# Patient Record
Sex: FEMALE | Race: WHITE | NOT HISPANIC OR LATINO | Employment: OTHER | URBAN - METROPOLITAN AREA
[De-identification: names, ages, dates, MRNs, and addresses within clinical notes are randomized per-mention and may not be internally consistent; named-entity substitution may affect disease eponyms.]

---

## 2017-06-09 ENCOUNTER — OUTSIDE FACILITY SERVICE (OUTPATIENT)
Dept: PULMONOLOGY | Facility: CLINIC | Age: 77
End: 2017-06-09

## 2017-06-09 PROCEDURE — 99233 SBSQ HOSP IP/OBS HIGH 50: CPT | Performed by: INTERNAL MEDICINE

## 2017-10-05 ENCOUNTER — HOSPITAL ENCOUNTER (OUTPATIENT)
Dept: OTHER | Age: 77
Discharge: HOME | End: 2017-10-05
Attending: INTERNAL MEDICINE | Admitting: INTERNAL MEDICINE

## 2017-10-05 LAB
BASOPHILS ABSOLUTE: 0 K/UL (ref 0–0.1)
BASOPHILS RELATIVE PERCENT: 0.1 %
CHOLESTEROL, TOTAL: 125 MG/DL (ref 0–200)
EOSINOPHILS ABSOLUTE: 0.2 K/UL (ref 0–0.4)
EOSINOPHILS RELATIVE PERCENT: 2.7 %
HCT VFR BLD CALC: 31.8 % (ref 37–47)
HDLC SERPL-MCNC: 27 MG/DL (ref 40–60)
HEMOGLOBIN: 9.7 G/DL (ref 11.5–16.5)
LDL CHOLESTEROL CALCULATED: 57 MG/DL
LYMPHOCYTES ABSOLUTE: 2.3 K/UL (ref 1.5–4)
LYMPHOCYTES RELATIVE PERCENT: 31.5 % (ref 20–40)
MCH RBC QN AUTO: 28.8 PG (ref 27–32)
MCHC RBC AUTO-ENTMCNC: 30.5 G/DL (ref 31–35)
MCV RBC AUTO: 94.4 FL (ref 80–100)
MONOCYTES ABSOLUTE: 0.7 K/UL (ref 0.2–0.8)
MONOCYTES RELATIVE PERCENT: 9.1 % (ref 3–10)
NEUTROPHILS ABSOLUTE: 4.1 K/UL (ref 2–7.5)
NEUTROPHILS RELATIVE PERCENT: 56.6 %
PDW BLD-RTO: 15 % (ref 11–16)
PLATELET # BLD: 250 K/UL (ref 150–400)
PMV BLD AUTO: 11.2 FL (ref 6–10)
RBC # BLD: 3.37 M/UL (ref 3.8–5.8)
TRIGL SERPL-MCNC: 204 MG/DL (ref 0–249)
TSH SERPL DL<=0.05 MIU/L-ACNC: 0.94 UIU/ML (ref 0.35–5.5)
VITAMIN B-12: 549 PG/ML (ref 211–911)
VITAMIN D 25-HYDROXY: 28.4 (ref 32–100)
VLDLC SERPL CALC-MCNC: 41 MG/DL
WBC # BLD: 7.3 K/UL (ref 4–11)

## 2017-10-06 ENCOUNTER — HOSPITAL ENCOUNTER (OUTPATIENT)
Dept: OTHER | Age: 77
Discharge: HOME | End: 2017-10-06
Attending: INTERNAL MEDICINE | Admitting: INTERNAL MEDICINE

## 2017-10-06 LAB
BACTERIA: ABNORMAL /HPF
BILIRUBIN URINE: NEGATIVE
BLOOD, URINE: ABNORMAL
CLARITY: ABNORMAL
COLOR: YELLOW
CRYSTALS, UA: ABNORMAL /HPF
EPITHELIAL CELLS, UA: ABNORMAL /HPF
GLUCOSE URINE: NEGATIVE MG/DL
KETONES, URINE: NEGATIVE MG/DL
LEUKOCYTE ESTERASE, URINE: ABNORMAL
MICROSCOPIC EXAMINATION: YES
MUCUS: ABNORMAL /LPF
NITRITE, URINE: NEGATIVE
PH UA: >=9
PROTEIN UA: 100 MG/DL
RBC UA: ABNORMAL /HPF (ref 0–2)
SPECIFIC GRAVITY UA: 1.01
URINE REFLEX TO CULTURE: YES
URINE TYPE: ABNORMAL
UROBILINOGEN, URINE: 0.2 E.U./DL
WBC UA: ABNORMAL /HPF (ref 0–5)

## 2017-10-08 LAB
ORGANISM: ABNORMAL
URINE CULTURE, ROUTINE: ABNORMAL
URINE CULTURE, ROUTINE: ABNORMAL

## 2017-10-18 ENCOUNTER — HOSPITAL ENCOUNTER (OUTPATIENT)
Dept: OTHER | Age: 77
Discharge: HOME | End: 2017-10-18
Attending: INTERNAL MEDICINE | Admitting: INTERNAL MEDICINE

## 2017-10-21 ENCOUNTER — HOSPITAL ENCOUNTER (OUTPATIENT)
Dept: OTHER | Age: 77
Discharge: HOME | End: 2017-10-21
Attending: INTERNAL MEDICINE | Admitting: INTERNAL MEDICINE

## 2017-10-22 LAB
CULTURE, RESPIRATORY: ABNORMAL
CULTURE, RESPIRATORY: ABNORMAL
GRAM STAIN RESULT: ABNORMAL
ORGANISM: ABNORMAL

## 2017-10-23 LAB
CULTURE, RESPIRATORY: NORMAL
GRAM STAIN RESULT: NORMAL

## 2017-10-28 ENCOUNTER — HOSPITAL ENCOUNTER (OUTPATIENT)
Dept: OTHER | Age: 77
Discharge: HOME | End: 2017-10-28
Attending: INTERNAL MEDICINE | Admitting: INTERNAL MEDICINE

## 2017-10-28 LAB
BACTERIA: ABNORMAL /HPF
BILIRUBIN URINE: NEGATIVE
BLOOD, URINE: ABNORMAL
CLARITY: ABNORMAL
COLOR: YELLOW
GLUCOSE URINE: NEGATIVE MG/DL
KETONES, URINE: NEGATIVE MG/DL
LEUKOCYTE ESTERASE, URINE: ABNORMAL
MICROSCOPIC EXAMINATION: YES
NITRITE, URINE: NEGATIVE
PH UA: 6
PROTEIN UA: NEGATIVE MG/DL
RBC UA: ABNORMAL /HPF (ref 0–2)
SPECIFIC GRAVITY UA: 1.01
URINE REFLEX TO CULTURE: YES
URINE TYPE: ABNORMAL
UROBILINOGEN, URINE: 0.2 E.U./DL
WBC UA: >100 /HPF (ref 0–5)

## 2017-10-30 LAB
ORGANISM: ABNORMAL
ORGANISM: ABNORMAL
URINE CULTURE, ROUTINE: ABNORMAL

## 2017-11-14 ENCOUNTER — HOSPITAL ENCOUNTER (OUTPATIENT)
Dept: OTHER | Age: 77
Discharge: HOME | End: 2017-11-14
Attending: INTERNAL MEDICINE | Admitting: INTERNAL MEDICINE

## 2017-11-14 LAB
BACTERIA: ABNORMAL /HPF
BILIRUBIN URINE: NEGATIVE
BLOOD, URINE: NEGATIVE
CLARITY: CLEAR
COLOR: YELLOW
EPITHELIAL CELLS, UA: ABNORMAL /HPF
GLUCOSE URINE: NEGATIVE MG/DL
KETONES, URINE: NEGATIVE MG/DL
LEUKOCYTE ESTERASE, URINE: ABNORMAL
MICROSCOPIC EXAMINATION: YES
NITRITE, URINE: NEGATIVE
PH UA: 7
PROTEIN UA: NEGATIVE MG/DL
RBC UA: ABNORMAL /HPF (ref 0–2)
SPECIFIC GRAVITY UA: 1.01
URINE REFLEX TO CULTURE: YES
URINE TYPE: ABNORMAL
UROBILINOGEN, URINE: 0.2 E.U./DL
WBC UA: ABNORMAL /HPF (ref 0–5)

## 2017-11-16 LAB
ORGANISM: ABNORMAL
URINE CULTURE, ROUTINE: ABNORMAL
URINE CULTURE, ROUTINE: ABNORMAL

## 2017-12-02 PROBLEM — N30.01 ACUTE CYSTITIS WITH HEMATURIA: Status: ACTIVE | Noted: 2017-12-02

## 2017-12-03 PROBLEM — Z93.1 G TUBE FEEDINGS (HCC): Status: ACTIVE | Noted: 2017-12-03

## 2017-12-03 PROBLEM — R33.9 URINARY RETENTION: Status: ACTIVE | Noted: 2017-12-03

## 2017-12-03 PROBLEM — I95.9 HYPOTENSION: Status: ACTIVE | Noted: 2017-12-03

## 2017-12-03 PROBLEM — N18.9 ACUTE ON CHRONIC RENAL FAILURE (HCC): Status: ACTIVE | Noted: 2017-12-03

## 2017-12-03 PROBLEM — T68.XXXA HYPOTHERMIA: Status: ACTIVE | Noted: 2017-12-03

## 2017-12-03 PROBLEM — L89.312 DECUBITUS ULCER OF RIGHT BUTTOCK, STAGE 2 (HCC): Status: ACTIVE | Noted: 2017-12-03

## 2017-12-03 PROBLEM — E16.2 HYPOGLYCEMIA: Status: ACTIVE | Noted: 2017-12-03

## 2017-12-03 PROBLEM — N17.9 ACUTE ON CHRONIC RENAL FAILURE (HCC): Status: ACTIVE | Noted: 2017-12-03

## 2017-12-03 PROBLEM — D50.9 IRON DEFICIENCY ANEMIA: Status: ACTIVE | Noted: 2017-12-03

## 2017-12-03 PROBLEM — E11.29 TYPE 2 DIABETES MELLITUS WITH KIDNEY COMPLICATION, WITH LONG-TERM CURRENT USE OF INSULIN (HCC): Status: ACTIVE | Noted: 2017-12-03

## 2017-12-03 PROBLEM — R41.82 ALTERED MENTAL STATUS: Status: ACTIVE | Noted: 2017-12-03

## 2017-12-03 PROBLEM — Z79.4 TYPE 2 DIABETES MELLITUS WITH KIDNEY COMPLICATION, WITH LONG-TERM CURRENT USE OF INSULIN (HCC): Status: ACTIVE | Noted: 2017-12-03

## 2017-12-05 PROBLEM — N18.30 ACUTE RENAL FAILURE SUPERIMPOSED ON STAGE 3 CHRONIC KIDNEY DISEASE (HCC): Status: ACTIVE | Noted: 2017-12-03

## 2017-12-05 PROBLEM — E11.22 TYPE 2 DIABETES MELLITUS WITH STAGE 3 CHRONIC KIDNEY DISEASE, WITH LONG-TERM CURRENT USE OF INSULIN (HCC): Status: ACTIVE | Noted: 2017-12-03

## 2017-12-05 PROBLEM — N18.30 TYPE 2 DIABETES MELLITUS WITH STAGE 3 CHRONIC KIDNEY DISEASE, WITH LONG-TERM CURRENT USE OF INSULIN (HCC): Status: ACTIVE | Noted: 2017-12-03

## 2017-12-13 ENCOUNTER — HOSPITAL ENCOUNTER (OUTPATIENT)
Dept: OTHER | Age: 77
Discharge: HOME | End: 2017-12-14
Attending: INTERNAL MEDICINE | Admitting: INTERNAL MEDICINE

## 2017-12-13 ENCOUNTER — HOSPITAL ENCOUNTER (OUTPATIENT)
Dept: OTHER | Age: 77
Discharge: HOME | End: 2017-12-13
Attending: INTERNAL MEDICINE | Admitting: INTERNAL MEDICINE

## 2017-12-13 LAB
ANION GAP SERPL CALCULATED.3IONS-SCNC: 8 MMOL/L (ref 3–16)
BASOPHILS ABSOLUTE: 0 K/UL (ref 0–0.1)
BASOPHILS RELATIVE PERCENT: 0.3 %
BUN BLDV-MCNC: 12 MG/DL (ref 6–20)
CALCIUM SERPL-MCNC: 9 MG/DL (ref 8.5–10.5)
CHLORIDE BLD-SCNC: 99 MMOL/L (ref 98–107)
CO2: 34 MMOL/L (ref 20–30)
CREAT SERPL-MCNC: 0.6 MG/DL (ref 0.4–1.2)
EOSINOPHILS ABSOLUTE: 0.2 K/UL (ref 0–0.4)
EOSINOPHILS RELATIVE PERCENT: 2.1 %
GFR AFRICAN AMERICAN: >59
GFR NON-AFRICAN AMERICAN: >60
GLUCOSE BLD-MCNC: 230 MG/DL (ref 74–106)
HCT VFR BLD CALC: 35.4 % (ref 37–47)
HEMOGLOBIN: 10.6 G/DL (ref 11.5–16.5)
IMMATURE GRANULOCYTES #: 0
IMMATURE GRANULOCYTES %: 0.4 % (ref 0–5)
LYMPHOCYTES ABSOLUTE: 2.8 K/UL (ref 1.5–4)
LYMPHOCYTES RELATIVE PERCENT: 26 %
MCH RBC QN AUTO: 27.6 PG (ref 27–32)
MCHC RBC AUTO-ENTMCNC: 29.9 G/DL (ref 31–35)
MCV RBC AUTO: 92.2 FL (ref 80–100)
MONOCYTES ABSOLUTE: 0.7 K/UL (ref 0.2–0.8)
MONOCYTES RELATIVE PERCENT: 6.2 %
NEUTROPHILS ABSOLUTE: 7 K/UL (ref 2–7.5)
NEUTROPHILS RELATIVE PERCENT: 65 %
PDW BLD-RTO: 15.2 % (ref 11–16)
PLATELET # BLD: 249 K/UL (ref 150–400)
PMV BLD AUTO: 9.7 FL (ref 6–10)
POTASSIUM SERPL-SCNC: 4.3 MMOL/L (ref 3.4–5.1)
RBC # BLD: 3.84 M/UL (ref 3.8–5.8)
SODIUM BLD-SCNC: 141 MMOL/L (ref 136–145)
WBC # BLD: 10.7 K/UL (ref 4–11)

## 2017-12-20 ENCOUNTER — HOSPITAL ENCOUNTER (OUTPATIENT)
Dept: OTHER | Age: 77
Discharge: HOME | End: 2017-12-20
Attending: INTERNAL MEDICINE | Admitting: INTERNAL MEDICINE

## 2017-12-20 LAB
BACTERIA: ABNORMAL /HPF
BILIRUBIN URINE: NEGATIVE
BLOOD, URINE: ABNORMAL
CLARITY: ABNORMAL
COLOR: YELLOW
EPITHELIAL CELLS, UA: ABNORMAL /HPF
GLUCOSE URINE: 500 MG/DL
KETONES, URINE: NEGATIVE MG/DL
LEUKOCYTE ESTERASE, URINE: ABNORMAL
MICROSCOPIC EXAMINATION: YES
NITRITE, URINE: POSITIVE
PH UA: 7.5
PROTEIN UA: 30 MG/DL
RBC UA: ABNORMAL /HPF (ref 0–2)
SPECIFIC GRAVITY UA: 1.01
URINE REFLEX TO CULTURE: YES
URINE TYPE: ABNORMAL
UROBILINOGEN, URINE: 1 E.U./DL
WBC UA: >100 /HPF (ref 0–5)

## 2017-12-21 ENCOUNTER — HOSPITAL ENCOUNTER (OUTPATIENT)
Dept: OTHER | Age: 77
Discharge: HOME | End: 2017-12-21
Attending: INTERNAL MEDICINE | Admitting: INTERNAL MEDICINE

## 2017-12-21 LAB
ANION GAP SERPL CALCULATED.3IONS-SCNC: 10 MMOL/L (ref 3–16)
BASOPHILS ABSOLUTE: 0 K/UL (ref 0–0.1)
BASOPHILS RELATIVE PERCENT: 0.5 %
BUN BLDV-MCNC: 13 MG/DL (ref 6–20)
CALCIUM SERPL-MCNC: 9 MG/DL (ref 8.5–10.5)
CHLORIDE BLD-SCNC: 98 MMOL/L (ref 98–107)
CO2: 29 MMOL/L (ref 20–30)
CREAT SERPL-MCNC: <0.5 MG/DL (ref 0.4–1.2)
EOSINOPHILS ABSOLUTE: 0.3 K/UL (ref 0–0.4)
EOSINOPHILS RELATIVE PERCENT: 4.5 %
GFR AFRICAN AMERICAN: >59
GFR NON-AFRICAN AMERICAN: >60
GLUCOSE BLD-MCNC: 296 MG/DL (ref 74–106)
HCT VFR BLD CALC: 34.6 % (ref 37–47)
HEMOGLOBIN: 10.4 G/DL (ref 11.5–16.5)
IMMATURE GRANULOCYTES #: 0
IMMATURE GRANULOCYTES %: 0.2 % (ref 0–5)
LYMPHOCYTES ABSOLUTE: 2.5 K/UL (ref 1.5–4)
LYMPHOCYTES RELATIVE PERCENT: 39.9 %
MCH RBC QN AUTO: 27.8 PG (ref 27–32)
MCHC RBC AUTO-ENTMCNC: 30.1 G/DL (ref 31–35)
MCV RBC AUTO: 92.5 FL (ref 80–100)
MONOCYTES ABSOLUTE: 0.6 K/UL (ref 0.2–0.8)
MONOCYTES RELATIVE PERCENT: 8.9 %
NEUTROPHILS ABSOLUTE: 2.9 K/UL (ref 2–7.5)
NEUTROPHILS RELATIVE PERCENT: 46 %
PDW BLD-RTO: 16 % (ref 11–16)
PLATELET # BLD: 243 K/UL (ref 150–400)
PMV BLD AUTO: 10.8 FL (ref 6–10)
POTASSIUM SERPL-SCNC: 4.8 MMOL/L (ref 3.4–5.1)
RBC # BLD: 3.74 M/UL (ref 3.8–5.8)
SODIUM BLD-SCNC: 137 MMOL/L (ref 136–145)
WBC # BLD: 6.2 K/UL (ref 4–11)

## 2017-12-22 LAB — URINE CULTURE, ROUTINE: NORMAL

## 2018-01-05 ENCOUNTER — HOSPITAL ENCOUNTER (OUTPATIENT)
Dept: OTHER | Age: 78
Discharge: HOME | End: 2018-01-05
Attending: INTERNAL MEDICINE | Admitting: INTERNAL MEDICINE

## 2018-01-08 LAB
CULTURE, RESPIRATORY: ABNORMAL
GRAM STAIN RESULT: ABNORMAL
ORGANISM: ABNORMAL
ORGANISM: ABNORMAL

## 2018-02-20 ENCOUNTER — HOSPITAL ENCOUNTER (OUTPATIENT)
Dept: OTHER | Age: 78
Discharge: HOME | End: 2018-02-20
Attending: INTERNAL MEDICINE | Admitting: INTERNAL MEDICINE

## 2018-03-09 PROCEDURE — 99305 1ST NF CARE MODERATE MDM 35: CPT | Performed by: PEDIATRICS

## 2018-03-12 RX ORDER — OXYCODONE HYDROCHLORIDE 5 MG/1
5 TABLET ORAL EVERY 6 HOURS
Qty: 60 TABLET | Refills: 0 | Status: SHIPPED | OUTPATIENT
Start: 2018-03-12 | End: 2018-03-27 | Stop reason: SDUPTHER

## 2018-03-19 ENCOUNTER — TELEPHONE (OUTPATIENT)
Dept: PRIMARY CARE CLINIC | Age: 78
End: 2018-03-19

## 2018-03-20 ENCOUNTER — HOSPITAL ENCOUNTER (OUTPATIENT)
Dept: OTHER | Age: 78
Discharge: HOME | End: 2018-03-20
Attending: INTERNAL MEDICINE | Admitting: INTERNAL MEDICINE

## 2018-03-21 ENCOUNTER — OUTSIDE SERVICES (OUTPATIENT)
Dept: PRIMARY CARE CLINIC | Age: 78
End: 2018-03-21
Payer: MEDICARE

## 2018-03-21 DIAGNOSIS — D50.9 IRON DEFICIENCY ANEMIA, UNSPECIFIED IRON DEFICIENCY ANEMIA TYPE: ICD-10-CM

## 2018-03-21 DIAGNOSIS — Z79.4 TYPE 2 DIABETES MELLITUS WITH STAGE 3 CHRONIC KIDNEY DISEASE, WITH LONG-TERM CURRENT USE OF INSULIN (HCC): Primary | ICD-10-CM

## 2018-03-21 DIAGNOSIS — N18.30 STAGE 3 CHRONIC KIDNEY DISEASE (HCC): ICD-10-CM

## 2018-03-21 DIAGNOSIS — R79.89 ABNORMAL TSH: ICD-10-CM

## 2018-03-21 DIAGNOSIS — Z98.890 H/O TRACHEOSTOMY: ICD-10-CM

## 2018-03-21 DIAGNOSIS — N18.30 TYPE 2 DIABETES MELLITUS WITH STAGE 3 CHRONIC KIDNEY DISEASE, WITH LONG-TERM CURRENT USE OF INSULIN (HCC): Primary | ICD-10-CM

## 2018-03-21 DIAGNOSIS — Z93.1 G TUBE FEEDINGS (HCC): ICD-10-CM

## 2018-03-21 DIAGNOSIS — E11.22 TYPE 2 DIABETES MELLITUS WITH STAGE 3 CHRONIC KIDNEY DISEASE, WITH LONG-TERM CURRENT USE OF INSULIN (HCC): Primary | ICD-10-CM

## 2018-03-24 PROBLEM — Z98.890 H/O TRACHEOSTOMY: Status: ACTIVE | Noted: 2018-03-24

## 2018-03-24 ASSESSMENT — ENCOUNTER SYMPTOMS
SHORTNESS OF BREATH: 0
ABDOMINAL PAIN: 0
WHEEZING: 0
BACK PAIN: 0
SINUS PRESSURE: 0
VOMITING: 0
SORE THROAT: 0
EYE DISCHARGE: 0
COUGH: 0
NAUSEA: 0

## 2018-03-27 RX ORDER — OXYCODONE HYDROCHLORIDE 5 MG/1
5 TABLET ORAL EVERY 6 HOURS
Qty: 120 TABLET | Refills: 0 | Status: SHIPPED | OUTPATIENT
Start: 2018-03-27 | End: 2018-04-24 | Stop reason: SDUPTHER

## 2018-03-27 RX ORDER — OXYCODONE HYDROCHLORIDE 5 MG/1
5 TABLET ORAL EVERY 6 HOURS
Status: CANCELLED | OUTPATIENT
Start: 2018-03-27

## 2018-04-24 RX ORDER — OXYCODONE HYDROCHLORIDE 5 MG/1
5 TABLET ORAL EVERY 8 HOURS
Qty: 90 TABLET | Refills: 0 | Status: SHIPPED | OUTPATIENT
Start: 2018-04-24 | End: 2018-05-03 | Stop reason: ALTCHOICE

## 2018-05-03 ENCOUNTER — TELEPHONE (OUTPATIENT)
Dept: PRIMARY CARE CLINIC | Age: 78
End: 2018-05-03

## 2018-05-03 PROCEDURE — 99308 SBSQ NF CARE LOW MDM 20: CPT | Performed by: PEDIATRICS

## 2018-05-03 RX ORDER — ALBUTEROL SULFATE 2.5 MG/3ML
2.5 SOLUTION RESPIRATORY (INHALATION) EVERY 4 HOURS PRN
COMMUNITY

## 2018-05-03 RX ORDER — ONDANSETRON 4 MG/1
4 TABLET, FILM COATED ORAL EVERY 6 HOURS PRN
COMMUNITY

## 2018-05-03 RX ORDER — MIDODRINE HYDROCHLORIDE 10 MG/1
10 TABLET ORAL 3 TIMES DAILY
COMMUNITY

## 2018-05-03 RX ORDER — BUSPIRONE HYDROCHLORIDE 10 MG/1
10 TABLET ORAL 3 TIMES DAILY
COMMUNITY
End: 2018-08-22

## 2018-05-03 RX ORDER — OXYCODONE HYDROCHLORIDE 5 MG/1
5 CAPSULE ORAL EVERY 6 HOURS PRN
COMMUNITY
End: 2018-05-10 | Stop reason: SDUPTHER

## 2018-05-03 RX ORDER — FERROUS SULFATE 325(65) MG
325 TABLET ORAL
COMMUNITY
End: 2018-08-07 | Stop reason: SDUPTHER

## 2018-05-03 RX ORDER — CHLORAL HYDRATE 500 MG
1000 CAPSULE ORAL 2 TIMES DAILY
COMMUNITY

## 2018-05-03 RX ORDER — HYDROXYZINE HYDROCHLORIDE 25 MG/1
25 TABLET, FILM COATED ORAL DAILY
COMMUNITY
End: 2018-08-07 | Stop reason: ALTCHOICE

## 2018-05-03 RX ORDER — NAPHAZOLINE HCL/GLYCERIN 0.012-0.2%
DROPS OPHTHALMIC (EYE)
COMMUNITY
End: 2018-09-16

## 2018-05-10 RX ORDER — OXYCODONE HYDROCHLORIDE 5 MG/1
5 CAPSULE ORAL EVERY 6 HOURS PRN
Qty: 120 CAPSULE | Refills: 0 | Status: SHIPPED | OUTPATIENT
Start: 2018-05-10 | End: 2018-05-18 | Stop reason: SDUPTHER

## 2018-05-18 RX ORDER — OXYCODONE HYDROCHLORIDE 5 MG/1
5 CAPSULE ORAL EVERY 8 HOURS
Qty: 30 CAPSULE | Refills: 0 | Status: SHIPPED | OUTPATIENT
Start: 2018-05-18 | End: 2018-05-25 | Stop reason: SDUPTHER

## 2018-05-25 RX ORDER — OXYCODONE HYDROCHLORIDE 5 MG/1
5 CAPSULE ORAL EVERY 8 HOURS
Qty: 90 CAPSULE | Refills: 0 | Status: SHIPPED | OUTPATIENT
Start: 2018-05-25 | End: 2018-05-25 | Stop reason: SDUPTHER

## 2018-05-25 RX ORDER — OXYCODONE HYDROCHLORIDE 5 MG/1
5 CAPSULE ORAL EVERY 8 HOURS
Qty: 90 CAPSULE | Refills: 0 | Status: SHIPPED | OUTPATIENT
Start: 2018-05-25 | End: 2018-05-30 | Stop reason: SDUPTHER

## 2018-05-30 DIAGNOSIS — G89.29 OTHER CHRONIC PAIN: Primary | ICD-10-CM

## 2018-05-30 RX ORDER — OXYCODONE HYDROCHLORIDE 5 MG/1
5 CAPSULE ORAL EVERY 8 HOURS
Qty: 90 CAPSULE | Refills: 0 | Status: SHIPPED | OUTPATIENT
Start: 2018-05-30 | End: 2018-06-06

## 2018-06-06 DIAGNOSIS — M54.50 CHRONIC BILATERAL LOW BACK PAIN WITHOUT SCIATICA: Primary | ICD-10-CM

## 2018-06-06 DIAGNOSIS — G89.29 CHRONIC BILATERAL LOW BACK PAIN WITHOUT SCIATICA: Primary | ICD-10-CM

## 2018-06-06 RX ORDER — OXYCODONE HYDROCHLORIDE 5 MG/1
5 TABLET ORAL EVERY 8 HOURS
Qty: 90 TABLET | Refills: 0 | Status: SHIPPED | OUTPATIENT
Start: 2018-06-06 | End: 2018-07-18 | Stop reason: SDUPTHER

## 2018-07-10 DIAGNOSIS — F41.9 ANXIETY: Primary | ICD-10-CM

## 2018-07-10 RX ORDER — CLONAZEPAM 1 MG/1
1 TABLET ORAL 2 TIMES DAILY
Qty: 60 TABLET | Refills: 3 | Status: ON HOLD | OUTPATIENT
Start: 2018-07-10 | End: 2018-10-01 | Stop reason: SDUPTHER

## 2018-07-18 DIAGNOSIS — M54.50 CHRONIC BILATERAL LOW BACK PAIN WITHOUT SCIATICA: ICD-10-CM

## 2018-07-18 DIAGNOSIS — G89.29 CHRONIC BILATERAL LOW BACK PAIN WITHOUT SCIATICA: ICD-10-CM

## 2018-07-18 RX ORDER — OXYCODONE HYDROCHLORIDE 5 MG/1
5 TABLET ORAL EVERY 8 HOURS
Qty: 90 TABLET | Refills: 0 | Status: SHIPPED | OUTPATIENT
Start: 2018-07-18 | End: 2018-09-12 | Stop reason: SDUPTHER

## 2018-07-26 PROCEDURE — 99308 SBSQ NF CARE LOW MDM 20: CPT | Performed by: PEDIATRICS

## 2018-08-07 DIAGNOSIS — I10 ESSENTIAL HYPERTENSION: ICD-10-CM

## 2018-08-07 DIAGNOSIS — K21.9 GASTROESOPHAGEAL REFLUX DISEASE WITHOUT ESOPHAGITIS: ICD-10-CM

## 2018-08-07 DIAGNOSIS — J44.9 CHRONIC OBSTRUCTIVE PULMONARY DISEASE, UNSPECIFIED COPD TYPE (HCC): ICD-10-CM

## 2018-08-07 DIAGNOSIS — I35.0 NONRHEUMATIC AORTIC (VALVE) STENOSIS: ICD-10-CM

## 2018-08-07 DIAGNOSIS — R11.2 NAUSEA AND VOMITING, INTRACTABILITY OF VOMITING NOT SPECIFIED, UNSPECIFIED VOMITING TYPE: ICD-10-CM

## 2018-08-07 DIAGNOSIS — R13.10 DYSPHAGIA, UNSPECIFIED TYPE: ICD-10-CM

## 2018-08-07 DIAGNOSIS — R54 AGE-RELATED PHYSICAL DEBILITY: ICD-10-CM

## 2018-08-07 DIAGNOSIS — I50.30 DIASTOLIC HEART FAILURE, UNSPECIFIED HF CHRONICITY (HCC): ICD-10-CM

## 2018-08-07 PROBLEM — F03.90 DEMENTIA WITHOUT BEHAVIORAL DISTURBANCE (HCC): Status: ACTIVE | Noted: 2018-08-07

## 2018-08-07 PROBLEM — I25.10 ATHEROSCLEROSIS OF NATIVE CORONARY ARTERY WITHOUT ANGINA PECTORIS: Status: ACTIVE | Noted: 2018-08-07

## 2018-08-07 RX ORDER — ROPINIROLE 4 MG/1
4 TABLET, FILM COATED ORAL 3 TIMES DAILY
Qty: 30 TABLET | Refills: 0 | COMMUNITY
Start: 2018-08-07

## 2018-08-07 RX ORDER — FERROUS SULFATE 325(65) MG
325 TABLET ORAL 2 TIMES DAILY
Qty: 30 TABLET | COMMUNITY
Start: 2018-08-07

## 2018-08-12 ENCOUNTER — APPOINTMENT (OUTPATIENT)
Dept: GENERAL RADIOLOGY | Facility: HOSPITAL | Age: 78
End: 2018-08-12
Payer: MEDICARE

## 2018-08-12 ENCOUNTER — HOSPITAL ENCOUNTER (EMERGENCY)
Facility: HOSPITAL | Age: 78
Discharge: OTHER INSTITUTION | End: 2018-08-12
Attending: EMERGENCY MEDICINE
Payer: MEDICARE

## 2018-08-12 VITALS
TEMPERATURE: 97.2 F | OXYGEN SATURATION: 100 % | WEIGHT: 163 LBS | DIASTOLIC BLOOD PRESSURE: 56 MMHG | BODY MASS INDEX: 28.88 KG/M2 | HEIGHT: 63 IN | HEART RATE: 73 BPM | RESPIRATION RATE: 18 BRPM | SYSTOLIC BLOOD PRESSURE: 106 MMHG

## 2018-08-12 DIAGNOSIS — R06.89 DYSPNEA AND RESPIRATORY ABNORMALITIES: Primary | ICD-10-CM

## 2018-08-12 DIAGNOSIS — J95.00 TRACHEOSTOMY COMPLICATION, UNSPECIFIED COMPLICATION TYPE (HCC): ICD-10-CM

## 2018-08-12 DIAGNOSIS — R79.89 ELEVATED BRAIN NATRIURETIC PEPTIDE (BNP) LEVEL: ICD-10-CM

## 2018-08-12 DIAGNOSIS — R06.00 DYSPNEA AND RESPIRATORY ABNORMALITIES: Primary | ICD-10-CM

## 2018-08-12 LAB
ANION GAP SERPL CALCULATED.3IONS-SCNC: 7 MMOL/L (ref 3–16)
BASOPHILS ABSOLUTE: 0 K/UL (ref 0–0.1)
BASOPHILS RELATIVE PERCENT: 0.5 %
BUN BLDV-MCNC: 15 MG/DL (ref 6–20)
CALCIUM SERPL-MCNC: 9.4 MG/DL (ref 8.5–10.5)
CHLORIDE BLD-SCNC: 99 MMOL/L (ref 98–107)
CO2: 32 MMOL/L (ref 20–30)
CREAT SERPL-MCNC: <0.5 MG/DL (ref 0.4–1.2)
EOSINOPHILS ABSOLUTE: 0.2 K/UL (ref 0–0.4)
EOSINOPHILS RELATIVE PERCENT: 2.1 %
GFR AFRICAN AMERICAN: >59
GFR NON-AFRICAN AMERICAN: >60
GLUCOSE BLD-MCNC: 253 MG/DL (ref 74–106)
HCT VFR BLD CALC: 38 % (ref 37–47)
HEMOGLOBIN: 11.6 G/DL (ref 11.5–16.5)
IMMATURE GRANULOCYTES #: 0 K/UL
IMMATURE GRANULOCYTES %: 0.4 % (ref 0–5)
LYMPHOCYTES ABSOLUTE: 2.9 K/UL (ref 1.5–4)
LYMPHOCYTES RELATIVE PERCENT: 36.1 %
MCH RBC QN AUTO: 29.5 PG (ref 27–32)
MCHC RBC AUTO-ENTMCNC: 30.5 G/DL (ref 31–35)
MCV RBC AUTO: 96.7 FL (ref 80–100)
MONOCYTES ABSOLUTE: 0.7 K/UL (ref 0.2–0.8)
MONOCYTES RELATIVE PERCENT: 9.1 %
NEUTROPHILS ABSOLUTE: 4.2 K/UL (ref 2–7.5)
NEUTROPHILS RELATIVE PERCENT: 51.8 %
PDW BLD-RTO: 13.2 % (ref 11–16)
PLATELET # BLD: 170 K/UL (ref 150–400)
PMV BLD AUTO: 10.7 FL (ref 6–10)
POTASSIUM SERPL-SCNC: 4.3 MMOL/L (ref 3.4–5.1)
PRO-BNP: 2715 PG/ML (ref 0–1800)
RBC # BLD: 3.93 M/UL (ref 3.8–5.8)
SODIUM BLD-SCNC: 138 MMOL/L (ref 136–145)
TROPONIN: <0.3 NG/ML
WBC # BLD: 8.1 K/UL (ref 4–11)

## 2018-08-12 PROCEDURE — 71045 X-RAY EXAM CHEST 1 VIEW: CPT

## 2018-08-12 PROCEDURE — 80048 BASIC METABOLIC PNL TOTAL CA: CPT

## 2018-08-12 PROCEDURE — 31720 CLEARANCE OF AIRWAYS: CPT

## 2018-08-12 PROCEDURE — 87070 CULTURE OTHR SPECIMN AEROBIC: CPT

## 2018-08-12 PROCEDURE — 87040 BLOOD CULTURE FOR BACTERIA: CPT

## 2018-08-12 PROCEDURE — 99285 EMERGENCY DEPT VISIT HI MDM: CPT

## 2018-08-12 PROCEDURE — 85025 COMPLETE CBC W/AUTO DIFF WBC: CPT

## 2018-08-12 PROCEDURE — 36415 COLL VENOUS BLD VENIPUNCTURE: CPT

## 2018-08-12 PROCEDURE — 87205 SMEAR GRAM STAIN: CPT

## 2018-08-12 PROCEDURE — 93005 ELECTROCARDIOGRAM TRACING: CPT

## 2018-08-12 PROCEDURE — 84484 ASSAY OF TROPONIN QUANT: CPT

## 2018-08-12 PROCEDURE — 83880 ASSAY OF NATRIURETIC PEPTIDE: CPT

## 2018-08-12 RX ORDER — FUROSEMIDE 40 MG/1
20 TABLET ORAL 2 TIMES DAILY
Qty: 6 TABLET | Refills: 0 | Status: SHIPPED | OUTPATIENT
Start: 2018-08-12

## 2018-08-12 NOTE — ED TRIAGE NOTES
Pt. To er per Twan Alvarado at [facility] inner cannula from trach was missing they gave pt. A oxycodone re-checked her 1 1/2 hrs later and sats were decreased to 93 % tried to replace cannula and sats dropped to 90% so did not replace cannula. Pt.  With c/o SOB

## 2018-08-12 NOTE — ED PROVIDER NOTES
[address] [place] [address] Court  eMERGENCY dEPARTMENT eNCOUnter      Pt Name: Radha Curry  MRN: [id number]  [place]: 1940  Date of evaluation: 1/27/3078  Provider: Ana Teixeira MD    [address] [place] [address]       Chief Complaint   Patient presents with    Shortness of Breath     per Nursing Home pt. inner cannula missing pt. sats 98% NH reports gave her a pain pill and 1 1/2 hours later attempted to replace canula sats decreased 90 and pt. C/O SOB         HISTORY OF PRESENT ILLNESS  (Location/Symptom, Timing/Onset, Context/Setting, Quality, Duration, Modifying Factors, Severity.)   Radha Curry is a 66 y.o. female who presents to the emergency department for dyspnea and hypoxia. She has a size 6 Shiley trach. She apparently lost her inner cannula and the nurse at the NH was unable to re-insert the inner cannula. Patient's sats reportedly dropped to 88%. Before arrival, we communicated with the nursing staff and asked them to perform deep suction. EMS reports O2 sats of 98% upon their arrival with the patient on her baseline oxygen of 2-3LNC. Patient told the nurse she had some dyspnea but told me this is baseline for her. Nursing notes were reviewed. REVIEW OF SYSTEMS    (2-9 systems for level 4, 10 or more for level 5)   ROS:  General:  No fevers, no chills, no weakness  HEENT: No sore throat, runny nose or ear pain  Cardiovascular:  No chest pain, no palpitations  Respiratory:  + shortness of breath, no cough, no wheezing  Gastrointestinal:  No pain, no nausea, no vomiting, no diarrhea  Musculoskeletal:  No muscle pain, no joint pain  Skin:  No rash, no easy bruising  Genitourinary:  No dysuria, no hematuria    Except as noted above the remainder of the review of systems was reviewed and negative.        PAST MEDICAL HISTORY     Past Medical History:   Diagnosis Date    Anemia     Anxiety     CAD (coronary artery disease)     CHF (congestive heart failure) (HCC)     Chronic MULTIVITAMIN-MINERALS) TABLET    Take 1 tablet by mouth daily    OMEGA-3 1000 MG CAPS    Take 1,000 mg by mouth 2 times daily    ONDANSETRON (ZOFRAN) 4 MG TABLET    Take 4 mg by mouth every 6 hours as needed for Nausea or Vomiting    OXYCODONE (ROXICODONE) 5 MG IMMEDIATE RELEASE TABLET    Take 1 tablet by mouth every 8 hours for 30 days. Lauri Hawley PANTOPRAZOLE (PROTONIX) 40 MG TABLET    Take 40 mg by mouth daily    POLYETHYLENE GLYCOL (GLYCOLAX) POWDER    Take 17 g by mouth daily    ROPINIROLE (REQUIP) 4 MG TABLET    Take 1 tablet by mouth 3 times daily    SODIUM BICARBONATE 325 MG TABLET    Take 325 mg by mouth 4 times daily    SOFT LENS PRODUCTS (SALINE) SOLN    by Does not apply route       ALLERGIES     Penicillins and Sulfa antibiotics    FAMILY HISTORY     History reviewed. No pertinent family history. SOCIAL HISTORY       Social History     Social History    Marital status:      Spouse name: N/A    Number of children: N/A    Years of education: N/A     Social History Main Topics    Smoking status: Former Smoker    Smokeless tobacco: Never Used    Alcohol use No    Drug use: No    Sexual activity: Not Asked     Other Topics Concern    None     Social History Narrative    None         PHYSICAL EXAM    (up to 7 for level 4, 8 or more for level 5)     ED Triage Vitals [08/12/18 0636]   BP Temp Temp src Pulse Resp SpO2 Height Weight   -- -- -- 80 -- 93 % 5' 2.5\" (1.588 m) 163 lb (73.9 kg)       Physical Exam  General :Patient is awake, alert, oriented, in no acute distress, nontoxic appearing  HEENT: Pupils are equally round and reactive to light, EOMI. TRACH:  Size 6: Inner cannula placed by MD during physical exam.  Cardiac: Heart regular rate, rhythm, no murmurs, rubs, or gallops  Lungs: Upper respiratory congestive sounds but lungs are clear to auscultation, there is no wheezing, rhonchi, or rales. Abdomen: Abdomen is soft, nontender, nondistended.    Musculoskeletal: Ambulatory  Back: No midline or bony tenderness. Dermatology: Skin is warm and dry  Psych: Mentation is grossly normal, cognition is grossly normal. Affect is appropriate. DIAGNOSTIC RESULTS       RADIOLOGY:   Non-plain film images such as CT, Ultrasound and MRI are read by the radiologist. Plain radiographic images are visualized and preliminarily interpreted by the emergency physician with the below findings:      [] Radiologist's Report Reviewed:  XR CHEST PORTABLE    (Results Pending)         ED BEDSIDE ULTRASOUND:   Performed by ED Physician - none    LABS:  Labs Reviewed   CULTURE BLOOD #1   RESPIRATORY CULTURE   CBC WITH AUTO DIFFERENTIAL   [address] PANEL   TROPONIN       I have reviewed and interpreted all of the currently available lab results from this visit (if applicable):  No results found for this visit on 08/12/18. All other labs were within normal range or not returned as of this dictation. EMERGENCY DEPARTMENT COURSE and DIFFERENTIAL DIAGNOSIS/MDM:   Vitals:    Vitals:    08/12/18 0636   Pulse: 80   SpO2: 93%   Weight: 163 lb (73.9 kg)   Height: 5' 2.5\" (1.588 m)     Will check basic labs including BNP and troponin. Will check chest x-ray. Respiratory removed a large mucous plug.    0700  I have signed out [facility]'s Emergency Department care to [address]. We discussed the pertinent history, physical exam, completed/pending test results (if applicable) and current treatment plan. Please refer to his/her chart for the patients remaining Emergency Department course and final disposition. CONSULTS:  None    PROCEDURES:  Procedures    CRITICAL CARE TIME    Total Critical Care time was 0 minutes, excluding separately reportable procedures. There was a high probability of clinically significant/life threatening deterioration in the patient's condition which required my urgent intervention. FINAL IMPRESSION    No diagnosis found.       DISPOSITION/PLAN

## 2018-08-12 NOTE — ED PROVIDER NOTES
CREATININE <0.5 0.4 - 1.2 mg/dL    GFR Non-African American >60 >59    GFR African American >59 >59    Calcium 9.4 8.5 - 10.5 mg/dL   Troponin   Result Value Ref Range    Troponin <0.30 <0.30 ng/mL     Xr Chest Portable    Result Date: 8/12/2018  EXAM: XR CHEST PORTABLE INDICATION: dyspnea, COMPARISON: May 14, 2018 FINDINGS: Medical Devices: Tracheostomy tube overlies the tracheal air column. Lungs: Small left pleural effusion. No pneumothorax. Heart and Mediastinum: Mild cardiomegaly. Atherosclerotic plaque in the aorta. Other: Postsurgical changes in the right humerus. 1. Small left pleural effusion. 2. Tracheostomy tube. EKG interpreted by me as normal sinus rhythm at 65 bpm, sinus arrhythmia present, first-degree AV block, NV interval 220 ms, no acute ST elevation, there is T-wave inversion in 3, aVF as well as lateral precordial leads. compared to old EKG from May 14, 2018 similar changes, no acute significant change. Final ED Course and MDM: In brief, Wojciech Francisco is a 66 y.o. female whose care was signed out to me by the outgoing provider. In brief, patient presented to losing her in her tracheostomy cannula, unable to be replaced by the nursing facility. This was replaced by overnight physician upon arrival, oxygen saturations in the upper 90s on her trach collar. No acute complaints per the patient. Chest x-ray, IV, lab work obtained, BNP slightly elevated, no other significant abnormalities. Patient eating a full breakfast, without acute complaints, we discussed placing her on a diuretic for a few days, following up with her clinician at her facility, referral to cardiology and echocardiography is indicated. Vital signs stable. Any worsening symptoms or further concerns to return to the emergency department. ED Medication Orders     None          Final Impression      1. Dyspnea and respiratory abnormalities    2. Tracheostomy complication, unspecified complication type ([address]    3.

## 2018-08-14 LAB
CULTURE, RESPIRATORY: NORMAL
GRAM STAIN RESULT: NORMAL

## 2018-08-17 LAB — BLOOD CULTURE, ROUTINE: NORMAL

## 2018-08-22 ENCOUNTER — TELEPHONE (OUTPATIENT)
Dept: PRIMARY CARE CLINIC | Age: 78
End: 2018-08-22

## 2018-08-22 RX ORDER — BUSPIRONE HYDROCHLORIDE 15 MG/1
15 TABLET ORAL 3 TIMES DAILY
Qty: 90 TABLET | Refills: 3 | Status: SHIPPED | OUTPATIENT
Start: 2018-08-22

## 2018-08-26 ENCOUNTER — OUTSIDE SERVICES (OUTPATIENT)
Dept: PRIMARY CARE CLINIC | Age: 78
End: 2018-08-26
Payer: MEDICARE

## 2018-08-26 DIAGNOSIS — I25.10 ATHEROSCLEROSIS OF NATIVE CORONARY ARTERY OF NATIVE HEART WITHOUT ANGINA PECTORIS: ICD-10-CM

## 2018-08-26 DIAGNOSIS — G89.29 OTHER CHRONIC PAIN: ICD-10-CM

## 2018-08-26 DIAGNOSIS — F03.90 DEMENTIA WITHOUT BEHAVIORAL DISTURBANCE, UNSPECIFIED DEMENTIA TYPE: ICD-10-CM

## 2018-08-26 DIAGNOSIS — I10 ESSENTIAL HYPERTENSION: ICD-10-CM

## 2018-08-26 DIAGNOSIS — E11.22 TYPE 2 DIABETES MELLITUS WITH STAGE 3 CHRONIC KIDNEY DISEASE, WITH LONG-TERM CURRENT USE OF INSULIN (HCC): Primary | ICD-10-CM

## 2018-08-26 DIAGNOSIS — D50.9 IRON DEFICIENCY ANEMIA, UNSPECIFIED IRON DEFICIENCY ANEMIA TYPE: ICD-10-CM

## 2018-08-26 DIAGNOSIS — R13.10 DYSPHAGIA, UNSPECIFIED TYPE: ICD-10-CM

## 2018-08-26 DIAGNOSIS — Z98.890 H/O TRACHEOSTOMY: ICD-10-CM

## 2018-08-26 DIAGNOSIS — I35.0 NONRHEUMATIC AORTIC (VALVE) STENOSIS: ICD-10-CM

## 2018-08-26 DIAGNOSIS — G25.81 RESTLESS LEG: ICD-10-CM

## 2018-08-26 DIAGNOSIS — N18.30 STAGE 3 CHRONIC KIDNEY DISEASE (HCC): ICD-10-CM

## 2018-08-26 DIAGNOSIS — K21.9 GASTROESOPHAGEAL REFLUX DISEASE WITHOUT ESOPHAGITIS: ICD-10-CM

## 2018-08-26 DIAGNOSIS — J44.9 CHRONIC OBSTRUCTIVE PULMONARY DISEASE, UNSPECIFIED COPD TYPE (HCC): ICD-10-CM

## 2018-08-26 DIAGNOSIS — R54 AGE-RELATED PHYSICAL DEBILITY: ICD-10-CM

## 2018-08-26 DIAGNOSIS — N18.30 TYPE 2 DIABETES MELLITUS WITH STAGE 3 CHRONIC KIDNEY DISEASE, WITH LONG-TERM CURRENT USE OF INSULIN (HCC): Primary | ICD-10-CM

## 2018-08-26 DIAGNOSIS — I50.9 CONGESTIVE HEART FAILURE, UNSPECIFIED HF CHRONICITY, UNSPECIFIED HEART FAILURE TYPE (HCC): ICD-10-CM

## 2018-08-26 DIAGNOSIS — Z79.4 TYPE 2 DIABETES MELLITUS WITH STAGE 3 CHRONIC KIDNEY DISEASE, WITH LONG-TERM CURRENT USE OF INSULIN (HCC): Primary | ICD-10-CM

## 2018-08-26 DIAGNOSIS — F41.9 ANXIETY: ICD-10-CM

## 2018-08-26 DIAGNOSIS — I50.30 DIASTOLIC HEART FAILURE, UNSPECIFIED HF CHRONICITY (HCC): ICD-10-CM

## 2018-08-26 PROBLEM — I95.9 HYPOTENSION: Status: RESOLVED | Noted: 2017-12-03 | Resolved: 2018-08-26

## 2018-08-26 PROBLEM — E16.2 HYPOGLYCEMIA: Status: RESOLVED | Noted: 2017-12-03 | Resolved: 2018-08-26

## 2018-08-26 PROBLEM — N30.01 ACUTE CYSTITIS WITH HEMATURIA: Status: RESOLVED | Noted: 2017-12-02 | Resolved: 2018-08-26

## 2018-08-26 PROBLEM — R41.82 ALTERED MENTAL STATUS: Status: RESOLVED | Noted: 2017-12-03 | Resolved: 2018-08-26

## 2018-08-26 PROBLEM — R11.2 NAUSEA WITH VOMITING: Status: RESOLVED | Noted: 2018-08-07 | Resolved: 2018-08-26

## 2018-08-26 PROBLEM — N17.9 ACUTE RENAL FAILURE SUPERIMPOSED ON STAGE 3 CHRONIC KIDNEY DISEASE (HCC): Status: RESOLVED | Noted: 2017-12-03 | Resolved: 2018-08-26

## 2018-08-26 PROBLEM — L89.312 DECUBITUS ULCER OF RIGHT BUTTOCK, STAGE 2 (HCC): Status: RESOLVED | Noted: 2017-12-03 | Resolved: 2018-08-26

## 2018-08-26 PROBLEM — Z93.1 G TUBE FEEDINGS (HCC): Status: RESOLVED | Noted: 2017-12-03 | Resolved: 2018-08-26

## 2018-08-26 PROBLEM — T68.XXXA HYPOTHERMIA: Status: RESOLVED | Noted: 2017-12-03 | Resolved: 2018-08-26

## 2018-08-26 PROBLEM — R33.9 URINARY RETENTION: Status: RESOLVED | Noted: 2017-12-03 | Resolved: 2018-08-26

## 2018-08-26 ASSESSMENT — ENCOUNTER SYMPTOMS
SINUS PRESSURE: 0
SORE THROAT: 0
COUGH: 0
SORE THROAT: 0
COUGH: 0
NAUSEA: 0
SHORTNESS OF BREATH: 0
BACK PAIN: 0
BACK PAIN: 0
VOMITING: 0
ABDOMINAL PAIN: 0
EYE DISCHARGE: 0
WHEEZING: 0
ABDOMINAL PAIN: 0
SHORTNESS OF BREATH: 0
SINUS PRESSURE: 0
EYE DISCHARGE: 0
VOMITING: 0
NAUSEA: 0
WHEEZING: 0

## 2018-08-26 NOTE — PROGRESS NOTES
[place]  History and Physical:    Mackenzie Ortiz   : 18    Interval history:    The patient, Mackenzie Ortiz, is a 66 y.o. female who is a resident at [facility] and [facility]. The patient is currently doing well on the current medication regimin and intervention plan. The patient has been complaining of some worsening anxiety and trouble sleeping. She was started on clonazepam 1 mg bid a few weeks ago. She reports this is helping her anxiety. She still has intermittent cough with mucus production from her tracheostomy. Patient's past medical, surgical, social and family histories were reviewed as documented in previous note/chart. Medication list and allergies reviewed as documented in the paper chart of the facility. Some recent changes to medication list may not be shown  Please referred to medication records at the facility for updated medications list.    Current Outpatient Prescriptions   Medication Sig Dispense Refill    busPIRone (BUSPAR) 15 MG tablet Take 15 mg by mouth 3 times daily 90 tablet 3    furosemide (LASIX) 40 MG tablet Take 0.5 tablets by mouth 2 times daily 6 tablet 0    rOPINIRole (REQUIP) 4 MG tablet Take 1 tablet by mouth 3 times daily 30 tablet 0    ferrous sulfate 325 (65 Fe) MG tablet Take 1 tablet by mouth 2 times daily 30 tablet     clonazePAM (KLONOPIN) 1 MG tablet Take 1 tablet by mouth 2 times daily for 120 days. . 60 tablet 3    midodrine (PROAMATINE) 10 MG tablet Take 10 mg by mouth 3 times daily      Omega-3 1000 MG CAPS Take 1,000 mg by mouth 2 times daily      ondansetron (ZOFRAN) 4 MG tablet Take 4 mg by mouth every 6 hours as needed for Nausea or Vomiting      Soft Lens Products (SALINE) SOLN by Does not apply route      albuterol (PROVENTIL) (2.5 MG/3ML) 0.083% nebulizer solution Take 2.5 mg by nebulization every 4 hours as needed for Wheezing      insulin glargine (LANTUS) 100 UNIT/ML injection vial Inject 20 Units into the skin nightly 1 vial 0    aspirin 81 MG chewable tablet Take 81 mg by mouth daily      docusate sodium (COLACE) 100 MG capsule Take 100 mg by mouth daily      polyethylene glycol (GLYCOLAX) powder Take 17 g by mouth daily      pantoprazole (PROTONIX) 40 MG tablet Take 40 mg by mouth daily      sodium bicarbonate 325 MG tablet Take 325 mg by mouth 4 times daily      Multiple Vitamins-Minerals (THERAPEUTIC MULTIVITAMIN-MINERALS) tablet Take 1 tablet by mouth daily      atorvastatin (LIPITOR) 40 MG tablet Take 40 mg by mouth daily      enoxaparin (LOVENOX) 40 MG/0.4ML injection Inject into the skin daily      Arginine POWD 1 packet by Gastrostomy Tube route 2 times daily      acetaminophen (TYLENOL) 500 MG tablet Take 500 mg by mouth every 4 hours as needed for Pain or Fever      acetylcysteine (MUCOMYST) 10 % nebulizer solution Inhale 4 mLs into the lungs every 4 hours as needed      ipratropium-albuterol (DUONEB) 0.5-2.5 (3) MG/3ML SOLN nebulizer solution Inhale 1 vial into the lungs every 4 hours       No current facility-administered medications for this visit. Review of Systems   Constitutional: Negative for chills and fever. HENT: Negative for congestion, sinus pressure and sore throat. Eyes: Negative for discharge and visual disturbance. Respiratory: Negative for cough, shortness of breath and wheezing. Mucus from trach   Cardiovascular: Negative for chest pain and palpitations. Gastrointestinal: Negative for abdominal pain, nausea and vomiting. Endocrine: Negative for cold intolerance and heat intolerance. Genitourinary: Negative for dysuria, frequency and urgency. Musculoskeletal: Negative for arthralgias and back pain. Skin: Negative for rash and wound. Neurological: Negative for syncope, numbness and headaches. Hematological: Negative. Psychiatric/Behavioral: Positive for sleep disturbance. Negative for agitation.  The patient is (L) 05/14/2018    GLOB 3.4 05/14/2018       Lab Results   Component Value Date    WBC 8.1 08/12/2018    HGB 11.6 08/12/2018    HCT 38.0 08/12/2018    MCV 96.7 08/12/2018     08/12/2018       Lab Results   Component Value Date    TSH 0.26 (L) 12/02/2017       No results found for: LABA1C    ASSESSMENT/PLAN:    Diagnosis Orders   1. Type 2 diabetes mellitus with stage 3 chronic kidney disease, with long-term current use of insulin ([facility]     2. Iron deficiency anemia, unspecified iron deficiency anemia type     3. H/O tracheostomy     4. Stage 3 chronic kidney disease     5. Atherosclerosis of native coronary artery of native heart without angina pectoris     6. Gastroesophageal reflux disease without esophagitis     7. Dementia without behavioral disturbance, unspecified dementia type     8. Chronic obstructive pulmonary disease, unspecified COPD type ([facility]     9. Essential hypertension     10. Dysphagia, unspecified type     11. Age-related physical debility     12. Nonrheumatic aortic (valve) stenosis     13. Restless leg     14. Other chronic pain     15. Anxiety     16. Congestive heart failure, unspecified HF chronicity, unspecified heart failure type ([facility]       Clonazepam recently increased from 0.5 mg bid to 1 mg bid due to break through anxiety and trouble sleeping. Will monitor of any adverse effects. Counseled patient again regarding having thickened liquids and following dietary recommendations, she is aware of the risk and reports she will eat and drink what she chooses. Other plan: May crush meds, open caps if appropriate and not contraindicated  May use generic substitutes unless contraindicated  May D/C any PRN meds not used for 3 months. I approve dosage equivalent interchanges for all dosage forms where clinically appropriate. Notify me of all therapeutic drug levels/lab tests per policy. May participate in all planned activities.   May see podiatrist, dentist, optometrist,

## 2018-08-26 NOTE — PROGRESS NOTES
for agitation and sleep disturbance. The patient is not nervous/anxious. Vital signs  Vital signs reviewed per chart and within normal limits per nurses report, currently ordered once weekly unless change in condition. Physical Exam   Constitutional: She is oriented to person, place, and time. She appears well-developed and well-nourished. No distress. HENT:   Head: Normocephalic and atraumatic. Nose: Nose normal.   Mouth/Throat: Oropharynx is clear and moist.   Eyes: Pupils are equal, round, and reactive to light. Conjunctivae and EOM are normal. Right eye exhibits no discharge. Left eye exhibits no discharge. No scleral icterus. Neck: Normal range of motion. Neck supple. No JVD present. No tracheal deviation present. No thyromegaly present. Cardiovascular: Normal rate, regular rhythm and normal heart sounds. No murmur heard. Pulmonary/Chest: Effort normal and breath sounds normal. No stridor. No respiratory distress. She has no wheezes. She has no rales. She exhibits no tenderness. occ referred upper air way sounds, trach   Abdominal: Soft. Bowel sounds are normal. She exhibits no distension and no mass. There is no tenderness. There is no rebound and no guarding. Musculoskeletal: She exhibits no edema or tenderness. Lymphadenopathy:     She has no cervical adenopathy. Neurological: She is alert and oriented to person, place, and time. Skin: Skin is warm and dry. No rash noted. She is not diaphoretic. Psychiatric: She has a normal mood and affect. Nursing note and vitals reviewed.       Lab Results   Component Value Date     08/12/2018    K 4.3 08/12/2018    CL 99 08/12/2018    CO2 32 (H) 08/12/2018    BUN 15 08/12/2018    CREATININE <0.5 08/12/2018    GLUCOSE 253 (H) 08/12/2018    CALCIUM 9.4 08/12/2018    PROT 5.6 (L) 05/14/2018    LABALBU 2.2 (L) 05/14/2018    BILITOT 0.4 05/14/2018    ALKPHOS 113 (H) 05/14/2018    AST 76 (H) 05/14/2018    ALT 61 (H) 05/14/2018 LABGLOM >60 08/12/2018    GFRAA >59 08/12/2018    AGRATIO 0.6 (L) 05/14/2018    GLOB 3.4 05/14/2018       Lab Results   Component Value Date    WBC 8.1 08/12/2018    HGB 11.6 08/12/2018    HCT 38.0 08/12/2018    MCV 96.7 08/12/2018     08/12/2018       Lab Results   Component Value Date    TSH 0.26 (L) 12/02/2017       No results found for: LABA1C    ASSESSMENT/PLAN:    Diagnosis Orders   1. Type 2 diabetes mellitus with stage 3 chronic kidney disease, with long-term current use of insulin ([facility]     2. Iron deficiency anemia, unspecified iron deficiency anemia type     3. Stage 3 chronic kidney disease     4. Atherosclerosis of native coronary artery of native heart without angina pectoris     5. Gastroesophageal reflux disease without esophagitis     6. H/O tracheostomy     7. Nonrheumatic aortic (valve) stenosis     8. Age-related physical debility     9. Essential hypertension     10. Chronic obstructive pulmonary disease, unspecified COPD type ([facility]     11. Dementia without behavioral disturbance, unspecified dementia type     12. Diastolic heart failure, unspecified HF chronicity ([facility]     13. Other chronic pain     14. Restless leg     15. Anxiety         Other plan: May crush meds, open caps if appropriate and not contraindicated  May use generic substitutes unless contraindicated  May D/C any PRN meds not used for 3 months. I approve dosage equivalent interchanges for all dosage forms where clinically appropriate. Notify me of all therapeutic drug levels/lab tests per policy. May participate in all planned activities. May see podiatrist, dentist, optometrist, audiology, dietician, and psychiatrist PRN and follow recommendations. Resident may visit outside facility for therapeutic leave with supervision and meds as needed. May have therapeutic diet restrictions lifted for holidays and special occasions not to exceed 2 x monthly.   May have alcoholic beverages in moderation at special parties unless contraindicated by history of alcohol and substance abuse. Diet consistency may be altered PRN except for liquid diet not to exceed 24 hours. The current treatment regimen was reviewed. I plan to continue other medications as currently written and follow labs as clinically indicated. The plan is to continue to provide assistance with mobility, nutrition and safety at the facility and continue current plan of care. A review of medications, labs, acitivty, diet, diagnosis and medical records completed.     Eris Bernal MD on 5/03/2018 at 5:37 PM

## 2018-09-12 DIAGNOSIS — G89.29 CHRONIC BILATERAL LOW BACK PAIN WITHOUT SCIATICA: ICD-10-CM

## 2018-09-12 DIAGNOSIS — M54.50 CHRONIC BILATERAL LOW BACK PAIN WITHOUT SCIATICA: ICD-10-CM

## 2018-09-12 RX ORDER — OXYCODONE HYDROCHLORIDE 5 MG/1
5 TABLET ORAL EVERY 8 HOURS
Qty: 90 TABLET | Refills: 0 | Status: ON HOLD | OUTPATIENT
Start: 2018-09-12 | End: 2018-10-02

## 2018-09-16 ENCOUNTER — APPOINTMENT (OUTPATIENT)
Dept: GENERAL RADIOLOGY | Facility: HOSPITAL | Age: 78
End: 2018-09-16
Payer: MEDICARE

## 2018-09-16 ENCOUNTER — HOSPITAL ENCOUNTER (EMERGENCY)
Facility: HOSPITAL | Age: 78
Discharge: NURSING, REHAB OR LONG-TERM CARE | End: 2018-09-16
Attending: EMERGENCY MEDICINE
Payer: MEDICARE

## 2018-09-16 VITALS
RESPIRATION RATE: 20 BRPM | SYSTOLIC BLOOD PRESSURE: 92 MMHG | OXYGEN SATURATION: 98 % | WEIGHT: 172 LBS | TEMPERATURE: 98.7 F | BODY MASS INDEX: 31.65 KG/M2 | HEIGHT: 62 IN | DIASTOLIC BLOOD PRESSURE: 54 MMHG | HEART RATE: 81 BPM

## 2018-09-16 DIAGNOSIS — R06.02 SHORTNESS OF BREATH: Primary | ICD-10-CM

## 2018-09-16 PROCEDURE — 31720 CLEARANCE OF AIRWAYS: CPT

## 2018-09-16 PROCEDURE — 99285 EMERGENCY DEPT VISIT HI MDM: CPT

## 2018-09-16 RX ORDER — TERBUTALINE SULFATE 1 MG/ML
INJECTION, SOLUTION SUBCUTANEOUS
COMMUNITY

## 2018-09-16 ASSESSMENT — ENCOUNTER SYMPTOMS
EYE REDNESS: 0
WHEEZING: 0
TROUBLE SWALLOWING: 0
SHORTNESS OF BREATH: 1
ABDOMINAL PAIN: 0
EYE DISCHARGE: 0
SINUS PRESSURE: 0
NAUSEA: 0
SORE THROAT: 0
RHINORRHEA: 0
COUGH: 0
CHEST TIGHTNESS: 0
EYE PAIN: 0
BACK PAIN: 0
DIARRHEA: 0
VOMITING: 0
CONSTIPATION: 0

## 2018-09-16 NOTE — ED NOTES
Azar Cyr from [facility] called back, and was updated on patient's BP. No verbalized concerns.      Sabrina Zhu RN  09/16/18 [id number]

## 2018-09-16 NOTE — ED NOTES
BP per machine 85/32. Manual BP 92/54 per Doneen Friday, RN checking.      Gamaliel Hughes RN  09/16/18 [id number]

## 2018-09-16 NOTE — ED TRIAGE NOTES
Patient is resident of [facility]. Sent because SOA. Patient has trach. Suctioned per respiratory. Obtained small amount of thick, yellow sputum. Patient states she became SOA fifteen minutes ago. Nurse there thinks patient may have mucous plug.

## 2018-09-16 NOTE — ED NOTES
Felipe Zapien, respiratory therapist continues to suction patient and administered albuterol neb.      Ursula Orellana RN  09/16/18 1318

## 2018-09-16 NOTE — ED PROVIDER NOTES
[address] [place] [address] Court  eMERGENCY dEPARTMENT eNCOUnter      Pt Name: Helen Malhotra  MRN: [id number]  [place] 1940  Date of evaluation: 9/16/2018  Provider: MD Mir Olmos       Chief Complaint   Patient presents with    Shortness of Breath         HISTORY OF PRESENT ILLNESS   (Location/Symptom, Timing/Onset, Context/Setting, Quality, Duration, Modifying Factors, Severity)  Note limiting factors. Helen Malhotra is a 66 y.o. female who presents to the emergency department because of shortness of breath. Patient has a trach and probably occluded. No fever. O2 sat 93%. Nursing Notes were reviewed. REVIEW OF SYSTEMS    (2-9 systems for level 4, 10 or more for level 5)     Review of Systems   Constitutional: Negative for chills and fever. HENT: Negative for congestion, ear pain, postnasal drip, rhinorrhea, sinus pressure, sneezing, sore throat and trouble swallowing. Eyes: Negative for pain, discharge and redness. Respiratory: Positive for shortness of breath. Negative for cough, chest tightness and wheezing. Cardiovascular: Negative for chest pain, palpitations and leg swelling. Gastrointestinal: Negative for abdominal pain, constipation, diarrhea, nausea and vomiting. Genitourinary: Negative for dysuria, flank pain, frequency, hematuria and urgency. Musculoskeletal: Negative for back pain, joint swelling and neck pain. Skin: Negative for pallor and rash. Neurological: Negative for dizziness, syncope, weakness, numbness and headaches. Psychiatric/Behavioral: Negative for confusion and hallucinations. The patient is not nervous/anxious.             PAST MEDICAL HISTORY     Past Medical History:   Diagnosis Date    Anemia     Anxiety     CAD (coronary artery disease)     [facility]est[facility] failure) [facility]     Chronic kidney disease     stage 3    Constipation     [facility]     Dementia     Diabetes mellitus ([address]     Dysphagia     Femur fracture (HCC)     GERD (gastroesophageal reflux disease)     Hypercapnia     Hyperlipidemia     Hypertension     Hypotension     Larynx disorder     Nausea & vomiting     Obesity     Respiratory failure (HCC)     Restless leg syndrome     Stenosis of aortic valve          SURGICAL HISTORY       Past Surgical History:   Procedure Laterality Date    ARM SURGERY Right     GASTROSTOMY      LEG SURGERY Right     TRACHEOSTOMY           CURRENT MEDICATIONS       Previous Medications    ACETAMINOPHEN (TYLENOL) 500 MG TABLET    Take 500 mg by mouth every 4 hours as needed for Pain or Fever    ACETYLCYSTEINE (MUCOMYST) 10 % NEBULIZER SOLUTION    Inhale 4 mLs into the lungs every 4 hours as needed    ALBUTEROL (PROVENTIL) (2.5 MG/3ML) 0.083% NEBULIZER SOLUTION    Take 2.5 mg by nebulization every 4 hours as needed for Wheezing    ARGININE POWD    1 packet by Gastrostomy Tube route 2 times daily    ASPIRIN 81 MG CHEWABLE TABLET    Take 81 mg by mouth daily    ATORVASTATIN (LIPITOR) 40 MG TABLET    Take 40 mg by mouth daily    BUSPIRONE (BUSPAR) 15 MG TABLET    Take 15 mg by mouth 3 times daily    CLONAZEPAM (KLONOPIN) 1 MG TABLET    Take 1 tablet by mouth 2 times daily for 120 days. .    DOCUSATE SODIUM (COLACE) 100 MG CAPSULE    Take 100 mg by mouth daily    ENOXAPARIN (LOVENOX) 40 MG/0.4ML INJECTION    Inject into the skin daily    FERROUS SULFATE 325 (65 FE) MG TABLET    Take 1 tablet by mouth 2 times daily    FUROSEMIDE (LASIX) 40 MG TABLET    Take 0.5 tablets by mouth 2 times daily    INSULIN GLARGINE (LANTUS) 100 UNIT/ML INJECTION VIAL    Inject 20 Units into the skin nightly    IPRATROPIUM-ALBUTEROL (DUONEB) 0.5-2.5 (3) MG/3ML SOLN NEBULIZER SOLUTION    Inhale 1 vial into the lungs every 4 hours as needed     MIDODRINE (PROAMATINE) 10 MG TABLET    Take 10 mg by mouth 3 times daily    MULTIPLE VITAMINS-MINERALS (THERAPEUTIC MULTIVITAMIN-MINERALS) TABLET    Take 1 tablet by mouth daily    NUTRITIONAL SUPPLEMENTS (PROMOTE PO)    Take 60 mL/hr by mouth    OMEGA-3 1000 MG CAPS    Take 1,000 mg by mouth 2 times daily    ONDANSETRON (ZOFRAN) 4 MG TABLET    Take 4 mg by mouth every 6 hours as needed for Nausea or Vomiting    OXYCODONE (ROXICODONE) 5 MG IMMEDIATE RELEASE TABLET    Take 1 tablet by mouth every 8 hours for 30 days. Alonzo Garciasper PANTOPRAZOLE (PROTONIX) 40 MG TABLET    Take 40 mg by mouth daily    POLYETHYLENE GLYCOL (GLYCOLAX) POWDER    Take 17 g by mouth daily    ROPINIROLE (REQUIP) 4 MG TABLET    Take 1 tablet by mouth 3 times daily    SODIUM BICARBONATE 325 MG TABLET    Take 325 mg by mouth 4 times daily    TERBUTALINE (BRETHINE) 1 MG/ML INJECTION    Inject 0.25 mg into the skin once       ALLERGIES     Penicillins and Sulfa antibiotics    FAMILY HISTORY     No family history on file. SOCIAL HISTORY       Social History     Social History    Marital status:      Spouse name: N/A    Number of children: N/A    Years of education: N/A     Social History Main Topics    Smoking status: Former Smoker    Smokeless tobacco: Never Used    Alcohol use No    Drug use: No    Sexual activity: Not on file     Other Topics Concern    Not on file     Social History Narrative    No narrative on file       SCREENINGS             PHYSICAL EXAM    (up to 7 for level 4, 8 or more for level 5)     ED Triage Vitals [09/16/18 0355]   BP Temp Temp Source Pulse Resp SpO2 Height Weight   (!) 142/66 98.7 °F (37.1 °C) Oral 112 (!) 36 93 % 5' 2\" (1.575 m) 172 lb (78 kg)       Physical Exam   Constitutional: She is oriented to person, place, and time. She appears well-developed and well-nourished. HENT:   Head: Normocephalic and atraumatic. Trach in place   Eyes: Pupils are equal, round, and reactive to light. Conjunctivae and EOM are normal.   Neck: Neck supple. Cardiovascular: Normal rate and regular rhythm.     Pulmonary/Chest: Effort normal and breath sounds normal. No respiratory distress. She has no wheezes. Abdominal: Soft. Bowel sounds are normal.   Musculoskeletal: Normal range of motion. Neurological: She is alert and oriented to person, place, and time. Skin: Skin is warm and dry. Psychiatric: She has a normal mood and affect. DIAGNOSTIC RESULTS     EKG: All EKG's are interpreted by the Emergency Department Physician who either signs or Co-signs this chart in the absence of a cardiologist.        RADIOLOGY:   Non-plain film images such as CT, Ultrasound and MRI are read by the radiologist. Plain radiographic images are visualized and preliminarily interpreted by the emergency physician with the below findings:        Interpretation per the Radiologist below, if available at the time of this note:    No orders to display         ED BEDSIDE ULTRASOUND:   Performed by ED Physician - none    LABS:  Labs Reviewed - No data to display    All other labs were within normal range or not returned as of this dictation. EMERGENCY DEPARTMENT COURSE and DIFFERENTIAL DIAGNOSIS/MDM:   Vitals:    Vitals:    09/16/18 0355   BP: (!) 142/66   Pulse: 112   Resp: (!) 36   Temp: 98.7 °F (37.1 °C)   TempSrc: Oral   SpO2: 93%   Weight: 172 lb (78 kg)   Height: 5' 2\" (1.575 m)           CRITICAL CARE TIME   Total Critical Care time was  minutes, excluding separately reportable procedures. There was a high probability of clinically significant/life threatening deterioration in the patient's condition which required my urgent intervention. CONSULTS:  None    PROCEDURES:  None    PROGRESS NOTES:    Respiratory suctioned patient, removed and cleaned trach which was occluded with secretions. O2 sat 100%, patient much better. Will d/c back to NH. FINAL IMPRESSION      1.  Shortness of breath          Final diagnoses:   Shortness of breath       DISPOSITION/PLAN   DISPOSITION Decision To Discharge 09/16/2018 04:16:40 AM      PATIENT REFERRED TO:  Tracie Berman DO  [facility] [address]  [phone]      If symptoms worsen      DISCHARGE MEDICATIONS:  New Prescriptions    No medications on file         (Please note that portions of this note may have been completed with a voice recognition program.  Efforts were made to edit the dictations but occasionally words are mis-transcribed.)    Ramona Coffey MD (electronically signed)  Attending Emergency Physician        Carolin Oakes MD  09/16/18 9117

## 2018-09-16 NOTE — ED NOTES
Patient departed ED per [facility]. No respiratory distress noted.       Amish Bridges RN  09/16/18 1868

## 2018-09-19 ENCOUNTER — TELEPHONE (OUTPATIENT)
Dept: PRIMARY CARE CLINIC | Age: 78
End: 2018-09-19

## 2018-09-19 DIAGNOSIS — E11.22 TYPE 2 DIABETES MELLITUS WITH STAGE 3 CHRONIC KIDNEY DISEASE, WITH LONG-TERM CURRENT USE OF INSULIN (HCC): Primary | ICD-10-CM

## 2018-09-19 DIAGNOSIS — N18.30 TYPE 2 DIABETES MELLITUS WITH STAGE 3 CHRONIC KIDNEY DISEASE, WITH LONG-TERM CURRENT USE OF INSULIN (HCC): Primary | ICD-10-CM

## 2018-09-19 DIAGNOSIS — Z79.4 TYPE 2 DIABETES MELLITUS WITH STAGE 3 CHRONIC KIDNEY DISEASE, WITH LONG-TERM CURRENT USE OF INSULIN (HCC): Primary | ICD-10-CM

## 2018-09-21 ENCOUNTER — HOSPITAL ENCOUNTER (OUTPATIENT)
Facility: HOSPITAL | Age: 78
Discharge: HOME | End: 2018-09-21
Payer: MEDICARE

## 2018-09-21 LAB
HBA1C MFR BLD: 9.5 %
HCT VFR BLD CALC: 37.8 % (ref 37–47)
HEMOGLOBIN: 11.6 G/DL (ref 11.5–16.5)

## 2018-09-21 PROCEDURE — 83036 HEMOGLOBIN GLYCOSYLATED A1C: CPT

## 2018-09-21 PROCEDURE — 85014 HEMATOCRIT: CPT

## 2018-09-21 PROCEDURE — 85018 HEMOGLOBIN: CPT

## 2018-09-24 RX ORDER — INSULIN GLARGINE 100 [IU]/ML
30 INJECTION, SOLUTION SUBCUTANEOUS NIGHTLY
Qty: 1 VIAL | Refills: 0 | Status: SHIPPED | OUTPATIENT
Start: 2018-09-24

## 2018-10-01 ENCOUNTER — APPOINTMENT (OUTPATIENT)
Dept: GENERAL RADIOLOGY | Facility: HOSPITAL | Age: 78
DRG: 194 | End: 2018-10-01
Payer: MEDICARE

## 2018-10-01 ENCOUNTER — HOSPITAL ENCOUNTER (INPATIENT)
Facility: HOSPITAL | Age: 78
LOS: 4 days | Discharge: ANOTHER HOSPITAL | DRG: 194 | End: 2018-10-06
Attending: EMERGENCY MEDICINE | Admitting: INTERNAL MEDICINE
Payer: MEDICARE

## 2018-10-01 DIAGNOSIS — F41.9 ANXIETY: ICD-10-CM

## 2018-10-01 DIAGNOSIS — Z43.0 TRACHEOSTOMY, ACUTE MANAGEMENT (HCC): ICD-10-CM

## 2018-10-01 DIAGNOSIS — R50.9 FEBRILE ILLNESS: ICD-10-CM

## 2018-10-01 DIAGNOSIS — D72.829 LEUKOCYTOSIS, UNSPECIFIED TYPE: Primary | ICD-10-CM

## 2018-10-01 DIAGNOSIS — J18.9 PNEUMONIA DUE TO ORGANISM: ICD-10-CM

## 2018-10-01 DIAGNOSIS — N39.0 ACUTE UTI: ICD-10-CM

## 2018-10-01 LAB
BASOPHILS ABSOLUTE: 0 K/UL (ref 0–0.1)
BASOPHILS RELATIVE PERCENT: 0.2 %
EOSINOPHILS ABSOLUTE: 0 K/UL (ref 0–0.4)
EOSINOPHILS RELATIVE PERCENT: 0.1 %
HCT VFR BLD CALC: 39.4 % (ref 37–47)
HEMOGLOBIN: 12.4 G/DL (ref 11.5–16.5)
IMMATURE GRANULOCYTES #: 0.1 K/UL
IMMATURE GRANULOCYTES %: 0.6 % (ref 0–5)
LACTIC ACID: 1.9 MMOL/L (ref 0.4–2)
LYMPHOCYTES ABSOLUTE: 3.6 K/UL (ref 1.5–4)
LYMPHOCYTES RELATIVE PERCENT: 18.1 %
MCH RBC QN AUTO: 29.8 PG (ref 27–32)
MCHC RBC AUTO-ENTMCNC: 31.5 G/DL (ref 31–35)
MCV RBC AUTO: 94.7 FL (ref 80–100)
MONOCYTES ABSOLUTE: 1.3 K/UL (ref 0.2–0.8)
MONOCYTES RELATIVE PERCENT: 6.5 %
NEUTROPHILS ABSOLUTE: 14.9 K/UL (ref 2–7.5)
NEUTROPHILS RELATIVE PERCENT: 74.5 %
PDW BLD-RTO: 13.8 % (ref 11–16)
PLATELET # BLD: 291 K/UL (ref 150–400)
PMV BLD AUTO: 11.2 FL (ref 6–10)
RBC # BLD: 4.16 M/UL (ref 3.8–5.8)
WBC # BLD: 20.1 K/UL (ref 4–11)

## 2018-10-01 PROCEDURE — 81001 URINALYSIS AUTO W/SCOPE: CPT

## 2018-10-01 PROCEDURE — 84484 ASSAY OF TROPONIN QUANT: CPT

## 2018-10-01 PROCEDURE — 87040 BLOOD CULTURE FOR BACTERIA: CPT

## 2018-10-01 PROCEDURE — 83605 ASSAY OF LACTIC ACID: CPT

## 2018-10-01 PROCEDURE — 2580000003 HC RX 258: Performed by: EMERGENCY MEDICINE

## 2018-10-01 PROCEDURE — 31502 CHANGE OF WINDPIPE AIRWAY: CPT

## 2018-10-01 PROCEDURE — 85025 COMPLETE CBC W/AUTO DIFF WBC: CPT

## 2018-10-01 PROCEDURE — 83880 ASSAY OF NATRIURETIC PEPTIDE: CPT

## 2018-10-01 PROCEDURE — 71045 X-RAY EXAM CHEST 1 VIEW: CPT

## 2018-10-01 PROCEDURE — 36415 COLL VENOUS BLD VENIPUNCTURE: CPT

## 2018-10-01 PROCEDURE — 31720 CLEARANCE OF AIRWAYS: CPT

## 2018-10-01 PROCEDURE — 99285 EMERGENCY DEPT VISIT HI MDM: CPT

## 2018-10-01 PROCEDURE — 80053 COMPREHEN METABOLIC PANEL: CPT

## 2018-10-01 RX ORDER — 0.9 % SODIUM CHLORIDE 0.9 %
1000 INTRAVENOUS SOLUTION INTRAVENOUS ONCE
Status: COMPLETED | OUTPATIENT
Start: 2018-10-01 | End: 2018-10-02

## 2018-10-01 RX ADMIN — SODIUM CHLORIDE 1000 ML: 9 INJECTION, SOLUTION INTRAVENOUS at 23:44

## 2018-10-01 ASSESSMENT — PAIN SCALES - GENERAL: PAINLEVEL_OUTOF10: 0

## 2018-10-02 PROBLEM — N30.00 ACUTE CYSTITIS: Status: ACTIVE | Noted: 2018-10-02

## 2018-10-02 PROBLEM — J18.9 PNEUMONIA: Status: ACTIVE | Noted: 2018-10-02

## 2018-10-02 PROBLEM — E11.9 TYPE 2 DIABETES MELLITUS (HCC): Status: ACTIVE | Noted: 2018-10-02

## 2018-10-02 LAB
A/G RATIO: 0.7 (ref 0.8–2)
ALBUMIN SERPL-MCNC: 3.3 G/DL (ref 3.4–4.8)
ALP BLD-CCNC: 84 U/L (ref 25–100)
ALT SERPL-CCNC: 9 U/L (ref 4–36)
ANION GAP SERPL CALCULATED.3IONS-SCNC: 9 MMOL/L (ref 3–16)
AST SERPL-CCNC: 14 U/L (ref 8–33)
BACTERIA: ABNORMAL /HPF
BILIRUB SERPL-MCNC: 0.8 MG/DL (ref 0.3–1.2)
BILIRUBIN URINE: NEGATIVE
BLOOD, URINE: NEGATIVE
BUN BLDV-MCNC: 17 MG/DL (ref 6–20)
CALCIUM SERPL-MCNC: 9.3 MG/DL (ref 8.5–10.5)
CHLORIDE BLD-SCNC: 100 MMOL/L (ref 98–107)
CLARITY: ABNORMAL
CO2: 31 MMOL/L (ref 20–30)
COLOR: YELLOW
COMMENT UA: ABNORMAL
CREAT SERPL-MCNC: 0.7 MG/DL (ref 0.4–1.2)
EPITHELIAL CELLS, UA: ABNORMAL /HPF
GFR AFRICAN AMERICAN: >59
GFR NON-AFRICAN AMERICAN: >60
GLOBULIN: 4.7 G/DL
GLUCOSE BLD-MCNC: 125 MG/DL (ref 74–106)
GLUCOSE BLD-MCNC: 209 MG/DL (ref 74–106)
GLUCOSE BLD-MCNC: 307 MG/DL (ref 74–106)
GLUCOSE BLD-MCNC: 347 MG/DL (ref 74–106)
GLUCOSE BLD-MCNC: 383 MG/DL (ref 74–106)
GLUCOSE URINE: NEGATIVE MG/DL
KETONES, URINE: NEGATIVE MG/DL
LEUKOCYTE ESTERASE, URINE: ABNORMAL
MICROSCOPIC EXAMINATION: YES
MUCUS: ABNORMAL /LPF
NITRITE, URINE: NEGATIVE
PERFORMED ON: ABNORMAL
PH UA: 7.5
POTASSIUM SERPL-SCNC: 3.5 MMOL/L (ref 3.4–5.1)
PRO-BNP: 1668 PG/ML (ref 0–1800)
PROTEIN UA: 100 MG/DL
RBC UA: ABNORMAL /HPF (ref 0–2)
SODIUM BLD-SCNC: 140 MMOL/L (ref 136–145)
SPECIFIC GRAVITY UA: 1.02
TOTAL PROTEIN: 8 G/DL (ref 6.4–8.3)
TROPONIN: <0.3 NG/ML
URINE REFLEX TO CULTURE: YES
URINE TYPE: ABNORMAL
UROBILINOGEN, URINE: 1 E.U./DL
WBC UA: ABNORMAL /HPF (ref 0–5)

## 2018-10-02 PROCEDURE — 2500000003 HC RX 250 WO HCPCS: Performed by: NURSE PRACTITIONER

## 2018-10-02 PROCEDURE — 99222 1ST HOSP IP/OBS MODERATE 55: CPT | Performed by: INTERNAL MEDICINE

## 2018-10-02 PROCEDURE — 89220 SPUTUM SPECIMEN COLLECTION: CPT

## 2018-10-02 PROCEDURE — G8998 SWALLOW D/C STATUS: HCPCS

## 2018-10-02 PROCEDURE — 2580000003 HC RX 258: Performed by: PSYCHIATRY & NEUROLOGY

## 2018-10-02 PROCEDURE — 6360000002 HC RX W HCPCS: Performed by: NURSE PRACTITIONER

## 2018-10-02 PROCEDURE — 2580000003 HC RX 258: Performed by: INTERNAL MEDICINE

## 2018-10-02 PROCEDURE — 2580000003 HC RX 258: Performed by: NURSE PRACTITIONER

## 2018-10-02 PROCEDURE — 97802 MEDICAL NUTRITION INDIV IN: CPT

## 2018-10-02 PROCEDURE — 6360000002 HC RX W HCPCS: Performed by: INTERNAL MEDICINE

## 2018-10-02 PROCEDURE — 1200000000 HC SEMI PRIVATE

## 2018-10-02 PROCEDURE — 6370000000 HC RX 637 (ALT 250 FOR IP): Performed by: NURSE PRACTITIONER

## 2018-10-02 PROCEDURE — 87186 SC STD MICRODIL/AGAR DIL: CPT

## 2018-10-02 PROCEDURE — G8996 SWALLOW CURRENT STATUS: HCPCS

## 2018-10-02 PROCEDURE — 87205 SMEAR GRAM STAIN: CPT

## 2018-10-02 PROCEDURE — 96366 THER/PROPH/DIAG IV INF ADDON: CPT

## 2018-10-02 PROCEDURE — 6360000002 HC RX W HCPCS

## 2018-10-02 PROCEDURE — 87077 CULTURE AEROBIC IDENTIFY: CPT

## 2018-10-02 PROCEDURE — 2580000003 HC RX 258

## 2018-10-02 PROCEDURE — 92610 EVALUATE SWALLOWING FUNCTION: CPT

## 2018-10-02 PROCEDURE — 94760 N-INVAS EAR/PLS OXIMETRY 1: CPT

## 2018-10-02 PROCEDURE — 2700000000 HC OXYGEN THERAPY PER DAY

## 2018-10-02 PROCEDURE — 96365 THER/PROPH/DIAG IV INF INIT: CPT

## 2018-10-02 PROCEDURE — 87070 CULTURE OTHR SPECIMN AEROBIC: CPT

## 2018-10-02 PROCEDURE — 31720 CLEARANCE OF AIRWAYS: CPT

## 2018-10-02 PROCEDURE — 6360000002 HC RX W HCPCS: Performed by: EMERGENCY MEDICINE

## 2018-10-02 RX ORDER — PETROLATUM,WHITE/LANOLIN
OINTMENT (GRAM) TOPICAL PRN
COMMUNITY

## 2018-10-02 RX ORDER — LEVOFLOXACIN 5 MG/ML
750 INJECTION, SOLUTION INTRAVENOUS EVERY 24 HOURS
Status: DISCONTINUED | OUTPATIENT
Start: 2018-10-03 | End: 2018-10-05 | Stop reason: ALTCHOICE

## 2018-10-02 RX ORDER — FAMOTIDINE 20 MG/1
20 TABLET, FILM COATED ORAL 2 TIMES DAILY
Status: DISCONTINUED | OUTPATIENT
Start: 2018-10-02 | End: 2018-10-06 | Stop reason: HOSPADM

## 2018-10-02 RX ORDER — ROPINIROLE 1 MG/1
4 TABLET, FILM COATED ORAL 3 TIMES DAILY
Status: DISCONTINUED | OUTPATIENT
Start: 2018-10-02 | End: 2018-10-06 | Stop reason: HOSPADM

## 2018-10-02 RX ORDER — SODIUM CHLORIDE 0.9 % (FLUSH) 0.9 %
10 SYRINGE (ML) INJECTION EVERY 12 HOURS SCHEDULED
Status: DISCONTINUED | OUTPATIENT
Start: 2018-10-02 | End: 2018-10-06 | Stop reason: HOSPADM

## 2018-10-02 RX ORDER — OXYCODONE HYDROCHLORIDE 5 MG/1
5 CAPSULE ORAL EVERY 8 HOURS
COMMUNITY
End: 2019-04-19 | Stop reason: SDUPTHER

## 2018-10-02 RX ORDER — INSULIN GLARGINE 100 [IU]/ML
30 INJECTION, SOLUTION SUBCUTANEOUS NIGHTLY
Status: DISCONTINUED | OUTPATIENT
Start: 2018-10-02 | End: 2018-10-06 | Stop reason: HOSPADM

## 2018-10-02 RX ORDER — VANCOMYCIN HYDROCHLORIDE 500 MG/10ML
INJECTION, POWDER, LYOPHILIZED, FOR SOLUTION INTRAVENOUS
Status: DISCONTINUED
Start: 2018-10-02 | End: 2018-10-02 | Stop reason: ALTCHOICE

## 2018-10-02 RX ORDER — METHYLPREDNISOLONE SODIUM SUCCINATE 40 MG/ML
40 INJECTION, POWDER, LYOPHILIZED, FOR SOLUTION INTRAMUSCULAR; INTRAVENOUS ONCE
Status: COMPLETED | OUTPATIENT
Start: 2018-10-02 | End: 2018-10-02

## 2018-10-02 RX ORDER — ACETAMINOPHEN 500 MG
500 TABLET ORAL EVERY 4 HOURS PRN
Status: DISCONTINUED | OUTPATIENT
Start: 2018-10-02 | End: 2018-10-06 | Stop reason: HOSPADM

## 2018-10-02 RX ORDER — DEXTROSE MONOHYDRATE 50 MG/ML
100 INJECTION, SOLUTION INTRAVENOUS PRN
Status: DISCONTINUED | OUTPATIENT
Start: 2018-10-02 | End: 2018-10-06 | Stop reason: HOSPADM

## 2018-10-02 RX ORDER — ONDANSETRON 2 MG/ML
4 INJECTION INTRAMUSCULAR; INTRAVENOUS EVERY 6 HOURS PRN
Status: DISCONTINUED | OUTPATIENT
Start: 2018-10-02 | End: 2018-10-06 | Stop reason: HOSPADM

## 2018-10-02 RX ORDER — DOCUSATE SODIUM 100 MG/1
100 CAPSULE, LIQUID FILLED ORAL DAILY
Status: DISCONTINUED | OUTPATIENT
Start: 2018-10-02 | End: 2018-10-06 | Stop reason: HOSPADM

## 2018-10-02 RX ORDER — SODIUM CHLORIDE 9 MG/ML
INJECTION, SOLUTION INTRAVENOUS ONCE
Status: COMPLETED | OUTPATIENT
Start: 2018-10-02 | End: 2018-10-02

## 2018-10-02 RX ORDER — CLONAZEPAM 0.5 MG/1
1 TABLET ORAL 2 TIMES DAILY PRN
Status: DISCONTINUED | OUTPATIENT
Start: 2018-10-02 | End: 2018-10-06 | Stop reason: HOSPADM

## 2018-10-02 RX ORDER — OXYCODONE HYDROCHLORIDE 5 MG/1
5 TABLET ORAL 3 TIMES DAILY PRN
Status: DISCONTINUED | OUTPATIENT
Start: 2018-10-02 | End: 2018-10-06 | Stop reason: HOSPADM

## 2018-10-02 RX ORDER — ONDANSETRON 4 MG/1
4 TABLET, FILM COATED ORAL EVERY 6 HOURS PRN
Status: DISCONTINUED | OUTPATIENT
Start: 2018-10-02 | End: 2018-10-06 | Stop reason: HOSPADM

## 2018-10-02 RX ORDER — SODIUM CHLORIDE 9 MG/ML
INJECTION, SOLUTION INTRAVENOUS CONTINUOUS
Status: DISCONTINUED | OUTPATIENT
Start: 2018-10-02 | End: 2018-10-02

## 2018-10-02 RX ORDER — ASPIRIN 81 MG/1
81 TABLET, CHEWABLE ORAL DAILY
Status: DISCONTINUED | OUTPATIENT
Start: 2018-10-02 | End: 2018-10-06 | Stop reason: HOSPADM

## 2018-10-02 RX ORDER — POLYETHYLENE GLYCOL 3350 17 G/17G
17 POWDER, FOR SOLUTION ORAL DAILY
Status: DISCONTINUED | OUTPATIENT
Start: 2018-10-02 | End: 2018-10-06 | Stop reason: HOSPADM

## 2018-10-02 RX ORDER — SODIUM CHLORIDE 9 MG/ML
INJECTION, SOLUTION INTRAVENOUS CONTINUOUS
Status: DISCONTINUED | OUTPATIENT
Start: 2018-10-03 | End: 2018-10-02

## 2018-10-02 RX ORDER — BUSPIRONE HYDROCHLORIDE 15 MG/1
15 TABLET ORAL 3 TIMES DAILY
Status: DISCONTINUED | OUTPATIENT
Start: 2018-10-02 | End: 2018-10-06 | Stop reason: HOSPADM

## 2018-10-02 RX ORDER — LEVOFLOXACIN 5 MG/ML
500 INJECTION, SOLUTION INTRAVENOUS ONCE
Status: COMPLETED | OUTPATIENT
Start: 2018-10-02 | End: 2018-10-02

## 2018-10-02 RX ORDER — SODIUM CHLORIDE 0.9 % (FLUSH) 0.9 %
10 SYRINGE (ML) INJECTION PRN
Status: DISCONTINUED | OUTPATIENT
Start: 2018-10-02 | End: 2018-10-06 | Stop reason: HOSPADM

## 2018-10-02 RX ORDER — ATORVASTATIN CALCIUM 40 MG/1
40 TABLET, FILM COATED ORAL NIGHTLY
Status: DISCONTINUED | OUTPATIENT
Start: 2018-10-02 | End: 2018-10-06 | Stop reason: HOSPADM

## 2018-10-02 RX ORDER — MIDODRINE HYDROCHLORIDE 5 MG/1
10 TABLET ORAL 3 TIMES DAILY
Status: DISCONTINUED | OUTPATIENT
Start: 2018-10-02 | End: 2018-10-06 | Stop reason: HOSPADM

## 2018-10-02 RX ORDER — DEXTROSE MONOHYDRATE 25 G/50ML
12.5 INJECTION, SOLUTION INTRAVENOUS PRN
Status: DISCONTINUED | OUTPATIENT
Start: 2018-10-02 | End: 2018-10-06 | Stop reason: HOSPADM

## 2018-10-02 RX ORDER — M-VIT,TX,IRON,MINS/CALC/FOLIC 27MG-0.4MG
1 TABLET ORAL DAILY
Status: DISCONTINUED | OUTPATIENT
Start: 2018-10-02 | End: 2018-10-06 | Stop reason: HOSPADM

## 2018-10-02 RX ORDER — SODIUM BICARBONATE 650 MG/1
325 TABLET ORAL 4 TIMES DAILY
Status: DISCONTINUED | OUTPATIENT
Start: 2018-10-02 | End: 2018-10-06 | Stop reason: HOSPADM

## 2018-10-02 RX ORDER — NICOTINE POLACRILEX 4 MG
15 LOZENGE BUCCAL PRN
Status: DISCONTINUED | OUTPATIENT
Start: 2018-10-02 | End: 2018-10-06 | Stop reason: HOSPADM

## 2018-10-02 RX ORDER — DEXTROSE MONOHYDRATE 50 MG/ML
INJECTION, SOLUTION INTRAVENOUS
Status: COMPLETED
Start: 2018-10-02 | End: 2018-10-02

## 2018-10-02 RX ORDER — FERROUS SULFATE 325(65) MG
325 TABLET ORAL 2 TIMES DAILY
Status: DISCONTINUED | OUTPATIENT
Start: 2018-10-02 | End: 2018-10-05

## 2018-10-02 RX ADMIN — SODIUM CHLORIDE: 9 INJECTION, SOLUTION INTRAVENOUS at 05:06

## 2018-10-02 RX ADMIN — FAMOTIDINE 20 MG: 20 TABLET ORAL at 20:51

## 2018-10-02 RX ADMIN — MEROPENEM 1 G: 1 INJECTION, POWDER, FOR SOLUTION INTRAVENOUS at 12:45

## 2018-10-02 RX ADMIN — Medication 5 UNITS: at 18:12

## 2018-10-02 RX ADMIN — MEROPENEM 1 G: 1 INJECTION, POWDER, FOR SOLUTION INTRAVENOUS at 05:08

## 2018-10-02 RX ADMIN — FAMOTIDINE 20 MG: 20 TABLET ORAL at 10:08

## 2018-10-02 RX ADMIN — ROPINIROLE HYDROCHLORIDE 4 MG: 1 TABLET, FILM COATED ORAL at 18:09

## 2018-10-02 RX ADMIN — DEXTROSE MONOHYDRATE 250 ML: 50 INJECTION, SOLUTION INTRAVENOUS at 06:26

## 2018-10-02 RX ADMIN — ASPIRIN 81 MG 81 MG: 81 TABLET ORAL at 18:05

## 2018-10-02 RX ADMIN — BUSPIRONE HYDROCHLORIDE 15 MG: 15 TABLET ORAL at 18:09

## 2018-10-02 RX ADMIN — MICONAZOLE NITRATE: 2 POWDER TOPICAL at 13:59

## 2018-10-02 RX ADMIN — ROPINIROLE HYDROCHLORIDE 4 MG: 1 TABLET, FILM COATED ORAL at 20:50

## 2018-10-02 RX ADMIN — BUSPIRONE HYDROCHLORIDE 15 MG: 15 TABLET ORAL at 20:50

## 2018-10-02 RX ADMIN — DOCUSATE SODIUM 100 MG: 100 CAPSULE, LIQUID FILLED ORAL at 18:05

## 2018-10-02 RX ADMIN — SODIUM CHLORIDE: 9 INJECTION, SOLUTION INTRAVENOUS at 02:11

## 2018-10-02 RX ADMIN — Medication 4 UNITS: at 12:02

## 2018-10-02 RX ADMIN — INSULIN LISPRO 2 UNITS: 100 INJECTION, SOLUTION INTRAVENOUS; SUBCUTANEOUS at 20:55

## 2018-10-02 RX ADMIN — METHYLPREDNISOLONE SODIUM SUCCINATE 40 MG: 40 INJECTION, POWDER, FOR SOLUTION INTRAMUSCULAR; INTRAVENOUS at 05:09

## 2018-10-02 RX ADMIN — LEVOFLOXACIN 500 MG: 5 INJECTION, SOLUTION INTRAVENOUS at 01:07

## 2018-10-02 RX ADMIN — INSULIN GLARGINE 30 UNITS: 100 INJECTION, SOLUTION SUBCUTANEOUS at 20:54

## 2018-10-02 RX ADMIN — SODIUM BICARBONATE TAB 650 MG 325 MG: 650 TAB at 18:05

## 2018-10-02 RX ADMIN — ENOXAPARIN SODIUM 40 MG: 40 INJECTION SUBCUTANEOUS at 10:08

## 2018-10-02 RX ADMIN — ATORVASTATIN CALCIUM 40 MG: 40 TABLET, FILM COATED ORAL at 20:50

## 2018-10-02 RX ADMIN — CLONAZEPAM 1 MG: 0.5 TABLET ORAL at 20:50

## 2018-10-02 RX ADMIN — MULTIPLE VITAMINS W/ MINERALS TAB 1 TABLET: TAB at 18:05

## 2018-10-02 RX ADMIN — MIDODRINE HYDROCHLORIDE 10 MG: 5 TABLET ORAL at 20:50

## 2018-10-02 RX ADMIN — MICONAZOLE NITRATE: 2 POWDER TOPICAL at 20:51

## 2018-10-02 RX ADMIN — VANCOMYCIN HYDROCHLORIDE 1250 MG: 1 INJECTION, POWDER, LYOPHILIZED, FOR SOLUTION INTRAVENOUS at 06:25

## 2018-10-02 RX ADMIN — OXYCODONE HYDROCHLORIDE 5 MG: 5 TABLET ORAL at 20:51

## 2018-10-02 RX ADMIN — MEROPENEM 1 G: 1 INJECTION, POWDER, FOR SOLUTION INTRAVENOUS at 20:49

## 2018-10-02 RX ADMIN — LEVOFLOXACIN 750 MG: 5 INJECTION, SOLUTION INTRAVENOUS at 23:04

## 2018-10-02 RX ADMIN — SODIUM BICARBONATE TAB 650 MG 325 MG: 650 TAB at 20:51

## 2018-10-02 RX ADMIN — MIDODRINE HYDROCHLORIDE 10 MG: 5 TABLET ORAL at 18:09

## 2018-10-02 ASSESSMENT — PAIN SCALES - GENERAL
PAINLEVEL_OUTOF10: 0

## 2018-10-02 NOTE — ED NOTES
Some splotches reddness noted to right arm after infusion of Levaquin. MD notified and Mendoza notified of reddness.      Colt Tobin RN  10/02/18 0930

## 2018-10-02 NOTE — ED NOTES
#16 f/c inserted without diff. Very sm. amt of thick,cloudy urine returned.      Emeli Hess, [place]  10/02/18 Leandro Dunne

## 2018-10-02 NOTE — ED PROVIDER NOTES
[address] [place] [address] SURG  eMERGENCY dEPARTMENT eNCOUnter      Pt Name: Franklin Peterson  MRN: [id number]  [place]: 1940  Date of evaluation: 13/1/9943  Provider: Zoey Guerra MD    CHIEF COMPLAINT       Chief Complaint   Patient presents with    Shortness of Breath     pt inner cannula stopped up with phlegm; Dr. Rehana Vazquez [email or web address] [address]  (Location/Symptom, Timing/Onset, Context/Setting, Quality, Duration, Modifying Factors, Severity.)   Franklin Peterson is a 66 y.o. female who presents to the emergency department for replacement of her inner cannula of her trach. She has apparently been without one for a few days. NH reported that they \"ran out\" of them and needed us to replace the inner cannula and clean up her trach. The patient has no complaints and denies feeling short of breath. Nursing notes were reviewed. REVIEW OF SYSTEMS    (2-9 systems for level 4, 10 or more for level 5)   ROS:  General:  + fevers, no chills, no weakness  Cardiovascular:  No chest pain, no palpitations  Respiratory:  No shortness of breath, no cough, no wheezing  Gastrointestinal:  No pain, no nausea, no vomiting, no diarrhea  Musculoskeletal:  No muscle pain, no joint pain  Skin:  No rash, no easy bruising  Neurologic:  No speech problems, no headache, no extremity numbness, no extremity tingling, no extremity weakness  Psychiatric:  No anxiety  Genitourinary:  No dysuria, no hematuria    Except as noted above the remainder of the review of systems was reviewed and negative.        PAST MEDICAL HISTORY     Past Medical History:   Diagnosis Date    Anemia     Anxiety     CAD (coronary artery disease)     CHF (congestive heart failure) (HCC)     Chronic kidney disease     stage 3    Constipation     [facility]     Dementia     Diabetes mellitus [facility]     Dysphagia     [facility]     GERD (gastroesophageal reflux disease)     Hypercapnia     Hyperlipidemia     Hypertension     Hypotension     Larynx disorder     Nausea & vomiting     Obesity     Respiratory failure (HCC)     Restless leg syndrome     Stenosis of aortic valve          SURGICAL HISTORY       Past Surgical History:   Procedure Laterality Date    ARM SURGERY Right     GASTROSTOMY      LEG SURGERY Right     TRACHEOSTOMY           CURRENT MEDICATIONS       Discharge Medication List as of 10/6/2018  5:33 PM      CONTINUE these medications which have NOT CHANGED    Details   oxyCODONE 5 MG capsule Take 5 mg by mouth every 8 hours. .Historical Med      Vitamins A & D (VITAMIN A & D) ointment Apply topically as needed for Dry Skin Apply topically to buttocks as needed for redness. , Topical, PRN, Historical Med      clonazePAM (KLONOPIN) 1 MG tablet Take 1 tablet by mouth 2 times daily for 120 days. ., Disp-60 tablet, R-3Normal      insulin glargine (LANTUS) 100 UNIT/ML injection vial Inject 30 Units into the skin nightly, Disp-1 vial, R-0Normal      Nutritional Supplements (PROMOTE PO) Take 60 mL/hr by mouthHistorical Med      terbutaline (BRETHINE) 1 MG/ML injection 1 vial inhaled via nebulization every 2 hrs as needed. Historical Med      busPIRone (BUSPAR) 15 MG tablet Take 15 mg by mouth 3 times daily, Disp-90 tablet, R-3Normal      furosemide (LASIX) 40 MG tablet Take 0.5 tablets by mouth 2 times daily, Disp-6 tablet, R-0Print      rOPINIRole (REQUIP) 4 MG tablet Take 1 tablet by mouth 3 times daily, Disp-30 tablet, R-0Historical Med      ferrous sulfate 325 (65 Fe) MG tablet Take 1 tablet by mouth 2 times daily, Disp-30 tabletHistorical Med      midodrine (PROAMATINE) 10 MG tablet Take 10 mg by mouth 3 times dailyHistorical Med      Omega-3 1000 MG CAPS Take 1,000 mg by mouth 2 times dailyHistorical Med      ondansetron (ZOFRAN) 4 MG tablet Take 4 mg by mouth every 6 hours as needed for Nausea or VomitingHistorical Med [10/01/18 2211]   BP Temp Temp Source Pulse Resp SpO2 Height Weight   -- 100.2 °F (37.9 °C) Oral 99 22 92 % 5' 6\" (1.676 m) 175 lb (79.4 kg)       Physical Exam  General :Patient is awake, alert, oriented, in no acute distress, nontoxic appearing  HEENT: Pupils are equally round and reactive to light, EOMI. Oral mucosa is moist, no exudate. No pharyngeal erythema. Neck: Trach in place; skin irritation around the trach; see note below. Neck is supple, full range of motion  Cardiac: Heart regular rate, rhythm, very harsh holosystolic murmur making it difficult to hear 2nd heart sound; no rubs, or gallops  Lungs: Lungs are clear to auscultation, there is no wheezing, rhonchi, or rales. Chest wall: There is no tenderness to palpation over the chest wall or over ribs  Abdomen: Abdomen is soft, nontender, nondistended. There is no firm or pulsatile masses, no rebound rigidity or guarding. Musculoskeletal:  5/5 motor strength in all 4 extremities; ambulatory  Back: No midline or bony tenderness. No CVAT. Neuro: No focal muscle deficits appreciated  Dermatology: Skin is warm and dry  Psych: Mentation is grossly normal, cognition is grossly normal. Affect is appropriate.       DIAGNOSTIC RESULTS     EKG: All EKG's are interpreted by the Emergency Department Physician who either signs or Co-signs this chart in the absence of a cardiologist.      RADIOLOGY:   Non-plain film images such as CT, Ultrasound and MRI are read by the radiologist. Plain radiographic images are visualized and preliminarily interpreted by the emergency physician with the below findings:      [] Radiologist's Report Reviewed:  XR CHEST PORTABLE   Final Result   Impression: Persistent near-complete opacification of the left lung      XR CHEST PORTABLE   Final Result      Complete opacification of the left hemithorax with cut off of the left mainstem bronchus possibly mucous plugging and atelectasis of the left lung      Critical findings called [id number]   POCT GLUCOSE - Abnormal; Notable for the following:     POC Glucose 341 (*)     All other components within normal limits    Narrative:     Performed at:  [address] [place] [address] Laboratory  [address] [place], [place] [address], [place] [address]   Phone [phone]   POCT GLUCOSE - Abnormal; Notable for the following:     POC Glucose 234 (*)     All other components within normal limits    Narrative:     Performed at:  [address] [place] [address] Laboratory  [address] [place], [place] [address], [place] [address]   Phone [phone]   POCT GLUCOSE - Abnormal; Notable for the following:     POC Glucose 379 (*)     All other components within normal limits    Narrative:     Performed at:  [address] [place] [address] Laboratory  [address] [place], [place] [address], [place] [address]   Phone [phone]   POCT GLUCOSE - Abnormal; Notable for the following:     POC Glucose 418 (*)     All other components within normal limits    Narrative:     Performed at:  [address] [place] [address] Laboratory  [address] [place], [place] [address], [place] [address]   Phone [phone]   POCT GLUCOSE - Abnormal; Notable for the following:     POC Glucose 406 (*)     All other components within normal limits    Narrative:     Performed at:  [address] [place] [address] Laboratory  [address] [place], [place] [address], [place] [address]   Phone [phone]   POCT GLUCOSE - Abnormal; Notable for the following:     POC Glucose 412 (*)     All other components within normal limits    Narrative:     Performed at:  [address] [place], [place] [address], [place] [address]   Phone [phone]   POCT GLUCOSE - Abnormal; Notable for the following:     POC Glucose 371 (*)     All other components within normal limits    Narrative:     Performed at:  [address] [place], [place] [address], [place] [address]   Phone [phone] using speech recognition software and may contain errors related to that system including errors in grammar, punctuation, and spelling, as well as words and phrases that may be inappropriate. If there are any questions or concerns please feel free to contact the dictating provider for clarification.     Magan Lagos MD  Attending Emergency Physician                Magan Lagos MD  81/51/45 1549       Magan Lagos MD  10/08/18 9341

## 2018-10-02 NOTE — H&P
lymphadenopathy  Respiratory:  No respiratory distress, no wheezing, rales or rhonchi detected  Cardiovascular:  Normal rate, normal rhythm, no murmurs, no gallops, no rubs, no edema   GI:  Soft, nondistended, normal bowel sounds, nontender, no voluntary guarding  Musculoskeletal:  No cyanosis or obvious acute deformity. Moving all extremities   Integument:  Warm and dry. Neurologic:  Alert, mouthing words and nodding head \"yes\" and \"no\" to communicate, follows commands, moves all extremities  Psychiatric:  Normal mood and affect        Lab Results   Component Value Date     10/01/2018    K 3.5 10/01/2018     10/01/2018    CO2 31 (H) 10/01/2018    BUN 17 10/01/2018    CREATININE 0.7 10/01/2018    GLUCOSE 209 (H) 10/01/2018    CALCIUM 9.3 10/01/2018    PROT 8.0 10/01/2018    LABALBU 3.3 (L) 10/01/2018    BILITOT 0.8 10/01/2018    ALKPHOS 84 10/01/2018    AST 14 10/01/2018    ALT 9 10/01/2018    LABGLOM >60 10/01/2018    GFRAA >59 10/01/2018    AGRATIO 0.7 (L) 10/01/2018    GLOB 4.7 10/01/2018           Lab Results   Component Value Date    WBC 20.1 (H) 10/01/2018    HGB 12.4 10/01/2018    HCT 39.4 10/01/2018    MCV 94.7 10/01/2018     10/01/2018       XR CHEST PORTABLE   Final Result   1. Small left pleural effusion. 2.  Opacities in the left mid and lower lung are nonspecific and may    represent atelectasis or an infectious or inflammatory process. Assessment and Plan     Active Hospital Problems    Diagnosis Date Noted    Pneumonia [J18.9]  Patient was admitted with shortness of breath and cough in addition to some trouble with her tracheostomy. Chest x-ray showed opacities in the left mid and lower lung are nonspecific and may represent atelectasis or an infectious or inflammatory process according to radiology. WBC elevated at 20.1 on arrival. Blood cultures obtained. Diagnosed with pneumonia.  Treat with broad-spectrum antibiotics, nebs, steroid, oxygen and aggressive pulmonary toilet. Follow cultures. CBC and BMP in a.m.   10/02/2018    Type 2 diabetes mellitus ([facility] [E11.9]  Continue regimen. Cover with sliding scale insulin per protocol if indicated. Hypoglycemia protocol. 10/02/2018    Acute cystitis [N30.00]  Urinalysis indicative of possible UTI. Urine culture obtained. Treat with empiric antibiotics pending urine culture. 10/02/2018    Hypotension [I95.9]  Improved. Treated with IVFs. Will hold for now. Continue home dose of midodrine. Monitor on telemetry   12/03/2017   · GI and DVT prophylaxis      Patient was seen and examined by Dr. Shira Crooks and plan of care reviewed.       Nevin Spain certifies per [facility] regulation for 42 CFR [phone]), that the patient may reasonably be expected to be discharged or transferred to a hospital within 96 hours after admission to [facility] and [facility].    Electronically signed by AUSTEN Ocampo on 10/2/2018 at 4:27 PM

## 2018-10-02 NOTE — PLAN OF CARE
Problem: DAILY CARE  Goal: Daily care needs are met  Outcome: Ongoing      Problem: PAIN  Goal: Patient's pain/discomfort is manageable  Outcome: Ongoing  Pt denies any pain at this time,   PT aware of PRN pain medication available.

## 2018-10-03 ENCOUNTER — APPOINTMENT (OUTPATIENT)
Dept: GENERAL RADIOLOGY | Facility: HOSPITAL | Age: 78
DRG: 194 | End: 2018-10-03
Payer: MEDICARE

## 2018-10-03 LAB
ANION GAP SERPL CALCULATED.3IONS-SCNC: 9 MMOL/L (ref 3–16)
BASOPHILS ABSOLUTE: 0 K/UL (ref 0–0.1)
BASOPHILS RELATIVE PERCENT: 0.2 %
BUN BLDV-MCNC: 13 MG/DL (ref 6–20)
CALCIUM SERPL-MCNC: 8.7 MG/DL (ref 8.5–10.5)
CHLORIDE BLD-SCNC: 101 MMOL/L (ref 98–107)
CO2: 27 MMOL/L (ref 20–30)
CREAT SERPL-MCNC: <0.5 MG/DL (ref 0.4–1.2)
EOSINOPHILS ABSOLUTE: 0 K/UL (ref 0–0.4)
EOSINOPHILS RELATIVE PERCENT: 0.3 %
GFR AFRICAN AMERICAN: >59
GFR NON-AFRICAN AMERICAN: >60
GLUCOSE BLD-MCNC: 248 MG/DL (ref 74–106)
GLUCOSE BLD-MCNC: 268 MG/DL (ref 74–106)
GLUCOSE BLD-MCNC: 283 MG/DL (ref 74–106)
GLUCOSE BLD-MCNC: 287 MG/DL (ref 74–106)
GLUCOSE BLD-MCNC: 292 MG/DL (ref 74–106)
HCT VFR BLD CALC: 34.5 % (ref 37–47)
HEMOGLOBIN: 10.2 G/DL (ref 11.5–16.5)
IMMATURE GRANULOCYTES #: 0.1 K/UL
IMMATURE GRANULOCYTES %: 0.4 % (ref 0–5)
LYMPHOCYTES ABSOLUTE: 2.5 K/UL (ref 1.5–4)
LYMPHOCYTES RELATIVE PERCENT: 20.7 %
MAGNESIUM: 1.8 MG/DL (ref 1.7–2.4)
MCH RBC QN AUTO: 28.8 PG (ref 27–32)
MCHC RBC AUTO-ENTMCNC: 29.6 G/DL (ref 31–35)
MCV RBC AUTO: 97.5 FL (ref 80–100)
MONOCYTES ABSOLUTE: 0.8 K/UL (ref 0.2–0.8)
MONOCYTES RELATIVE PERCENT: 6.7 %
NEUTROPHILS ABSOLUTE: 8.6 K/UL (ref 2–7.5)
NEUTROPHILS RELATIVE PERCENT: 71.7 %
PDW BLD-RTO: 13.5 % (ref 11–16)
PERFORMED ON: ABNORMAL
PLATELET # BLD: 243 K/UL (ref 150–400)
PMV BLD AUTO: 10.2 FL (ref 6–10)
POTASSIUM REFLEX MAGNESIUM: 3.2 MMOL/L (ref 3.4–5.1)
RBC # BLD: 3.54 M/UL (ref 3.8–5.8)
SODIUM BLD-SCNC: 137 MMOL/L (ref 136–145)
WBC # BLD: 12 K/UL (ref 4–11)

## 2018-10-03 PROCEDURE — 80048 BASIC METABOLIC PNL TOTAL CA: CPT

## 2018-10-03 PROCEDURE — 94760 N-INVAS EAR/PLS OXIMETRY 1: CPT

## 2018-10-03 PROCEDURE — 6360000002 HC RX W HCPCS: Performed by: NURSE PRACTITIONER

## 2018-10-03 PROCEDURE — 31720 CLEARANCE OF AIRWAYS: CPT

## 2018-10-03 PROCEDURE — 96366 THER/PROPH/DIAG IV INF ADDON: CPT

## 2018-10-03 PROCEDURE — 85025 COMPLETE CBC W/AUTO DIFF WBC: CPT

## 2018-10-03 PROCEDURE — 6370000000 HC RX 637 (ALT 250 FOR IP): Performed by: NURSE PRACTITIONER

## 2018-10-03 PROCEDURE — 94640 AIRWAY INHALATION TREATMENT: CPT

## 2018-10-03 PROCEDURE — 36415 COLL VENOUS BLD VENIPUNCTURE: CPT

## 2018-10-03 PROCEDURE — 83735 ASSAY OF MAGNESIUM: CPT

## 2018-10-03 PROCEDURE — 1200000000 HC SEMI PRIVATE

## 2018-10-03 PROCEDURE — 6360000002 HC RX W HCPCS: Performed by: PEDIATRICS

## 2018-10-03 PROCEDURE — 2700000000 HC OXYGEN THERAPY PER DAY

## 2018-10-03 PROCEDURE — 2580000003 HC RX 258: Performed by: NURSE PRACTITIONER

## 2018-10-03 PROCEDURE — 71045 X-RAY EXAM CHEST 1 VIEW: CPT

## 2018-10-03 PROCEDURE — 6360000002 HC RX W HCPCS: Performed by: INTERNAL MEDICINE

## 2018-10-03 PROCEDURE — 6370000000 HC RX 637 (ALT 250 FOR IP): Performed by: PEDIATRICS

## 2018-10-03 PROCEDURE — 2500000003 HC RX 250 WO HCPCS: Performed by: NURSE PRACTITIONER

## 2018-10-03 PROCEDURE — 94667 MNPJ CHEST WALL 1ST: CPT

## 2018-10-03 PROCEDURE — G8998 SWALLOW D/C STATUS: HCPCS

## 2018-10-03 PROCEDURE — G8996 SWALLOW CURRENT STATUS: HCPCS

## 2018-10-03 PROCEDURE — G8997 SWALLOW GOAL STATUS: HCPCS

## 2018-10-03 PROCEDURE — 2580000003 HC RX 258: Performed by: INTERNAL MEDICINE

## 2018-10-03 RX ORDER — FLUTICASONE PROPIONATE 50 MCG
2 SPRAY, SUSPENSION (ML) NASAL DAILY
Status: DISCONTINUED | OUTPATIENT
Start: 2018-10-03 | End: 2018-10-06 | Stop reason: HOSPADM

## 2018-10-03 RX ORDER — CLONAZEPAM 1 MG/1
1 TABLET ORAL 2 TIMES DAILY
Qty: 60 TABLET | Refills: 3 | Status: SHIPPED | OUTPATIENT
Start: 2018-10-03 | End: 2019-01-24 | Stop reason: SDUPTHER

## 2018-10-03 RX ORDER — TOBRAMYCIN SULFATE 40 MG/ML
300 INJECTION, SOLUTION INTRAMUSCULAR; INTRAVENOUS EVERY 12 HOURS
Status: DISCONTINUED | OUTPATIENT
Start: 2018-10-03 | End: 2018-10-03

## 2018-10-03 RX ORDER — METOCLOPRAMIDE HYDROCHLORIDE 5 MG/ML
10 INJECTION INTRAMUSCULAR; INTRAVENOUS EVERY 6 HOURS
Status: DISCONTINUED | OUTPATIENT
Start: 2018-10-03 | End: 2018-10-06 | Stop reason: HOSPADM

## 2018-10-03 RX ORDER — IPRATROPIUM BROMIDE AND ALBUTEROL SULFATE 2.5; .5 MG/3ML; MG/3ML
1 SOLUTION RESPIRATORY (INHALATION) EVERY 4 HOURS
Status: DISCONTINUED | OUTPATIENT
Start: 2018-10-03 | End: 2018-10-06 | Stop reason: HOSPADM

## 2018-10-03 RX ORDER — ACETYLCYSTEINE 200 MG/ML
600 SOLUTION ORAL; RESPIRATORY (INHALATION) 4 TIMES DAILY
Status: DISCONTINUED | OUTPATIENT
Start: 2018-10-04 | End: 2018-10-05

## 2018-10-03 RX ORDER — ACETYLCYSTEINE 200 MG/ML
600 SOLUTION ORAL; RESPIRATORY (INHALATION) 4 TIMES DAILY
Status: DISCONTINUED | OUTPATIENT
Start: 2018-10-03 | End: 2018-10-03

## 2018-10-03 RX ADMIN — ONDANSETRON 4 MG: 2 INJECTION INTRAMUSCULAR; INTRAVENOUS at 19:36

## 2018-10-03 RX ADMIN — ASPIRIN 81 MG 81 MG: 81 TABLET ORAL at 08:34

## 2018-10-03 RX ADMIN — MIDODRINE HYDROCHLORIDE 10 MG: 5 TABLET ORAL at 21:21

## 2018-10-03 RX ADMIN — Medication 10 ML: at 08:33

## 2018-10-03 RX ADMIN — BUSPIRONE HYDROCHLORIDE 15 MG: 15 TABLET ORAL at 13:24

## 2018-10-03 RX ADMIN — OXYCODONE HYDROCHLORIDE 5 MG: 5 TABLET ORAL at 21:22

## 2018-10-03 RX ADMIN — IPRATROPIUM BROMIDE AND ALBUTEROL SULFATE 1 AMPULE: .5; 3 SOLUTION RESPIRATORY (INHALATION) at 20:44

## 2018-10-03 RX ADMIN — Medication 3 UNITS: at 17:55

## 2018-10-03 RX ADMIN — INSULIN GLARGINE 30 UNITS: 100 INJECTION, SOLUTION SUBCUTANEOUS at 21:23

## 2018-10-03 RX ADMIN — MIDODRINE HYDROCHLORIDE 10 MG: 5 TABLET ORAL at 13:23

## 2018-10-03 RX ADMIN — BUSPIRONE HYDROCHLORIDE 15 MG: 15 TABLET ORAL at 21:22

## 2018-10-03 RX ADMIN — MICONAZOLE NITRATE: 2 POWDER TOPICAL at 21:24

## 2018-10-03 RX ADMIN — Medication 3 UNITS: at 08:36

## 2018-10-03 RX ADMIN — SODIUM BICARBONATE TAB 650 MG 325 MG: 650 TAB at 13:23

## 2018-10-03 RX ADMIN — Medication 10 ML: at 21:23

## 2018-10-03 RX ADMIN — Medication 3 UNITS: at 11:39

## 2018-10-03 RX ADMIN — MIDODRINE HYDROCHLORIDE 10 MG: 5 TABLET ORAL at 08:33

## 2018-10-03 RX ADMIN — SODIUM BICARBONATE TAB 650 MG 325 MG: 650 TAB at 21:22

## 2018-10-03 RX ADMIN — INSULIN LISPRO 2 UNITS: 100 INJECTION, SOLUTION INTRAVENOUS; SUBCUTANEOUS at 21:23

## 2018-10-03 RX ADMIN — ACETYLCYSTEINE 600 MG: 200 SOLUTION ORAL; RESPIRATORY (INHALATION) at 20:46

## 2018-10-03 RX ADMIN — HYDROCORTISONE 2.5%: 25 CREAM TOPICAL at 21:23

## 2018-10-03 RX ADMIN — DOCUSATE SODIUM 100 MG: 100 CAPSULE, LIQUID FILLED ORAL at 08:34

## 2018-10-03 RX ADMIN — METOCLOPRAMIDE 10 MG: 5 INJECTION, SOLUTION INTRAMUSCULAR; INTRAVENOUS at 21:22

## 2018-10-03 RX ADMIN — FAMOTIDINE 20 MG: 20 TABLET ORAL at 08:34

## 2018-10-03 RX ADMIN — MICONAZOLE NITRATE: 2 POWDER TOPICAL at 08:33

## 2018-10-03 RX ADMIN — MEROPENEM 1 G: 1 INJECTION, POWDER, FOR SOLUTION INTRAVENOUS at 13:23

## 2018-10-03 RX ADMIN — Medication 325 MG: at 21:21

## 2018-10-03 RX ADMIN — ROPINIROLE HYDROCHLORIDE 4 MG: 1 TABLET, FILM COATED ORAL at 08:35

## 2018-10-03 RX ADMIN — SODIUM BICARBONATE TAB 650 MG 325 MG: 650 TAB at 17:54

## 2018-10-03 RX ADMIN — MEROPENEM 1 G: 1 INJECTION, POWDER, FOR SOLUTION INTRAVENOUS at 21:22

## 2018-10-03 RX ADMIN — IPRATROPIUM BROMIDE AND ALBUTEROL SULFATE 1 AMPULE: .5; 3 SOLUTION RESPIRATORY (INHALATION) at 23:37

## 2018-10-03 RX ADMIN — SODIUM BICARBONATE TAB 650 MG 325 MG: 650 TAB at 08:34

## 2018-10-03 RX ADMIN — VANCOMYCIN HYDROCHLORIDE 1250 MG: 5 INJECTION, POWDER, LYOPHILIZED, FOR SOLUTION INTRAVENOUS at 04:59

## 2018-10-03 RX ADMIN — FLUTICASONE PROPIONATE 2 SPRAY: 50 SPRAY, METERED NASAL at 13:23

## 2018-10-03 RX ADMIN — BUSPIRONE HYDROCHLORIDE 15 MG: 15 TABLET ORAL at 08:34

## 2018-10-03 RX ADMIN — ENOXAPARIN SODIUM 40 MG: 40 INJECTION SUBCUTANEOUS at 08:34

## 2018-10-03 RX ADMIN — ONDANSETRON 4 MG: 2 INJECTION INTRAMUSCULAR; INTRAVENOUS at 09:35

## 2018-10-03 RX ADMIN — MULTIPLE VITAMINS W/ MINERALS TAB 1 TABLET: TAB at 08:34

## 2018-10-03 RX ADMIN — CLONAZEPAM 1 MG: 0.5 TABLET ORAL at 21:21

## 2018-10-03 RX ADMIN — ROPINIROLE HYDROCHLORIDE 4 MG: 1 TABLET, FILM COATED ORAL at 21:21

## 2018-10-03 RX ADMIN — MEROPENEM 1 G: 1 INJECTION, POWDER, FOR SOLUTION INTRAVENOUS at 04:08

## 2018-10-03 RX ADMIN — ROPINIROLE HYDROCHLORIDE 4 MG: 1 TABLET, FILM COATED ORAL at 13:23

## 2018-10-03 RX ADMIN — ACETAMINOPHEN 500 MG: 500 TABLET, FILM COATED ORAL at 17:53

## 2018-10-03 RX ADMIN — FAMOTIDINE 20 MG: 20 TABLET ORAL at 21:22

## 2018-10-03 RX ADMIN — CLONAZEPAM 1 MG: 0.5 TABLET ORAL at 09:35

## 2018-10-03 RX ADMIN — ATORVASTATIN CALCIUM 40 MG: 40 TABLET, FILM COATED ORAL at 21:21

## 2018-10-03 RX ADMIN — POLYETHYLENE GLYCOL 3350 17 G: 17 POWDER, FOR SOLUTION ORAL at 12:00

## 2018-10-03 ASSESSMENT — PAIN DESCRIPTION - PAIN TYPE: TYPE: CHRONIC PAIN

## 2018-10-03 ASSESSMENT — PAIN DESCRIPTION - LOCATION
LOCATION: ABDOMEN
LOCATION: ABDOMEN

## 2018-10-03 ASSESSMENT — PAIN DESCRIPTION - ORIENTATION
ORIENTATION: LEFT
ORIENTATION: LEFT

## 2018-10-03 ASSESSMENT — PAIN - FUNCTIONAL ASSESSMENT
PAIN_FUNCTIONAL_ASSESSMENT: 0-10
PAIN_FUNCTIONAL_ASSESSMENT: 0-10

## 2018-10-03 ASSESSMENT — PAIN DESCRIPTION - FREQUENCY: FREQUENCY: CONTINUOUS

## 2018-10-03 ASSESSMENT — PAIN DESCRIPTION - DESCRIPTORS
DESCRIPTORS: ACHING
DESCRIPTORS: ACHING

## 2018-10-03 ASSESSMENT — PAIN SCALES - GENERAL
PAINLEVEL_OUTOF10: 8
PAINLEVEL_OUTOF10: 9

## 2018-10-03 NOTE — CONSULTS
Nutrition Assessment    Type and Reason for Visit: Initial, Consult, Positive Nutrition Screen (Late entry: done on 10/2/2018; poor dentation)    Nutrition Recommendations: Will start ONS BID related to wound healing and protein needs. Recommend continuing MVI and will monitor intakes, pertinent labs, weights and changes skin integrity. Malnutrition Assessment:  · Malnutrition Status: At risk for malnutrition  · Context: Chronic illness  · Findings of the 6 clinical characteristics of malnutrition (Minimum of 2 out of 6 clinical characteristics is required to make the diagnosis of moderate or severe Protein Calorie Malnutrition based on AND/ASPEN Guidelines):  1. Energy Intake-Not available, not able to assess    2. Weight Loss-Unable to assess, unable to assess  3. Fat Loss-Unable to assess,    4. Muscle Loss-Unable to assess,    5. Fluid Accumulation-Mild fluid accumulation,    6.   Strength-Normal    Nutrition Diagnosis:   · Problem: Increased nutrient needs  · Etiology: related to Increased demand for energy/nutrients due to     Signs and symptoms:  as evidenced by Presence of wounds, Localized or generalized fluid accumulation, Lab values    Nutrition Assessment:  · Subjective Assessment:    · Nutrition-Focused Physical Findings: patient has dysphagia and has to mouth words, having trouble breathing, has several wounds on chest, poor dentation, on mech soft, nectar thick   · Wound Type: Stage II (on rt buttx )  · Current Nutrition Therapies:  · Oral Diet Orders: Dysphagia 2, Nectar Thick   · Oral Diet intake: % (x two meals)  · Oral Nutrition Supplement (ONS) Orders: None  · ONS intake:    · Anthropometric Measures:  · Ht: 5' 6\" (167.6 cm)   · Current Body Wt: 175 lb 0.7 oz (79.4 kg)  · Admission Body Wt:    · Usual Body Wt:    · % Weight Change:  ,     · Ideal Body Wt: 130 lb (59 kg), % Ideal Body 135  · Adjusted Body Wt:  , body weight adjusted for    · BMI Classification: BMI 25.0 - 29.9 Overweight (28.3)  · Comparative Standards (Estimated Nutrition Needs):  · Estimated Daily Total Kcal:  (1.2-1.3 activity factor)  · Estimated Daily Protein (g): 99    Estimated Intake vs Estimated Needs: Insufficient Data    Nutrition Risk Level: Moderate    Nutrition Interventions:   Continue current diet, Start ONS  Continued Inpatient Monitoring, Education not appropriate at this time    Nutrition Evaluation:   · Evaluation: Goals set   · Goals: meet estimated needs for age/condition, to promote wound healing    · Monitoring: Meal Intake, Supplement Intake, Pertinent Labs, Weight, Wound Healing, Ascites/Edema    See Adult Nutrition Doc Flowsheet for more detail.      Electronically signed by Colette Perez on 10/3/18 at 7:38 AM

## 2018-10-04 ENCOUNTER — APPOINTMENT (OUTPATIENT)
Dept: GENERAL RADIOLOGY | Facility: HOSPITAL | Age: 78
DRG: 194 | End: 2018-10-04
Payer: MEDICARE

## 2018-10-04 LAB
ANION GAP SERPL CALCULATED.3IONS-SCNC: 12 MMOL/L (ref 3–16)
BASE EXCESS ARTERIAL: 1.8 MMOL/L (ref -3–3)
BUN BLDV-MCNC: 15 MG/DL (ref 6–20)
CALCIUM SERPL-MCNC: 8.9 MG/DL (ref 8.5–10.5)
CHLORIDE BLD-SCNC: 100 MMOL/L (ref 98–107)
CO2: 25 MMOL/L (ref 20–30)
CREAT SERPL-MCNC: 0.6 MG/DL (ref 0.4–1.2)
FIO2: 0.4 %
GFR AFRICAN AMERICAN: >59
GFR NON-AFRICAN AMERICAN: >60
GLUCOSE BLD-MCNC: 234 MG/DL (ref 74–106)
GLUCOSE BLD-MCNC: 285 MG/DL (ref 74–106)
GLUCOSE BLD-MCNC: 341 MG/DL (ref 74–106)
GLUCOSE BLD-MCNC: 379 MG/DL (ref 74–106)
GLUCOSE BLD-MCNC: 418 MG/DL (ref 74–106)
HCO3 ARTERIAL: 25.6 MMOL/L (ref 22–26)
HCT VFR BLD CALC: 35.9 % (ref 37–47)
HEMOGLOBIN: 11.4 G/DL (ref 11.5–16.5)
MCH RBC QN AUTO: 29.8 PG (ref 27–32)
MCHC RBC AUTO-ENTMCNC: 31.8 G/DL (ref 31–35)
MCV RBC AUTO: 93.7 FL (ref 80–100)
O2 SAT, ARTERIAL: 94 %
O2 THERAPY: ABNORMAL
PCO2 ARTERIAL: 37.5 MMHG (ref 35–45)
PDW BLD-RTO: 13.5 % (ref 11–16)
PERFORMED ON: ABNORMAL
PH ARTERIAL: 7.45 (ref 7.35–7.45)
PLATELET # BLD: 290 K/UL (ref 150–400)
PMV BLD AUTO: 10.2 FL (ref 6–10)
PO2 ARTERIAL: 75.6 MMHG (ref 80–100)
POTASSIUM SERPL-SCNC: 2.7 MMOL/L (ref 3.4–5.1)
RBC # BLD: 3.83 M/UL (ref 3.8–5.8)
SODIUM BLD-SCNC: 137 MMOL/L (ref 136–145)
TCO2 ARTERIAL: 26.7 MMOL/L
WBC # BLD: 12.5 K/UL (ref 4–11)

## 2018-10-04 PROCEDURE — 6360000002 HC RX W HCPCS: Performed by: PEDIATRICS

## 2018-10-04 PROCEDURE — 6360000002 HC RX W HCPCS: Performed by: NURSE PRACTITIONER

## 2018-10-04 PROCEDURE — 93005 ELECTROCARDIOGRAM TRACING: CPT

## 2018-10-04 PROCEDURE — 1200000000 HC SEMI PRIVATE

## 2018-10-04 PROCEDURE — 82803 BLOOD GASES ANY COMBINATION: CPT

## 2018-10-04 PROCEDURE — 94669 MECHANICAL CHEST WALL OSCILL: CPT

## 2018-10-04 PROCEDURE — 2500000003 HC RX 250 WO HCPCS: Performed by: NURSE PRACTITIONER

## 2018-10-04 PROCEDURE — 36415 COLL VENOUS BLD VENIPUNCTURE: CPT

## 2018-10-04 PROCEDURE — 71045 X-RAY EXAM CHEST 1 VIEW: CPT

## 2018-10-04 PROCEDURE — 94668 MNPJ CHEST WALL SBSQ: CPT

## 2018-10-04 PROCEDURE — 31720 CLEARANCE OF AIRWAYS: CPT

## 2018-10-04 PROCEDURE — 85027 COMPLETE CBC AUTOMATED: CPT

## 2018-10-04 PROCEDURE — 6370000000 HC RX 637 (ALT 250 FOR IP): Performed by: NURSE PRACTITIONER

## 2018-10-04 PROCEDURE — 6370000000 HC RX 637 (ALT 250 FOR IP): Performed by: PEDIATRICS

## 2018-10-04 PROCEDURE — 36600 WITHDRAWAL OF ARTERIAL BLOOD: CPT

## 2018-10-04 PROCEDURE — 6360000002 HC RX W HCPCS: Performed by: INTERNAL MEDICINE

## 2018-10-04 PROCEDURE — 2580000003 HC RX 258: Performed by: INTERNAL MEDICINE

## 2018-10-04 PROCEDURE — 80048 BASIC METABOLIC PNL TOTAL CA: CPT

## 2018-10-04 PROCEDURE — 96366 THER/PROPH/DIAG IV INF ADDON: CPT

## 2018-10-04 PROCEDURE — 94760 N-INVAS EAR/PLS OXIMETRY 1: CPT

## 2018-10-04 PROCEDURE — 94640 AIRWAY INHALATION TREATMENT: CPT

## 2018-10-04 RX ORDER — POTASSIUM CHLORIDE 7.45 MG/ML
10 INJECTION INTRAVENOUS
Status: DISCONTINUED | OUTPATIENT
Start: 2018-10-04 | End: 2018-10-04 | Stop reason: SDUPTHER

## 2018-10-04 RX ORDER — POTASSIUM CHLORIDE 7.45 MG/ML
10 INJECTION INTRAVENOUS ONCE
Status: COMPLETED | OUTPATIENT
Start: 2018-10-04 | End: 2018-10-04

## 2018-10-04 RX ORDER — METHYLPREDNISOLONE SODIUM SUCCINATE 40 MG/ML
40 INJECTION, POWDER, LYOPHILIZED, FOR SOLUTION INTRAMUSCULAR; INTRAVENOUS EVERY 8 HOURS
Status: DISCONTINUED | OUTPATIENT
Start: 2018-10-04 | End: 2018-10-06 | Stop reason: HOSPADM

## 2018-10-04 RX ADMIN — VANCOMYCIN HYDROCHLORIDE 1250 MG: 5 INJECTION, POWDER, LYOPHILIZED, FOR SOLUTION INTRAVENOUS at 06:19

## 2018-10-04 RX ADMIN — ROPINIROLE HYDROCHLORIDE 4 MG: 1 TABLET, FILM COATED ORAL at 21:19

## 2018-10-04 RX ADMIN — LEVOFLOXACIN 750 MG: 5 INJECTION, SOLUTION INTRAVENOUS at 03:02

## 2018-10-04 RX ADMIN — METHYLPREDNISOLONE SODIUM SUCCINATE 40 MG: 40 INJECTION, POWDER, FOR SOLUTION INTRAMUSCULAR; INTRAVENOUS at 17:17

## 2018-10-04 RX ADMIN — MEROPENEM 1 G: 1 INJECTION, POWDER, FOR SOLUTION INTRAVENOUS at 14:36

## 2018-10-04 RX ADMIN — BUSPIRONE HYDROCHLORIDE 15 MG: 15 TABLET ORAL at 14:37

## 2018-10-04 RX ADMIN — ROPINIROLE HYDROCHLORIDE 4 MG: 1 TABLET, FILM COATED ORAL at 09:15

## 2018-10-04 RX ADMIN — MIDODRINE HYDROCHLORIDE 10 MG: 5 TABLET ORAL at 09:15

## 2018-10-04 RX ADMIN — POTASSIUM CHLORIDE 10 MEQ: 7.46 INJECTION, SOLUTION INTRAVENOUS at 11:19

## 2018-10-04 RX ADMIN — ACETYLCYSTEINE 600 MG: 200 SOLUTION ORAL; RESPIRATORY (INHALATION) at 21:14

## 2018-10-04 RX ADMIN — MICONAZOLE NITRATE: 2 POWDER TOPICAL at 09:16

## 2018-10-04 RX ADMIN — HYDROCORTISONE 2.5%: 25 CREAM TOPICAL at 21:37

## 2018-10-04 RX ADMIN — INSULIN LISPRO 3 UNITS: 100 INJECTION, SOLUTION INTRAVENOUS; SUBCUTANEOUS at 21:31

## 2018-10-04 RX ADMIN — DOCUSATE SODIUM 100 MG: 100 CAPSULE, LIQUID FILLED ORAL at 09:15

## 2018-10-04 RX ADMIN — ACETYLCYSTEINE 600 MG: 200 SOLUTION ORAL; RESPIRATORY (INHALATION) at 07:38

## 2018-10-04 RX ADMIN — METHYLPREDNISOLONE SODIUM SUCCINATE 40 MG: 40 INJECTION, POWDER, FOR SOLUTION INTRAMUSCULAR; INTRAVENOUS at 09:16

## 2018-10-04 RX ADMIN — FLUTICASONE PROPIONATE 2 SPRAY: 50 SPRAY, METERED NASAL at 09:15

## 2018-10-04 RX ADMIN — ENOXAPARIN SODIUM 40 MG: 40 INJECTION SUBCUTANEOUS at 09:14

## 2018-10-04 RX ADMIN — ASPIRIN 81 MG 81 MG: 81 TABLET ORAL at 09:15

## 2018-10-04 RX ADMIN — Medication 4 UNITS: at 09:14

## 2018-10-04 RX ADMIN — IPRATROPIUM BROMIDE AND ALBUTEROL SULFATE 1 AMPULE: .5; 3 SOLUTION RESPIRATORY (INHALATION) at 21:14

## 2018-10-04 RX ADMIN — BUSPIRONE HYDROCHLORIDE 15 MG: 15 TABLET ORAL at 09:15

## 2018-10-04 RX ADMIN — METOCLOPRAMIDE 10 MG: 5 INJECTION, SOLUTION INTRAMUSCULAR; INTRAVENOUS at 14:37

## 2018-10-04 RX ADMIN — IPRATROPIUM BROMIDE AND ALBUTEROL SULFATE 1 AMPULE: .5; 3 SOLUTION RESPIRATORY (INHALATION) at 11:50

## 2018-10-04 RX ADMIN — ACETYLCYSTEINE 600 MG: 200 SOLUTION ORAL; RESPIRATORY (INHALATION) at 11:50

## 2018-10-04 RX ADMIN — METOCLOPRAMIDE 10 MG: 5 INJECTION, SOLUTION INTRAMUSCULAR; INTRAVENOUS at 09:29

## 2018-10-04 RX ADMIN — IPRATROPIUM BROMIDE AND ALBUTEROL SULFATE 1 AMPULE: .5; 3 SOLUTION RESPIRATORY (INHALATION) at 15:32

## 2018-10-04 RX ADMIN — BUSPIRONE HYDROCHLORIDE 15 MG: 15 TABLET ORAL at 21:21

## 2018-10-04 RX ADMIN — SODIUM BICARBONATE TAB 650 MG 325 MG: 650 TAB at 21:21

## 2018-10-04 RX ADMIN — MEROPENEM 1 G: 1 INJECTION, POWDER, FOR SOLUTION INTRAVENOUS at 21:18

## 2018-10-04 RX ADMIN — ACETYLCYSTEINE 600 MG: 200 SOLUTION ORAL; RESPIRATORY (INHALATION) at 15:32

## 2018-10-04 RX ADMIN — ATORVASTATIN CALCIUM 40 MG: 40 TABLET, FILM COATED ORAL at 21:20

## 2018-10-04 RX ADMIN — Medication 5 UNITS: at 17:18

## 2018-10-04 RX ADMIN — INSULIN GLARGINE 30 UNITS: 100 INJECTION, SOLUTION SUBCUTANEOUS at 21:31

## 2018-10-04 RX ADMIN — POTASSIUM CHLORIDE 10 MEQ: 7.46 INJECTION, SOLUTION INTRAVENOUS at 09:14

## 2018-10-04 RX ADMIN — MIDODRINE HYDROCHLORIDE 10 MG: 5 TABLET ORAL at 21:20

## 2018-10-04 RX ADMIN — ROPINIROLE HYDROCHLORIDE 4 MG: 1 TABLET, FILM COATED ORAL at 14:37

## 2018-10-04 RX ADMIN — POLYETHYLENE GLYCOL 3350 17 G: 17 POWDER, FOR SOLUTION ORAL at 09:16

## 2018-10-04 RX ADMIN — OXYCODONE HYDROCHLORIDE 5 MG: 5 TABLET ORAL at 06:24

## 2018-10-04 RX ADMIN — MEROPENEM 1 G: 1 INJECTION, POWDER, FOR SOLUTION INTRAVENOUS at 05:37

## 2018-10-04 RX ADMIN — IPRATROPIUM BROMIDE AND ALBUTEROL SULFATE 1 AMPULE: .5; 3 SOLUTION RESPIRATORY (INHALATION) at 07:37

## 2018-10-04 RX ADMIN — Medication 2 UNITS: at 11:40

## 2018-10-04 RX ADMIN — METOCLOPRAMIDE 10 MG: 5 INJECTION, SOLUTION INTRAMUSCULAR; INTRAVENOUS at 05:37

## 2018-10-04 RX ADMIN — HYDROCORTISONE 2.5%: 25 CREAM TOPICAL at 09:16

## 2018-10-04 RX ADMIN — OXYCODONE HYDROCHLORIDE 5 MG: 5 TABLET ORAL at 19:02

## 2018-10-04 RX ADMIN — MULTIPLE VITAMINS W/ MINERALS TAB 1 TABLET: TAB at 09:15

## 2018-10-04 RX ADMIN — SODIUM BICARBONATE TAB 650 MG 325 MG: 650 TAB at 17:18

## 2018-10-04 RX ADMIN — FAMOTIDINE 20 MG: 20 TABLET ORAL at 09:15

## 2018-10-04 RX ADMIN — SODIUM BICARBONATE TAB 650 MG 325 MG: 650 TAB at 14:38

## 2018-10-04 RX ADMIN — MIDODRINE HYDROCHLORIDE 10 MG: 5 TABLET ORAL at 14:37

## 2018-10-04 RX ADMIN — SODIUM BICARBONATE TAB 650 MG 325 MG: 650 TAB at 09:15

## 2018-10-04 RX ADMIN — IPRATROPIUM BROMIDE AND ALBUTEROL SULFATE 1 AMPULE: .5; 3 SOLUTION RESPIRATORY (INHALATION) at 03:38

## 2018-10-04 RX ADMIN — METOCLOPRAMIDE 10 MG: 5 INJECTION, SOLUTION INTRAMUSCULAR; INTRAVENOUS at 21:17

## 2018-10-04 RX ADMIN — FAMOTIDINE 20 MG: 20 TABLET ORAL at 21:20

## 2018-10-04 ASSESSMENT — PAIN SCALES - GENERAL
PAINLEVEL_OUTOF10: 7
PAINLEVEL_OUTOF10: 5
PAINLEVEL_OUTOF10: 0

## 2018-10-04 NOTE — PLAN OF CARE
Problem: SAFETY  Goal: Free from accidental physical injury  Outcome: Met This Shift    Goal: Free from intentional harm  Outcome: Met This Shift      Problem: DAILY CARE  Goal: Daily care needs are met  Outcome: Met This Shift      Problem: PAIN  Goal: Patient's pain/discomfort is manageable  Outcome: Met This Shift      Problem: SKIN INTEGRITY  Goal: Skin integrity is maintained or improved  Outcome: Ongoing      Problem: KNOWLEDGE DEFICIT  Goal: Patient/S.O. demonstrates understanding of disease process, treatment plan, medications, and discharge instructions.   Outcome: Ongoing      Problem: DISCHARGE BARRIERS  Goal: Patient's continuum of care needs are met  Outcome: Ongoing      Problem: Pain:  Goal: Pain level will decrease  Pain level will decrease   Outcome: Ongoing    Goal: Control of acute pain  Control of acute pain   Outcome: Ongoing

## 2018-10-04 NOTE — DISCHARGE SUMMARY
Discharge Summary      Patient ID: Kofi Sharma      Patient's PCP: Jackie Gregg DO    Admit Date: 10/1/2018     Discharge Date:  10/4/2018    Admitting Provider: Gerardo Beltran MD    Discharging Provider: AUSTEN Villatoro     Reason for this admission:   Shortness of breath    Discharge Diagnoses: Active Hospital Problems    Diagnosis Date Noted    Pneumonia [J18.9] 10/02/2018    Type 2 diabetes mellitus ([address] [E11.9] 10/02/2018    Acute cystitis [N30.00] 10/02/2018    Hypotension [I95.9] 12/03/2017       Procedures:  XR CHEST PORTABLE   Final Result   Impression: Persistent near-complete opacification of the left lung      XR CHEST PORTABLE   Final Result      Complete opacification of the left hemithorax with cut off of the left mainstem bronchus possibly mucous plugging and atelectasis of the left lung      Critical findings called to Hattie Wen, patient's floor nurse on 10/3/2018 at 4:17 PM            RADIOLOGY REPORT   Final Result      XR CHEST PORTABLE   Final Result   1. Small left pleural effusion. 2.  Opacities in the left mid and lower lung are nonspecific and may    represent atelectasis or an infectious or inflammatory process. Consults:   PHARMACY TO DOSE VANCOMYCIN  IP CONSULT TO DIETITIAN  Speech therapy    Briefly:   The patient is a 66 y.o. female who presents with persistent shortness of breath for a few days. Patient is a nursing home resident. Hospital Course: Active Hospital Problems    Diagnosis Date Noted    Pneumonia [J18.9]  Patient was admitted with shortness of breath and cough in addition to some trouble with her tracheostomy. Initial chest x-ray showed opacities in the left mid and lower lung are nonspecific and may represent atelectasis or an infectious or inflammatory process according to radiology. WBC elevated at 20.1 on arrival. Blood cultures obtained. Diagnosed with pneumonia.  Treated with broad-spectrum antibiotics, (levaquin, vanco, opportunity to be involved in this patient's care. If you have any questions or concerns please feel free to contact me at [phone].

## 2018-10-05 LAB
ANION GAP SERPL CALCULATED.3IONS-SCNC: 14 MMOL/L (ref 3–16)
BUN BLDV-MCNC: 17 MG/DL (ref 6–20)
CALCIUM SERPL-MCNC: 9.2 MG/DL (ref 8.5–10.5)
CHLORIDE BLD-SCNC: 100 MMOL/L (ref 98–107)
CO2: 26 MMOL/L (ref 20–30)
CREAT SERPL-MCNC: 0.6 MG/DL (ref 0.4–1.2)
CULTURE, RESPIRATORY: ABNORMAL
GFR AFRICAN AMERICAN: >59
GFR NON-AFRICAN AMERICAN: >60
GLUCOSE BLD-MCNC: 202 MG/DL (ref 74–106)
GLUCOSE BLD-MCNC: 357 MG/DL (ref 74–106)
GLUCOSE BLD-MCNC: 371 MG/DL (ref 74–106)
GLUCOSE BLD-MCNC: 406 MG/DL (ref 74–106)
GLUCOSE BLD-MCNC: 412 MG/DL (ref 74–106)
GLUCOSE BLD-MCNC: 450 MG/DL (ref 74–106)
GRAM STAIN RESULT: ABNORMAL
ORGANISM: ABNORMAL
ORGANISM: ABNORMAL
PERFORMED ON: ABNORMAL
POTASSIUM SERPL-SCNC: 3.1 MMOL/L (ref 3.4–5.1)
SODIUM BLD-SCNC: 140 MMOL/L (ref 136–145)
VANCOMYCIN TROUGH: 10.5 UG/ML (ref 5–15)

## 2018-10-05 PROCEDURE — 94760 N-INVAS EAR/PLS OXIMETRY 1: CPT

## 2018-10-05 PROCEDURE — 6370000000 HC RX 637 (ALT 250 FOR IP): Performed by: NURSE PRACTITIONER

## 2018-10-05 PROCEDURE — 36415 COLL VENOUS BLD VENIPUNCTURE: CPT

## 2018-10-05 PROCEDURE — 80202 ASSAY OF VANCOMYCIN: CPT

## 2018-10-05 PROCEDURE — 94668 MNPJ CHEST WALL SBSQ: CPT

## 2018-10-05 PROCEDURE — 94669 MECHANICAL CHEST WALL OSCILL: CPT

## 2018-10-05 PROCEDURE — 94640 AIRWAY INHALATION TREATMENT: CPT

## 2018-10-05 PROCEDURE — 2580000003 HC RX 258: Performed by: INTERNAL MEDICINE

## 2018-10-05 PROCEDURE — 96366 THER/PROPH/DIAG IV INF ADDON: CPT

## 2018-10-05 PROCEDURE — 80048 BASIC METABOLIC PNL TOTAL CA: CPT

## 2018-10-05 PROCEDURE — 31720 CLEARANCE OF AIRWAYS: CPT

## 2018-10-05 PROCEDURE — 6360000002 HC RX W HCPCS: Performed by: INTERNAL MEDICINE

## 2018-10-05 PROCEDURE — 2700000000 HC OXYGEN THERAPY PER DAY

## 2018-10-05 PROCEDURE — 99232 SBSQ HOSP IP/OBS MODERATE 35: CPT | Performed by: PEDIATRICS

## 2018-10-05 PROCEDURE — 6360000002 HC RX W HCPCS: Performed by: PEDIATRICS

## 2018-10-05 PROCEDURE — 6370000000 HC RX 637 (ALT 250 FOR IP): Performed by: INTERNAL MEDICINE

## 2018-10-05 PROCEDURE — 1200000000 HC SEMI PRIVATE

## 2018-10-05 PROCEDURE — 2500000003 HC RX 250 WO HCPCS: Performed by: NURSE PRACTITIONER

## 2018-10-05 PROCEDURE — 6370000000 HC RX 637 (ALT 250 FOR IP): Performed by: PEDIATRICS

## 2018-10-05 PROCEDURE — 6360000002 HC RX W HCPCS: Performed by: NURSE PRACTITIONER

## 2018-10-05 RX ORDER — TOBRAMYCIN SULFATE 40 MG/ML
300 INJECTION, SOLUTION INTRAMUSCULAR; INTRAVENOUS EVERY 12 HOURS
Status: DISCONTINUED | OUTPATIENT
Start: 2018-10-05 | End: 2018-10-06 | Stop reason: HOSPADM

## 2018-10-05 RX ORDER — ACETYLCYSTEINE 200 MG/ML
200 SOLUTION ORAL; RESPIRATORY (INHALATION) 4 TIMES DAILY
Status: DISCONTINUED | OUTPATIENT
Start: 2018-10-05 | End: 2018-10-06 | Stop reason: HOSPADM

## 2018-10-05 RX ORDER — FERROUS SULFATE 325(65) MG
324 TABLET ORAL 2 TIMES DAILY
Status: DISCONTINUED | OUTPATIENT
Start: 2018-10-05 | End: 2018-10-06 | Stop reason: HOSPADM

## 2018-10-05 RX ADMIN — BUSPIRONE HYDROCHLORIDE 15 MG: 15 TABLET ORAL at 20:22

## 2018-10-05 RX ADMIN — SODIUM BICARBONATE TAB 650 MG 325 MG: 650 TAB at 12:41

## 2018-10-05 RX ADMIN — SODIUM BICARBONATE TAB 650 MG 325 MG: 650 TAB at 16:17

## 2018-10-05 RX ADMIN — INSULIN LISPRO 15 UNITS: 100 INJECTION, SOLUTION INTRAVENOUS; SUBCUTANEOUS at 16:53

## 2018-10-05 RX ADMIN — SODIUM BICARBONATE TAB 650 MG 325 MG: 650 TAB at 20:22

## 2018-10-05 RX ADMIN — METOCLOPRAMIDE 10 MG: 5 INJECTION, SOLUTION INTRAMUSCULAR; INTRAVENOUS at 04:40

## 2018-10-05 RX ADMIN — BUSPIRONE HYDROCHLORIDE 15 MG: 15 TABLET ORAL at 16:17

## 2018-10-05 RX ADMIN — MEROPENEM 1 G: 1 INJECTION, POWDER, FOR SOLUTION INTRAVENOUS at 20:21

## 2018-10-05 RX ADMIN — FLUTICASONE PROPIONATE 2 SPRAY: 50 SPRAY, METERED NASAL at 10:10

## 2018-10-05 RX ADMIN — MIDODRINE HYDROCHLORIDE 10 MG: 5 TABLET ORAL at 16:17

## 2018-10-05 RX ADMIN — MIDODRINE HYDROCHLORIDE 10 MG: 5 TABLET ORAL at 10:09

## 2018-10-05 RX ADMIN — IPRATROPIUM BROMIDE AND ALBUTEROL SULFATE 1 AMPULE: .5; 3 SOLUTION RESPIRATORY (INHALATION) at 11:28

## 2018-10-05 RX ADMIN — IPRATROPIUM BROMIDE AND ALBUTEROL SULFATE 1 AMPULE: .5; 3 SOLUTION RESPIRATORY (INHALATION) at 00:46

## 2018-10-05 RX ADMIN — MULTIPLE VITAMINS W/ MINERALS TAB 1 TABLET: TAB at 10:08

## 2018-10-05 RX ADMIN — MICONAZOLE NITRATE: 2 POWDER TOPICAL at 10:11

## 2018-10-05 RX ADMIN — MEROPENEM 1 G: 1 INJECTION, POWDER, FOR SOLUTION INTRAVENOUS at 04:40

## 2018-10-05 RX ADMIN — Medication 6 UNITS: at 10:16

## 2018-10-05 RX ADMIN — METOCLOPRAMIDE 10 MG: 5 INJECTION, SOLUTION INTRAMUSCULAR; INTRAVENOUS at 10:10

## 2018-10-05 RX ADMIN — ENOXAPARIN SODIUM 40 MG: 40 INJECTION SUBCUTANEOUS at 10:10

## 2018-10-05 RX ADMIN — FAMOTIDINE 20 MG: 20 TABLET ORAL at 20:22

## 2018-10-05 RX ADMIN — ROPINIROLE HYDROCHLORIDE 4 MG: 1 TABLET, FILM COATED ORAL at 16:17

## 2018-10-05 RX ADMIN — IPRATROPIUM BROMIDE AND ALBUTEROL SULFATE 1 AMPULE: .5; 3 SOLUTION RESPIRATORY (INHALATION) at 19:57

## 2018-10-05 RX ADMIN — ATORVASTATIN CALCIUM 40 MG: 40 TABLET, FILM COATED ORAL at 20:22

## 2018-10-05 RX ADMIN — INSULIN GLARGINE 30 UNITS: 100 INJECTION, SOLUTION SUBCUTANEOUS at 20:32

## 2018-10-05 RX ADMIN — DOCUSATE SODIUM 100 MG: 100 CAPSULE, LIQUID FILLED ORAL at 10:09

## 2018-10-05 RX ADMIN — INSULIN LISPRO 18 UNITS: 100 INJECTION, SOLUTION INTRAVENOUS; SUBCUTANEOUS at 12:15

## 2018-10-05 RX ADMIN — IPRATROPIUM BROMIDE AND ALBUTEROL SULFATE 1 AMPULE: .5; 3 SOLUTION RESPIRATORY (INHALATION) at 08:45

## 2018-10-05 RX ADMIN — METHYLPREDNISOLONE SODIUM SUCCINATE 40 MG: 40 INJECTION, POWDER, FOR SOLUTION INTRAMUSCULAR; INTRAVENOUS at 10:09

## 2018-10-05 RX ADMIN — MICONAZOLE NITRATE: 2 POWDER TOPICAL at 00:23

## 2018-10-05 RX ADMIN — FAMOTIDINE 20 MG: 20 TABLET ORAL at 10:09

## 2018-10-05 RX ADMIN — HYDROCORTISONE 2.5%: 25 CREAM TOPICAL at 20:23

## 2018-10-05 RX ADMIN — ACETYLCYSTEINE 200 MG: 200 SOLUTION ORAL; RESPIRATORY (INHALATION) at 15:19

## 2018-10-05 RX ADMIN — IPRATROPIUM BROMIDE AND ALBUTEROL SULFATE 1 AMPULE: .5; 3 SOLUTION RESPIRATORY (INHALATION) at 15:19

## 2018-10-05 RX ADMIN — METHYLPREDNISOLONE SODIUM SUCCINATE 40 MG: 40 INJECTION, POWDER, FOR SOLUTION INTRAMUSCULAR; INTRAVENOUS at 16:17

## 2018-10-05 RX ADMIN — CLONAZEPAM 1 MG: 0.5 TABLET ORAL at 20:21

## 2018-10-05 RX ADMIN — METHYLPREDNISOLONE SODIUM SUCCINATE 40 MG: 40 INJECTION, POWDER, FOR SOLUTION INTRAMUSCULAR; INTRAVENOUS at 00:10

## 2018-10-05 RX ADMIN — LEVOFLOXACIN 750 MG: 5 INJECTION, SOLUTION INTRAVENOUS at 00:10

## 2018-10-05 RX ADMIN — ACETYLCYSTEINE 600 MG: 200 SOLUTION ORAL; RESPIRATORY (INHALATION) at 08:45

## 2018-10-05 RX ADMIN — VANCOMYCIN HYDROCHLORIDE 1250 MG: 5 INJECTION, POWDER, LYOPHILIZED, FOR SOLUTION INTRAVENOUS at 05:14

## 2018-10-05 RX ADMIN — ROPINIROLE HYDROCHLORIDE 4 MG: 1 TABLET, FILM COATED ORAL at 10:08

## 2018-10-05 RX ADMIN — ACETYLCYSTEINE 200 MG: 200 SOLUTION ORAL; RESPIRATORY (INHALATION) at 19:57

## 2018-10-05 RX ADMIN — IPRATROPIUM BROMIDE AND ALBUTEROL SULFATE 1 AMPULE: .5; 3 SOLUTION RESPIRATORY (INHALATION) at 04:21

## 2018-10-05 RX ADMIN — Medication 324 MG: at 20:22

## 2018-10-05 RX ADMIN — HYDROCORTISONE 2.5%: 25 CREAM TOPICAL at 10:10

## 2018-10-05 RX ADMIN — MICONAZOLE NITRATE: 2 POWDER TOPICAL at 20:23

## 2018-10-05 RX ADMIN — ASPIRIN 81 MG 81 MG: 81 TABLET ORAL at 10:09

## 2018-10-05 RX ADMIN — TOBRAMYCIN SULFATE 300 MG: 40 INJECTION, SOLUTION INTRAMUSCULAR; INTRAVENOUS at 15:20

## 2018-10-05 RX ADMIN — ROPINIROLE HYDROCHLORIDE 4 MG: 1 TABLET, FILM COATED ORAL at 20:21

## 2018-10-05 RX ADMIN — MEROPENEM 1 G: 1 INJECTION, POWDER, FOR SOLUTION INTRAVENOUS at 12:41

## 2018-10-05 RX ADMIN — INSULIN LISPRO 3 UNITS: 100 INJECTION, SOLUTION INTRAVENOUS; SUBCUTANEOUS at 20:35

## 2018-10-05 RX ADMIN — METOCLOPRAMIDE 10 MG: 5 INJECTION, SOLUTION INTRAMUSCULAR; INTRAVENOUS at 20:22

## 2018-10-05 RX ADMIN — BUSPIRONE HYDROCHLORIDE 15 MG: 15 TABLET ORAL at 10:09

## 2018-10-05 RX ADMIN — MIDODRINE HYDROCHLORIDE 10 MG: 5 TABLET ORAL at 20:22

## 2018-10-05 RX ADMIN — SODIUM BICARBONATE TAB 650 MG 325 MG: 650 TAB at 10:09

## 2018-10-05 RX ADMIN — METOCLOPRAMIDE 10 MG: 5 INJECTION, SOLUTION INTRAMUSCULAR; INTRAVENOUS at 16:17

## 2018-10-05 NOTE — PLAN OF CARE
Problem: SAFETY  Goal: Free from intentional harm  Outcome: Met This Shift      Problem: DAILY CARE  Goal: Daily care needs are met  Outcome: Met This Shift

## 2018-10-06 ENCOUNTER — APPOINTMENT (OUTPATIENT)
Dept: GENERAL RADIOLOGY | Facility: HOSPITAL | Age: 78
End: 2018-10-06

## 2018-10-06 ENCOUNTER — HOSPITAL ENCOUNTER (INPATIENT)
Facility: HOSPITAL | Age: 78
LOS: 12 days | Discharge: NURSING, REHAB OR LONG-TERM CARE | End: 2018-10-18
Attending: FAMILY MEDICINE | Admitting: INTERNAL MEDICINE

## 2018-10-06 VITALS
HEART RATE: 95 BPM | OXYGEN SATURATION: 98 % | HEIGHT: 66 IN | RESPIRATION RATE: 16 BRPM | TEMPERATURE: 98.1 F | WEIGHT: 175 LBS | DIASTOLIC BLOOD PRESSURE: 76 MMHG | BODY MASS INDEX: 28.12 KG/M2 | SYSTOLIC BLOOD PRESSURE: 128 MMHG

## 2018-10-06 DIAGNOSIS — Z74.09 IMPAIRED MOBILITY AND ADLS: Primary | ICD-10-CM

## 2018-10-06 DIAGNOSIS — J69.0 ASPIRATION PNEUMONIA OF LEFT LOWER LOBE, UNSPECIFIED ASPIRATION PNEUMONIA TYPE (HCC): ICD-10-CM

## 2018-10-06 DIAGNOSIS — Z78.9 IMPAIRED MOBILITY AND ADLS: Primary | ICD-10-CM

## 2018-10-06 DIAGNOSIS — R13.12 OROPHARYNGEAL DYSPHAGIA: ICD-10-CM

## 2018-10-06 DIAGNOSIS — Z74.09 IMPAIRED FUNCTIONAL MOBILITY, BALANCE, GAIT, AND ENDURANCE: ICD-10-CM

## 2018-10-06 DIAGNOSIS — R49.9 VOICE AND RESONANCE DISORDER: ICD-10-CM

## 2018-10-06 PROBLEM — E11.9 TYPE 2 DIABETES MELLITUS (HCC): Status: ACTIVE | Noted: 2018-10-06

## 2018-10-06 PROBLEM — J96.21 ACUTE AND CHRONIC RESPIRATORY FAILURE WITH HYPOXIA (HCC): Status: ACTIVE | Noted: 2018-10-06

## 2018-10-06 PROBLEM — J18.9 PNEUMONIA: Status: ACTIVE | Noted: 2018-10-06

## 2018-10-06 LAB
ALBUMIN SERPL-MCNC: 2.82 G/DL (ref 3.2–4.8)
ALBUMIN/GLOB SERPL: 0.9 G/DL (ref 1.5–2.5)
ALP SERPL-CCNC: 78 U/L (ref 25–100)
ALT SERPL W P-5'-P-CCNC: 44 U/L (ref 7–40)
ANION GAP SERPL CALCULATED.3IONS-SCNC: 11 MMOL/L (ref 3–16)
ANION GAP SERPL CALCULATED.3IONS-SCNC: 5 MMOL/L (ref 3–11)
ANION GAP SERPL CALCULATED.3IONS-SCNC: 5 MMOL/L (ref 3–11)
AST SERPL-CCNC: 63 U/L (ref 0–33)
BASOPHILS ABSOLUTE: 0 K/UL (ref 0–0.1)
BASOPHILS RELATIVE PERCENT: 0.1 %
BILIRUB SERPL-MCNC: 0.4 MG/DL (ref 0.3–1.2)
BILIRUB UR QL STRIP: NEGATIVE
BUN BLD-MCNC: 15 MG/DL (ref 9–23)
BUN BLD-MCNC: 15 MG/DL (ref 9–23)
BUN BLDV-MCNC: 18 MG/DL (ref 6–20)
BUN/CREAT SERPL: 27.8 (ref 7–25)
BUN/CREAT SERPL: 27.8 (ref 7–25)
CALCIUM SERPL-MCNC: 9.4 MG/DL (ref 8.5–10.5)
CALCIUM SPEC-SCNC: 8.8 MG/DL (ref 8.7–10.4)
CALCIUM SPEC-SCNC: 8.8 MG/DL (ref 8.7–10.4)
CHLORIDE BLD-SCNC: 103 MMOL/L (ref 98–107)
CHLORIDE SERPL-SCNC: 107 MMOL/L (ref 99–109)
CHLORIDE SERPL-SCNC: 107 MMOL/L (ref 99–109)
CLARITY UR: CLEAR
CO2 SERPL-SCNC: 28 MMOL/L (ref 20–31)
CO2 SERPL-SCNC: 28 MMOL/L (ref 20–31)
CO2: 29 MMOL/L (ref 20–30)
COLOR UR: YELLOW
CREAT BLD-MCNC: 0.54 MG/DL (ref 0.6–1.3)
CREAT BLD-MCNC: 0.54 MG/DL (ref 0.6–1.3)
CREAT SERPL-MCNC: <0.5 MG/DL (ref 0.4–1.2)
EOSINOPHILS ABSOLUTE: 0 K/UL (ref 0–0.4)
EOSINOPHILS RELATIVE PERCENT: 0 %
GFR AFRICAN AMERICAN: >59
GFR NON-AFRICAN AMERICAN: >60
GFR SERPL CREATININE-BSD FRML MDRD: 109 ML/MIN/1.73
GFR SERPL CREATININE-BSD FRML MDRD: 109 ML/MIN/1.73
GLOBULIN UR ELPH-MCNC: 3.3 GM/DL
GLUCOSE BLD-MCNC: 152 MG/DL (ref 74–106)
GLUCOSE BLD-MCNC: 203 MG/DL (ref 74–106)
GLUCOSE BLD-MCNC: 226 MG/DL (ref 70–100)
GLUCOSE BLD-MCNC: 226 MG/DL (ref 70–100)
GLUCOSE BLD-MCNC: 278 MG/DL (ref 74–106)
GLUCOSE BLD-MCNC: 290 MG/DL (ref 74–106)
GLUCOSE BLDC GLUCOMTR-MCNC: 213 MG/DL (ref 70–130)
GLUCOSE UR STRIP-MCNC: ABNORMAL MG/DL
HBA1C MFR BLD: 8.9 % (ref 4.8–5.6)
HCT VFR BLD CALC: 35.4 % (ref 37–47)
HEMOGLOBIN: 10.8 G/DL (ref 11.5–16.5)
HGB UR QL STRIP.AUTO: NEGATIVE
IMMATURE GRANULOCYTES #: 0.1 K/UL
IMMATURE GRANULOCYTES %: 0.5 % (ref 0–5)
KETONES UR QL STRIP: ABNORMAL
LEUKOCYTE ESTERASE UR QL STRIP.AUTO: NEGATIVE
LYMPHOCYTES ABSOLUTE: 1 K/UL (ref 1.5–4)
LYMPHOCYTES RELATIVE PERCENT: 9.4 %
MAGNESIUM SERPL-MCNC: 2.1 MG/DL (ref 1.3–2.7)
MCH RBC QN AUTO: 29 PG (ref 27–32)
MCHC RBC AUTO-ENTMCNC: 30.5 G/DL (ref 31–35)
MCV RBC AUTO: 95.2 FL (ref 80–100)
MONOCYTES ABSOLUTE: 0.6 K/UL (ref 0.2–0.8)
MONOCYTES RELATIVE PERCENT: 5 %
NEUTROPHILS ABSOLUTE: 9.4 K/UL (ref 2–7.5)
NEUTROPHILS RELATIVE PERCENT: 85 %
NITRITE UR QL STRIP: NEGATIVE
PDW BLD-RTO: 13.6 % (ref 11–16)
PERFORMED ON: ABNORMAL
PH UR STRIP.AUTO: 6 [PH] (ref 5–8)
PLATELET # BLD: 273 K/UL (ref 150–400)
PMV BLD AUTO: 10.5 FL (ref 6–10)
POTASSIUM BLD-SCNC: 3.5 MMOL/L (ref 3.5–5.5)
POTASSIUM BLD-SCNC: 3.5 MMOL/L (ref 3.5–5.5)
POTASSIUM SERPL-SCNC: 3 MMOL/L (ref 3.4–5.1)
PROT SERPL-MCNC: 6.1 G/DL (ref 5.7–8.2)
PROT UR QL STRIP: NEGATIVE
RBC # BLD: 3.72 M/UL (ref 3.8–5.8)
SODIUM BLD-SCNC: 140 MMOL/L (ref 132–146)
SODIUM BLD-SCNC: 140 MMOL/L (ref 132–146)
SODIUM BLD-SCNC: 143 MMOL/L (ref 136–145)
SP GR UR STRIP: 1.02 (ref 1–1.03)
UROBILINOGEN UR QL STRIP: ABNORMAL
WBC # BLD: 11.1 K/UL (ref 4–11)

## 2018-10-06 PROCEDURE — 94640 AIRWAY INHALATION TREATMENT: CPT

## 2018-10-06 PROCEDURE — 31720 CLEARANCE OF AIRWAYS: CPT

## 2018-10-06 PROCEDURE — 6370000000 HC RX 637 (ALT 250 FOR IP): Performed by: PEDIATRICS

## 2018-10-06 PROCEDURE — 85025 COMPLETE CBC W/AUTO DIFF WBC: CPT

## 2018-10-06 PROCEDURE — 2580000003 HC RX 258: Performed by: INTERNAL MEDICINE

## 2018-10-06 PROCEDURE — 6370000000 HC RX 637 (ALT 250 FOR IP): Performed by: INTERNAL MEDICINE

## 2018-10-06 PROCEDURE — 94760 N-INVAS EAR/PLS OXIMETRY 1: CPT

## 2018-10-06 PROCEDURE — 82962 GLUCOSE BLOOD TEST: CPT

## 2018-10-06 PROCEDURE — 99238 HOSP IP/OBS DSCHRG MGMT 30/<: CPT | Performed by: PEDIATRICS

## 2018-10-06 PROCEDURE — 6370000000 HC RX 637 (ALT 250 FOR IP): Performed by: NURSE PRACTITIONER

## 2018-10-06 PROCEDURE — 25010000002 ENOXAPARIN PER 10 MG: Performed by: INTERNAL MEDICINE

## 2018-10-06 PROCEDURE — 83735 ASSAY OF MAGNESIUM: CPT | Performed by: INTERNAL MEDICINE

## 2018-10-06 PROCEDURE — 94799 UNLISTED PULMONARY SVC/PX: CPT

## 2018-10-06 PROCEDURE — 63710000001 INSULIN LISPRO (HUMAN) PER 5 UNITS: Performed by: INTERNAL MEDICINE

## 2018-10-06 PROCEDURE — 87040 BLOOD CULTURE FOR BACTERIA: CPT | Performed by: INTERNAL MEDICINE

## 2018-10-06 PROCEDURE — 25010000002 VANCOMYCIN 10 G RECONSTITUTED SOLUTION: Performed by: INTERNAL MEDICINE

## 2018-10-06 PROCEDURE — 96366 THER/PROPH/DIAG IV INF ADDON: CPT

## 2018-10-06 PROCEDURE — 6360000002 HC RX W HCPCS: Performed by: NURSE PRACTITIONER

## 2018-10-06 PROCEDURE — 94669 MECHANICAL CHEST WALL OSCILL: CPT

## 2018-10-06 PROCEDURE — 80053 COMPREHEN METABOLIC PANEL: CPT | Performed by: INTERNAL MEDICINE

## 2018-10-06 PROCEDURE — 71045 X-RAY EXAM CHEST 1 VIEW: CPT

## 2018-10-06 PROCEDURE — 99223 1ST HOSP IP/OBS HIGH 75: CPT | Performed by: INTERNAL MEDICINE

## 2018-10-06 PROCEDURE — 94668 MNPJ CHEST WALL SBSQ: CPT

## 2018-10-06 PROCEDURE — 6360000002 HC RX W HCPCS: Performed by: INTERNAL MEDICINE

## 2018-10-06 PROCEDURE — 36415 COLL VENOUS BLD VENIPUNCTURE: CPT

## 2018-10-06 PROCEDURE — 80048 BASIC METABOLIC PNL TOTAL CA: CPT

## 2018-10-06 PROCEDURE — 81003 URINALYSIS AUTO W/O SCOPE: CPT | Performed by: FAMILY MEDICINE

## 2018-10-06 PROCEDURE — 2500000003 HC RX 250 WO HCPCS: Performed by: NURSE PRACTITIONER

## 2018-10-06 PROCEDURE — 83036 HEMOGLOBIN GLYCOSYLATED A1C: CPT | Performed by: INTERNAL MEDICINE

## 2018-10-06 PROCEDURE — 6360000002 HC RX W HCPCS: Performed by: PEDIATRICS

## 2018-10-06 RX ORDER — PREDNISONE 20 MG/1
40 TABLET ORAL DAILY
Status: DISCONTINUED | OUTPATIENT
Start: 2018-10-06 | End: 2018-10-07

## 2018-10-06 RX ORDER — CHLORAL HYDRATE 500 MG
CAPSULE ORAL 2 TIMES DAILY WITH MEALS
Status: ON HOLD | COMMUNITY
End: 2019-04-02

## 2018-10-06 RX ORDER — BUSPIRONE HYDROCHLORIDE 15 MG/1
15 TABLET ORAL EVERY 8 HOURS SCHEDULED
Status: DISCONTINUED | OUTPATIENT
Start: 2018-10-06 | End: 2018-10-18 | Stop reason: HOSPADM

## 2018-10-06 RX ORDER — ROPINIROLE 4 MG/1
4 TABLET, FILM COATED ORAL 3 TIMES DAILY
COMMUNITY

## 2018-10-06 RX ORDER — DEXTROSE MONOHYDRATE 25 G/50ML
25 INJECTION, SOLUTION INTRAVENOUS
Status: DISCONTINUED | OUTPATIENT
Start: 2018-10-06 | End: 2018-10-18 | Stop reason: HOSPADM

## 2018-10-06 RX ORDER — ATORVASTATIN CALCIUM 40 MG/1
40 TABLET, FILM COATED ORAL DAILY
COMMUNITY

## 2018-10-06 RX ORDER — ASPIRIN 325 MG
162 TABLET ORAL DAILY
Status: DISCONTINUED | OUTPATIENT
Start: 2018-10-06 | End: 2018-10-18 | Stop reason: HOSPADM

## 2018-10-06 RX ORDER — ACETYLCYSTEINE 100 MG/ML
4 SOLUTION ORAL; RESPIRATORY (INHALATION) EVERY 4 HOURS PRN
COMMUNITY
End: 2018-10-18 | Stop reason: HOSPADM

## 2018-10-06 RX ORDER — IPRATROPIUM BROMIDE AND ALBUTEROL SULFATE 2.5; .5 MG/3ML; MG/3ML
3 SOLUTION RESPIRATORY (INHALATION)
Status: DISCONTINUED | OUTPATIENT
Start: 2018-10-06 | End: 2018-10-15

## 2018-10-06 RX ORDER — OXYCODONE HYDROCHLORIDE AND ACETAMINOPHEN 5; 325 MG/1; MG/1
1 TABLET ORAL EVERY 8 HOURS PRN
COMMUNITY
End: 2018-10-18 | Stop reason: HOSPADM

## 2018-10-06 RX ORDER — PANTOPRAZOLE SODIUM 40 MG/1
40 TABLET, DELAYED RELEASE ORAL DAILY
COMMUNITY

## 2018-10-06 RX ORDER — FUROSEMIDE 20 MG/1
20 TABLET ORAL 2 TIMES DAILY
COMMUNITY

## 2018-10-06 RX ORDER — DOCUSATE SODIUM 100 MG/1
100 CAPSULE, LIQUID FILLED ORAL 2 TIMES DAILY
COMMUNITY

## 2018-10-06 RX ORDER — INSULIN GLARGINE 100 [IU]/ML
30 INJECTION, SOLUTION SUBCUTANEOUS NIGHTLY
Status: ON HOLD | COMMUNITY
End: 2018-10-18

## 2018-10-06 RX ORDER — ACETAMINOPHEN 500 MG
500 TABLET ORAL EVERY 4 HOURS PRN
Status: ON HOLD | COMMUNITY
End: 2019-04-04

## 2018-10-06 RX ORDER — ROPINIROLE 2 MG/1
4 TABLET, FILM COATED ORAL NIGHTLY
Status: DISCONTINUED | OUTPATIENT
Start: 2018-10-06 | End: 2018-10-18 | Stop reason: HOSPADM

## 2018-10-06 RX ORDER — ASPIRIN 81 MG/1
81 TABLET, CHEWABLE ORAL DAILY
COMMUNITY

## 2018-10-06 RX ORDER — FUROSEMIDE 20 MG/1
20 TABLET ORAL
Status: DISCONTINUED | OUTPATIENT
Start: 2018-10-06 | End: 2018-10-12

## 2018-10-06 RX ORDER — SODIUM CHLORIDE 0.9 % (FLUSH) 0.9 %
3-10 SYRINGE (ML) INJECTION AS NEEDED
Status: DISCONTINUED | OUTPATIENT
Start: 2018-10-06 | End: 2018-10-18 | Stop reason: HOSPADM

## 2018-10-06 RX ORDER — ATORVASTATIN CALCIUM 40 MG/1
40 TABLET, FILM COATED ORAL NIGHTLY
Status: DISCONTINUED | OUTPATIENT
Start: 2018-10-06 | End: 2018-10-18 | Stop reason: HOSPADM

## 2018-10-06 RX ORDER — ONDANSETRON 4 MG/1
4 TABLET, FILM COATED ORAL EVERY 6 HOURS PRN
COMMUNITY

## 2018-10-06 RX ORDER — IPRATROPIUM BROMIDE AND ALBUTEROL SULFATE 2.5; .5 MG/3ML; MG/3ML
3 SOLUTION RESPIRATORY (INHALATION) 4 TIMES DAILY PRN
Status: ON HOLD | COMMUNITY
End: 2019-04-04

## 2018-10-06 RX ORDER — NICOTINE POLACRILEX 4 MG
15 LOZENGE BUCCAL
Status: DISCONTINUED | OUTPATIENT
Start: 2018-10-06 | End: 2018-10-18 | Stop reason: HOSPADM

## 2018-10-06 RX ORDER — MIDODRINE HYDROCHLORIDE 5 MG/1
10 TABLET ORAL
Status: DISCONTINUED | OUTPATIENT
Start: 2018-10-07 | End: 2018-10-18 | Stop reason: HOSPADM

## 2018-10-06 RX ORDER — SODIUM BICARBONATE 325 MG/1
325 TABLET ORAL 4 TIMES DAILY
COMMUNITY

## 2018-10-06 RX ORDER — FERROUS SULFATE 325(65) MG
325 TABLET ORAL
COMMUNITY

## 2018-10-06 RX ORDER — CLONAZEPAM 1 MG/1
1 TABLET ORAL 2 TIMES DAILY PRN
COMMUNITY
End: 2018-10-18 | Stop reason: HOSPADM

## 2018-10-06 RX ORDER — ALBUTEROL SULFATE 1.25 MG/3ML
1 SOLUTION RESPIRATORY (INHALATION) EVERY 4 HOURS PRN
COMMUNITY

## 2018-10-06 RX ORDER — MIDODRINE HYDROCHLORIDE 10 MG/1
10 TABLET ORAL 3 TIMES DAILY
COMMUNITY

## 2018-10-06 RX ORDER — BUSPIRONE HYDROCHLORIDE 15 MG/1
15 TABLET ORAL 3 TIMES DAILY
COMMUNITY

## 2018-10-06 RX ORDER — SODIUM CHLORIDE 0.9 % (FLUSH) 0.9 %
3 SYRINGE (ML) INJECTION EVERY 12 HOURS SCHEDULED
Status: DISCONTINUED | OUTPATIENT
Start: 2018-10-06 | End: 2018-10-18 | Stop reason: HOSPADM

## 2018-10-06 RX ADMIN — INSULIN LISPRO 6 UNITS: 100 INJECTION, SOLUTION INTRAVENOUS; SUBCUTANEOUS at 08:35

## 2018-10-06 RX ADMIN — FAMOTIDINE 20 MG: 20 TABLET ORAL at 08:24

## 2018-10-06 RX ADMIN — SODIUM BICARBONATE TAB 650 MG 325 MG: 650 TAB at 13:22

## 2018-10-06 RX ADMIN — ACETYLCYSTEINE 200 MG: 200 SOLUTION ORAL; RESPIRATORY (INHALATION) at 04:01

## 2018-10-06 RX ADMIN — INSULIN LISPRO 9 UNITS: 100 INJECTION, SOLUTION INTRAVENOUS; SUBCUTANEOUS at 16:59

## 2018-10-06 RX ADMIN — IPRATROPIUM BROMIDE AND ALBUTEROL SULFATE 1 AMPULE: .5; 3 SOLUTION RESPIRATORY (INHALATION) at 11:28

## 2018-10-06 RX ADMIN — VANCOMYCIN HYDROCHLORIDE 1250 MG: 5 INJECTION, POWDER, LYOPHILIZED, FOR SOLUTION INTRAVENOUS at 05:49

## 2018-10-06 RX ADMIN — IPRATROPIUM BROMIDE AND ALBUTEROL SULFATE 1 AMPULE: .5; 3 SOLUTION RESPIRATORY (INHALATION) at 00:08

## 2018-10-06 RX ADMIN — HYDROCORTISONE 2.5%: 25 CREAM TOPICAL at 08:25

## 2018-10-06 RX ADMIN — BUSPIRONE HYDROCHLORIDE 15 MG: 15 TABLET ORAL at 08:23

## 2018-10-06 RX ADMIN — METOCLOPRAMIDE 10 MG: 5 INJECTION, SOLUTION INTRAMUSCULAR; INTRAVENOUS at 16:20

## 2018-10-06 RX ADMIN — INSULIN LISPRO 9 UNITS: 100 INJECTION, SOLUTION INTRAVENOUS; SUBCUTANEOUS at 11:47

## 2018-10-06 RX ADMIN — MIDODRINE HYDROCHLORIDE 10 MG: 5 TABLET ORAL at 13:22

## 2018-10-06 RX ADMIN — BUSPIRONE HYDROCHLORIDE 15 MG: 15 TABLET ORAL at 13:22

## 2018-10-06 RX ADMIN — TOBRAMYCIN SULFATE 300 MG: 40 INJECTION, SOLUTION INTRAMUSCULAR; INTRAVENOUS at 04:23

## 2018-10-06 RX ADMIN — ROPINIROLE HYDROCHLORIDE 4 MG: 1 TABLET, FILM COATED ORAL at 13:22

## 2018-10-06 RX ADMIN — MICONAZOLE NITRATE: 2 POWDER TOPICAL at 08:25

## 2018-10-06 RX ADMIN — ROPINIROLE HYDROCHLORIDE 4 MG: 1 TABLET, FILM COATED ORAL at 08:23

## 2018-10-06 RX ADMIN — POLYETHYLENE GLYCOL 3350 17 G: 17 POWDER, FOR SOLUTION ORAL at 08:35

## 2018-10-06 RX ADMIN — DOCUSATE SODIUM 100 MG: 100 CAPSULE, LIQUID FILLED ORAL at 08:23

## 2018-10-06 RX ADMIN — SODIUM BICARBONATE TAB 650 MG 325 MG: 650 TAB at 08:24

## 2018-10-06 RX ADMIN — IPRATROPIUM BROMIDE AND ALBUTEROL SULFATE 3 ML: 2.5; .5 SOLUTION RESPIRATORY (INHALATION) at 22:09

## 2018-10-06 RX ADMIN — METHYLPREDNISOLONE SODIUM SUCCINATE 40 MG: 40 INJECTION, POWDER, FOR SOLUTION INTRAMUSCULAR; INTRAVENOUS at 02:10

## 2018-10-06 RX ADMIN — MULTIPLE VITAMINS W/ MINERALS TAB 1 TABLET: TAB at 08:23

## 2018-10-06 RX ADMIN — ENOXAPARIN SODIUM 40 MG: 40 INJECTION SUBCUTANEOUS at 08:22

## 2018-10-06 RX ADMIN — VANCOMYCIN HYDROCHLORIDE 2000 MG: 10 INJECTION, POWDER, LYOPHILIZED, FOR SOLUTION INTRAVENOUS at 23:38

## 2018-10-06 RX ADMIN — FLUTICASONE PROPIONATE 2 SPRAY: 50 SPRAY, METERED NASAL at 08:25

## 2018-10-06 RX ADMIN — Medication 324 MG: at 08:23

## 2018-10-06 RX ADMIN — METOCLOPRAMIDE 10 MG: 5 INJECTION, SOLUTION INTRAMUSCULAR; INTRAVENOUS at 02:10

## 2018-10-06 RX ADMIN — ACETYLCYSTEINE 200 MG: 200 SOLUTION ORAL; RESPIRATORY (INHALATION) at 11:28

## 2018-10-06 RX ADMIN — ASPIRIN 81 MG 81 MG: 81 TABLET ORAL at 08:25

## 2018-10-06 RX ADMIN — METHYLPREDNISOLONE SODIUM SUCCINATE 40 MG: 40 INJECTION, POWDER, FOR SOLUTION INTRAMUSCULAR; INTRAVENOUS at 16:20

## 2018-10-06 RX ADMIN — IPRATROPIUM BROMIDE AND ALBUTEROL SULFATE 1 AMPULE: .5; 3 SOLUTION RESPIRATORY (INHALATION) at 04:01

## 2018-10-06 RX ADMIN — MEROPENEM 1 G: 1 INJECTION, POWDER, FOR SOLUTION INTRAVENOUS at 13:23

## 2018-10-06 RX ADMIN — MIDODRINE HYDROCHLORIDE 10 MG: 5 TABLET ORAL at 08:23

## 2018-10-06 RX ADMIN — MEROPENEM 1 G: 1 INJECTION, POWDER, FOR SOLUTION INTRAVENOUS at 08:23

## 2018-10-06 RX ADMIN — IPRATROPIUM BROMIDE AND ALBUTEROL SULFATE 1 AMPULE: .5; 3 SOLUTION RESPIRATORY (INHALATION) at 07:54

## 2018-10-06 RX ADMIN — ENOXAPARIN SODIUM 40 MG: 40 INJECTION SUBCUTANEOUS at 23:58

## 2018-10-06 RX ADMIN — METOCLOPRAMIDE 10 MG: 5 INJECTION, SOLUTION INTRAMUSCULAR; INTRAVENOUS at 08:24

## 2018-10-06 RX ADMIN — METHYLPREDNISOLONE SODIUM SUCCINATE 40 MG: 40 INJECTION, POWDER, FOR SOLUTION INTRAMUSCULAR; INTRAVENOUS at 08:22

## 2018-10-06 RX ADMIN — Medication 3 ML: at 23:40

## 2018-10-06 RX ADMIN — SODIUM BICARBONATE TAB 650 MG 325 MG: 650 TAB at 16:20

## 2018-10-06 RX ADMIN — ACETYLCYSTEINE 200 MG: 200 SOLUTION ORAL; RESPIRATORY (INHALATION) at 07:54

## 2018-10-06 NOTE — PLAN OF CARE
Problem: SAFETY  Goal: Free from accidental physical injury  Outcome: Ongoing      Problem: DAILY CARE  Goal: Daily care needs are met  Outcome: Ongoing

## 2018-10-06 NOTE — DISCHARGE SUMMARY
Details   oxyCODONE 5 MG capsule Take 5 mg by mouth every 8 hours. .      Vitamins A & D (VITAMIN A & D) ointment Apply topically as needed for Dry Skin Apply topically to buttocks as needed for redness. clonazePAM (KLONOPIN) 1 MG tablet Take 1 tablet by mouth 2 times daily for 120 days. [place] Piedad: 60 tablet, Refills: 3    Associated Diagnoses: Anxiety      insulin glargine (LANTUS) 100 UNIT/ML injection vial Inject 30 Units into the skin nightly  Qty: 1 vial, Refills: 0      Nutritional Supplements (PROMOTE PO) Take 60 mL/hr by mouth      terbutaline (BRETHINE) 1 MG/ML injection 1 vial inhaled via nebulization every 2 hrs as needed.       busPIRone (BUSPAR) 15 MG tablet Take 15 mg by mouth 3 times daily  Qty: 90 tablet, Refills: 3      furosemide (LASIX) 40 MG tablet Take 0.5 tablets by mouth 2 times daily  Qty: 6 tablet, Refills: 0      rOPINIRole (REQUIP) 4 MG tablet Take 1 tablet by mouth 3 times daily  Qty: 30 tablet, Refills: 0      ferrous sulfate 325 (65 Fe) MG tablet Take 1 tablet by mouth 2 times daily  Qty: 30 tablet      midodrine (PROAMATINE) 10 MG tablet Take 10 mg by mouth 3 times daily      Omega-3 1000 MG CAPS Take 1,000 mg by mouth 2 times daily      ondansetron (ZOFRAN) 4 MG tablet Take 4 mg by mouth every 6 hours as needed for Nausea or Vomiting      albuterol (PROVENTIL) (2.5 MG/3ML) 0.083% nebulizer solution Take 2.5 mg by nebulization every 4 hours as needed for Wheezing      aspirin 81 MG chewable tablet Take 81 mg by mouth daily      docusate sodium (COLACE) 100 MG capsule Take 100 mg by mouth daily      polyethylene glycol (GLYCOLAX) powder Take 17 g by mouth daily      pantoprazole (PROTONIX) 40 MG tablet Take 40 mg by mouth daily      sodium bicarbonate 325 MG tablet Take 325 mg by mouth 4 times daily      Multiple Vitamins-Minerals (THERAPEUTIC MULTIVITAMIN-MINERALS) tablet Take 1 tablet by mouth daily      atorvastatin (LIPITOR) 40 MG tablet Take 40 mg by mouth nightly

## 2018-10-06 NOTE — H&P
[facility] Medicine Services  HISTORY AND PHYSICAL    Patient Name: Nallely Junior  : 1940  MRN: [id number]  Primary Care Physician: Ray Strickland MD  Date of admission: 10/6/2018      Subjective   Subjective     Chief Complaint:  Transfer for nonresolving PNA/mucus plugging/trach issues    HPI:  Nallely Junior is a 78 y.o. female with history of chronic respiratory failure s/p tracheostomy, HLD, T2DM, bedbound state who presents as a transfer/direct admission from [facility] for nonresolving PNA, mucus plugging with opacification of left lung and tracheostomy issues. Patient is a poor historian, most history was obtained from outside hospital medical records. Patient was admitted to OSH on 10/2 from her NH, for several days of sob, cough, increasing amount of sputum. Patient was initially diagnosed with left sided PNA at OSH, treated with broad spectrum abx- merrem, vanc, levaquin since 10/2. Patient was also noted to be hypotensive to as low as 90s systolic on presentation, though this resolved. Sputum cx from admission was reported to grow MRSA, sensitivities pending, as well as GNR (speciations/sensitivities pending). Patient was reported to initially be improving as WBC noted on admission 20k--- went down to 10k on day of transfer (10/6), however repeat CXR from 10/3-10/4 revealed worsening opacification of left hemithorax concerning for mucus plugging, also noted reports of small left sided plueral effusion. [facility] was called for transfer, however no beds were available until this time.     Patient as stated above is a poor historian and does not know why/when her trach was placed. No records are available regarding this, will need to be obtained. No family present this evening. She reports that she feels better and is unsure why she was transferred here. No other complaints at this time per patient, though she would like to eat.     Review of Systems   Gen- No  fevers, chills  CV- No chest pain, palpitations  Resp- No cough, dyspnea  GI- No N/V/D, abd pain      Otherwise 10-system ROS reviewed and is negative except as mentioned in the HPI.    Personal History     No past medical history on file.     T2DM  HLD  RLS  Chronic respiratory failure with tracheostomy       No past surgical history on file.    Family History: family history is not on file.     Social History:  reports that she has quit smoking. She quit after 25.00 years of use. She has never used smokeless tobacco.  Social History     Social History Narrative   • No narrative on file       Medications:  Available home medication information reviewed   See Med Rec in OSH records    Allergies   Allergen Reactions   • Penicillins Unknown (See Comments)     Per med rec, pt is a poor historian    • Sulfa Antibiotics Unknown (See Comments)     Per med rec, pt is a poor historian       Objective   Objective     Vital Signs:   Temp:  [97.7 °F (36.5 °C)] 97.7 °F (36.5 °C)  Heart Rate:  [76-88] 76  Resp:  [18-20] 20  BP: (112-125)/(52-69) 125/69        Physical Exam   Constitutional: No acute distress, awake, alert  Eyes: PERRLA, sclerae anicteric, no conjunctival injection  HENT: NCAT, mucous membranes moist, very poor dentition  Neck: Supple, no thyromegaly, no lymphadenopathy, tracheostomy in place , 35% trach collar   Respiratory: very rhonchorus breath sounds, wheezing diffusely   Cardiovascular: RRR, no murmurs, rubs, or gallops, palpable pedal pulses bilaterally  Gastrointestinal: Positive bowel sounds, soft, nontender, nondistended, PEG in place   Musculoskeletal: No bilateral ankle edema, no clubbing or cyanosis to extremities  Psychiatric: Appropriate affect, cooperative, but difficult to understand given trach   Neurologic: Oriented x 3, strength symmetric in all extremities, Cranial Nerves grossly intact to confrontation,   Skin: No rashes      Results Reviewed:  I have personally reviewed current lab,  radiology, and data and agree.          Results from last 7 days  Lab Units 10/06/18  2031   SODIUM mmol/L 140  140   POTASSIUM mmol/L 3.5  3.5   CHLORIDE mmol/L 107  107   CO2 mmol/L 28.0  28.0   BUN mg/dL 15  15   CREATININE mg/dL 0.54*  0.54*   GLUCOSE mg/dL 226*  226*   CALCIUM mg/dL 8.8  8.8   ALT (SGPT) U/L 44*   AST (SGOT) U/L 63*     CrCl cannot be calculated (Unknown ideal weight.).  Brief Urine Lab Results  (Last result in the past 365 days)      Color   Clarity   Blood   Leuk Est   Nitrite   Protein   CREAT   Urine HCG        10/06/18 1854 Yellow Clear Negative Negative Negative Negative             No results found for: BNP  Imaging Results (last 24 hours)     Procedure Component Value Units Date/Time    XR Chest 1 View [[id number]] Updated:  10/06/18 2141             Assessment/Plan   Assessment / Plan     Hospital Problem List     Pneumonia    Acute and chronic respiratory failure with hypoxia (CMS/[facility])    Type 2 diabetes mellitus (CMS/[facility])            Assessment & Plan:    Acute on chronic respiratory failure  PNA, MRSA/GNR   Mucus plugging   Possible aspiration  - unclear what patient's home baseline oxygen requirement is, will need to clarify with further records   - patient was being treated with IV vanc/merrem/levaquin at OSH-- sputum cx had resulted with MRSA/GNR-- unclear if she has had risk factors or pseudomonal infections in the past, however will treat with vanc/azactam (PCN allergy) here for now which should cover both MRSA/GNR, could consider ID consultation. Will need to follow up closely with OSH cultures/sensitivities. Repeat sputum/blood cx sent here  - in regards to reason for transfer- issues with tracheostomy-- will need to find out more information regarding where her trach was done/when this was done--- will place pulmonary consultation for help with this  - CXR pending here , most recent from OSH did comment on complete opacification of left side with supsected mucus  pluggin  - continue scheduled nebs, steroids (patient was on TID solumedrol at OSH prior to transfer)  - will place speech consult regarding aspiration risk-- patient states she takes in regular diet but will keep NPO with aspiration precautions for now (also has a PEG, but patient was not aware she had this, unclear when this has been used last)       T2DM  - home meds per OSH med rec list 30units levemir, will place on 15 units qhs as well as SSI    HLD  - continue statin    RLS  - continue ropinirole    Depression  - continue buspar    H/o Hypotension  - continue home midodrine, closely monitor     CM, PT/OT consult for dispo        DVT prophylaxis: Lovenox     CODE STATUS:    Code Status and Medical Interventions:   Ordered at: 10/06/18 1946     Level Of Support Discussed With:    Patient     Code Status:    CPR     Medical Interventions (Level of Support Prior to Arrest):    Full       Admission Status:  I believe this patient meets INPATIENT status due to the need for care which can only be reasonably provided in an hospital setting such as aggressive/expedited ancillary services and/or consultation services, the necessity for IV medications, close physician monitoring and/or the possible need for procedures.  In such, I feel patient’s risk for adverse outcomes and need for care warrant INPATIENT evaluation and predict the patient’s care encounter to likely last beyond 2 midnights.        Electronically signed by Olivia Murphy MD, 10/06/18, 7:50 PM.

## 2018-10-06 NOTE — NURSING NOTE
ACC REVIEW REPORT: [facility]        PATIENT NAME: Nallely Junior    PATIENT ID: [id number]    BED: N602    BED TYPE:Tele    BED GIVEN TO: Kecia MULTANI BED GIVEN: 1618    YOB: 1940    AGE: 77 yo    GENDER: Female    PREVIOUS ADMIT TO [facility]:     PREVIOUS ADMISSION DATE:     PATIENT CLASS:     TODAY'S DATE: 10/6/2018    TRANSFER DATE: 10/6/2018    ETA:     TRANSFERRING FACILITY: [facility]    TRANSFERRING FACILITY PHONE # : [phone]    TRANSFERRING MD: Vasu HOOD    DATE/TIME REQUEST RECEIVED: 10/4/2018 at 1323    [facility] RN: Patrica Sutton RN     REPORT FROM: Kecia Padilla RN    TIME REPORT TAKEN: 1625    DIAGNOSIS: Pleural Effusion    REASON FOR TRANSFER TO [facility]: Higher Level of Care    TRANSPORTATION: Ambulance    CLINICAL REASON FOR TRANSFER TO [facility]: On 10/2, Patient was taken to local ED from the NH, where she resides, for possible mucus plugging of her trach. It was found that the inner cannula of the trach was missing. Large amounts of thick green secretions where suctioned. Patient was admitted for PNA      CLINICAL INFORMATION    HEIGHT:     WEIGHT: 175 lbs    ALLERGIES: PCN ans sulfa    BHARDWAJ: #16 fr placed on 10/2.     INFECTIOUS DISEASE: MRSA in Sputum per culture results from yesterday    ISOLATION: Contact    1ST VITAL SIGNS:   TIME:   TEMP:   PULSE:   B/P:   RESP:     LAST VITAL SIGNS:  TIME:   TEMP: 98.1  PULSE: 95  B/P: 128/76  RESP: 18    LAB INFORMATION: K+-3.0, WBC-11.1, H/H-10.8/35.4. 12 pm blood sugar-278    CULTURE INFORMATION:     MEDS/IV FLUIDS: See MAR sent with patient. Has a#20 LAC-SL      CARDIAC SYSTEM:    CHEST PAIN: None    RATE:     SCALE:     RHYTHM: SR    Is patient taking or has patient been given any drugs that could increase bleeding? Yes  (Plavix, Brilinta, Effient, Eliquis, Xarelto, Warfarin, Integrilin, Angiomax)    DRUG: Lovenox     DOSE/FREQUENCY: 40 mg daily    CARDIAC ENZYMES:    DATE:   TIME:   CK:   CKMB:   RENA:   TROP:     DATE:   TIME:   CK:   CKMB:   RENA:    TROP:       HEART CATH:     HEART CATH DATE:     HEART CATH RESULTS:     LAD:   RCA:   CX:   LMAIN:   EF:     SWAN:     SITE:   SIZE:    DATE INSERTED:     ARTLINE:     SITE:   SIZE:   DATE INSERTED:     SHEATH:    SITE:   SIZE:   DATE INSERTED:         VASOSEAL:    SITE:   DATE INSERTED:     EXTERNAL PACEMAKER:     RATE:   EXT PACER ON:     MODE:    DATE INSERTED:   OUTPUT SETTING:   SENSITIVITY SETTING:   SENSITIVITY TYPE:     IABP:    SITE:   AUG PRESSURE:   DATE INSERTED:     CARDIAC NOTES:       RESPIRATORY SYSTEM:    LUNG SOUNDS:    CLEAR:   CRACKLES:   WHEEZES:   RHONCHI: Yea  DIMINISHED:     ABG DATE:         ABG TIME:     ABG RESULTS:    PH:   PO2:   PCO2:   HCO3:   O2 SAT:       ETT:     ETT SIZE:     ORAL:     NASAL:     SECURED AT MEASUREMENT (CM):     ON VENTILATOR:    TV:   FI02:   RATE:   PEEP:     OXYGEN: 40%    O2 SAT: 98%    ADMINISTRATION ROUTE: Trach collar    IMAGING FINDINGS:     PNEUMO LOCATION:     PNEUMO SIZE:     PNEUMO CHEST TUBE SEAL TYPE:     RADIOLOGY RESULTS:     RESPIRATORY STATUS: Patient in no acute distress. Needs possible Bronchoscopy. Has a 6 XL Shiley proximal cuffless trach. Trach care done at 0800. Requires suction prn. No redness or excoriation around stoma.       CNS/MUSCULOSKELETAL    ALERT AND ORIENTED:    PERSON: Yes  PLACE: Yes  TIME: Yes    INJURY:  WHERE:     DMITRY COMA SCALE:    E:   M:   V:     STROKE SCALE:     SIZE OF HEMORRHAGE:     SYMPTOMS: (CHOOSE APPROPRIATE)    ASPHASIA:   ATAXIA:   DYSARTHRIA:   DYSPHASIA:   HEADACHE:   PARALYSIS:   SEIZURE:   SYNCOPE:   VERTIGO:   VISION CHANGE:        EXTREMITY WEAKNESS:    LEFT ARM:   RIGHT ARM:   LEFT LEG:   RIGHT LEG:     CAT SCAN RESULTS:     MRI RESULTS:     CNS/MUSCULOSKELETAL NOTES: Patient moves all extremities. Very weak. Minimal bed motility. Patient has not tamie OOB since hospital admission      GI//GY      ABDOMINAL PAIN:     VOMITING:     DIARRHEA:     NAUSEA:     BOWEL SOUNDS:     OCCULT STOOL:      VAGINAL BLEEDING:     TESTICULAR PAIN:     HEMATURIA:     NG TUBE:    SIZE:   DATE INSERTED:       ULTRASOUND:     ULTRASOUND RESULTS:       ACUTE ABDOMEN:     ACUTE ABDOMEN RESULTS:       CT SCAN:     CT SCAN RESULTS:       GI//GY NOTES: LBM-Lg BM today. Continent with bowels.  UOP-850. Urine clear but dark. Patient supposed to be on dysphasia diet but is noncompliant. Meds given crushed in applesauce. Does not show signs of aspiration when swallowing.     PAST MEDICAL HISTORY: Anemia, CAD, COPD, CHF, CKD, DM, GERD, HTN, Dysphasia    OTHER SYMPTOM NOTES: Skin intact. Buttocks red but blanchable    ADDITIONAL NOTES:           Patrica Sutton RN  10/6/2018  4:53 PM

## 2018-10-06 NOTE — PROGRESS NOTES
Called the bed coordinator at [facility],  stated they have 8 pt in the ER needing to be admitted still and not a lot of discharges today, Still no bed available at this time.
Called to patients room. Upon entering room MT thompson at bedside, noticed pt sounded congested. RN stated,  that she had just sxd patient and had a hard time passing sx cath. Inner cannula checked at this time, and was partially occluded, DIC changed at this time. Problem resolved. No s/s of resp distress noted at this time. Pt on ATC. RN still at bedside.---VIV Salinas, STEFANO--
EMS here to transport Pt to [facility]. Pt requested that sister be called to let know of transport. Pt sister, Mila Body called at this time.
Late entry  Pt was brought to the ED by EMS for trach care. MT Crystal had informed me patient came in without inner cannula and needed a new one. MT Espinoza and Dr. Bryan Samaniego notified and aware of the condition of the trach and trach area. Sanket Blades was almost occluded by both moist and dried mucous. Suction preformed multiple passes of the cath. Secretions were copious and tenacious. 4 cath kits used which became occluded with thick tenacious secretions. Patient presented with gown stuck to her chest by dried mucous. Patients skin was red and irritated around trach and chest area. Sanket Blades tie was dirty and stuck to patients neck. Securement elastic band of trach mask was stuck on her chest and gown. Patients gown and trach site was soiled with mucous. Patients chest, neck, and trach area was cleaned and frequently suctioned. Humified oxygen was placed on patient.    Total time spent on suctioning and cleaning in the ED was 60  minutes   Lisseth Napier CRT  10/3/18 08:36
Med rec completed using [address] [facility] Physician Order Sheet. Oxycodone tablets changed to capsules. Strength of buspirone changed from 10mg po tid to 15mg. Terbutaline directions changed from 2.5mg injected subcutaneously once to 1 vial inhaled every 2 hrs via nebulizer as needed. Vitamin A & D ointment added.
symptoms of aspiration, and make recommendations regarding safe dietary consistencies, effective compensatory strategies, and safe eating environment. Impression  Dysphagia Diagnosis  Dysphagia Diagnosis: Mild to moderate oral stage dysphagia  Dysphagia Impression : missing teeth contribute to difficulty with mastication. pt demonstrates controlled feeding with no overt s/s aspiration with current diet. Dysphagia Outcome Severity Scale: Level 5: Mild dysphagia- Distant supervision. May need one diet consistency restricted    Treatment Plan  Requires SLP Intervention: No               Recommended Diet and Intervention  Solid: Diet Solids Recommendation: Dysphagia II Mechanically Altered  Liquid: Liquid Consistency Recommendation: Thin  Medication:Recommended Form of Meds: Meds in puree       Compensatory Swallowing Strategies Attempted  Compensatory Swallowing Strategies: Alternate solids and liquids;Eat/Feed slowly; Small bites/sips;Upright as possible for all oral intake      Treatment/Goals  Dysphagia Goals: The patient will tolerate recommended diet without observed clinical signs of aspiration    General  Chart Reviewed: Yes  Comments: Pt referred for BSE to assess current recommended diet  Subjective: Patient was alert and cooperative for task this date. Behavior/Cognition  Behavior/Cognition: Alert; Cooperative;Pleasant mood  Respiratory Status: Trach cuff  O2 Device: Trach mask  Communication Observation: Functional  Follows Directions: Simple  Dentition: Poor dental/oral hygeine  Patient Positioning: Upright in bed  Baseline Vocal Quality: Weak  Volitional Cough: Weak  Volitional Swallow: Delayed  Consistencies Administered: Soft solid;Mechanical soft; Nectar - cup;Nectar - straw; Thin - straw; Thin - cup         Vision/Hearing  Vision  Vision: Within Functional Limits  Hearing  Hearing: Within functional limits    Oral Motor Deficits  Oral/Motor  Oral Motor:  Within functional limits    Oral Phase
 rOPINIRole  4 mg Oral TID    polyethylene glycol  17 g Oral Daily     Continuous Infusions:   dextrose 250 mL (10/02/18 0626)       Objective:   Vitals: BP (!) 106/46   Pulse 84   Temp 97.4 °F (36.3 °C) (Axillary)   Resp 20   Ht 5' 6\" (1.676 m)   Wt 175 lb (79.4 kg)   SpO2 97%   BMI 28.25 kg/m²   Vital signs reviewed in electronic charts    Physical exam  Constitutional:  Well developed, well nourished, no acute distress  HENT:  Atraumatic, external ears normal, nose normal, oropharynx moist, no pharyngeal exudates. Neck- supple, no JVD, no lymphadenopathy  Respiratory:  No respiratory distress, breath sounds decreased bilaterally, scattered rhonchi noted  Cardiovascular:  Normal rate, normal rhythm, no murmurs, no gallops, no rubs, no edema   GI:  Soft, nondistended, normal bowel sounds, nontender, no voluntary guarding  Musculoskeletal:  No cyanosis or obvious acute deformity. Moving all extremities   Integument:  Warm and dry. Neurologic:  Alert, no apparent focal deficits noted   Psychiatric:  Speech and behavior appropriate       Results:     Lab Results   Component Value Date    WBC 12.0 (H) 10/03/2018    HGB 10.2 (L) 10/03/2018    HCT 34.5 (L) 10/03/2018    MCV 97.5 10/03/2018     10/03/2018       Lab Results   Component Value Date     10/03/2018    K 3.2 10/03/2018     10/03/2018    CO2 27 10/03/2018    BUN 13 10/03/2018    CREATININE <0.5 10/03/2018    GLUCOSE 248 10/03/2018    CALCIUM 8.7 10/03/2018      XR CHEST PORTABLE   Final Result      Complete opacification of the left hemithorax with cut off of the left mainstem bronchus possibly mucous plugging and atelectasis of the left lung      Critical findings called to Adrian Almodovar, patient's floor nurse on 10/3/2018 at 4:17 PM            RADIOLOGY REPORT   Final Result      XR CHEST PORTABLE   Final Result   1. Small left pleural effusion.    2.  Opacities in the left mid and lower lung are nonspecific and may    represent

## 2018-10-06 NOTE — FLOWSHEET NOTE
Notified Apryl Loredo RN at [facility]. Informed that pt just left medical unit, transported from EMS.
When at bedside with pt, audible chest and  tracheal congestion, informed pt of need of suction, pt mouths \"no I dont need it\", pt educated on need for suction. Agrees to suction, RT unavailable at this time, suction performed using sterile technique. Pt had LARGE amount of very thick sputum. 10/02/18 1400   Cough/Sputum   Sputum How Obtained Tracheal;Suctioned   Cough Congested;Productive   Frequency Frequent   Sputum Amount Large   Sputum Color Creamy; Yellow   Tenacity Thick
Assessment Clean;Dry; Intact   Cardiac   Cardiac (WDL) WDL   Cardiac Regularity Irregular   Heart Sounds S1, S2   Cardiac Rhythm NSR   Cardiac Monitor   Telemetry Monitor On Yes   Telemetry Audible Yes   Telemetry Alarms Set Yes   Telemetry Box Number [id number]   Gastrointestinal   Abdominal (WDL) WDL   Abdomen Inspection Rounded   Peripheral Vascular   Peripheral Vascular (WDL) WDL   Edema Generalized   Edema Generalized Non-pitting   Skin Color/Condition   Skin Color/Condition (WDL) WDL   Skin Integrity   Skin Integrity (WDL) X   Skin Integrity Bruising; Redness   Location scattered ble, bue   Skin Fold Management Yes   Skin Integrity Site 2   Skin Integrity Location 2 Redness   Location 2 Groin folds   Musculoskeletal   Musculoskeletal (WDL) X   Musculoskeletal Details   L Foot Limited movement   R Foot Limited movement   R Shoulder Limited movement; Other (Comment)  (Pt reports that she has a deon in arm, limited ROM)   Genitourinary   Genitourinary (WDL) X   Flank Tenderness No   Suprapubic Tenderness No   Dysuria No   Wound 10/02/18 Other (Comment) Buttocks Right stage 2    Date First Assessed/Time First Assessed: 10/02/18 0557   Wound Type: Other (Comment)  Wound Event: Pressure  Location: Buttocks  Wound Location Orientation: Right  Wound Description (Comments): stage 2   Pre-existing: Yes  Photo Taken: No   Wound Type Wound   Wound Pressure Stage  2   Dressing Status Changed   Dressing Changed Changed/New   Dressing/Treatment (mepilex)   Dressing Change Due 10/05/18   Psychosocial   Psychosocial (WDL) WDL     Pt in bed, appears to be restin comfortably. Pt denies complaint at this time.

## 2018-10-07 PROBLEM — E78.5 HYPERLIPIDEMIA: Status: ACTIVE | Noted: 2018-10-07

## 2018-10-07 PROBLEM — J96.11 CHRONIC RESPIRATORY FAILURE WITH HYPOXIA AND HYPERCAPNIA (HCC): Status: ACTIVE | Noted: 2018-10-07

## 2018-10-07 PROBLEM — J44.9 COPD (CHRONIC OBSTRUCTIVE PULMONARY DISEASE) (HCC): Status: ACTIVE | Noted: 2018-10-07

## 2018-10-07 PROBLEM — I25.10 CORONARY ARTERY DISEASE: Status: ACTIVE | Noted: 2018-10-07

## 2018-10-07 PROBLEM — F41.9 ANXIETY: Status: ACTIVE | Noted: 2018-10-07

## 2018-10-07 PROBLEM — F32.A DEPRESSION: Status: ACTIVE | Noted: 2018-10-07

## 2018-10-07 PROBLEM — I95.9 HYPOTENSION: Status: ACTIVE | Noted: 2018-10-07

## 2018-10-07 PROBLEM — K21.9 GERD (GASTROESOPHAGEAL REFLUX DISEASE): Status: ACTIVE | Noted: 2018-10-07

## 2018-10-07 PROBLEM — J96.12 CHRONIC RESPIRATORY FAILURE WITH HYPOXIA AND HYPERCAPNIA (HCC): Status: ACTIVE | Noted: 2018-10-07

## 2018-10-07 PROBLEM — R13.10 DYSPHAGIA: Status: ACTIVE | Noted: 2018-10-07

## 2018-10-07 PROBLEM — I50.9 CHF (CONGESTIVE HEART FAILURE) (HCC): Status: ACTIVE | Noted: 2018-10-07

## 2018-10-07 LAB
ANION GAP SERPL CALCULATED.3IONS-SCNC: 6 MMOL/L (ref 3–11)
BASOPHILS # BLD AUTO: 0.01 10*3/MM3 (ref 0–0.2)
BASOPHILS NFR BLD AUTO: 0.1 % (ref 0–1)
BLOOD CULTURE, ROUTINE: NORMAL
BUN BLD-MCNC: 17 MG/DL (ref 9–23)
BUN/CREAT SERPL: 38.6 (ref 7–25)
CALCIUM SPEC-SCNC: 8.3 MG/DL (ref 8.7–10.4)
CHLORIDE SERPL-SCNC: 104 MMOL/L (ref 99–109)
CO2 SERPL-SCNC: 31 MMOL/L (ref 20–31)
CREAT BLD-MCNC: 0.44 MG/DL (ref 0.6–1.3)
CULTURE, BLOOD 2: NORMAL
DEPRECATED RDW RBC AUTO: 47.6 FL (ref 37–54)
EOSINOPHIL # BLD AUTO: 0 10*3/MM3 (ref 0–0.3)
EOSINOPHIL NFR BLD AUTO: 0 % (ref 0–3)
ERYTHROCYTE [DISTWIDTH] IN BLOOD BY AUTOMATED COUNT: 14.1 % (ref 11.3–14.5)
GFR SERPL CREATININE-BSD FRML MDRD: 138 ML/MIN/1.73
GLUCOSE BLD-MCNC: 196 MG/DL (ref 70–100)
GLUCOSE BLDC GLUCOMTR-MCNC: 169 MG/DL (ref 70–130)
GLUCOSE BLDC GLUCOMTR-MCNC: 171 MG/DL (ref 70–130)
GLUCOSE BLDC GLUCOMTR-MCNC: 173 MG/DL (ref 70–130)
GLUCOSE BLDC GLUCOMTR-MCNC: 175 MG/DL (ref 70–130)
GLUCOSE BLDC GLUCOMTR-MCNC: 214 MG/DL (ref 70–130)
HCT VFR BLD AUTO: 36 % (ref 34.5–44)
HGB BLD-MCNC: 11.8 G/DL (ref 11.5–15.5)
IMM GRANULOCYTES # BLD: 0.05 10*3/MM3 (ref 0–0.03)
IMM GRANULOCYTES NFR BLD: 0.5 % (ref 0–0.6)
LYMPHOCYTES # BLD AUTO: 1.3 10*3/MM3 (ref 0.6–4.8)
LYMPHOCYTES NFR BLD AUTO: 13.1 % (ref 24–44)
MAGNESIUM SERPL-MCNC: 1.9 MG/DL (ref 1.3–2.7)
MCH RBC QN AUTO: 30.6 PG (ref 27–31)
MCHC RBC AUTO-ENTMCNC: 32.8 G/DL (ref 32–36)
MCV RBC AUTO: 93.3 FL (ref 80–99)
MONOCYTES # BLD AUTO: 0.51 10*3/MM3 (ref 0–1)
MONOCYTES NFR BLD AUTO: 5.1 % (ref 0–12)
NEUTROPHILS # BLD AUTO: 8.09 10*3/MM3 (ref 1.5–8.3)
NEUTROPHILS NFR BLD AUTO: 81.2 % (ref 41–71)
PLATELET # BLD AUTO: 251 10*3/MM3 (ref 150–450)
PMV BLD AUTO: 10.2 FL (ref 6–12)
POTASSIUM BLD-SCNC: 4 MMOL/L (ref 3.5–5.5)
RBC # BLD AUTO: 3.86 10*6/MM3 (ref 3.89–5.14)
SODIUM BLD-SCNC: 141 MMOL/L (ref 132–146)
WBC NRBC COR # BLD: 9.96 10*3/MM3 (ref 3.5–10.8)

## 2018-10-07 PROCEDURE — 87077 CULTURE AEROBIC IDENTIFY: CPT | Performed by: INTERNAL MEDICINE

## 2018-10-07 PROCEDURE — 94667 MNPJ CHEST WALL 1ST: CPT

## 2018-10-07 PROCEDURE — 82962 GLUCOSE BLOOD TEST: CPT

## 2018-10-07 PROCEDURE — 87106 FUNGI IDENTIFICATION YEAST: CPT | Performed by: NURSE PRACTITIONER

## 2018-10-07 PROCEDURE — 99222 1ST HOSP IP/OBS MODERATE 55: CPT | Performed by: INTERNAL MEDICINE

## 2018-10-07 PROCEDURE — 92610 EVALUATE SWALLOWING FUNCTION: CPT

## 2018-10-07 PROCEDURE — L8501 TRACHEOSTOMY SPEAKING VALVE: HCPCS

## 2018-10-07 PROCEDURE — 94799 UNLISTED PULMONARY SVC/PX: CPT

## 2018-10-07 PROCEDURE — 97166 OT EVAL MOD COMPLEX 45 MIN: CPT

## 2018-10-07 PROCEDURE — 83735 ASSAY OF MAGNESIUM: CPT | Performed by: INTERNAL MEDICINE

## 2018-10-07 PROCEDURE — 87205 SMEAR GRAM STAIN: CPT | Performed by: INTERNAL MEDICINE

## 2018-10-07 PROCEDURE — 94668 MNPJ CHEST WALL SBSQ: CPT

## 2018-10-07 PROCEDURE — 25010000002 MORPHINE PER 10 MG: Performed by: INTERNAL MEDICINE

## 2018-10-07 PROCEDURE — 99233 SBSQ HOSP IP/OBS HIGH 50: CPT | Performed by: FAMILY MEDICINE

## 2018-10-07 PROCEDURE — 87186 SC STD MICRODIL/AGAR DIL: CPT | Performed by: INTERNAL MEDICINE

## 2018-10-07 PROCEDURE — 87186 SC STD MICRODIL/AGAR DIL: CPT | Performed by: NURSE PRACTITIONER

## 2018-10-07 PROCEDURE — 87077 CULTURE AEROBIC IDENTIFY: CPT | Performed by: NURSE PRACTITIONER

## 2018-10-07 PROCEDURE — 87070 CULTURE OTHR SPECIMN AEROBIC: CPT | Performed by: INTERNAL MEDICINE

## 2018-10-07 PROCEDURE — 87147 CULTURE TYPE IMMUNOLOGIC: CPT | Performed by: NURSE PRACTITIONER

## 2018-10-07 PROCEDURE — 85025 COMPLETE CBC W/AUTO DIFF WBC: CPT | Performed by: INTERNAL MEDICINE

## 2018-10-07 PROCEDURE — 97530 THERAPEUTIC ACTIVITIES: CPT

## 2018-10-07 PROCEDURE — 80048 BASIC METABOLIC PNL TOTAL CA: CPT | Performed by: INTERNAL MEDICINE

## 2018-10-07 PROCEDURE — 25010000002 METHYLPREDNISOLONE PER 40 MG: Performed by: INTERNAL MEDICINE

## 2018-10-07 PROCEDURE — 94760 N-INVAS EAR/PLS OXIMETRY 1: CPT

## 2018-10-07 PROCEDURE — 92597 ORAL SPEECH DEVICE EVAL: CPT

## 2018-10-07 PROCEDURE — 87147 CULTURE TYPE IMMUNOLOGIC: CPT | Performed by: INTERNAL MEDICINE

## 2018-10-07 PROCEDURE — 25010000002 ENOXAPARIN PER 10 MG: Performed by: INTERNAL MEDICINE

## 2018-10-07 PROCEDURE — 87070 CULTURE OTHR SPECIMN AEROBIC: CPT | Performed by: NURSE PRACTITIONER

## 2018-10-07 PROCEDURE — 63710000001 INSULIN DETEMIR PER 5 UNITS: Performed by: INTERNAL MEDICINE

## 2018-10-07 PROCEDURE — 94640 AIRWAY INHALATION TREATMENT: CPT

## 2018-10-07 PROCEDURE — 87205 SMEAR GRAM STAIN: CPT | Performed by: NURSE PRACTITIONER

## 2018-10-07 RX ORDER — MORPHINE SULFATE 2 MG/ML
1 INJECTION, SOLUTION INTRAMUSCULAR; INTRAVENOUS EVERY 4 HOURS PRN
Status: DISCONTINUED | OUTPATIENT
Start: 2018-10-07 | End: 2018-10-10

## 2018-10-07 RX ORDER — METHYLPREDNISOLONE SODIUM SUCCINATE 40 MG/ML
40 INJECTION, POWDER, LYOPHILIZED, FOR SOLUTION INTRAMUSCULAR; INTRAVENOUS EVERY 8 HOURS
Status: DISCONTINUED | OUTPATIENT
Start: 2018-10-07 | End: 2018-10-09

## 2018-10-07 RX ORDER — ACETYLCYSTEINE 200 MG/ML
3 SOLUTION ORAL; RESPIRATORY (INHALATION)
Status: DISCONTINUED | OUTPATIENT
Start: 2018-10-07 | End: 2018-10-10

## 2018-10-07 RX ADMIN — INSULIN LISPRO 2 UNITS: 100 INJECTION, SOLUTION INTRAVENOUS; SUBCUTANEOUS at 11:00

## 2018-10-07 RX ADMIN — MORPHINE SULFATE 1 MG: 2 INJECTION, SOLUTION INTRAMUSCULAR; INTRAVENOUS at 11:00

## 2018-10-07 RX ADMIN — ENOXAPARIN SODIUM 40 MG: 40 INJECTION SUBCUTANEOUS at 21:07

## 2018-10-07 RX ADMIN — BUSPIRONE HYDROCHLORIDE 15 MG: 15 TABLET ORAL at 15:27

## 2018-10-07 RX ADMIN — METHYLPREDNISOLONE SODIUM SUCCINATE 40 MG: 40 INJECTION, POWDER, FOR SOLUTION INTRAMUSCULAR; INTRAVENOUS at 17:12

## 2018-10-07 RX ADMIN — SODIUM CHLORIDE 2 G: 9 INJECTION, SOLUTION INTRAVENOUS at 15:46

## 2018-10-07 RX ADMIN — ACETYLCYSTEINE 3 ML: 200 SOLUTION ORAL; RESPIRATORY (INHALATION) at 12:28

## 2018-10-07 RX ADMIN — FUROSEMIDE 20 MG: 20 TABLET ORAL at 08:47

## 2018-10-07 RX ADMIN — INSULIN LISPRO 2 UNITS: 100 INJECTION, SOLUTION INTRAVENOUS; SUBCUTANEOUS at 21:06

## 2018-10-07 RX ADMIN — FUROSEMIDE 20 MG: 20 TABLET ORAL at 17:12

## 2018-10-07 RX ADMIN — MORPHINE SULFATE 1 MG: 2 INJECTION, SOLUTION INTRAMUSCULAR; INTRAVENOUS at 15:32

## 2018-10-07 RX ADMIN — METHYLPREDNISOLONE SODIUM SUCCINATE 40 MG: 40 INJECTION, POWDER, FOR SOLUTION INTRAMUSCULAR; INTRAVENOUS at 10:56

## 2018-10-07 RX ADMIN — Medication 3 ML: at 08:47

## 2018-10-07 RX ADMIN — MIDODRINE HYDROCHLORIDE 10 MG: 5 TABLET ORAL at 17:11

## 2018-10-07 RX ADMIN — BUSPIRONE HYDROCHLORIDE 15 MG: 15 TABLET ORAL at 21:07

## 2018-10-07 RX ADMIN — INSULIN DETEMIR 15 UNITS: 100 INJECTION, SOLUTION SUBCUTANEOUS at 00:00

## 2018-10-07 RX ADMIN — INSULIN LISPRO 2 UNITS: 100 INJECTION, SOLUTION INTRAVENOUS; SUBCUTANEOUS at 08:47

## 2018-10-07 RX ADMIN — SODIUM CHLORIDE 2 G: 900 INJECTION INTRAVENOUS at 01:16

## 2018-10-07 RX ADMIN — MIDODRINE HYDROCHLORIDE 10 MG: 5 TABLET ORAL at 11:00

## 2018-10-07 RX ADMIN — MORPHINE SULFATE 1 MG: 2 INJECTION, SOLUTION INTRAMUSCULAR; INTRAVENOUS at 00:53

## 2018-10-07 RX ADMIN — INSULIN LISPRO 2 UNITS: 100 INJECTION, SOLUTION INTRAVENOUS; SUBCUTANEOUS at 17:12

## 2018-10-07 RX ADMIN — ATORVASTATIN CALCIUM 40 MG: 40 TABLET, FILM COATED ORAL at 21:07

## 2018-10-07 RX ADMIN — IPRATROPIUM BROMIDE AND ALBUTEROL SULFATE 3 ML: 2.5; .5 SOLUTION RESPIRATORY (INHALATION) at 12:29

## 2018-10-07 RX ADMIN — Medication 3 ML: at 21:10

## 2018-10-07 RX ADMIN — ACETYLCYSTEINE 3 ML: 200 SOLUTION ORAL; RESPIRATORY (INHALATION) at 21:27

## 2018-10-07 RX ADMIN — IPRATROPIUM BROMIDE AND ALBUTEROL SULFATE 3 ML: 2.5; .5 SOLUTION RESPIRATORY (INHALATION) at 07:11

## 2018-10-07 RX ADMIN — INSULIN DETEMIR 15 UNITS: 100 INJECTION, SOLUTION SUBCUTANEOUS at 21:08

## 2018-10-07 RX ADMIN — ROPINIROLE HYDROCHLORIDE 4 MG: 2 TABLET, FILM COATED ORAL at 21:07

## 2018-10-07 RX ADMIN — IPRATROPIUM BROMIDE AND ALBUTEROL SULFATE 3 ML: 2.5; .5 SOLUTION RESPIRATORY (INHALATION) at 16:55

## 2018-10-07 RX ADMIN — IPRATROPIUM BROMIDE AND ALBUTEROL SULFATE 3 ML: 2.5; .5 SOLUTION RESPIRATORY (INHALATION) at 21:27

## 2018-10-07 RX ADMIN — METHYLPREDNISOLONE SODIUM SUCCINATE 40 MG: 40 INJECTION, POWDER, FOR SOLUTION INTRAMUSCULAR; INTRAVENOUS at 01:16

## 2018-10-07 RX ADMIN — SODIUM CHLORIDE 2 G: 9 INJECTION, SOLUTION INTRAVENOUS at 10:55

## 2018-10-07 RX ADMIN — ASPIRIN 325 MG ORAL TABLET 162 MG: 325 PILL ORAL at 08:46

## 2018-10-07 NOTE — PLAN OF CARE
Problem: Patient Care Overview  Goal: Plan of Care Review  Outcome: Ongoing (interventions implemented as appropriate)   10/07/18 0623   Coping/Psychosocial   Plan of Care Reviewed With patient   OTHER   Outcome Summary Pt VSS overnight. NSR on tele. Complained of headache, MD paged for PRN orders. Pt is poor historian when questioned about past medical history- PEG tube leaking foul drainage, cultures sent. Pt kept NPO d/t possible aspiration.

## 2018-10-07 NOTE — PROGRESS NOTES
Pharmacokinetic Consult - Vancomycin Dosing    Nallely Junior is a 78 y.o. female who has been consulted for vancomycin dosing for PNA.    Relevant clinical data and objective history reviewed:  Creatinine   Date Value Ref Range Status   10/06/2018 0.54 (L) 0.60 - 1.30 mg/dL Final   10/06/2018 0.54 (L) 0.60 - 1.30 mg/dL Final     BUN   Date Value Ref Range Status   10/06/2018 15 9 - 23 mg/dL Final   10/06/2018 15 9 - 23 mg/dL Final     Estimated Creatinine Clearance: 59.6 mL/min (A) (by C-G formula based on SCr of 0.54 mg/dL (L)).  No results found for: WBC, HGB, HCT, MCV, PLT  Temp Readings from Last 3 Encounters:   10/07/18 98.3 °F (36.8 °C) (Oral)       Assessment/Plan  The patient got a dose of 2000 mg as a loading dose. Will initiate maintenance dose at 1250 mg IV every 24 hours.  Plan for trough as patient approaches steady state, prior to the 3rd dose.  Due to infection severity, will target a trough of 15-20.     Pharmacy will continue to follow the patient’s culture results and clinical progress daily.    Neo Jean, EddieD

## 2018-10-07 NOTE — THERAPY EVALUATION
Acute Care - Occupational Therapy Initial Evaluation  [facility]     Patient Name: Nallely Junior  : 1940  MRN: [id number]  Today's Date: 10/7/2018  Onset of Illness/Injury or Date of Surgery: 10/06/18  Date of Referral to OT: 10/06/18  Referring Physician: Dr Murphy    Admit Date: 10/6/2018       ICD-10-CM ICD-9-CM   1. Impaired mobility and ADLs Z74.09 799.89     Patient Active Problem List   Diagnosis   • Pneumonia   • Acute and chronic respiratory failure with hypoxia (CMS/HCC)   • Type 2 diabetes mellitus (CMS/HCC)     No past medical history on file.  No past surgical history on file.       OT ASSESSMENT FLOWSHEET (last 72 hours)      Occupational Therapy Evaluation     Row Name 10/07/18 0855                   OT Evaluation Time/Intention    Subjective Information no complaints  -TA        Document Type evaluation  -TA        Mode of Treatment occupational therapy;individual therapy  -TA        Patient Effort good  -TA        Symptoms Noted During/After Treatment fatigue  -TA           General Information    Patient Profile Reviewed? yes  -TA        Onset of Illness/Injury or Date of Surgery 10/06/18  -TA        Referring Physician Dr Murphy  -TA        Patient Observations alert;cooperative;agree to therapy  -TA        Patient/Family Observations No visitors present in room  -TA        General Observations of Patient Pt supine, trach collar, PEG  -TA        Prior Level of Function max assist:;gait;transfer;bed mobility;ADL's  -TA        Equipment Currently Used at Home walker, rolling;wheelchair  -TA        Pertinent History of Current Functional Problem Pt is a direct admit from [facility] for nonresolving PNA, mucous plugging, trach issues.  -TA        Existing Precautions/Restrictions fall;oxygen therapy device and L/min  -TA        Risks Reviewed patient:;LOB;dizziness;change in vital signs;increased discomfort;lines disloged  -TA        Benefits Reviewed patient:;improve  function;increase independence;increase strength;decrease pain;increase balance;increase knowledge  -TA        Barriers to Rehab medically complex  -TA           Relationship/Environment    Lives With alone   ECF in [place], [place]  -TA           Resource/Environmental Concerns    Current Living Arrangements extended care facility  -TA           Cognitive Assessment/Intervention- PT/OT    Orientation Status (Cognition) oriented to;person;place;situation  -TA        Follows Commands (Cognition) follows one step commands;over 90% accuracy  -TA        Safety Deficit (Cognitive) mild deficit;awareness of need for assistance;insight into deficits/self awareness;safety precautions awareness  -TA           Safety Issues, Functional Mobility    Safety Issues Affecting Function (Mobility) safety precaution awareness  -TA        Impairments Affecting Function (Mobility) balance;coordination;endurance/activity tolerance;range of motion (ROM);strength;shortness of breath  -TA           Bed Mobility Assessment/Treatment    Bed Mobility Assessment/Treatment supine-sit;sit-supine  -TA        Supine-Sit [place] (Bed Mobility) maximum assist (25% patient effort);verbal cues  -TA        Sit-Supine [place] (Bed Mobility) maximum assist (25% patient effort);verbal cues  -TA        Bed Mobility, Safety Issues decreased use of arms for pushing/pulling;decreased use of legs for bridging/pushing  -TA           ADL Assessment/Intervention    BADL Assessment/Intervention grooming  -TA           Grooming Assessment/Training    [place] Level (Grooming) wash face, hands;supervision;set up  -TA        Grooming Position supine  -TA           BADL Safety/Performance    Impairments, BADL Safety/Performance balance;endurance/activity tolerance;coordination;range of motion;shortness of breath;strength  -TA        Skilled BADL Treatment/Intervention BADL process/adaptation training  -TA           General ROM    GENERAL ROM COMMENTS R shld  f/e 25%, L shld f/e WFL; heidy joints distally WFL  -TA           MMT (Manual Muscle Testing)    General MMT Comments LUE shld f/e 2/5, joints distally 3/5; LUE 4+/5  -TA           Sensory Assessment/Intervention    Sensory General Assessment no sensation deficits identified;other (see comments)   BUEs intact  -TA           Positioning and Restraints    Pre-Treatment Position in bed  -TA        Post Treatment Position bed  -TA        In Bed supine;call light within reach;encouraged to call for assist;exit alarm on;side rails up x2;legs elevated  -TA           Pain Assessment    Additional Documentation Pain Scale: Numbers Pre/Post-Treatment (Group)  -TA           Pain Scale: Numbers Pre/Post-Treatment    Pain Scale: Numbers, Pretreatment 0/10 - no pain  -TA        Pain Scale: Numbers, Post-Treatment 0/10 - no pain  -TA        Pain Intervention(s) Repositioned;Ambulation/increased activity  -TA           Clinical Impression (OT)    Date of Referral to OT 10/06/18  -TA        OT Diagnosis Impaired mobility and ADLs  -TA        Patient/Family Goals Statement (OT Eval) Return home  -TA        Criteria for Skilled Therapeutic Interventions Met (OT Eval) yes;treatment indicated  -TA        Rehab Potential (OT Eval) good, to achieve stated therapy goals  -TA        Therapy Frequency (OT Eval) daily   Per priority policy  -TA        Care Plan Review (OT) evaluation/treatment results reviewed;risks/benefits reviewed;patient/other agree to care plan  -TA        Anticipated Discharge Disposition (OT) extended care facility  -TA           Vital Signs    Pre Systolic BP Rehab --   VSS; RN cleared pt for tx.  -TA        Pre Patient Position Supine  -TA        Intra Patient Position Sitting  -TA        Post Patient Position Supine  -TA           OT Goals    Bed Mobility Goal Selection (OT) bed mobility, OT goal 1  -TA        Transfer Goal Selection (OT) transfer, OT goal 1  -TA        Toileting Goal Selection (OT) toileting, OT goal  1  -TA           Bed Mobility Goal 1 (OT)    Activity/Assistive Device (Bed Mobility Goal 1, OT) sit to supine;supine to sit  -TA        [place] Level/Cues Needed (Bed Mobility Goal 1, OT) moderate assist (50-74% patient effort);verbal cues required  -TA        Time Frame (Bed Mobility Goal 1, OT) 1 week  -TA           Transfer Goal 1 (OT)    Activity/Assistive Device (Transfer Goal 1, OT) sit-to-stand/stand-to-sit;bed-to-chair/chair-to-bed;toilet  -TA        [place] Level/Cues Needed (Transfer Goal 1, OT) maximum assist (25-49% patient effort);verbal cues required  -TA        Time Frame (Transfer Goal 1, OT) 1 week  -TA           Toileting Goal 1 (OT)    Activity/Device (Toileting Goal 1, OT) perform perineal hygiene;commode, bedside with drop arms  -TA        [place] Level/Cues Needed (Toileting Goal 1, OT) minimum assist (75% or more patient effort)  -TA        Time Frame (Toileting Goal 1, OT) 1 week  -TA          User Key  (r) = Recorded By, (t) = Taken By, (c) = Cosigned By    Initials Name Effective Dates    TA Christiano Marie, OT 03/14/16 -            Occupational Therapy Education     Title: PT OT SLP Therapies (Active)     Topic: Occupational Therapy (Active)     Point: ADL training (Done)     Description: Instruct learner(s) on proper safety adaptation and remediation techniques during self care or transfers.   Instruct in proper use of assistive devices.   Learning Progress Summary     Learner Status Readiness Method Response Comment Documented by    Patient Done Acceptance REYNA SCOTT,SAWYER Body mechanics with bed mobility; reinforced need for call for assist with OOB activities; role of OT. TA 10/07/18 0928          Point: Precautions (Done)     Description: Instruct learner(s) on prescribed precautions during self-care and functional transfers.   Learning Progress Summary     Learner Status Readiness Method Response Comment Documented by    Patient Done Acceptance REYNA SCOTT,NR Body mechanics  with bed mobility; reinforced need for call for assist with OOB activities; role of OT.  10/07/18 0928          Point: Body mechanics (Done)     Description: Instruct learner(s) on proper positioning and spine alignment during self-care, functional mobility activities and/or exercises.   Learning Progress Summary     Learner Status Readiness Method Response Comment Documented by    Patient Done Acceptance REYNA SCOTT,NR Body mechanics with bed mobility; reinforced need for call for assist with OOB activities; role of OT.  10/07/18 0928                      User Key     Initials Effective Dates Name Provider Type Discipline     03/14/16 -  Christiano Marie OT Occupational Therapist OT                  OT Recommendation and Plan  Outcome Summary/Treatment Plan (OT)  Anticipated Discharge Disposition (OT): extended care facility  Therapy Frequency (OT Eval): daily (Per priority policy)  Plan of Care Review  Plan of Care Reviewed With: patient  Plan of Care Reviewed With: patient  Outcome Summary: VSS; Pt presents with generalized weakness, deficits in ADL performance, fxl mobility, occupational endurance. Max A supine<>sit. Recommend ECF at discharge.          Outcome Measures     Row Name 10/07/18 0855             How much help from another is currently needed...    Putting on and taking off regular lower body clothing? 1  -TA      Bathing (including washing, rinsing, and drying) 1  -TA      Toileting (which includes using toilet bed pan or urinal) 1  -TA      Putting on and taking off regular upper body clothing 2  -TA      Taking care of personal grooming (such as brushing teeth) 3  -TA      Eating meals 3  -TA      Score 11  -TA         Functional Assessment    Outcome Measure Options AM-PAC 6 Clicks Daily Activity (OT)  -TA        User Key  (r) = Recorded By, (t) = Taken By, (c) = Cosigned By    Initials Name Provider Type    TA Christiano Marie, OT Occupational Therapist          Time Calculation:          Time Calculation- OT     Row Name 10/07/18 0931             Time Calculation- OT    OT Start Time 0855   ttc 8 minutes  -TA      Total Timed Code Minutes- OT 8 minute(s)  -TA      OT Received On 10/07/18  -TA      OT Goal Re-Cert Due Date 10/17/18  -TA        User Key  (r) = Recorded By, (t) = Taken By, (c) = Cosigned By    Initials Name Provider Type    TA Christiano Marie OT Occupational Therapist        Therapy Suggested Charges     Code   Minutes Charges    None           Therapy Charges for Today     Code Description Service Date Service Provider Modifiers Qty    [id number]  OT EVAL MOD COMPLEXITY 4 10/7/2018 Christiano Marie, OT GO 1    [id number]  OT THERAPEUTIC ACT EA 15 MIN 10/7/2018 Christiano Marie, OT GO 1               Christiano Marie OT  10/7/2018

## 2018-10-07 NOTE — CONSULTS
"Clinical Nutrition   Reason For Visit: Identified at risk by screening criteria, MST score 2+    Patient Name: Nallely Junior  YOB: 1940  MRN: [id number]  Date of Encounter: 10/07/18 11:56 AM  Admission date: 10/6/2018      Nutrition Assessment     Hospital Problem List  Active Problems:    Pneumonia    Acute and chronic respiratory failure with hypoxia (CMS/HCC)    Type 2 diabetes mellitus (CMS/[facility])      Other Applicable Diagnosis:  Pt admitted to OSH (10/2) from her NH secondary to SOA.cough, transferred to [facility] (10/6) for higher level of care.     Pt with tracheostomy and PEG.     A/c respiratory failure  Tracheostomy  PNA  Mucus plugging      Applicable medical tests/procedures since admission:  SLP dysphagia eval pending at this time (10/7)       Reported/Observed/Food/Nutrition Related History     RD called and spoke with nursing staff at [facility] to complete nutrition assessment. Nursing staff report that pt has refused tube feeding for at least the past 10 months. States that pt was started on a PO diet, had a MBS and was recommended mechanical soft textures with nectar thick liquids. Reports that pt does not like nectar thick liquids, and will often drink thin liquids, either that she is able to get a hold of or her family brings in for her. States that pt has a good appetite and eats well and drinks at least 2000 ml/day of fluid. States no food allergies. Reports that sometimes pt gets extra fluids via PEG tube.     RD then spoke with pt. Pt reports good appetite, asking for food, states that she is hungry and that she has not had anything to eat since yesterday at noon. Reports that she does not use oral nutrition supplements but is open to them if passes SLP swallow eval. States no food allergies, no issues chewing/swallowing. When RD asked pt if she is on a modified diet at the nursing facility, pt states \"no.\"      Anthropometrics   Height: 64 in  Weight: 177 lb (10/7), no " method documented  BMI: 30.5  BMI classification: Obese Class I: 30-34.9kg/m2   UBW: ~177 lb per [facility]    Weight change: Nursing staff at [facility] report that pt has not had any recent wt loss or wt changes. Pt states that she has lost 3 lb over the past 5 months.     Labs reviewed   Labs reviewed: Yes    Medications reviewed   Medications reviewed: Yes    Current Nutrition Prescription   PO: NPO Diet SLP swallow eval pending (10/7)    Nutrition Diagnosis   10/7  Problem Inadequate oral intake   Etiology Current diet order   Signs/Symptoms NPO, SLP eval pending       Intervention   Intervention: Follow treatment progress, Care plan reviewed   -Will add oral nutrition supplements (chocolate or vanilla) as appropriate  -RD available for nutrition support recs if pt does not pass swallow eval and medically appropriate      Goal:   General: Nutrition support treatment  PO: Initiate diet if pt passes SLP swallow eval    Monitoring/Evaluation:       Monitoring/Evaluation: Per protocol, Pertinent labs, Weight, GI status, Swallow function  Will Continue to follow per protocol  Deidre Barr MS RD/LD CNSC  Time Spent: 45 minutes

## 2018-10-07 NOTE — THERAPY EVALUATION
"Acute Care - Speech Language Pathology Initial Evaluation  [facility]   Trach/Speaking Valve Evaluation  Clinical Swallow Evaluation     Patient Name: Nallely Junior  : 1940  MRN: [id number]  Today's Date: 10/7/2018  Onset of Illness/Injury or Date of Surgery: 10/06/18     Referring Physician: Dr Murphy      Admit Date: 10/6/2018     Visit Dx:    ICD-10-CM ICD-9-CM   1. Impaired mobility and ADLs Z74.09 799.89   2. Dysphagia, unspecified type R13.10 787.20     Patient Active Problem List   Diagnosis   • Pneumonia   • Acute and chronic respiratory failure with hypoxia (CMS/HCC)   • Type 2 diabetes mellitus (CMS/[facility])     No past medical history on file.  No past surgical history on file.       SLP EVALUATION (last 72 hours)      SLP SLC Evaluation     Row Name 10/07/18 1100                   Speaking Valve    Respiratory Status trach collar  -        Pretreatment Heart Rate (beats/min) 85  -SM        Pre SpO2 (%) 93  -SM        Trach Type Shiley;non cuffed;size 6  -SM        Types of Intervention tracheostomy speaking valve  -        Speaking Valve Type PMV-007 (aqua)  -        Phonation with Occlusion speaking valve;audible at 3 feet  -        Vocal Quality on Phonation WFL  -        Response to Intervention speaking valve;tolerated for short period;other (see comments)   c/o feeling \"rough\" and wheezing noted  -        Minutes Tolerated < 5 minutes  -        Posttreatment Heart Rate (beats/min) 85  -SM        Post SpO2 (%) 93  -SM        At Conclusion speaking valve removed;speaking valve at bedside;notified RN/LPN  -        Speaking Valve Placement Recommendation SLP, RT and RN/LPN only  -           SLP Clinical Impressions    SLP Diagnosis Voice impairment after trach  -        Rehab Potential/Prognosis good  -[facility] Criteria for Skilled Therapy Interventions Met yes  -SM        Functional Impact difficulty communicating wants, needs;difficulty in expressing complex messages  " -           Recommendations    Therapy Frequency (SLP SLC) 5 days per week  -        Predicted Duration Therapy Intervention (Days) until discharge  -        Anticipated Dischage Disposition anticipate therapy at next level of care  -           Communication Treatment Objective and Progress Goals (SLP)    Speaking Valve Treatment Objectives Speaking Valve Treatment Objectives (Group)  -        Additional Goals Additional Goals (Group)  -           Speaking Valve Treatment Objectives    Tolerate Speaking Valve Placement Selection tolerate speaking valve placement, SLP goal 1  -           Tolerate Speaking Valve Placement Goal 1 (SLP)    Tolerate Speaking Valve Placement Goal 1 (SLP) speaking valve 20min;100%;independently (over 90% accuracy)  -        Time Frame (Tolerate Speaking Valve Placement Goal 1, SLP) short term goal (STG);by discharge  -           Additional Goals    Additional Goal Selection additional goal, SLP goal 1  -           Additional Goal 1 (SLP)    Additional Goal 1, SLP LTG: Comunicate wants/needs with PMV speaking valve while in hospital setting with 100% accruacy and no cues  -        Time Frame (Additional Goal 1, SLP) by discharge  -          User Key  (r) = Recorded By, (t) = Taken By, (c) = Cosigned By    Initials Name Effective Dates    Yenny Murillo MS CCC-SLP 06/22/15 -                EDUCATION  The patient has been educated in the following areas:     Speaking Valve.    SLP Recommendation and Plan    SLP Diagnosis: Voice impairment after trach    Rehab Potential/Prognosis: good    SLC Criteria for Skilled Therapy Interventions Met: yes    Anticipated Dischage Disposition: anticipate therapy at next level of care              Predicted Duration Therapy Intervention (Days): until discharge          Plan of Care Review  Plan of Care Reviewed With: patient  Plan of Care Reviewed With: patient              SLP GOALS     Row Name 10/07/18 1100              Tolerate Speaking Valve Placement Goal 1 (SLP)    Tolerate Speaking Valve Placement Goal 1 (SLP) speaking valve 20min;100%;independently (over 90% accuracy)  -      Time Frame (Tolerate Speaking Valve Placement Goal 1, SLP) short term goal (STG);by discharge  -         Additional Goal 1 (SLP)    Additional Goal 1, SLP LTG: Comunicate wants/needs with PMV speaking valve while in hospital setting with 100% accruacy and no cues  -      Time Frame (Additional Goal 1, SLP) by discharge  -        User Key  (r) = Recorded By, (t) = Taken By, (c) = Cosigned By    Initials Name Provider Type    Yenny Murillo MS CCC-SLP Speech and Language Pathologist                      Time Calculation:           Time Calculation- SLP     Row Name 10/07/18 1450             Time Calculation- SLP    SLP Start Time 1100  -      SLP Received On 10/07/18  -        User Key  (r) = Recorded By, (t) = Taken By, (c) = Cosigned By    Initials Name Provider Type    Yenny Murillo MS CCC-SLP Speech and Language Pathologist          Therapy Charges for Today     Code Description Service Date Service Provider Modifiers Qty    33892033288 HC ST EVAL ORAL PHARYNG SWALLOW 2 10/7/2018 Yenny Grover MS CCC-SLP GN 1    38656249334 HC ST EVALUATION FIT VOICE PROSTH 2 10/7/2018 Yenny Grover MS CCC-SLP  1    [id number] HC VALVE TRACH PMV SERIES 10/7/2018 Yenny Grover MS CCC-SLP  1                     Yenny Grover MS CCC-SLP  10/7/2018 and Acute Care - Speech Language Pathology   Swallow Initial Evaluation [facility]     Patient Name: Nallely Junior  : 1940  MRN: [id number]  Today's Date: 10/7/2018  Onset of Illness/Injury or Date of Surgery: 10/06/18     Referring Physician: Dr Murphy      Admit Date: 10/6/2018    Visit Dx:     ICD-10-CM ICD-9-CM   1. Impaired mobility and ADLs Z74.09 799.89   2. Dysphagia, unspecified type R13.10 787.20     Patient Active Problem List   Diagnosis   •  "Pneumonia   • Acute and chronic respiratory failure with hypoxia (CMS/HCC)   • Type 2 diabetes mellitus (CMS/[facility])     No past medical history on file.  No past surgical history on file.       SWALLOW EVALUATION (last 72 hours)      SLP Adult Swallow Evaluation     Row Name 10/07/18 1100                   Rehab Evaluation    Document Type evaluation  -        Subjective Information no complaints  -        Patient Observations alert;cooperative  -           General Information    Patient Profile Reviewed yes  -SM        Pertinent History Of Current Problem \"New-beba trach x 3-4 mos\" per order. Transferred for nonresolving PNA, mucus plugging, trach issues.   -        Current Method of Nutrition NPO  -        Prior Level of Function-Communication cognitive-linguistic impairment  -        Prior Level of Function-Swallowing other (see comments)   mech-soft and nectar as refusing FTs at NH  -        Plans/Goals Discussed with patient;agreed upon  -        Barriers to Rehab previous functional deficit;cognitive status  -        Patient's Goals for Discharge return to PO diet  -           Pain Scale: Numbers Pre/Post-Treatment    Pain Scale: Numbers, Pretreatment 7/10  -SM        Pain Scale: Numbers, Post-Treatment 7/10  -SM        Pain Intervention(s) --   Headache on R. Notified RN  -           Clinical Swallow Eval    Pharyngeal Phase suspected pharyngeal impairment  -           Pharyngeal Phase Concerns    Pharyngeal Phase Concerns wet vocal quality  -        Wet Vocal Quality thin  -        Pharyngeal Phase Concerns, Comment Increase wheezing as PMV trials progressed. Made it difficult to assess for signs aspiration. Hx dysphagia though details unknown. Will need MBS to further assess. Pt agreeable though then asked when her food tray would arrive  -           Clinical Impression    SLP Swallowing Diagnosis suspected pharyngeal dysfunction  -SM        Functional Impact risk of " aspiration/pneumonia  -           Recommendations    SLP Diet Recommendation NPO;ice chips between meals after oral care, with supervision;other (see comments)   ice chips sparingly  -        Recommended Diagnostics VFSS (MBS)  -        Recommended Precautions and Strategies upright posture during/after eating  -        SLP Rec. for Method of Medication Administration meds via alternate route  -          User Key  (r) = Recorded By, (t) = Taken By, (c) = Cosigned By    Initials Name Effective Dates     Yenny Grover, MS CCC-SLP 06/22/15 -         EDUCATION  The patient has been educated in the following areas:   Dysphagia (Swallowing Impairment) Oral Care/Hydration NPO rationale.    SLP Recommendation and Plan  SLP Swallowing Diagnosis: suspected pharyngeal dysfunction  SLP Diet Recommendation: NPO, ice chips between meals after oral care, with supervision, other (see comments) (ice chips sparingly)  Recommended Precautions and Strategies: upright posture during/after eating        Recommended Diagnostics: VFSS (MBS)     Anticipated Dischage Disposition: anticipate therapy at next level of care        Predicted Duration Therapy Intervention (Days): until discharge       Plan of Care Reviewed With: patient  Plan of Care Review  Plan of Care Reviewed With: patient          SLP GOALS     Row Name 10/07/18 1100             Tolerate Speaking Valve Placement Goal 1 (SLP)    Tolerate Speaking Valve Placement Goal 1 (SLP) speaking valve 20min;100%;independently (over 90% accuracy)  -      Time Frame (Tolerate Speaking Valve Placement Goal 1, SLP) short term goal (STG);by discharge  -         Additional Goal 1 (SLP)    Additional Goal 1, SLP LTG: Comunicate wants/needs with PMV speaking valve while in hospital setting with 100% accruacy and no cues  -      Time Frame (Additional Goal 1, SLP) by discharge  -        User Key  (r) = Recorded By, (t) = Taken By, (c) = Cosigned By    Initials Name  Provider Type    Yenny Murillo MS CCC-SLP Speech and Language Pathologist               Time Calculation:         Time Calculation- SLP     Row Name 10/07/18 1450             Time Calculation- SLP    SLP Start Time 1100  -      SLP Received On 10/07/18  -        User Key  (r) = Recorded By, (t) = Taken By, (c) = Cosigned By    Initials Name Provider Type    Yenny Murillo, MS CCC-SLP Speech and Language Pathologist          Therapy Charges for Today     Code Description Service Date Service Provider Modifiers Qty    12977947481 HC ST EVAL ORAL PHARYNG SWALLOW 2 10/7/2018 Yenny Grover MS CCC-SLP GN 1    19567991709 HC ST EVALUATION FIT VOICE PROSTH 2 10/7/2018 Yenny Grover, MS CCC-SLP  1    [id number] HC VALVE TRACH PMV SERIES 10/7/2018 Yenny Grover MS CCC-SLP  1               Yenny Grover MS CCC-SLP  10/7/2018

## 2018-10-07 NOTE — PLAN OF CARE
"Problem: Patient Care Overview  Goal: Plan of Care Review  Outcome: Ongoing (interventions implemented as appropriate)   10/07/18 4165   Coping/Psychosocial   Plan of Care Reviewed With patient   SLP evaluation completed. Clarified - SLP consult for dysphagia and trach/PMV speaking valve. Seen by SLP earlier today. PMV Speaking Eval: Patricio #6 cuffless trach. Tolerated placement with good voicing for 5 mins before increased wheezing noted and pt c/o feeling \"rough\", No change in O2 sats or HR. Ok for nursing to place while in room with pt. Remove/breaks as needed, to be removed when no one in pt's room. Dysphagia Eval: Apparent hx dysphagia though degree unknown. Per RDs note (thank you!), seemingly has refused TFs and taking thin liquids though nectar-thick was safest recommendation. Pt clearly poor historian. Does agree to MBS here to further assess swallow function (though immediately after did ask when her meal tray would arrive). Recommend: NPO x ice chips sparingly from nursing staff when PMV on. Will arrange for MBS tomorrow to further assess. Thanks. Please see note for further details and recommendations.        "

## 2018-10-07 NOTE — PLAN OF CARE
Problem: Fall Risk (Adult)  Goal: Identify Related Risk Factors and Signs and Symptoms  Outcome: Outcome(s) achieved Date Met: 10/07/18    Goal: Absence of Fall  Outcome: Ongoing (interventions implemented as appropriate)      Problem: Skin Injury Risk (Adult)  Goal: Identify Related Risk Factors and Signs and Symptoms  Outcome: Outcome(s) achieved Date Met: 10/07/18    Goal: Skin Health and Integrity  Outcome: Ongoing (interventions implemented as appropriate)      Problem: Patient Care Overview  Goal: Plan of Care Review  Outcome: Ongoing (interventions implemented as appropriate)   10/07/18 1101   Coping/Psychosocial   Plan of Care Reviewed With patient   OTHER   Outcome Summary pt c/o ha all day, iv morphine given q4hrs. Fc patent, trach care completed. pt has thin frothy secretions with suctoning. nebs and ppd given per resp.    Plan of Care Review   Progress improving

## 2018-10-07 NOTE — PROGRESS NOTES
[facility] Medicine Services  PROGRESS NOTE    Patient Name: Nallely Junior  : 1940  MRN: [id number]    Date of Admission: 10/6/2018  Length of Stay: 1  Primary Care Physician: Ray Strickland MD    Subjective   Subjective     CC:  pna     HPI:  Pt asking when speech is coming to see her because she is hungry. Lying in bed no family at bedside.     Review of Systems  Gen- No fevers, chills  CV- No chest pain, palpitations  Resp- No cough,pos dyspnea, trach   GI- No N/V/D, abd pain        Otherwise ROS is negative except as mentioned in the HPI.    Objective   Objective     Vital Signs:   Temp:  [97.7 °F (36.5 °C)-98.6 °F (37 °C)] 98.6 °F (37 °C)  Heart Rate:  [76-88] 79  Resp:  [16-20] 18  BP: ()/(52-69) 96/61        Physical Exam:  Constitutional: No acute distress, awake, alert, trach   HENT: NCAT, mucous membranes moist  Respiratory: wheeze and coarse bs with trach, rhonchi b/l   Cardiovascular: RRR, no murmurs, rubs, or gallops, palpable pedal pulses bilaterally  Gastrointestinal: Positive bowel sounds, soft, nontender, nondistended  Musculoskeletal: No bilateral ankle edema  Psychiatric: Appropriate affect, cooperative  Neurologic: Oriented x 3, strength symmetric in all extremities, Cranial Nerves grossly intact to confrontation, speech clear  Skin: No rashes        Results Reviewed:  I have personally reviewed current lab, radiology, and data and agree.      Results from last 7 days  Lab Units 10/07/18  0756   WBC 10*3/mm3 9.96   HEMOGLOBIN g/dL 11.8   HEMATOCRIT % 36.0   PLATELETS 10*3/mm3 251       Results from last 7 days  Lab Units 10/06/18  2031   SODIUM mmol/L 140  140   POTASSIUM mmol/L 3.5  3.5   CHLORIDE mmol/L 107  107   CO2 mmol/L 28.0  28.0   BUN mg/dL 15  15   CREATININE mg/dL 0.54*  0.54*   GLUCOSE mg/dL 226*  226*   CALCIUM mg/dL 8.8  8.8   ALT (SGPT) U/L 44*   AST (SGOT) U/L 63*     Estimated Creatinine Clearance: 59.6 mL/min (A) (by C-G formula  based on SCr of 0.54 mg/dL (L)).  No results found for: BNP    Microbiology Results Abnormal     Procedure Component Value - Date/Time    Blood Culture - Blood, [[id number]]  (Normal) Collected:  10/06/18 2020    Lab Status:  Preliminary result Specimen:  Blood from Arm, Right Updated:  10/07/18 0900     Blood Culture No growth at less than 24 hours          Imaging Results (last 24 hours)     Procedure Component Value Units Date/Time    XR Chest 1 View [[id number]] Updated:  10/06/18 2141             I have reviewed the medications.      [START ON 10/9/2018] Pharmacy Consult  Does not apply Once   acetylcysteine 3 mL Nebulization BID - RT   aspirin 162 mg Oral Daily   atorvastatin 40 mg Oral Nightly   aztreonam 2 g Intravenous Q8H   busPIRone 15 mg Oral Q8H   enoxaparin 40 mg Subcutaneous Q24H   furosemide 20 mg Oral BID   insulin detemir 15 Units Subcutaneous Nightly   insulin lispro 0-7 Units Subcutaneous 4x Daily With Meals & Nightly   ipratropium-albuterol 3 mL Nebulization 4x Daily - RT   methylPREDNISolone sodium succinate 40 mg Intravenous Q8H   midodrine 10 mg Oral TID AC   rOPINIRole 4 mg Oral Nightly   sodium chloride 3 mL Intravenous Q12H   [START ON 10/8/2018] vancomycin 15 mg/kg Intravenous Q24H         Assessment/Plan   Assessment / Plan     Hospital Problem List     Pneumonia    Acute and chronic respiratory failure with hypoxia (CMS/HCC)    Type 2 diabetes mellitus (CMS/HCC)             Brief Hospital Course to date:  Nallely Junior is a 78 y.o. female  with history of chronic respiratory failure s/p tracheostomy, HLD, T2DM, bedbound state who presents as a transfer/direct admission from [facility] for nonresolving PNA, mucus plugging with opacification of left lung and tracheostomy issues. Patient was admitted to OSH on 10/2 from her NH, for several days of sob, cough, increasing amount of sputum. Patient was initially diagnosed with left sided PNA at OSH, treated with broad spectrum  abx- merrem, vanc, levaquin since 10/2. Patient was also noted to be hypotensive to as low as 90s systolic on presentation, though this resolved. Sputum cx from admission was reported to grow MRSA, sensitivities pending, as well as GNR (speciations/sensitivities pending). Patient was reported to initially be improving as WBC noted on admission 20k--- went down to 10k on day of transfer (10/6), however repeat CXR from 10/3-10/4 revealed worsening opacification of left hemithorax concerning for mucus plugging, also noted reports of small left sided plueral effusion. [facility] was called for transfer, however no beds were available until this time.       Assessment & Plan:  Acute on chronic respiratory failure  PNA, MRSA/GNR   Mucus plugging   Possible aspiration  - unclear what patient's home baseline oxygen requirement is, she says no oxygen at home prior  -- patient was being treated with IV vanc/merrem/levaquin at OSH  -- sputum cx had resulted with MRSA/GNR  --continue  vanc/azactam (PCN allergy) here for now which should cover both MRSA/GNR, will consider ID consultation. Will need to follow up closely with OSH cultures/sensitivities. Repeat sputum/blood cx sent here  - in regards to reason for transfer- issues with tracheostomy-- will need to find out more information regarding where her trach was done/when this was done--- pulm following.   - CXR pending read here but reviewed and appears almost complete opacification of left side with suspected mucus plugging  - continue scheduled nebs, steroids (patient was on TID solumedrol at OSH prior to transfer)  - speech consult regarding aspiration risk-- patient states she takes in regular diet but will keep NPO with aspiration precautions for now (also has a PEG, but reportedly she has been refusing to use this and has been eating anything she wants)  --Mucomyst nebs  --Chest Physiotherapy        T2DM  - home meds per OSH med rec list 30units levemir, will place on 15  units qhs as well as SSI     HLD  - continue statin     RLS  - continue ropinirole     Depression  - continue buspar     H/o Hypotension  - continue home midodrine, closely monitor      CM, PT/OT consult for dispo      DVT prophylaxis: Lovenox     CODE STATUS:   Code Status and Medical Interventions:   Ordered at: 10/06/18 1946     Level Of Support Discussed With:    Patient     Code Status:    CPR     Medical Interventions (Level of Support Prior to Arrest):    Full       Disposition: I expect the patient to be discharged tbd      Electronically signed by Salina Arguello DO, 10/07/18, 9:11 AM.

## 2018-10-07 NOTE — NURSING NOTE
"Patient unable to answer questions on medications prior to admission nor where she resides. RN called family and spoke to sister, Carlita Sharif. Pt lives at [facility] and her tracheotomy was placed at  \"years and years ago\", per sister. RN also called nursing home for medications prior to admission, they state their \"computers have been down for two days now\". Also, state patient is usually alert and oriented. Number for nursing home is [phone] for further reference.     "

## 2018-10-07 NOTE — PLAN OF CARE
Problem: Patient Care Overview  Goal: Plan of Care Review  Outcome: Ongoing (interventions implemented as appropriate)   10/07/18 2718   Coping/Psychosocial   Plan of Care Reviewed With patient   OTHER   Outcome Summary VSS; Pt presents with generalized weakness, deficits in ADL performance, fxl mobility, occupational endurance. Max A supine<>sit. Recommend ECF at discharge.

## 2018-10-08 ENCOUNTER — APPOINTMENT (OUTPATIENT)
Dept: GENERAL RADIOLOGY | Facility: HOSPITAL | Age: 78
End: 2018-10-08

## 2018-10-08 ENCOUNTER — APPOINTMENT (OUTPATIENT)
Dept: CT IMAGING | Facility: HOSPITAL | Age: 78
End: 2018-10-08

## 2018-10-08 LAB
ANION GAP SERPL CALCULATED.3IONS-SCNC: 7 MMOL/L (ref 3–11)
BASOPHILS # BLD AUTO: 0.01 10*3/MM3 (ref 0–0.2)
BASOPHILS NFR BLD AUTO: 0.1 % (ref 0–1)
BUN BLD-MCNC: 15 MG/DL (ref 9–23)
BUN/CREAT SERPL: 37.5 (ref 7–25)
CALCIUM SPEC-SCNC: 8.1 MG/DL (ref 8.7–10.4)
CHLORIDE SERPL-SCNC: 96 MMOL/L (ref 99–109)
CO2 SERPL-SCNC: 35 MMOL/L (ref 20–31)
CREAT BLD-MCNC: 0.4 MG/DL (ref 0.6–1.3)
DEPRECATED RDW RBC AUTO: 49.2 FL (ref 37–54)
EOSINOPHIL # BLD AUTO: 0 10*3/MM3 (ref 0–0.3)
EOSINOPHIL NFR BLD AUTO: 0 % (ref 0–3)
ERYTHROCYTE [DISTWIDTH] IN BLOOD BY AUTOMATED COUNT: 14.1 % (ref 11.3–14.5)
GFR SERPL CREATININE-BSD FRML MDRD: >150 ML/MIN/1.73
GLUCOSE BLD-MCNC: 124 MG/DL (ref 70–100)
GLUCOSE BLDC GLUCOMTR-MCNC: 125 MG/DL (ref 70–130)
GLUCOSE BLDC GLUCOMTR-MCNC: 171 MG/DL (ref 70–130)
GLUCOSE BLDC GLUCOMTR-MCNC: 290 MG/DL (ref 70–130)
HCT VFR BLD AUTO: 36.8 % (ref 34.5–44)
HGB BLD-MCNC: 11.3 G/DL (ref 11.5–15.5)
IMM GRANULOCYTES # BLD: 0.07 10*3/MM3 (ref 0–0.03)
IMM GRANULOCYTES NFR BLD: 0.8 % (ref 0–0.6)
LYMPHOCYTES # BLD AUTO: 1.09 10*3/MM3 (ref 0.6–4.8)
LYMPHOCYTES NFR BLD AUTO: 11.7 % (ref 24–44)
MAGNESIUM SERPL-MCNC: 1.9 MG/DL (ref 1.3–2.7)
MCH RBC QN AUTO: 29.3 PG (ref 27–31)
MCHC RBC AUTO-ENTMCNC: 30.7 G/DL (ref 32–36)
MCV RBC AUTO: 95.3 FL (ref 80–99)
MONOCYTES # BLD AUTO: 0.61 10*3/MM3 (ref 0–1)
MONOCYTES NFR BLD AUTO: 6.6 % (ref 0–12)
NEUTROPHILS # BLD AUTO: 7.5 10*3/MM3 (ref 1.5–8.3)
NEUTROPHILS NFR BLD AUTO: 80.8 % (ref 41–71)
PLAT MORPH BLD: NORMAL
PLATELET # BLD AUTO: 197 10*3/MM3 (ref 150–450)
PMV BLD AUTO: 10.5 FL (ref 6–12)
POTASSIUM BLD-SCNC: 3.5 MMOL/L (ref 3.5–5.5)
RBC # BLD AUTO: 3.86 10*6/MM3 (ref 3.89–5.14)
RBC MORPH BLD: NORMAL
SODIUM BLD-SCNC: 138 MMOL/L (ref 132–146)
WBC MORPH BLD: NORMAL
WBC NRBC COR # BLD: 9.28 10*3/MM3 (ref 3.5–10.8)

## 2018-10-08 PROCEDURE — 71045 X-RAY EXAM CHEST 1 VIEW: CPT

## 2018-10-08 PROCEDURE — 71250 CT THORAX DX C-: CPT

## 2018-10-08 PROCEDURE — 94668 MNPJ CHEST WALL SBSQ: CPT

## 2018-10-08 PROCEDURE — 25010000002 VANCOMYCIN 10 G RECONSTITUTED SOLUTION

## 2018-10-08 PROCEDURE — 25010000002 METHYLPREDNISOLONE PER 40 MG: Performed by: INTERNAL MEDICINE

## 2018-10-08 PROCEDURE — 83735 ASSAY OF MAGNESIUM: CPT | Performed by: INTERNAL MEDICINE

## 2018-10-08 PROCEDURE — 63710000001 INSULIN DETEMIR PER 5 UNITS: Performed by: INTERNAL MEDICINE

## 2018-10-08 PROCEDURE — 85007 BL SMEAR W/DIFF WBC COUNT: CPT | Performed by: INTERNAL MEDICINE

## 2018-10-08 PROCEDURE — 25010000002 ENOXAPARIN PER 10 MG: Performed by: INTERNAL MEDICINE

## 2018-10-08 PROCEDURE — 74230 X-RAY XM SWLNG FUNCJ C+: CPT

## 2018-10-08 PROCEDURE — 94799 UNLISTED PULMONARY SVC/PX: CPT

## 2018-10-08 PROCEDURE — 92507 TX SP LANG VOICE COMM INDIV: CPT

## 2018-10-08 PROCEDURE — 99233 SBSQ HOSP IP/OBS HIGH 50: CPT | Performed by: INTERNAL MEDICINE

## 2018-10-08 PROCEDURE — 92611 MOTION FLUOROSCOPY/SWALLOW: CPT

## 2018-10-08 PROCEDURE — 94760 N-INVAS EAR/PLS OXIMETRY 1: CPT

## 2018-10-08 PROCEDURE — 99233 SBSQ HOSP IP/OBS HIGH 50: CPT | Performed by: FAMILY MEDICINE

## 2018-10-08 PROCEDURE — 94640 AIRWAY INHALATION TREATMENT: CPT

## 2018-10-08 PROCEDURE — 82962 GLUCOSE BLOOD TEST: CPT

## 2018-10-08 PROCEDURE — 97162 PT EVAL MOD COMPLEX 30 MIN: CPT

## 2018-10-08 PROCEDURE — G8979 MOBILITY GOAL STATUS: HCPCS

## 2018-10-08 PROCEDURE — 80048 BASIC METABOLIC PNL TOTAL CA: CPT | Performed by: INTERNAL MEDICINE

## 2018-10-08 PROCEDURE — G8978 MOBILITY CURRENT STATUS: HCPCS

## 2018-10-08 PROCEDURE — 85025 COMPLETE CBC W/AUTO DIFF WBC: CPT | Performed by: INTERNAL MEDICINE

## 2018-10-08 RX ADMIN — ACETYLCYSTEINE 3 ML: 200 SOLUTION ORAL; RESPIRATORY (INHALATION) at 09:31

## 2018-10-08 RX ADMIN — IPRATROPIUM BROMIDE AND ALBUTEROL SULFATE 3 ML: 2.5; .5 SOLUTION RESPIRATORY (INHALATION) at 17:00

## 2018-10-08 RX ADMIN — INSULIN DETEMIR 15 UNITS: 100 INJECTION, SOLUTION SUBCUTANEOUS at 21:22

## 2018-10-08 RX ADMIN — ENOXAPARIN SODIUM 40 MG: 40 INJECTION SUBCUTANEOUS at 21:19

## 2018-10-08 RX ADMIN — MIDODRINE HYDROCHLORIDE 10 MG: 5 TABLET ORAL at 17:11

## 2018-10-08 RX ADMIN — INSULIN LISPRO 4 UNITS: 100 INJECTION, SOLUTION INTRAVENOUS; SUBCUTANEOUS at 21:22

## 2018-10-08 RX ADMIN — METHYLPREDNISOLONE SODIUM SUCCINATE 40 MG: 40 INJECTION, POWDER, FOR SOLUTION INTRAMUSCULAR; INTRAVENOUS at 17:20

## 2018-10-08 RX ADMIN — ATORVASTATIN CALCIUM 40 MG: 40 TABLET, FILM COATED ORAL at 21:19

## 2018-10-08 RX ADMIN — BUSPIRONE HYDROCHLORIDE 15 MG: 15 TABLET ORAL at 21:19

## 2018-10-08 RX ADMIN — IPRATROPIUM BROMIDE AND ALBUTEROL SULFATE 3 ML: 2.5; .5 SOLUTION RESPIRATORY (INHALATION) at 09:30

## 2018-10-08 RX ADMIN — SODIUM CHLORIDE 2 G: 9 INJECTION, SOLUTION INTRAVENOUS at 09:21

## 2018-10-08 RX ADMIN — BUSPIRONE HYDROCHLORIDE 15 MG: 15 TABLET ORAL at 06:55

## 2018-10-08 RX ADMIN — BARIUM SULFATE 50 ML: 400 SUSPENSION ORAL at 11:48

## 2018-10-08 RX ADMIN — BUSPIRONE HYDROCHLORIDE 15 MG: 15 TABLET ORAL at 17:11

## 2018-10-08 RX ADMIN — MIDODRINE HYDROCHLORIDE 10 MG: 5 TABLET ORAL at 06:55

## 2018-10-08 RX ADMIN — BARIUM SULFATE 100 ML: 0.81 POWDER, FOR SUSPENSION ORAL at 11:49

## 2018-10-08 RX ADMIN — IPRATROPIUM BROMIDE AND ALBUTEROL SULFATE 3 ML: 2.5; .5 SOLUTION RESPIRATORY (INHALATION) at 20:32

## 2018-10-08 RX ADMIN — METHYLPREDNISOLONE SODIUM SUCCINATE 40 MG: 40 INJECTION, POWDER, FOR SOLUTION INTRAMUSCULAR; INTRAVENOUS at 02:41

## 2018-10-08 RX ADMIN — METHYLPREDNISOLONE SODIUM SUCCINATE 40 MG: 40 INJECTION, POWDER, FOR SOLUTION INTRAMUSCULAR; INTRAVENOUS at 12:14

## 2018-10-08 RX ADMIN — INSULIN LISPRO 2 UNITS: 100 INJECTION, SOLUTION INTRAVENOUS; SUBCUTANEOUS at 17:24

## 2018-10-08 RX ADMIN — FUROSEMIDE 20 MG: 20 TABLET ORAL at 17:11

## 2018-10-08 RX ADMIN — ROPINIROLE HYDROCHLORIDE 4 MG: 2 TABLET, FILM COATED ORAL at 21:20

## 2018-10-08 RX ADMIN — SODIUM CHLORIDE 2 G: 9 INJECTION, SOLUTION INTRAVENOUS at 23:49

## 2018-10-08 RX ADMIN — IPRATROPIUM BROMIDE AND ALBUTEROL SULFATE 3 ML: 2.5; .5 SOLUTION RESPIRATORY (INHALATION) at 13:29

## 2018-10-08 RX ADMIN — SODIUM CHLORIDE 2 G: 9 INJECTION, SOLUTION INTRAVENOUS at 17:11

## 2018-10-08 RX ADMIN — Medication 3 ML: at 21:20

## 2018-10-08 RX ADMIN — MIDODRINE HYDROCHLORIDE 10 MG: 5 TABLET ORAL at 12:15

## 2018-10-08 RX ADMIN — VANCOMYCIN HYDROCHLORIDE 1250 MG: 10 INJECTION, POWDER, LYOPHILIZED, FOR SOLUTION INTRAVENOUS at 05:50

## 2018-10-08 RX ADMIN — HYDROCORTISONE 2.5% 1 APPLICATION: 25 CREAM TOPICAL at 12:16

## 2018-10-08 RX ADMIN — BARIUM SULFATE 20 ML: 400 PASTE ORAL at 11:48

## 2018-10-08 RX ADMIN — ACETYLCYSTEINE 3 ML: 200 SOLUTION ORAL; RESPIRATORY (INHALATION) at 20:32

## 2018-10-08 RX ADMIN — SODIUM CHLORIDE 2 G: 9 INJECTION, SOLUTION INTRAVENOUS at 00:18

## 2018-10-08 RX ADMIN — ASPIRIN 325 MG ORAL TABLET 162 MG: 325 PILL ORAL at 09:21

## 2018-10-08 RX ADMIN — FUROSEMIDE 20 MG: 20 TABLET ORAL at 09:21

## 2018-10-08 NOTE — PROGRESS NOTES
INPATIENT PULMONARY SERVICE  PROGRESS NOTE     Hospital LOS: 2 days    Ms. Nallely Junior, is followed for a Chief Complaint of: Non-resolving pneumonia, shortness of breath    Active Problems:    Pneumonia    Type 2 diabetes mellitus (CMS/[facility]ly [facility])    Hypotension    Hyperlipidemia    Depression    Anxiety    COPD (chronic obstructive pulmonary disease) (CMS/[facility])    Dysphagia    Coronary artery disease    CHF (congestive heart failure) (CMS/[facility])    GERD (gastroesophageal reflux disease)    Chronic respiratory failure with hypoxia and hypercapnia (CMS/[facility])      Subjective   S   Mrs. Junior is a 77 y/o WF who was transferred to [facility] from [facility] yesterday for pneumonia.  She had a 4 day c/o shortness of air and was taken to [facility].  She was found to have a UTI and LLL PNA.  Per records, her sputum was pending but was positive for GNR and Staph Aureus.  She was given Zosyn, Vanc and Merrem.      She is a very poor historian.  Per RN, her sister stated that the patient fell about a year and a half ago and broke her leg. She went to  and had to have surgery.  She was failure to wean from the vent after a month, so she had a trach and PEG placed.  She now is a NHR.  She does have dysphagia and is supposed to be on mechanical soft/nectar thick diet, but she is noncompliant. Her family brings her food and she is found drinking thin liquids often.  She still has her PEG that is not used.      Since admission, she has been continued on Vancomycin and Aztreonam. Sputum culture here with only scant growth of yeast. She was started on Mucomyst and chest physiotherapy on 10/7.     Interval History:  No events overnight. She is scheduled for a MBS today. She wants to eat. She is noted to have thick secretions.        The patient's relevant past medical, surgical and social history were reviewed and updated in Epic as appropriate.      ROS:   Constitutional: Negative for fever.   Respiratory: Positive for dyspnea.    Cardiovascular: Negative for chest pain.   Gastrointestinal: Negative for  nausea, vomiting and diarrhea.     Objective   O     Vitals  Temp  Min: 97.8 °F (36.6 °C)  Max: 98.4 °F (36.9 °C)  BP  Min: 97/56  Max: 123/64  Pulse  Min: 66  Max: 84  Resp  Min: 16  Max: 24  SpO2  Min: 91 %  Max: 95 % Flow (L/min)  Min: 11  Max: 35    I/O 24 HR (7:00 AM-6:59AM):  Intake/Output       10/07/18 0700 - 10/08/18 0659    Intake (ml) 1110    Output (ml) 2750    Net (ml) -1640          Medications (Drips):    Pharmacy to dose vancomycin       Physical Examination    Telemetry: Normal sinus rhythm.    Constitutional:  No acute distress.  Lying in bed.    Cardiovascular: Regular rate and rhythm.  No murmurs, rub or gallop.   Respiratory: Normal symmetric chest expansion.  Normal respiratory effort.  Diffuse rhonchi with diminished breath sounds on the left.    Abdominal:  Soft. No masses.   Non-tender. No distension.   No hepatosplenomegaly.   Extremities: No digital cyanosis or clubbing.  No peripheral edema.   Neurological:   Alert and Oriented to person, place, and time.   Moves all extremities.              Results from last 7 days  Lab Units 10/08/18  0449 10/07/18  0756   WBC 10*3/mm3 9.28 9.96   HEMOGLOBIN g/dL 11.3* 11.8   MCV fL 95.3 93.3   PLATELETS 10*3/mm3 197 251       Results from last 7 days  Lab Units 10/08/18  0449 10/07/18  0756 10/06/18  2031   SODIUM mmol/L 138 141 140  140   POTASSIUM mmol/L 3.5 4.0 3.5  3.5   CO2 mmol/L 35.0* 31.0 28.0  28.0   CREATININE mg/dL 0.40* 0.44* 0.54*  0.54*   MAGNESIUM mg/dL 1.9 1.9 2.1     Serum creatinine: 0.4 mg/dL (L) 10/08/18 0449  Estimated creatinine clearance: 59.6 mL/min (A)    Results from last 7 days  Lab Units 10/06/18  2031   ALK PHOS U/L 78   BILIRUBIN mg/dL 0.4   ALT (SGPT) U/L 44*   AST (SGOT) U/L 63*             Images:     Imaging Results (last 24 hours)     Procedure Component Value Units Date/Time    XR Chest 1 View [[id number]] Collected:  10/08/18 0814      Updated:  10/08/18 0814    Narrative:       EXAMINATION: XR CHEST 1 VW-      INDICATION: Respiratory failure, pneumonia, atelectasis; Z74.09-Other  reduced mobility; R13.10-Dysphagia, unspecified.      COMPARISON: 10/06/2018.     FINDINGS: Portable chest reveals patchy ill-defined opacification seen  within the left upper lobe. Tracheostomy tube in satisfactory position  above the jacobo. Ill-defined opacification seen at the lung bases. Mild  elevation identified of the right hemidiaphragm. Small left pleural  effusion.           Impression:       Worsening identified of the airspace disease in the left  upper lobe. Small bilateral pleural effusions. The heart is enlarged.     D:  10/08/2018  E:  10/08/2018       XR Chest 1 View [[id number]] Collected:  10/07/18 1222     Updated:  10/07/18 1449    Narrative:       EXAMINATION: XR CHEST 1 VW - 10/6/2018     INDICATION: Pneumonia.     TECHNIQUE:  Single view frontal chest.     COMPARISONS: None.     FINDINGS:  Tracheostomy in place. Calcification of the aortic knob.  There is an arc-like calcification projecting over the cardiac  silhouette; this could be valvular or in the ventricular wall. There is  extensive opacity involving the left lung. The right lung is grossly  clear. No pneumothorax. Right proximal humerus ORIF hardware noted.       Impression:       Extensive opacification of the left lung. This is  compatible with the given history of pneumonia.     DICTATED:   10/07/2018  EDITED/ls :   10/07/2018         This report was finalized on 10/7/2018 2:47 PM by Sonny Perez.             I reviewed the patient's new clinical results.  I reviewed the patient's new imaging results and agree with the interpretation.    Assessment/Plan   A / P     Ms. Junior is a 77yo F who is admitted with acute on chronic respiratory failure secondary to non-resolving pneumonia and mucous plugging. She was started on mucomyst and chest physiotherapy on 10/7. Chest xray this AM shows  worsening left airspace disease. She has remained on broad spectrum antibiotics for GNR and staph isolated from sputum at the OSH. Cultures here are growing scant yeast. She has a history of dysphagia with noncompliance and is scheduled for a MBS today.     NPO Diet  Code Status and Medical Interventions:   Ordered at: 10/06/18 1946     Level Of Support Discussed With:    Patient     Code Status:    CPR     Medical Interventions (Level of Support Prior to Arrest):    Full       Hospital Problem List     Pneumonia    Type 2 diabetes mellitus (CMS/[facility])    Hypotension    Overview Signed 10/7/2018  3:25 PM by Meme Acevedo APRN     on midodrine         Hyperlipidemia    Depression    Anxiety    COPD (chronic obstructive pulmonary disease) (CMS/[facility])    Dysphagia    Overview Signed 10/7/2018  4:13 PM by Meme Acevedo APRN     Was on mechanical soft and nectar thick at SNF, noncompliant          Coronary artery disease    CHF (congestive heart failure) (CMS/[facility])    GERD (gastroesophageal reflux disease)    Chronic respiratory failure with hypoxia and hypercapnia (CMS/[facility])          Assessment / Plan:  1. Continue chest physiotherapy and Mucomyst today.   2. Obtain CT chest to further delineate the degree of obstruction and assess for any masses or endobronchial lesions.   3. If she fails to have any improvement with the above therapy, she may need a therapeutic bronchoscopy.   4. MBS today. NPO until completed.   5. AM CXR    I discussed the patient's findings and my recommendations with patient and nursing staff    Time: 35 minutes       Alva Pinedo, DO  Pulmonary and Critical Care Medicine

## 2018-10-08 NOTE — PLAN OF CARE
Problem: Patient Care Overview  Goal: Plan of Care Review  Outcome: Ongoing (interventions implemented as appropriate)   10/08/18 7779   Coping/Psychosocial   Plan of Care Reviewed With patient   OTHER   Outcome Summary Pt. was pleasant and cooperative during the evening; Pt. NPO d/t aspiration risk- barium swallow scheduled for today; meds crushed and put through PEG; trach suctioned once during shift; VSS; NSR; IV Abx. continued; x-ray showed almost total white of left lung; continue to monitor.   Plan of Care Review   Progress no change

## 2018-10-08 NOTE — MBS/VFSS/FEES
Acute Care - Speech Language Pathology   Swallow Initial Evaluation  [facility]   Modified Barium Swallow Study (MBS)     Patient Name: Nallely Junior  : 1940  MRN: [id number]  Today's Date: 10/8/2018  Onset of Illness/Injury or Date of Surgery: 10/06/18     Referring Physician: TIN Murphy MD      Admit Date: 10/6/2018    Visit Dx:     ICD-10-CM ICD-9-CM   1. Impaired mobility and ADLs Z74.09 799.89   2. Oropharyngeal dysphagia R13.12 787.22   3. Impaired functional mobility, balance, gait, and endurance Z74.09 V49.89     Patient Active Problem List   Diagnosis   • Pneumonia   • Type 2 diabetes mellitus (CMS/HCC)   • Hypotension   • Hyperlipidemia   • Depression   • Anxiety   • COPD (chronic obstructive pulmonary disease) (CMS/HCC)   • Dysphagia   • Coronary artery disease   • CHF (congestive heart failure) (CMS/HCC)   • GERD (gastroesophageal reflux disease)   • Chronic respiratory failure with hypoxia and hypercapnia (CMS/HCC)     Past Medical History:   Diagnosis Date   • Anemia    • Anxiety    • Aortic stenosis    • CHF (congestive heart failure) (CMS/HCC)    • Chronic respiratory failure with hypoxia and hypercapnia (CMS/HCC)    • CKD (chronic kidney disease), stage III (CMS/HCC)    • Constipation    • COPD (chronic obstructive pulmonary disease) (CMS/HCC)    • Coronary artery disease    • Dementia    • Depression    • Diabetes mellitus (CMS/HCC)    • Dysphagia    • Femur fracture, right (CMS/HCC)    • GERD (gastroesophageal reflux disease)    • Hyperlipidemia    • Hypertension    • Hypotension     on midodrine   • Restless legs syndrome (RLS)      Past Surgical History:   Procedure Laterality Date   • FOREARM SURGERY     • LEG SURGERY     • PEG TUBE INSERTION     • TRACHEOSTOMY            SWALLOW EVALUATION (last 72 hours)      SLP Adult Swallow Evaluation     Row Name 10/08/18 1130       Rehab Evaluation    Document Type  --    Subjective Information complains of;dyspnea  -RD    Patient Observations  alert;cooperative;agree to therapy  -RD    Patient/Family Observations No family present  -RD    Patient Effort good  -RD       General Information    Patient Profile Reviewed yes  -RD    Pertinent History Of Current Problem Trach for ~ x3-4 months per order. Transferred to [facility] for non-resolving PNA, mucus plugging, and trach issues.   -RD    Current Method of Nutrition NPO  -RD    Precautions/Limitations, Vision WFL;for purposes of eval  -RD    Precautions/Limitations, Hearing WFL;for purposes of eval  -RD    Prior Level of Function-Communication cognitive-linguistic impairment  -RD    Prior Level of Function-Swallowing other (see comments)   Mech soft and nectar- refusing TF's at NH  -RD    Plans/Goals Discussed with patient;agreed upon  -RD    Barriers to Rehab previous functional deficit;cognitive status;medically complex  -RD    Patient's Goals for Discharge return to PO diet  -RD       Pain Assessment    Additional Documentation Pain Scale: FACES Pre/Post-Treatment (Group)  -RD       Pain Scale: Numbers Pre/Post-Treatment    Pain Scale: Numbers, Pretreatment  --    Pain Scale: Numbers, Post-Treatment  --    Pain Intervention(s)  --       Pain Scale: FACES Pre/Post-Treatment    Pain: FACES Scale, Pretreatment 0-->no hurt  -RD    Pain: FACES Scale, Post-Treatment 0-->no hurt  -RD       Clinical Swallow Eval    Pharyngeal Phase  --       Pharyngeal Phase Concerns    Pharyngeal Phase Concerns  --    Wet Vocal Quality  --    Pharyngeal Phase Concerns, Comment  --       MBS/VFSS    Utensils Used spoon;cup;straw  -RD    Consistencies Trialed thin liquids;nectar/syrup-thick liquids;pudding thick;regular textures  -RD       MBS/VFSS Interpretation    Oral Prep Phase impaired oral phase of swallowing  -RD    Oral Transit Phase impaired  -RD    Oral Residue impaired  -RD    Oral Phase, Comment Mild-moderate oral dysphagia. Incr'd prep and piecemeal transit w/ regular solid. Suspect respiratory status impacting along w/  lingual weakness. Incr'd oral transit time w/ all consistencies. Pre-spill inconsistently to the valleculae and pyriforms w/ all consistencies. Reduced lingual/back of tongue control.   -RD       Oral Preparatory Phase    Oral Preparatory Phase prolonged manipulation  -RD    Prolonged Manipulation regular textures  -RD       Oral Transit Phase    Impaired Oral Transit Phase increased A-P transit time;piecemeal oral transit;premature spillage of liquids into pharynx  -RD    Increased A-P Transit Time all consistencies tested;secondary to reduced lingual control;secondary to impaired cognitive status  -RD    Piecemeal Oral Transit regular textures;secondary to reduced lingual control  -RD    Premature Spillage of Liquids into Pharynx all consistencies tested;secondary to reduced lingual control  -RD       Oral Residue    Impaired Oral Residue lingual residue  -RD    Lingual Residue regular textures;secondary to reduced lingual strength;secondary to reduced lingual range of motion  -RD    Response to Oral Residue cleared residue;with spontaneous subsequent swallow  -RD       Initiation of Pharyngeal Swallow    Initiation of Pharyngeal Swallow bolus in pyriform sinuses  -RD    Pharyngeal Phase impaired pharyngeal phase of swallowing  -RD    Rosenbek's Scale thin:;8--->level 8;7--->level 7  -RD    Attempted Compensatory Maneuvers bolus size;bolus presentation style;combination of maneuvers (see comments);other (see comments)   PMV on and off  -RD    Response to Attempted Compensatory Maneuvers prevented aspiration;did not prevent penetration  -RD    Pharyngeal Phase, Comment Moderate oropharyngeal dysphagia. Swallow initiation was inconsistently delayed to the valleculae and pyriforms w/ all consistencies. Aspiration occurred before/during the swallow w/ thins 2' pre-spill + delay and reduced vestibular closure. Pt continued to aspirate thin vestibular residue. Pt sensed initial aspiration w/ thins w/ weak cough, however  was unsuccessful to clear aspirated material. Otherwise, aspiration was mostly silent. Penetration occurred during the swallow w/ nectar-thick liquids, however penetration cleared upon completion of the swallow. No aspiration w/ nectar, pureed, or solid. Compensations attempted including placement and removal of PMV. Pt safe for Nectar-thick and Level-4 dys mech soft w/ PMV off. With PMV in place, pt may have thins via cup only (no straws) b/w meals w/ RN & SLP supervision only, as long as pt able to tolerate PMV. If incr'd RR or fatigue noted w/ PMV in place may need to remove and discontinue drinks of thins. Vestibular residue present w/ thins, otherwise no significant pharyngeal residue observed. Difficulty coordinating breathing/swallowing, lingual control, timing, and reduced hyolaryngeal elevation/excursion impacting swallow function at this time.   -RD       Clinical Impression    SLP Swallowing Diagnosis moderate;oral dysfunction;pharyngeal dysfunction  -RD    Functional Impact risk of aspiration/pneumonia;risk of malnutrition;risk of dehydration  -RD    Rehab Potential/Prognosis, Swallowing good, to achieve stated therapy goals  -RD    Swallow Criteria for Skilled Therapeutic Interventions Met demonstrates skilled criteria  -RD       Recommendations    Therapy Frequency (Swallow) 5 days per week  -RD    Predicted Duration Therapy Intervention (Days) until discharge  -RD    SLP Diet Recommendation mechanical soft with no mixed consistencies;nectar thick liquids;other (see comments)   thins via cup b/w meals w/ PMV in place (only w/ RN/SLP)  -RD    Recommended Diagnostics  --    Recommended Precautions and Strategies upright posture during/after eating;small bites of food and sips of liquid;other (see comments)   w/o PMV for meals, gen aspiration precaut; Oral care BID/PRN  -RD    SLP Rec. for Method of Medication Administration meds crushed;with pudding or applesauce;as tolerated  -RD    Monitor for Signs of  Aspiration yes;notify SLP if any concerns  -RD    Anticipated Dischage Disposition unknown;anticipate therapy at next level of care  -RD      User Key  (r) = Recorded By, (t) = Taken By, (c) = Cosigned By    Initials Name Effective Dates    JAYE Lenore Yenny M, MS CCC-SLP 06/22/15 -     RD Stacey Kmi, MS CCC-SLP 04/03/18 -         EDUCATION  The patient has been educated in the following areas:   Dysphagia (Swallowing Impairment) Oral Care/Hydration Modified Diet Instruction.    SLP Recommendation and Plan  SLP Swallowing Diagnosis: moderate, oral dysfunction, pharyngeal dysfunction  SLP Diet Recommendation: mechanical soft with no mixed consistencies, nectar thick liquids, other (see comments) (thins via cup b/w meals w/ PMV in place (only w/ RN/SLP))  Recommended Precautions and Strategies: upright posture during/after eating, small bites of food and sips of liquid, other (see comments) (w/o PMV for meals, gen aspiration precaut; Oral care BID/PRN)     Monitor for Signs of Aspiration: yes, notify SLP if any concerns     Swallow Criteria for Skilled Therapeutic Interventions Met: demonstrates skilled criteria  Anticipated Dischage Disposition: unknown, anticipate therapy at next level of care  Rehab Potential/Prognosis, Swallowing: good, to achieve stated therapy goals  Therapy Frequency (Swallow): 5 days per week  Predicted Duration Therapy Intervention (Days): until discharge       Plan of Care Reviewed With: patient  Plan of Care Review  Plan of Care Reviewed With: patient  Daily Summary of Progress (SLP): progress toward functional goals as expected  Plan for Continued Treatment (SLP): Continue PMV tx. PMV off during meals. Pt fatigued after ~5 minutes of use of PMV and noted w/ incr'd RR and PMV had to be removed. Unable to tolerate re-placement of valve. Pt reports SOA and anxiety w/ placement. Wear PMV w/ RN and SLP supervision only.          SLP GOALS     Row Name 10/08/18 1130 10/07/18 1100           Oral Nutrition/Hydration Goal 1 (SLP)    Oral Nutrition/Hydration Goal 1, SLP LTG: Pt will tolerate soft diet and thin liquids w/ 100% accuracy w/o cues.   -RD  --     Time Frame (Oral Nutrition/Hydration Goal 1, SLP) by discharge  -RD  --        Oral Nutrition/Hydration Goal 2 (SLP)    Oral Nutrition/Hydration Goal 2, SLP Pt will tolerate nectar-thick and Level 4-dys mech soft (w/ PMV off) w/ no overt s/s of aspiration w/ 100% acc w/o cues  -RD  --     Time Frame (Oral Nutrition/Hydration Goal 2, SLP) short term goal (STG);by discharge  -RD  --        Lingual Strengthening Goal 1 (SLP)    Activity (Lingual Strengthening Goal 1, SLP) increase tongue back strength  -RD  --     Increase Tongue Back Strength lingual resistance exercises  -RD  --     [place]/Accuracy (Lingual Strengthening Goal 1, SLP) with minimal cues (75-90% accuracy)  -RD  --     Time Frame (Lingual Strengthening Goal 1, SLP) short term goal (STG);by discharge  -RD  --        Pharyngeal Strengthening Exercise Goal 1 (SLP)    Activity (Pharyngeal Strengthening Goal 1, SLP) increase timing;increase superior movement of the hyolaryngeal complex;increase anterior movement of the hyolaryngeal complex;increase closure at entrance to airway/closure of airway at glottis  -RD  --     Increase Timing prepping - 3 second prep or suck swallow or 3-step swallow  -RD  --     Increase Superior Movement of the Hyolaryngeal Complex supraglottic swallow  -RD  --     Increase Anterior Movement of the Hyolaryngeal Complex chin tuck against resistance (CTAR)  -RD  --     Increase Closure at Entrance to Airway/Closure of Airway at Glottis super-supraglottic swallow  -RD  --     [place]/Accuracy (Pharyngeal Strengthening Goal 1, SLP) with minimal cues (75-90% accuracy)  -RD  --     Time Frame (Pharyngeal Strengthening Goal 1, SLP) short term goal (STG);by discharge  -RD  --        Swallow Compensatory Strategies Goal (SLP)    Swallow Compensatory  Strategies/Techniques Goal (SLP) Pt will tolerate trials of thins via cup only w/ PMV on w/ no overt s/s of aspiration or significant changes in vital signs w/ 100% acc w/o cues  -RD  --     Time Frame (Swallow Compensatory Strategies/Techniques Goal, SLP) short term goal (STG);by discharge  -RD  --        Tolerate Speaking Valve Placement Goal 1 (SLP)    Tolerate Speaking Valve Placement Goal 1 (SLP) speaking valve 20min;100%;independently (over 90% accuracy)  -RD speaking valve 20min;100%;independently (over 90% accuracy)  -SM     Time Frame (Tolerate Speaking Valve Placement Goal 1, SLP) short term goal (STG);by discharge  -RD short term goal (STG);by discharge  -SM     Progress (Tolerate Speaking Valve Placement Goal 1, SLP) 40%;with minimal cues (75-90%)  -RD  --     Progress/Outcomes (Tolerate Speaking Valve Placement Goal 1, SLP) continuing progress toward goal  -RD  --     Comment (Tolerate Speaking Valve Placement Goal 1, SLP) Tolerated for ~ 5min then observed w/ SOA and had to remove  -RD  --        Additional Goal 1 (SLP)    Additional Goal 1, SLP LTG: Comunicate wants/needs with PMV speaking valve while in hospital setting with 100% accruacy and no cues  -RD LTG: Comunicate wants/needs with PMV speaking valve while in hospital setting with 100% accruacy and no cues  -SM     Time Frame (Additional Goal 1, SLP) by discharge  -RD by discharge  -SM     Progress/Outcomes (Additional Goal 1, SLP) continuing progress toward goal  -RD  --       User Key  (r) = Recorded By, (t) = Taken By, (c) = Cosigned By    Initials Name Provider Type    Yenny Murillo, MS CCC-SLP Speech and Language Pathologist    Stacey Patton, MS CCC-SLP Speech and Language Pathologist               Time Calculation:         Time Calculation- SLP     Row Name 10/08/18 2674             Time Calculation- SLP    SLP Start Time 1130  -RD      SLP Received On 10/08/18  -RD        User Key  (r) = Recorded By, (t) = Taken By, (c)  = Cosigned By    Initials Name Provider Type    RD Stacey Kim MS CCC-SLP Speech and Language Pathologist          Therapy Charges for Today     Code Description Service Date Service Provider Modifiers Qty    32250493998 HC ST MOTION FLUORO EVAL SWALLOW 7 10/8/2018 Stacey Kim MS CCC-SLP GN 1    [id number] HC ST TREATMENT SPEECH 2 10/8/2018 Stacey Kim MS CCC-SLP GN 1        [facility] Care - Speech Language Pathology Treatment Note  [facility]     Patient Name: Nallely Junior  : 1940  MRN: [id number]  Today's Date: 10/8/2018         Admit Date: 10/6/2018    Visit Dx:      ICD-10-CM ICD-9-CM   1. Impaired mobility and ADLs Z74.09 799.89   2. Oropharyngeal dysphagia R13.12 787.22   3. Impaired functional mobility, balance, gait, and endurance Z74.09 V49.89     Patient Active Problem List   Diagnosis   • Pneumonia   • Type 2 diabetes mellitus (CMS/[facility])   • Hypotension   • Hyperlipidemia   • Depression   • Anxiety   • COPD (chronic obstructive pulmonary disease) (CMS/[facility])   • Dysphagia   • Coronary artery disease   • CHF (congestive heart failure) (CMS/[facility])   • GERD (gastroesophageal reflux disease)   • Chronic respiratory failure with hypoxia and hypercapnia (CMS/[facility])        Therapy Treatment        Rehabilitation Treatment Summary     Row Name 10/08/18 1130             Treatment Time/Intention    Discipline speech language pathologist  -RD      Document Type evaluation;therapy note (daily note)  -RD      Mode of Treatment speech-language pathology  -RD      Care Plan Review evaluation/treatment results reviewed;care plan/treatment goals reviewed;risks/benefits reviewed;current/potential barriers reviewed;patient/other agree to care plan  -RD      Recorded by [RD] Stacey Kim MS CCC-SLP 10/08/18 1642      Row Name 10/08/18 3440             Outcome Summary/Treatment Plan (SLP)    Daily Summary of Progress (SLP) progress toward functional goals as expected  -RD      Barriers to  Overall Progress (SLP) resp status  -RD      Plan for Continued Treatment (SLP) Continue PMV tx. PMV off during meals. Pt fatigued after ~5 minutes of use of PMV and noted w/ incr'd RR and PMV had to be removed. Unable to tolerate re-placement of valve. Pt reports SOA and anxiety w/ placement. Wear PMV w/ RN and SLP supervision only.  -RD      Recorded by [RD] Stacey Kim, MS CCC-SLP 10/08/18 5031        User Key  (r) = Recorded By, (t) = Taken By, (c) = Cosigned By    Initials Name Effective Dates Discipline    RD Stacey Kim, MS CCC-SLP 04/03/18 -  SLP            EDUCATION  The patient has been educated in the following areas:   Communication Impairment Speaking Valve.    SLP Recommendation and Plan        Swallow Criteria for Skilled Therapeutic Interventions Met: demonstrates skilled criteria  Anticipated Dischage Disposition: unknown, anticipate therapy at next level of care     Therapy Frequency (Swallow): 5 days per week  Predicted Duration Therapy Intervention (Days): until discharge       Plan of Care Reviewed With: patient  Plan of Care Review  Plan of Care Reviewed With: patient  Daily Summary of Progress (SLP): progress toward functional goals as expected  Plan for Continued Treatment (SLP): Continue PMV tx. PMV off during meals. Pt fatigued after ~5 minutes of use of PMV and noted w/ incr'd RR and PMV had to be removed. Unable to tolerate re-placement of valve. Pt reports SOA and anxiety w/ placement. Wear PMV w/ RN and SLP supervision only.          SLP GOALS     Row Name 10/08/18 1130 10/07/18 1100          Oral Nutrition/Hydration Goal 1 (SLP)    Oral Nutrition/Hydration Goal 1, SLP LTG: Pt will tolerate soft diet and thin liquids w/ 100% accuracy w/o cues.   -RD  --     Time Frame (Oral Nutrition/Hydration Goal 1, SLP) by discharge  -RD  --        Oral Nutrition/Hydration Goal 2 (SLP)    Oral Nutrition/Hydration Goal 2, SLP Pt will tolerate nectar-thick and Level 4-dys mech soft (w/  PMV off) w/ no overt s/s of aspiration w/ 100% acc w/o cues  -RD  --     Time Frame (Oral Nutrition/Hydration Goal 2, SLP) short term goal (STG);by discharge  -RD  --        Lingual Strengthening Goal 1 (SLP)    Activity (Lingual Strengthening Goal 1, SLP) increase tongue back strength  -RD  --     Increase Tongue Back Strength lingual resistance exercises  -RD  --     [place]/Accuracy (Lingual Strengthening Goal 1, SLP) with minimal cues (75-90% accuracy)  -RD  --     Time Frame (Lingual Strengthening Goal 1, SLP) short term goal (STG);by discharge  -RD  --        Pharyngeal Strengthening Exercise Goal 1 (SLP)    Activity (Pharyngeal Strengthening Goal 1, SLP) increase timing;increase superior movement of the hyolaryngeal complex;increase anterior movement of the hyolaryngeal complex;increase closure at entrance to airway/closure of airway at glottis  -RD  --     Increase Timing prepping - 3 second prep or suck swallow or 3-step swallow  -RD  --     Increase Superior Movement of the Hyolaryngeal Complex supraglottic swallow  -RD  --     Increase Anterior Movement of the Hyolaryngeal Complex chin tuck against resistance (CTAR)  -RD  --     Increase Closure at Entrance to Airway/Closure of Airway at Glottis super-supraglottic swallow  -RD  --     [place]/Accuracy (Pharyngeal Strengthening Goal 1, SLP) with minimal cues (75-90% accuracy)  -RD  --     Time Frame (Pharyngeal Strengthening Goal 1, SLP) short term goal (STG);by discharge  -RD  --        Swallow Compensatory Strategies Goal (SLP)    Swallow Compensatory Strategies/Techniques Goal (SLP) Pt will tolerate trials of thins via cup only w/ PMV on w/ no overt s/s of aspiration or significant changes in vital signs w/ 100% acc w/o cues  -RD  --     Time Frame (Swallow Compensatory Strategies/Techniques Goal, SLP) short term goal (STG);by discharge  -RD  --        Tolerate Speaking Valve Placement Goal 1 (SLP)    Tolerate Speaking Valve Placement Goal 1  (SLP) speaking valve 20min;100%;independently (over 90% accuracy)  -RD speaking valve 20min;100%;independently (over 90% accuracy)  -SM     Time Frame (Tolerate Speaking Valve Placement Goal 1, SLP) short term goal (STG);by discharge  -RD short term goal (STG);by discharge  -SM     Progress (Tolerate Speaking Valve Placement Goal 1, SLP) 40%;with minimal cues (75-90%)  -RD  --     Progress/Outcomes (Tolerate Speaking Valve Placement Goal 1, SLP) continuing progress toward goal  -RD  --     Comment (Tolerate Speaking Valve Placement Goal 1, SLP) Tolerated for ~ 5min then observed w/ SOA and had to remove  -RD  --        Additional Goal 1 (SLP)    Additional Goal 1, SLP LTG: Comunicate wants/needs with PMV speaking valve while in hospital setting with 100% accruacy and no cues  -RD LTG: Comunicate wants/needs with PMV speaking valve while in hospital setting with 100% accruacy and no cues  -SM     Time Frame (Additional Goal 1, SLP) by discharge  -RD by discharge  -SM     Progress/Outcomes (Additional Goal 1, SLP) continuing progress toward goal  -RD  --       User Key  (r) = Recorded By, (t) = Taken By, (c) = Cosigned By    Initials Name Provider Type    Yenny Murillo MS CCC-SLP Speech and Language Pathologist    Stacey Patton MS CCC-SLP Speech and Language Pathologist                Time Calculation:           Time Calculation- SLP     Row Name 10/08/18 1655             Time Calculation- SLP    SLP Start Time 1130  -      SLP Received On 10/08/18  -        User Key  (r) = Recorded By, (t) = Taken By, (c) = Cosigned By    Initials Name Provider Type    Stacey Patton MS CCC-SLP Speech and Language Pathologist          Therapy Charges for Today     Code Description Service Date Service Provider Modifiers Qty    [id number]  ST MOTION FLUORO EVAL SWALLOW 7 10/8/2018 Stacey Kim MS CCC-SLP GN 1    [id number] HC ST TREATMENT SPEECH 2 10/8/2018 Stacey Kim MS CCC-SLP GN 1                      Stacey Kim, MS CCC-SLP  10/8/2018         Stacey Kim, MS CCC-SLP  10/8/2018

## 2018-10-08 NOTE — PROGRESS NOTES
Discharge Planning Assessment  [facility]     Patient Name: Nallely Junior  MRN: [id number]  Today's Date: 10/8/2018    Admit Date: 10/6/2018          Discharge Needs Assessment     Row Name 10/08/18 1042       Living Environment    Lives With facility resident   [facility]    Current Living Arrangements extended care facility    Primary Care Provided by other (see comments)    Provides Primary Care For no one, unable/limited ability to care for self    Family Caregiver if Needed other (see comments)   facility    Quality of Family Relationships unable to assess       Resource/Environmental Concerns    Resource/Environmental Concerns none    Transportation Concerns other (see comments)   Ambulance       Transition Planning    Patient/Family Anticipates Transition to long term care facility    Patient/Family Anticipated Services at Transition ;other (see comments)   Ambulance services, Long term care gacility.     Transportation Anticipated health plan transportation       Discharge Needs Assessment    Readmission Within the Last 30 Days no previous admission in last 30 days    Concerns to be Addressed denies needs/concerns at this time    Equipment Currently Used at Home trach supplies;other (see comments)   Pt mostly bed ridden. All equipment provided per [facility]    Anticipated Changes Related to Illness none    Equipment Needed After Discharge none    Discharge Facility/Level of Care Needs nursing facility, intermediate    Current Discharge Risk chronically ill;dependent with mobility/activities of daily living            Discharge Plan     Row Name 10/08/18 1045       Plan    Plan Back to [facility]    Patient/Family in Agreement with Plan yes    Plan Comments Met with pt at . She was able to answer questions in spite of her trach. She has been at [facility] Medicaid long term care and plans to return there at d/c.  Called and spoke to Radha, admission assistant at [facility] and pt has a 14 day medicaid bed hold and able to return when medically ready. Pt will need an ambulance for return due to her trach and is bed ridden. Pt denies any further d/c needs. CM will cont to follow. Patrica Bryant RN, CM ext. [phone]    Final Discharge Disposition Code 04 - intermediate care facility        Destination     No service coordination in this encounter.      Durable Medical Equipment     No service coordination in this encounter.      Dialysis/Infusion     No service coordination in this encounter.      Home Medical Care     No service coordination in this encounter.      Social Care     No service coordination in this encounter.        Expected Discharge Date and Time     Expected Discharge Date Expected Discharge Time    Oct 10, 2018               Demographic Summary     Row Name 10/08/18 1041       General Information    Referral Source admission list;nursing    Reason for Consult discharge planning    Preferred Language English     Used During This Interaction no    General Information Comments PCP is Dr. Strickland       Contact Information    Permission Granted to Share Info With ;family/designee   Sister-Carlita Sharif at [phone]            Functional Status     Row Name 10/08/18 1041       Functional Status    Usual Activity Tolerance poor    Current Activity Tolerance poor    Functional Status Comments Pt has a trach, peg and f/c. Can sit up on side of bed.        Functional Status, IADL    Medications completely dependent    Meal Preparation completely dependent   PEG    Housekeeping completely dependent    Laundry completely dependent    Shopping completely dependent       Employment/    Employment/ Comments Has Medicare [employer] and [employer]. Is at Nursing Home with Medicaid            Psychosocial    No documentation.           Abuse/Neglect    No documentation.           Legal    No documentation.            Substance Abuse    No documentation.           Patient Forms    No documentation.         Patrica Bryant

## 2018-10-08 NOTE — PLAN OF CARE
Problem: Patient Care Overview  Goal: Plan of Care Review  Outcome: Ongoing (interventions implemented as appropriate)   10/08/18 1130   Coping/Psychosocial   Plan of Care Reviewed With patient   SLP MBS evaluation completed. Will address moderate oropharyngeal dysphagia. RECS: Nectar-thick liquids, Level 4-dys mech soft, small bites/sips, general aspiration precautions, Oral care BID/PRN, PMV off w/ meals, initiate dysphagia treatment. Pt may have small sips of thin liquids (6-8 drinks) via cup w/ PMV ON only, b/w meals only w/ RN/SLP supervision. If any changes in vital signs or coughing noted, discontinue drinks and remove PMV. Please see note for further details and recommendations.

## 2018-10-08 NOTE — CONSULTS
INFECTIOUS DISEASE CONSULT/INITIAL HOSPITAL VISIT    Nallely Junior  1940  [id number]    Date of consult: 10/8/2018    Admit date: 10/6/2018    Requesting Provider: Mallika Jacobs MD  Evaluating physician: Maxim Lundberg MD  Reason for Consultation: pneumonia  Chief Complaint: above      Subjective   History of present illness:  Patient is a 78 y.o.  Yr old female with dementia, diabetes mellitus type 2, chronic respiratory failure status post tracheostomy,hypertension, hyperlipidemia, bedbound, previous critical aortic stenosis, dysphagia, diastolic dysfunction, COPD, restless leg, GERD, anxiety, CAD, ho was recently admitted to [facility] for pneumonia and mucous plugging with opacification of the left lung. Patient is a poor historian.The patienthad been treated with meropenem, vancomycin, and levofloxacin since 10/2/18. She was transferred to [facility] on 10/6/18 with non-resolving pneumonia. Left hemithorax continued to be opacified.  I was consulted on 10/8/18 for further evaluation and treatment.  No known immunocompromised state, travel history, zoonotic exposures,TB, or HIV.    Past Medical History:   Diagnosis Date   • Anemia    • Anxiety    • Aortic stenosis    • CHF (congestive heart failure) (CMS/HCC)    • Chronic respiratory failure with hypoxia and hypercapnia (CMS/HCC)    • CKD (chronic kidney disease), stage III (CMS/HCC)    • Constipation    • COPD (chronic obstructive pulmonary disease) (CMS/HCC)    • Coronary artery disease    • Dementia    • Depression    • Diabetes mellitus (CMS/HCC)    • Dysphagia    • Femur fracture, right (CMS/HCC)    • GERD (gastroesophageal reflux disease)    • Hyperlipidemia    • Hypertension    • Hypotension     on midodrine   • Restless legs syndrome (RLS)        Past Surgical History:   Procedure Laterality Date   • FOREARM SURGERY     • LEG SURGERY     • PEG TUBE INSERTION     • TRACHEOSTOMY     SOPHIA, cholecystectomy,  tracheostomy May 2017    Pediatric History   Patient Guardian Status   • Not on file.     Other Topics Concern   • Not on file     Social History Narrative   • No narrative on file   Single, never , no children    Family history is unknown by patient.    Allergies   Allergen Reactions   • Penicillins Unknown (See Comments)     Per med rec, pt is a poor historian    • Sulfa Antibiotics Unknown (See Comments)     Per med rec, pt is a poor historian         There is no immunization history on file for this patient.    Medication:    Current Facility-Administered Medications:   •  [START ON 10/9/2018] !vanc trough 10/9@0530, do not hang dose until lab drawn and pharmacy reviewed, , Does not apply, Once, Neo Jean, [facility]  •  acetylcysteine (MUCOMYST) 20 % solution 3 mL, 3 mL, Nebulization, BID - RT, Case, Alva V., DO, 3 mL at 10/08/18 0931  •  aspirin tablet 162 mg, 162 mg, Oral, Daily, Olivia Murphy MD, 162 mg at 10/08/18 0921  •  atorvastatin (LIPITOR) tablet 40 mg, 40 mg, Oral, Nightly, Olivia Murphy MD, 40 mg at 10/07/18 2107  •  aztreonam (AZACTAM) 2 g in sodium chloride 0.9 % 100 mL IVPB-MBP, 2 g, Intravenous, Q8H, Tony Barba MD  •  busPIRone (BUSPAR) tablet 15 mg, 15 mg, Oral, Q8H, Olivia Murphy MD, 15 mg at 10/08/18 0655  •  dextrose (D50W) 25 g/ 50mL Intravenous Solution 25 g, 25 g, Intravenous, Q15 Min PRN, Olivia Murphy MD  •  dextrose (GLUTOSE) oral gel 15 g, 15 g, Oral, Q15 Min PRN, Olivia Murphy MD  •  enoxaparin (LOVENOX) syringe 40 mg, 40 mg, Subcutaneous, Q24H, Olivia Murphy MD, 40 mg at 10/07/18 2107  •  furosemide (LASIX) tablet 20 mg, 20 mg, Oral, BID, Olivia Murphy MD, 20 mg at 10/08/18 0921  •  glucagon (human recombinant) (GLUCAGEN DIAGNOSTIC) injection 1 mg, 1 mg, Subcutaneous, PRN, Olivia Murphy MD  •  hydrocortisone (ANUSOL-HC) 2.5 % rectal cream, , Rectal, BID, Latonia, Yumi, ARELI  •  insulin detemir (LEVEMIR) injection 15 Units, 15 Units,  Subcutaneous, Nightly, Olivia Murphy MD, 15 Units at 10/07/18 2108  •  insulin lispro (humaLOG) injection 0-7 Units, 0-7 Units, Subcutaneous, 4x Daily With Meals & Nightly, Olivia Murphy MD, 2 Units at 10/07/18 2106  •  ipratropium-albuterol (DUO-NEB) nebulizer solution 3 mL, 3 mL, Nebulization, 4x Daily - RT, Olivia Murphy MD, 3 mL at 10/08/18 0930  •  methylPREDNISolone sodium succinate (SOLU-Medrol) injection 40 mg, 40 mg, Intravenous, Q8H, Olivia Murphy MD, 40 mg at 10/08/18 0241  •  midodrine (PROAMATINE) tablet 10 mg, 10 mg, Oral, TID AC, Olivia Murphy MD, 10 mg at 10/08/18 0655  •  morphine injection 1 mg, 1 mg, Intravenous, Q4H PRN, Olivia Murphy MD, 1 mg at 10/07/18 1532  •  Pharmacy to dose vancomycin, , Does not apply, Continuous PRN, Olivia Murphy MD  •  rOPINIRole (REQUIP) tablet 4 mg, 4 mg, Oral, Nightly, Olivia Murphy MD, 4 mg at 10/07/18 2107  •  sodium chloride 0.9 % flush 3 mL, 3 mL, Intravenous, Q12H, Olivia Murphy MD, 3 mL at 10/07/18 2110  •  sodium chloride 0.9 % flush 3-10 mL, 3-10 mL, Intravenous, PRN, lOivia Murphy MD  •  vancomycin 1250 mg/250 mL 0.9% NS IVPB ([facility]), 15 mg/kg, Intravenous, Q24H, Neo [facility], 1,250 mg at 10/08/18 0550    Please refer to the medical record for a full medication list    Review of Systems:    Constitutional-- No Fever, chills or sweats.  Appetite poor, and no malaise. No fatigue.  HEENT-- No new vision, hearing or throat complaints.  No epistaxis or oral sores.  Denies odynophagia or dysphagia.  No odynophagia or dysphagia. No headache, photophobia or neck stiffness.  CV-- No chest pain, palpitation or syncope  Resp-- some SOB/cough and sputum production/no Hemoptysis  GI- No nausea, vomiting, or diarrhea.  No hematochezia, melena, or hematemesis. Denies jaundice or chronic liver disease.  -- No dysuria, hematuria, or flank pain.  Denies hesitancy, urgency or flank pain.  Lymph- no swollen lymph nodes in neck/axilla or  "groin.   Heme- No active bruising or bleeding; no Hx of DVT or PE.  MS-- no swelling or pain in the bones or joints of arms/legs.  No new back pain.  Neuro-- No acute focal weakness or numbness in the arms or legs.  No seizures.  Skin--No rashes or lesions    Physical Exam:   Vital Signs   Temp:  [97.8 °F (36.6 °C)-98.4 °F (36.9 °C)] 97.8 °F (36.6 °C)  Heart Rate:  [66-84] 69  Resp:  [16-24] 18  BP: ()/(50-65) 115/65    Temp  Min: 97.8 °F (36.6 °C)  Max: 98.4 °F (36.9 °C)  BP  Min: 97/56  Max: 123/64  Pulse  Min: 66  Max: 84  Resp  Min: 16  Max: 24  SpO2  Min: 91 %  Max: 95 %    Blood pressure 115/65, pulse 69, temperature 97.8 °F (36.6 °C), temperature source Oral, resp. rate 18, height 162.6 cm (64\"), weight 80.7 kg (177 lb 14.6 oz), SpO2 95 %.  GENERAL: Awake and alert, in moderate distress. Appears older than stated age.  HEENT:  Normocephalic, atraumatic.  Oropharynx without thrush. Dentition in good repair. No cervical adenopathy. No neck masses.  Ears externally normal, Nose externally normal.  EYES: PERRL. No conjunctival injection. No icterus. EOM full.  LYMPHATICS: No lymphadenopathy of the neck or axillary or inguinal regions.   HEART: No murmur, gallop, or pericardial friction rub. Reg rate rhythm, No JVD at 45 degrees.  LUNGS: rales left chest. No respiratory distress, no use of accessory muscles.  ABDOMEN: Soft, nontender, nondistended. No appreciable HSM.  Bowel sounds normal.  obese  GENITAL: No external lesions, breasts without masses, back straight, no CVAT, rectal external without lesions.   SKIN: Warm and dry without cutaneous eruptions.  No nodules.    PSYCHIATRIC: Mental status lucid. some confusion.  EXT:  No cellulitic change.  NEURO: Oriented to name, CN 2 to 12 intact, DTR 1 + and symmetric, sensory intact to LT upper and lower extremitiy, motor 5/5 upper and lower extremity, cerebellar and gait not tested.      Results Review:   I reviewed the patient's new clinical results.  I " reviewed the patient's new imaging results and agree with the interpretation.  I reviewed the patient's other test results and agree with the interpretation      Results from last 7 days  Lab Units 10/08/18  0449 10/07/18  0756   WBC 10*3/mm3 9.28 9.96   HEMOGLOBIN g/dL 11.3* 11.8   HEMATOCRIT % 36.8 36.0   PLATELETS 10*3/mm3 197 251       Results from last 7 days  Lab Units 10/08/18  0449   SODIUM mmol/L 138   POTASSIUM mmol/L 3.5   CHLORIDE mmol/L 96*   CO2 mmol/L 35.0*   BUN mg/dL 15   CREATININE mg/dL 0.40*   GLUCOSE mg/dL 124*   CALCIUM mg/dL 8.1*       Results from last 7 days  Lab Units 10/06/18  2031   ALK PHOS U/L 78   BILIRUBIN mg/dL 0.4   ALT (SGPT) U/L 44*   AST (SGOT) U/L 63*                     Estimated Creatinine Clearance: 59.6 mL/min (A) (by C-G formula based on SCr of 0.4 mg/dL (L)).    Microbiology:  Microbiology Results Abnormal     Procedure Component Value - Date/Time    Wound Culture - Wound, Abdominal Wall [[id number]]  (Abnormal) Collected:  10/07/18 0109    Lab Status:  Preliminary result Specimen:  Wound from Abdominal Wall Updated:  10/08/18 0855     Wound Culture Moderate growth (3+) Gram Negative Bacilli (A)     Gram Stain Result No WBCs seen      Few (2+) Gram positive bacilli      Many (4+) Yeast    Respiratory Culture - Sputum, NT Suction [[id number]]  (Abnormal) Collected:  10/07/18 0108    Lab Status:  Preliminary result Specimen:  Sputum from NT Suction Updated:  10/08/18 0746     Respiratory Culture Scant growth (1+) Yeast isolated (A)     Gram Stain Result Many (4+) WBCs per low power field      Rare (1+) Epithelial cells per low power field      No organisms seen    Blood Culture - Blood, [[id number]]  (Normal) Collected:  10/06/18 2020    Lab Status:  Preliminary result Specimen:  Blood from Arm, Right Updated:  10/07/18 2101     Blood Culture No growth at 24 hours          Radiology:  Imaging Results (last 72 hours)     Procedure Component Value Units Date/Time    XR Chest 1  View [[id number]] Collected:  10/08/18 0814     Updated:  10/08/18 0814    Narrative:       EXAMINATION: XR CHEST 1 VW-      INDICATION: Respiratory failure, pneumonia, atelectasis; Z74.09-Other  reduced mobility; R13.10-Dysphagia, unspecified.      COMPARISON: 10/06/2018.     FINDINGS: Portable chest reveals patchy ill-defined opacification seen  within the left upper lobe. Tracheostomy tube in satisfactory position  above the jacobo. Ill-defined opacification seen at the lung bases. Mild  elevation identified of the right hemidiaphragm. Small left pleural  effusion.           Impression:       Worsening identified of the airspace disease in the left  upper lobe. Small bilateral pleural effusions. The heart is enlarged.     D:  10/08/2018  E:  10/08/2018       XR Chest 1 View [[id number]] Collected:  10/07/18 1222     Updated:  10/07/18 1449    Narrative:       EXAMINATION: XR CHEST 1 VW - 10/6/2018     INDICATION: Pneumonia.     TECHNIQUE:  Single view frontal chest.     COMPARISONS: None.     FINDINGS:  Tracheostomy in place. Calcification of the aortic knob.  There is an arc-like calcification projecting over the cardiac  silhouette; this could be valvular or in the ventricular wall. There is  extensive opacity involving the left lung. The right lung is grossly  clear. No pneumothorax. Right proximal humerus ORIF hardware noted.       Impression:       Extensive opacification of the left lung. This is  compatible with the given history of pneumonia.     DICTATED:   10/07/2018  EDITED/ls :   10/07/2018         This report was finalized on 10/7/2018 2:47 PM by Sonny Perez.             IMPRESSION:     1.  Pneumonia, left chest, with recurrent mucus plugging, with previous cultures in the past positive for MRSA and Pseudomonas. Recurrence likely related to recurrent aspiration.  2. Acute on chronic respiratory hypoxic failure.  3. Dementia.  4. Encephalopathy, metabolic.  5. Anemia, chronic disease.  6. Diabetes  mellitus type 2 with increased risk for infection.  7. Hypocalcemia, 8.1.  8.  ALT 44, AST 63, elevated. Probably related to medications versus other.    RECOMMENDATIONS:    1.  Diagnostically, continue to follow patient's physical exam, CBC, CMP, CRP, cultures obtained from outside hospital, serum immunoglobulins, HIV.  2. Therapeutically, continue vancomycin and aztreonam pending further culture data.  Duration of antibiotics likely to be until 10/20/18.  3. Oxygen support therapy.    I discussed the patients findings and my recommendations with patient, nursing staff and primary care team    Thank you for asking me to see Nallely Junior.  Our group would be pleased to follow this patient over the course of their hospitalization and assist with outpatient antimicrobial therapy, as indicated.  Further recommendations depend on the results of the cultures and clinical course.    Maxim Lundberg MD  10/8/2018

## 2018-10-08 NOTE — PROGRESS NOTES
[facility] Medicine Services  PROGRESS NOTE    Patient Name: Nallely Junior  : 1940  MRN: [id number]    Date of Admission: 10/6/2018  Length of Stay: 2  Primary Care Physician: Ray Strickland MD    Subjective   Subjective     CC:  pna     HPI:  Pt just suctioned and laying in bed smiling. Feeling some better overall, respiratory at bedside. For Ct today     Review of Systems  Gen- No fevers, chills  CV- No chest pain, palpitations  Resp- No cough,pos dyspnea, trach   GI- No N/V/D, abd pain        Otherwise ROS is negative except as mentioned in the HPI.    Objective   Objective     Vital Signs:   Temp:  [97.8 °F (36.6 °C)-98.4 °F (36.9 °C)] 97.8 °F (36.6 °C)  Heart Rate:  [69-84] 78  Resp:  [16-24] 16  BP: ()/(50-64) 123/64        Physical Exam:  Constitutional: No acute distress, awake, alert, trached  HENT: NCAT, mucous membranes moist  Respiratory: wheeze and coarse bs with trach, rhonchi b/l   Cardiovascular: RRR, no murmurs, rubs, or gallops, palpable pedal pulses bilaterally  Gastrointestinal: Positive bowel sounds, soft, nontender, nondistended  Musculoskeletal: No bilateral ankle edema  Psychiatric: Appropriate affect, cooperative  Neurologic: Oriented x 3, strength symmetric in all extremities, Cranial Nerves grossly intact to confrontation, speech clear  Skin: No rashes        Results Reviewed:  I have personally reviewed current lab, radiology, and data and agree.      Results from last 7 days  Lab Units 10/08/18  0449 10/07/18  0756   WBC 10*3/mm3 9.28 9.96   HEMOGLOBIN g/dL 11.3* 11.8   HEMATOCRIT % 36.8 36.0   PLATELETS 10*3/mm3 197 251       Results from last 7 days  Lab Units 10/08/18  0449 10/07/18  0756 10/06/18  2031   SODIUM mmol/L 138 141 140  140   POTASSIUM mmol/L 3.5 4.0 3.5  3.5   CHLORIDE mmol/L 96* 104 107  107   CO2 mmol/L 35.0* 31.0 28.0  28.0   BUN mg/dL 15 17 15  15   CREATININE mg/dL 0.40* 0.44* 0.54*  0.54*   GLUCOSE mg/dL 124* 196* 226*   226*   CALCIUM mg/dL 8.1* 8.3* 8.8  8.8   ALT (SGPT) U/L  --   --  44*   AST (SGOT) U/L  --   --  63*     Estimated Creatinine Clearance: 59.6 mL/min (A) (by C-G formula based on SCr of 0.4 mg/dL (L)).  No results found for: BNP    Microbiology Results Abnormal     Procedure Component Value - Date/Time    Respiratory Culture - Sputum, NT Suction [[id number]]  (Abnormal) Collected:  10/07/18 0108    Lab Status:  Preliminary result Specimen:  Sputum from NT Suction Updated:  10/08/18 0746     Respiratory Culture Scant growth (1+) Yeast isolated (A)     Gram Stain Result Many (4+) WBCs per low power field      Rare (1+) Epithelial cells per low power field      No organisms seen    Blood Culture - Blood, [[id number]]  (Normal) Collected:  10/06/18 2020    Lab Status:  Preliminary result Specimen:  Blood from Arm, Right Updated:  10/07/18 2101     Blood Culture No growth at 24 hours    Wound Culture - Wound, Abdominal Wall [[id number]] Collected:  10/07/18 0109    Lab Status:  Preliminary result Specimen:  Wound from Abdominal Wall Updated:  10/07/18 1418     Gram Stain Result No WBCs seen      Few (2+) Gram positive bacilli      Many (4+) Yeast          Imaging Results (last 24 hours)     Procedure Component Value Units Date/Time    XR Chest 1 View [[id number]] Collected:  10/08/18 0814     Updated:  10/08/18 0814    Narrative:       EXAMINATION: XR CHEST 1 VW-      INDICATION: Respiratory failure, pneumonia, atelectasis; Z74.09-Other  reduced mobility; R13.10-Dysphagia, unspecified.      COMPARISON: 10/06/2018.     FINDINGS: Portable chest reveals patchy ill-defined opacification seen  within the left upper lobe. Tracheostomy tube in satisfactory position  above the jacobo. Ill-defined opacification seen at the lung bases. Mild  elevation identified of the right hemidiaphragm. Small left pleural  effusion.           Impression:       Worsening identified of the airspace disease in the left  upper lobe. Small bilateral  pleural effusions. The heart is enlarged.     D:  10/08/2018  E:  10/08/2018       XR Chest 1 View [[id number]] Collected:  10/07/18 1222     Updated:  10/07/18 1449    Narrative:       EXAMINATION: XR CHEST 1 VW - 10/6/2018     INDICATION: Pneumonia.     TECHNIQUE:  Single view frontal chest.     COMPARISONS: None.     FINDINGS:  Tracheostomy in place. Calcification of the aortic knob.  There is an arc-like calcification projecting over the cardiac  silhouette; this could be valvular or in the ventricular wall. There is  extensive opacity involving the left lung. The right lung is grossly  clear. No pneumothorax. Right proximal humerus ORIF hardware noted.       Impression:       Extensive opacification of the left lung. This is  compatible with the given history of pneumonia.     DICTATED:   10/07/2018  EDITED/ls :   10/07/2018         This report was finalized on 10/7/2018 2:47 PM by Sonny Perez.                I have reviewed the medications.      [START ON 10/9/2018] Pharmacy Consult  Does not apply Once   acetylcysteine 3 mL Nebulization BID - RT   aspirin 162 mg Oral Daily   atorvastatin 40 mg Oral Nightly   aztreonam 2 g Intravenous Q8H   busPIRone 15 mg Oral Q8H   enoxaparin 40 mg Subcutaneous Q24H   furosemide 20 mg Oral BID   hydrocortisone  Rectal BID   insulin detemir 15 Units Subcutaneous Nightly   insulin lispro 0-7 Units Subcutaneous 4x Daily With Meals & Nightly   ipratropium-albuterol 3 mL Nebulization 4x Daily - RT   methylPREDNISolone sodium succinate 40 mg Intravenous Q8H   midodrine 10 mg Oral TID AC   rOPINIRole 4 mg Oral Nightly   sodium chloride 3 mL Intravenous Q12H   vancomycin 15 mg/kg Intravenous Q24H         Assessment/Plan   Assessment / Plan     Hospital Problem List     Pneumonia    Type 2 diabetes mellitus (CMS/HCC)    Hypotension    Overview Signed 10/7/2018  3:25 PM by Meme Acevedo APRN     on midodrine         Hyperlipidemia    Depression    Anxiety    COPD  (chronic obstructive pulmonary disease) (CMS/[facility])    Dysphagia    Overview Signed 10/7/2018  4:13 PM by Meme Acevedo APRN     Was on mechanical soft and nectar thick at SNF, noncompliant          Coronary artery disease    CHF (congestive heart failure) (CMS/[facility])    GERD (gastroesophageal reflux disease)    Chronic respiratory failure with hypoxia and hypercapnia (CMS/[facility])             Brief Hospital Course to date:  Nallely Junior is a 78 y.o. female  with history of chronic respiratory failure s/p tracheostomy, HLD, T2DM, bedbound state who presents as a transfer/direct admission from [facility] for nonresolving PNA, mucus plugging with opacification of left lung and tracheostomy issues. Patient was admitted to OSH on 10/2 from her NH, for several days of sob, cough, increasing amount of sputum. Patient was initially diagnosed with left sided PNA at OSH, treated with broad spectrum abx- merrem, vanc, levaquin since 10/2. Patient was also noted to be hypotensive to as low as 90s systolic on presentation, though this resolved. Sputum cx from admission was reported to grow MRSA, sensitivities pending, as well as GNR (speciations/sensitivities pending). Patient was reported to initially be improving as WBC noted on admission 20k--- went down to 10k on day of transfer (10/6), however repeat CXR from 10/3-10/4 revealed worsening opacification of left hemithorax concerning for mucus plugging, also noted reports of small left sided plueral effusion. [facility] was called for transfer, however no beds were available until this time.       Assessment & Plan:  Acute on chronic respiratory failure  PNA, MRSA/GNR   Mucus plugging   Possible aspiration  - unclear what patient's home baseline oxygen requirement is, she says no oxygen at home prior but per sister ,the patient fell about a year and a half ago and broke her leg.  She went to  and had to have surgery.  She was failure to wean from the  vent after a month, so she had a trach and PEG placed.  She now is a [employer]  -- patient was being treated with IV vanc/merrem/levaquin at OSH  -- sputum cx had resulted with MRSA/GNR  --continue  vanc/azactam (PCN allergy) here for now which should cover both MRSA/GNR, Likely through 10/20/18. ID consulted and following. Will need to follow up closely with OSH cultures/sensitivities. Repeat sputum/blood cx sent here.HIV and IGG pending,  pulm following.   - CXR pending read here but reviewed and appears almost complete opacification of left side with suspected mucus plugging  - continue scheduled nebs, steroids (patient was on TID solumedrol at OSH prior to transfer)  - speech consult regarding aspiration risk-- patient states she takes in regular diet but will keep NPO with aspiration precautions for now (also has a PEG, but reportedly she has been refusing to use this and has been eating anything she wants) per ST Recommend npo x ice chips sparingly from nursing and MBS today.   --Mucomyst nebs  --Chest Physiotherapy        T2DM  - home meds per OSH med rec list 30units levemir, continue 15 units qhs as well as SSI, seems good control      HLD  - continue statin     RLS  - continue ropinirole     Depression  - continue buspar     H/o Hypotension  - continue home midodrine, closely monitor      CM, PT/OT consult for dispo      DVT prophylaxis: Lovenox     CODE STATUS:   Code Status and Medical Interventions:   Ordered at: 10/06/18 1946     Level Of Support Discussed With:    Patient     Code Status:    CPR     Medical Interventions (Level of Support Prior to Arrest):    Full       Disposition: I expect the patient to be discharged tbd      Electronically signed by Salina Arguello DO, 10/08/18, 8:50 AM.

## 2018-10-08 NOTE — NURSING NOTE
Pt with low salo score, impaired mobility and at risk for skin breakdown.  Low air loss mattress and regular bed frame ordered from [facility] [phone].

## 2018-10-08 NOTE — PLAN OF CARE
Problem: Patient Care Overview  Goal: Plan of Care Review  Outcome: Ongoing (interventions implemented as appropriate)   10/08/18 1113   Coping/Psychosocial   Plan of Care Reviewed With patient   OTHER   Outcome Summary PT eval complerted. Pt. pleasant, w/ good effort. Pt. presents w/ generalized weakness, decreased ROM w/ L ankle contracture, decreased functional endurance, and impaired mobility. Pt. performed bed mobility w/ max A, participated in BLE supine Ther Ex, and tolerated PROM/stretching L ankle DF. Pt. would benefit from B multipodus boots to protect ankle ROM and facilitate improved L ankle DF for improved ability to perform safe transfers. Pt. will benefit from skilled IPPT to address impairments and maximize patients safety and independence w/ mobility.    Plan of Care Review   Progress improving      10/08/18 1113   Coping/Psychosocial   Plan of Care Reviewed With patient   OTHER   Outcome Summary PT ziggy ariasrted. Pt. pleasant, w/ good effort. Pt. presents w/ generalized weakness, decreased ROM w/ L ankle contracture, decreased functional endurance, and impaired mobility. Pt. performed bed mobility w/ max A, participated in BLE supine Ther Ex, and tolerated PROM/stretching L ankle. Pt. would benefit from B multipodus boots to protect ankle ROM and facilitate improved L ankle DF for improved ability to perform safe transfers. Pt. will benefit from skilled IPPT to address impairments and maximize patients safety and independence w/ mobility.    Plan of Care Review   Progress improving

## 2018-10-09 ENCOUNTER — APPOINTMENT (OUTPATIENT)
Dept: GENERAL RADIOLOGY | Facility: HOSPITAL | Age: 78
End: 2018-10-09

## 2018-10-09 LAB
ALBUMIN SERPL-MCNC: 2.93 G/DL (ref 3.2–4.8)
ALBUMIN/GLOB SERPL: 1.1 G/DL (ref 1.5–2.5)
ALP SERPL-CCNC: 79 U/L (ref 25–100)
ALT SERPL W P-5'-P-CCNC: 45 U/L (ref 7–40)
ANION GAP SERPL CALCULATED.3IONS-SCNC: 4 MMOL/L (ref 3–11)
AST SERPL-CCNC: 43 U/L (ref 0–33)
BASOPHILS # BLD AUTO: 0 10*3/MM3 (ref 0–0.2)
BASOPHILS NFR BLD AUTO: 0 % (ref 0–1)
BILIRUB SERPL-MCNC: 0.6 MG/DL (ref 0.3–1.2)
BUN BLD-MCNC: 15 MG/DL (ref 9–23)
BUN/CREAT SERPL: 30 (ref 7–25)
CALCIUM SPEC-SCNC: 8.2 MG/DL (ref 8.7–10.4)
CHLORIDE SERPL-SCNC: 95 MMOL/L (ref 99–109)
CK SERPL-CCNC: 31 U/L (ref 26–174)
CO2 SERPL-SCNC: 34 MMOL/L (ref 20–31)
CREAT BLD-MCNC: 0.5 MG/DL (ref 0.6–1.3)
D-LACTATE SERPL-SCNC: 1.7 MMOL/L (ref 0.5–2)
D-LACTATE SERPL-SCNC: 2.3 MMOL/L (ref 0.5–2)
DEPRECATED RDW RBC AUTO: 47.3 FL (ref 37–54)
EOSINOPHIL # BLD AUTO: 0 10*3/MM3 (ref 0–0.3)
EOSINOPHIL NFR BLD AUTO: 0 % (ref 0–3)
ERYTHROCYTE [DISTWIDTH] IN BLOOD BY AUTOMATED COUNT: 13.9 % (ref 11.3–14.5)
GFR SERPL CREATININE-BSD FRML MDRD: 119 ML/MIN/1.73
GLOBULIN UR ELPH-MCNC: 2.7 GM/DL
GLUCOSE BLD-MCNC: 302 MG/DL (ref 70–100)
GLUCOSE BLDC GLUCOMTR-MCNC: 262 MG/DL (ref 70–130)
GLUCOSE BLDC GLUCOMTR-MCNC: 282 MG/DL (ref 70–130)
GLUCOSE BLDC GLUCOMTR-MCNC: 327 MG/DL (ref 70–130)
GLUCOSE BLDC GLUCOMTR-MCNC: 335 MG/DL (ref 70–130)
HCT VFR BLD AUTO: 36.5 % (ref 34.5–44)
HGB BLD-MCNC: 11.5 G/DL (ref 11.5–15.5)
HOLD SPECIMEN: NORMAL
IMM GRANULOCYTES # BLD: 0.07 10*3/MM3 (ref 0–0.03)
IMM GRANULOCYTES NFR BLD: 0.8 % (ref 0–0.6)
LYMPHOCYTES # BLD AUTO: 0.83 10*3/MM3 (ref 0.6–4.8)
LYMPHOCYTES NFR BLD AUTO: 9.3 % (ref 24–44)
MAGNESIUM SERPL-MCNC: 2 MG/DL (ref 1.3–2.7)
MCH RBC QN AUTO: 29.8 PG (ref 27–31)
MCHC RBC AUTO-ENTMCNC: 31.5 G/DL (ref 32–36)
MCV RBC AUTO: 94.6 FL (ref 80–99)
MONOCYTES # BLD AUTO: 0.58 10*3/MM3 (ref 0–1)
MONOCYTES NFR BLD AUTO: 6.5 % (ref 0–12)
NEUTROPHILS # BLD AUTO: 7.42 10*3/MM3 (ref 1.5–8.3)
NEUTROPHILS NFR BLD AUTO: 83.4 % (ref 41–71)
PLATELET # BLD AUTO: 235 10*3/MM3 (ref 150–450)
PMV BLD AUTO: 10.4 FL (ref 6–12)
POTASSIUM BLD-SCNC: 3.6 MMOL/L (ref 3.5–5.5)
PROCALCITONIN SERPL-MCNC: 0.05 NG/ML
PROT SERPL-MCNC: 5.6 G/DL (ref 5.7–8.2)
RBC # BLD AUTO: 3.86 10*6/MM3 (ref 3.89–5.14)
SODIUM BLD-SCNC: 133 MMOL/L (ref 132–146)
VANCOMYCIN TROUGH SERPL-MCNC: 9.8 MCG/ML (ref 10–20)
WBC NRBC COR # BLD: 8.9 10*3/MM3 (ref 3.5–10.8)

## 2018-10-09 PROCEDURE — 82785 ASSAY OF IGE: CPT | Performed by: INTERNAL MEDICINE

## 2018-10-09 PROCEDURE — 94760 N-INVAS EAR/PLS OXIMETRY 1: CPT

## 2018-10-09 PROCEDURE — 97110 THERAPEUTIC EXERCISES: CPT

## 2018-10-09 PROCEDURE — 99233 SBSQ HOSP IP/OBS HIGH 50: CPT | Performed by: INTERNAL MEDICINE

## 2018-10-09 PROCEDURE — 25010000002 METHYLPREDNISOLONE PER 40 MG: Performed by: INTERNAL MEDICINE

## 2018-10-09 PROCEDURE — 82962 GLUCOSE BLOOD TEST: CPT

## 2018-10-09 PROCEDURE — 83735 ASSAY OF MAGNESIUM: CPT | Performed by: INTERNAL MEDICINE

## 2018-10-09 PROCEDURE — 25010000002 VANCOMYCIN 10 G RECONSTITUTED SOLUTION

## 2018-10-09 PROCEDURE — 80202 ASSAY OF VANCOMYCIN: CPT

## 2018-10-09 PROCEDURE — 83605 ASSAY OF LACTIC ACID: CPT | Performed by: INTERNAL MEDICINE

## 2018-10-09 PROCEDURE — 82550 ASSAY OF CK (CPK): CPT | Performed by: INTERNAL MEDICINE

## 2018-10-09 PROCEDURE — 63710000001 PREDNISONE PER 1 MG: Performed by: INTERNAL MEDICINE

## 2018-10-09 PROCEDURE — 94799 UNLISTED PULMONARY SVC/PX: CPT

## 2018-10-09 PROCEDURE — 71045 X-RAY EXAM CHEST 1 VIEW: CPT

## 2018-10-09 PROCEDURE — 63710000001 INSULIN DETEMIR PER 5 UNITS: Performed by: FAMILY MEDICINE

## 2018-10-09 PROCEDURE — 99232 SBSQ HOSP IP/OBS MODERATE 35: CPT | Performed by: FAMILY MEDICINE

## 2018-10-09 PROCEDURE — 80053 COMPREHEN METABOLIC PANEL: CPT | Performed by: INTERNAL MEDICINE

## 2018-10-09 PROCEDURE — 84145 PROCALCITONIN (PCT): CPT | Performed by: INTERNAL MEDICINE

## 2018-10-09 PROCEDURE — 82784 ASSAY IGA/IGD/IGG/IGM EACH: CPT | Performed by: INTERNAL MEDICINE

## 2018-10-09 PROCEDURE — 25010000002 ENOXAPARIN PER 10 MG: Performed by: INTERNAL MEDICINE

## 2018-10-09 PROCEDURE — 25010000002 VANCOMYCIN PER 500 MG

## 2018-10-09 PROCEDURE — 94640 AIRWAY INHALATION TREATMENT: CPT

## 2018-10-09 PROCEDURE — 85025 COMPLETE CBC W/AUTO DIFF WBC: CPT | Performed by: INTERNAL MEDICINE

## 2018-10-09 PROCEDURE — 94668 MNPJ CHEST WALL SBSQ: CPT

## 2018-10-09 RX ORDER — PREDNISONE 20 MG/1
40 TABLET ORAL
Status: COMPLETED | OUTPATIENT
Start: 2018-10-09 | End: 2018-10-13

## 2018-10-09 RX ORDER — VANCOMYCIN HYDROCHLORIDE 1 G/200ML
1000 INJECTION, SOLUTION INTRAVENOUS EVERY 12 HOURS
Status: DISCONTINUED | OUTPATIENT
Start: 2018-10-09 | End: 2018-10-13

## 2018-10-09 RX ORDER — ACETAMINOPHEN 325 MG/1
650 TABLET ORAL EVERY 6 HOURS PRN
Status: DISCONTINUED | OUTPATIENT
Start: 2018-10-09 | End: 2018-10-18 | Stop reason: HOSPADM

## 2018-10-09 RX ADMIN — BUSPIRONE HYDROCHLORIDE 15 MG: 15 TABLET ORAL at 14:25

## 2018-10-09 RX ADMIN — ACETYLCYSTEINE 3 ML: 200 SOLUTION ORAL; RESPIRATORY (INHALATION) at 20:12

## 2018-10-09 RX ADMIN — IPRATROPIUM BROMIDE AND ALBUTEROL SULFATE 3 ML: 2.5; .5 SOLUTION RESPIRATORY (INHALATION) at 17:19

## 2018-10-09 RX ADMIN — MIDODRINE HYDROCHLORIDE 10 MG: 5 TABLET ORAL at 17:58

## 2018-10-09 RX ADMIN — PREDNISONE 40 MG: 20 TABLET ORAL at 09:36

## 2018-10-09 RX ADMIN — INSULIN LISPRO 4 UNITS: 100 INJECTION, SOLUTION INTRAVENOUS; SUBCUTANEOUS at 17:58

## 2018-10-09 RX ADMIN — METHYLPREDNISOLONE SODIUM SUCCINATE 40 MG: 40 INJECTION, POWDER, FOR SOLUTION INTRAMUSCULAR; INTRAVENOUS at 02:58

## 2018-10-09 RX ADMIN — MIDODRINE HYDROCHLORIDE 10 MG: 5 TABLET ORAL at 09:37

## 2018-10-09 RX ADMIN — FUROSEMIDE 20 MG: 20 TABLET ORAL at 17:58

## 2018-10-09 RX ADMIN — AZTREONAM 2 G: 2 INJECTION, POWDER, LYOPHILIZED, FOR SOLUTION INTRAMUSCULAR; INTRAVENOUS at 16:44

## 2018-10-09 RX ADMIN — AZTREONAM 2 G: 2 INJECTION, POWDER, LYOPHILIZED, FOR SOLUTION INTRAMUSCULAR; INTRAVENOUS at 09:35

## 2018-10-09 RX ADMIN — FUROSEMIDE 20 MG: 20 TABLET ORAL at 09:37

## 2018-10-09 RX ADMIN — MIDODRINE HYDROCHLORIDE 10 MG: 5 TABLET ORAL at 12:20

## 2018-10-09 RX ADMIN — ENOXAPARIN SODIUM 40 MG: 40 INJECTION SUBCUTANEOUS at 22:03

## 2018-10-09 RX ADMIN — INSULIN DETEMIR 20 UNITS: 100 INJECTION, SOLUTION SUBCUTANEOUS at 21:53

## 2018-10-09 RX ADMIN — BUSPIRONE HYDROCHLORIDE 15 MG: 15 TABLET ORAL at 06:45

## 2018-10-09 RX ADMIN — INSULIN LISPRO 5 UNITS: 100 INJECTION, SOLUTION INTRAVENOUS; SUBCUTANEOUS at 21:53

## 2018-10-09 RX ADMIN — VANCOMYCIN HYDROCHLORIDE 1250 MG: 10 INJECTION, POWDER, LYOPHILIZED, FOR SOLUTION INTRAVENOUS at 06:46

## 2018-10-09 RX ADMIN — ATORVASTATIN CALCIUM 40 MG: 40 TABLET, FILM COATED ORAL at 21:53

## 2018-10-09 RX ADMIN — INSULIN LISPRO 4 UNITS: 100 INJECTION, SOLUTION INTRAVENOUS; SUBCUTANEOUS at 12:21

## 2018-10-09 RX ADMIN — IPRATROPIUM BROMIDE AND ALBUTEROL SULFATE 3 ML: 2.5; .5 SOLUTION RESPIRATORY (INHALATION) at 08:28

## 2018-10-09 RX ADMIN — IPRATROPIUM BROMIDE AND ALBUTEROL SULFATE 3 ML: 2.5; .5 SOLUTION RESPIRATORY (INHALATION) at 20:12

## 2018-10-09 RX ADMIN — ACETYLCYSTEINE 3 ML: 200 SOLUTION ORAL; RESPIRATORY (INHALATION) at 08:28

## 2018-10-09 RX ADMIN — ACETAMINOPHEN 650 MG: 325 TABLET ORAL at 00:55

## 2018-10-09 RX ADMIN — IPRATROPIUM BROMIDE AND ALBUTEROL SULFATE 3 ML: 2.5; .5 SOLUTION RESPIRATORY (INHALATION) at 12:37

## 2018-10-09 RX ADMIN — VANCOMYCIN HYDROCHLORIDE 1000 MG: 1 INJECTION, SOLUTION INTRAVENOUS at 17:58

## 2018-10-09 RX ADMIN — ASPIRIN 325 MG ORAL TABLET 162 MG: 325 PILL ORAL at 09:36

## 2018-10-09 RX ADMIN — ROPINIROLE HYDROCHLORIDE 4 MG: 2 TABLET, FILM COATED ORAL at 21:53

## 2018-10-09 RX ADMIN — BUSPIRONE HYDROCHLORIDE 15 MG: 15 TABLET ORAL at 21:53

## 2018-10-09 RX ADMIN — INSULIN LISPRO 5 UNITS: 100 INJECTION, SOLUTION INTRAVENOUS; SUBCUTANEOUS at 09:38

## 2018-10-09 RX ADMIN — Medication 3 ML: at 22:03

## 2018-10-09 NOTE — THERAPY TREATMENT NOTE
Acute Care - Physical Therapy Treatment Note  [facility]     Patient Name: Nallely Junior  : 1940  MRN: [id number]  Today's Date: 10/9/2018  Onset of Illness/Injury or Date of Surgery: 10/06/18  Date of Referral to PT: 10/06/18  Referring Physician: TIN Murphy MD    Admit Date: 10/6/2018    Visit Dx:    ICD-10-CM ICD-9-CM   1. Impaired mobility and ADLs Z74.09 799.89   2. Oropharyngeal dysphagia R13.12 787.22   3. Impaired functional mobility, balance, gait, and endurance Z74.09 V49.89     Patient Active Problem List   Diagnosis   • Pneumonia   • Type 2 diabetes mellitus (CMS/[facility])   • Hypotension   • Hyperlipidemia   • Depression   • Anxiety   • COPD (chronic obstructive pulmonary disease) (CMS/[facility])   • Dysphagia   • Coronary artery disease   • CHF (congestive heart failure) (CMS/[facility])   • GERD (gastroesophageal reflux disease)   • Chronic respiratory failure with hypoxia and hypercapnia (CMS/[facility])       Therapy Treatment          Rehabilitation Treatment Summary     Row Name 10/09/18 1435 10/09/18 1147          Treatment Time/Intention    Discipline physical therapist  -MB occupational therapist  -CL     Document Type therapy note (daily note)  -MB therapy note (daily note)  -CL     Subjective Information no complaints  -MB complains of;fatigue;weakness  -CL     Mode of Treatment physical therapy;individual therapy  -MB  --     Patient/Family Observations Pt's sister present at bedside.  -MB  --     Care Plan Review care plan/treatment goals reviewed;risks/benefits reviewed;current/potential barriers reviewed;patient/other agree to care plan  -MB  --     Care Plan Review, Other Participant(s) sibling  -MB  --     Therapy Frequency (PT Clinical Impression) daily  -MB  --     Patient Effort good  -MB fair  -CL     Existing Precautions/Restrictions fall;oxygen therapy device and L/min   trach  -MB fall;oxygen therapy device and L/min  -CL     Treatment Considerations/Comments  -- Pt declined any EOB or OOB  activity this date.   -CL     Recorded by [MB] Britt Gilbert, PT 10/09/18 1553 [CL] Marie Pabon, OT 10/09/18 1548     Row Name 10/09/18 1435 10/09/18 1147          Vital Signs    Pre Systolic BP Rehab --   VSS.  RN cleared for PT.  -MB --   VSS, RN cleared for tx.   -CL     Recorded by [MB] Britt Gilbert, PT 10/09/18 1553 [CL] Marie Pabon, OT 10/09/18 1548     Row Name 10/09/18 1435 10/09/18 1147          Cognitive Assessment/Intervention- PT/OT    Affect/Mental Status (Cognitive)  -- confused  -CL     Orientation Status (Cognition) oriented to;person;place;situation;verbal cues/prompts needed for orientation;time  -MB oriented to;person;place  -CL     Follows Commands (Cognition) follows one step commands;over 90% accuracy;verbal cues/prompting required  -MB follows one step commands;75-90% accuracy;repetition of directions required;verbal cues/prompting required  -CL     Cognitive Function (Cognitive)  -- safety deficit  -CL     Safety Deficit (Cognitive) mild deficit;awareness of need for assistance;insight into deficits/self awareness;safety precautions awareness  -MB mild deficit;awareness of need for assistance;safety precautions awareness  -CL     Personal Safety Interventions  -- fall prevention program maintained;gait belt;nonskid shoes/slippers when out of bed  -CL     Recorded by [MB] Britt Gilbert, PT 10/09/18 1553 [CL] Marie Pabon, OT 10/09/18 1548     Row Name 10/09/18 1435             Safety Issues, Functional Mobility    Impairments Affecting Function (Mobility) balance;coordination;endurance/activity tolerance;range of motion (ROM);shortness of breath;strength  -MB      Recorded by [MB] Britt Gilbert, PT 10/09/18 1553      Row Name 10/09/18 1435 10/09/18 1147          Bed Mobility Assessment/Treatment    Bed Mobility Assessment/Treatment scooting/bridging  -MB  --     Scooting/Bridging [place] (Bed Mobility) dependent (less than 25% patient effort);2 person assist   -MB  --     Bed Mobility, Safety Issues decreased use of arms for pushing/pulling;decreased use of legs for bridging/pushing  -MB  --     Assistive Device (Bed Mobility) draw sheet  -MB  --     Comment (Bed Mobility) Pt. required dep A x 2 to scoot to HOB.    -MB Pt declined.   -CL     Recorded by [MB] Britt Gilbert, PT 10/09/18 1553 [CL] Marie Pabon, OT 10/09/18 1548     Row Name 10/09/18 1435             Transfer Assessment/Treatment    Comment (Transfers) Deferred  -MB      Recorded by [MB] Britt Gilbert, PT 10/09/18 1553      Row Name 10/09/18 1435             Gait/Stairs Assessment/Training    Comment (Gait/Stairs) Pt non-ambulatory at baseline.  -MB      Recorded by [MB] Britt Gilbert, PT 10/09/18 1553      Row Name 10/09/18 1147             Motor Skills Assessment/Interventions    Additional Documentation Therapeutic Exercise (Group)  -CL      Recorded by [CL] Marie Pabon, OT 10/09/18 1548      Row Name 10/09/18 1435 10/09/18 1147          Therapeutic Exercise    Therapeutic Exercise supine, upper extremities;supine, lower extremities  -MB seated, upper extremities   HOB elevated Simultaneous filing. User may be unaware of other data.  -CL     23774 - OT Therapeutic Exercise Minutes  -- 10  -CL     Recorded by [MB] Britt Gilbert, PT 10/09/18 1553 [CL] Marie Pabon, OT 10/09/18 1548     Row Name 10/09/18 1147             Upper Extremity Seated Therapeutic Exercise    Performed, Seated Upper Extremity (Therapeutic Exercise) shoulder flexion/extension;elbow flexion/extension;digit flexion/extension;scapular protraction/retraction;shoulder horizontal abduction/adduction;shoulder abduction/adduction  -CL      Exercise Type, Seated Upper Extremity (Therapeutic Exercise) AROM (active range of motion)  -CL      Sets/Reps Detail, Seated Upper Extremity (Therapeutic Exercise) 1/10  -CL      Comment, Seated Upper Extremity (Therapeutic Exercise) BUE  -CL      Recorded by [CL] Marie Pabon, OT  10/09/18 1548      Row Name 10/09/18 1435             Upper Extremity Supine Therapeutic Exercise    Performed, Supine Upper Extremity (Therapeutic Exercise) shoulder abduction/adduction;elbow flexion/extension  -MB      Exercise Type, Supine Upper Extremity (Therapeutic Exercise) AROM (active range of motion);AAROM (active assistive range of motion)   R shoulder AAROM  -MB      Sets/Reps Detail, Supine Upper Extremity (Therapeutic Exercise) 1/10 BUEs  -MB      Recorded by [MB] Britt Gilbert, PT 10/09/18 1553      Row Name 10/09/18 1435             Lower Extremity Supine Therapeutic Exercise    Performed, Supine Lower Extremity (Therapeutic Exercise) hip flexion/extension;hip abduction/adduction;ankle dorsiflexion/plantarflexion;SAQ (short arc quad) over bolster;SLR (straight leg raise);quadriceps sets;gluteal sets;ankle pumps;dorsiflexor stretch  -MB      Exercise Type, Supine Lower Extremity (Therapeutic Exercise) AROM (active range of motion);AAROM (active assistive range of motion)  -MB      Sets/Reps Detail, Supine Lower Extremity (Therapeutic Exercise) 1/10 BLEs  -MB      Recorded by [MB] Britt Gilbert, PT 10/09/18 1553      Row Name 10/09/18 1435 10/09/18 1147          Positioning and Restraints    Pre-Treatment Position in bed  -MB in bed  -CL     Post Treatment Position bed  -MB bed  -CL     In Bed notified nsg;supine;call light within reach;encouraged to call for assist;exit alarm on;with family/caregiver;RUE elevated;heels elevated  -MB notified nsg;fowlers;call light within reach;encouraged to call for assist;exit alarm on;with family/caregiver;RUE elevated;legs elevated;heels elevated  -CL     Recorded by [MB] Britt Gilbert, PT 10/09/18 1553 [CL] Marie Pabon, OT 10/09/18 1548     Row Name 10/09/18 1435 10/09/18 1147          Pain Scale: Numbers Pre/Post-Treatment    Pain Scale: Numbers, Pretreatment 0/10 - no pain  -MB 0/10 - no pain  -CL     Pain Scale: Numbers, Post-Treatment 0/10 -  no pain  -MB 0/10 - no pain  -CL     Recorded by [MB] Britt Gilbert, PT 10/09/18 1553 [CL] Marie Pabon, OT 10/09/18 1548     Row Name 10/09/18 1435             Plan of Care Review    Plan of Care Reviewed With patient  -MB      Recorded by [MB] Britt Gilbert, PT 10/09/18 1553      Row Name 10/09/18 1435             Outcome Summary/Treatment Plan (PT)    Daily Summary of Progress (PT) progress toward functional goals as expected  -MB      Anticipated Discharge Disposition (PT) extended care facility  -MB      Recorded by [MB] Britt Gilbert, PT 10/09/18 1553        User Key  (r) = Recorded By, (t) = Taken By, (c) = Cosigned By    Initials Name Effective Dates Discipline    Britt Clay, PT 03/14/16 -  PT    CL Marie Pabon, OT 04/03/18 -  OT                             PT Recommendation and Plan  Anticipated Discharge Disposition (PT): extended care facility  Planned Therapy Interventions (PT Eval): balance training, bed mobility training, home exercise program, patient/family education, strengthening, transfer training, manual therapy techniques, ROM (range of motion)  Therapy Frequency (PT Clinical Impression): daily  Outcome Summary/Treatment Plan (PT)  Daily Summary of Progress (PT): progress toward functional goals as expected  Anticipated Equipment Needs at Discharge (PT):  (TBD)  Anticipated Discharge Disposition (PT): extended care facility  Plan of Care Reviewed With: patient  Progress: improving  Outcome Summary: Pt. pleasant w/ good effort w/ supine TherEx.  Pt. required dep A x 2 for bed mobility; declined sitting EOB.  Recommend cont IPPT per POC.          Outcome Measures     Row Name 10/09/18 1435 10/09/18 1147 10/08/18 0943       How much help from another person do you currently need...    Turning from your back to your side while in flat bed without using bedrails? 2  -MB  -- 2  -MB    Moving from lying on back to sitting on the side of a flat bed without bedrails? 2  -MB   -- 2  -MB    Moving to and from a bed to a chair (including a wheelchair)? 1  -MB  -- 1  -MB    Standing up from a chair using your arms (e.g., wheelchair, bedside chair)? 1  -MB  -- 1  -MB    Climbing 3-5 steps with a railing? 1  -MB  -- 1  -MB    To walk in hospital room? 1  -MB  -- 1  -MB    AM-PAC 6 Clicks Score 8  -MB  -- 8  -MB       How much help from another is currently needed...    Putting on and taking off regular lower body clothing?  -- 1  -CL  --    Bathing (including washing, rinsing, and drying)  -- 1  -CL  --    Toileting (which includes using toilet bed pan or urinal)  -- 1  -CL  --    Putting on and taking off regular upper body clothing  -- 2  -CL  --    Taking care of personal grooming (such as brushing teeth)  -- 3  -CL  --    Eating meals  -- 3  -CL  --    Score  -- 11  -CL  --       Functional Assessment    Outcome Measure Options AM-PAC 6 Clicks Basic Mobility (PT)  -MB AM-PAC 6 Clicks Daily Activity (OT)  -CL AM-PAC 6 Clicks Basic Mobility (PT)  -MB    Row Name 10/07/18 0855             How much help from another is currently needed...    Putting on and taking off regular lower body clothing? 1  -TA      Bathing (including washing, rinsing, and drying) 1  -TA      Toileting (which includes using toilet bed pan or urinal) 1  -TA      Putting on and taking off regular upper body clothing 2  -TA      Taking care of personal grooming (such as brushing teeth) 3  -TA      Eating meals 3  -TA      Score 11  -TA         Functional Assessment    Outcome Measure Options AM-PAC 6 Clicks Daily Activity (OT)  -TA        User Key  (r) = Recorded By, (t) = Taken By, (c) = Cosigned By    Initials Name Provider Type    TA Christiano Marie, OT Occupational Therapist    MB Britt Gilbert, PT Physical Therapist    CL Marie Pabon, OT Occupational Therapist           Time Calculation:         PT Charges     Row Name 10/09/18 6434             Time Calculation    Start Time 1435  -MB      PT Received On  10/09/18  -MB      PT Goal Re-Cert Due Date 10/18/18  -MB         Time Calculation- PT    Total Timed Code Minutes- PT 20 minute(s)  -MB         Timed Charges    21050 - PT Therapeutic Exercise Minutes 20  -MB        User Key  (r) = Recorded By, (t) = Taken By, (c) = Cosigned By    Initials Name Provider Type    Britt Clay, PT Physical Therapist        Therapy Suggested Charges     Code   Minutes Charges    92377 (CPT®) Hc Pt Neuromusc Re Education Ea 15 Min      46018 (CPT®) Hc Pt Ther Proc Ea 15 Min 20 1    65858 (CPT®) Hc Gait Training Ea 15 Min      08106 (CPT®) Hc Pt Therapeutic Act Ea 15 Min      50969 (CPT®) Hc Pt Manual Therapy Ea 15 Min      35707 (CPT®) Hc Pt Iontophoresis Ea 15 Min      30997 (CPT®) Hc Pt Elec Stim Ea-Per 15 Min      78441 (CPT®) Hc Pt Ultrasound Ea 15 Min      60147 (CPT®) Hc Pt Self Care/Mgmt/Train Ea 15 Min      27844 (CPT®) Hc Pt Prosthetic (S) Train Initial Encounter, Each 15 Min      05605 (CPT®) Hc Pt Orthotic(S)/Prosthetic(S) Encounter, Each 15 Min      48567 (CPT®) Hc Orthotic(S) Mgmt/Train Initial Encounter, Each 15min      Total  20 1        Therapy Charges for Today     Code Description Service Date Service Provider Modifiers Qty    53470632866 HC PT MOBILITY CURRENT 10/8/2018 Britt Gilbert, PT GP, CM 1    97061162545 HC PT MOBILITY PROJECTED 10/8/2018 Britt Gilbert, PT GP, CL 1    08733649739 HC PT EVAL MOD COMPLEXITY 4 10/8/2018 Britt Gilbert, PT GP 1    94449147036 HC PT THER PROC EA 15 MIN 10/9/2018 Britt Gilbert, PT GP 1          PT G-Codes  Outcome Measure Options: AM-PAC 6 Clicks Basic Mobility (PT)  AM-PAC 6 Clicks Score: 8  Score: 11  Functional Limitation: Mobility: Walking and moving around  Mobility: Walking and Moving Around Current Status (): At least 80 percent but less than 100 percent impaired, limited or restricted  Mobility: Walking and Moving Around Goal Status (): At least 60 percent but less than 80 percent  impaired, limited or restricted    Britt Gilbert, PT  10/9/2018

## 2018-10-09 NOTE — PROGRESS NOTES
[facility] Progress Note    Admission Date: 10/6/2018    Nallely Junior  1940  [id number]    Date: 10/9/2018    Antibiotics:  Anti-Infectives     Ordered     Dose/Rate Route Frequency Start Stop    10/09/18 0723  vancomycin (VANCOCIN) in iso-osmotic dextrose IVPB 1 g (premix) 200 mL     Ordering Provider:  Jan Martínez RPH    1,000 mg  over 60 Minutes Intravenous Every 12 Hours 10/09/18 1800 10/20/18 1759    10/09/18 0723  aztreonam (AZACTAM) 2 g in sodium chloride 0.9 % 100 mL IVPB-MBP     Ordering Provider:  Jan Martínez RPH    2 g  over 4 Hours Intravenous Every 8 Hours 10/09/18 0800 10/20/18 0814    10/09/18 0723  Pharmacy to dose vancomycin     Ordering Provider:  Jan Martínez RPH     Does not apply Continuous PRN 10/09/18 0721 10/20/18 0720    10/06/18 2328  aztreonam (AZACTAM) 2 g in sodium chloride 0.9 % 100 mL IVPB-MBP     Ordering Provider:  Olivia Murphy MD    2 g  200 mL/hr over 30 Minutes Intravenous Once 10/07/18 0015 10/07/18 0146    10/06/18 2023  vancomycin 2000 mg/500 mL 0.9% NS IVPB ([facility])     Ordering Provider:  Olivia Murphy MD    2,000 mg  over 120 Minutes Intravenous Once 10/06/18 2115 10/07/18 0138             CC:  pneumonia    HPI:  Patient is a 78 y.o.  Yr old female with dementia, diabetes mellitus type 2, chronic respiratory failure status post tracheostomy,hypertension, hyperlipidemia, bedbound, previous critical aortic stenosis, dysphagia, diastolic dysfunction, COPD, restless leg, GERD, anxiety, CAD, ho was recently admitted to [facility] for pneumonia and mucous plugging with opacification of the left lung. Patient is a poor historian.The patienthad been treated with meropenem, vancomycin, and levofloxacin since 10/2/18. She was transferred to [facility] on 10/6/18 with non-resolving pneumonia. Left hemithorax continued to be opacified.  I was consulted on 10/8/18 for further evaluation and treatment.  No known  immunocompromised state, travel history, zoonotic exposures,TB, or HIV.  10/9/18 hx rev.  Some sob.  Zulay vanc and aztreonam till 10/20 for pneum.  GNR in sputum.  No fever.    ROS:  No f/c/s. No n/v/d. No rash. No new ADR to Abx.     Constitutional-- No Fever, chills or sweats.  Appetite good, and no malaise. No fatigue.  Heent-- No new vision, hearing or throat complaints.  No epistaxis or oral sores.  Denies odynophagia or dysphagia.  No flashers, floaters or eye pain. No odynophagia or dysphagia. No headache, photophobia or neck stiffness.  CV-- No chest pain, palpitation or syncope  Resp-- Some SOB/cough/Hemoptysis  GI- No nausea, vomiting, or diarrhea.  No hematochezia, melena, or hematemesis. Denies jaundice or chronic liver disease.  -- No dysuria, hematuria, or flank pain.  Denies hesitancy, urgency or flank pain.  Lymph- no swollen lymph nodes in neck/axilla or groin.   Heme- No active bruising or bleeding; no Hx of DVT or PE.  MS-- no swelling or pain in the bones or joints of arms/legs.  No new back pain.  Neuro-- No acute focal weakness or numbness in the arms or legs.  No seizures.  Skin--No rash, nodules, blisters.    Objective   PE:  Vital Signs  Temp  Min: 97.4 °F (36.3 °C)  Max: 98.1 °F (36.7 °C)  BP  Min: 93/47  Max: 107/59  Pulse  Min: 65  Max: 77  Resp  Min: 18  Max: 22  SpO2  Min: 92 %  Max: 96 %    GENERAL: Awake and alert, in mod distress.   HEENT: Normocephalic, atraumatic.  EOMI. No conjunctival injection. No icterus. Oropharynx clear without evidence of thrush or exudate. No evidence of peridontal disease.    NECK: Supple without nuchal rigidity. No mass.  LYMPH: No cervical, axillary or inguinal lymphadenopathy.  HEART: RRR; No murmur, rubs, gallops.   LUNGS: rales left lung. Normal respiratory effort. Nonlabored. No dullness.  ABDOMEN: Soft, nontender, nondistended. Positive bowel sounds. No rebound or guarding. NO mass or HSM.  Obese.  EXT:  No cyanosis, clubbing or edema. No  cord.  MSK: FROM without joint effusions noted arms/legs.    SKIN: Warm and dry without cutaneous eruptions on Inspection/palpation.    NEURO: Oriented to name. No focal deficits on motor/sensory exam at arms/legs.    Laboratory Data      Results from last 7 days  Lab Units 10/09/18  0525 10/08/18  0449 10/07/18  0756   WBC 10*3/mm3 8.90 9.28 9.96   HEMOGLOBIN g/dL 11.5 11.3* 11.8   HEMATOCRIT % 36.5 36.8 36.0   PLATELETS 10*3/mm3 235 197 251       Results from last 7 days  Lab Units 10/09/18  0525   SODIUM mmol/L 133   POTASSIUM mmol/L 3.6   CHLORIDE mmol/L 95*   CO2 mmol/L 34.0*   BUN mg/dL 15   CREATININE mg/dL 0.50*   GLUCOSE mg/dL 302*   CALCIUM mg/dL 8.2*       Results from last 7 days  Lab Units 10/09/18  0525   ALK PHOS U/L 79   BILIRUBIN mg/dL 0.6   ALT (SGPT) U/L 45*   AST (SGOT) U/L 43*               Results from last 7 days  Lab Units 10/09/18  0525   CK TOTAL U/L 31       Results from last 7 days  Lab Units 10/09/18  0525   VANCOMYCIN TR mcg/mL 9.80*       Results from last 7 days  Lab Units 10/09/18  0525   LACTATE mmol/L 2.3*     Estimated Creatinine Clearance: 59.6 mL/min (A) (by C-G formula based on SCr of 0.5 mg/dL (L)).      Microbiology:  Microbiology Results Abnormal     Procedure Component Value - Date/Time    Blood Culture - Blood, [[id number]]  (Normal) Collected:  10/06/18 2020    Lab Status:  Preliminary result Specimen:  Blood from Arm, Right Updated:  10/08/18 2101     Blood Culture No growth at 2 days    Wound Culture - Wound, Abdominal Wall [[id number]]  (Abnormal) Collected:  10/07/18 0109    Lab Status:  Preliminary result Specimen:  Wound from Abdominal Wall Updated:  10/08/18 0855     Wound Culture Moderate growth (3+) Gram Negative Bacilli (A)     Gram Stain Result No WBCs seen      Few (2+) Gram positive bacilli      Many (4+) Yeast    Respiratory Culture - Sputum, NT Suction [[id number]]  (Abnormal) Collected:  10/07/18 0108    Lab Status:  Preliminary result Specimen:  Sputum from  NT Suction Updated:  10/08/18 0746     Respiratory Culture Scant growth (1+) Yeast isolated (A)     Gram Stain Result Many (4+) WBCs per low power field      Rare (1+) Epithelial cells per low power field      No organisms seen          Radiology:  Imaging Results (last 72 hours)     Procedure Component Value Units Date/Time    XR Chest 1 View [[id number]] Collected:  10/09/18 0809     Updated:  10/09/18 0954    Narrative:       EXAMINATION: XR CHEST 1 VW-10/09/2018:      INDICATION: Pneumonia; Z74.09-Other reduced mobility; R13.12-Dysphagia,  oropharyngeal phase; Z74.09-Other reduced mobility.      COMPARISON: Chest x-ray 10/08/2018.      FINDINGS: Significant interval improvement in left lung aeration with  decreased opacifications left lung apex and within the left lung base  from prior. Cardiac silhouette enlarged with only minimal left mid lung  opacification likely atelectasis. Right hemithorax grossly clear. No  pneumothorax or large effusion.       Impression:       Significant interval reduction in opacifications of the left  hemithorax with a considerable increase in aeration of the left lung and  only mid lung opacities likely represent mild atelectasis remaining.     D:  10/09/2018  E:  10/09/2018             CT Chest Without Contrast [[id number]] Collected:  10/08/18 0852     Updated:  10/08/18 2123    Narrative:       EXAM:    CT Chest Without Intravenous Contrast     EXAM DATE/TIME:    10/8/2018 8:52 AM     CLINICAL HISTORY:    78 years old, female; Dysphagia, oropharyngeal phase; Other reduced mobility;   Other reduced mobility; Abnormal findings; Abnormal radiologic exam of lung or   chest; Additional info: Pneumonia complicated / unresolved     TECHNIQUE:    Axial computed tomography images of the chest without intravenous contrast.    All CT scans at this facility use at least one of these dose optimization   techniques: automated exposure control; mA and/or kV adjustment per patient   size  (includes targeted exams where dose is matched to clinical indication); or   iterative reconstruction.    Coronal and sagittal reformatted images were created and reviewed.     COMPARISON:    CR XR CHEST 1 VW 10/8/2018 1:31 AM     FINDINGS:    Tubes, catheters and devices:  Tracheostomy tube position appears   satisfactory.    Lungs:  Bilateral infiltrates/atelectasis, predominantly on left, involving   LLL, BETTIE, and RLL. Clinically correlate to rule out pneumonia. Retained   secretions scattered in left bronchial tree.    Pleural space:  Small to moderate left pleural effusion, small right pleural   effusion. No pneumothorax.    Heart:  Mild cardiomegaly. Mitral annulus calcifications.  CAD.   Aorta:  No aortic aneurysm. Aortic atherosclerosis.    Great vessels off aortic arch and other great vessels:  Atherosclerotic   plaques are scattered along some vessels.    Other arteries:  There are coronary artery atheromatous calcifications,   consistent with CAD.    Lymph nodes:  No obvious lymphadenopathy, but resolution of left hilum is   obscured by adjacent air space disease.    Bones/joints:  Plate and screws ORIF hardware at proximal right humerus. DJD   of right glenohumeral joint. Degenerative changes of the spine. Mild scoliotic   curvature, positional versus fixed.    Soft tissues: Unremarkable.    Gallbladder and bile ducts:  Cholecystectomy.    Kidneys and ureters: Nonobstructive right nephrolithiasis.    Stomach and bowel:  Some contrast in the bowel.       Impression:       1. Small to moderate left pleural effusion, small right pleural effusion.   2. Bilateral infiltrates/atelectasis, predominantly on left, involving LLL,   BETTIE, and RLL.   3. CAD.   4. Mild cardiomegaly.   5.  Nonobstructive right nephrolithiasis.     THIS DOCUMENT HAS BEEN ELECTRONICALLY SIGNED BY KEENAN KIM MD    FL Video Swallow With Speech [[id number]] Collected:  10/08/18 1514     Updated:  10/08/18 1532    Narrative:        EXAMINATION: FL VIDEO SWALLOW W SPEECH-     INDICATION: dysphagia; Z74.09-Other reduced mobility; R13.10-Dysphagia,  unspecified; Z74.09-Other reduced mobility     TECHNIQUE: 1 minute and 54 seconds of fluoroscopic time was used for  this exam. 1 associated image was saved. The patient was evaluated in  the seated lateral position while taking a variety of consistencies of  barium by mouth under the direction of speech pathology.     COMPARISON: NONE     FINDINGS: There was aspiration with sips of thin barium. There was no  penetration and no aspiration with nectar, pudding, or solid consistency  barium.          Impression:       Fluoroscopy provided for a modified barium swallow. Please  see speech therapy report for full details and recommendations.         This report was finalized on 10/8/2018 3:30 PM by Dr. Christiano Mcmillan MD.       XR Chest 1 View [[id number]] Collected:  10/08/18 0814     Updated:  10/08/18 1311    Narrative:       EXAMINATION: XR CHEST 1 VW-      INDICATION: Respiratory failure, pneumonia, atelectasis; Z74.09-Other  reduced mobility; R13.10-Dysphagia, unspecified.      COMPARISON: 10/06/2018.     FINDINGS: Portable chest reveals patchy ill-defined opacification seen  within the left upper lobe. Tracheostomy tube in satisfactory position  above the jacobo. Ill-defined opacification seen at the lung bases. Mild  elevation identified of the right hemidiaphragm. Small left pleural  effusion.           Impression:       Worsening identified of the airspace disease in the left  upper lobe. Small bilateral pleural effusions. The heart is enlarged.     D:  10/08/2018  E:  10/08/2018     This report was finalized on 10/8/2018 1:09 PM by Dr. Meagan Randhawa MD.       XR Chest 1 View [[id number]] Collected:  10/07/18 1222     Updated:  10/07/18 1449    Narrative:       EXAMINATION: XR CHEST 1 VW - 10/6/2018     INDICATION: Pneumonia.     TECHNIQUE:  Single view frontal chest.     COMPARISONS: None.      FINDINGS:  Tracheostomy in place. Calcification of the aortic knob.  There is an arc-like calcification projecting over the cardiac  silhouette; this could be valvular or in the ventricular wall. There is  extensive opacity involving the left lung. The right lung is grossly  clear. No pneumothorax. Right proximal humerus ORIF hardware noted.       Impression:       Extensive opacification of the left lung. This is  compatible with the given history of pneumonia.     DICTATED:   10/07/2018  EDITED/ls :   10/07/2018         This report was finalized on 10/7/2018 2:47 PM by Sonny Perez.             I personally reviewed the radiographic studies     Assessment/Plan   IMPRESSION:   1.  Pneumonia, left chest, with recurrent mucus plugging, with previous cultures in the past positive for MRSA and Pseudomonas. Recurrence likely related to recurrent aspiration.  2. Acute on chronic respiratory hypoxic failure.  3. Dementia.  4. Encephalopathy, metabolic.  5. Anemia, chronic disease.  6. Diabetes mellitus type 2 with increased risk for infection.  7. Hypocalcemia, 8.5.  8.  ALT 45 (worse), AST 42, elevated. Probably related to medications versus other.     Plan:     1.  Diagnostically, continue to follow patient's physical exam, CBC, CMP, CRP, cultures obtained from outside hospital, serum immunoglobulins, HIV.  2. Therapeutically, continue vancomycin and aztreonam pending further culture data.  Duration of antibiotics likely to be until 10/20/18.  3. Oxygen support therapy.    Increased risk for adverse drug reactions, complications from line, recurrent pneumonia.    Maxim Lundberg MD  10/9/2018

## 2018-10-09 NOTE — PLAN OF CARE
Problem: Patient Care Overview  Goal: Plan of Care Review   10/09/18 1048   Coping/Psychosocial   Plan of Care Reviewed With patient   Plan of Care Review   Progress no change

## 2018-10-09 NOTE — PLAN OF CARE
Problem: Patient Care Overview  Goal: Plan of Care Review  Outcome: Ongoing (interventions implemented as appropriate)   10/09/18 0552   Coping/Psychosocial   Plan of Care Reviewed With patient   OTHER   Outcome Summary Pt. was pleasant and cooperative during the evening; CT of abdomin completed; VSS; NSR; high flow NC at 35%; Pt. awake for most of the night; no other problems identified.   Plan of Care Review   Progress improving

## 2018-10-09 NOTE — PROGRESS NOTES
"Pharmacy Consult-Vancomycin Dosing  Nallely Junior is a  78 y.o. female receiving vancomycin therapy.     Indication: Pneumonia  Consulting Provider: Dr. Murphy  ID Consult: yes    Goal Trough: 15-20mcg/mL    Current Antimicrobial Therapy    Vancomycin 1250mg q24h - day 4 vancomycin  Aztreonam 2gm q8h    Allergies  Allergies as of 10/06/2018 - Reviewed 10/06/2018   Allergen Reaction Noted   • Penicillins Unknown (See Comments) 10/06/2018   • Sulfa antibiotics Unknown (See Comments) 10/06/2018     LabsResults from last 7 days   Lab Units 10/09/18  0525 10/08/18  0449 10/07/18  0756   SODIUM mmol/L 133 138 141   POTASSIUM mmol/L 3.6 3.5 4.0   CHLORIDE mmol/L 95* 96* 104   CO2 mmol/L 34.0* 35.0* 31.0   BUN mg/dL 15 15 17   CREATININE mg/dL 0.50* 0.40* 0.44*   GLUCOSE mg/dL 302* 124* 196*   CALCIUM mg/dL 8.2* 8.1* 8.3*      Results from last 7 days     Lab Units 10/09/18  0525 10/08/18  0449 10/07/18  0756   WBC 10*3/mm3 8.90 9.28 9.96   HEMOGLOBIN g/dL 11.5 11.3* 11.8   HEMATOCRIT % 36.5 36.8 36.0   PLATELETS 10*3/mm3 235 197 251      Evaluation of Dosing     Ht - 162.6 cm (64\")  Wt - 80.7 kg (177 lb 14.6 oz)    Estimated Creatinine Clearance: 59.6 mL/min (A) (by C-G formula based on SCr of 0.5 mg/dL (L)).    Intake & Output (last 3 days)         10/06 0701 - 10/07 0700 10/07 0701 - 10/08 0700 10/08 0701 - 10/09 0700 10/09 0701 - 10/10 0700    P.O.  480      Other  80 400     IV Piggyback  550 350     Total Intake(mL/kg)  1110 (13.8) 750 (9.3)     Urine (mL/kg/hr) 1000 2750 (1.4) 2875 (1.5)     Total Output 1000 2750 2875      Net -1000 -1640 -2125              Unmeasured Stool Occurrence 1 x 3 x 2 x           Microbiology and Radiology    Microbiology Results (last 10 days)       Procedure Component Value - Date/Time    Wound Culture - Wound, Abdominal Wall [[id number]]  (Abnormal) Collected:  10/07/18 0109    Lab Status:  Preliminary result Specimen:  Wound from Abdominal Wall Updated:  10/08/18 0855     Wound Culture " Moderate growth (3+) Gram Negative Bacilli (A)     Gram Stain Result No WBCs seen      Few (2+) Gram positive bacilli      Many (4+) Yeast    Respiratory Culture - Sputum, NT Suction [[id number]]  (Abnormal) Collected:  10/07/18 0108    Lab Status:  Preliminary result Specimen:  Sputum from NT Suction Updated:  10/08/18 0746     Respiratory Culture Scant growth (1+) Yeast isolated (A)     Gram Stain Result Many (4+) WBCs per low power field      Rare (1+) Epithelial cells per low power field      No organisms seen    Blood Culture - Blood, [[id number]]  (Normal) Collected:  10/06/18 2020    Lab Status:  Preliminary result Specimen:  Blood from Arm, Right Updated:  10/08/18 2101     Blood Culture No growth at 2 days          Evaluation of Level    Lab Results   Component Value Date    VANCOTROUGH 9.80 (L) 10/09/2018       Assessment/Plan:    10/9 vancomycin trough 9.8 mcg/ml  Vancomycin trough drawn 22.5 hours following 2nd dose   Goal trough: 15-20 mcg/ml    SCr - 0.5, WBC - 8.9, 24hr Tmax -98.1  Will adjust to vancomycin 1000mg q12h  Plan duration of treatment to 10/20 per ID  Obtain vancomycin trough 10/11 prior to 0600 dose  Monitor response to treatment, s/s nephrotoxicity and infusion related reactions  Will follow    Thanks.  Jan Martínez RPH  10/9/2018  7:09 AM

## 2018-10-09 NOTE — PLAN OF CARE
Problem: Patient Care Overview  Goal: Plan of Care Review  Outcome: Ongoing (interventions implemented as appropriate)   10/09/18 4790   Coping/Psychosocial   Plan of Care Reviewed With patient   OTHER   Outcome Summary Pt session limited d/t pt declined any EOB or OOB activity. Pt tolerated bed level ther ex well. Recommend cont skilled IPOT POC.    Plan of Care Review   Progress improving

## 2018-10-09 NOTE — PROGRESS NOTES
[facility] Medicine Services  PROGRESS NOTE    Patient Name: Nallely Junior  : 1940  MRN: [id number]    Date of Admission: 10/6/2018  Length of Stay: 3  Primary Care Physician: Ray Strickland MD    Subjective   Subjective     CC:  pna     HPI:  Pt sitting up in bed, awake and alert, feels better since she can eat. No fever or chills      Review of Systems  Gen- No fevers, chills  CV- No chest pain, palpitations  Resp- No cough,pos dyspnea, trach   GI- No N/V/D, abd pain        Otherwise ROS is negative except as mentioned in the HPI.    Objective   Objective     Vital Signs:   Temp:  [96.7 °F (35.9 °C)-98.1 °F (36.7 °C)] 97.4 °F (36.3 °C)  Heart Rate:  [65-75] 65  Resp:  [18-22] 20  BP: ()/(44-65) 105/44        Physical Exam:  Constitutional: No acute distress, awake, alert, trached  HENT: NCAT, mucous membranes moist  Respiratory: wheeze and coarse bs with trach, rhonchi b/l   Cardiovascular: RRR, no murmurs, rubs, or gallops, palpable pedal pulses bilaterally  Gastrointestinal: Positive bowel sounds, soft, nontender, nondistended  Musculoskeletal: No bilateral ankle edema  Psychiatric: Appropriate affect, cooperative  Neurologic: Oriented x 3, strength symmetric in all extremities, Cranial Nerves grossly intact to confrontation, speech clear  Skin: No rashes        Results Reviewed:  I have personally reviewed current lab, radiology, and data and agree.      Results from last 7 days  Lab Units 10/09/18  0525 10/08/18  0449 10/07/18  0756   WBC 10*3/mm3 8.90 9.28 9.96   HEMOGLOBIN g/dL 11.5 11.3* 11.8   HEMATOCRIT % 36.5 36.8 36.0   PLATELETS 10*3/mm3 235 197 251       Results from last 7 days  Lab Units 10/09/18  0525 10/08/18  0449 10/07/18  0756 10/06/18  2031   SODIUM mmol/L 133 138 141 140  140   POTASSIUM mmol/L 3.6 3.5 4.0 3.5  3.5   CHLORIDE mmol/L 95* 96* 104 107  107   CO2 mmol/L 34.0* 35.0* 31.0 28.0  28.0   BUN mg/dL 15 15 17 15  15   CREATININE mg/dL 0.50*  0.40* 0.44* 0.54*  0.54*   GLUCOSE mg/dL 302* 124* 196* 226*  226*   CALCIUM mg/dL 8.2* 8.1* 8.3* 8.8  8.8   ALT (SGPT) U/L 45*  --   --  44*   AST (SGOT) U/L 43*  --   --  63*     Estimated Creatinine Clearance: 59.6 mL/min (A) (by C-G formula based on SCr of 0.5 mg/dL (L)).  No results found for: BNP    Microbiology Results Abnormal     Procedure Component Value - Date/Time    Blood Culture - Blood, [[id number]]  (Normal) Collected:  10/06/18 2020    Lab Status:  Preliminary result Specimen:  Blood from Arm, Right Updated:  10/08/18 2101     Blood Culture No growth at 2 days    Wound Culture - Wound, Abdominal Wall [[id number]]  (Abnormal) Collected:  10/07/18 0109    Lab Status:  Preliminary result Specimen:  Wound from Abdominal Wall Updated:  10/08/18 0855     Wound Culture Moderate growth (3+) Gram Negative Bacilli (A)     Gram Stain Result No WBCs seen      Few (2+) Gram positive bacilli      Many (4+) Yeast    Respiratory Culture - Sputum, NT Suction [[id number]]  (Abnormal) Collected:  10/07/18 0108    Lab Status:  Preliminary result Specimen:  Sputum from NT Suction Updated:  10/08/18 0746     Respiratory Culture Scant growth (1+) Yeast isolated (A)     Gram Stain Result Many (4+) WBCs per low power field      Rare (1+) Epithelial cells per low power field      No organisms seen          Imaging Results (last 24 hours)     Procedure Component Value Units Date/Time    XR Chest 1 View [[id number]] Collected:  10/09/18 0809     Updated:  10/09/18 0810    Narrative:          EXAMINATION: XR CHEST 1 VW-      INDICATION: Pneumonia; Z74.09-Other reduced mobility; R13.12-Dysphagia,  oropharyngeal phase; Z74.09-Other reduced mobility      COMPARISON: Chest x-ray 10/18/2018     FINDINGS: Significant interval improvement in left lung aeration with  decreased opacifications left lung apex and within the left lung base  from prior. Cardiac silhouette enlarged with only minimal left midlung  opacifications likely  atelectasis. Hemithorax grossly clear. No  pneumothorax or large effusion.       Impression:       Significant interval reduction in opacifications of the left  hemithorax with a considerable increase in aeration of the left lung and  allowing midlung opacities likely represent mild atelectasis remaining.          CT Chest Without Contrast [[id number]] Collected:  10/08/18 0852     Updated:  10/08/18 2123    Narrative:       EXAM:    CT Chest Without Intravenous Contrast     EXAM DATE/TIME:    10/8/2018 8:52 AM     CLINICAL HISTORY:    78 years old, female; Dysphagia, oropharyngeal phase; Other reduced mobility;   Other reduced mobility; Abnormal findings; Abnormal radiologic exam of lung or   chest; Additional info: Pneumonia complicated / unresolved     TECHNIQUE:    Axial computed tomography images of the chest without intravenous contrast.    All CT scans at this facility use at least one of these dose optimization   techniques: automated exposure control; mA and/or kV adjustment per patient   size (includes targeted exams where dose is matched to clinical indication); or   iterative reconstruction.    Coronal and sagittal reformatted images were created and reviewed.     COMPARISON:    CR XR CHEST 1 VW 10/8/2018 1:31 AM     FINDINGS:    Tubes, catheters and devices:  Tracheostomy tube position appears   satisfactory.    Lungs:  Bilateral infiltrates/atelectasis, predominantly on left, involving   LLL, BETTIE, and RLL. Clinically correlate to rule out pneumonia. Retained   secretions scattered in left bronchial tree.    Pleural space:  Small to moderate left pleural effusion, small right pleural   effusion. No pneumothorax.    Heart:  Mild cardiomegaly. Mitral annulus calcifications.  CAD.   Aorta:  No aortic aneurysm. Aortic atherosclerosis.    Great vessels off aortic arch and other great vessels:  Atherosclerotic   plaques are scattered along some vessels.    Other arteries:  There are coronary artery  atheromatous calcifications,   consistent with CAD.    Lymph nodes:  No obvious lymphadenopathy, but resolution of left hilum is   obscured by adjacent air space disease.    Bones/joints:  Plate and screws ORIF hardware at proximal right humerus. DJD   of right glenohumeral joint. Degenerative changes of the spine. Mild scoliotic   curvature, positional versus fixed.    Soft tissues: Unremarkable.    Gallbladder and bile ducts:  Cholecystectomy.    Kidneys and ureters: Nonobstructive right nephrolithiasis.    Stomach and bowel:  Some contrast in the bowel.       Impression:       1. Small to moderate left pleural effusion, small right pleural effusion.   2. Bilateral infiltrates/atelectasis, predominantly on left, involving LLL,   BETTIE, and RLL.   3. CAD.   4. Mild cardiomegaly.   5.  Nonobstructive right nephrolithiasis.     THIS DOCUMENT HAS BEEN ELECTRONICALLY SIGNED BY KEENAN KIM MD    FL Video Swallow With Speech [[id number]] Collected:  10/08/18 1514     Updated:  10/08/18 1532    Narrative:       EXAMINATION: FL VIDEO SWALLOW W SPEECH-     INDICATION: dysphagia; Z74.09-Other reduced mobility; R13.10-Dysphagia,  unspecified; Z74.09-Other reduced mobility     TECHNIQUE: 1 minute and 54 seconds of fluoroscopic time was used for  this exam. 1 associated image was saved. The patient was evaluated in  the seated lateral position while taking a variety of consistencies of  barium by mouth under the direction of speech pathology.     COMPARISON: NONE     FINDINGS: There was aspiration with sips of thin barium. There was no  penetration and no aspiration with nectar, pudding, or solid consistency  barium.          Impression:       Fluoroscopy provided for a modified barium swallow. Please  see speech therapy report for full details and recommendations.         This report was finalized on 10/8/2018 3:30 PM by Dr. Christiano Mcmillan MD.       XR Chest 1 View [[id number]] Collected:  10/08/18 0814     Updated:  10/08/18  1311    Narrative:       EXAMINATION: XR CHEST 1 VW-      INDICATION: Respiratory failure, pneumonia, atelectasis; Z74.09-Other  reduced mobility; R13.10-Dysphagia, unspecified.      COMPARISON: 10/06/2018.     FINDINGS: Portable chest reveals patchy ill-defined opacification seen  within the left upper lobe. Tracheostomy tube in satisfactory position  above the jacobo. Ill-defined opacification seen at the lung bases. Mild  elevation identified of the right hemidiaphragm. Small left pleural  effusion.           Impression:       Worsening identified of the airspace disease in the left  upper lobe. Small bilateral pleural effusions. The heart is enlarged.     D:  10/08/2018  E:  10/08/2018     This report was finalized on 10/8/2018 1:09 PM by Dr. Meagan Randhawa MD.                I have reviewed the medications.      Pharmacy Consult  Does not apply Once   acetylcysteine 3 mL Nebulization BID - RT   aspirin 162 mg Oral Daily   atorvastatin 40 mg Oral Nightly   aztreonam 2 g Intravenous Q8H   busPIRone 15 mg Oral Q8H   enoxaparin 40 mg Subcutaneous Q24H   furosemide 20 mg Oral BID   hydrocortisone  Rectal BID   insulin detemir 15 Units Subcutaneous Nightly   insulin lispro 0-7 Units Subcutaneous 4x Daily With Meals & Nightly   ipratropium-albuterol 3 mL Nebulization 4x Daily - RT   midodrine 10 mg Oral TID AC   predniSONE 40 mg Oral Daily With Breakfast   rOPINIRole 4 mg Oral Nightly   sodium chloride 3 mL Intravenous Q12H   vancomycin 1,000 mg Intravenous Q12H         Assessment/Plan   Assessment / Plan     Hospital Problem List     Pneumonia    Type 2 diabetes mellitus (CMS/[facility])    Hypotension    Overview Signed 10/7/2018  3:25 PM by Meme Acevedo APRN     on midodrine         Hyperlipidemia    Depression    Anxiety    COPD (chronic obstructive pulmonary disease) (CMS/[facility])    Dysphagia    Overview Signed 10/7/2018  4:13 PM by Meme Acevedo APRN     Was on mechanical soft and  nectar thick at SNF, noncompliant          Coronary artery disease    CHF (congestive heart failure) (CMS/[facility])    GERD (gastroesophageal reflux disease)    Chronic respiratory failure with hypoxia and hypercapnia (CMS/[facility])             Brief Hospital Course to date:  Nallely Junior is a 78 y.o. female  with history of chronic respiratory failure s/p tracheostomy, HLD, T2DM, bedbound state who presents as a transfer/direct admission from [facility] for nonresolving PNA, mucus plugging with opacification of left lung and tracheostomy issues. Patient was admitted to OSH on 10/2 from her NH, for several days of sob, cough, increasing amount of sputum. Patient was initially diagnosed with left sided PNA at OSH, treated with broad spectrum abx- merrem, vanc, levaquin since 10/2. Patient was also noted to be hypotensive to as low as 90s systolic on presentation, though this resolved. Sputum cx from admission was reported to grow MRSA, sensitivities pending, as well as GNR (speciations/sensitivities pending). Patient was reported to initially be improving as WBC noted on admission 20k--- went down to 10k on day of transfer (10/6), however repeat CXR from 10/3-10/4 revealed worsening opacification of left hemithorax concerning for mucus plugging, also noted reports of small left sided plueral effusion. [facility] was called for transfer, however no beds were available until this time. unclear what patient's home baseline oxygen requirement is, she says no oxygen at home prior but per sister ,the patient fell about a year and a half ago and broke her leg.  She went to  and had to have surgery.  She was failure to wean from the vent after a month, so she had a trach and PEG placed.  She now is a N[employer]      Assessment & Plan:  Acute on chronic respiratory failure  PNA, MRSA/GNR ,LLL, BETTIE, RLL PNA on CT   Mucus plugging   Possible aspiration   -- patient was being treated with IV vanc/merrem/levaquin at OSH  -- sputum cx  had resulted with MRSA/GNR  --continue  vanc/azactam (PCN allergy) here for now which should cover both MRSA/GNR, Likely through 10/20/18. ID consulted and following.  pulm following. No need for bronch at this point per pulm  - continue scheduled nebs, steroids (patient was on TID solumedrol at OSH prior to transfer)  - speech consulted and mech soft with nectar thick liquids.    --Mucomyst nebs  --Chest Physiotherapy  --cxr in am         T2DM  - home meds per OSH med rec list 30units levemir, increase to 20 units qhs as well as SSI, elevated today      HLD  - continue statin     RLS  - continue ropinirole     Depression  - continue buspar     H/o Hypotension  - continue home midodrine, closely monitor     Elevated Lactic Acid   --recheck mei     CM, PT/OT consult for dispo      DVT prophylaxis: Lovenox     CODE STATUS:   Code Status and Medical Interventions:   Ordered at: 10/06/18 1946     Level Of Support Discussed With:    Patient     Code Status:    CPR     Medical Interventions (Level of Support Prior to Arrest):    Full       Disposition: I expect the patient to be discharged tbd      Electronically signed by Salina Arguello DO, 10/09/18, 8:34 AM.

## 2018-10-09 NOTE — THERAPY TREATMENT NOTE
Acute Care - Occupational Therapy Treatment Note  [facility]     Patient Name: Nallely Junior  : 1940  MRN: [id number]  Today's Date: 10/9/2018  Onset of Illness/Injury or Date of Surgery: 10/06/18  Date of Referral to OT: 10/06/18  Referring Physician: TIN Murphy MD    Admit Date: 10/6/2018       ICD-10-CM ICD-9-CM   1. Impaired mobility and ADLs Z74.09 799.89   2. Oropharyngeal dysphagia R13.12 787.22   3. Impaired functional mobility, balance, gait, and endurance Z74.09 V49.89     Patient Active Problem List   Diagnosis   • Pneumonia   • Type 2 diabetes mellitus (CMS/HCC)   • Hypotension   • Hyperlipidemia   • Depression   • Anxiety   • COPD (chronic obstructive pulmonary disease) (CMS/HCC)   • Dysphagia   • Coronary artery disease   • CHF (congestive heart failure) (CMS/HCC)   • GERD (gastroesophageal reflux disease)   • Chronic respiratory failure with hypoxia and hypercapnia (CMS/HCC)     Past Medical History:   Diagnosis Date   • Anemia    • Anxiety    • Aortic stenosis    • CHF (congestive heart failure) (CMS/HCC)    • Chronic respiratory failure with hypoxia and hypercapnia (CMS/HCC)    • CKD (chronic kidney disease), stage III (CMS/HCC)    • Constipation    • COPD (chronic obstructive pulmonary disease) (CMS/HCC)    • Coronary artery disease    • Dementia    • Depression    • Diabetes mellitus (CMS/HCC)    • Dysphagia    • Femur fracture, right (CMS/HCC)    • GERD (gastroesophageal reflux disease)    • Hyperlipidemia    • Hypertension    • Hypotension     on midodrine   • Restless legs syndrome (RLS)      Past Surgical History:   Procedure Laterality Date   • FOREARM SURGERY     • LEG SURGERY     • PEG TUBE INSERTION     • TRACHEOSTOMY         Therapy Treatment          Rehabilitation Treatment Summary     Row Name 10/09/18 1435 10/09/18 1147          Treatment Time/Intention    Discipline physical therapist  -MB occupational therapist  -CL     Document Type therapy note (daily note)  -MB therapy  note (daily note)  -CL     Subjective Information no complaints  -MB complains of;fatigue;weakness  -CL     Mode of Treatment physical therapy;individual therapy  -MB  --     Patient/Family Observations Pt's sister present at bedside.  -MB  --     Care Plan Review care plan/treatment goals reviewed;risks/benefits reviewed;current/potential barriers reviewed;patient/other agree to care plan  -MB  --     Care Plan Review, Other Participant(s) sibling  -MB  --     Therapy Frequency (PT Clinical Impression) daily  -MB  --     Patient Effort good  -MB fair  -CL     Existing Precautions/Restrictions fall;oxygen therapy device and L/min   trach  -MB fall;oxygen therapy device and L/min  -CL     Treatment Considerations/Comments  -- Pt declined any EOB or OOB activity this date.   -CL     Recorded by [MB] Britt Gilbert, PT 10/09/18 1553 [CL] Marie Pabon, OT 10/09/18 1548     Row Name 10/09/18 1435 10/09/18 1147          Vital Signs    Pre Systolic BP Rehab --   VSS.  RN cleared for PT.  -MB --   VSS, RN cleared for tx.   -CL     Recorded by [MB] Britt Gilbert, PT 10/09/18 1553 [CL] Marie Pabon, OT 10/09/18 1548     Row Name 10/09/18 1435 10/09/18 1147          Cognitive Assessment/Intervention- PT/OT    Affect/Mental Status (Cognitive)  -- confused  -CL     Orientation Status (Cognition) oriented to;person;place;situation;verbal cues/prompts needed for orientation;time  -MB oriented to;person;place  -CL     Follows Commands (Cognition) follows one step commands;over 90% accuracy;verbal cues/prompting required  -MB follows one step commands;75-90% accuracy;repetition of directions required;verbal cues/prompting required  -CL     Cognitive Function (Cognitive)  -- safety deficit  -CL     Safety Deficit (Cognitive) mild deficit;awareness of need for assistance;insight into deficits/self awareness;safety precautions awareness  -MB mild deficit;awareness of need for assistance;safety precautions awareness  -CL      Personal Safety Interventions  -- fall prevention program maintained;gait belt;nonskid shoes/slippers when out of bed  -CL     Recorded by [MB] Britt Gilbert, PT 10/09/18 1553 [CL] Marie Pabon, OT 10/09/18 1548     Row Name 10/09/18 1435             Safety Issues, Functional Mobility    Impairments Affecting Function (Mobility) balance;coordination;endurance/activity tolerance;range of motion (ROM);shortness of breath;strength  -MB      Recorded by [MB] Britt Gilbert, PT 10/09/18 1553      Row Name 10/09/18 1435 10/09/18 1147          Bed Mobility Assessment/Treatment    Bed Mobility Assessment/Treatment scooting/bridging  -MB  --     Scooting/Bridging [place] (Bed Mobility) dependent (less than 25% patient effort);2 person assist  -MB  --     Bed Mobility, Safety Issues decreased use of arms for pushing/pulling;decreased use of legs for bridging/pushing  -MB  --     Assistive Device (Bed Mobility) draw sheet  -MB  --     Comment (Bed Mobility) Pt. required dep A x 2 to scoot to HOB.    -MB Pt declined.   -CL     Recorded by [MB] Britt Gilbert, PT 10/09/18 1553 [CL] Marie Pabon, OT 10/09/18 1548     Row Name 10/09/18 1435             Transfer Assessment/Treatment    Comment (Transfers) Deferred  -MB      Recorded by [MB] Britt Gilbert, PT 10/09/18 1553      Row Name 10/09/18 1435             Gait/Stairs Assessment/Training    Comment (Gait/Stairs) Pt non-ambulatory at baseline.  -MB      Recorded by [MB] Britt Gilbert, PT 10/09/18 1553      Row Name 10/09/18 1147             Motor Skills Assessment/Interventions    Additional Documentation Therapeutic Exercise (Group)  -CL      Recorded by [CL] Marie Pabon, OT 10/09/18 1548      Row Name 10/09/18 1435 10/09/18 1147          Therapeutic Exercise    Therapeutic Exercise supine, upper extremities;supine, lower extremities  -MB seated, upper extremities   HOB elevated Simultaneous filing. User may be unaware of other data.  -CL      44890 - OT Therapeutic Exercise Minutes  -- 10  -CL     Recorded by [MB] Britt Gilbert, PT 10/09/18 1553 [CL] Marie Pabon, OT 10/09/18 1548     Row Name 10/09/18 1147             Upper Extremity Seated Therapeutic Exercise    Performed, Seated Upper Extremity (Therapeutic Exercise) shoulder flexion/extension;elbow flexion/extension;digit flexion/extension;scapular protraction/retraction;shoulder horizontal abduction/adduction;shoulder abduction/adduction  -CL      Exercise Type, Seated Upper Extremity (Therapeutic Exercise) AROM (active range of motion)  -CL      Sets/Reps Detail, Seated Upper Extremity (Therapeutic Exercise) 1/10  -CL      Comment, Seated Upper Extremity (Therapeutic Exercise) BUE  -CL      Recorded by [CL] Marie Pabon, OT 10/09/18 1548      Row Name 10/09/18 1435             Upper Extremity Supine Therapeutic Exercise    Performed, Supine Upper Extremity (Therapeutic Exercise) shoulder abduction/adduction;elbow flexion/extension  -MB      Exercise Type, Supine Upper Extremity (Therapeutic Exercise) AROM (active range of motion);AAROM (active assistive range of motion)   R shoulder AAROM  -MB      Sets/Reps Detail, Supine Upper Extremity (Therapeutic Exercise) 1/10 BUEs  -MB      Recorded by [MB] Britt Gilbert, PT 10/09/18 1553      Row Name 10/09/18 1435             Lower Extremity Supine Therapeutic Exercise    Performed, Supine Lower Extremity (Therapeutic Exercise) hip flexion/extension;hip abduction/adduction;ankle dorsiflexion/plantarflexion;SAQ (short arc quad) over bolster;SLR (straight leg raise);quadriceps sets;gluteal sets;ankle pumps;dorsiflexor stretch  -MB      Exercise Type, Supine Lower Extremity (Therapeutic Exercise) AROM (active range of motion);AAROM (active assistive range of motion)  -MB      Sets/Reps Detail, Supine Lower Extremity (Therapeutic Exercise) 1/10 BLEs  -MB      Recorded by [MB] Britt Gilbert, PT 10/09/18 1553      Row Name 10/09/18 1435  10/09/18 1147          Positioning and Restraints    Pre-Treatment Position in bed  -MB in bed  -CL     Post Treatment Position bed  -MB bed  -CL     In Bed notified nsg;supine;call light within reach;encouraged to call for assist;exit alarm on;with family/caregiver;RUE elevated;heels elevated  -MB notified nsg;fowlers;call light within reach;encouraged to call for assist;exit alarm on;with family/caregiver;RUE elevated;legs elevated;heels elevated  -CL     Recorded by [MB] Britt Gilbert, PT 10/09/18 1553 [CL] Marie Pabon, OT 10/09/18 1548     Row Name 10/09/18 1435 10/09/18 1147          Pain Scale: Numbers Pre/Post-Treatment    Pain Scale: Numbers, Pretreatment 0/10 - no pain  -MB 0/10 - no pain  -CL     Pain Scale: Numbers, Post-Treatment 0/10 - no pain  -MB 0/10 - no pain  -CL     Recorded by [MB] Britt Gilbert, PT 10/09/18 1553 [CL] Marie Pabon, OT 10/09/18 1548     Row Name 10/09/18 1435             Plan of Care Review    Plan of Care Reviewed With patient  -MB      Recorded by [MB] Britt Gilbert, PT 10/09/18 1553      Row Name 10/09/18 1435             Outcome Summary/Treatment Plan (PT)    Daily Summary of Progress (PT) progress toward functional goals as expected  -MB      Anticipated Discharge Disposition (PT) extended care facility  -MB      Recorded by [MB] Britt Gilbert, PT 10/09/18 1553        User Key  (r) = Recorded By, (t) = Taken By, (c) = Cosigned By    Initials Name Effective Dates Discipline    Britt Clay, PT 03/14/16 -  PT    CL Marie Pabon, OT 04/03/18 -  OT               Occupational Therapy Education     Title: PT OT SLP Therapies (Active)     Topic: Occupational Therapy (Active)     Point: ADL training (Active)     Description: Instruct learner(s) on proper safety adaptation and remediation techniques during self care or transfers.   Instruct in proper use of assistive devices.   Learning Progress Summary     Learner Status Readiness Method Response  Comment Documented by    Patient Active Acceptance E NR Pt educated on HEP, positioning, role of OT, and benefits of therapy. CL 10/09/18 1548     Done Acceptance E,D VU,NR Body mechanics with bed mobility; reinforced need for call for assist with OOB activities; role of OT. TA 10/07/18 0928          Point: Home exercise program (Active)     Description: Instruct learner(s) on appropriate technique for monitoring, assisting and/or progressing therapeutic exercises/activities.   Learning Progress Summary     Learner Status Readiness Method Response Comment Documented by    Patient Active Acceptance E NR Pt educated on HEP, positioning, role of OT, and benefits of therapy. CL 10/09/18 1548          Point: Precautions (Active)     Description: Instruct learner(s) on prescribed precautions during self-care and functional transfers.   Learning Progress Summary     Learner Status Readiness Method Response Comment Documented by    Patient Active Acceptance E NR Pt educated on HEP, positioning, role of OT, and benefits of therapy. CL 10/09/18 1548     Done Acceptance E,D VU,NR Body mechanics with bed mobility; reinforced need for call for assist with OOB activities; role of OT. TA 10/07/18 0928          Point: Body mechanics (Done)     Description: Instruct learner(s) on proper positioning and spine alignment during self-care, functional mobility activities and/or exercises.   Learning Progress Summary     Learner Status Readiness Method Response Comment Documented by    Patient Done Acceptance E,D VU,NR Body mechanics with bed mobility; reinforced need for call for assist with OOB activities; role of OT. TA 10/07/18 0928                      User Key     Initials Effective Dates Name Provider Type Discipline    TA 03/14/16 -  Christiano Marie, OT Occupational Therapist OT    CL 04/03/18 -  Marie Pabon OT Occupational Therapist OT                OT Recommendation and Plan     Plan of Care Review  Plan of Care Reviewed  With: patient  Plan of Care Reviewed With: patient  Outcome Summary: Pt session limited d/t pt declined any EOB or OOB activity. Pt tolerated bed level ther ex well. Recommend cont skilled IPOT POC.         Outcome Measures     Row Name 10/09/18 1147 10/08/18 0943 10/07/18 0855       How much help from another person do you currently need...    Turning from your back to your side while in flat bed without using bedrails?  -- 2  -MB  --    Moving from lying on back to sitting on the side of a flat bed without bedrails?  -- 2  -MB  --    Moving to and from a bed to a chair (including a wheelchair)?  -- 1  -MB  --    Standing up from a chair using your arms (e.g., wheelchair, bedside chair)?  -- 1  -MB  --    Climbing 3-5 steps with a railing?  -- 1  -MB  --    To walk in hospital room?  -- 1  -MB  --    AM-PAC 6 Clicks Score  -- 8  -MB  --       How much help from another is currently needed...    Putting on and taking off regular lower body clothing? 1  -CL  -- 1  -TA    Bathing (including washing, rinsing, and drying) 1  -CL  -- 1  -TA    Toileting (which includes using toilet bed pan or urinal) 1  -CL  -- 1  -TA    Putting on and taking off regular upper body clothing 2  -CL  -- 2  -TA    Taking care of personal grooming (such as brushing teeth) 3  -CL  -- 3  -TA    Eating meals 3  -CL  -- 3  -TA    Score 11  -CL  -- 11  -TA       Functional Assessment    Outcome Measure Options AM-PAC 6 Clicks Daily Activity (OT)  -CL AM-PAC 6 Clicks Basic Mobility (PT)  -MB AM-PAC 6 Clicks Daily Activity (OT)  -TA      User Key  (r) = Recorded By, (t) = Taken By, (c) = Cosigned By    Initials Name Provider Type    TA Christiano Marie, OT Occupational Therapist    Britt Clay, PT Physical Therapist    Marie Medina, OT Occupational Therapist           Time Calculation:         Time Calculation- OT     Row Name 10/09/18 1552 10/09/18 1147          Time Calculation- OT    OT Start Time 1147  -CL  --     OT Received  On 10/09/18  -CL  --     OT Goal Re-Cert Due Date 10/17/18  -CL  --        Timed Charges    70406 - OT Therapeutic Exercise Minutes  -- 10  -CL       User Key  (r) = Recorded By, (t) = Taken By, (c) = Cosigned By    Initials Name Provider Type    CL Marie Pabon OT Occupational Therapist           Therapy Suggested Charges     Code   Minutes Charges    54569 (CPT®) Hc Ot Neuromusc Re Education Ea 15 Min      91123 (CPT®) Hc Ot Ther Proc Ea 15 Min 10 1    67682 (CPT®) Hc Ot Therapeutic Act Ea 15 Min      72903 (CPT®) Hc Ot Manual Therapy Ea 15 Min      36186 (CPT®) Hc Ot Iontophoresis Ea 15 Min      89455 (CPT®) Hc Ot Elec Stim Ea-Per 15 Min      59457 (CPT®) Hc Ot Ultrasound Ea 15 Min      98889 (CPT®) Hc Ot Self Care/Mgmt/Train Ea 15 Min      Total  10 1        Therapy Charges for Today     Code Description Service Date Service Provider Modifiers Qty    [id number] HC OT THER PROC EA 15 MIN 10/9/2018 Marie Pabon OT GO 1               Marie Pabon OT  10/9/2018

## 2018-10-09 NOTE — PROGRESS NOTES
INPATIENT PULMONARY SERVICE  PROGRESS NOTE     Hospital LOS: 3 days    Ms. Nallely Junior, is followed for a Chief Complaint of: Non-resolving pneumonia, shortness of breath      Pneumonia    Type 2 diabetes mellitus (CMS/[facility])    Hypotension    Hyperlipidemia    Depression    Anxiety    COPD (chronic obstructive pulmonary disease) (CMS/[facility])    Dysphagia    Coronary artery disease    CHF (congestive heart failure) (CMS/[facility])    GERD (gastroesophageal reflux disease)    Chronic respiratory failure with hypoxia and hypercapnia (CMS/[facility])      Subjective   S   Mrs. Junior is a 79 y/o WF who was transferred to [facility] from [facility] yesterday for pneumonia.  She had a 4 day c/o shortness of air and was taken to [facility].  She was found to have a UTI and LLL PNA.  Per records, her sputum was pending but was positive for GNR and Staph Aureus.  She was given Zosyn, Vanc and Merrem.      She is a very poor historian.  Per RN, her sister stated that the patient fell about a year and a half ago and broke her leg. She went to  and had to have surgery.  She was failure to wean from the vent after a month, so she had a trach and PEG placed.  She now is a NHR.  She does have dysphagia and is supposed to be on mechanical soft/nectar thick diet, but she is noncompliant. Her family brings her food and she is found drinking thin liquids often.  She still has her PEG that is not used.      Since admission, she has been continued on Vancomycin and Aztreonam. Sputum culture here with only scant growth of yeast. She was started on Mucomyst and chest physiotherapy on 10/7.     Interval History:  No events overnight. CXR greatly improved this AM. Patient reports that she is coughing up phlegm after the respiratory treatments and is feeling better.        The patient's relevant past medical, surgical and social history were reviewed and updated in Epic as appropriate.      ROS:   Constitutional: Negative for fever.    Respiratory: Positive for dyspnea.   Cardiovascular: Negative for chest pain.   Gastrointestinal: Negative for  nausea, vomiting and diarrhea.     Objective   O     Vitals  Temp  Min: 97.4 °F (36.3 °C)  Max: 98.1 °F (36.7 °C)  BP  Min: 93/47  Max: 115/65  Pulse  Min: 65  Max: 77  Resp  Min: 18  Max: 22  SpO2  Min: 92 %  Max: 96 % Flow (L/min)  Min: 12  Max: 12    I/O 24 HR (7:00 AM-6:59AM):  Intake/Output       10/08/18 0700 - 10/09/18 0659    Intake (ml) 750    Output (ml) 2875    Net (ml) -2125          Medications (Drips):    Pharmacy Consult   Pharmacy to dose vancomycin       Physical Examination    Telemetry: Normal sinus rhythm.    Constitutional:  No acute distress.  Lying in bed.    Cardiovascular: Regular rate and rhythm.  No murmurs, rub or gallop.   Respiratory: Normal symmetric chest expansion.  Normal respiratory effort.  Diffuse rhonchi with improved breath sounds on the left.    Abdominal:  Soft. No masses.   Non-tender. No distension.   No hepatosplenomegaly.   Extremities: No digital cyanosis or clubbing.  No peripheral edema.   Neurological:   Alert and Oriented to person, place, and time.   Moves all extremities.              Results from last 7 days  Lab Units 10/09/18  0525 10/08/18  0449 10/07/18  0756   WBC 10*3/mm3 8.90 9.28 9.96   HEMOGLOBIN g/dL 11.5 11.3* 11.8   MCV fL 94.6 95.3 93.3   PLATELETS 10*3/mm3 235 197 251       Results from last 7 days  Lab Units 10/09/18  0525 10/08/18  0449 10/07/18  0756   SODIUM mmol/L 133 138 141   POTASSIUM mmol/L 3.6 3.5 4.0   CO2 mmol/L 34.0* 35.0* 31.0   CREATININE mg/dL 0.50* 0.40* 0.44*   MAGNESIUM mg/dL 2.0 1.9 1.9     Serum creatinine: 0.5 mg/dL (L) 10/09/18 0525  Estimated creatinine clearance: 59.6 mL/min (A)    Results from last 7 days  Lab Units 10/09/18  0525 10/06/18  2031   ALK PHOS U/L 79 78   BILIRUBIN mg/dL 0.6 0.4   ALT (SGPT) U/L 45* 44*   AST (SGOT) U/L 43* 63*             Images:     Imaging Results (last 24 hours)     Procedure  Component Value Units Date/Time    XR Chest 1 View [[id number]] Collected:  10/09/18 0809     Updated:  10/09/18 0810    Narrative:          EXAMINATION: XR CHEST 1 VW-      INDICATION: Pneumonia; Z74.09-Other reduced mobility; R13.12-Dysphagia,  oropharyngeal phase; Z74.09-Other reduced mobility      COMPARISON: Chest x-ray 10/18/2018     FINDINGS: Significant interval improvement in left lung aeration with  decreased opacifications left lung apex and within the left lung base  from prior. Cardiac silhouette enlarged with only minimal left midlung  opacifications likely atelectasis. Hemithorax grossly clear. No  pneumothorax or large effusion.       Impression:       Significant interval reduction in opacifications of the left  hemithorax with a considerable increase in aeration of the left lung and  allowing midlung opacities likely represent mild atelectasis remaining.          CT Chest Without Contrast [[id number]] Collected:  10/08/18 0852     Updated:  10/08/18 2123    Narrative:       EXAM:    CT Chest Without Intravenous Contrast     EXAM DATE/TIME:    10/8/2018 8:52 AM     CLINICAL HISTORY:    78 years old, female; Dysphagia, oropharyngeal phase; Other reduced mobility;   Other reduced mobility; Abnormal findings; Abnormal radiologic exam of lung or   chest; Additional info: Pneumonia complicated / unresolved     TECHNIQUE:    Axial computed tomography images of the chest without intravenous contrast.    All CT scans at this facility use at least one of these dose optimization   techniques: automated exposure control; mA and/or kV adjustment per patient   size (includes targeted exams where dose is matched to clinical indication); or   iterative reconstruction.    Coronal and sagittal reformatted images were created and reviewed.     COMPARISON:    CR XR CHEST 1 VW 10/8/2018 1:31 AM     FINDINGS:    Tubes, catheters and devices:  Tracheostomy tube position appears   satisfactory.    Lungs:  Bilateral  infiltrates/atelectasis, predominantly on left, involving   LLL, BETTIE, and RLL. Clinically correlate to rule out pneumonia. Retained   secretions scattered in left bronchial tree.    Pleural space:  Small to moderate left pleural effusion, small right pleural   effusion. No pneumothorax.    Heart:  Mild cardiomegaly. Mitral annulus calcifications.  CAD.   Aorta:  No aortic aneurysm. Aortic atherosclerosis.    Great vessels off aortic arch and other great vessels:  Atherosclerotic   plaques are scattered along some vessels.    Other arteries:  There are coronary artery atheromatous calcifications,   consistent with CAD.    Lymph nodes:  No obvious lymphadenopathy, but resolution of left hilum is   obscured by adjacent air space disease.    Bones/joints:  Plate and screws ORIF hardware at proximal right humerus. DJD   of right glenohumeral joint. Degenerative changes of the spine. Mild scoliotic   curvature, positional versus fixed.    Soft tissues: Unremarkable.    Gallbladder and bile ducts:  Cholecystectomy.    Kidneys and ureters: Nonobstructive right nephrolithiasis.    Stomach and bowel:  Some contrast in the bowel.       Impression:       1. Small to moderate left pleural effusion, small right pleural effusion.   2. Bilateral infiltrates/atelectasis, predominantly on left, involving LLL,   BETTIE, and RLL.   3. CAD.   4. Mild cardiomegaly.   5.  Nonobstructive right nephrolithiasis.     THIS DOCUMENT HAS BEEN ELECTRONICALLY SIGNED BY KEENAN KIM MD    FL Video Swallow With Speech [[id number]] Collected:  10/08/18 1514     Updated:  10/08/18 1532    Narrative:       EXAMINATION: FL VIDEO SWALLOW W SPEECH-     INDICATION: dysphagia; Z74.09-Other reduced mobility; R13.10-Dysphagia,  unspecified; Z74.09-Other reduced mobility     TECHNIQUE: 1 minute and 54 seconds of fluoroscopic time was used for  this exam. 1 associated image was saved. The patient was evaluated in  the seated lateral position while taking a  variety of consistencies of  barium by mouth under the direction of speech pathology.     COMPARISON: NONE     FINDINGS: There was aspiration with sips of thin barium. There was no  penetration and no aspiration with nectar, pudding, or solid consistency  barium.          Impression:       Fluoroscopy provided for a modified barium swallow. Please  see speech therapy report for full details and recommendations.         This report was finalized on 10/8/2018 3:30 PM by Dr. Christiano Mcmillan MD.       XR Chest 1 View [[id number]] Collected:  10/08/18 0814     Updated:  10/08/18 1311    Narrative:       EXAMINATION: XR CHEST 1 VW-      INDICATION: Respiratory failure, pneumonia, atelectasis; Z74.09-Other  reduced mobility; R13.10-Dysphagia, unspecified.      COMPARISON: 10/06/2018.     FINDINGS: Portable chest reveals patchy ill-defined opacification seen  within the left upper lobe. Tracheostomy tube in satisfactory position  above the jacobo. Ill-defined opacification seen at the lung bases. Mild  elevation identified of the right hemidiaphragm. Small left pleural  effusion.           Impression:       Worsening identified of the airspace disease in the left  upper lobe. Small bilateral pleural effusions. The heart is enlarged.     D:  10/08/2018  E:  10/08/2018     This report was finalized on 10/8/2018 1:09 PM by Dr. Meagan Randhawa MD.             I reviewed the patient's new clinical results.  I reviewed the patient's new imaging results and agree with the interpretation.    Assessment/Plan   A / P     Ms. Junior is a 79yo F who is admitted with acute on chronic respiratory failure secondary to non-resolving pneumonia and mucous plugging. She was started on mucomyst and chest physiotherapy on 10/7. She has remained on broad spectrum antibiotics for GNR and staph isolated from sputum at the OSH. Cultures here are growing scant yeast. She has a history of dysphagia with noncompliance and is had an MBS performed on  10/8. CXR performed this AM shows improvement in her left sided airspace disease/mucous plugging.     Diet Dysphagia; IV - Mechanical Soft No Mixed Consistencies; Nectar / Syrup Thick; Consistent Carbohydrate  Code Status and Medical Interventions:   Ordered at: 10/06/18 1946     Level Of Support Discussed With:    Patient     Code Status:    CPR     Medical Interventions (Level of Support Prior to Arrest):    Full       Active Hospital Problems    Diagnosis   • Hypotension     on midodrine     • Hyperlipidemia   • Depression   • Anxiety   • COPD (chronic obstructive pulmonary disease) (CMS/[facility])   • Dysphagia     Was on mechanical soft and nectar thick at [facility], noncompliant      • Coronary artery disease   • CHF (congestive heart failure) (CMS/[facility])   • GERD (gastroesophageal reflux disease)   • Chronic respiratory failure with hypoxia and hypercapnia (CMS/[facility])   • Pneumonia   • Type 2 diabetes mellitus (CMS/[facility])       Assessment / Plan:  1. No endobronchial obstruction appreciated on CT chest and CXR is improved. Therefore, she does not need a therapeutic bronchoscopy at this time.   2. Continue chest physiotherapy and Mucomyst as she is slowly improving.   3. Continue antibiotics.   4. Modified diet. Monitor for aspiration.   5. AM CXR    I discussed the patient's findings and my recommendations with patient    Time: 35 minutes       Alva Pinedo, DO  Pulmonary and Critical Care Medicine

## 2018-10-09 NOTE — PLAN OF CARE
Problem: Patient Care Overview  Goal: Plan of Care Review  Outcome: Ongoing (interventions implemented as appropriate)   10/09/18 5113   Coping/Psychosocial   Plan of Care Reviewed With patient   OTHER   Outcome Summary Pt. pleasant w/ good effort w/ supine TherEx. Pt. required dep A x 2 for bed mobility; declined sitting EOB. Recommend cont IPPT per POC.   Plan of Care Review   Progress improving

## 2018-10-09 NOTE — PROGRESS NOTES
Clinical Nutrition   Reason For Visit: Follow-up protocol    Patient Name: Nallely Junior  YOB: 1940  MRN: [id number]  Date of Encounter: 10/09/18 1:00 PM  Admission date: 10/6/2018      Nutrition Assessment     Active Hospital Problem List  Active Problems:    Pneumonia    Acute and chronic respiratory failure with hypoxia (CMS/H[facility])    Type 2 diabetes mellitus (CMS/[facility])    Dysphagia      Other Applicable Diagnosis:  Pt admitted to OSH (10/2) from her NH secondary to SOA.cough, transferred to [facility] (10/6) for higher level of care.      Pt with tracheostomy and PEG.      A/c respiratory failure  Tracheostomy  PNA  Mucus plugging      Applicable medical tests/procedures since admission:   10/8 MBS- rec's for Dysphagia 4 with nectar thick liquids     Reported/Observed/Food/Nutrition Related History   Pt reports good appetite, ate well on modified diet after [facility] 10/8. Agreeable to try Boost in vanilla or chocolate. Pt does have a PEG tube that is not in use at this time.  Per prior RD note (10/7) pt has refused tube feeding at the NH. Often non-compliant with dysphagia diet as well. Will need to monitor liquid consistency.         Anthropometrics   Height: 64 in  Weight: 177 lb (10/7), no method documented  BMI: 30.5  BMI classification: Obese Class I: 30-34.9kg/m2   UBW: ~177 lb per [facility]     Weight change: Nursing staff at [facility] report that pt has not had any recent wt loss or wt changes. Pt states that she has lost 3 lb over the past 5 months.      Labs reviewed   Labs reviewed: Yes      Medications reviewed   Medications reviewed: Yes      Current Nutrition Prescription   PO: Diet Dysphagia; IV - Mechanical Soft No Mixed Consistencies; Nectar / Syrup Thick; Consistent Carbohydrate      Evaluation of Received Nutrient/Fluid Intake:  1 Day: 93% of 3 meals       Nutrition Diagnosis     Problem Swallowing difficulty   Etiology Dysphagia history; recurrent Asp PNA    Signs/Symptoms MBS rec's Dysphagia 4 with nectar thick liquids       Intervention   Intervention: Follow treatment progress, Care plan reviewed, Encourage intake, Supplement provided  1) Boost 2x/day    Goal:   General: Maintain nutrition, Nutrition support treatment  PO: Maintain intake, Advace diet as medically appropriate        Monitoring/Evaluation:       Monitoring/Evaluation: Per protocol, Swallow function  Will Continue to follow per protocol  Meme Alonzo, HOA  Time Spent: 30 min

## 2018-10-10 ENCOUNTER — APPOINTMENT (OUTPATIENT)
Dept: GENERAL RADIOLOGY | Facility: HOSPITAL | Age: 78
End: 2018-10-10

## 2018-10-10 LAB
ALBUMIN SERPL-MCNC: 3.25 G/DL (ref 3.2–4.8)
ALBUMIN/GLOB SERPL: 1.3 G/DL (ref 1.5–2.5)
ALP SERPL-CCNC: 83 U/L (ref 25–100)
ALT SERPL W P-5'-P-CCNC: 57 U/L (ref 7–40)
ANION GAP SERPL CALCULATED.3IONS-SCNC: 4 MMOL/L (ref 3–11)
AST SERPL-CCNC: 46 U/L (ref 0–33)
BACTERIA SPEC RESP CULT: ABNORMAL
BACTERIA SPEC RESP CULT: ABNORMAL
BASOPHILS # BLD AUTO: 0.01 10*3/MM3 (ref 0–0.2)
BASOPHILS NFR BLD AUTO: 0.1 % (ref 0–1)
BILIRUB SERPL-MCNC: 0.6 MG/DL (ref 0.3–1.2)
BUN BLD-MCNC: 19 MG/DL (ref 9–23)
BUN/CREAT SERPL: 34.5 (ref 7–25)
CALCIUM SPEC-SCNC: 8.3 MG/DL (ref 8.7–10.4)
CHLORIDE SERPL-SCNC: 95 MMOL/L (ref 99–109)
CO2 SERPL-SCNC: 37 MMOL/L (ref 20–31)
CREAT BLD-MCNC: 0.55 MG/DL (ref 0.6–1.3)
DEPRECATED RDW RBC AUTO: 46 FL (ref 37–54)
EOSINOPHIL # BLD AUTO: 0.07 10*3/MM3 (ref 0–0.3)
EOSINOPHIL NFR BLD AUTO: 0.5 % (ref 0–3)
ERYTHROCYTE [DISTWIDTH] IN BLOOD BY AUTOMATED COUNT: 13.9 % (ref 11.3–14.5)
GFR SERPL CREATININE-BSD FRML MDRD: 107 ML/MIN/1.73
GLOBULIN UR ELPH-MCNC: 2.5 GM/DL
GLUCOSE BLD-MCNC: 218 MG/DL (ref 70–100)
GLUCOSE BLDC GLUCOMTR-MCNC: 151 MG/DL (ref 70–130)
GLUCOSE BLDC GLUCOMTR-MCNC: 184 MG/DL (ref 70–130)
GLUCOSE BLDC GLUCOMTR-MCNC: 212 MG/DL (ref 70–130)
GLUCOSE BLDC GLUCOMTR-MCNC: 325 MG/DL (ref 70–130)
GRAM STN SPEC: ABNORMAL
HCT VFR BLD AUTO: 38.2 % (ref 34.5–44)
HGB BLD-MCNC: 12.1 G/DL (ref 11.5–15.5)
IMM GRANULOCYTES # BLD: 0.1 10*3/MM3 (ref 0–0.03)
IMM GRANULOCYTES NFR BLD: 0.7 % (ref 0–0.6)
LYMPHOCYTES # BLD AUTO: 2.55 10*3/MM3 (ref 0.6–4.8)
LYMPHOCYTES NFR BLD AUTO: 18.8 % (ref 24–44)
MAGNESIUM SERPL-MCNC: 1.8 MG/DL (ref 1.3–2.7)
MCH RBC QN AUTO: 29.2 PG (ref 27–31)
MCHC RBC AUTO-ENTMCNC: 31.7 G/DL (ref 32–36)
MCV RBC AUTO: 92 FL (ref 80–99)
MONOCYTES # BLD AUTO: 1.01 10*3/MM3 (ref 0–1)
MONOCYTES NFR BLD AUTO: 7.4 % (ref 0–12)
NEUTROPHILS # BLD AUTO: 9.92 10*3/MM3 (ref 1.5–8.3)
NEUTROPHILS NFR BLD AUTO: 73.2 % (ref 41–71)
PLATELET # BLD AUTO: 291 10*3/MM3 (ref 150–450)
PMV BLD AUTO: 10.7 FL (ref 6–12)
POTASSIUM BLD-SCNC: 3.3 MMOL/L (ref 3.5–5.5)
PROT SERPL-MCNC: 5.7 G/DL (ref 5.7–8.2)
RBC # BLD AUTO: 4.15 10*6/MM3 (ref 3.89–5.14)
SODIUM BLD-SCNC: 136 MMOL/L (ref 132–146)
WBC NRBC COR # BLD: 13.56 10*3/MM3 (ref 3.5–10.8)

## 2018-10-10 PROCEDURE — 63710000001 INSULIN DETEMIR PER 5 UNITS: Performed by: FAMILY MEDICINE

## 2018-10-10 PROCEDURE — 25010000002 ONDANSETRON PER 1 MG: Performed by: NURSE PRACTITIONER

## 2018-10-10 PROCEDURE — 25010000002 ENOXAPARIN PER 10 MG: Performed by: INTERNAL MEDICINE

## 2018-10-10 PROCEDURE — 92507 TX SP LANG VOICE COMM INDIV: CPT

## 2018-10-10 PROCEDURE — 99233 SBSQ HOSP IP/OBS HIGH 50: CPT | Performed by: INTERNAL MEDICINE

## 2018-10-10 PROCEDURE — 85025 COMPLETE CBC W/AUTO DIFF WBC: CPT | Performed by: INTERNAL MEDICINE

## 2018-10-10 PROCEDURE — 25010000002 VANCOMYCIN PER 500 MG

## 2018-10-10 PROCEDURE — 92526 ORAL FUNCTION THERAPY: CPT

## 2018-10-10 PROCEDURE — 94668 MNPJ CHEST WALL SBSQ: CPT

## 2018-10-10 PROCEDURE — 63710000001 PREDNISONE PER 1 MG: Performed by: INTERNAL MEDICINE

## 2018-10-10 PROCEDURE — 82962 GLUCOSE BLOOD TEST: CPT

## 2018-10-10 PROCEDURE — 94760 N-INVAS EAR/PLS OXIMETRY 1: CPT

## 2018-10-10 PROCEDURE — 71045 X-RAY EXAM CHEST 1 VIEW: CPT

## 2018-10-10 PROCEDURE — 94799 UNLISTED PULMONARY SVC/PX: CPT

## 2018-10-10 PROCEDURE — 80053 COMPREHEN METABOLIC PANEL: CPT | Performed by: INTERNAL MEDICINE

## 2018-10-10 PROCEDURE — 83735 ASSAY OF MAGNESIUM: CPT | Performed by: INTERNAL MEDICINE

## 2018-10-10 PROCEDURE — 94640 AIRWAY INHALATION TREATMENT: CPT

## 2018-10-10 PROCEDURE — 99232 SBSQ HOSP IP/OBS MODERATE 35: CPT | Performed by: HOSPITALIST

## 2018-10-10 RX ORDER — POTASSIUM CHLORIDE 750 MG/1
40 CAPSULE, EXTENDED RELEASE ORAL ONCE
Status: COMPLETED | OUTPATIENT
Start: 2018-10-10 | End: 2018-10-10

## 2018-10-10 RX ORDER — ONDANSETRON 2 MG/ML
4 INJECTION INTRAMUSCULAR; INTRAVENOUS EVERY 6 HOURS PRN
Status: DISCONTINUED | OUTPATIENT
Start: 2018-10-10 | End: 2018-10-18 | Stop reason: HOSPADM

## 2018-10-10 RX ADMIN — HYDROCORTISONE 2.5% 1 APPLICATION: 25 CREAM TOPICAL at 20:31

## 2018-10-10 RX ADMIN — BUSPIRONE HYDROCHLORIDE 15 MG: 15 TABLET ORAL at 21:36

## 2018-10-10 RX ADMIN — ASPIRIN 325 MG ORAL TABLET 162 MG: 325 PILL ORAL at 09:28

## 2018-10-10 RX ADMIN — AZTREONAM 2 G: 2 INJECTION, POWDER, LYOPHILIZED, FOR SOLUTION INTRAMUSCULAR; INTRAVENOUS at 09:28

## 2018-10-10 RX ADMIN — INSULIN LISPRO 2 UNITS: 100 INJECTION, SOLUTION INTRAVENOUS; SUBCUTANEOUS at 12:00

## 2018-10-10 RX ADMIN — ENOXAPARIN SODIUM 40 MG: 40 INJECTION SUBCUTANEOUS at 21:38

## 2018-10-10 RX ADMIN — VANCOMYCIN HYDROCHLORIDE 1000 MG: 1 INJECTION, SOLUTION INTRAVENOUS at 18:02

## 2018-10-10 RX ADMIN — MIDODRINE HYDROCHLORIDE 10 MG: 5 TABLET ORAL at 18:02

## 2018-10-10 RX ADMIN — INSULIN LISPRO 5 UNITS: 100 INJECTION, SOLUTION INTRAVENOUS; SUBCUTANEOUS at 21:37

## 2018-10-10 RX ADMIN — VANCOMYCIN HYDROCHLORIDE 1000 MG: 1 INJECTION, SOLUTION INTRAVENOUS at 05:29

## 2018-10-10 RX ADMIN — BUSPIRONE HYDROCHLORIDE 15 MG: 15 TABLET ORAL at 05:29

## 2018-10-10 RX ADMIN — ONDANSETRON HYDROCHLORIDE 4 MG: 2 INJECTION, SOLUTION INTRAMUSCULAR; INTRAVENOUS at 02:48

## 2018-10-10 RX ADMIN — INSULIN LISPRO 3 UNITS: 100 INJECTION, SOLUTION INTRAVENOUS; SUBCUTANEOUS at 18:02

## 2018-10-10 RX ADMIN — ROPINIROLE HYDROCHLORIDE 4 MG: 2 TABLET, FILM COATED ORAL at 21:36

## 2018-10-10 RX ADMIN — IPRATROPIUM BROMIDE AND ALBUTEROL SULFATE 3 ML: 2.5; .5 SOLUTION RESPIRATORY (INHALATION) at 12:15

## 2018-10-10 RX ADMIN — HYDROCORTISONE 2.5% 1 APPLICATION: 25 CREAM TOPICAL at 00:31

## 2018-10-10 RX ADMIN — ATORVASTATIN CALCIUM 40 MG: 40 TABLET, FILM COATED ORAL at 21:36

## 2018-10-10 RX ADMIN — PREDNISONE 40 MG: 20 TABLET ORAL at 09:28

## 2018-10-10 RX ADMIN — AZTREONAM 2 G: 2 INJECTION, POWDER, LYOPHILIZED, FOR SOLUTION INTRAMUSCULAR; INTRAVENOUS at 23:25

## 2018-10-10 RX ADMIN — FUROSEMIDE 20 MG: 20 TABLET ORAL at 18:02

## 2018-10-10 RX ADMIN — ACETYLCYSTEINE 3 ML: 200 SOLUTION ORAL; RESPIRATORY (INHALATION) at 08:17

## 2018-10-10 RX ADMIN — IPRATROPIUM BROMIDE AND ALBUTEROL SULFATE 3 ML: 2.5; .5 SOLUTION RESPIRATORY (INHALATION) at 15:41

## 2018-10-10 RX ADMIN — MIDODRINE HYDROCHLORIDE 10 MG: 5 TABLET ORAL at 11:59

## 2018-10-10 RX ADMIN — INSULIN DETEMIR 20 UNITS: 100 INJECTION, SOLUTION SUBCUTANEOUS at 21:36

## 2018-10-10 RX ADMIN — Medication 3 ML: at 20:31

## 2018-10-10 RX ADMIN — Medication 3 ML: at 09:29

## 2018-10-10 RX ADMIN — BUSPIRONE HYDROCHLORIDE 15 MG: 15 TABLET ORAL at 13:39

## 2018-10-10 RX ADMIN — IPRATROPIUM BROMIDE AND ALBUTEROL SULFATE 3 ML: 2.5; .5 SOLUTION RESPIRATORY (INHALATION) at 20:00

## 2018-10-10 RX ADMIN — POTASSIUM CHLORIDE 40 MEQ: 750 CAPSULE, EXTENDED RELEASE ORAL at 09:28

## 2018-10-10 RX ADMIN — AZTREONAM 2 G: 2 INJECTION, POWDER, LYOPHILIZED, FOR SOLUTION INTRAMUSCULAR; INTRAVENOUS at 16:22

## 2018-10-10 RX ADMIN — AZTREONAM 2 G: 2 INJECTION, POWDER, LYOPHILIZED, FOR SOLUTION INTRAMUSCULAR; INTRAVENOUS at 00:30

## 2018-10-10 RX ADMIN — Medication 400 MG: at 09:29

## 2018-10-10 RX ADMIN — IPRATROPIUM BROMIDE AND ALBUTEROL SULFATE 3 ML: 2.5; .5 SOLUTION RESPIRATORY (INHALATION) at 08:13

## 2018-10-10 RX ADMIN — INSULIN LISPRO 2 UNITS: 100 INJECTION, SOLUTION INTRAVENOUS; SUBCUTANEOUS at 09:29

## 2018-10-10 RX ADMIN — MIDODRINE HYDROCHLORIDE 10 MG: 5 TABLET ORAL at 09:29

## 2018-10-10 RX ADMIN — FUROSEMIDE 20 MG: 20 TABLET ORAL at 09:28

## 2018-10-10 NOTE — PROGRESS NOTES
Continued Stay Note   Sukumar     Patient Name: Nallely Junior  MRN: [id number]  Today's Date: 10/10/2018    Admit Date: 10/6/2018          Discharge Plan     Row Name 10/10/18 1053       Plan    Plan Back to [facility] long term care    Patient/Family in Agreement with Plan yes    Plan Comments Pt has a long term bed hold at [facility] for 10 more days. Spoke to Kelley at [facility] [phone]) and updated her on status. She will need [facility] scheduled for return. Denies any d/c needs at this time. CM will cont to follow.               Discharge Codes    No documentation.       Expected Discharge Date and Time     Expected Discharge Date Expected Discharge Time    Oct 10, 2018             Patrica Bryant

## 2018-10-10 NOTE — PLAN OF CARE
Problem: Patient Care Overview  Goal: Plan of Care Review   10/10/18 0938   Coping/Psychosocial   Plan of Care Reviewed With patient   Plan of Care Review   Progress improving

## 2018-10-10 NOTE — PROGRESS NOTES
[facility] Medicine Services  PROGRESS NOTE    Patient Name: Nallely Junior  : 1940  MRN: [id number]    Date of Admission: 10/6/2018  Length of Stay: 4  Primary Care Physician: Ray Strickland MD    Subjective   Subjective     CC:  pna     HPI:  Says she has had trach for about a yr.  Hasn't received morphine in several days.  Breathing seems dimenished    Review of Systems  Gen- No fevers, chills  CV- No chest pain, palpitations  Resp- No cough,pos dyspnea, trach   GI- No N/V/D, abd pain        Otherwise ROS is negative except as mentioned in the HPI.    Objective   Objective     Vital Signs:   Temp:  [97.9 °F (36.6 °C)-98.9 °F (37.2 °C)] 98.9 °F (37.2 °C)  Heart Rate:  [61-86] 82  Resp:  [18-28] 24  BP: (105-138)/(44-66) 110/54        Physical Exam:  Constitutional: No acute distress, awake, alert, trached  HENT: NCAT, mucous membranes moist  Respiratory: wheeze and coarse bs with trach, rhonchi b/l   Cardiovascular: RRR, no murmurs, rubs, or gallops, palpable pedal pulses bilaterally  Gastrointestinal: Positive bowel sounds, soft, nontender, nondistended  Musculoskeletal: No bilateral ankle edema  Psychiatric: Appropriate affect, cooperative  Neurologic: Oriented x 3, strength symmetric in all extremities, Cranial Nerves grossly intact to confrontation, speech clear  Skin: No rashes        Results Reviewed:  I have personally reviewed current lab, radiology, and data and agree.      Results from last 7 days  Lab Units 10/10/18  0436 10/09/18  0525 10/08/18  0449   WBC 10*3/mm3 13.56* 8.90 9.28   HEMOGLOBIN g/dL 12.1 11.5 11.3*   HEMATOCRIT % 38.2 36.5 36.8   PLATELETS 10*3/mm3 291 235 197       Results from last 7 days  Lab Units 10/10/18  0436 10/09/18  0525 10/08/18  0449  10/06/18  2031   SODIUM mmol/L 136 133 138  < > 140  140   POTASSIUM mmol/L 3.3* 3.6 3.5  < > 3.5  3.5   CHLORIDE mmol/L 95* 95* 96*  < > 107  107   CO2 mmol/L 37.0* 34.0* 35.0*  < > 28.0  28.0   BUN mg/dL  19 15 15  < > 15  15   CREATININE mg/dL 0.55* 0.50* 0.40*  < > 0.54*  0.54*   GLUCOSE mg/dL 218* 302* 124*  < > 226*  226*   CALCIUM mg/dL 8.3* 8.2* 8.1*  < > 8.8  8.8   ALT (SGPT) U/L 57* 45*  --   --  44*   AST (SGOT) U/L 46* 43*  --   --  63*   < > = values in this interval not displayed.  Estimated Creatinine Clearance: 59.6 mL/min (A) (by C-G formula based on SCr of 0.55 mg/dL (L)).  No results found for: BNP    Microbiology Results Abnormal     Procedure Component Value - Date/Time    Wound Culture - Wound, Abdominal Wall [[id number]]  (Abnormal)  (Susceptibility) Collected:  10/07/18 0109    Lab Status:  Preliminary result Specimen:  Wound from Abdominal Wall Updated:  10/10/18 1135     Wound Culture Moderate growth (3+) Proteus mirabilis (A)      Light growth (2+) Enterococcus species (A)      Light growth (2+) Staphylococcus aureus (A)     Gram Stain Result No WBCs seen      Few (2+) Gram positive bacilli      Many (4+) Yeast    Susceptibility      Proteus mirabilis     GONSALO (Preliminary)     Amikacin <=16 ug/ml Susceptible     Ampicillin >16 ug/ml Resistant     Ampicillin + Sulbactam 16/8 ug/ml Intermediate     Aztreonam <=8 ug/ml Susceptible     Cefepime <=8 ug/ml Susceptible     Cefotaxime <=2 ug/ml Susceptible     Ceftriaxone <=8 ug/ml Susceptible     Cefuroxime sodium <=4 ug/ml Susceptible     Ciprofloxacin >2 ug/ml Resistant     Ertapenem <=1 ug/ml Susceptible     Gentamicin >8 ug/ml Resistant     Levofloxacin 4 ug/ml Intermediate     Meropenem <=1 ug/ml Susceptible     Piperacillin + Tazobactam <=16 ug/ml Susceptible     Tobramycin >8 ug/ml Resistant     Trimethoprim + Sulfamethoxazole >2/38 ug/ml Resistant                    Respiratory Culture - Sputum, NT Suction [[id number]]  (Abnormal)  (Susceptibility) Collected:  10/07/18 0108    Lab Status:  Preliminary result Specimen:  Sputum from NT Suction Updated:  10/10/18 0801     Respiratory Culture Scant growth (1+) Yeast isolated (A)      Scant  growth (1+) Staphylococcus aureus, MRSA (A)     Comment:   Methicillin resistant Staphylococcus aureus, Patient may be an isolation risk.        Gram Stain Result Many (4+) WBCs per low power field      Rare (1+) Epithelial cells per low power field      No organisms seen    Susceptibility      Staphylococcus aureus, MRSA     GONSALO (Preliminary)     Ciprofloxacin >2 ug/ml Resistant     Clindamycin >4 ug/ml Resistant     Erythromycin >4 ug/ml Resistant     Gentamicin <=4 ug/ml Susceptible     Levofloxacin >4 ug/ml Resistant     Linezolid 2 ug/ml Susceptible     Oxacillin >2 ug/ml Resistant     Penicillin G >8 ug/ml Resistant     Quinupristin + Dalfopristin <=0.5 ug/ml Susceptible     Rifampin <=1 ug/ml Susceptible     Tetracycline <=4 ug/ml Susceptible     Trimethoprim + Sulfamethoxazole <=0.5/9.5 ug/ml Susceptible     Vancomycin 2 ug/ml Susceptible                    Blood Culture - Blood, [[id number]]  (Normal) Collected:  10/06/18 2020    Lab Status:  Preliminary result Specimen:  Blood from Arm, Right Updated:  10/09/18 2100     Blood Culture No growth at 3 days          Imaging Results (last 24 hours)     Procedure Component Value Units Date/Time    XR Chest 1 View [[id number]] Collected:  10/10/18 0953     Updated:  10/10/18 0955    Narrative:       EXAMINATION: XR CHEST 1 VW- 10/10/2018     INDICATION: Pneumonia, atelectasis; Z74.09-Other reduced mobility;  R13.12-Dysphagia, oropharyngeal phase     TECHNIQUE:  Single view frontal chest.     COMPARISONS: 10/09/2018     FINDINGS:  Stable support apparatus. Unchanged small volume left and  trace right pleural effusion. No pneumothorax. ORIF hardware right  proximal humerus. Cardiomediastinal silhouette is unchanged.        Impression:       Stable exam.     D:  10/10/2018  E:  10/10/2018     This report was finalized on 10/10/2018 9:53 AM by Sonny Perez.                I have reviewed the medications.      acetylcysteine 3 mL Nebulization BID - RT   aspirin 162 mg  Oral Daily   atorvastatin 40 mg Oral Nightly   aztreonam 2 g Intravenous Q8H   busPIRone 15 mg Oral Q8H   enoxaparin 40 mg Subcutaneous Q24H   furosemide 20 mg Oral BID   hydrocortisone  Rectal BID   insulin detemir 20 Units Subcutaneous Nightly   insulin lispro 0-7 Units Subcutaneous 4x Daily With Meals & Nightly   ipratropium-albuterol 3 mL Nebulization 4x Daily - RT   midodrine 10 mg Oral TID AC   predniSONE 40 mg Oral Daily With Breakfast   rOPINIRole 4 mg Oral Nightly   sodium chloride 3 mL Intravenous Q12H   vancomycin 1,000 mg Intravenous Q12H         Assessment/Plan   Assessment / Plan     Hospital Problem List     Pneumonia    Type 2 diabetes mellitus (CMS/[facility])    Hypotension    Overview Signed 10/7/2018  3:25 PM by Meme Acevedo APRN     on midodrine         Hyperlipidemia    Depression    Anxiety    COPD (chronic obstructive pulmonary disease) (CMS/[facility])    Dysphagia    Overview Signed 10/7/2018  4:13 PM by Meme Acevedo APRN     Was on mechanical soft and nectar thick at SNF, noncompliant          Coronary artery disease    CHF (congestive heart failure) (CMS/[facility])    GERD (gastroesophageal reflux disease)    Chronic respiratory failure with hypoxia and hypercapnia (CMS/[facility])             Brief Hospital Course to date:  Nallely Junior is a 78 y.o. female  with history of chronic respiratory failure s/p tracheostomy, HLD, T2DM, bedbound state who presents as a transfer/direct admission from [facility] for nonresolving PNA, mucus plugging with opacification of left lung and tracheostomy issues. Patient was admitted to OSH on 10/2 from her NH, for several days of sob, cough, increasing amount of sputum. Patient was initially diagnosed with left sided PNA at OSH, treated with broad spectrum abx- merrem, vanc, levaquin since 10/2. Patient was also noted to be hypotensive to as low as 90s systolic on presentation, though this resolved. Sputum cx from admission was  reported to grow MRSA, sensitivities pending, as well as GNR (speciations/sensitivities pending). Patient was reported to initially be improving as WBC noted on admission 20k--- went down to 10k on day of transfer (10/6), however repeat CXR from 10/3-10/4 revealed worsening opacification of left hemithorax concerning for mucus plugging, also noted reports of small left sided plueral effusion. [facility] was called for transfer, however no beds were available until this time. unclear what patient's home baseline oxygen requirement is, she says no oxygen at home prior but per sister ,the patient fell about a year and a half ago and broke her leg.  She went to  and had to have surgery.  She was failure to wean from the vent after a month, so she had a trach and PEG placed.  She now is a NHR      Assessment & Plan:  Acute on chronic respiratory failure  PNA, MRSA/GNR ,LLL, BETTIE, RLL PNA on CT   Mucus plugging   Possible aspiration   -- patient was being treated with IV vanc/merrem/levaquin at OSH  -- sputum cx had resulted with MRSA/GNR  --continue  vanc/azactam (PCN allergy) here for now which should cover both MRSA/GNR, Likely through 10/20/18. ID consulted and following.  pulm following. No need for bronch at this point per pulm  - continue scheduled nebs, steroids (patient was on TID solumedrol at OSH prior to transfer)  - speech consulted and mech soft with nectar thick liquids.    --Mucomyst nebs  --Chest Physiotherapy  --cxr in am         T2DM  - home meds per OSH med rec list 30units levemir, increase to 20 units qhs as well as SSI, elevated today      HLD  - continue statin     RLS  - continue ropinirole     Depression  - continue buspar     H/o Hypotension  - continue home midodrine, closely monitor     Elevated Lactic Acid   --recheck mei     Incentive spirometer  Likely need CPAP but unclear how to interface with trach      DVT prophylaxis: Lovenox     CODE STATUS:   Code Status and Medical Interventions:    Ordered at: 10/06/18 1946     Level Of Support Discussed With:    Patient     Code Status:    CPR     Medical Interventions (Level of Support Prior to Arrest):    Full       Disposition: I expect the patient to be discharged tbd      Electronically signed by Jovanny Michael MD, 10/10/18, 12:33 PM.

## 2018-10-10 NOTE — PLAN OF CARE
Problem: Patient Care Overview  Goal: Plan of Care Review  Outcome: Ongoing (interventions implemented as appropriate)   10/10/18 1800   Coping/Psychosocial   Plan of Care Reviewed With patient   SLP treatment completed. Will continue to address pharyngeal dysphagia and PMV tolerance in tx. Please see note for further details and recommendations.

## 2018-10-10 NOTE — PROGRESS NOTES
INPATIENT PULMONARY SERVICE  PROGRESS NOTE     Hospital LOS: 4 days    Ms. Nallely Junior, is followed for a Chief Complaint of: Non-resolving pneumonia, shortness of breath      Pneumonia    Type 2 diabetes mellitus (CMS/[facility])    Hypotension    Hyperlipidemia    Depression    Anxiety    COPD (chronic obstructive pulmonary disease) (CMS/[facility])    Dysphagia    Coronary artery disease    CHF (congestive heart failure) (CMS/[facility])    GERD (gastroesophageal reflux disease)    Chronic respiratory failure with hypoxia and hypercapnia (CMS/[facility])      Subjective   S   Mrs. Junior is a 79 y/o WF who was transferred to [facility] from [facility] yesterday for pneumonia.  She had a 4 day c/o shortness of air and was taken to [facility].  She was found to have a UTI and LLL PNA.  Per records, her sputum was pending but was positive for GNR and Staph Aureus.  She was given Zosyn, Vanc and Merrem.      She is a very poor historian.  Per RN, her sister stated that the patient fell about a year and a half ago and broke her leg. She went to  and had to have surgery.  She was failure to wean from the vent after a month, so she had a trach and PEG placed.  She now is a NHR.  She does have dysphagia and is supposed to be on mechanical soft/nectar thick diet, but she is noncompliant. Her family brings her food and she is found drinking thin liquids often.  She still has her PEG that is not used.      Since admission, she has been continued on Vancomycin and Aztreonam. Sputum culture here with only scant growth of yeast. She was started on Mucomyst and chest physiotherapy on 10/7.     Interval History:  No events overnight. Continues to have lots of sputum production.         The patient's relevant past medical, surgical and social history were reviewed and updated in Epic as appropriate.      ROS:   Constitutional: Negative for fever.   Respiratory: Positive for dyspnea.   Cardiovascular: Negative for chest pain.   Gastrointestinal:  Negative for  nausea, vomiting and diarrhea.     Objective   O     Vitals  Temp  Min: 97.9 °F (36.6 °C)  Max: 98.9 °F (37.2 °C)  BP  Min: 105/44  Max: 138/66  Pulse  Min: 65  Max: 86  Resp  Min: 18  Max: 28  SpO2  Min: 90 %  Max: 98 % Flow (L/min)  Min: 12  Max: 15    I/O 24 HR (7:00 AM-6:59AM):  Intake/Output       10/09/18 0700 - 10/10/18 0659 10/10/18 0700 - 10/11/18 0659    Intake (ml) 1415 120    Output (ml) 2150 --    Net (ml) -735 120          Medications (Drips):    Pharmacy Consult   Pharmacy to dose vancomycin       Physical Examination    Telemetry: Normal sinus rhythm.    Constitutional:  No acute distress.  Lying in bed.    Cardiovascular: Regular rate and rhythm.  No murmurs, rub or gallop.   Respiratory: Normal symmetric chest expansion.  Normal respiratory effort.  Upper airway rhonchi.   Copious secretions from the trach.    Abdominal:  Soft. No masses.   Non-tender. No distension.   No hepatosplenomegaly.   Extremities: No digital cyanosis or clubbing.  No peripheral edema.   Neurological:   Alert and Oriented to person, place, and time.   Moves all extremities.              Results from last 7 days  Lab Units 10/10/18  0436 10/09/18  0525 10/08/18  0449   WBC 10*3/mm3 13.56* 8.90 9.28   HEMOGLOBIN g/dL 12.1 11.5 11.3*   MCV fL 92.0 94.6 95.3   PLATELETS 10*3/mm3 291 235 197       Results from last 7 days  Lab Units 10/10/18  0436 10/09/18  0525 10/08/18  0449   SODIUM mmol/L 136 133 138   POTASSIUM mmol/L 3.3* 3.6 3.5   CO2 mmol/L 37.0* 34.0* 35.0*   CREATININE mg/dL 0.55* 0.50* 0.40*   MAGNESIUM mg/dL 1.8 2.0 1.9     Serum creatinine: 0.55 mg/dL (L) 10/10/18 0436  Estimated creatinine clearance: 59.6 mL/min (A)    Results from last 7 days  Lab Units 10/10/18  0436 10/09/18  0525 10/06/18  2031   ALK PHOS U/L 83 79 78   BILIRUBIN mg/dL 0.6 0.6 0.4   ALT (SGPT) U/L 57* 45* 44*   AST (SGOT) U/L 46* 43* 63*             Images:     Imaging Results (last 24 hours)     Procedure Component Value Units  Date/Time    XR Chest 1 View [[id number]] Collected:  10/10/18 0953     Updated:  10/10/18 0955    Narrative:       EXAMINATION: XR CHEST 1 VW- 10/10/2018     INDICATION: Pneumonia, atelectasis; Z74.09-Other reduced mobility;  R13.12-Dysphagia, oropharyngeal phase     TECHNIQUE:  Single view frontal chest.     COMPARISONS: 10/09/2018     FINDINGS:  Stable support apparatus. Unchanged small volume left and  trace right pleural effusion. No pneumothorax. ORIF hardware right  proximal humerus. Cardiomediastinal silhouette is unchanged.        Impression:       Stable exam.     D:  10/10/2018  E:  10/10/2018     This report was finalized on 10/10/2018 9:53 AM by Sonny Perez.             I reviewed the patient's new clinical results.  I reviewed the patient's new imaging results and agree with the interpretation.    Assessment/Plan   A / P     Ms. Junior is a 77yo F who is admitted with acute on chronic respiratory failure secondary to non-resolving pneumonia and mucous plugging. She was started on mucomyst and chest physiotherapy on 10/7. She has remained on broad spectrum antibiotics for GNR and staph isolated from sputum at the OSH. Cultures here are growing scant yeast. She has a history of dysphagia with noncompliance and is had an MBS performed on 10/8. CXR performed this AM shows improvement in her left sided airspace disease/mucous plugging.     Diet Dysphagia; IV - Mechanical Soft No Mixed Consistencies; Nectar / Syrup Thick; Consistent Carbohydrate  Code Status and Medical Interventions:   Ordered at: 10/06/18 1946     Level Of Support Discussed With:    Patient     Code Status:    CPR     Medical Interventions (Level of Support Prior to Arrest):    Full       Active Hospital Problems    Diagnosis   • Hypotension     on midodrine     • Hyperlipidemia   • Depression   • Anxiety   • COPD (chronic obstructive pulmonary disease) (CMS/[facility])   • Dysphagia     Was on mechanical soft and nectar thick at SNF, noncompliant       • Coronary artery disease   • CHF (congestive heart failure) (CMS/[facility])   • GERD (gastroesophageal reflux disease)   • Chronic respiratory failure with hypoxia and hypercapnia (CMS/[facility])   • Pneumonia   • Type 2 diabetes mellitus (CMS/[facility])       Assessment / Plan:  1. Decrease frequency of chest percussion to 3x daily.   2. D/c mucomyst. If she has evidence of mucous plugging, will need to resume.    3. Continue antibiotics per ID.   4. Modified diet. Monitor for aspiration.   5. Wean supplemental O2 as tolerated. She will need to continue humidified air.     I discussed the patient's findings and my recommendations with patient    Time: 35 minutes       Alva Pinedo, DO  Pulmonary and Critical Care Medicine

## 2018-10-10 NOTE — THERAPY TREATMENT NOTE
Acute Care - Speech Language Pathology Treatment Note  [facility]     Patient Name: Nallely Junior  : 1940  MRN: [id number]  Today's Date: 10/10/2018         Admit Date: 10/6/2018    Visit Dx:      ICD-10-CM ICD-9-CM   1. Impaired mobility and ADLs Z74.09 799.89   2. Oropharyngeal dysphagia R13.12 787.22   3. Impaired functional mobility, balance, gait, and endurance Z74.09 V49.89   4. Aspiration pneumonia of left lower lobe, unspecified aspiration pneumonia type (CMS/HCC) J69.0 507.0   5. Voice and resonance disorder R49.9 784.40     Patient Active Problem List   Diagnosis   • Pneumonia   • Type 2 diabetes mellitus (CMS/HCC)   • Hypotension   • Hyperlipidemia   • Depression   • Anxiety   • COPD (chronic obstructive pulmonary disease) (CMS/[facility])   • Dysphagia   • Coronary artery disease   • CHF (congestive heart failure) (CMS/[facility])   • GERD (gastroesophageal reflux disease)   • Chronic respiratory failure with hypoxia and hypercapnia (CMS/[facility])        Therapy Treatment        Rehabilitation Treatment Summary     Row Name 10/10/18 1545             Treatment Time/Intention    Discipline speech language pathologist  -AC      Document Type therapy note (daily note)  -AC      Subjective Information no complaints  -AC      Patient/Family Observations Pt was alert, cooperative. No family present.  -AC      Care Plan Review evaluation/treatment results reviewed;care plan/treatment goals reviewed;risks/benefits reviewed;current/potential barriers reviewed;patient/other agree to care plan  -AC      Therapy Frequency (Swallow) 5 days per week  -AC      Therapy Frequency (SLP SLC) 5 days per week  -AC      Patient Effort good  -AC      Recorded by [AC] Yola Khan MS CCC-SLP 10/10/18 1756      Row Name 10/10/18 1545             Pain Assessment    Additional Documentation Pain Scale: Numbers Pre/Post-Treatment (Group)  -AC      Recorded by [AC] Yola Khan MS CCC-SLP 10/10/18 0921      Row Name 10/10/18 1547              Pain Scale: Numbers Pre/Post-Treatment    Pain Scale: Numbers, Pretreatment 0/10 - no pain  -AC      Pain Scale: Numbers, Post-Treatment 0/10 - no pain  -AC      Recorded by [] Yola Khan MS CCC-SLP 10/10/18 5816      Row Name 10/10/18 1887             Outcome Summary/Treatment Plan (SLP)    Daily Summary of Progress (SLP) progress toward functional goals is good  -AC      Barriers to Overall Progress (SLP) Pt appears anxious to use PMV.  -AC      Plan for Continued Treatment (SLP) Cont dysphagia level IV diet & nectar-thick liquids (PMV off w/ meals). May have couple small sips of thin liquid between meals if wearing and tolerating PMV w/ supervision. Encourage PMV use for communication w/ RN/RT/SLP supervision. Pt would benefit from further SLP services for PMV & dysphagia tx.   -AC      Anticipated Dischage Disposition unknown;anticipate therapy at next level of care  -AC      Recorded by [AC] Yola Khan MS CCC-SLP 10/10/18 7205        User Key  (r) = Recorded By, (t) = Taken By, (c) = Cosigned By    Initials Name Effective Dates Discipline    AC Yola Khan, MS CCC-SLP 07/27/17 -  SLP            EDUCATION  The patient has been educated in the following areas:   Speaking Valve Dysphagia (Swallowing Impairment) Oral Care/Hydration Modified Diet Instruction.    SLP Recommendation and Plan  Anticipated Dischage Disposition: unknown, anticipate therapy at next level of care     Therapy Frequency (Swallow): 5 days per week    Plan of Care Reviewed With: patient  Plan of Care Review  Plan of Care Reviewed With: patient  Daily Summary of Progress (SLP): progress toward functional goals is good  Plan for Continued Treatment (SLP): Cont dysphagia level IV diet & nectar-thick liquids (PMV off w/ meals). May have couple small sips of thin liquid between meals if wearing and tolerating PMV w/ supervision. Encourage PMV use for communication w/ RN/RT/SLP supervision. Pt would benefit from further SLP  services for PMV & dysphagia tx.           SLP GOALS     Row Name 10/10/18 1545 10/08/18 1130          Oral Nutrition/Hydration Goal 1 (SLP)    Oral Nutrition/Hydration Goal 1, SLP LTG: Pt will tolerate soft diet and thin liquids w/ 100% accuracy w/o cues.   -AC LTG: Pt will tolerate soft diet and thin liquids w/ 100% accuracy w/o cues.   -RD     Time Frame (Oral Nutrition/Hydration Goal 1, SLP) by discharge  -AC by discharge  -RD     Progress/Outcomes (Oral Nutrition/Hydration Goal 1, SLP) continuing progress toward goal  -AC  --        Oral Nutrition/Hydration Goal 2 (SLP)    Oral Nutrition/Hydration Goal 2, SLP Pt will tolerate nectar-thick and Level 4-dys mech soft (w/ PMV off) w/ no overt s/s of aspiration w/ 100% acc w/o cues  -AC Pt will tolerate nectar-thick and Level 4-dys mech soft (w/ PMV off) w/ no overt s/s of aspiration w/ 100% acc w/o cues  -RD     Time Frame (Oral Nutrition/Hydration Goal 2, SLP) short term goal (STG);by discharge  -AC short term goal (STG);by discharge  -RD     Progress/Outcomes (Oral Nutrition/Hydration Goal 2, SLP) goal ongoing  -AC  --        Lingual Strengthening Goal 1 (SLP)    Activity (Lingual Strengthening Goal 1, SLP)  -- increase tongue back strength  -RD     Increase Tongue Back Strength lingual resistance exercises  -AC lingual resistance exercises  -RD     [place]/Accuracy (Lingual Strengthening Goal 1, SLP) with minimal cues (75-90% accuracy)  -AC with minimal cues (75-90% accuracy)  -RD     Time Frame (Lingual Strengthening Goal 1, SLP) short term goal (STG);by discharge  -AC short term goal (STG);by discharge  -RD     Progress/Outcomes (Lingual Strengthening Goal 1, SLP) goal ongoing  -AC  --        Pharyngeal Strengthening Exercise Goal 1 (SLP)    Activity (Pharyngeal Strengthening Goal 1, SLP)  -- increase timing;increase superior movement of the hyolaryngeal complex;increase anterior movement of the hyolaryngeal complex;increase closure at entrance to  airway/closure of airway at glottis  -RD     Increase Timing prepping - 3 second prep or suck swallow or 3-step swallow  -AC prepping - 3 second prep or suck swallow or 3-step swallow  -RD     Increase Superior Movement of the Hyolaryngeal Complex supraglottic swallow  -AC supraglottic swallow  -RD     Increase Anterior Movement of the Hyolaryngeal Complex chin tuck against resistance (CTAR)  -AC chin tuck against resistance (CTAR)  -RD     Increase Closure at Entrance to Airway/Closure of Airway at Glottis super-supraglottic swallow  -AC super-supraglottic swallow  -RD     [place]/Accuracy (Pharyngeal Strengthening Goal 1, SLP) with minimal cues (75-90% accuracy)  -AC with minimal cues (75-90% accuracy)  -RD     Time Frame (Pharyngeal Strengthening Goal 1, SLP) short term goal (STG);by discharge  -AC short term goal (STG);by discharge  -RD     Progress/Outcomes (Pharyngeal Strengthening Goal 1, SLP) goal ongoing  -AC  --        Swallow Compensatory Strategies Goal (SLP)    Swallow Compensatory Strategies/Techniques Goal (SLP) Pt will tolerate trials of thins via cup only w/ PMV on w/ no overt s/s of aspiration or significant changes in vital signs w/ 100% acc w/o cues  -AC Pt will tolerate trials of thins via cup only w/ PMV on w/ no overt s/s of aspiration or significant changes in vital signs w/ 100% acc w/o cues  -RD     Time Frame (Swallow Compensatory Strategies/Techniques Goal, SLP) short term goal (STG);by discharge  -AC short term goal (STG);by discharge  -RD     Barriers (Swallow Compensatory Strategies/Techniques Goal, SLP) Tolerated tsp-sized cup sips x4 w/ no overt s/sxs aspiration or change in VS.   -AC  --     Progress/Outcomes (Swallow Compensatory Strategies/Techniques Goal, SLP) good progress toward goal  -AC  --        Tolerate Speaking Valve Placement Goal 1 (SLP)    Tolerate Speaking Valve Placement Goal 1 (SLP) speaking valve 20min;100%;independently (over 90% accuracy)  -AC speaking valve  "20min;100%;independently (over 90% accuracy)  -RD     Time Frame (Tolerate Speaking Valve Placement Goal 1, SLP) short term goal (STG);by discharge  -AC short term goal (STG);by discharge  -RD     Progress (Tolerate Speaking Valve Placement Goal 1, SLP) 50%;with minimal cues (75-90%)  -AC 40%;with minimal cues (75-90%)  -RD     Progress/Outcomes (Tolerate Speaking Valve Placement Goal 1, SLP) continuing progress toward goal  -AC continuing progress toward goal  -RD     Comment (Tolerate Speaking Valve Placement Goal 1, SLP) Required tracheal suctioning prior to PMV placement (appeared that inner cannula was clogged). Pt w/ initial coughing w/ PMV placement, but able to tolerate for ~10 min. Became anxious @ one point (\"I'm not getting oxygen\"), though O2 was stable @ 96%. When distracted w/ personal ?s, pt appeared to tolerate PMV w/o difficulty.  -AC Tolerated for ~ 5min then observed w/ SOA and had to remove  -RD        Additional Goal 1 (SLP)    Additional Goal 1, SLP LTG: Comunicate wants/needs with PMV speaking valve while in hospital setting with 100% accruacy and no cues  -AC LTG: Comunicate wants/needs with PMV speaking valve while in hospital setting with 100% accruacy and no cues  -RD     Time Frame (Additional Goal 1, SLP) by discharge  -AC by discharge  -RD     Progress/Outcomes (Additional Goal 1, SLP) continuing progress toward goal  -AC continuing progress toward goal  -RD       User Key  (r) = Recorded By, (t) = Taken By, (c) = Cosigned By    Initials Name Provider Type    Yola Lin MS CCC-SLP Speech and Language Pathologist    Stacey Patton MS CCC-SLP Speech and Language Pathologist          Time Calculation:           Time Calculation- SLP     Row Name 10/10/18 1801             Time Calculation- SLP    SLP Start Time 1545  -      SLP Received On 10/10/18  -AC        User Key  (r) = Recorded By, (t) = Taken By, (c) = Cosigned By    Initials Name Provider Type    Yola Lin " S, MS CCC-SLP Speech and Language Pathologist          Therapy Charges for Today     Code Description Service Date Service Provider Modifiers Qty    [id number]  ST TREATMENT SPEECH 2 10/10/2018 Yola Khan, MS CCC-SLP GN 1    [id number]  ST TREATMENT SWALLOW 1 10/10/2018 Yola Khan, MS BRICE-SLP GN 1                     Yola Khan MS CCC-SLP  10/10/2018

## 2018-10-10 NOTE — PROGRESS NOTES
[facility] Progress Note    Admission Date: 10/6/2018    Nallely Junior  1940  [id number]    Date: 10/10/2018    Antibiotics:  Anti-Infectives     Ordered     Dose/Rate Route Frequency Start Stop    10/09/18 0723  vancomycin (VANCOCIN) in iso-osmotic dextrose IVPB 1 g (premix) 200 mL     Ordering Provider:  Jan Martínez RPH    1,000 mg  over 60 Minutes Intravenous Every 12 Hours 10/09/18 1800 10/20/18 1759    10/09/18 0723  aztreonam (AZACTAM) 2 g in sodium chloride 0.9 % 100 mL IVPB-MBP     Ordering Provider:  Jan Martínez RPH    2 g  over 4 Hours Intravenous Every 8 Hours 10/09/18 0800 10/20/18 0814    10/09/18 0723  Pharmacy to dose vancomycin     Ordering Provider:  Jan Martínez RPH     Does not apply Continuous PRN 10/09/18 0721 10/20/18 0720    10/06/18 2328  aztreonam (AZACTAM) 2 g in sodium chloride 0.9 % 100 mL IVPB-MBP     Ordering Provider:  Olivia Murphy MD    2 g  200 mL/hr over 30 Minutes Intravenous Once 10/07/18 0015 10/07/18 0146    10/06/18 2023  vancomycin 2000 mg/500 mL 0.9% NS IVPB ([facility])     Ordering Provider:  Olivia Murphy MD    2,000 mg  over 120 Minutes Intravenous Once 10/06/18 2115 10/07/18 0138             CC:  Pneumonia, MRSA (sensitive to tetracycline and Bactrim)    HPI:  Patient is a 78 y.o.  Yr old female with dementia, diabetes mellitus type 2, chronic respiratory failure status post tracheostomy,hypertension, hyperlipidemia, bedbound, previous critical aortic stenosis, dysphagia, diastolic dysfunction, COPD, restless leg, GERD, anxiety, CAD, ho was recently admitted to [facility] for pneumonia and mucous plugging with opacification of the left lung. Patient is a poor historian.The patienthad been treated with meropenem, vancomycin, and levofloxacin since 10/2/18. She was transferred to [facility] on 10/6/18 with non-resolving pneumonia. Left hemithorax continued to be opacified.  I was consulted on 10/8/18 for  further evaluation and treatment.  No known immunocompromised state, travel history, zoonotic exposures,TB, or HIV.  10/9/18 hx rev.  Some sob.  Zulay vanc and aztreonam till 10/20 for pneum.  GNR in sputum.  No fever.  10/10/18 history reviewed. Tolerating vancomycin and aztreonam until 10/20 for pneumonia.  No high fevers. Minimal shortness of breath.    ROS:  No f/c/s. No n/v/d. No rash. No new ADR to Abx.     Constitutional-- No Fever, chills or sweats.  Appetite good, and no malaise. No fatigue.  Heent-- No new vision, hearing or throat complaints.  No epistaxis or oral sores.  Denies odynophagia or dysphagia.  No flashers, floaters or eye pain. No odynophagia or dysphagia. No headache, photophobia or neck stiffness.  CV-- No chest pain, palpitation or syncope  Resp-- Some SOB/cough/Hemoptysis, no wheezing  GI- No nausea, vomiting, or diarrhea.  No hematochezia, melena, or hematemesis. Denies jaundice or chronic liver disease.  -- No dysuria, hematuria, or flank pain.  Denies hesitancy, urgency or flank pain.  Lymph- no swollen lymph nodes in neck/axilla or groin.   Heme- No active bruising or bleeding; no Hx of DVT or PE.  MS-- no swelling or pain in the bones or joints of arms/legs.  No new back pain.  Neuro-- No acute focal weakness or numbness in the arms or legs.  No seizures.  Skin--No rash, nodules, blisters.    Objective   PE:  Vital Signs  Temp  Min: 96.5 °F (35.8 °C)  Max: 98.7 °F (37.1 °C)  BP  Min: 105/44  Max: 138/66  Pulse  Min: 61  Max: 86  Resp  Min: 18  Max: 28  SpO2  Min: 95 %  Max: 98 %    GENERAL: Awake and alert, in mod distress. Appears older than stated age.  HEENT: Normocephalic, atraumatic.  EOMI. No conjunctival injection. No icterus. Oropharynx clear without evidence of thrush or exudate. No evidence of peridontal disease.    NECK: Supple without nuchal rigidity. No mass.  LYMPH: No cervical, axillary or inguinal lymphadenopathy.  HEART: RRR; No murmur, rubs, gallops. No JVD.  LUNGS:  rales left lung. Normal respiratory effort. Nonlabored. No dullness.no wheeze.  ABDOMEN: Soft, nontender, nondistended. Positive bowel sounds. No rebound or guarding. NO mass or HSM.  Obese.  EXT:  No cyanosis, clubbing or edema. No cord.  MSK: FROM without joint effusions noted arms/legs.    SKIN: Warm and dry without cutaneous eruptions on Inspection/palpation.    NEURO: Oriented to name. No focal deficits on motor/sensory exam at arms/legs.    Laboratory Data      Results from last 7 days  Lab Units 10/10/18  0436 10/09/18  0525 10/08/18  0449   WBC 10*3/mm3 13.56* 8.90 9.28   HEMOGLOBIN g/dL 12.1 11.5 11.3*   HEMATOCRIT % 38.2 36.5 36.8   PLATELETS 10*3/mm3 291 235 197       Results from last 7 days  Lab Units 10/10/18  0436   SODIUM mmol/L 136   POTASSIUM mmol/L 3.3*   CHLORIDE mmol/L 95*   CO2 mmol/L 37.0*   BUN mg/dL 19   CREATININE mg/dL 0.55*   GLUCOSE mg/dL 218*   CALCIUM mg/dL 8.3*       Results from last 7 days  Lab Units 10/10/18  0436   ALK PHOS U/L 83   BILIRUBIN mg/dL 0.6   ALT (SGPT) U/L 57*   AST (SGOT) U/L 46*               Results from last 7 days  Lab Units 10/09/18  0525   CK TOTAL U/L 31       Results from last 7 days  Lab Units 10/09/18  0525   VANCOMYCIN TR mcg/mL 9.80*       Results from last 7 days  Lab Units 10/09/18  1037   LACTATE mmol/L 1.7     Estimated Creatinine Clearance: 59.6 mL/min (A) (by C-G formula based on SCr of 0.55 mg/dL (L)).      Microbiology:  Microbiology Results Abnormal     Procedure Component Value - Date/Time    Respiratory Culture - Sputum, NT Suction [[id number]]  (Abnormal)  (Susceptibility) Collected:  10/07/18 0108    Lab Status:  Preliminary result Specimen:  Sputum from NT Suction Updated:  10/10/18 0801     Respiratory Culture Scant growth (1+) Yeast isolated (A)      Scant growth (1+) Staphylococcus aureus, MRSA (A)     Comment:   Methicillin resistant Staphylococcus aureus, Patient may be an isolation risk.        Gram Stain Result Many (4+) WBCs per low  power field      Rare (1+) Epithelial cells per low power field      No organisms seen    Susceptibility      Staphylococcus aureus, MRSA     GONSALO (Preliminary)     Ciprofloxacin >2 ug/ml Resistant     Clindamycin >4 ug/ml Resistant     Erythromycin >4 ug/ml Resistant     Gentamicin <=4 ug/ml Susceptible     Levofloxacin >4 ug/ml Resistant     Linezolid 2 ug/ml Susceptible     Oxacillin >2 ug/ml Resistant     Penicillin G >8 ug/ml Resistant     Quinupristin + Dalfopristin <=0.5 ug/ml Susceptible     Rifampin <=1 ug/ml Susceptible     Tetracycline <=4 ug/ml Susceptible     Trimethoprim + Sulfamethoxazole <=0.5/9.5 ug/ml Susceptible     Vancomycin 2 ug/ml Susceptible                    Blood Culture - Blood, [[id number]]  (Normal) Collected:  10/06/18 2020    Lab Status:  Preliminary result Specimen:  Blood from Arm, Right Updated:  10/09/18 2100     Blood Culture No growth at 3 days    Wound Culture - Wound, Abdominal Wall [[id number]]  (Abnormal)  (Susceptibility) Collected:  10/07/18 0109    Lab Status:  Preliminary result Specimen:  Wound from Abdominal Wall Updated:  10/09/18 1136     Wound Culture Moderate growth (3+) Proteus mirabilis (A)     Gram Stain Result No WBCs seen      Few (2+) Gram positive bacilli      Many (4+) Yeast    Susceptibility      Proteus mirabilis     GONSALO (Preliminary)     Amikacin <=16 ug/ml Susceptible     Ampicillin >16 ug/ml Resistant     Ampicillin + Sulbactam 16/8 ug/ml Intermediate     Aztreonam <=8 ug/ml Susceptible     Cefepime <=8 ug/ml Susceptible     Cefotaxime <=2 ug/ml Susceptible     Ceftriaxone <=8 ug/ml Susceptible     Cefuroxime sodium <=4 ug/ml Susceptible     Ciprofloxacin >2 ug/ml Resistant     Ertapenem <=1 ug/ml Susceptible     Gentamicin >8 ug/ml Resistant     Levofloxacin 4 ug/ml Intermediate     Meropenem <=1 ug/ml Susceptible     Piperacillin + Tazobactam <=16 ug/ml Susceptible     Tobramycin >8 ug/ml Resistant     Trimethoprim + Sulfamethoxazole >2/38 ug/ml  Resistant                          Radiology:  Imaging Results (last 72 hours)     Procedure Component Value Units Date/Time    XR Chest 1 View [[id number]] Collected:  10/09/18 0809     Updated:  10/09/18 0954    Narrative:       EXAMINATION: XR CHEST 1 VW-10/09/2018:      INDICATION: Pneumonia; Z74.09-Other reduced mobility; R13.12-Dysphagia,  oropharyngeal phase; Z74.09-Other reduced mobility.      COMPARISON: Chest x-ray 10/08/2018.      FINDINGS: Significant interval improvement in left lung aeration with  decreased opacifications left lung apex and within the left lung base  from prior. Cardiac silhouette enlarged with only minimal left mid lung  opacification likely atelectasis. Right hemithorax grossly clear. No  pneumothorax or large effusion.       Impression:       Significant interval reduction in opacifications of the left  hemithorax with a considerable increase in aeration of the left lung and  only mid lung opacities likely represent mild atelectasis remaining.     D:  10/09/2018  E:  10/09/2018             CT Chest Without Contrast [[id number]] Collected:  10/08/18 0852     Updated:  10/08/18 2123    Narrative:       EXAM:    CT Chest Without Intravenous Contrast     EXAM DATE/TIME:    10/8/2018 8:52 AM     CLINICAL HISTORY:    78 years old, female; Dysphagia, oropharyngeal phase; Other reduced mobility;   Other reduced mobility; Abnormal findings; Abnormal radiologic exam of lung or   chest; Additional info: Pneumonia complicated / unresolved     TECHNIQUE:    Axial computed tomography images of the chest without intravenous contrast.    All CT scans at this facility use at least one of these dose optimization   techniques: automated exposure control; mA and/or kV adjustment per patient   size (includes targeted exams where dose is matched to clinical indication); or   iterative reconstruction.    Coronal and sagittal reformatted images were created and reviewed.     COMPARISON:    CR XR CHEST 1 VW  10/8/2018 1:31 AM     FINDINGS:    Tubes, catheters and devices:  Tracheostomy tube position appears   satisfactory.    Lungs:  Bilateral infiltrates/atelectasis, predominantly on left, involving   LLL, BETTIE, and RLL. Clinically correlate to rule out pneumonia. Retained   secretions scattered in left bronchial tree.    Pleural space:  Small to moderate left pleural effusion, small right pleural   effusion. No pneumothorax.    Heart:  Mild cardiomegaly. Mitral annulus calcifications.  CAD.   Aorta:  No aortic aneurysm. Aortic atherosclerosis.    Great vessels off aortic arch and other great vessels:  Atherosclerotic   plaques are scattered along some vessels.    Other arteries:  There are coronary artery atheromatous calcifications,   consistent with CAD.    Lymph nodes:  No obvious lymphadenopathy, but resolution of left hilum is   obscured by adjacent air space disease.    Bones/joints:  Plate and screws ORIF hardware at proximal right humerus. DJD   of right glenohumeral joint. Degenerative changes of the spine. Mild scoliotic   curvature, positional versus fixed.    Soft tissues: Unremarkable.    Gallbladder and bile ducts:  Cholecystectomy.    Kidneys and ureters: Nonobstructive right nephrolithiasis.    Stomach and bowel:  Some contrast in the bowel.       Impression:       1. Small to moderate left pleural effusion, small right pleural effusion.   2. Bilateral infiltrates/atelectasis, predominantly on left, involving LLL,   BETTIE, and RLL.   3. CAD.   4. Mild cardiomegaly.   5.  Nonobstructive right nephrolithiasis.     THIS DOCUMENT HAS BEEN ELECTRONICALLY SIGNED BY KEENAN KIM MD    FL Video Swallow With Speech [[id number]] Collected:  10/08/18 1514     Updated:  10/08/18 1532    Narrative:       EXAMINATION: FL VIDEO SWALLOW W SPEECH-     INDICATION: dysphagia; Z74.09-Other reduced mobility; R13.10-Dysphagia,  unspecified; Z74.09-Other reduced mobility     TECHNIQUE: 1 minute and 54 seconds of fluoroscopic  time was used for  this exam. 1 associated image was saved. The patient was evaluated in  the seated lateral position while taking a variety of consistencies of  barium by mouth under the direction of speech pathology.     COMPARISON: NONE     FINDINGS: There was aspiration with sips of thin barium. There was no  penetration and no aspiration with nectar, pudding, or solid consistency  barium.          Impression:       Fluoroscopy provided for a modified barium swallow. Please  see speech therapy report for full details and recommendations.         This report was finalized on 10/8/2018 3:30 PM by Dr. Christiano Mcmillan MD.       XR Chest 1 View [[id number]] Collected:  10/08/18 0814     Updated:  10/08/18 1311    Narrative:       EXAMINATION: XR CHEST 1 VW-      INDICATION: Respiratory failure, pneumonia, atelectasis; Z74.09-Other  reduced mobility; R13.10-Dysphagia, unspecified.      COMPARISON: 10/06/2018.     FINDINGS: Portable chest reveals patchy ill-defined opacification seen  within the left upper lobe. Tracheostomy tube in satisfactory position  above the jacobo. Ill-defined opacification seen at the lung bases. Mild  elevation identified of the right hemidiaphragm. Small left pleural  effusion.           Impression:       Worsening identified of the airspace disease in the left  upper lobe. Small bilateral pleural effusions. The heart is enlarged.     D:  10/08/2018  E:  10/08/2018     This report was finalized on 10/8/2018 1:09 PM by Dr. Meagan Randhawa MD.       XR Chest 1 View [[id number]] Collected:  10/07/18 1222     Updated:  10/07/18 1449    Narrative:       EXAMINATION: XR CHEST 1 VW - 10/6/2018     INDICATION: Pneumonia.     TECHNIQUE:  Single view frontal chest.     COMPARISONS: None.     FINDINGS:  Tracheostomy in place. Calcification of the aortic knob.  There is an arc-like calcification projecting over the cardiac  silhouette; this could be valvular or in the ventricular wall. There  is  extensive opacity involving the left lung. The right lung is grossly  clear. No pneumothorax. Right proximal humerus ORIF hardware noted.       Impression:       Extensive opacification of the left lung. This is  compatible with the given history of pneumonia.     DICTATED:   10/07/2018  EDITED/ls :   10/07/2018         This report was finalized on 10/7/2018 2:47 PM by Sonny Perez.             I personally reviewed the radiographic studies     Assessment/Plan   IMPRESSION:   1.  Pneumonia, left chest, with recurrent mucus plugging, with previous cultures in the past positive for MRSA and Pseudomonas. Recurrence likely related to recurrent aspiration.  MRSA positive on this admission to date.  2. Acute on chronic respiratory hypoxic failure.  3. Dementia.  4. Encephalopathy, metabolic.  5. Anemia, chronic disease.  Resolved.  6. Diabetes mellitus type 2 with increased risk for infection.  7. Hypocalcemia, 8.3, worse.  8.  ALT 57 (worse), AST 46 worse, elevated. Probably related to medications versus other.  9. Hypokalemia, 3.3, new.     Plan:     1.  Diagnostically, continue to follow patient's physical exam, CBC, CMP, CRP, cultures obtained from outside hospital, serum immunoglobulins, HIV.  2. Therapeutically, continue vancomycin and aztreonam pending further culture data.  Duration of antibiotics likely to be until 10/20/18.  3. Oxygen support therapy.    Increased risk for adverse drug reactions, complications from line, recurrent pneumonia.    Maxim Lundberg MD  10/10/2018

## 2018-10-11 LAB
ANION GAP SERPL CALCULATED.3IONS-SCNC: 5 MMOL/L (ref 3–11)
BACTERIA SPEC AEROBE CULT: NORMAL
BASOPHILS # BLD AUTO: 0.01 10*3/MM3 (ref 0–0.2)
BASOPHILS NFR BLD AUTO: 0.1 % (ref 0–1)
BUN BLD-MCNC: 19 MG/DL (ref 9–23)
BUN/CREAT SERPL: 35.8 (ref 7–25)
CALCIUM SPEC-SCNC: 8 MG/DL (ref 8.7–10.4)
CHLORIDE SERPL-SCNC: 93 MMOL/L (ref 99–109)
CO2 SERPL-SCNC: 36 MMOL/L (ref 20–31)
CREAT BLD-MCNC: 0.53 MG/DL (ref 0.6–1.3)
DEPRECATED RDW RBC AUTO: 46.5 FL (ref 37–54)
EOSINOPHIL # BLD AUTO: 0.11 10*3/MM3 (ref 0–0.3)
EOSINOPHIL NFR BLD AUTO: 1.2 % (ref 0–3)
ERYTHROCYTE [DISTWIDTH] IN BLOOD BY AUTOMATED COUNT: 14.1 % (ref 11.3–14.5)
GFR SERPL CREATININE-BSD FRML MDRD: 112 ML/MIN/1.73
GLUCOSE BLD-MCNC: 255 MG/DL (ref 70–100)
GLUCOSE BLDC GLUCOMTR-MCNC: 212 MG/DL (ref 70–130)
GLUCOSE BLDC GLUCOMTR-MCNC: 244 MG/DL (ref 70–130)
GLUCOSE BLDC GLUCOMTR-MCNC: 325 MG/DL (ref 70–130)
GLUCOSE BLDC GLUCOMTR-MCNC: 375 MG/DL (ref 70–130)
HCT VFR BLD AUTO: 36.2 % (ref 34.5–44)
HGB BLD-MCNC: 11.5 G/DL (ref 11.5–15.5)
IMM GRANULOCYTES # BLD: 0.08 10*3/MM3 (ref 0–0.03)
IMM GRANULOCYTES NFR BLD: 0.9 % (ref 0–0.6)
LYMPHOCYTES # BLD AUTO: 2.47 10*3/MM3 (ref 0.6–4.8)
LYMPHOCYTES NFR BLD AUTO: 26.7 % (ref 24–44)
MAGNESIUM SERPL-MCNC: 1.8 MG/DL (ref 1.3–2.7)
MCH RBC QN AUTO: 29.5 PG (ref 27–31)
MCHC RBC AUTO-ENTMCNC: 31.8 G/DL (ref 32–36)
MCV RBC AUTO: 92.8 FL (ref 80–99)
MONOCYTES # BLD AUTO: 0.71 10*3/MM3 (ref 0–1)
MONOCYTES NFR BLD AUTO: 7.7 % (ref 0–12)
NEUTROPHILS # BLD AUTO: 5.95 10*3/MM3 (ref 1.5–8.3)
NEUTROPHILS NFR BLD AUTO: 64.3 % (ref 41–71)
PLATELET # BLD AUTO: 223 10*3/MM3 (ref 150–450)
PMV BLD AUTO: 10.6 FL (ref 6–12)
POTASSIUM BLD-SCNC: 3.8 MMOL/L (ref 3.5–5.5)
RBC # BLD AUTO: 3.9 10*6/MM3 (ref 3.89–5.14)
SODIUM BLD-SCNC: 134 MMOL/L (ref 132–146)
VANCOMYCIN TROUGH SERPL-MCNC: 18.8 MCG/ML (ref 10–20)
WBC NRBC COR # BLD: 9.25 10*3/MM3 (ref 3.5–10.8)

## 2018-10-11 PROCEDURE — 63710000001 PREDNISONE PER 1 MG: Performed by: INTERNAL MEDICINE

## 2018-10-11 PROCEDURE — 97530 THERAPEUTIC ACTIVITIES: CPT

## 2018-10-11 PROCEDURE — 99233 SBSQ HOSP IP/OBS HIGH 50: CPT | Performed by: HOSPITALIST

## 2018-10-11 PROCEDURE — 82962 GLUCOSE BLOOD TEST: CPT

## 2018-10-11 PROCEDURE — 80202 ASSAY OF VANCOMYCIN: CPT

## 2018-10-11 PROCEDURE — 25010000002 VANCOMYCIN PER 500 MG

## 2018-10-11 PROCEDURE — 94799 UNLISTED PULMONARY SVC/PX: CPT

## 2018-10-11 PROCEDURE — 63710000001 INSULIN DETEMIR PER 5 UNITS: Performed by: FAMILY MEDICINE

## 2018-10-11 PROCEDURE — 80048 BASIC METABOLIC PNL TOTAL CA: CPT | Performed by: INTERNAL MEDICINE

## 2018-10-11 PROCEDURE — 94640 AIRWAY INHALATION TREATMENT: CPT

## 2018-10-11 PROCEDURE — 94760 N-INVAS EAR/PLS OXIMETRY 1: CPT

## 2018-10-11 PROCEDURE — 97110 THERAPEUTIC EXERCISES: CPT

## 2018-10-11 PROCEDURE — 83735 ASSAY OF MAGNESIUM: CPT | Performed by: INTERNAL MEDICINE

## 2018-10-11 PROCEDURE — 99232 SBSQ HOSP IP/OBS MODERATE 35: CPT | Performed by: INTERNAL MEDICINE

## 2018-10-11 PROCEDURE — 25010000002 ENOXAPARIN PER 10 MG: Performed by: INTERNAL MEDICINE

## 2018-10-11 PROCEDURE — 85025 COMPLETE CBC W/AUTO DIFF WBC: CPT | Performed by: INTERNAL MEDICINE

## 2018-10-11 RX ADMIN — ATORVASTATIN CALCIUM 40 MG: 40 TABLET, FILM COATED ORAL at 21:19

## 2018-10-11 RX ADMIN — MIDODRINE HYDROCHLORIDE 10 MG: 5 TABLET ORAL at 17:24

## 2018-10-11 RX ADMIN — IPRATROPIUM BROMIDE AND ALBUTEROL SULFATE 3 ML: 2.5; .5 SOLUTION RESPIRATORY (INHALATION) at 16:53

## 2018-10-11 RX ADMIN — INSULIN LISPRO 5 UNITS: 100 INJECTION, SOLUTION INTRAVENOUS; SUBCUTANEOUS at 17:25

## 2018-10-11 RX ADMIN — VANCOMYCIN HYDROCHLORIDE 1000 MG: 1 INJECTION, SOLUTION INTRAVENOUS at 17:23

## 2018-10-11 RX ADMIN — INSULIN LISPRO 3 UNITS: 100 INJECTION, SOLUTION INTRAVENOUS; SUBCUTANEOUS at 12:23

## 2018-10-11 RX ADMIN — MIDODRINE HYDROCHLORIDE 10 MG: 5 TABLET ORAL at 08:47

## 2018-10-11 RX ADMIN — INSULIN LISPRO 3 UNITS: 100 INJECTION, SOLUTION INTRAVENOUS; SUBCUTANEOUS at 08:47

## 2018-10-11 RX ADMIN — MIDODRINE HYDROCHLORIDE 10 MG: 5 TABLET ORAL at 12:26

## 2018-10-11 RX ADMIN — IPRATROPIUM BROMIDE AND ALBUTEROL SULFATE 3 ML: 2.5; .5 SOLUTION RESPIRATORY (INHALATION) at 09:58

## 2018-10-11 RX ADMIN — INSULIN DETEMIR 20 UNITS: 100 INJECTION, SOLUTION SUBCUTANEOUS at 21:19

## 2018-10-11 RX ADMIN — ENOXAPARIN SODIUM 40 MG: 40 INJECTION SUBCUTANEOUS at 21:19

## 2018-10-11 RX ADMIN — INSULIN LISPRO 6 UNITS: 100 INJECTION, SOLUTION INTRAVENOUS; SUBCUTANEOUS at 21:20

## 2018-10-11 RX ADMIN — BUSPIRONE HYDROCHLORIDE 15 MG: 15 TABLET ORAL at 05:45

## 2018-10-11 RX ADMIN — ASPIRIN 325 MG ORAL TABLET 162 MG: 325 PILL ORAL at 08:46

## 2018-10-11 RX ADMIN — AZTREONAM 2 G: 2 INJECTION, POWDER, LYOPHILIZED, FOR SOLUTION INTRAMUSCULAR; INTRAVENOUS at 17:23

## 2018-10-11 RX ADMIN — PREDNISONE 40 MG: 20 TABLET ORAL at 08:46

## 2018-10-11 RX ADMIN — ROPINIROLE HYDROCHLORIDE 4 MG: 2 TABLET, FILM COATED ORAL at 21:19

## 2018-10-11 RX ADMIN — AZTREONAM 2 G: 2 INJECTION, POWDER, LYOPHILIZED, FOR SOLUTION INTRAMUSCULAR; INTRAVENOUS at 08:47

## 2018-10-11 RX ADMIN — VANCOMYCIN HYDROCHLORIDE 1000 MG: 1 INJECTION, SOLUTION INTRAVENOUS at 05:45

## 2018-10-11 RX ADMIN — BUSPIRONE HYDROCHLORIDE 15 MG: 15 TABLET ORAL at 21:19

## 2018-10-11 RX ADMIN — HYDROCORTISONE 2.5%: 25 CREAM TOPICAL at 23:30

## 2018-10-11 RX ADMIN — BUSPIRONE HYDROCHLORIDE 15 MG: 15 TABLET ORAL at 14:50

## 2018-10-11 RX ADMIN — FUROSEMIDE 20 MG: 20 TABLET ORAL at 17:24

## 2018-10-11 RX ADMIN — IPRATROPIUM BROMIDE AND ALBUTEROL SULFATE 3 ML: 2.5; .5 SOLUTION RESPIRATORY (INHALATION) at 13:09

## 2018-10-11 RX ADMIN — IPRATROPIUM BROMIDE AND ALBUTEROL SULFATE 3 ML: 2.5; .5 SOLUTION RESPIRATORY (INHALATION) at 19:03

## 2018-10-11 RX ADMIN — HYDROCORTISONE 2.5%: 25 CREAM TOPICAL at 08:48

## 2018-10-11 RX ADMIN — FUROSEMIDE 20 MG: 20 TABLET ORAL at 08:47

## 2018-10-11 NOTE — THERAPY TREATMENT NOTE
Acute Care - Occupational Therapy Treatment Note  Psychiatric     Patient Name: Nallely Junior  : 1940  MRN: [id number]  Today's Date: 10/11/2018  Onset of Illness/Injury or Date of Surgery: 10/06/18  Date of Referral to OT: 10/06/18  Referring Physician: TIN Murphy MD    Admit Date: 10/6/2018       ICD-10-CM ICD-9-CM   1. Impaired mobility and ADLs Z74.09 799.89   2. Oropharyngeal dysphagia R13.12 787.22   3. Impaired functional mobility, balance, gait, and endurance Z74.09 V49.89   4. Aspiration pneumonia of left lower lobe, unspecified aspiration pneumonia type (CMS/HCC) J69.0 507.0   5. Voice and resonance disorder R49.9 784.40     Patient Active Problem List   Diagnosis   • Pneumonia   • Type 2 diabetes mellitus (CMS/[facility])   • Hypotension   • Hyperlipidemia   • Depression   • Anxiety   • COPD (chronic obstructive pulmonary disease) (CMS/[facility])   • Dysphagia   • Coronary artery disease   • CHF (congestive heart failure) (CMS/[facility])   • GERD (gastroesophageal reflux disease)   • Chronic respiratory failure with hypoxia and hypercapnia (CMS/[facility])     Past Medical History:   Diagnosis Date   • Anemia    • Anxiety    • Aortic stenosis    • CHF (congestive heart failure) (CMS/[facility])    • Chronic respiratory failure with hypoxia and hypercapnia (CMS/HCC)    • CKD (chronic kidney disease), stage III (CMS/[facility])    • Constipation    • COPD (chronic obstructive pulmonary disease) (CMS/[facility])    • Coronary artery disease    • Dementia    • Depression    • Diabetes mellitus (CMS/[facility])    • Dysphagia    • Femur fracture, right (CMS/[facility])    • GERD (gastroesophageal reflux disease)    • Hyperlipidemia    • Hypertension    • Hypotension     on midodrine   • Restless legs syndrome (RLS)      Past Surgical History:   Procedure Laterality Date   • FOREARM SURGERY     • LEG SURGERY     • PEG TUBE INSERTION     • TRACHEOSTOMY         Therapy Treatment          Rehabilitation Treatment Summary     Row Name 10/11/18 1342 10/11/18 0953           Treatment Time/Intention    Discipline occupational therapist  -CL physical therapist  -KR     Document Type therapy note (daily note)  -CL therapy note (daily note)  -KR     Subjective Information complains of;fatigue  -CL no complaints  -KR     Mode of Treatment  -- physical therapy  -KR     Care Plan Review  -- care plan/treatment goals reviewed;risks/benefits reviewed;patient/other agree to care plan  -KR2     Therapy Frequency (PT Clinical Impression)  -- daily  -KR2     Patient Effort adequate  -CL good  -KR2     Comment Pt lethargic this date.   -CL  --     Existing Precautions/Restrictions fall;oxygen therapy device and L/min;other (see comments)   chronic trach  -CL fall;oxygen therapy device and L/min   trach  -KR2     Recorded by [CL] Marie Pabon, OT 10/11/18 1445 [KR] Shelli Nunes, PT 10/11/18 1136  [KR2] Shelli Nunes, PT 10/11/18 1208     Row Name 10/11/18 1342 10/11/18 0953          Vital Signs    Pre Systolic BP Rehab --   VSS, RN cleared for tx.   -  -KR     Pre Treatment Diastolic BP  -- 48  -KR     Pretreatment Heart Rate (beats/min)  -- 70  -KR     Posttreatment Heart Rate (beats/min)  -- 75  -KR     Pre SpO2 (%)  -- 92  -KR     O2 Delivery Pre Treatment  -- supplemental O2  -KR     Post SpO2 (%)  -- 93  -KR     O2 Delivery Post Treatment  -- supplemental O2  -KR     Pre Patient Position  -- Supine  -KR     Intra Patient Position  -- Sitting  -KR     Post Patient Position  -- Sitting  -KR     Recorded by [CL] Marie Pabon, OT 10/11/18 1445 [KR] Shelli Nunes, PT 10/11/18 1208     Row Name 10/11/18 0953             Cognitive Assessment/Intervention    Additional Documentation Cognitive Assessment/Intervention (Group)  -KR      Recorded by [KR] Shelli Nunes, PT 10/11/18 1208      Row Name 10/11/18 1342 10/11/18 0953          Cognitive Assessment/Intervention- PT/OT    Affect/Mental Status (Cognitive) WFL  -CL WFL  -KR     Orientation Status (Cognition) oriented  to;person;place  -CL oriented to;person;place;situation  -KR     Follows Commands (Cognition) follows one step commands;over 90% accuracy;repetition of directions required;verbal cues/prompting required  -CL follows one step commands;over 90% accuracy;repetition of directions required;verbal cues/prompting required  -KR     Cognitive Function (Cognitive) safety deficit  -CL safety deficit  -KR     Safety Deficit (Cognitive) mild deficit;awareness of need for assistance;safety precautions awareness  -CL mild deficit;ability to follow commands;awareness of need for assistance;insight into deficits/self awareness;safety precautions awareness;safety precautions follow-through/compliance  -KR     Personal Safety Interventions fall prevention program maintained;nonskid shoes/slippers when out of bed;muscle strengthening facilitated  -CL fall prevention program maintained;gait belt;nonskid shoes/slippers when out of bed  -KR     Recorded by [CL] Marie Pabon, OT 10/11/18 1445 [KR] Shelli Nunes, PT 10/11/18 1208     Row Name 10/11/18 0953             Safety Issues, Functional Mobility    Safety Issues Affecting Function (Mobility) awareness of need for assistance;insight into deficits/self awareness;safety precaution awareness;safety precautions follow-through/compliance;sequencing abilities  -KR      Impairments Affecting Function (Mobility) balance;coordination;endurance/activity tolerance;shortness of breath;strength  -KR      Recorded by [KR] Shelli Nunes, PT 10/11/18 1208      Row Name 10/11/18 0953             Bed Mobility Assessment/Treatment    Bed Mobility Assessment/Treatment rolling left;rolling right  -KR      Rolling Left [place] (Bed Mobility) maximum assist (25% patient effort);2 person assist;verbal cues  -KR      Rolling Right [place] (Bed Mobility) maximum assist (25% patient effort);2 person assist;verbal cues  -KR      Bed Mobility, Safety Issues decreased use of arms for  pushing/pulling;decreased use of legs for bridging/pushing  -KR      Assistive Device (Bed Mobility) bed rails;draw sheet  -KR      Comment (Bed Mobility) Pt rolled L and R for hygiene and placement of sling.   -KR      Recorded by [KR] Shelli Nunes, PT 10/11/18 1208      Row Name 10/11/18 0953             Transfer Assessment/Treatment    Comment (Transfers) Transfers deferred. Not appropriate to assess.   -KR      Recorded by [KR] Shelli Nunes, PT 10/11/18 1208      Row Name 10/11/18 0953             Gait/Stairs Assessment/Training    Comment (Gait/Stairs) Ambulation deferred. Pt non-ambulatory at baseline.   -KR      Recorded by [KR] Shelli Nunes, PT 10/11/18 1208      Row Name 10/11/18 1342             ADL Assessment/Intervention    39931 - OT Self Care/Mgmt Minutes 5  -CL      BADL Assessment/Intervention grooming  -CL      Recorded by [CL] Marie Pabon, OT 10/11/18 1445      Row Name 10/11/18 1342             Grooming Assessment/Training    [place] Level (Grooming) wash face, hands;set up  -CL      Grooming Position supported sitting  -CL      Recorded by [CL] Marie Pabon, OT 10/11/18 1445      Row Name 10/11/18 0953             Motor Skills Assessment/Interventions    Additional Documentation Balance (Group);Therapeutic Exercise (Group)  -KR      Recorded by [KR] Shelli Nunes, PT 10/11/18 1208      Row Name 10/11/18 1342 10/11/18 0953          Therapeutic Exercise    Therapeutic Exercise  -- seated, lower extremities  -KR     Additional Documentation  -- Therapeutic Exercise (Row)  -KR     47763 - PT Therapeutic Exercise Minutes  -- 5  -KR     41281 - PT Therapeutic Activity Minutes  -- 18  -KR     70268 - OT Therapeutic Exercise Minutes 5  -CL  --     Recorded by [CL] Marie Pabon, OT 10/11/18 1445 [KR] Shelli Nunes, PT 10/11/18 1208     Row Name 10/11/18 1342             Upper Extremity Seated Therapeutic Exercise    Performed, Seated Upper Extremity (Therapeutic Exercise) shoulder  flexion/extension;shoulder external/internal rotation;elbow flexion/extension;wrist flexion/extension;digit flexion/extension  -CL      Exercise Type, Seated Upper Extremity (Therapeutic Exercise) AROM (active range of motion);AAROM (active assistive range of motion)  -CL      Sets/Reps Detail, Seated Upper Extremity (Therapeutic Exercise) 1/10  -CL      Comment, Seated Upper Extremity (Therapeutic Exercise) RUE shldr FE significantly limited, R hand 4/5 digit contractures noted.   -CL      Recorded by [CL] Marie Pabon, OT 10/11/18 1445      Row Name 10/11/18 0953             Lower Extremity Seated Therapeutic Exercise    Performed, Seated Lower Extremity (Therapeutic Exercise) hip flexion/extension;LAQ (long arc quad), knee extension;ankle dorsiflexion/plantarflexion;hip abduction/adduction  -KR      Exercise Type, Seated Lower Extremity (Therapeutic Exercise) AAROM (active assistive range of motion)  -KR      Sets/Reps Detail, Seated Lower Extremity (Therapeutic Exercise) BLE 1/10  -KR      Recorded by [KR] Shelli Nunes, PT 10/11/18 1208      Row Name 10/11/18 0953             Balance    Balance static sitting balance  -KR      Recorded by [KR] Shelli Nunes, PT 10/11/18 1208      Row Name 10/11/18 0953             Static Sitting Balance    Level of [place] (Unsupported Sitting, Static Balance) moderate assist, 50 to 74% patient effort   progressed to CGA  -KR      Sitting Position (Unsupported Sitting, Static Balance) sitting in chair  -KR      Recorded by [KR] Shelli Nunes, PT 10/11/18 1208      Row Name 10/11/18 1342 10/11/18 0953          Positioning and Restraints    Pre-Treatment Position sitting in chair/recliner  -CL in bed  -KR     Post Treatment Position chair  -CL chair  -KR     In Chair notified nsg;reclined;call light within reach;encouraged to call for assist;exit alarm on;RUE elevated;LUE elevated;waffle cushion;on mechanical lift sling;legs elevated;heels elevated  -CL notified  nsg;reclined;call light within reach;encouraged to call for assist;exit alarm on;RUE elevated;LUE elevated;waffle cushion;on mechanical lift sling;legs elevated  -KR     Recorded by [CL] Marie Pabon, OT 10/11/18 1445 [KR] Shelli Nunes, PT 10/11/18 1208     Row Name 10/11/18 0953             Pain Assessment    Additional Documentation Pain Scale: Numbers Pre/Post-Treatment (Group)  -KR      Recorded by [KR] Shelli Nuens, PT 10/11/18 1208      Row Name 10/11/18 0953             Pain Scale: Numbers Pre/Post-Treatment    Pain Scale: Numbers, Pretreatment 0/10 - no pain  -KR      Pain Scale: Numbers, Post-Treatment 0/10 - no pain  -KR      Recorded by [KR] Shelli Nunes, PT 10/11/18 1208      Row Name 10/11/18 1342             Pain Scale: FACES Pre/Post-Treatment    Pain: FACES Scale, Pretreatment 0-->no hurt  -CL      Pain: FACES Scale, Post-Treatment 0-->no hurt  -CL      Recorded by [CL] Marie Pabon, OT 10/11/18 1445      Row Name 10/11/18 0953             Plan of Care Review    Plan of Care Reviewed With patient  -KR      Recorded by [KR] Shelli Nunes, PT 10/11/18 1208      Row Name 10/11/18 0953             Outcome Summary/Treatment Plan (PT)    Daily Summary of Progress (PT) progress toward functional goals as expected  -KR      Recorded by [KR] Shelli Nunes, PT 10/11/18 1208        User Key  (r) = Recorded By, (t) = Taken By, (c) = Cosigned By    Initials Name Effective Dates Discipline    CL Marie Pabon, OT 04/03/18 -  OT    Shelli Willard, PT 04/03/18 -  PT               Occupational Therapy Education     Title: PT OT SLP Therapies (Active)     Topic: Occupational Therapy (Active)     Point: ADL training (Active)     Description: Instruct learner(s) on proper safety adaptation and remediation techniques during self care or transfers.   Instruct in proper use of assistive devices.   Learning Progress Summary     Learner Status Readiness Method Response Comment Documented by    Patient Active  Acceptance E NR Pt educated on appropriate safety precautions, positioning, HEP, and benefits of therapy. CL 10/11/18 1445     Active Acceptance E NR Pt educated on HEP, positioning, role of OT, and benefits of therapy. CL 10/09/18 1548     Done Acceptance E,D VU,NR Body mechanics with bed mobility; reinforced need for call for assist with OOB activities; role of OT. TA 10/07/18 0928          Point: Home exercise program (Active)     Description: Instruct learner(s) on appropriate technique for monitoring, assisting and/or progressing therapeutic exercises/activities.   Learning Progress Summary     Learner Status Readiness Method Response Comment Documented by    Patient Active Acceptance E NR Pt educated on appropriate safety precautions, positioning, HEP, and benefits of therapy. CL 10/11/18 1445     Active Acceptance E NR Pt educated on HEP, positioning, role of OT, and benefits of therapy. CL 10/09/18 1548          Point: Precautions (Active)     Description: Instruct learner(s) on prescribed precautions during self-care and functional transfers.   Learning Progress Summary     Learner Status Readiness Method Response Comment Documented by    Patient Active Acceptance E NR Pt educated on appropriate safety precautions, positioning, HEP, and benefits of therapy. CL 10/11/18 1445     Active Acceptance E NR Pt educated on HEP, positioning, role of OT, and benefits of therapy. CL 10/09/18 1548     Done Acceptance E,D VU,NR Body mechanics with bed mobility; reinforced need for call for assist with OOB activities; role of OT. TA 10/07/18 0928          Point: Body mechanics (Done)     Description: Instruct learner(s) on proper positioning and spine alignment during self-care, functional mobility activities and/or exercises.   Learning Progress Summary     Learner Status Readiness Method Response Comment Documented by    Patient Done Acceptance E,D VU,NR Body mechanics with bed mobility; reinforced need for call for  assist with OOB activities; role of OT.  10/07/18 0928                      User Key     Initials Effective Dates Name Provider Type Discipline    TA 03/14/16 -  Christiano Marie, OT Occupational Therapist OT    CL 04/03/18 -  Marie Pabon OT Occupational Therapist OT                OT Recommendation and Plan     Plan of Care Review  Plan of Care Reviewed With: patient  Plan of Care Reviewed With: patient  Outcome Summary: Pt session limited d/t lethargy/fatigue. Pt tolerated BUE HEP and grooming tasks. Recommend cont skilled IPOT POC.         Outcome Measures     Row Name 10/11/18 1342 10/11/18 0953 10/09/18 1435       How much help from another person do you currently need...    Turning from your back to your side while in flat bed without using bedrails?  -- 2  -KR 2  -MB    Moving from lying on back to sitting on the side of a flat bed without bedrails?  -- 2  -KR 2  -MB    Moving to and from a bed to a chair (including a wheelchair)?  -- 1  -KR 1  -MB    Standing up from a chair using your arms (e.g., wheelchair, bedside chair)?  -- 1  -KR 1  -MB    Climbing 3-5 steps with a railing?  -- 1  -KR 1  -MB    To walk in hospital room?  -- 1  -KR 1  -MB    AM-PAC 6 Clicks Score  -- 8  -KR 8  -MB       How much help from another is currently needed...    Putting on and taking off regular lower body clothing? 1  -CL  --  --    Bathing (including washing, rinsing, and drying) 1  -CL  --  --    Toileting (which includes using toilet bed pan or urinal) 1  -CL  --  --    Putting on and taking off regular upper body clothing 2  -CL  --  --    Taking care of personal grooming (such as brushing teeth) 3  -CL  --  --    Eating meals 3  -CL  --  --    Score 11  -CL  --  --       Functional Assessment    Outcome Measure Options AM-PAC 6 Clicks Daily Activity (OT)  -CL AM-PAC 6 Clicks Basic Mobility (PT)  -KR AM-PAC 6 Clicks Basic Mobility (PT)  -MB    Row Name 10/09/18 1147             How much help from another is  currently needed...    Putting on and taking off regular lower body clothing? 1  -CL      Bathing (including washing, rinsing, and drying) 1  -CL      Toileting (which includes using toilet bed pan or urinal) 1  -CL      Putting on and taking off regular upper body clothing 2  -CL      Taking care of personal grooming (such as brushing teeth) 3  -CL      Eating meals 3  -CL      Score 11  -CL         Functional Assessment    Outcome Measure Options AM-PAC 6 Clicks Daily Activity (OT)  -CL        User Key  (r) = Recorded By, (t) = Taken By, (c) = Cosigned By    Initials Name Provider Type    MB Britt Gilbert, PT Physical Therapist    CL Marie Pabon, OT Occupational Therapist    Shelli Willard, PT Physical Therapist           Time Calculation:         Time Calculation- OT     Row Name 10/11/18 1342             Time Calculation- OT    OT Start Time 1342  -CL      OT Received On 10/11/18  -CL      OT Goal Re-Cert Due Date 10/17/18  -CL         Timed Charges    48274 - OT Therapeutic Exercise Minutes 5  -CL      27861 - OT Self Care/Mgmt Minutes 5  -CL        User Key  (r) = Recorded By, (t) = Taken By, (c) = Cosigned By    Initials Name Provider Type    CL Marie Pabon, OT Occupational Therapist           Therapy Suggested Charges     Code   Minutes Charges    66094 (CPT®) Hc Ot Neuromusc Re Education Ea 15 Min      67970 (CPT®) Hc Ot Ther Proc Ea 15 Min 5 1    95932 (CPT®) Hc Ot Therapeutic Act Ea 15 Min      19575 (CPT®) Hc Ot Manual Therapy Ea 15 Min      86307 (CPT®) Hc Ot Iontophoresis Ea 15 Min      40018 (CPT®) Hc Ot Elec Stim Ea-Per 15 Min      97985 (CPT®) Hc Ot Ultrasound Ea 15 Min      92695 (CPT®) Hc Ot Self Care/Mgmt/Train Ea 15 Min 5     Total  10 1        Therapy Charges for Today     Code Description Service Date Service Provider Modifiers Qty    [id number] HC OT THER PROC EA 15 MIN 10/11/2018 Marie Pabon OT GO 1               Marie Pabon OT  10/11/2018

## 2018-10-11 NOTE — PROGRESS NOTES
[facility] Progress Note    Admission Date: 10/6/2018    Nallely Junior  1940  [id number]    Date: 10/11/2018    Antibiotics:  Anti-Infectives     Ordered     Dose/Rate Route Frequency Start Stop    10/09/18 0723  vancomycin (VANCOCIN) in iso-osmotic dextrose IVPB 1 g (premix) 200 mL     Ordering Provider:  Jan Martínez RPH    1,000 mg  over 60 Minutes Intravenous Every 12 Hours 10/09/18 1800 10/20/18 1759    10/09/18 0723  aztreonam (AZACTAM) 2 g in sodium chloride 0.9 % 100 mL IVPB-MBP     Ordering Provider:  Jan Martínez RPH    2 g  over 4 Hours Intravenous Every 8 Hours 10/09/18 0800 10/20/18 0814    10/09/18 0723  Pharmacy to dose vancomycin     Ordering Provider:  Jan Martínez RPH     Does not apply Continuous PRN 10/09/18 0721 10/20/18 0720    10/06/18 2328  aztreonam (AZACTAM) 2 g in sodium chloride 0.9 % 100 mL IVPB-MBP     Ordering Provider:  Olivia Murphy MD    2 g  200 mL/hr over 30 Minutes Intravenous Once 10/07/18 0015 10/07/18 0146    10/06/18 2023  vancomycin 2000 mg/500 mL 0.9% NS IVPB ([facility])     Ordering Provider:  Olivia Murphy MD    2,000 mg  over 120 Minutes Intravenous Once 10/06/18 2115 10/07/18 0138             CC:  Pneumonia, MRSA (sensitive to tetracycline and Bactrim)    HPI:  Patient is a 78 y.o.  Yr old female with dementia, diabetes mellitus type 2, chronic respiratory failure status post tracheostomy,hypertension, hyperlipidemia, bedbound, previous critical aortic stenosis, dysphagia, diastolic dysfunction, COPD, restless leg, GERD, anxiety, CAD, ho was recently admitted to [facility] for pneumonia and mucous plugging with opacification of the left lung. Patient is a poor historian.The patienthad been treated with meropenem, vancomycin, and levofloxacin since 10/2/18. She was transferred to [facility] on 10/6/18 with non-resolving pneumonia. Left hemithorax continued to be opacified.  I was consulted on 10/8/18 for  further evaluation and treatment.  No known immunocompromised state, travel history, zoonotic exposures,TB, or HIV.  10/9/18 hx rev.  Some sob.  Zulay vanc and aztreonam till 10/20 for pneum.  GNR in sputum.  No fever.  10/10/18 history reviewed. Tolerating vancomycin and aztreonam until 10/20 for pneumonia.  No high fevers. Minimal shortness of breath.  10/11/18 hx rev.  Receiving vanc and aztreo till 10/20 for pneum.  No high fever.  Min sputum prod.  Some sob.    ROS:  No f/c/s. No n/v/d. No rash. No new ADR to Abx.     Constitutional-- No Fever, chills or sweats.  Appetite good, and no malaise. No fatigue.  Heent-- No new vision, hearing or throat complaints.  No epistaxis or oral sores.  Denies odynophagia or dysphagia.  No flashers, floaters or eye pain. No odynophagia or dysphagia. No headache, photophobia or neck stiffness.  CV-- No chest pain, palpitation or syncope  Resp-- Some SOB/cough/Hemoptysis, no wheezing  GI- No nausea, vomiting, or diarrhea.  No hematochezia, melena, or hematemesis. Denies jaundice or chronic liver disease.  -- No dysuria, hematuria, or flank pain.  Denies hesitancy, urgency or flank pain.  Lymph- no swollen lymph nodes in neck/axilla or groin.   Heme- No active bruising or bleeding  MS-- no swelling or pain in the bones or joints of arms/legs.  No new back pain.  Neuro-- No acute focal weakness or numbness in the arms or legs.  No seizures.  Skin--No rash, nodules, blisters.    Objective   PE:  Vital Signs  Temp  Min: 97.6 °F (36.4 °C)  Max: 98.9 °F (37.2 °C)  BP  Min: 110/48  Max: 169/76  Pulse  Min: 62  Max: 83  Resp  Min: 16  Max: 24  SpO2  Min: 90 %  Max: 97 %    GENERAL: Awake and alert, in minimal distress. Appears older than stated age.  Nontoxic  HEENT: Normocephalic, atraumatic.  EOMI. No conjunctival injection. No icterus. Oropharynx clear without evidence of thrush or exudate. No evidence of peridontal disease.    NECK: Supple without nuchal rigidity. No mass.  LYMPH:  No cervical, axillary or inguinal lymphadenopathy.  HEART: RRR; No murmur, rubs, gallops.   LUNGS: rales left lung. Normal respiratory effort. Nonlabored. No dullness.no wheeze.  ABDOMEN: Soft, nontender, nondistended. Positive bowel sounds. No rebound or guarding. NO mass or HSM.  Obese.  EXT:  No cyanosis, clubbing or edema. No cord.  MSK: FROM without joint effusions noted arms/legs.    SKIN: Warm and dry without cutaneous eruptions on Inspection/palpation.    NEURO: Oriented to name. No focal deficits on motor/sensory exam at arms/legs.    Laboratory Data      Results from last 7 days  Lab Units 10/11/18  0524 10/10/18  0436 10/09/18  0525   WBC 10*3/mm3 9.25 13.56* 8.90   HEMOGLOBIN g/dL 11.5 12.1 11.5   HEMATOCRIT % 36.2 38.2 36.5   PLATELETS 10*3/mm3 223 291 235       Results from last 7 days  Lab Units 10/11/18  0648   SODIUM mmol/L 134   POTASSIUM mmol/L 3.8   CHLORIDE mmol/L 93*   CO2 mmol/L 36.0*   BUN mg/dL 19   CREATININE mg/dL 0.53*   GLUCOSE mg/dL 255*   CALCIUM mg/dL 8.0*       Results from last 7 days  Lab Units 10/10/18  0436   ALK PHOS U/L 83   BILIRUBIN mg/dL 0.6   ALT (SGPT) U/L 57*   AST (SGOT) U/L 46*               Results from last 7 days  Lab Units 10/09/18  0525   CK TOTAL U/L 31       Results from last 7 days  Lab Units 10/11/18  0524 10/09/18  0525   VANCOMYCIN TR mcg/mL 18.80 9.80*       Results from last 7 days  Lab Units 10/09/18  1037   LACTATE mmol/L 1.7     Estimated Creatinine Clearance: 59.6 mL/min (A) (by C-G formula based on SCr of 0.53 mg/dL (L)).      Microbiology:  Microbiology Results Abnormal     Procedure Component Value - Date/Time    Blood Culture - Blood, [[id number]]  (Normal) Collected:  10/06/18 2020    Lab Status:  Preliminary result Specimen:  Blood from Arm, Right Updated:  10/10/18 2101     Blood Culture No growth at 4 days    Respiratory Culture - Sputum, NT Suction [[id number]]  (Abnormal)  (Susceptibility) Collected:  10/07/18 0108    Lab Status:  Final result  Specimen:  Sputum from NT Suction Updated:  10/10/18 1437     Respiratory Culture Scant growth (1+) Yeast isolated (A)      Scant growth (1+) Staphylococcus aureus, MRSA (A)     Comment:   Methicillin resistant Staphylococcus aureus, Patient may be an isolation risk.        Gram Stain Result Many (4+) WBCs per low power field      Rare (1+) Epithelial cells per low power field      No organisms seen    Susceptibility      Staphylococcus aureus, MRSA     GONSALO     Ciprofloxacin >2 ug/ml Resistant     Clindamycin >4 ug/ml Resistant     Erythromycin >4 ug/ml Resistant     Gentamicin <=4 ug/ml Susceptible     Levofloxacin >4 ug/ml Resistant     Linezolid 2 ug/ml Susceptible     Oxacillin >2 ug/ml Resistant     Penicillin G >8 ug/ml Resistant     Quinupristin + Dalfopristin <=0.5 ug/ml Susceptible     Rifampin <=1 ug/ml Susceptible     Tetracycline <=4 ug/ml Susceptible     Trimethoprim + Sulfamethoxazole <=0.5/9.5 ug/ml Susceptible     Vancomycin 2 ug/ml Susceptible                    Wound Culture - Wound, Abdominal Wall [[id number]]  (Abnormal)  (Susceptibility) Collected:  10/07/18 0109    Lab Status:  Preliminary result Specimen:  Wound from Abdominal Wall Updated:  10/10/18 1356     Wound Culture Moderate growth (3+) Proteus mirabilis (A)      Moderate growth (3+) Candida glabrata (A)      Moderate growth (3+) Candida krusei (A)      Light growth (2+) Enterococcus species (A)      Light growth (2+) Staphylococcus aureus (A)     Gram Stain Result No WBCs seen      Few (2+) Gram positive bacilli      Many (4+) Yeast    Susceptibility      Proteus mirabilis     GONSALO (Preliminary)     Amikacin <=16 ug/ml Susceptible     Ampicillin >16 ug/ml Resistant     Ampicillin + Sulbactam 16/8 ug/ml Intermediate     Aztreonam <=8 ug/ml Susceptible     Cefepime <=8 ug/ml Susceptible     Cefotaxime <=2 ug/ml Susceptible     Ceftriaxone <=8 ug/ml Susceptible     Cefuroxime sodium <=4 ug/ml Susceptible     Ciprofloxacin >2 ug/ml Resistant      Ertapenem <=1 ug/ml Susceptible     Gentamicin >8 ug/ml Resistant     Levofloxacin 4 ug/ml Intermediate     Meropenem <=1 ug/ml Susceptible     Piperacillin + Tazobactam <=16 ug/ml Susceptible     Tobramycin >8 ug/ml Resistant     Trimethoprim + Sulfamethoxazole >2/38 ug/ml Resistant                          Radiology:  Imaging Results (last 72 hours)     Procedure Component Value Units Date/Time    XR Chest 1 View [[id number]] Collected:  10/09/18 0809     Updated:  10/09/18 0954    Narrative:       EXAMINATION: XR CHEST 1 VW-10/09/2018:      INDICATION: Pneumonia; Z74.09-Other reduced mobility; R13.12-Dysphagia,  oropharyngeal phase; Z74.09-Other reduced mobility.      COMPARISON: Chest x-ray 10/08/2018.      FINDINGS: Significant interval improvement in left lung aeration with  decreased opacifications left lung apex and within the left lung base  from prior. Cardiac silhouette enlarged with only minimal left mid lung  opacification likely atelectasis. Right hemithorax grossly clear. No  pneumothorax or large effusion.       Impression:       Significant interval reduction in opacifications of the left  hemithorax with a considerable increase in aeration of the left lung and  only mid lung opacities likely represent mild atelectasis remaining.     D:  10/09/2018  E:  10/09/2018             CT Chest Without Contrast [[id number]] Collected:  10/08/18 0852     Updated:  10/08/18 2123    Narrative:       EXAM:    CT Chest Without Intravenous Contrast     EXAM DATE/TIME:    10/8/2018 8:52 AM     CLINICAL HISTORY:    78 years old, female; Dysphagia, oropharyngeal phase; Other reduced mobility;   Other reduced mobility; Abnormal findings; Abnormal radiologic exam of lung or   chest; Additional info: Pneumonia complicated / unresolved     TECHNIQUE:    Axial computed tomography images of the chest without intravenous contrast.    All CT scans at this facility use at least one of these dose optimization   techniques:  automated exposure control; mA and/or kV adjustment per patient   size (includes targeted exams where dose is matched to clinical indication); or   iterative reconstruction.    Coronal and sagittal reformatted images were created and reviewed.     COMPARISON:    CR XR CHEST 1 VW 10/8/2018 1:31 AM     FINDINGS:    Tubes, catheters and devices:  Tracheostomy tube position appears   satisfactory.    Lungs:  Bilateral infiltrates/atelectasis, predominantly on left, involving   LLL, BETTIE, and RLL. Clinically correlate to rule out pneumonia. Retained   secretions scattered in left bronchial tree.    Pleural space:  Small to moderate left pleural effusion, small right pleural   effusion. No pneumothorax.    Heart:  Mild cardiomegaly. Mitral annulus calcifications.  CAD.   Aorta:  No aortic aneurysm. Aortic atherosclerosis.    Great vessels off aortic arch and other great vessels:  Atherosclerotic   plaques are scattered along some vessels.    Other arteries:  There are coronary artery atheromatous calcifications,   consistent with CAD.    Lymph nodes:  No obvious lymphadenopathy, but resolution of left hilum is   obscured by adjacent air space disease.    Bones/joints:  Plate and screws ORIF hardware at proximal right humerus. DJD   of right glenohumeral joint. Degenerative changes of the spine. Mild scoliotic   curvature, positional versus fixed.    Soft tissues: Unremarkable.    Gallbladder and bile ducts:  Cholecystectomy.    Kidneys and ureters: Nonobstructive right nephrolithiasis.    Stomach and bowel:  Some contrast in the bowel.       Impression:       1. Small to moderate left pleural effusion, small right pleural effusion.   2. Bilateral infiltrates/atelectasis, predominantly on left, involving LLL,   BETTIE, and RLL.   3. CAD.   4. Mild cardiomegaly.   5.  Nonobstructive right nephrolithiasis.     THIS DOCUMENT HAS BEEN ELECTRONICALLY SIGNED BY KEENAN KIM MD    FL Video Swallow With Speech [[id number]]  Collected:  10/08/18 1514     Updated:  10/08/18 1532    Narrative:       EXAMINATION: FL VIDEO SWALLOW W SPEECH-     INDICATION: dysphagia; Z74.09-Other reduced mobility; R13.10-Dysphagia,  unspecified; Z74.09-Other reduced mobility     TECHNIQUE: 1 minute and 54 seconds of fluoroscopic time was used for  this exam. 1 associated image was saved. The patient was evaluated in  the seated lateral position while taking a variety of consistencies of  barium by mouth under the direction of speech pathology.     COMPARISON: NONE     FINDINGS: There was aspiration with sips of thin barium. There was no  penetration and no aspiration with nectar, pudding, or solid consistency  barium.          Impression:       Fluoroscopy provided for a modified barium swallow. Please  see speech therapy report for full details and recommendations.         This report was finalized on 10/8/2018 3:30 PM by Dr. Christiano Mcmillan MD.       XR Chest 1 View [[id number]] Collected:  10/08/18 0814     Updated:  10/08/18 1311    Narrative:       EXAMINATION: XR CHEST 1 VW-      INDICATION: Respiratory failure, pneumonia, atelectasis; Z74.09-Other  reduced mobility; R13.10-Dysphagia, unspecified.      COMPARISON: 10/06/2018.     FINDINGS: Portable chest reveals patchy ill-defined opacification seen  within the left upper lobe. Tracheostomy tube in satisfactory position  above the jacobo. Ill-defined opacification seen at the lung bases. Mild  elevation identified of the right hemidiaphragm. Small left pleural  effusion.           Impression:       Worsening identified of the airspace disease in the left  upper lobe. Small bilateral pleural effusions. The heart is enlarged.     D:  10/08/2018  E:  10/08/2018     This report was finalized on 10/8/2018 1:09 PM by Dr. Meagan Randhawa MD.       XR Chest 1 View [[id number]] Collected:  10/07/18 1222     Updated:  10/07/18 1449    Narrative:       EXAMINATION: XR CHEST 1 VW - 10/6/2018     INDICATION:  Pneumonia.     TECHNIQUE:  Single view frontal chest.     COMPARISONS: None.     FINDINGS:  Tracheostomy in place. Calcification of the aortic knob.  There is an arc-like calcification projecting over the cardiac  silhouette; this could be valvular or in the ventricular wall. There is  extensive opacity involving the left lung. The right lung is grossly  clear. No pneumothorax. Right proximal humerus ORIF hardware noted.       Impression:       Extensive opacification of the left lung. This is  compatible with the given history of pneumonia.     DICTATED:   10/07/2018  EDITED/ls :   10/07/2018         This report was finalized on 10/7/2018 2:47 PM by Sonny Perez.             I personally reviewed the radiographic studies     Assessment/Plan   IMPRESSION:   1.  Pneumonia, left chest, with recurrent mucus plugging, with previous cultures in the past positive for MRSA and Pseudomonas. Recurrence likely related to recurrent aspiration.  MRSA positive on this admission to date.  2. Acute on chronic respiratory hypoxic failure.  3. Dementia.  4. Encephalopathy, metabolic.  5. Anemia, chronic disease.  Resolved.  6. Diabetes mellitus type 2 with increased risk for infection.  7. Hypocalcemia, 8.0, worse.  8.  ALT 57 (worse), AST 46 worse, elevated. Probably related to medications versus other.  9. Hypokalemia, resolved.     Plan:     1.  Diagnostically, continue to follow patient's physical exam, CBC, CMP, CRP, cultures obtained from outside hospital, serum immunoglobulins, HIV.  2. Therapeutically, continue vancomycin and aztreonam pending further culture data.  Duration of antibiotics likely to be until 10/20/18.  3. Oxygen support therapy.    Increased risk for adverse drug reactions, complications from line, recurrent pneumonia.    Maxim Lundberg MD  10/11/2018

## 2018-10-11 NOTE — PROGRESS NOTES
Pharmacy Consult-Vancomycin Dosing    Nallely Junior is a  79 yo woman transferred 10/6/18  to [facility] due to non-resolving PNA.  She was recently admitted to [facility] for pneumonia and mucous plugging with opacification of the left lung. She was treated with meropenem, vancomycin, and levofloxacin since 10/2/18. She was transferred to [facility] on 10/6/18 with non-resolving pneumonia. She is currently receiving vancomycin and aztreonam per ID Service recommendations.  Pharmacy Service to dose and monitor the vancomycin.      PMH includes dementia, diabetes mellitus type 2, chronic respiratory failure status post tracheostomy,hypertension, hyperlipidemia, bedbound, previous critical aortic stenosis, dysphagia, diastolic dysfunction, COPD, restless leg, GERD, anxiety, CAD.    Indication: PNA  Consulting Provider: Dr. Murphy  ID Consult: yes    Goal Trough: 15-20 mcg/ml    Current Antimicrobial Therapy  Anti-Infectives       Ordered     Dose/Rate Route Frequency Start Stop    10/09/18 0723  vancomycin (VANCOCIN) in iso-osmotic dextrose IVPB 1 g (premix) 200 mL     Ordering Provider:  Jan Martínez RPH    1,000 mg  over 60 Minutes Intravenous Every 12 Hours 10/09/18 1800 10/20/18 1759    10/09/18 0723  aztreonam (AZACTAM) 2 g in sodium chloride 0.9 % 100 mL IVPB-MBP     Ordering Provider:  Jan Martínez RPH    2 g  over 4 Hours Intravenous Every 8 Hours 10/09/18 0800 10/20/18 0814    10/09/18 0723  Pharmacy to dose vancomycin     Ordering Provider:  Jan Martínez RPH     Does not apply Continuous PRN 10/09/18 0721 10/20/18 0720    10/06/18 2328  aztreonam (AZACTAM) 2 g in sodium chloride 0.9 % 100 mL IVPB-MBP     Ordering Provider:  Olivia Murphy MD    2 g  200 mL/hr over 30 Minutes Intravenous Once 10/07/18 0015 10/07/18 0146    10/06/18 2023  vancomycin 2000 mg/500 mL 0.9% NS IVPB ([facility])     Ordering Provider:  Olivia Murphy MD    2,000 mg  over 120  "Minutes Intravenous Once 10/06/18 2115 10/07/18 0138          Allergies  Allergies as of 10/06/2018 - Reviewed 10/06/2018   Allergen Reaction Noted   • Penicillins Unknown (See Comments) 10/06/2018   • Sulfa antibiotics Unknown (See Comments) 10/06/2018     Labs    Results from last 7 days     Lab Units 10/11/18  0648 10/10/18  0436 10/09/18  0525   BUN mg/dL 19 19 15   CREATININE mg/dL 0.53* 0.55* 0.50*       Results from last 7 days     Lab Units 10/11/18  0524 10/10/18  0436 10/09/18  0525   WBC 10*3/mm3 9.25 13.56* 8.90     Ht - 162.6 cm (64\")  Wt - 80.7 kg (177 lb 14.6 oz)    Estimated Creatinine Clearance: 59.6 mL/min (A) (by C-G formula based on SCr of 0.53 mg/dL (L)).    Evaluation of Level    Lab Results   Component Value Date    [facility] 18.80 10/11/2018       Assessment/Plan:     • ID Service notes pneumonia, left chest, with recurrent mucus plugging, with previous cultures in the past positive for MRSA and Pseudomonas. Recurrence likely related to recurrent aspiration.  MRSA positive on this admission to date  • Vancomycin trough within target range  but cannot be sure that level is at steady-state.   Continue vancomycin 1 gm IV q12h.  • Will schedule another trough in 48 hours to ensure that it remains within 15-20 mcg/ml.  • Renal function is stable, WBC trending down.  Remains afebrile.  • Pharmacy Service will continue to follow the patient's clinical progress and monitor for evidence of vancomycin-induced adverse effects.    Guadalupe Bland, PharmD, BCPS   "

## 2018-10-11 NOTE — THERAPY TREATMENT NOTE
Acute Care - Physical Therapy Treatment Note  [facility]     Patient Name: Nallely Junior  : 1940  MRN: [id number]  Today's Date: 10/11/2018  Onset of Illness/Injury or Date of Surgery: 10/06/18  Date of Referral to PT: 10/06/18  Referring Physician: TIN Murphy MD    Admit Date: 10/6/2018    Visit Dx:    ICD-10-CM ICD-9-CM   1. Impaired mobility and ADLs Z74.09 799.89   2. Oropharyngeal dysphagia R13.12 787.22   3. Impaired functional mobility, balance, gait, and endurance Z74.09 V49.89   4. Aspiration pneumonia of left lower lobe, unspecified aspiration pneumonia type (CMS/HCC) J69.0 507.0   5. Voice and resonance disorder R49.9 784.40     Patient Active Problem List   Diagnosis   • Pneumonia   • Type 2 diabetes mellitus (CMS/[facility])   • Hypotension   • Hyperlipidemia   • Depression   • Anxiety   • COPD (chronic obstructive pulmonary disease) (CMS/[facility])   • Dysphagia   • Coronary artery disease   • CHF (congestive heart failure) (CMS/[facility])   • GERD (gastroesophageal reflux disease)   • Chronic respiratory failure with hypoxia and hypercapnia (CMS/[facility])       Therapy Treatment          Rehabilitation Treatment Summary     Row Name 10/11/18 0953             Treatment Time/Intention    Discipline physical therapist  -KR      Document Type therapy note (daily note)  -KR      Subjective Information no complaints  -KR      Mode of Treatment physical therapy  -KR      Care Plan Review care plan/treatment goals reviewed;risks/benefits reviewed;patient/other agree to care plan  -KR2      Therapy Frequency (PT Clinical Impression) daily  -KR2      Patient Effort good  -KR2      Existing Precautions/Restrictions fall;oxygen therapy device and L/min   trach  -KR2      Recorded by [KR] Shelli Nunes, PT 10/11/18 1136  [KR2] Shelli Nunes, PT 10/11/18 1208      Row Name 10/11/18 0953             Vital Signs    Pre Systolic BP Rehab 110  -KR      Pre Treatment Diastolic BP 48  -KR      Pretreatment Heart Rate (beats/min)  70  -KR      Posttreatment Heart Rate (beats/min) 75  -KR      Pre SpO2 (%) 92  -KR      O2 Delivery Pre Treatment supplemental O2  -KR      Post SpO2 (%) 93  -KR      O2 Delivery Post Treatment supplemental O2  -KR      Pre Patient Position Supine  -KR      Intra Patient Position Sitting  -KR      Post Patient Position Sitting  -KR      Recorded by [KR] Shelli Nunes, PT 10/11/18 1208      Row Name 10/11/18 0953             Cognitive Assessment/Intervention    Additional Documentation Cognitive Assessment/Intervention (Group)  -KR      Recorded by [KR] Shelli Nunes, PT 10/11/18 1208      Row Name 10/11/18 0953             Cognitive Assessment/Intervention- PT/OT    Affect/Mental Status (Cognitive) WFL  -KR      Orientation Status (Cognition) oriented to;person;place;situation  -KR      Follows Commands (Cognition) follows one step commands;over 90% accuracy;repetition of directions required;verbal cues/prompting required  -KR      Cognitive Function (Cognitive) safety deficit  -KR      Safety Deficit (Cognitive) mild deficit;ability to follow commands;awareness of need for assistance;insight into deficits/self awareness;safety precautions awareness;safety precautions follow-through/compliance  -KR      Personal Safety Interventions fall prevention program maintained;gait belt;nonskid shoes/slippers when out of bed  -KR      Recorded by [KR] Shelli Nunes, PT 10/11/18 1208      Row Name 10/11/18 0953             Safety Issues, Functional Mobility    Safety Issues Affecting Function (Mobility) awareness of need for assistance;insight into deficits/self awareness;safety precaution awareness;safety precautions follow-through/compliance;sequencing abilities  -KR      Impairments Affecting Function (Mobility) balance;coordination;endurance/activity tolerance;shortness of breath;strength  -KR      Recorded by [KR] Shelli Nunes, PT 10/11/18 1208      Row Name 10/11/18 0953             Bed Mobility  Assessment/Treatment    Bed Mobility Assessment/Treatment rolling left;rolling right  -KR      Rolling Left [place] (Bed Mobility) maximum assist (25% patient effort);2 person assist;verbal cues  -KR      Rolling Right [place] (Bed Mobility) maximum assist (25% patient effort);2 person assist;verbal cues  -KR      Bed Mobility, Safety Issues decreased use of arms for pushing/pulling;decreased use of legs for bridging/pushing  -KR      Assistive Device (Bed Mobility) bed rails;draw sheet  -KR      Comment (Bed Mobility) Pt rolled L and R for hygiene and placement of sling.   -KR      Recorded by [KR] Shelli Nunes, PT 10/11/18 1208      Row Name 10/11/18 0953             Transfer Assessment/Treatment    Comment (Transfers) Transfers deferred. Not appropriate to assess.   -KR      Recorded by [KR] Shelli Nunes, PT 10/11/18 1208      Row Name 10/11/18 0953             Gait/Stairs Assessment/Training    Comment (Gait/Stairs) Ambulation deferred. Pt non-ambulatory at baseline.   -KR      Recorded by [KR] Shelli Nunes, PT 10/11/18 1208      Row Name 10/11/18 0953             Motor Skills Assessment/Interventions    Additional Documentation Balance (Group);Therapeutic Exercise (Group)  -KR      Recorded by [KR] Shelli Nunes, PT 10/11/18 1208      Row Name 10/11/18 0953             Therapeutic Exercise    Therapeutic Exercise seated, lower extremities  -KR      Additional Documentation Therapeutic Exercise (Row)  -KR      91649 - PT Therapeutic Exercise Minutes 5  -KR      19789 - PT Therapeutic Activity Minutes 18  -KR      Recorded by [KR] Shelli Nunes, PT 10/11/18 1208      Row Name 10/11/18 0953             Lower Extremity Seated Therapeutic Exercise    Performed, Seated Lower Extremity (Therapeutic Exercise) hip flexion/extension;LAQ (long arc quad), knee extension;ankle dorsiflexion/plantarflexion;hip abduction/adduction  -KR      Exercise Type, Seated Lower Extremity (Therapeutic Exercise)  AAROM (active assistive range of motion)  -KR      Sets/Reps Detail, Seated Lower Extremity (Therapeutic Exercise) BLE 1/10  -KR      Recorded by [KR] Shelli Nunes, PT 10/11/18 1208      Row Name 10/11/18 0953             Balance    Balance static sitting balance  -KR      Recorded by [KR] Shelli Nunes, PT 10/11/18 1208      Row Name 10/11/18 0953             Static Sitting Balance    Level of [place] (Unsupported Sitting, Static Balance) moderate assist, 50 to 74% patient effort   progressed to CGA  -KR      Sitting Position (Unsupported Sitting, Static Balance) sitting in chair  -KR      Recorded by [KR] Shelli Nunes, PT 10/11/18 1208      Row Name 10/11/18 0953             Positioning and Restraints    Pre-Treatment Position in bed  -KR      Post Treatment Position chair  -KR      In Chair notified nsg;reclined;call light within reach;encouraged to call for assist;exit alarm on;RUE elevated;LUE elevated;waffle cushion;on mechanical lift sling;legs elevated  -KR      Recorded by [KR] Shelli Nunes, PT 10/11/18 1208      Row Name 10/11/18 0953             Pain Assessment    Additional Documentation Pain Scale: Numbers Pre/Post-Treatment (Group)  -KR      Recorded by [JAKE] Shelli Nunes, PT 10/11/18 1208      Row Name 10/11/18 0953             Pain Scale: Numbers Pre/Post-Treatment    Pain Scale: Numbers, Pretreatment 0/10 - no pain  -KR      Pain Scale: Numbers, Post-Treatment 0/10 - no pain  -KR      Recorded by [JAKE] Shelli Nunes, PT 10/11/18 1208      Row Name 10/11/18 0953             Plan of Care Review    Plan of Care Reviewed With patient  -KR      Recorded by [KR] Shelli Nunes, PT 10/11/18 1208      Row Name 10/11/18 0953             Outcome Summary/Treatment Plan (PT)    Daily Summary of Progress (PT) progress toward functional goals as expected  -KR      Recorded by [KR] Shelli Nunes, PT 10/11/18 1208        User Key  (r) = Recorded By, (t) = Taken By, (c) = Cosigned By     Initials Name Effective Dates Discipline    KR Shelli Nunes, PT 04/03/18 -  PT                     Physical Therapy Education     Title: PT OT SLP Therapies (Active)     Topic: Physical Therapy (Active)     Point: Home exercise program (Active)    Learning Progress Summary     Learner Status Readiness Method Response Comment Documented by    Patient Active Acceptance E NR  KR 10/11/18 0953          Point: Body mechanics (Active)    Learning Progress Summary     Learner Status Readiness Method Response Comment Documented by    Patient Active Acceptance E NR  KR 10/11/18 0953          Point: Precautions (Active)    Learning Progress Summary     Learner Status Readiness Method Response Comment Documented by    Patient Active Acceptance E NR  KR 10/11/18 0953                      User Key     Initials Effective Dates Name Provider Type Discipline     04/03/18 -  Shelli Nunes, PT Physical Therapist PT                    PT Recommendation and Plan  Therapy Frequency (PT Clinical Impression): daily  Outcome Summary/Treatment Plan (PT)  Daily Summary of Progress (PT): progress toward functional goals as expected  Plan of Care Reviewed With: patient  Progress: improving  Outcome Summary: Pt transferred from bed to chair with mechanical lift. Pt able to perform static sitting balance with CGA and UE support. Pt participated in B LE AAROM ther ex. Pt denied any pain. Continue to progress as appropriate.           Outcome Measures     Row Name 10/11/18 0953 10/09/18 1435 10/09/18 1147       How much help from another person do you currently need...    Turning from your back to your side while in flat bed without using bedrails? 2  -KR 2  -MB  --    Moving from lying on back to sitting on the side of a flat bed without bedrails? 2  -KR 2  -MB  --    Moving to and from a bed to a chair (including a wheelchair)? 1  -KR 1  -MB  --    Standing up from a chair using your arms (e.g., wheelchair, bedside chair)? 1  -KR 1  -MB   --    Climbing 3-5 steps with a railing? 1  -KR 1  -MB  --    To walk in hospital room? 1  -KR 1  -MB  --    AM-PAC 6 Clicks Score 8  -KR 8  -MB  --       How much help from another is currently needed...    Putting on and taking off regular lower body clothing?  --  -- 1  -CL    Bathing (including washing, rinsing, and drying)  --  -- 1  -CL    Toileting (which includes using toilet bed pan or urinal)  --  -- 1  -CL    Putting on and taking off regular upper body clothing  --  -- 2  -CL    Taking care of personal grooming (such as brushing teeth)  --  -- 3  -CL    Eating meals  --  -- 3  -CL    Score  --  -- 11  -CL       Functional Assessment    Outcome Measure Options AM-PAC 6 Clicks Basic Mobility (PT)  -KR AM-PAC 6 Clicks Basic Mobility (PT)  -MB AM-PAC 6 Clicks Daily Activity (OT)  -CL      User Key  (r) = Recorded By, (t) = Taken By, (c) = Cosigned By    Initials Name Provider Type    Britt Clay, PT Physical Therapist    CL Marie Pabon, OT Occupational Therapist    Shelli Willard, PT Physical Therapist           Time Calculation:         PT Charges     Row Name 10/11/18 0953             Time Calculation    Start Time 0953  -KR      PT Received On 10/11/18  -KR      PT Goal Re-Cert Due Date 10/18/18  -KR         Time Calculation- PT    Total Timed Code Minutes- PT 23 minute(s)  -KR         Timed Charges    88439 - PT Therapeutic Exercise Minutes 5  -KR      37317 - PT Therapeutic Activity Minutes 18  -KR        User Key  (r) = Recorded By, (t) = Taken By, (c) = Cosigned By    Initials Name Provider Type    Shelli Willard, PT Physical Therapist        Therapy Suggested Charges     Code   Minutes Charges    13173 (CPT®) Hc Pt Neuromusc Re Education Ea 15 Min      61947 (CPT®) Hc Pt Ther Proc Ea 15 Min 5 1    21100 (CPT®) Hc Gait Training Ea 15 Min      72929 (CPT®) Hc Pt Therapeutic Act Ea 15 Min 18 1    39447 (CPT®) Hc Pt Manual Therapy Ea 15 Min      71681 (CPT®) Hc Pt Iontophoresis Ea 15  Min      52303 (CPT®) Hc Pt Elec Stim Ea-Per 15 Min      92048 (CPT®) Hc Pt Ultrasound Ea 15 Min      14246 (CPT®) Hc Pt Self Care/Mgmt/Train Ea 15 Min      20096 (CPT®) Hc Pt Prosthetic (S) Train Initial Encounter, Each 15 Min      34308 (CPT®) Hc Pt Orthotic(S)/Prosthetic(S) Encounter, Each 15 Min      64038 (CPT®) Hc Orthotic(S) Mgmt/Train Initial Encounter, Each 15min      Total  23 2        Therapy Charges for Today     Code Description Service Date Service Provider Modifiers Qty    55146058921 HC PT THER PROC EA 15 MIN 10/11/2018 Shelli Nunes, PT GP 1    12574356577 HC PT THERAPEUTIC ACT EA 15 MIN 10/11/2018 Shelli Nunes, PT GP 1    53386555343 HC PT THER SUPP EA 15 MIN 10/11/2018 Shelli Nunes, PT GP 2          PT G-Codes  Outcome Measure Options: AM-PAC 6 Clicks Basic Mobility (PT)  AM-PAC 6 Clicks Score: 8  Score: 11  Functional Limitation: Mobility: Walking and moving around  Mobility: Walking and Moving Around Current Status (): At least 80 percent but less than 100 percent impaired, limited or restricted  Mobility: Walking and Moving Around Goal Status (): At least 60 percent but less than 80 percent impaired, limited or restricted    Mercy Nunes, PT  10/11/2018

## 2018-10-11 NOTE — PLAN OF CARE
Problem: Patient Care Overview  Goal: Plan of Care Review  Outcome: Ongoing (interventions implemented as appropriate)   10/11/18 0965   Coping/Psychosocial   Plan of Care Reviewed With patient   OTHER   Outcome Summary Pt transferred from bed to chair with mechanical lift. Pt able to perform static sitting balance with CGA and UE support. Pt participated in B LE AAROM ther ex. Pt denied any pain. Continue to progress as appropriate.    Plan of Care Review   Progress improving

## 2018-10-11 NOTE — PROGRESS NOTES
Continued Stay Note  [facility]     Patient Name: Nallely Junior  MRN: [id number]  Today's Date: 10/11/2018    Admit Date: 10/6/2018          Discharge Plan     Row Name 10/11/18 1310       Plan    Plan Return to [facility] long term bed when medically ready    Patient/Family in Agreement with Plan yes    Plan Comments Spoke with Kelley, liaison with [facility] (P: [phone]), while she was onsite today visiting Ms. Junior. She says Ms. Junior looks better than she normally does at their facility. She has a Medicaid bed-hold there until 10/16/18. They will accept patient back whenever she is medically ready. Discussed with Dr. Michael and tentatively scheduled ambulance via Patricia with [facility] for Friday, 10/12 at 3:00 PM, in case patient is medically ready by then and it will be an out of town transport, which is more difficult to schedule. Per Patricia, this will be a BLS run; aware that patient has a trach requiring 35% trach collar and possible suctioning en route. CM notified patient of possible transfer at bedside. Attempted to call daughter Carlita Sharif to update at P: [phone], but was unable to reach her. Please notify CM if this needs to be rescheduled. Thank you. Radha Merino RN [phone]    Final Discharge Disposition Code 04 - intermediate care facility              Discharge Codes    No documentation.       Expected Discharge Date and Time     Expected Discharge Date Expected Discharge Time    Oct 12, 2018             Radha Merino RN

## 2018-10-11 NOTE — PROGRESS NOTES
INPATIENT PULMONARY SERVICE  PROGRESS NOTE     Hospital LOS: 5 days    Ms. Nallely Junior, is followed for a Chief Complaint of: Non-resolving pneumonia, shortness of breath      Pneumonia    Type 2 diabetes mellitus (CMS/[facility])    Hypotension    Hyperlipidemia    Depression    Anxiety    COPD (chronic obstructive pulmonary disease) (CMS/[facility])    Dysphagia    Coronary artery disease    CHF (congestive heart failure) (CMS/[facility])    GERD (gastroesophageal reflux disease)    Chronic respiratory failure with hypoxia and hypercapnia (CMS/[facility])      Subjective   S   Mrs. Junior is a 79 y/o WF who was transferred to [facility] from [facility] yesterday for pneumonia.  She had a 4 day c/o shortness of air and was taken to [facility].  She was found to have a UTI and LLL PNA.  Per records, her sputum was pending but was positive for GNR and Staph Aureus.  She was given Zosyn, Vanc and Merrem.      She is a very poor historian.  Per RN, her sister stated that the patient fell about a year and a half ago and broke her leg. She went to  and had to have surgery.  She was failure to wean from the vent after a month, so she had a trach and PEG placed.  She now is a NHR.  She does have dysphagia and is supposed to be on mechanical soft/nectar thick diet, but she is noncompliant. Her family brings her food and she is found drinking thin liquids often.  She still has her PEG that is not used.      Since admission, she has been continued on Vancomycin and Aztreonam. Sputum culture here with scant growth of yeast and MRSA. She was started on Mucomyst and chest physiotherapy on 10/7.     Interval History:  No events overnight. Doing well this AM. Continues to have copious secretions.          The patient's relevant past medical, surgical and social history were reviewed and updated in Epic as appropriate.      ROS:   Constitutional: Negative for fever.   Respiratory: Positive for dyspnea.   Cardiovascular: Negative for chest pain.    Gastrointestinal: Negative for  nausea, vomiting and diarrhea.     Objective   O     Vitals  Temp  Min: 98.1 °F (36.7 °C)  Max: 98.9 °F (37.2 °C)  BP  Min: 110/48  Max: 169/76  Pulse  Min: 65  Max: 83  Resp  Min: 16  Max: 24  SpO2  Min: 90 %  Max: 97 % Flow (L/min)  Min: 14  Max: 15    I/O 24 HR (7:00 AM-6:59AM):  Intake/Output       10/10/18 0700 - 10/11/18 0659    Intake (ml) 900    Output (ml) 2550    Net (ml) -1650          Medications (Drips):    Pharmacy Consult   Pharmacy to dose vancomycin       Physical Examination    Telemetry: Normal sinus rhythm.    Constitutional:  No acute distress.  Lying in bed.    Cardiovascular: Regular rate and rhythm.  No murmurs, rub or gallop.   Respiratory: Normal symmetric chest expansion.  Normal respiratory effort.  Upper airway rhonchi.   Copious secretions from the trach.    Abdominal:  Soft. No masses.   Non-tender. No distension.   No hepatosplenomegaly.   Extremities: No digital cyanosis or clubbing.  No peripheral edema.   Neurological:   Alert and Oriented to person, place, and time.   Moves all extremities.              Results from last 7 days  Lab Units 10/11/18  0524 10/10/18  0436 10/09/18  0525   WBC 10*3/mm3 9.25 13.56* 8.90   HEMOGLOBIN g/dL 11.5 12.1 11.5   MCV fL 92.8 92.0 94.6   PLATELETS 10*3/mm3 223 291 235       Results from last 7 days  Lab Units 10/11/18  0648 10/10/18  0436 10/09/18  0525   SODIUM mmol/L 134 136 133   POTASSIUM mmol/L 3.8 3.3* 3.6   CO2 mmol/L 36.0* 37.0* 34.0*   CREATININE mg/dL 0.53* 0.55* 0.50*   MAGNESIUM mg/dL 1.8 1.8 2.0     Serum creatinine: 0.53 mg/dL (L) 10/11/18 0648  Estimated creatinine clearance: 59.6 mL/min (A)    Results from last 7 days  Lab Units 10/10/18  0436 10/09/18  0525 10/06/18  2031   ALK PHOS U/L 83 79 78   BILIRUBIN mg/dL 0.6 0.6 0.4   ALT (SGPT) U/L 57* 45* 44*   AST (SGOT) U/L 46* 43* 63*             Images:     Imaging Results (last 24 hours)     Procedure Component Value Units Date/Time    XR Chest 1  View [[id number]] Collected:  10/10/18 0953     Updated:  10/10/18 0955    Narrative:       EXAMINATION: XR CHEST 1 VW- 10/10/2018     INDICATION: Pneumonia, atelectasis; Z74.09-Other reduced mobility;  R13.12-Dysphagia, oropharyngeal phase     TECHNIQUE:  Single view frontal chest.     COMPARISONS: 10/09/2018     FINDINGS:  Stable support apparatus. Unchanged small volume left and  trace right pleural effusion. No pneumothorax. ORIF hardware right  proximal humerus. Cardiomediastinal silhouette is unchanged.        Impression:       Stable exam.     D:  10/10/2018  E:  10/10/2018     This report was finalized on 10/10/2018 9:53 AM by Sonny Perez.             I reviewed the patient's new clinical results.  I reviewed the patient's new imaging results and agree with the interpretation.    Assessment/Plan   A / P     Ms. Junior is a 77yo F who is admitted with acute on chronic respiratory failure secondary to non-resolving pneumonia and mucous plugging. She was started on mucomyst and chest physiotherapy on 10/7. She has remained on broad spectrum antibiotics for GNR and staph isolated from sputum at the OSH. Sputum cultures here are growing scant yeast and MRSA. She has a history of dysphagia with noncompliance and is had an MBS performed on 10/8. CXR performed this AM shows improvement in her left sided airspace disease/mucous plugging.     Diet Dysphagia; IV - Mechanical Soft No Mixed Consistencies; Nectar / Syrup Thick; Consistent Carbohydrate  Code Status and Medical Interventions:   Ordered at: 10/06/18 1946     Level Of Support Discussed With:    Patient     Code Status:    CPR     Medical Interventions (Level of Support Prior to Arrest):    Full       Active Hospital Problems    Diagnosis   • Hypotension     on midodrine     • Hyperlipidemia   • Depression   • Anxiety   • COPD (chronic obstructive pulmonary disease) (CMS/[facility])   • Dysphagia     Was on mechanical soft and nectar thick at SNF, noncompliant      •  Coronary artery disease   • CHF (congestive heart failure) (CMS/[facility])   • GERD (gastroesophageal reflux disease)   • Chronic respiratory failure with hypoxia and hypercapnia (CMS/[facility])   • Pneumonia   • Type 2 diabetes mellitus (CMS/[facility])       Assessment / Plan:  1. Continue chest physiotherapy and suctioning.     2. Continue antibiotics per ID.    3. Wean supplemental O2 as tolerated. She will need to continue humidified air.   4. Repeat CXR in AM    I discussed the patient's findings and my recommendations with patient    Time: 25 minutes       Alva Pinedo, DO  Pulmonary and Critical Care Medicine

## 2018-10-11 NOTE — PLAN OF CARE
Problem: Patient Care Overview  Goal: Plan of Care Review  Outcome: Ongoing (interventions implemented as appropriate)   10/11/18 4132   Coping/Psychosocial   Plan of Care Reviewed With patient   OTHER   Outcome Summary Pt session limited d/t lethargy/fatigue. Pt tolerated BUE HEP and grooming tasks. Recommend cont skilled IPOT POC.

## 2018-10-11 NOTE — PROGRESS NOTES
[facility] Medicine Services  PROGRESS NOTE    Patient Name: Nallely Junior  : 1940  MRN: [id number]    Date of Admission: 10/6/2018  Length of Stay: 5  Primary Care Physician: Ray Strickland MD    Subjective   Subjective     CC:  pna     HPI:  Says she has upset stomach.  Sitting up today.  Respiratory status very delicate.  No family today.  No overnight events reported.      Review of Systems  Gen- No fevers, chills  CV- No chest pain, palpitations  Resp- No cough,pos dyspnea, trach   GI- No N/V/D, abd pain        Otherwise ROS is negative except as mentioned in the HPI.    Objective   Objective     Vital Signs:   Temp:  [97.6 °F (36.4 °C)-98.1 °F (36.7 °C)] 97.6 °F (36.4 °C)  Heart Rate:  [62-84] 84  Resp:  [16-24] 24  BP: (110-169)/(48-76) 112/61        Physical Exam:  Constitutional: No acute distress, awake, alert, trached  HENT: NCAT, mucous membranes moist  Respiratory: wheeze and coarse bs with trach, rhonchi b/l   Cardiovascular: RRR, no murmurs, rubs, or gallops, palpable pedal pulses bilaterally  Gastrointestinal: Positive bowel sounds, soft, nontender, nondistended  Musculoskeletal: No bilateral ankle edema  Psychiatric: Appropriate affect, cooperative  Neurologic: Oriented x 3, strength symmetric in all extremities, Cranial Nerves grossly intact to confrontation, speech clear  Skin: No rashes        Results Reviewed:  I have personally reviewed current lab, radiology, and data and agree.      Results from last 7 days  Lab Units 10/11/18  0524 10/10/18  0436 10/09/18  0525   WBC 10*3/mm3 9.25 13.56* 8.90   HEMOGLOBIN g/dL 11.5 12.1 11.5   HEMATOCRIT % 36.2 38.2 36.5   PLATELETS 10*3/mm3 223 291 235       Results from last 7 days  Lab Units 10/11/18  0648 10/10/18  0436 10/09/18  0525  10/06/18  2031   SODIUM mmol/L 134 136 133  < > 140  140   POTASSIUM mmol/L 3.8 3.3* 3.6  < > 3.5  3.5   CHLORIDE mmol/L 93* 95* 95*  < > 107  107   CO2 mmol/L 36.0* 37.0* 34.0*  < >  28.0  28.0   BUN mg/dL 19 19 15  < > 15  15   CREATININE mg/dL 0.53* 0.55* 0.50*  < > 0.54*  0.54*   GLUCOSE mg/dL 255* 218* 302*  < > 226*  226*   CALCIUM mg/dL 8.0* 8.3* 8.2*  < > 8.8  8.8   ALT (SGPT) U/L  --  57* 45*  --  44*   AST (SGOT) U/L  --  46* 43*  --  63*   < > = values in this interval not displayed.  Estimated Creatinine Clearance: 59.6 mL/min (A) (by C-G formula based on SCr of 0.53 mg/dL (L)).  No results found for: BNP    Microbiology Results Abnormal     Procedure Component Value - Date/Time    Wound Culture - Wound, Abdominal Wall [[id number]]  (Abnormal)  (Susceptibility) Collected:  10/07/18 0109    Lab Status:  Preliminary result Specimen:  Wound from Abdominal Wall Updated:  10/11/18 1327     Wound Culture Moderate growth (3+) Proteus mirabilis (A)      Moderate growth (3+) Candida glabrata (A)      Moderate growth (3+) Candida krusei (A)      Light growth (2+) Enterococcus species (A)      Light growth (2+) Staphylococcus aureus, MRSA (A)     Comment:   Methicillin resistant Staphylococcus aureus, Patient may be an isolation risk.        Gram Stain Result No WBCs seen      Few (2+) Gram positive bacilli      Many (4+) Yeast    Susceptibility      Proteus mirabilis    Staphylococcus aureus, MRSA       GONSALO (Preliminary)    GONSALO (Preliminary)       Amikacin <=16 ug/ml Susceptible             Ampicillin >16 ug/ml Resistant             Ampicillin + Sulbactam 16/8 ug/ml Intermediate             Aztreonam <=8 ug/ml Susceptible             Cefepime <=8 ug/ml Susceptible             Cefotaxime <=2 ug/ml Susceptible             Ceftriaxone <=8 ug/ml Susceptible             Cefuroxime sodium <=4 ug/ml Susceptible             Ciprofloxacin >2 ug/ml Resistant    >2 ug/ml Resistant       Clindamycin       >4 ug/ml Resistant       Daptomycin       <=0.5 ug/ml Susceptible       Ertapenem <=1 ug/ml Susceptible             Erythromycin       >4 ug/ml Resistant       Gentamicin >8 ug/ml Resistant    <=4  ug/ml Susceptible       Levofloxacin 4 ug/ml Intermediate    >4 ug/ml Resistant       Linezolid       2 ug/ml Susceptible       Meropenem <=1 ug/ml Susceptible             Oxacillin       >2 ug/ml Resistant       Penicillin G       8 ug/ml Resistant       Piperacillin + Tazobactam <=16 ug/ml Susceptible             Quinupristin + Dalfopristin       <=0.5 ug/ml Susceptible       Rifampin       <=1 ug/ml Susceptible       Tetracycline       <=4 ug/ml Susceptible       Tobramycin >8 ug/ml Resistant             Trimethoprim + Sulfamethoxazole >2/38 ug/ml Resistant    <=0.5/9.5 ug/ml Susceptible       Vancomycin       1 ug/ml Susceptible                      Blood Culture - Blood, [[id number]]  (Normal) Collected:  10/06/18 2020    Lab Status:  Preliminary result Specimen:  Blood from Arm, Right Updated:  10/10/18 2101     Blood Culture No growth at 4 days    Respiratory Culture - Sputum, NT Suction [[id number]]  (Abnormal)  (Susceptibility) Collected:  10/07/18 0108    Lab Status:  Final result Specimen:  Sputum from NT Suction Updated:  10/10/18 1437     Respiratory Culture Scant growth (1+) Yeast isolated (A)      Scant growth (1+) Staphylococcus aureus, MRSA (A)     Comment:   Methicillin resistant Staphylococcus aureus, Patient may be an isolation risk.        Gram Stain Result Many (4+) WBCs per low power field      Rare (1+) Epithelial cells per low power field      No organisms seen    Susceptibility      Staphylococcus aureus, MRSA     GONSALO     Ciprofloxacin >2 ug/ml Resistant     Clindamycin >4 ug/ml Resistant     Erythromycin >4 ug/ml Resistant     Gentamicin <=4 ug/ml Susceptible     Levofloxacin >4 ug/ml Resistant     Linezolid 2 ug/ml Susceptible     Oxacillin >2 ug/ml Resistant     Penicillin G >8 ug/ml Resistant     Quinupristin + Dalfopristin <=0.5 ug/ml Susceptible     Rifampin <=1 ug/ml Susceptible     Tetracycline <=4 ug/ml Susceptible     Trimethoprim + Sulfamethoxazole <=0.5/9.5 ug/ml Susceptible      Vancomycin 2 ug/ml Susceptible                          Imaging Results (last 24 hours)     ** No results found for the last 24 hours. **             I have reviewed the medications.      [START ON 10/13/2018] Pharmacy Consult  Does not apply Once   aspirin 162 mg Oral Daily   atorvastatin 40 mg Oral Nightly   aztreonam 2 g Intravenous Q8H   busPIRone 15 mg Oral Q8H   enoxaparin 40 mg Subcutaneous Q24H   furosemide 20 mg Oral BID   hydrocortisone  Rectal BID   insulin detemir 20 Units Subcutaneous Nightly   insulin lispro 0-7 Units Subcutaneous 4x Daily With Meals & Nightly   ipratropium-albuterol 3 mL Nebulization 4x Daily - RT   midodrine 10 mg Oral TID AC   predniSONE 40 mg Oral Daily With Breakfast   rOPINIRole 4 mg Oral Nightly   sodium chloride 3 mL Intravenous Q12H   vancomycin 1,000 mg Intravenous Q12H         Assessment/Plan   Assessment / Plan     Hospital Problem List     Pneumonia    Type 2 diabetes mellitus (CMS/[facility])    Hypotension    Overview Signed 10/7/2018  3:25 PM by Meme Acevedo APRN     on midodrine         Hyperlipidemia    Depression    Anxiety    COPD (chronic obstructive pulmonary disease) (CMS/[facility])    Dysphagia    Overview Signed 10/7/2018  4:13 PM by Meme Acevedo APRN     Was on mechanical soft and nectar thick at SNF, noncompliant          Coronary artery disease    CHF (congestive heart failure) (CMS/[facility])    GERD (gastroesophageal reflux disease)    Chronic respiratory failure with hypoxia and hypercapnia (CMS/[facility])             Brief Hospital Course to date:  Nallely Junior is a 78 y.o. female  with history of chronic respiratory failure s/p tracheostomy, HLD, T2DM, bedbound state who presents as a transfer/direct admission from [facility] for nonresolving PNA, mucus plugging with opacification of left lung and tracheostomy issues. Patient was admitted to OSH on 10/2 from her NH, for several days of sob, cough, increasing amount of  sputum. Patient was initially diagnosed with left sided PNA at OSH, treated with broad spectrum abx- merrem, vanc, levaquin since 10/2. Patient was also noted to be hypotensive to as low as 90s systolic on presentation, though this resolved. Sputum cx from admission was reported to grow MRSA, sensitivities pending, as well as GNR (speciations/sensitivities pending). Patient was reported to initially be improving as WBC noted on admission 20k--- went down to 10k on day of transfer (10/6), however repeat CXR from 10/3-10/4 revealed worsening opacification of left hemithorax concerning for mucus plugging, also noted reports of small left sided plueral effusion. [facility] was called for transfer, however no beds were available until this time. unclear what patient's home baseline oxygen requirement is, she says no oxygen at home prior but per sister ,the patient fell about a year and a half ago and broke her leg.  She went to  and had to have surgery.  She was failure to wean from the vent after a month, so she had a trach and PEG placed.  She now is a [employer]      Assessment & Plan:  Acute on chronic respiratory failure  PNA, MRSA/GNR ,LLL, BETTIE, RLL PNA on CT   Mucus plugging   Possible aspiration   -- patient was being treated with IV vanc/merrem/levaquin at OSH  -- sputum cx had resulted with MRSA/GNR  --continue  vanc/azactam (PCN allergy) here for now which should cover both MRSA/GNR, Likely through 10/20/18. ID consulted and following.  pulm following. No need for bronch at this point per pulm  - continue scheduled nebs, steroids (patient was on TID solumedrol at OSH prior to transfer)  - [facility].    --Mucomyst nebs  --Chest Physiotherapy  --cxr in am         T2DM  - home meds per OSH med rec list 30units levemir, increase to 20 units qhs as well as SSI, elevated today      HLD  - continue statin     RLS  - continue ropinirole     Depression  - continue buspar     H/o  Hypotension  - continue home midodrine, closely monitor     Elevated Lactic Acid   --recheck mei     Incentive spirometer  Likely need CPAP but unclear how to interface with trach     CXR Friday AM  Labs Friday AM including BNP  Echo     DVT prophylaxis: Lovenox     CODE STATUS:   Code Status and Medical Interventions:   Ordered at: 10/06/18 1946     Level Of Support Discussed With:    Patient     Code Status:    CPR     Medical Interventions (Level of Support Prior to Arrest):    Full       Disposition: I expect the patient to be discharged tbd      Electronically signed by Jovanny Michael MD, 10/11/18, 6:19 PM.

## 2018-10-11 NOTE — PLAN OF CARE
Problem: Fall Risk (Adult)  Goal: Absence of Fall  Outcome: Ongoing (interventions implemented as appropriate)      Problem: Skin Injury Risk (Adult)  Goal: Skin Health and Integrity  Outcome: Ongoing (interventions implemented as appropriate)      Problem: Patient Care Overview  Goal: Plan of Care Review  Outcome: Ongoing (interventions implemented as appropriate)   10/11/18 0441   Coping/Psychosocial   Plan of Care Reviewed With patient   OTHER   Outcome Summary VSS. RA. NSR. Pt c/o some discomfort in her hands and legs. Pt slept during most of the night. Will continue to monitor.    Plan of Care Review   Progress no change     Goal: Discharge Needs Assessment  Outcome: Ongoing (interventions implemented as appropriate)    Goal: Interprofessional Rounds/Family Conf  Outcome: Ongoing (interventions implemented as appropriate)

## 2018-10-12 ENCOUNTER — APPOINTMENT (OUTPATIENT)
Dept: CARDIOLOGY | Facility: HOSPITAL | Age: 78
End: 2018-10-12
Attending: HOSPITALIST

## 2018-10-12 ENCOUNTER — APPOINTMENT (OUTPATIENT)
Dept: GENERAL RADIOLOGY | Facility: HOSPITAL | Age: 78
End: 2018-10-12

## 2018-10-12 LAB
ANION GAP SERPL CALCULATED.3IONS-SCNC: 8 MMOL/L (ref 3–11)
BACTERIA SPEC AEROBE CULT: ABNORMAL
BH CV ECHO MEAS - AO MAX PG (FULL): 59.7 MMHG
BH CV ECHO MEAS - AO MAX PG: 63.7 MMHG
BH CV ECHO MEAS - AO MEAN PG (FULL): 36.8 MMHG
BH CV ECHO MEAS - AO MEAN PG: 38.9 MMHG
BH CV ECHO MEAS - AO ROOT AREA (BSA CORRECTED): 1.4
BH CV ECHO MEAS - AO ROOT AREA: 5 CM^2
BH CV ECHO MEAS - AO ROOT DIAM: 2.5 CM
BH CV ECHO MEAS - AO V2 MAX: 399.1 CM/SEC
BH CV ECHO MEAS - AO V2 MEAN: 295.9 CM/SEC
BH CV ECHO MEAS - AO V2 VTI: 95.3 CM
BH CV ECHO MEAS - AVA(I,A): 0.72 CM^2
BH CV ECHO MEAS - AVA(I,D): 0.72 CM^2
BH CV ECHO MEAS - AVA(V,A): 0.76 CM^2
BH CV ECHO MEAS - AVA(V,D): 0.76 CM^2
BH CV ECHO MEAS - BSA(HAYCOCK): 1.9 M^2
BH CV ECHO MEAS - BSA: 1.9 M^2
BH CV ECHO MEAS - BZI_BMI: 30.4 KILOGRAMS/M^2
BH CV ECHO MEAS - BZI_METRIC_HEIGHT: 162.6 CM
BH CV ECHO MEAS - BZI_METRIC_WEIGHT: 80.3 KG
BH CV ECHO MEAS - EDV(CUBED): 26.3 ML
BH CV ECHO MEAS - EDV(MOD-SP4): 83 ML
BH CV ECHO MEAS - EDV(TEICH): 34.3 ML
BH CV ECHO MEAS - EF(CUBED): 70.2 %
BH CV ECHO MEAS - EF(MOD-SP4): 59 %
BH CV ECHO MEAS - EF(TEICH): 63.5 %
BH CV ECHO MEAS - ESV(CUBED): 7.8 ML
BH CV ECHO MEAS - ESV(MOD-SP4): 34 ML
BH CV ECHO MEAS - ESV(TEICH): 12.5 ML
BH CV ECHO MEAS - FS: 33.2 %
BH CV ECHO MEAS - IVS/LVPW: 1.2
BH CV ECHO MEAS - IVSD: 1.5 CM
BH CV ECHO MEAS - LA DIMENSION: 3.9 CM
BH CV ECHO MEAS - LA/AO: 1.5
BH CV ECHO MEAS - LAD MAJOR: 5.9 CM
BH CV ECHO MEAS - LAT PEAK E' VEL: 4.4 CM/SEC
BH CV ECHO MEAS - LATERAL E/E' RATIO: 26
BH CV ECHO MEAS - LV DIASTOLIC VOL/BSA (35-75): 44.7 ML/M^2
BH CV ECHO MEAS - LV MASS(C)D: 132.6 GRAMS
BH CV ECHO MEAS - LV MASS(C)DI: 71.4 GRAMS/M^2
BH CV ECHO MEAS - LV MAX PG: 4 MMHG
BH CV ECHO MEAS - LV MEAN PG: 2.1 MMHG
BH CV ECHO MEAS - LV SYSTOLIC VOL/BSA (12-30): 18.3 ML/M^2
BH CV ECHO MEAS - LV V1 MAX: 100 CM/SEC
BH CV ECHO MEAS - LV V1 MEAN: 67.6 CM/SEC
BH CV ECHO MEAS - LV V1 VTI: 22.7 CM
BH CV ECHO MEAS - LVIDD: 3 CM
BH CV ECHO MEAS - LVIDS: 2 CM
BH CV ECHO MEAS - LVLD AP4: 7.4 CM
BH CV ECHO MEAS - LVLS AP4: 6.4 CM
BH CV ECHO MEAS - LVOT AREA (M): 3.1 CM^2
BH CV ECHO MEAS - LVOT AREA: 3 CM^2
BH CV ECHO MEAS - LVOT DIAM: 2 CM
BH CV ECHO MEAS - LVPWD: 1.3 CM
BH CV ECHO MEAS - MED PEAK E' VEL: 3.1 CM/SEC
BH CV ECHO MEAS - MEDIAL E/E' RATIO: 36.8
BH CV ECHO MEAS - MV A MAX VEL: 160.4 CM/SEC
BH CV ECHO MEAS - MV DEC SLOPE: 293.7 CM/SEC^2
BH CV ECHO MEAS - MV E MAX VEL: 117.8 CM/SEC
BH CV ECHO MEAS - MV E/A: 0.73
BH CV ECHO MEAS - MV MAX PG: 12.5 MMHG
BH CV ECHO MEAS - MV MEAN PG: 4.5 MMHG
BH CV ECHO MEAS - MV P1/2T MAX VEL: 123.5 CM/SEC
BH CV ECHO MEAS - MV P1/2T: 123.2 MSEC
BH CV ECHO MEAS - MV V2 MAX: 176.5 CM/SEC
BH CV ECHO MEAS - MV V2 MEAN: 99.3 CM/SEC
BH CV ECHO MEAS - MV V2 VTI: 53.2 CM
BH CV ECHO MEAS - MVA P1/2T LCG: 1.8 CM^2
BH CV ECHO MEAS - MVA(P1/2T): 1.8 CM^2
BH CV ECHO MEAS - MVA(VTI): 1.3 CM^2
BH CV ECHO MEAS - PA ACC SLOPE: 648.9 CM/SEC^2
BH CV ECHO MEAS - PA ACC TIME: 0.14 SEC
BH CV ECHO MEAS - PA PR(ACCEL): 16 MMHG
BH CV ECHO MEAS - RVDD: 2.9 CM
BH CV ECHO MEAS - SI(AO): 255.5 ML/M^2
BH CV ECHO MEAS - SI(CUBED): 9.9 ML/M^2
BH CV ECHO MEAS - SI(LVOT): 37.1 ML/M^2
BH CV ECHO MEAS - SI(MOD-SP4): 26.4 ML/M^2
BH CV ECHO MEAS - SI(TEICH): 11.7 ML/M^2
BH CV ECHO MEAS - SV(AO): 474.5 ML
BH CV ECHO MEAS - SV(CUBED): 18.5 ML
BH CV ECHO MEAS - SV(LVOT): 68.9 ML
BH CV ECHO MEAS - SV(MOD-SP4): 49 ML
BH CV ECHO MEAS - SV(TEICH): 21.8 ML
BH CV ECHO MEAS - TAPSE (>1.6): 2.1 CM2
BH CV ECHO MEASUREMENTS AVERAGE E/E' RATIO: 31.41
BH CV VAS BP LEFT ARM: NORMAL MMHG
BH CV XLRA - RV BASE: 3.8 CM
BH CV XLRA - RV LENGTH: 6.6 CM
BH CV XLRA - RV MID: 3.2 CM
BH CV XLRA - TDI S': 11.5 CM/SEC
BNP SERPL-MCNC: 234 PG/ML (ref 0–100)
BUN BLD-MCNC: 21 MG/DL (ref 9–23)
BUN/CREAT SERPL: 36.2 (ref 7–25)
CALCIUM SPEC-SCNC: 8.4 MG/DL (ref 8.7–10.4)
CHLORIDE SERPL-SCNC: 91 MMOL/L (ref 99–109)
CO2 SERPL-SCNC: 34 MMOL/L (ref 20–31)
CREAT BLD-MCNC: 0.58 MG/DL (ref 0.6–1.3)
GFR SERPL CREATININE-BSD FRML MDRD: 101 ML/MIN/1.73
GLUCOSE BLD-MCNC: 345 MG/DL (ref 70–100)
GLUCOSE BLDC GLUCOMTR-MCNC: 218 MG/DL (ref 70–130)
GLUCOSE BLDC GLUCOMTR-MCNC: 252 MG/DL (ref 70–130)
GLUCOSE BLDC GLUCOMTR-MCNC: 273 MG/DL (ref 70–130)
GLUCOSE BLDC GLUCOMTR-MCNC: 340 MG/DL (ref 70–130)
GRAM STN SPEC: ABNORMAL
IGA SERPL-MCNC: 400 MG/DL (ref 64–422)
IGG SERPL-MCNC: 1097 MG/DL (ref 700–1600)
IGM SERPL-MCNC: 45 MG/DL (ref 26–217)
LV EF 2D ECHO EST: 75 %
MAGNESIUM SERPL-MCNC: 1.7 MG/DL (ref 1.3–2.7)
MAXIMAL PREDICTED HEART RATE: 142 BPM
PHOSPHATE SERPL-MCNC: 3.1 MG/DL (ref 2.4–5.1)
POTASSIUM BLD-SCNC: 3.7 MMOL/L (ref 3.5–5.5)
SODIUM BLD-SCNC: 133 MMOL/L (ref 132–146)
STRESS TARGET HR: 121 BPM
TOTAL IGE SMQN RAST: 16 IU/ML (ref 0–100)

## 2018-10-12 PROCEDURE — 83880 ASSAY OF NATRIURETIC PEPTIDE: CPT | Performed by: HOSPITALIST

## 2018-10-12 PROCEDURE — 83735 ASSAY OF MAGNESIUM: CPT | Performed by: HOSPITALIST

## 2018-10-12 PROCEDURE — 93306 TTE W/DOPPLER COMPLETE: CPT | Performed by: INTERNAL MEDICINE

## 2018-10-12 PROCEDURE — 99233 SBSQ HOSP IP/OBS HIGH 50: CPT | Performed by: HOSPITALIST

## 2018-10-12 PROCEDURE — 63710000001 INSULIN DETEMIR PER 5 UNITS: Performed by: HOSPITALIST

## 2018-10-12 PROCEDURE — 63710000001 PREDNISONE PER 1 MG: Performed by: INTERNAL MEDICINE

## 2018-10-12 PROCEDURE — 25010000002 SULFUR HEXAFLUORIDE MICROSPH 60.7-25 MG RECONSTITUTED SUSPENSION: Performed by: HOSPITALIST

## 2018-10-12 PROCEDURE — 92507 TX SP LANG VOICE COMM INDIV: CPT

## 2018-10-12 PROCEDURE — 94668 MNPJ CHEST WALL SBSQ: CPT

## 2018-10-12 PROCEDURE — 94760 N-INVAS EAR/PLS OXIMETRY 1: CPT

## 2018-10-12 PROCEDURE — 25010000002 VANCOMYCIN PER 500 MG

## 2018-10-12 PROCEDURE — 82962 GLUCOSE BLOOD TEST: CPT

## 2018-10-12 PROCEDURE — 94640 AIRWAY INHALATION TREATMENT: CPT

## 2018-10-12 PROCEDURE — 97110 THERAPEUTIC EXERCISES: CPT

## 2018-10-12 PROCEDURE — 25010000002 ENOXAPARIN PER 10 MG: Performed by: INTERNAL MEDICINE

## 2018-10-12 PROCEDURE — 93306 TTE W/DOPPLER COMPLETE: CPT

## 2018-10-12 PROCEDURE — 71045 X-RAY EXAM CHEST 1 VIEW: CPT

## 2018-10-12 PROCEDURE — 94799 UNLISTED PULMONARY SVC/PX: CPT

## 2018-10-12 PROCEDURE — 84100 ASSAY OF PHOSPHORUS: CPT | Performed by: HOSPITALIST

## 2018-10-12 PROCEDURE — 92526 ORAL FUNCTION THERAPY: CPT

## 2018-10-12 PROCEDURE — 99233 SBSQ HOSP IP/OBS HIGH 50: CPT | Performed by: INTERNAL MEDICINE

## 2018-10-12 PROCEDURE — 80048 BASIC METABOLIC PNL TOTAL CA: CPT | Performed by: HOSPITALIST

## 2018-10-12 RX ORDER — ACETYLCYSTEINE 200 MG/ML
3 SOLUTION ORAL; RESPIRATORY (INHALATION)
Status: DISCONTINUED | OUTPATIENT
Start: 2018-10-12 | End: 2018-10-15

## 2018-10-12 RX ADMIN — ACETYLCYSTEINE 3 ML: 200 SOLUTION ORAL; RESPIRATORY (INHALATION) at 20:10

## 2018-10-12 RX ADMIN — ASPIRIN 325 MG ORAL TABLET 162 MG: 325 PILL ORAL at 08:15

## 2018-10-12 RX ADMIN — VANCOMYCIN HYDROCHLORIDE 1000 MG: 1 INJECTION, SOLUTION INTRAVENOUS at 05:38

## 2018-10-12 RX ADMIN — INSULIN LISPRO 3 UNITS: 100 INJECTION, SOLUTION INTRAVENOUS; SUBCUTANEOUS at 17:51

## 2018-10-12 RX ADMIN — BUSPIRONE HYDROCHLORIDE 15 MG: 15 TABLET ORAL at 15:51

## 2018-10-12 RX ADMIN — FUROSEMIDE 20 MG: 20 TABLET ORAL at 08:15

## 2018-10-12 RX ADMIN — Medication 3 ML: at 08:17

## 2018-10-12 RX ADMIN — SODIUM CHLORIDE 500 ML: 9 INJECTION, SOLUTION INTRAVENOUS at 09:18

## 2018-10-12 RX ADMIN — MIDODRINE HYDROCHLORIDE 10 MG: 5 TABLET ORAL at 11:17

## 2018-10-12 RX ADMIN — AZTREONAM 2 G: 2 INJECTION, POWDER, LYOPHILIZED, FOR SOLUTION INTRAMUSCULAR; INTRAVENOUS at 15:53

## 2018-10-12 RX ADMIN — Medication 3 ML: at 00:02

## 2018-10-12 RX ADMIN — SODIUM CHLORIDE 500 ML: 9 INJECTION, SOLUTION INTRAVENOUS at 05:38

## 2018-10-12 RX ADMIN — IPRATROPIUM BROMIDE AND ALBUTEROL SULFATE 3 ML: 2.5; .5 SOLUTION RESPIRATORY (INHALATION) at 20:10

## 2018-10-12 RX ADMIN — BUSPIRONE HYDROCHLORIDE 15 MG: 15 TABLET ORAL at 21:30

## 2018-10-12 RX ADMIN — HYDROCORTISONE 2.5%: 25 CREAM TOPICAL at 21:37

## 2018-10-12 RX ADMIN — INSULIN LISPRO 4 UNITS: 100 INJECTION, SOLUTION INTRAVENOUS; SUBCUTANEOUS at 21:31

## 2018-10-12 RX ADMIN — AZTREONAM 2 G: 2 INJECTION, POWDER, LYOPHILIZED, FOR SOLUTION INTRAMUSCULAR; INTRAVENOUS at 00:01

## 2018-10-12 RX ADMIN — INSULIN DETEMIR 25 UNITS: 100 INJECTION, SOLUTION SUBCUTANEOUS at 21:29

## 2018-10-12 RX ADMIN — VANCOMYCIN HYDROCHLORIDE 1000 MG: 1 INJECTION, SOLUTION INTRAVENOUS at 17:49

## 2018-10-12 RX ADMIN — HYDROCORTISONE 2.5%: 25 CREAM TOPICAL at 08:15

## 2018-10-12 RX ADMIN — INSULIN LISPRO 4 UNITS: 100 INJECTION, SOLUTION INTRAVENOUS; SUBCUTANEOUS at 11:25

## 2018-10-12 RX ADMIN — ATORVASTATIN CALCIUM 40 MG: 40 TABLET, FILM COATED ORAL at 21:30

## 2018-10-12 RX ADMIN — IPRATROPIUM BROMIDE AND ALBUTEROL SULFATE 3 ML: 2.5; .5 SOLUTION RESPIRATORY (INHALATION) at 08:41

## 2018-10-12 RX ADMIN — MIDODRINE HYDROCHLORIDE 10 MG: 5 TABLET ORAL at 17:49

## 2018-10-12 RX ADMIN — AZTREONAM 2 G: 2 INJECTION, POWDER, LYOPHILIZED, FOR SOLUTION INTRAMUSCULAR; INTRAVENOUS at 08:14

## 2018-10-12 RX ADMIN — SODIUM CHLORIDE 1000 ML: 9 INJECTION, SOLUTION INTRAVENOUS at 11:17

## 2018-10-12 RX ADMIN — ENOXAPARIN SODIUM 40 MG: 40 INJECTION SUBCUTANEOUS at 21:30

## 2018-10-12 RX ADMIN — ACETYLCYSTEINE 3 ML: 200 SOLUTION ORAL; RESPIRATORY (INHALATION) at 08:41

## 2018-10-12 RX ADMIN — MIDODRINE HYDROCHLORIDE 10 MG: 5 TABLET ORAL at 08:15

## 2018-10-12 RX ADMIN — IPRATROPIUM BROMIDE AND ALBUTEROL SULFATE 3 ML: 2.5; .5 SOLUTION RESPIRATORY (INHALATION) at 16:26

## 2018-10-12 RX ADMIN — Medication 3 ML: at 21:37

## 2018-10-12 RX ADMIN — ROPINIROLE HYDROCHLORIDE 4 MG: 2 TABLET, FILM COATED ORAL at 21:30

## 2018-10-12 RX ADMIN — BUSPIRONE HYDROCHLORIDE 15 MG: 15 TABLET ORAL at 05:38

## 2018-10-12 RX ADMIN — PREDNISONE 40 MG: 20 TABLET ORAL at 08:15

## 2018-10-12 RX ADMIN — SULFUR HEXAFLUORIDE 3 ML: KIT at 10:30

## 2018-10-12 RX ADMIN — INSULIN LISPRO 5 UNITS: 100 INJECTION, SOLUTION INTRAVENOUS; SUBCUTANEOUS at 08:14

## 2018-10-12 RX ADMIN — BISMUTH SUBSALICYLATE 30 ML: 262 LIQUID ORAL at 00:00

## 2018-10-12 RX ADMIN — IPRATROPIUM BROMIDE AND ALBUTEROL SULFATE 3 ML: 2.5; .5 SOLUTION RESPIRATORY (INHALATION) at 12:45

## 2018-10-12 NOTE — PROGRESS NOTES
Continued Stay Note  Psychiatric     Patient Name: Nallely Junior  MRN: [id number]  Today's Date: 10/12/2018    Admit Date: 10/6/2018          Discharge Plan     Row Name 10/12/18 0916       Plan    Plan Return to [facility] long term bed     Patient/Family in Agreement with Plan yes    Plan Comments Spoke to Dr. Michael and pt is hypotensive at this time and not medically ready to return to her LTC bed at [facility]. Notified Kelley (liason at [facility]). [facility] can take pt over the weekend if medically ready, but Kelley will need to be notified first at [phone]. Rescheduled [facility] from today to Monday 10/15/18 at 1500. PCS on chart.               Discharge Codes    No documentation.       Expected Discharge Date and Time     Expected Discharge Date Expected Discharge Time    Oct 12, 2018             Patrica Bryant

## 2018-10-12 NOTE — PROGRESS NOTES
[facility] Progress Note    Admission Date: 10/6/2018    Nallely Junior  1940  [id number]    Date: 10/12/2018    Antibiotics:  Anti-Infectives     Ordered     Dose/Rate Route Frequency Start Stop    10/09/18 0723  vancomycin (VANCOCIN) in iso-osmotic dextrose IVPB 1 g (premix) 200 mL     Ordering Provider:  Jan Martínez RPH    1,000 mg  over 60 Minutes Intravenous Every 12 Hours 10/09/18 1800 10/20/18 1759    10/09/18 0723  aztreonam (AZACTAM) 2 g in sodium chloride 0.9 % 100 mL IVPB-MBP     Ordering Provider:  Jan Martínez RPH    2 g  over 4 Hours Intravenous Every 8 Hours 10/09/18 0800 10/20/18 0814    10/09/18 0723  Pharmacy to dose vancomycin     Ordering Provider:  Jan Martínez RPH     Does not apply Continuous PRN 10/09/18 0721 10/20/18 0720    10/06/18 2328  aztreonam (AZACTAM) 2 g in sodium chloride 0.9 % 100 mL IVPB-MBP     Ordering Provider:  Olivia Murphy MD    2 g  200 mL/hr over 30 Minutes Intravenous Once 10/07/18 0015 10/07/18 0146    10/06/18 2023  vancomycin 2000 mg/500 mL 0.9% NS IVPB ([facility])     Ordering Provider:  Olivia Murphy MD    2,000 mg  over 120 Minutes Intravenous Once 10/06/18 2115 10/07/18 0138             CC:  Pneumonia, MRSA (sensitive to tetracycline and Bactrim)    HPI:  Patient is a 78 y.o.  Yr old female with dementia, diabetes mellitus type 2, chronic respiratory failure status post tracheostomy,hypertension, hyperlipidemia, bedbound, previous critical aortic stenosis, dysphagia, diastolic dysfunction, COPD, restless leg, GERD, anxiety, CAD, ho was recently admitted to [facility] for pneumonia and mucous plugging with opacification of the left lung. Patient is a poor historian.The patienthad been treated with meropenem, vancomycin, and levofloxacin since 10/2/18. She was transferred to [facility] on 10/6/18 with non-resolving pneumonia. Left hemithorax continued to be opacified.  I was consulted on 10/8/18 for  further evaluation and treatment.  No known immunocompromised state, travel history, zoonotic exposures,TB, or HIV.  10/9/18 hx rev.  Some sob.  Zulay vanc and aztreonam till 10/20 for pneum.  GNR in sputum.  No fever.  10/10/18 history reviewed. Tolerating vancomycin and aztreonam until 10/20 for pneumonia.  No high fevers. Minimal shortness of breath.  10/11/18 hx rev.  Receiving vanc and aztreo till 10/20 for pneum.  No high fever.  Min sputum prod.  Some sob.  10/12/18 history reviewed.  Receiving vancomycin and aztreonam until 10/20 for pneumonia.  MRSA in sputum.  Previous Pseudomonas.  No fever.  Minimal shortness of breath.    ROS:  No f/c/s. No n/v/d. No rash. No new ADR to Abx.     Constitutional-- No Fever, chills or sweats.  Appetite good, and no malaise. No fatigue.  Heent-- No new vision, hearing or throat complaints.  No epistaxis or oral sores.  Denies odynophagia or dysphagia.  No flashers, floaters or eye pain. No odynophagia or dysphagia. No headache, photophobia or neck stiffness.  CV-- No chest pain, palpitation or syncope  Resp-- Some SOB/cough/Hemoptysis, no wheezing  GI- No nausea, vomiting, or diarrhea.  No hematochezia, melena, or hematemesis. Denies jaundice or chronic liver disease.  -- No dysuria, hematuria, or flank pain.    Lymph- no swollen lymph nodes in neck/axilla or groin.   Heme- No active bruising or bleeding  MS-- no swelling or pain in the bones or joints of arms/legs.  No new back pain.  Neuro-- No acute focal weakness or numbness in the arms or legs.  No seizures.  Skin--No rash, nodules, blisters.    Objective   PE:  Vital Signs  Temp  Min: 98.2 °F (36.8 °C)  Max: 99.3 °F (37.4 °C)  BP  Min: 78/43  Max: 116/52  Pulse  Min: 67  Max: 84  Resp  Min: 18  Max: 22  SpO2  Min: 91 %  Max: 97 %    GENERAL: Awake and alert, in minor distress. Appears older than stated age.    HEENT: Normocephalic, atraumatic.  EOMI. No conjunctival injection. No icterus. Oropharynx clear without  evidence of thrush or exudate. No evidence of peridontal disease.    NECK: Supple without nuchal rigidity. No mass.  LYMPH: No cervical, axillary or inguinal lymphadenopathy.  HEART: RRR; No murmur, rubs, gallops.  No JVD.  LUNGS: rales left lung. Normal respiratory effort. Nonlabored. No dullness.no wheeze.  ABDOMEN: Soft, nontender, nondistended. Positive bowel sounds. No rebound or guarding. NO mass or HSM.  Obese.  EXT:  No cyanosis, clubbing or edema. No cord.  MSK: FROM without joint effusions noted arms/legs.    SKIN: Warm and dry without cutaneous eruptions on Inspection/palpation.    NEURO: Oriented to name. No focal deficits on motor/sensory exam at arms/legs.    Laboratory Data      Results from last 7 days  Lab Units 10/11/18  0524 10/10/18  0436 10/09/18  0525   WBC 10*3/mm3 9.25 13.56* 8.90   HEMOGLOBIN g/dL 11.5 12.1 11.5   HEMATOCRIT % 36.2 38.2 36.5   PLATELETS 10*3/mm3 223 291 235       Results from last 7 days  Lab Units 10/12/18  0449   SODIUM mmol/L 133   POTASSIUM mmol/L 3.7   CHLORIDE mmol/L 91*   CO2 mmol/L 34.0*   BUN mg/dL 21   CREATININE mg/dL 0.58*   GLUCOSE mg/dL 345*   CALCIUM mg/dL 8.4*       Results from last 7 days  Lab Units 10/10/18  0436   ALK PHOS U/L 83   BILIRUBIN mg/dL 0.6   ALT (SGPT) U/L 57*   AST (SGOT) U/L 46*               Results from last 7 days  Lab Units 10/09/18  0525   CK TOTAL U/L 31       Results from last 7 days  Lab Units 10/11/18  0524 10/09/18  0525   VANCOMYCIN TR mcg/mL 18.80 9.80*       Results from last 7 days  Lab Units 10/09/18  1037   LACTATE mmol/L 1.7     Estimated Creatinine Clearance: 59.4 mL/min (A) (by C-G formula based on SCr of 0.58 mg/dL (L)).      Microbiology:  Microbiology Results Abnormal     Procedure Component Value - Date/Time    Blood Culture - Blood, [[id number]]  (Normal) Collected:  10/06/18 2020    Lab Status:  Final result Specimen:  Blood from Arm, Right Updated:  10/11/18 2101     Blood Culture No growth at 5 days    Wound Culture  - Wound, Abdominal Wall [[id number]]  (Abnormal)  (Susceptibility) Collected:  10/07/18 0109    Lab Status:  Preliminary result Specimen:  Wound from Abdominal Wall Updated:  10/11/18 1327     Wound Culture Moderate growth (3+) Proteus mirabilis (A)      Moderate growth (3+) Candida glabrata (A)      Moderate growth (3+) Candida krusei (A)      Light growth (2+) Enterococcus species (A)      Light growth (2+) Staphylococcus aureus, MRSA (A)     Comment:   Methicillin resistant Staphylococcus aureus, Patient may be an isolation risk.        Gram Stain Result No WBCs seen      Few (2+) Gram positive bacilli      Many (4+) Yeast    Susceptibility      Proteus mirabilis    Staphylococcus aureus, MRSA       GONSALO (Preliminary)    GONSALO (Preliminary)       Amikacin <=16 ug/ml Susceptible             Ampicillin >16 ug/ml Resistant             Ampicillin + Sulbactam 16/8 ug/ml Intermediate             Aztreonam <=8 ug/ml Susceptible             Cefepime <=8 ug/ml Susceptible             Cefotaxime <=2 ug/ml Susceptible             Ceftriaxone <=8 ug/ml Susceptible             Cefuroxime sodium <=4 ug/ml Susceptible             Ciprofloxacin >2 ug/ml Resistant    >2 ug/ml Resistant       Clindamycin       >4 ug/ml Resistant       Daptomycin       <=0.5 ug/ml Susceptible       Ertapenem <=1 ug/ml Susceptible             Erythromycin       >4 ug/ml Resistant       Gentamicin >8 ug/ml Resistant    <=4 ug/ml Susceptible       Levofloxacin 4 ug/ml Intermediate    >4 ug/ml Resistant       Linezolid       2 ug/ml Susceptible       Meropenem <=1 ug/ml Susceptible             Oxacillin       >2 ug/ml Resistant       Penicillin G       8 ug/ml Resistant       Piperacillin + Tazobactam <=16 ug/ml Susceptible             Quinupristin + Dalfopristin       <=0.5 ug/ml Susceptible       Rifampin       <=1 ug/ml Susceptible       Tetracycline       <=4 ug/ml Susceptible       Tobramycin >8 ug/ml Resistant             Trimethoprim +  Sulfamethoxazole >2/38 ug/ml Resistant    <=0.5/9.5 ug/ml Susceptible       Vancomycin       1 ug/ml Susceptible                      Respiratory Culture - Sputum, NT Suction [[id number]]  (Abnormal)  (Susceptibility) Collected:  10/07/18 0108    Lab Status:  Final result Specimen:  Sputum from NT Suction Updated:  10/10/18 1437     Respiratory Culture Scant growth (1+) Yeast isolated (A)      Scant growth (1+) Staphylococcus aureus, MRSA (A)     Comment:   Methicillin resistant Staphylococcus aureus, Patient may be an isolation risk.        Gram Stain Result Many (4+) WBCs per low power field      Rare (1+) Epithelial cells per low power field      No organisms seen    Susceptibility      Staphylococcus aureus, MRSA     GONSALO     Ciprofloxacin >2 ug/ml Resistant     Clindamycin >4 ug/ml Resistant     Erythromycin >4 ug/ml Resistant     Gentamicin <=4 ug/ml Susceptible     Levofloxacin >4 ug/ml Resistant     Linezolid 2 ug/ml Susceptible     Oxacillin >2 ug/ml Resistant     Penicillin G >8 ug/ml Resistant     Quinupristin + Dalfopristin <=0.5 ug/ml Susceptible     Rifampin <=1 ug/ml Susceptible     Tetracycline <=4 ug/ml Susceptible     Trimethoprim + Sulfamethoxazole <=0.5/9.5 ug/ml Susceptible     Vancomycin 2 ug/ml Susceptible                          Radiology:  Imaging Results (last 72 hours)     Procedure Component Value Units Date/Time    XR Chest 1 View [[id number]] Collected:  10/09/18 0809     Updated:  10/09/18 0954    Narrative:       EXAMINATION: XR CHEST 1 VW-10/09/2018:      INDICATION: Pneumonia; Z74.09-Other reduced mobility; R13.12-Dysphagia,  oropharyngeal phase; Z74.09-Other reduced mobility.      COMPARISON: Chest x-ray 10/08/2018.      FINDINGS: Significant interval improvement in left lung aeration with  decreased opacifications left lung apex and within the left lung base  from prior. Cardiac silhouette enlarged with only minimal left mid lung  opacification likely atelectasis. Right hemithorax  grossly clear. No  pneumothorax or large effusion.       Impression:       Significant interval reduction in opacifications of the left  hemithorax with a considerable increase in aeration of the left lung and  only mid lung opacities likely represent mild atelectasis remaining.     D:  10/09/2018  E:  10/09/2018             CT Chest Without Contrast [[id number]] Collected:  10/08/18 0852     Updated:  10/08/18 2123    Narrative:       EXAM:    CT Chest Without Intravenous Contrast     EXAM DATE/TIME:    10/8/2018 8:52 AM     CLINICAL HISTORY:    78 years old, female; Dysphagia, oropharyngeal phase; Other reduced mobility;   Other reduced mobility; Abnormal findings; Abnormal radiologic exam of lung or   chest; Additional info: Pneumonia complicated / unresolved     TECHNIQUE:    Axial computed tomography images of the chest without intravenous contrast.    All CT scans at this facility use at least one of these dose optimization   techniques: automated exposure control; mA and/or kV adjustment per patient   size (includes targeted exams where dose is matched to clinical indication); or   iterative reconstruction.    Coronal and sagittal reformatted images were created and reviewed.     COMPARISON:    CR XR CHEST 1 VW 10/8/2018 1:31 AM     FINDINGS:    Tubes, catheters and devices:  Tracheostomy tube position appears   satisfactory.    Lungs:  Bilateral infiltrates/atelectasis, predominantly on left, involving   LLL, BETTIE, and RLL. Clinically correlate to rule out pneumonia. Retained   secretions scattered in left bronchial tree.    Pleural space:  Small to moderate left pleural effusion, small right pleural   effusion. No pneumothorax.    Heart:  Mild cardiomegaly. Mitral annulus calcifications.  CAD.   Aorta:  No aortic aneurysm. Aortic atherosclerosis.    Great vessels off aortic arch and other great vessels:  Atherosclerotic   plaques are scattered along some vessels.    Other arteries:  There are coronary artery  atheromatous calcifications,   consistent with CAD.    Lymph nodes:  No obvious lymphadenopathy, but resolution of left hilum is   obscured by adjacent air space disease.    Bones/joints:  Plate and screws ORIF hardware at proximal right humerus. DJD   of right glenohumeral joint. Degenerative changes of the spine. Mild scoliotic   curvature, positional versus fixed.    Soft tissues: Unremarkable.    Gallbladder and bile ducts:  Cholecystectomy.    Kidneys and ureters: Nonobstructive right nephrolithiasis.    Stomach and bowel:  Some contrast in the bowel.       Impression:       1. Small to moderate left pleural effusion, small right pleural effusion.   2. Bilateral infiltrates/atelectasis, predominantly on left, involving LLL,   BETTIE, and RLL.   3. CAD.   4. Mild cardiomegaly.   5.  Nonobstructive right nephrolithiasis.     THIS DOCUMENT HAS BEEN ELECTRONICALLY SIGNED BY KEENAN KIM MD    FL Video Swallow With Speech [[id number]] Collected:  10/08/18 1514     Updated:  10/08/18 1532    Narrative:       EXAMINATION: FL VIDEO SWALLOW W SPEECH-     INDICATION: dysphagia; Z74.09-Other reduced mobility; R13.10-Dysphagia,  unspecified; Z74.09-Other reduced mobility     TECHNIQUE: 1 minute and 54 seconds of fluoroscopic time was used for  this exam. 1 associated image was saved. The patient was evaluated in  the seated lateral position while taking a variety of consistencies of  barium by mouth under the direction of speech pathology.     COMPARISON: NONE     FINDINGS: There was aspiration with sips of thin barium. There was no  penetration and no aspiration with nectar, pudding, or solid consistency  barium.          Impression:       Fluoroscopy provided for a modified barium swallow. Please  see speech therapy report for full details and recommendations.         This report was finalized on 10/8/2018 3:30 PM by Dr. Christiano Mcmillan MD.       XR Chest 1 View [[id number]] Collected:  10/08/18 0814     Updated:  10/08/18  1311    Narrative:       EXAMINATION: XR CHEST 1 VW-      INDICATION: Respiratory failure, pneumonia, atelectasis; Z74.09-Other  reduced mobility; R13.10-Dysphagia, unspecified.      COMPARISON: 10/06/2018.     FINDINGS: Portable chest reveals patchy ill-defined opacification seen  within the left upper lobe. Tracheostomy tube in satisfactory position  above the jacobo. Ill-defined opacification seen at the lung bases. Mild  elevation identified of the right hemidiaphragm. Small left pleural  effusion.           Impression:       Worsening identified of the airspace disease in the left  upper lobe. Small bilateral pleural effusions. The heart is enlarged.     D:  10/08/2018  E:  10/08/2018     This report was finalized on 10/8/2018 1:09 PM by Dr. Meagan Randhawa MD.       XR Chest 1 View [[id number]] Collected:  10/07/18 1222     Updated:  10/07/18 1449    Narrative:       EXAMINATION: XR CHEST 1 VW - 10/6/2018     INDICATION: Pneumonia.     TECHNIQUE:  Single view frontal chest.     COMPARISONS: None.     FINDINGS:  Tracheostomy in place. Calcification of the aortic knob.  There is an arc-like calcification projecting over the cardiac  silhouette; this could be valvular or in the ventricular wall. There is  extensive opacity involving the left lung. The right lung is grossly  clear. No pneumothorax. Right proximal humerus ORIF hardware noted.       Impression:       Extensive opacification of the left lung. This is  compatible with the given history of pneumonia.     DICTATED:   10/07/2018  EDITED/ls :   10/07/2018         This report was finalized on 10/7/2018 2:47 PM by Sonny Perez.             I personally reviewed the radiographic studies     Assessment/Plan   IMPRESSION:   1.  Pneumonia, left chest, with recurrent mucus plugging, with previous cultures in the past positive for MRSA and Pseudomonas. Recurrence likely related to recurrent aspiration.  MRSA positive on this admission to date.  2. Acute on  chronic respiratory hypoxic failure.  3. Dementia.  4. Encephalopathy, metabolic.  5. Anemia, chronic disease.  Resolved.  6. Diabetes mellitus type 2 with increased risk for infection.  7. Hypocalcemia, 8.4.  8.  ALT 57 (worse), AST 46 worse, elevated. Probably related to medications versus other.  9. Hypokalemia, resolved.  10.  Cultures from abdominal wall with multiple organisms, colonized.  New.  Colonized with yeast.     Plan:     1.  Diagnostically, continue to follow patient's physical exam, CBC, CMP, CRP, cultures obtained from outside hospital, serum immunoglobulins, HIV.  2. Therapeutically, continue vancomycin and aztreonam pending further culture data.  Duration of antibiotics likely to be until 10/20/18.  3. Oxygen support therapy.    Increased risk for adverse drug reactions, complications from line, recurrent pneumonia.    Maxim Lundberg MD  10/12/2018

## 2018-10-12 NOTE — PLAN OF CARE
Problem: Patient Care Overview  Goal: Plan of Care Review  Outcome: Ongoing (interventions implemented as appropriate)   10/12/18 1043   OTHER   Outcome Summary PT AAROM in supine, low BP at rest prior to start limiting activity.   Plan of Care Review   Progress no change

## 2018-10-12 NOTE — PROGRESS NOTES
INPATIENT PULMONARY SERVICE  PROGRESS NOTE     Hospital LOS: 6 days    Ms. Nallely Junior, is followed for a Chief Complaint of: Non-resolving pneumonia, shortness of breath      Pneumonia    Type 2 diabetes mellitus (CMS/[facility])    Hypotension    Hyperlipidemia    Depression    Anxiety    COPD (chronic obstructive pulmonary disease) (CMS/[facility])    Dysphagia    Coronary artery disease    CHF (congestive heart failure) (CMS/[facility])    GERD (gastroesophageal reflux disease)    Chronic respiratory failure with hypoxia and hypercapnia (CMS/[facility])      Subjective   S   Mrs. Junior is a 79 y/o WF who was transferred to [facility] from [facility] yesterday for pneumonia.  She had a 4 day c/o shortness of air and was taken to [facility].  She was found to have a UTI and LLL PNA.  Per records, her sputum was pending but was positive for GNR and Staph Aureus.  She was given Zosyn, Vanc and Merrem.      She is a very poor historian.  Per RN, her sister stated that the patient fell about a year and a half ago and broke her leg. She went to  and had to have surgery.  She was failure to wean from the vent after a month, so she had a trach and PEG placed.  She now is a NHR.  She does have dysphagia and is supposed to be on mechanical soft/nectar thick diet, but she is noncompliant. Her family brings her food and she is found drinking thin liquids often.  She still has her PEG that is not used.      Since admission, she has been continued on Vancomycin and Aztreonam. Sputum culture here with scant growth of yeast and MRSA. She was started on Mucomyst and chest physiotherapy on 10/7.     Interval History:  No events overnight. Left lung is again mucous plugged with discontinuation of Mucomyst nebs and decreasing the frequency of chest percussion.         The patient's relevant past medical, surgical and social history were reviewed and updated in Epic as appropriate.      ROS:   Constitutional: Negative for fever.   Respiratory:  Positive for dyspnea.   Cardiovascular: Negative for chest pain.   Gastrointestinal: Negative for  nausea, vomiting and diarrhea.     Objective   O     Vitals  Temp  Min: 98.5 °F (36.9 °C)  Max: 99.3 °F (37.4 °C)  BP  Min: 78/40  Max: 132/55  Pulse  Min: 68  Max: 84  Resp  Min: 18  Max: 24  SpO2  Min: 91 %  Max: 97 % Flow (L/min)  Min: 14  Max: 14    I/O 24 HR (7:00 AM-6:59AM):  Intake/Output       10/11/18 0700 - 10/12/18 0659 10/12/18 0700 - 10/13/18 0659    Intake (ml) 800 400    Output (ml) 3100 750    Net (ml) -2300 -350          Medications (Drips):    Pharmacy Consult   Pharmacy to dose vancomycin       Physical Examination    Telemetry: Normal sinus rhythm.    Constitutional:  No acute distress.  Lying in bed.    Cardiovascular: Regular rate and rhythm.  No murmurs, rub or gallop.   Respiratory: Normal symmetric chest expansion.  Normal respiratory effort.  Diffuse Rhonchi.   Copious secretions from the trach.    Abdominal:  Soft. No masses.   Non-tender. No distension.   No hepatosplenomegaly.   Extremities: No digital cyanosis or clubbing.  No peripheral edema.   Neurological:   Alert and Oriented to person, place, and time.   Moves all extremities.              Results from last 7 days  Lab Units 10/11/18  0524 10/10/18  0436 10/09/18  0525   WBC 10*3/mm3 9.25 13.56* 8.90   HEMOGLOBIN g/dL 11.5 12.1 11.5   MCV fL 92.8 92.0 94.6   PLATELETS 10*3/mm3 223 291 235       Results from last 7 days  Lab Units 10/12/18  0449 10/11/18  0648 10/10/18  0436   SODIUM mmol/L 133 134 136   POTASSIUM mmol/L 3.7 3.8 3.3*   CO2 mmol/L 34.0* 36.0* 37.0*   CREATININE mg/dL 0.58* 0.53* 0.55*   MAGNESIUM mg/dL 1.7 1.8 1.8   PHOSPHORUS mg/dL 3.1  --   --      Serum creatinine: 0.58 mg/dL (L) 10/12/18 0449  Estimated creatinine clearance: 59.6 mL/min (A)    Results from last 7 days  Lab Units 10/10/18  0436 10/09/18  0525 10/06/18  2031   ALK PHOS U/L 83 79 78   BILIRUBIN mg/dL 0.6 0.6 0.4   ALT (SGPT) U/L 57* 45* 44*   AST  (SGOT) U/L 46* 43* 63*             Images:     Imaging Results (last 24 hours)     Procedure Component Value Units Date/Time    XR Chest 1 View [[id number]] Collected:  10/12/18 0856     Updated:  10/12/18 0856    Narrative:       EXAMINATION: XR CHEST 1 VW- 10/12/2018     INDICATION: Pneumonia, mucous plugging; Z74.09-Other reduced mobility;  R13.12-Dysphagia, oropharyngeal phase; Z74.09-Other reduced mobility;  J69.0-Pneumonitis due to inhalation of food and vomit; R49.9-Unspecified  voice and resonance disorder      COMPARISON: 10/10/2018     FINDINGS: Portable chest reveals heart to be enlarged. Tracheostomy tube  in satisfactory position above the jacobo. The left lung reveals  worsening of the markings in the left mid and lower lung field. Small  left pleural effusion. Shift of the mediastinum to the left suggesting  possible collapse. There is a cutoff sign identified within the left  main stem bronchus. Consider bronchoscopy for possible treatment of  mucous plugging.           Impression:       Cutoff sign Identified in left main stem bronchus with  possible mucous plugging. There is worsening of the airspace disease  within the left mid and lower lung field suggesting collapse with shift  of the mediastinum to the left. Small left pleural effusion. The right  lung remains clear.        D:  10/12/2018  E:  10/12/2018             I reviewed the patient's new clinical results.  I reviewed the patient's new imaging results and agree with the interpretation.    Assessment/Plan   A / P     Ms. Junior is a 77yo F who is admitted with acute on chronic respiratory failure secondary to non-resolving pneumonia and mucous plugging. She was started on mucomyst and chest physiotherapy on 10/7. She has remained on broad spectrum antibiotics for GNR and staph isolated from sputum at the OSH. Sputum cultures here are growing scant yeast and MRSA. She has a history of dysphagia with noncompliance and is had an MBS performed  on 10/8. CXR performed this AM shows reoccurrence of left sided mucous plugging with discontinuation of Mucomyst and decreasing the frequency of chest percussion.     Diet Dysphagia; IV - Mechanical Soft No Mixed Consistencies; Nectar / Syrup Thick; Consistent Carbohydrate  Code Status and Medical Interventions:   Ordered at: 10/06/18 1946     Level Of Support Discussed With:    Patient     Code Status:    CPR     Medical Interventions (Level of Support Prior to Arrest):    Full       Active Hospital Problems    Diagnosis   • Hypotension     on midodrine     • Hyperlipidemia   • Depression   • Anxiety   • COPD (chronic obstructive pulmonary disease) (CMS/[facility])   • Dysphagia     Was on mechanical soft and nectar thick at [facility], noncompliant      • Coronary artery disease   • CHF (congestive heart failure) (CMS/[facility])   • GERD (gastroesophageal reflux disease)   • Chronic respiratory failure with hypoxia and hypercapnia (CMS/[facility])   • Pneumonia   • Type 2 diabetes mellitus (CMS/[facility])       Assessment / Plan:  1. Restart Mucomyst BID.   2. Increase the frequency of chest physiotherapy.  3. Continue suctioning.   4. Continue antibiotics per ID.    5. Complete Prednisone 40mg x 5 days. (to end on 10/13)   6. Wean supplemental O2 as tolerated. She will need to continue humidified air.   7. Repeat CXR in AM    I discussed the patient's findings and my recommendations with patient    Time: 35 minutes       Alva Pinedo, DO  Pulmonary and Critical Care Medicine

## 2018-10-12 NOTE — PLAN OF CARE
Problem: Fall Risk (Adult)  Goal: Absence of Fall  Outcome: Ongoing (interventions implemented as appropriate)      Problem: Patient Care Overview  Goal: Plan of Care Review  Outcome: Ongoing (interventions implemented as appropriate)   10/12/18 0505   Coping/Psychosocial   Plan of Care Reviewed With patient   OTHER   Outcome Summary NSR. BP did drop slightly, gave 500 ml bolus per APRN. No c/o of pain. Pt sat up in the chair for most of the day, asked to be put into bed at shift change. Pt seemed to cough more this evening and through the night. Suctioned thick yellow secreations. There is an ambulance set to arrive at 3pm to transfer to pt back to facility. Will continue to monitor.    Plan of Care Review   Progress no change     Goal: Discharge Needs Assessment  Outcome: Ongoing (interventions implemented as appropriate)      Problem: Pneumonia (Adult)  Goal: Signs and Symptoms of Listed Potential Problems Will be Absent, Minimized or Managed (Pneumonia)  Outcome: Ongoing (interventions implemented as appropriate)

## 2018-10-12 NOTE — PROGRESS NOTES
[facility] Medicine Services  PROGRESS NOTE    Patient Name: Nallely Junior  : 1940  MRN: [id number]    Date of Admission: 10/6/2018  Length of Stay: 6  Primary Care Physician: Ray Strickland MD    Subjective   Subjective     CC:  pna     HPI:  Hypotensive today.  May need bronch for secretions.  Needed several boluses today for hypotension.    Review of Systems  Gen- No fevers, chills  CV- No chest pain, palpitations  Resp- No cough,pos dyspnea, trach   GI- No N/V/D, abd pain        Otherwise ROS is negative except as mentioned in the HPI.    Objective   Objective     Vital Signs:   Temp:  [98 °F (36.7 °C)-99.3 °F (37.4 °C)] 98 °F (36.7 °C)  Heart Rate:  [67-86] 86  Resp:  [18-22] 20  BP: ()/(40-58) 104/43        Physical Exam:  Constitutional: No acute distress, awake, alert, trached  HENT: NCAT, mucous membranes moist  Respiratory: wheeze and coarse bs with trach, rhonchi b/l   Cardiovascular: RRR, no murmurs, rubs, or gallops, palpable pedal pulses bilaterally  Gastrointestinal: Positive bowel sounds, soft, nontender, nondistended  Musculoskeletal: No bilateral ankle edema  Psychiatric: Appropriate affect, cooperative  Neurologic: Oriented x 3, strength symmetric in all extremities, Cranial Nerves grossly intact to confrontation, speech clear  Skin: No rashes        Results Reviewed:  I have personally reviewed current lab, radiology, and data and agree.      Results from last 7 days  Lab Units 10/11/18  0524 10/10/18  0436 10/09/18  05   WBC 10*3/mm3 9.25 13.56* 8.90   HEMOGLOBIN g/dL 11.5 12.1 11.5   HEMATOCRIT % 36.2 38.2 36.5   PLATELETS 10*3/mm3 223 291 235       Results from last 7 days  Lab Units 10/12/18  0449 10/11/18  0648 10/10/18  0436 10/09/18  0525  10/06/18  2031   SODIUM mmol/L 133 134 136 133  < > 140  140   POTASSIUM mmol/L 3.7 3.8 3.3* 3.6  < > 3.5  3.5   CHLORIDE mmol/L 91* 93* 95* 95*  < > 107  107   CO2 mmol/L 34.0* 36.0* 37.0* 34.0*  < > 28.0   28.0   BUN mg/dL 21 19 19 15  < > 15  15   CREATININE mg/dL 0.58* 0.53* 0.55* 0.50*  < > 0.54*  0.54*   GLUCOSE mg/dL 345* 255* 218* 302*  < > 226*  226*   CALCIUM mg/dL 8.4* 8.0* 8.3* 8.2*  < > 8.8  8.8   ALT (SGPT) U/L  --   --  57* 45*  --  44*   AST (SGOT) U/L  --   --  46* 43*  --  63*   < > = values in this interval not displayed.  Estimated Creatinine Clearance: 59.4 mL/min (A) (by C-G formula based on SCr of 0.58 mg/dL (L)).  BNP   Date Value Ref Range Status   10/12/2018 234.0 (H) 0.0 - 100.0 pg/mL Final     Comment:     Results may be falsely decreased if patient taking Biotin.       Microbiology Results Abnormal     Procedure Component Value - Date/Time    Wound Culture - Wound, Abdominal Wall [[id number]]  (Abnormal)  (Susceptibility) Collected:  10/07/18 0109    Lab Status:  Final result Specimen:  Wound from Abdominal Wall Updated:  10/12/18 1422     Wound Culture Moderate growth (3+) Proteus mirabilis (A)      Moderate growth (3+) Candida glabrata (A)      Moderate growth (3+) Candida krusei (A)      Light growth (2+) Enterococcus raffinosus (A)      Light growth (2+) Staphylococcus aureus, MRSA (A)     Comment:   Methicillin resistant Staphylococcus aureus, Patient may be an isolation risk.        Gram Stain Result No WBCs seen      Few (2+) Gram positive bacilli      Many (4+) Yeast    Susceptibility      Proteus mirabilis    Enterococcus raffinosus       GONSALO    GONSALO       Amikacin <=16 ug/ml Susceptible             Ampicillin >16 ug/ml Resistant    8 ug/ml Susceptible       Ampicillin + Sulbactam 16/8 ug/ml Intermediate             Aztreonam <=8 ug/ml Susceptible             Cefepime <=8 ug/ml Susceptible             Cefotaxime <=2 ug/ml Susceptible             Ceftriaxone <=8 ug/ml Susceptible             Cefuroxime sodium <=4 ug/ml Susceptible             Ciprofloxacin >2 ug/ml Resistant             Ertapenem <=1 ug/ml Susceptible             Gentamicin >8 ug/ml Resistant             Gentamicin  High Level Synergy       <=500 ug/ml Susceptible       Levofloxacin 4 ug/ml Intermediate             Linezolid       <=1 ug/ml Susceptible       Meropenem <=1 ug/ml Susceptible             Penicillin G       >8 ug/ml Resistant       Piperacillin + Tazobactam <=16 ug/ml Susceptible             Streptomycin High Level Synergy       >1,000 ug/ml Resistant       Tobramycin >8 ug/ml Resistant             Trimethoprim + Sulfamethoxazole >2/38 ug/ml Resistant             Vancomycin       1 ug/ml Susceptible                  Susceptibility      Staphylococcus aureus, MRSA     GONSALO     Ciprofloxacin >2 ug/ml Resistant     Clindamycin >4 ug/ml Resistant     Daptomycin <=0.5 ug/ml Susceptible     Erythromycin >4 ug/ml Resistant     Gentamicin <=4 ug/ml Susceptible     Levofloxacin >4 ug/ml Resistant     Linezolid 2 ug/ml Susceptible     Oxacillin >2 ug/ml Resistant     Penicillin G 8 ug/ml Resistant     Quinupristin + Dalfopristin <=0.5 ug/ml Susceptible     Rifampin <=1 ug/ml Susceptible     Tetracycline <=4 ug/ml Susceptible     Trimethoprim + Sulfamethoxazole <=0.5/9.5 ug/ml Susceptible     Vancomycin 1 ug/ml Susceptible                    Blood Culture - Blood, [[id number]]  (Normal) Collected:  10/06/18 2020    Lab Status:  Final result Specimen:  Blood from Arm, Right Updated:  10/11/18 2101     Blood Culture No growth at 5 days    Respiratory Culture - Sputum, NT Suction [[id number]]  (Abnormal)  (Susceptibility) Collected:  10/07/18 0108    Lab Status:  Final result Specimen:  Sputum from NT Suction Updated:  10/10/18 1437     Respiratory Culture Scant growth (1+) Yeast isolated (A)      Scant growth (1+) Staphylococcus aureus, MRSA (A)     Comment:   Methicillin resistant Staphylococcus aureus, Patient may be an isolation risk.        Gram Stain Result Many (4+) WBCs per low power field      Rare (1+) Epithelial cells per low power field      No organisms seen    Susceptibility      Staphylococcus aureus, MRSA     GONSALO      Ciprofloxacin >2 ug/ml Resistant     Clindamycin >4 ug/ml Resistant     Erythromycin >4 ug/ml Resistant     Gentamicin <=4 ug/ml Susceptible     Levofloxacin >4 ug/ml Resistant     Linezolid 2 ug/ml Susceptible     Oxacillin >2 ug/ml Resistant     Penicillin G >8 ug/ml Resistant     Quinupristin + Dalfopristin <=0.5 ug/ml Susceptible     Rifampin <=1 ug/ml Susceptible     Tetracycline <=4 ug/ml Susceptible     Trimethoprim + Sulfamethoxazole <=0.5/9.5 ug/ml Susceptible     Vancomycin 2 ug/ml Susceptible                          Imaging Results (last 24 hours)     Procedure Component Value Units Date/Time    XR Chest 1 View [[id number]] Collected:  10/12/18 0856     Updated:  10/12/18 1417    Narrative:       EXAMINATION: XR CHEST 1 VW- 10/12/2018     INDICATION: Pneumonia, mucous plugging; Z74.09-Other reduced mobility;  R13.12-Dysphagia, oropharyngeal phase; Z74.09-Other reduced mobility;  J69.0-Pneumonitis due to inhalation of food and vomit; R49.9-Unspecified  voice and resonance disorder      COMPARISON: 10/10/2018     FINDINGS: Portable chest reveals heart to be enlarged. Tracheostomy tube  in satisfactory position above the jacobo. The left lung reveals  worsening of the markings in the left mid and lower lung field. Small  left pleural effusion. Shift of the mediastinum to the left suggesting  possible collapse. There is a cutoff sign identified within the left  main stem bronchus. Consider bronchoscopy for possible treatment of  mucous plugging.           Impression:       Cutoff sign Identified in left main stem bronchus with  possible mucous plugging. There is worsening of the airspace disease  within the left mid and lower lung field suggesting collapse with shift  of the mediastinum to the left. Small left pleural effusion. The right  lung remains clear.        D:  10/12/2018  E:  10/12/2018     This report was finalized on 10/12/2018 2:15 PM by Dr. Meagan Randhawa MD.           Results for orders  placed during the hospital encounter of 10/06/18   Adult Transthoracic Echo Complete W/ Cont if Necessary Per Protocol    Narrative · Left ventricular systolic function is hyperdynamic (EF > 70).  · Left ventricular wall thickness is consistent with moderate concentric   hypertrophy.  · Left ventricular diastolic dysfunction (grade I) consistent with   impaired relaxation.  · Moderate to severe aortic stenosis (mean gradient 39 mmHg) is present.  · Mild mitral valve regurgitation is present          I have reviewed the medications.      [START ON 10/13/2018] Pharmacy Consult  Does not apply Once   acetylcysteine 3 mL Nebulization BID - RT   aspirin 162 mg Oral Daily   atorvastatin 40 mg Oral Nightly   aztreonam 2 g Intravenous Q8H   busPIRone 15 mg Oral Q8H   enoxaparin 40 mg Subcutaneous Q24H   hydrocortisone  Rectal BID   insulin detemir 25 Units Subcutaneous Nightly   insulin lispro 0-7 Units Subcutaneous 4x Daily With Meals & Nightly   insulin lispro 5 Units Subcutaneous TID With Meals   ipratropium-albuterol 3 mL Nebulization 4x Daily - RT   midodrine 10 mg Oral TID AC   predniSONE 40 mg Oral Daily With Breakfast   rOPINIRole 4 mg Oral Nightly   sodium chloride 3 mL Intravenous Q12H   vancomycin 1,000 mg Intravenous Q12H         Assessment/Plan   Assessment / Plan     Hospital Problem List     Pneumonia    Type 2 diabetes mellitus (CMS/[facility])    Hypotension    Overview Signed 10/7/2018  3:25 PM by Meme Acevedo APRN     on midodrine         Hyperlipidemia    Depression    Anxiety    COPD (chronic obstructive pulmonary disease) (CMS/[facility])    Dysphagia    Overview Signed 10/7/2018  4:13 PM by Meme Acevedo APRN     Was on mechanical soft and nectar thick at SNF, noncompliant          Coronary artery disease    CHF (congestive heart failure) (CMS/[facility])    GERD (gastroesophageal reflux disease)    Chronic respiratory failure with hypoxia and hypercapnia (CMS/[facility])             Brief  Hospital Course to date:  Nallely Junior is a 78 y.o. female  with history of chronic respiratory failure s/p tracheostomy, HLD, T2DM, bedbound state who presents as a transfer/direct admission from [facility] for nonresolving PNA, mucus plugging with opacification of left lung and tracheostomy issues. Patient was admitted to OSH on 10/2 from her NH, for several days of sob, cough, increasing amount of sputum. Patient was initially diagnosed with left sided PNA at OSH, treated with broad spectrum abx- merrem, vanc, levaquin since 10/2. Patient was also noted to be hypotensive to as low as 90s systolic on presentation, though this resolved. Sputum cx from admission was reported to grow MRSA, sensitivities pending, as well as GNR (speciations/sensitivities pending). Patient was reported to initially be improving as WBC noted on admission 20k--- went down to 10k on day of transfer (10/6), however repeat CXR from 10/3-10/4 revealed worsening opacification of left hemithorax concerning for mucus plugging, also noted reports of small left sided plueral effusion. [facility] was called for transfer, however no beds were available until this time. unclear what patient's home baseline oxygen requirement is, she says no oxygen at home prior but per sister ,the patient fell about a year and a half ago and broke her leg.  She went to  and had to have surgery.  She was failure to wean from the vent after a month, so she had a trach and PEG placed.  She now is a NHR      Assessment & Plan:  Acute on chronic respiratory failure  PNA, MRSA/GNR ,LLL, BETTIE, RLL PNA on CT   Mucus plugging   Possible aspiration   -- patient was being treated with IV vanc/merrem/levaquin at OSH  -- sputum cx had resulted with MRSA/GNR  --continue  vanc/azactam (PCN allergy) here for now which should cover both MRSA/GNR, Likely through 10/20/18. ID consulted and following.  pulm following. No need for bronch at this point per pulm  - continue  scheduled nebs, steroids (patient was on TID solumedrol at OSH prior to transfer)  - speech consulted and mech soft with nectar thick liquids.    --Mucomyst nebs  --Chest Physiotherapy  --cxr in am         T2DM  - home meds per OSH med rec list 30units levemir, increase to 20 units qhs as well as SSI, elevated today      HLD  - continue statin     RLS  - continue ropinirole     Depression  - continue buspar     H/o Hypotension  - continue home midodrine, closely monitor     Elevated Lactic Acid   --recheck mei     Incentive spirometer  Likely need CPAP but unclear how to interface with trach     Bolus NS x 2 today  Blood pressure improved this afternoon    Probable mucous plugging - unclear if bronchoscopy - therapeutic would be helpful - will defer to Pulm     DVT prophylaxis: Lovenox     CODE STATUS:   Code Status and Medical Interventions:   Ordered at: 10/06/18 1946     Level Of Support Discussed With:    Patient     Code Status:    CPR     Medical Interventions (Level of Support Prior to Arrest):    Full       Disposition: I expect the patient to be discharged tbd      Electronically signed by Jovanny Michael MD, 10/12/18, 6:44 PM.

## 2018-10-12 NOTE — THERAPY TREATMENT NOTE
Acute Care - Physical Therapy Treatment Note  [facility]     Patient Name: Nallely Junior  : 1940  MRN: [id number]  Today's Date: 10/12/2018  Onset of Illness/Injury or Date of Surgery: 10/06/18  Date of Referral to PT: 10/06/18  Referring Physician: TIN Murphy MD    Admit Date: 10/6/2018    Visit Dx:    ICD-10-CM ICD-9-CM   1. Impaired mobility and ADLs Z74.09 799.89   2. Oropharyngeal dysphagia R13.12 787.22   3. Impaired functional mobility, balance, gait, and endurance Z74.09 V49.89   4. Aspiration pneumonia of left lower lobe, unspecified aspiration pneumonia type (CMS/HCC) J69.0 507.0   5. Voice and resonance disorder R49.9 784.40     Patient Active Problem List   Diagnosis   • Pneumonia   • Type 2 diabetes mellitus (CMS/[facility])   • Hypotension   • Hyperlipidemia   • Depression   • Anxiety   • COPD (chronic obstructive pulmonary disease) (CMS/[facility])   • Dysphagia   • Coronary artery disease   • CHF (congestive heart failure) (CMS/[facility])   • GERD (gastroesophageal reflux disease)   • Chronic respiratory failure with hypoxia and hypercapnia (CMS/[facility])       Therapy Treatment          Rehabilitation Treatment Summary     Row Name 10/12/18 1049             Treatment Time/Intention    Discipline physical therapist  -      Document Type therapy note (daily note)  -      Subjective Information no complaints  -      Mode of Treatment physical therapy  -      Patient/Family Observations No family present.  -      Care Plan Review care plan/treatment goals reviewed  -      Patient Effort good  -      Comment Pt lethargic.  -      Existing Precautions/Restrictions fall;oxygen therapy device and L/min   trach  -SH      Recorded by [SH] Meme Schmid, PT 10/12/18 1106      Row Name 10/12/18 4665             Cognitive Assessment/Intervention- PT/OT    Affect/Mental Status (Cognitive) WFL  -SH      Orientation Status (Cognition) oriented to;person  -SH      Follows Commands (Cognition) follows one step  commands;over 90% accuracy  -      Personal Safety Interventions fall prevention program maintained  -SH      Recorded by [] Meme Schmid, PT 10/12/18 1106      Row Name 10/12/18 1049             Safety Issues, Functional Mobility    Impairments Affecting Function (Mobility) endurance/activity tolerance;strength;range of motion (ROM)  -SH      Recorded by [] Meme Schmid, PT 10/12/18 1106      Row Name 10/12/18 1049             Bed Mobility Assessment/Treatment    Comment (Bed Mobility) Deferred low BP in supine.  -SH      Recorded by [] Meem Schmid, PT 10/12/18 1106      Row Name 10/12/18 1049             Functional Mobility    Functional Mobility- Comment Deferred low BP in supine.  -SH      Recorded by [] Meme Schmid, PT 10/12/18 1106      Row Name 10/12/18 1049             Gait/Stairs Assessment/Training    Comment (Gait/Stairs) Deferred.  -SH      Recorded by [] Meme Schmid, PT 10/12/18 1106      Row Name 10/12/18 1049             Therapeutic Exercise    95557 - PT Therapeutic Exercise Minutes 10  -SH      Recorded by [] Meme Schmid, PT 10/12/18 1106      Row Name 10/12/18 1049             Upper Extremity Supine Therapeutic Exercise    Performed, Supine Upper Extremity (Therapeutic Exercise) shoulder flexion/extension;elbow flexion/extension  -      Exercise Type, Supine Upper Extremity (Therapeutic Exercise) AAROM (active assistive range of motion)  -      Sets/Reps Detail, Supine Upper Extremity (Therapeutic Exercise) 1/10  -SH      Recorded by [] Meme Schmid, PT 10/12/18 1106      Row Name 10/12/18 1049             Lower Extremity Seated Therapeutic Exercise    Performed, Seated Lower Extremity (Therapeutic Exercise) hip flexion/extension;hip abduction/adduction;knee flexion/extension;ankle dorsiflexion/plantarflexion;plantarflexor stretch  -      Exercise Type, Seated Lower Extremity (Therapeutic Exercise) AAROM (active assistive range of motion)   -      Sets/Reps Detail, Seated Lower Extremity (Therapeutic Exercise) 1/10  -SH      Recorded by [] Meme Schmid, PT 10/12/18 1106      Row Name 10/12/18 1049             Positioning and Restraints    Pre-Treatment Position in bed  -SH      Post Treatment Position bed  -SH      In Bed notified nsg;supine;call light within reach;encouraged to call for assist;exit alarm on  -SH      Recorded by [] Meme Schmid, PT 10/12/18 1106      Row Name 10/12/18 1049             Pain Scale: FACES Pre/Post-Treatment    Pain: FACES Scale, Pretreatment 0-->no hurt  -SH      Pain: FACES Scale, Post-Treatment 0-->no hurt  -SH      Recorded by [] Meme Schmid, PT 10/12/18 1106      Row Name 10/12/18 1049             Coping    Observed Emotional State cooperative  -SH      Recorded by [] Meme Schmid, PT 10/12/18 1106      Row Name 10/12/18 1049             Outcome Summary/Treatment Plan (PT)    Daily Summary of Progress (PT) progress toward functional goals as expected  -      Barriers to Overall Progress (PT) Low BP this session  -      Anticipated Discharge Disposition (PT) extended care facility  -      Recorded by [] Meme Schmid, PT 10/12/18 1106        User Key  (r) = Recorded By, (t) = Taken By, (c) = Cosigned By    Initials Name Effective Dates Discipline     Meme Schmid, PT 06/19/15 -  PT                     Physical Therapy Education     Title: PT OT SLP Therapies (Active)     Topic: Physical Therapy (Active)     Point: Mobility training (Active)    Learning Progress Summary     Learner Status Readiness Method Response Comment Documented by    Patient Active Acceptance D,E NR HEP  10/12/18 1106          Point: Home exercise program (Active)    Learning Progress Summary     Learner Status Readiness Method Response Comment Documented by    Patient Active Acceptance D,E NR HEP  10/12/18 1106     Active Acceptance E NR  KR 10/11/18 0912          Point: Body mechanics  (Active)    Learning Progress Summary     Learner Status Readiness Method Response Comment Documented by    Patient Active Acceptance D,E NR HEP  10/12/18 1106     Active Acceptance E NR  KR 10/11/18 0953          Point: Precautions (Active)    Learning Progress Summary     Learner Status Readiness Method Response Comment Documented by    Patient Active Acceptance D,E NR HEP  10/12/18 1106     Active Acceptance E NR  KR 10/11/18 0953                      User Key     Initials Effective Dates Name Provider Type Discipline     06/19/15 -  Meme Schmid, PT Physical Therapist PT     04/03/18 -  Shelli Nunes, PT Physical Therapist PT                    PT Recommendation and Plan  Anticipated Discharge Disposition (PT): extended care facility  Outcome Summary/Treatment Plan (PT)  Daily Summary of Progress (PT): progress toward functional goals as expected  Barriers to Overall Progress (PT): Low BP this session  Anticipated Discharge Disposition (PT): extended care facility  Progress: no change  Outcome Summary: PT AAROM in supine, low BP at rest prior to start limiting activity.          Outcome Measures     Row Name 10/12/18 1049 10/11/18 1342 10/11/18 0953       How much help from another person do you currently need...    Turning from your back to your side while in flat bed without using bedrails? 1  -SH  -- 2  -KR    Moving from lying on back to sitting on the side of a flat bed without bedrails? 1  -SH  -- 2  -KR    Moving to and from a bed to a chair (including a wheelchair)? 1  -SH  -- 1  -KR    Standing up from a chair using your arms (e.g., wheelchair, bedside chair)? 1  -SH  -- 1  -KR    Climbing 3-5 steps with a railing? 1  -SH  -- 1  -KR    To walk in hospital room? 1  -SH  -- 1  -KR    AM-PAC 6 Clicks Score 6  -SH  -- 8  -KR       How much help from another is currently needed...    Putting on and taking off regular lower body clothing?  -- 1  -CL  --    Bathing (including washing, rinsing,  and drying)  -- 1  -CL  --    Toileting (which includes using toilet bed pan or urinal)  -- 1  -CL  --    Putting on and taking off regular upper body clothing  -- 2  -CL  --    Taking care of personal grooming (such as brushing teeth)  -- 3  -CL  --    Eating meals  -- 3  -CL  --    Score  -- 11  -CL  --       Functional Assessment    Outcome Measure Options AM-PAC 6 Clicks Basic Mobility (PT)  - AM-PAC 6 Clicks Daily Activity (OT)  -CL AM-PAC 6 Clicks Basic Mobility (PT)  -    Row Name 10/09/18 1435 10/09/18 1147          How much help from another person do you currently need...    Turning from your back to your side while in flat bed without using bedrails? 2  -MB  --     Moving from lying on back to sitting on the side of a flat bed without bedrails? 2  -MB  --     Moving to and from a bed to a chair (including a wheelchair)? 1  -MB  --     Standing up from a chair using your arms (e.g., wheelchair, bedside chair)? 1  -MB  --     Climbing 3-5 steps with a railing? 1  -MB  --     To walk in hospital room? 1  -MB  --     AM-PAC 6 Clicks Score 8  -MB  --        How much help from another is currently needed...    Putting on and taking off regular lower body clothing?  -- 1  -CL     Bathing (including washing, rinsing, and drying)  -- 1  -CL     Toileting (which includes using toilet bed pan or urinal)  -- 1  -CL     Putting on and taking off regular upper body clothing  -- 2  -CL     Taking care of personal grooming (such as brushing teeth)  -- 3  -CL     Eating meals  -- 3  -CL     Score  -- 11  -CL        Functional Assessment    Outcome Measure Options AM-PAC 6 Clicks Basic Mobility (PT)  -MB AM-PAC 6 Clicks Daily Activity (OT)  -CL       User Key  (r) = Recorded By, (t) = Taken By, (c) = Cosigned By    Initials Name Provider Type    SH Meme Schmid, PT Physical Therapist    MB Britt Gilbert, PT Physical Therapist    CL Marie Pabon, OT Occupational Therapist    Shelli Willard, PT Physical  Therapist           Time Calculation:         PT Charges     Row Name 10/12/18 1049             Time Calculation    Start Time 1049  -SH      PT Received On 10/12/18  -      PT Goal Re-Cert Due Date 10/18/18  -         Time Calculation- PT    Total Timed Code Minutes- PT 10 minute(s)  -SH         Timed Charges    47781 - PT Therapeutic Exercise Minutes 10  -SH        User Key  (r) = Recorded By, (t) = Taken By, (c) = Cosigned By    Initials Name Provider Type     Meme Schmid, PT Physical Therapist        Therapy Suggested Charges     Code   Minutes Charges    55520 (CPT®) Hc Pt Neuromusc Re Education Ea 15 Min      45680 (CPT®) Hc Pt Ther Proc Ea 15 Min 10 1    32879 (CPT®) Hc Gait Training Ea 15 Min      43844 (CPT®) Hc Pt Therapeutic Act Ea 15 Min      71336 (CPT®) Hc Pt Manual Therapy Ea 15 Min      88757 (CPT®) Hc Pt Iontophoresis Ea 15 Min      18715 (CPT®) Hc Pt Elec Stim Ea-Per 15 Min      31099 (CPT®) Hc Pt Ultrasound Ea 15 Min      22678 (CPT®) Hc Pt Self Care/Mgmt/Train Ea 15 Min      52272 (CPT®) Hc Pt Prosthetic (S) Train Initial Encounter, Each 15 Min      38120 (CPT®) Hc Pt Orthotic(S)/Prosthetic(S) Encounter, Each 15 Min      53673 (CPT®) Hc Orthotic(S) Mgmt/Train Initial Encounter, Each 15min      Total  10 1        Therapy Charges for Today     Code Description Service Date Service Provider Modifiers Qty    49242477286 HC PT THER PROC EA 15 MIN 10/12/2018 Meme Schmid, PT GP 1          PT G-Codes  Outcome Measure Options: AM-PAC 6 Clicks Basic Mobility (PT)  AM-PAC 6 Clicks Score: 6  Score: 11  Functional Limitation: Mobility: Walking and moving around  Mobility: Walking and Moving Around Current Status (): At least 80 percent but less than 100 percent impaired, limited or restricted  Mobility: Walking and Moving Around Goal Status (): At least 60 percent but less than 80 percent impaired, limited or restricted    Meme Schmid PT  10/12/2018

## 2018-10-12 NOTE — THERAPY TREATMENT NOTE
Acute Care - Speech Language Pathology   Swallow Progress Note [facility]     Patient Name: Nallely Junior  : 1940  MRN: [id number]  Today's Date: 10/12/2018  Onset of Illness/Injury or Date of Surgery: 10/06/18     Referring Physician: TIN Murphy MD      Admit Date: 10/6/2018    Visit Dx:      ICD-10-CM ICD-9-CM   1. Impaired mobility and ADLs Z74.09 799.89   2. Oropharyngeal dysphagia R13.12 787.22   3. Impaired functional mobility, balance, gait, and endurance Z74.09 V49.89   4. Aspiration pneumonia of left lower lobe, unspecified aspiration pneumonia type (CMS/HCC) J69.0 507.0   5. Voice and resonance disorder R49.9 784.40     Patient Active Problem List   Diagnosis   • Pneumonia   • Type 2 diabetes mellitus (CMS/HCC)   • Hypotension   • Hyperlipidemia   • Depression   • Anxiety   • COPD (chronic obstructive pulmonary disease) (CMS/[facility])   • Dysphagia   • Coronary artery disease   • CHF (congestive heart failure) (CMS/[facility])   • GERD (gastroesophageal reflux disease)   • Chronic respiratory failure with hypoxia and hypercapnia (CMS/HCC)       Therapy Treatment        Rehabilitation Treatment Summary     Row Name 10/12/18 1330 10/12/18 1049          Treatment Time/Intention    Discipline speech language pathologist  -VW physical therapist  -     Document Type therapy note (daily note)  - therapy note (daily note)  -     Subjective Information no complaints  - no complaints  -     Mode of Treatment speech-language pathology  - physical therapy  -     Patient/Family Observations  -- No family present.  -     Care Plan Review care plan/treatment goals reviewed;risks/benefits reviewed;current/potential barriers reviewed;patient/other agree to care plan  - care plan/treatment goals reviewed  -     Therapy Frequency (Swallow) 5 days per week  -  --     Therapy Frequency (SLP SLC) 5 days per week  -  --     Patient Effort adequate  - good  -     Comment  -- Pt lethargic.  -      Existing Precautions/Restrictions  -- fall;oxygen therapy device and L/min   trach  -SH     Recorded by [VW] Kalpana Marroquin MA,CCC-SLP 10/12/18 1412 [SH] Meme Schmid, PT 10/12/18 1106     Row Name 10/12/18 1049             Cognitive Assessment/Intervention- PT/OT    Affect/Mental Status (Cognitive) WFL  -SH      Orientation Status (Cognition) oriented to;person  -SH      Follows Commands (Cognition) follows one step commands;over 90% accuracy  -      Personal Safety Interventions fall prevention program maintained  -SH      Recorded by [SH] Meme Schmid, PT 10/12/18 1106      Row Name 10/12/18 1049             Safety Issues, Functional Mobility    Impairments Affecting Function (Mobility) endurance/activity tolerance;strength;range of motion (ROM)  -SH      Recorded by [SH] Meme Schmid, PT 10/12/18 1106      Row Name 10/12/18 1049             Bed Mobility Assessment/Treatment    Comment (Bed Mobility) Deferred low BP in supine.  -SH      Recorded by [SH] Meme Schmid, PT 10/12/18 1106      Row Name 10/12/18 1049             Functional Mobility    Functional Mobility- Comment Deferred low BP in supine.  -SH      Recorded by [SH] Meme Schmid, PT 10/12/18 1106      Row Name 10/12/18 1049             Gait/Stairs Assessment/Training    Comment (Gait/Stairs) Deferred.  -SH      Recorded by [SH] Meme Schmid, PT 10/12/18 1106      Row Name 10/12/18 1049             Therapeutic Exercise    38084 - PT Therapeutic Exercise Minutes 10  -SH      Recorded by [SH] Meme Schmid, PT 10/12/18 1106      Row Name 10/12/18 1049             Upper Extremity Supine Therapeutic Exercise    Performed, Supine Upper Extremity (Therapeutic Exercise) shoulder flexion/extension;elbow flexion/extension  -      Exercise Type, Supine Upper Extremity (Therapeutic Exercise) AAROM (active assistive range of motion)  -      Sets/Reps Detail, Supine Upper Extremity (Therapeutic Exercise) 1/10  -       Recorded by [] Meme Schmid, PT 10/12/18 1106      Row Name 10/12/18 1049             Lower Extremity Seated Therapeutic Exercise    Performed, Seated Lower Extremity (Therapeutic Exercise) hip flexion/extension;hip abduction/adduction;knee flexion/extension;ankle dorsiflexion/plantarflexion;plantarflexor stretch  -      Exercise Type, Seated Lower Extremity (Therapeutic Exercise) AAROM (active assistive range of motion)  -      Sets/Reps Detail, Seated Lower Extremity (Therapeutic Exercise) 1/10  -SH      Recorded by [] Meme Schmid, PT 10/12/18 1106      Row Name 10/12/18 1049             Positioning and Restraints    Pre-Treatment Position in bed  -SH      Post Treatment Position bed  -SH      In Bed notified nsg;supine;call light within reach;encouraged to call for assist;exit alarm on  -SH      Recorded by [] Meme Schmid, PT 10/12/18 1106      Row Name 10/12/18 1330             Pain Assessment    Additional Documentation Pain Scale: Numbers Pre/Post-Treatment (Group)  -VW      Recorded by [VW] Kalpana Marroquin MA,CCC-SLP 10/12/18 1412      Row Name 10/12/18 1330             Pain Scale: Numbers Pre/Post-Treatment    Pain Scale: Numbers, Pretreatment 0/10 - no pain  -VW      Pain Scale: Numbers, Post-Treatment 0/10 - no pain  -VW      Recorded by [VW] Kalpana Marroquin MA,CCC-SLP 10/12/18 1412      Row Name 10/12/18 1049             Pain Scale: FACES Pre/Post-Treatment    Pain: FACES Scale, Pretreatment 0-->no hurt  -SH      Pain: FACES Scale, Post-Treatment 0-->no hurt  -SH      Recorded by [SH] Meme Schmid, PT 10/12/18 1106      Row Name 10/12/18 1049             Coping    Observed Emotional State cooperative  -      Recorded by [SH] Meme Schmid, PT 10/12/18 1106      Row Name 10/12/18 1049             Outcome Summary/Treatment Plan (PT)    Daily Summary of Progress (PT) progress toward functional goals as expected  -      Barriers to Overall Progress (PT) Low BP  this session  -      Anticipated Discharge Disposition (PT) [facility]  -      Recorded by [] Meme Schmid, PT 10/12/18 1106      Row Name 10/12/18 1330             Outcome Summary/Treatment Plan (SLP)    Daily Summary of Progress (SLP) progress toward functional goals is good  -VW      Plan for Continued Treatment (SLP) Continue dysphagia level 4 w/ nectar thick liquids. Tolerated trials of thins w/ PMV placed. Pt requires a lot of encouragement w/ PMV, though w/ it on she produced mostly clear, audible voice. Continue dys tx and PMV tx.  -VW      Anticipated Dischage Disposition unknown;anticipate therapy at next level of care  -VW      Recorded by [VW] Kalpana Marroquin MA,CCC-SLP 10/12/18 1412        User Key  (r) = Recorded By, (t) = Taken By, (c) = Cosigned By    Initials Name Effective Dates Discipline     Meme Schmid, PT 06/19/15 -  PT    VW Kalpana Marroquin MA,CCC-SLP 07/16/18 -  SLP          Outcome Summary  Outcome Summary/Treatment Plan (SLP)  Daily Summary of Progress (SLP): progress toward functional goals is good (10/12/18 1330 : Kalpana Marroquin MA,CCC-SLP)  Plan for Continued Treatment (SLP): Continue dysphagia level 4 w/ nectar thick liquids. Tolerated trials of thins w/ PMV placed. Pt requires a lot of encouragement w/ PMV, though w/ it on she produced mostly clear, audible voice. Continue dys tx and PMV tx. (10/12/18 1330 : Kalpana Marroquin MA,CCC-SLP)  Anticipated Dischage Disposition: unknown, anticipate therapy at next level of care (10/12/18 1330 : Kalpana Marroquin MA,CCC-SLP)            SLP GOALS     Row Name 10/12/18 1330 10/10/18 5405          Oral Nutrition/Hydration Goal 1 (SLP)    Oral Nutrition/Hydration Goal 1, SLP LTG: Pt will tolerate soft diet and thin liquids w/ 100% accuracy w/o cues.   -VW LTG: Pt will tolerate soft diet and thin liquids w/ 100% accuracy w/o cues.   -AC     Time Frame (Oral Nutrition/Hydration Goal 1, SLP) by  discharge  -VW by discharge  -AC     Progress/Outcomes (Oral Nutrition/Hydration Goal 1, SLP) continuing progress toward goal  -VW continuing progress toward goal  -AC        Oral Nutrition/Hydration Goal 2 (SLP)    Oral Nutrition/Hydration Goal 2, SLP Pt will tolerate nectar-thick and Level 4-dys mech soft (w/ PMV off) w/ no overt s/s of aspiration w/ 100% acc w/o cues  -VW Pt will tolerate nectar-thick and Level 4-dys mech soft (w/ PMV off) w/ no overt s/s of aspiration w/ 100% acc w/o cues  -AC     Time Frame (Oral Nutrition/Hydration Goal 2, SLP) short term goal (STG);by discharge  -VW short term goal (STG);by discharge  -AC     Barriers (Oral Nutrition/Hydration Goal 2, SLP) Trialed NTL w/o PMV w/ no overt s/sxs aspiration  -VW  --     Progress/Outcomes (Oral Nutrition/Hydration Goal 2, SLP) good progress toward goal  -VW goal ongoing  -AC        Lingual Strengthening Goal 1 (SLP)    Activity (Lingual Strengthening Goal 1, SLP) increase tongue back strength  -VW  --     Increase Tongue Back Strength lingual resistance exercises  -VW lingual resistance exercises  -AC     [place]/Accuracy (Lingual Strengthening Goal 1, SLP) with minimal cues (75-90% accuracy)  -VW with minimal cues (75-90% accuracy)  -AC     Time Frame (Lingual Strengthening Goal 1, SLP) short term goal (STG);by discharge  -VW short term goal (STG);by discharge  -AC     Progress/Outcomes (Lingual Strengthening Goal 1, SLP) goal ongoing  -VW goal ongoing  -AC        Pharyngeal Strengthening Exercise Goal 1 (SLP)    Activity (Pharyngeal Strengthening Goal 1, SLP) increase timing;increase superior movement of the hyolaryngeal complex;increase anterior movement of the hyolaryngeal complex;increase closure at entrance to airway/closure of airway at glottis  -VW  --     Increase Timing prepping - 3 second prep or suck swallow or 3-step swallow  -VW prepping - 3 second prep or suck swallow or 3-step swallow  -AC     Increase Superior Movement of  the Hyolaryngeal Complex supraglottic swallow  -VW supraglottic swallow  -AC     Increase Anterior Movement of the Hyolaryngeal Complex chin tuck against resistance (CTAR)  -VW chin tuck against resistance (CTAR)  -AC     Increase Closure at Entrance to Airway/Closure of Airway at Glottis super-supraglottic swallow  -VW super-supraglottic swallow  -AC     [place]/Accuracy (Pharyngeal Strengthening Goal 1, SLP) with minimal cues (75-90% accuracy)  -VW with minimal cues (75-90% accuracy)  -AC     Time Frame (Pharyngeal Strengthening Goal 1, SLP) short term goal (STG);by discharge  -VW short term goal (STG);by discharge  -AC     Progress/Outcomes (Pharyngeal Strengthening Goal 1, SLP) continuing progress toward goal  -VW goal ongoing  -AC        Swallow Compensatory Strategies Goal (SLP)    Swallow Compensatory Strategies/Techniques Goal (SLP) Pt will tolerate trials of thins via cup only w/ PMV on w/ no overt s/s of aspiration or significant changes in vital signs w/ 100% acc w/o cues  -VW Pt will tolerate trials of thins via cup only w/ PMV on w/ no overt s/s of aspiration or significant changes in vital signs w/ 100% acc w/o cues  -AC     Time Frame (Swallow Compensatory Strategies/Techniques Goal, SLP) short term goal (STG);by discharge  -VW short term goal (STG);by discharge  -AC     Barriers (Swallow Compensatory Strategies/Techniques Goal, SLP) Tolerated tsp-sized cup sips x4 w/ no overt s/sxs aspiration or change in VS.   -VW Tolerated tsp-sized cup sips x4 w/ no overt s/sxs aspiration or change in VS.   -AC     Progress/Outcomes (Swallow Compensatory Strategies/Techniques Goal, SLP) good progress toward goal  -VW good progress toward goal  -AC        Tolerate Speaking Valve Placement Goal 1 (SLP)    Tolerate Speaking Valve Placement Goal 1 (SLP) speaking valve 20min;100%;independently (over 90% accuracy)  -VW speaking valve 20min;100%;independently (over 90% accuracy)  -AC     Time Frame (Tolerate  "Speaking Valve Placement Goal 1, SLP) short term goal (STG);by discharge  -VW short term goal (STG);by discharge  -AC     Progress (Tolerate Speaking Valve Placement Goal 1, SLP) 40%;with minimal cues (75-90%)  -VW 50%;with minimal cues (75-90%)  -AC     Progress/Outcomes (Tolerate Speaking Valve Placement Goal 1, SLP) continuing progress toward goal  -VW continuing progress toward goal  -AC     Comment (Tolerate Speaking Valve Placement Goal 1, SLP) Tolerated for 6-7  min before patient asked for removal. Anxiety impacting.  -VW Required tracheal suctioning prior to PMV placement (appeared that inner cannula was clogged). Pt w/ initial coughing w/ PMV placement, but able to tolerate for ~10 min. Became anxious @ one point (\"I'm not getting oxygen\"), though O2 was stable @ 96%. When distracted w/ personal ?s, pt appeared to tolerate PMV w/o difficulty.  -AC        Additional Goal 1 (SLP)    Additional Goal 1, SLP LTG: Comunicate wants/needs with PMV speaking valve while in hospital setting with 100% accruacy and no cues  -VW LTG: Comunicate wants/needs with PMV speaking valve while in hospital setting with 100% accruacy and no cues  -AC     Time Frame (Additional Goal 1, SLP) by discharge  -VW by discharge  -AC     Progress/Outcomes (Additional Goal 1, SLP) continuing progress toward goal  -VW continuing progress toward goal  -AC       User Key  (r) = Recorded By, (t) = Taken By, (c) = Cosigned By    Initials Name Provider Type    AC Yola Khan MS CCC-SLP Speech and Language Pathologist    VW Kalpana Marroquin MA,CCC-SLP Speech and Language Pathologist          EDUCATION  The patient has been educated in the following areas:   Speaking Valve Dysphagia (Swallowing Impairment) Oral Care/Hydration Modified Diet Instruction.    SLP Recommendation and Plan                       Anticipated Dischage Disposition: unknown, anticipate therapy at next level of care     Therapy Frequency (Swallow): 5 days per week      "     Plan of Care Reviewed With: patient  Plan of Care Review  Plan of Care Reviewed With: patient  Daily Summary of Progress (SLP): progress toward functional goals is good  Plan for Continued Treatment (SLP): Continue dysphagia level 4 w/ nectar thick liquids. Tolerated trials of thins w/ PMV placed. Pt requires a lot of encouragement w/ PMV, though w/ it on she produced mostly clear, audible voice. Continue dys tx and PMV tx.           Time Calculation:         Time Calculation- SLP     Row Name 10/12/18 1417             Time Calculation- SLP    SLP Start Time 1330  -VW      SLP Received On 10/12/18  -        User Key  (r) = Recorded By, (t) = Taken By, (c) = Cosigned By    Initials Name Provider Type     Kalpana Marroquin MA,CCC-SLP Speech and Language Pathologist          Therapy Charges for Today     Code Description Service Date Service Provider Modifiers Qty    65837181594 HC ST TREATMENT SPEECH 2 10/12/2018 Kalpana Marroquin MA,CCC-SLP GN 1    50995656305 HC ST TREATMENT SWALLOW 1 10/12/2018 Kalpana Marroquin MA,CCC-SLP GN 1                 Kalpana Marroquin MA,CCC-SLP  10/12/2018

## 2018-10-12 NOTE — PLAN OF CARE
Problem: Patient Care Overview  Goal: Plan of Care Review  Outcome: Ongoing (interventions implemented as appropriate)   10/12/18 7300   Coping/Psychosocial   Plan of Care Reviewed With patient   SLP treatment completed. Will continue to address pharyngeal dysphagia and communication deficits through treatment. Patient requires a lot of encouragement to tolerate PMV placement (2' anxiousness). However, tolerating thins w/ PMV and able to produce audible speech. Encourage PMV use w/ RN/SLP supervision. Continue dysphagia level 4- nectar thick liquids at this time. Okay for thins b/w meals after oral care w/ PMV in place. Please see note for further details and recommendations.

## 2018-10-12 NOTE — PLAN OF CARE
Problem: Patient Care Overview  Goal: Plan of Care Review  Outcome: Ongoing (interventions implemented as appropriate)   10/12/18 4440   Coping/Psychosocial   Plan of Care Reviewed With patient   OTHER   Outcome Summary VSS. 14L O2 via tracheostomy collar. BP Low this am. D/c diuretics per MD orders and administered a total of 1500 ml bolus. Pressures have remained stable throughout shift. Trach care completed. No other complaints from pt at this time. Will continue to monitor.    Plan of Care Review   Progress improving

## 2018-10-13 ENCOUNTER — APPOINTMENT (OUTPATIENT)
Dept: GENERAL RADIOLOGY | Facility: HOSPITAL | Age: 78
End: 2018-10-13

## 2018-10-13 LAB
GLUCOSE BLDC GLUCOMTR-MCNC: 203 MG/DL (ref 70–130)
GLUCOSE BLDC GLUCOMTR-MCNC: 209 MG/DL (ref 70–130)
GLUCOSE BLDC GLUCOMTR-MCNC: 241 MG/DL (ref 70–130)
GLUCOSE BLDC GLUCOMTR-MCNC: 286 MG/DL (ref 70–130)
VANCOMYCIN TROUGH SERPL-MCNC: 24 MCG/ML (ref 10–20)

## 2018-10-13 PROCEDURE — 99232 SBSQ HOSP IP/OBS MODERATE 35: CPT | Performed by: NURSE PRACTITIONER

## 2018-10-13 PROCEDURE — 71045 X-RAY EXAM CHEST 1 VIEW: CPT

## 2018-10-13 PROCEDURE — 80202 ASSAY OF VANCOMYCIN: CPT

## 2018-10-13 PROCEDURE — 94799 UNLISTED PULMONARY SVC/PX: CPT

## 2018-10-13 PROCEDURE — 25010000002 VANCOMYCIN 10 G RECONSTITUTED SOLUTION

## 2018-10-13 PROCEDURE — 82962 GLUCOSE BLOOD TEST: CPT

## 2018-10-13 PROCEDURE — 99232 SBSQ HOSP IP/OBS MODERATE 35: CPT | Performed by: INTERNAL MEDICINE

## 2018-10-13 PROCEDURE — 63710000001 PREDNISONE PER 1 MG: Performed by: INTERNAL MEDICINE

## 2018-10-13 PROCEDURE — 63710000001 INSULIN DETEMIR PER 5 UNITS: Performed by: HOSPITALIST

## 2018-10-13 PROCEDURE — 25010000002 ENOXAPARIN PER 10 MG: Performed by: INTERNAL MEDICINE

## 2018-10-13 PROCEDURE — 94668 MNPJ CHEST WALL SBSQ: CPT

## 2018-10-13 PROCEDURE — 94640 AIRWAY INHALATION TREATMENT: CPT

## 2018-10-13 PROCEDURE — 94760 N-INVAS EAR/PLS OXIMETRY 1: CPT

## 2018-10-13 RX ADMIN — IPRATROPIUM BROMIDE AND ALBUTEROL SULFATE 3 ML: 2.5; .5 SOLUTION RESPIRATORY (INHALATION) at 08:21

## 2018-10-13 RX ADMIN — AZTREONAM 2 G: 2 INJECTION, POWDER, LYOPHILIZED, FOR SOLUTION INTRAMUSCULAR; INTRAVENOUS at 08:48

## 2018-10-13 RX ADMIN — ATORVASTATIN CALCIUM 40 MG: 40 TABLET, FILM COATED ORAL at 22:26

## 2018-10-13 RX ADMIN — PREDNISONE 40 MG: 20 TABLET ORAL at 08:48

## 2018-10-13 RX ADMIN — AZTREONAM 2 G: 2 INJECTION, POWDER, LYOPHILIZED, FOR SOLUTION INTRAMUSCULAR; INTRAVENOUS at 17:13

## 2018-10-13 RX ADMIN — HYDROCORTISONE 2.5%: 25 CREAM TOPICAL at 22:29

## 2018-10-13 RX ADMIN — INSULIN LISPRO 4 UNITS: 100 INJECTION, SOLUTION INTRAVENOUS; SUBCUTANEOUS at 22:26

## 2018-10-13 RX ADMIN — Medication: at 05:45

## 2018-10-13 RX ADMIN — ENOXAPARIN SODIUM 40 MG: 40 INJECTION SUBCUTANEOUS at 22:26

## 2018-10-13 RX ADMIN — AZTREONAM 2 G: 2 INJECTION, POWDER, LYOPHILIZED, FOR SOLUTION INTRAMUSCULAR; INTRAVENOUS at 00:12

## 2018-10-13 RX ADMIN — ASPIRIN 325 MG ORAL TABLET 162 MG: 325 PILL ORAL at 08:48

## 2018-10-13 RX ADMIN — HYDROCORTISONE 2.5%: 25 CREAM TOPICAL at 08:50

## 2018-10-13 RX ADMIN — Medication 3 ML: at 08:50

## 2018-10-13 RX ADMIN — BUSPIRONE HYDROCHLORIDE 15 MG: 15 TABLET ORAL at 15:01

## 2018-10-13 RX ADMIN — ROPINIROLE HYDROCHLORIDE 4 MG: 2 TABLET, FILM COATED ORAL at 22:26

## 2018-10-13 RX ADMIN — INSULIN LISPRO 3 UNITS: 100 INJECTION, SOLUTION INTRAVENOUS; SUBCUTANEOUS at 12:14

## 2018-10-13 RX ADMIN — IPRATROPIUM BROMIDE AND ALBUTEROL SULFATE 3 ML: 2.5; .5 SOLUTION RESPIRATORY (INHALATION) at 19:50

## 2018-10-13 RX ADMIN — BUSPIRONE HYDROCHLORIDE 15 MG: 15 TABLET ORAL at 22:26

## 2018-10-13 RX ADMIN — IPRATROPIUM BROMIDE AND ALBUTEROL SULFATE 3 ML: 2.5; .5 SOLUTION RESPIRATORY (INHALATION) at 12:28

## 2018-10-13 RX ADMIN — INSULIN LISPRO 3 UNITS: 100 INJECTION, SOLUTION INTRAVENOUS; SUBCUTANEOUS at 17:13

## 2018-10-13 RX ADMIN — MIDODRINE HYDROCHLORIDE 10 MG: 5 TABLET ORAL at 17:11

## 2018-10-13 RX ADMIN — INSULIN DETEMIR 25 UNITS: 100 INJECTION, SOLUTION SUBCUTANEOUS at 22:27

## 2018-10-13 RX ADMIN — BUSPIRONE HYDROCHLORIDE 15 MG: 15 TABLET ORAL at 05:44

## 2018-10-13 RX ADMIN — ACETYLCYSTEINE 3 ML: 200 SOLUTION ORAL; RESPIRATORY (INHALATION) at 19:50

## 2018-10-13 RX ADMIN — INSULIN LISPRO 3 UNITS: 100 INJECTION, SOLUTION INTRAVENOUS; SUBCUTANEOUS at 08:51

## 2018-10-13 RX ADMIN — ACETYLCYSTEINE 3 ML: 200 SOLUTION ORAL; RESPIRATORY (INHALATION) at 08:22

## 2018-10-13 RX ADMIN — Medication 3 ML: at 22:30

## 2018-10-13 RX ADMIN — MIDODRINE HYDROCHLORIDE 10 MG: 5 TABLET ORAL at 12:14

## 2018-10-13 RX ADMIN — IPRATROPIUM BROMIDE AND ALBUTEROL SULFATE 3 ML: 2.5; .5 SOLUTION RESPIRATORY (INHALATION) at 16:24

## 2018-10-13 RX ADMIN — VANCOMYCIN HYDROCHLORIDE 1500 MG: 10 INJECTION, POWDER, LYOPHILIZED, FOR SOLUTION INTRAVENOUS at 12:14

## 2018-10-13 RX ADMIN — MIDODRINE HYDROCHLORIDE 10 MG: 5 TABLET ORAL at 08:48

## 2018-10-13 NOTE — PLAN OF CARE
Problem: Patient Care Overview  Goal: Plan of Care Review  Outcome: Ongoing (interventions implemented as appropriate)   10/13/18 6524   Coping/Psychosocial   Plan of Care Reviewed With patient   OTHER   Outcome Summary Vitals stable tonight trach much less congested tonight continue to monitor cooperative with liquids being thickened pleasant and cooperative   Plan of Care Review   Progress improving

## 2018-10-13 NOTE — PROGRESS NOTES
Pulmonary Service Follow-up      LOS: 7 days     Ms. Nallely Junior, 78 y.o. female is followed for: <principal problem not specified>     Subjective - Interval History     Awake and alert  No problems overnight  Continues to receive  humidified oxygen via tracheostomy  Continued moderate secretions    The patient's relevant past medical, surgical and social history were reviewed and updated in Epic as appropriate.     Objective     Medications:    [START ON 10/15/2018] Pharmacy Consult  Does not apply Once   acetylcysteine 3 mL Nebulization BID - RT   aspirin 162 mg Oral Daily   atorvastatin 40 mg Oral Nightly   aztreonam 2 g Intravenous Q8H   busPIRone 15 mg Oral Q8H   enoxaparin 40 mg Subcutaneous Q24H   hydrocortisone  Rectal BID   insulin detemir 25 Units Subcutaneous Nightly   insulin lispro 0-7 Units Subcutaneous 4x Daily With Meals & Nightly   insulin lispro 5 Units Subcutaneous TID With Meals   ipratropium-albuterol 3 mL Nebulization 4x Daily - RT   midodrine 10 mg Oral TID AC   rOPINIRole 4 mg Oral Nightly   sodium chloride 3 mL Intravenous Q12H   vancomycin 1,500 mg Intravenous Q24H     Intake/Output       10/12/18 0700 - 10/13/18 0659 10/13/18 0700 - 10/14/18 0659    Intake (ml) 2100 400    Output (ml) 3575 --    Net (ml) -1475 400    Last Weight  80.3 kg (177 lb)  --        Vital Sign Min/Max for last 24 hours  Temp  Min: 98 °F (36.7 °C)  Max: 98.5 °F (36.9 °C)   BP  Min: 82/42  Max: 139/68   Pulse  Min: 69  Max: 90   Resp  Min: 16  Max: 20   SpO2  Min: 92 %  Max: 98 %   No Data Recorded        Physical Exam:   GENERAL: Awake, no distress   HEENT: Tracheostomy site okay, no adenopathy   LUNGS: Decreased sounds in the bases, no wheezes   HEART: Regular rate and rhythm without murmurs   ABDOMEN: Obese, soft, nontender   EXTREMITIES: Trace edema, no cyanosis   NEURO/PSYCH: Awake and alert.  Responds questions appropriately.      Results from last 7 days  Lab Units 10/11/18  0524 10/10/18  0436  10/09/18  0525   WBC 10*3/mm3 9.25 13.56* 8.90   HEMOGLOBIN g/dL 11.5 12.1 11.5   PLATELETS 10*3/mm3 223 291 235       Results from last 7 days  Lab Units 10/12/18  0449 10/11/18  0648 10/10/18  0436   SODIUM mmol/L 133 134 136   POTASSIUM mmol/L 3.7 3.8 3.3*   CO2 mmol/L 34.0* 36.0* 37.0*   BUN mg/dL 21 19 19   CREATININE mg/dL 0.58* 0.53* 0.55*   MAGNESIUM mg/dL 1.7 1.8 1.8   PHOSPHORUS mg/dL 3.1  --   --    GLUCOSE mg/dL 345* 255* 218*     Estimated Creatinine Clearance: 59.4 mL/min (A) (by C-G formula based on SCr of 0.58 mg/dL (L)).               Images: Chest x-ray today reveals improvement in aeration and decreased atelectasis    I reviewed the patient's results and images.     Impression      Active Hospital Problems    Diagnosis   • Hypotension     on midodrine     • Hyperlipidemia   • Depression   • Anxiety   • COPD (chronic obstructive pulmonary disease) (CMS/HCC)   • Dysphagia     Was on mechanical soft and nectar thick at SNF, noncompliant      • Coronary artery disease   • CHF (congestive heart failure) (CMS/HCC)   • GERD (gastroesophageal reflux disease)   • Chronic respiratory failure with hypoxia and hypercapnia (CMS/HCC)   • Pneumonia   • Type 2 diabetes mellitus (CMS/HCC)            Plan        Continue respiratory measures  Push mobilization  Complete antibiotics per infectious disease  We'll check back next week     I discussed the patient's findings and my recommendations with patient       JBrayan Rabago MD  Pulmonary and Critical Care Medicine

## 2018-10-13 NOTE — PROGRESS NOTES
"    [facility] Medicine Services  PROGRESS NOTE    Patient Name: Nallely Junior  : 1940  MRN: [id number]    Date of Admission: 10/6/2018  Length of Stay: 7  Primary Care Physician: Ray Strickland MD    Subjective   Subjective     CC:  pna     HPI:    In bed in no acute distress.  No visitors at bedside.  Wearing trach collar oxygen.  Awake and alert.  Tolerating diet.  Pressure much improved after yesterday's IV fluid hydration.  Feels she is doing \"a little better\" today.  No current nausea, vomiting, chest pain.  SOA improving.  Cough improving.    Review of Systems  Gen- No fevers, chills  CV- No chest pain, palpitations  Resp- No cough,pos dyspnea, trach   GI- No N/V/D, abd pain      Otherwise ROS is negative except as mentioned in the HPI.    Objective   Objective     Vital Signs:   Temp:  [98 °F (36.7 °C)-98.5 °F (36.9 °C)] 98.5 °F (36.9 °C)  Heart Rate:  [70-90] 90  Resp:  [16-20] 18  BP: (101-139)/(43-74) 123/62        Physical Exam:  Constitutional: No acute distress, awake, alert, trached.  Appears chronically debilitated. Sitting upright in bed.  No visitors at bedside.  HENT: NCAT, mucous membranes moist  Respiratory: Few fine scattered expiratory wheezes and coarse with trach, coarse bilateral rhonchi, clears some with ineffective cough.    Cardiovascular: RRR, no murmurs, rubs, or gallops, palpable pedal pulses bilaterally  Gastrointestinal: Positive bowel sounds, soft, nontender, nondistended.  Obese  Musculoskeletal: No bilateral ankle edema.  ALMAGUER spont  Psychiatric: Appropriate affect, cooperative and pleasant.  Neurologic: Oriented x 3, strength symmetric in all extremities, Cranial Nerves grossly intact to confrontation, speech clear and appropriate.  Slightly garbled as she is speaking with trach in place.  Follows commands.  Skin: No rashes        Results Reviewed:  I have personally reviewed current lab, radiology, and data and agree.      Results from last 7 " days  Lab Units 10/11/18  0524 10/10/18  0436 10/09/18  0525   WBC 10*3/mm3 9.25 13.56* 8.90   HEMOGLOBIN g/dL 11.5 12.1 11.5   HEMATOCRIT % 36.2 38.2 36.5   PLATELETS 10*3/mm3 223 291 235       Results from last 7 days  Lab Units 10/12/18  0449 10/11/18  0648 10/10/18  0436 10/09/18  0525  10/06/18  2031   SODIUM mmol/L 133 134 136 133  < > 140  140   POTASSIUM mmol/L 3.7 3.8 3.3* 3.6  < > 3.5  3.5   CHLORIDE mmol/L 91* 93* 95* 95*  < > 107  107   CO2 mmol/L 34.0* 36.0* 37.0* 34.0*  < > 28.0  28.0   BUN mg/dL 21 19 19 15  < > 15  15   CREATININE mg/dL 0.58* 0.53* 0.55* 0.50*  < > 0.54*  0.54*   GLUCOSE mg/dL 345* 255* 218* 302*  < > 226*  226*   CALCIUM mg/dL 8.4* 8.0* 8.3* 8.2*  < > 8.8  8.8   ALT (SGPT) U/L  --   --  57* 45*  --  44*   AST (SGOT) U/L  --   --  46* 43*  --  63*   < > = values in this interval not displayed.  Estimated Creatinine Clearance: 59.4 mL/min (A) (by C-G formula based on SCr of 0.58 mg/dL (L)).  BNP   Date Value Ref Range Status   10/12/2018 234.0 (H) 0.0 - 100.0 pg/mL Final     Comment:     Results may be falsely decreased if patient taking Biotin.       Microbiology Results Abnormal     Procedure Component Value - Date/Time    Wound Culture - Wound, Abdominal Wall [[id number]]  (Abnormal)  (Susceptibility) Collected:  10/07/18 0109    Lab Status:  Final result Specimen:  Wound from Abdominal Wall Updated:  10/12/18 1422     Wound Culture Moderate growth (3+) Proteus mirabilis (A)      Moderate growth (3+) Candida glabrata (A)      Moderate growth (3+) Candida krusei (A)      Light growth (2+) Enterococcus raffinosus (A)      Light growth (2+) Staphylococcus aureus, MRSA (A)     Comment:   Methicillin resistant Staphylococcus aureus, Patient may be an isolation risk.        Gram Stain Result No WBCs seen      Few (2+) Gram positive bacilli      Many (4+) Yeast    Susceptibility      Proteus mirabilis    Enterococcus raffinosus       GONSALO    GONSALO       Amikacin <=16 ug/ml Susceptible              Ampicillin >16 ug/ml Resistant    8 ug/ml Susceptible       Ampicillin + Sulbactam 16/8 ug/ml Intermediate             Aztreonam <=8 ug/ml Susceptible             Cefepime <=8 ug/ml Susceptible             Cefotaxime <=2 ug/ml Susceptible             Ceftriaxone <=8 ug/ml Susceptible             Cefuroxime sodium <=4 ug/ml Susceptible             Ciprofloxacin >2 ug/ml Resistant             Ertapenem <=1 ug/ml Susceptible             Gentamicin >8 ug/ml Resistant             Gentamicin High Level Synergy       <=500 ug/ml Susceptible       Levofloxacin 4 ug/ml Intermediate             Linezolid       <=1 ug/ml Susceptible       Meropenem <=1 ug/ml Susceptible             Penicillin G       >8 ug/ml Resistant       Piperacillin + Tazobactam <=16 ug/ml Susceptible             Streptomycin High Level Synergy       >1,000 ug/ml Resistant       Tobramycin >8 ug/ml Resistant             Trimethoprim + Sulfamethoxazole >2/38 ug/ml Resistant             Vancomycin       1 ug/ml Susceptible                  Susceptibility      Staphylococcus aureus, MRSA     GONSALO     Ciprofloxacin >2 ug/ml Resistant     Clindamycin >4 ug/ml Resistant     Daptomycin <=0.5 ug/ml Susceptible     Erythromycin >4 ug/ml Resistant     Gentamicin <=4 ug/ml Susceptible     Levofloxacin >4 ug/ml Resistant     Linezolid 2 ug/ml Susceptible     Oxacillin >2 ug/ml Resistant     Penicillin G 8 ug/ml Resistant     Quinupristin + Dalfopristin <=0.5 ug/ml Susceptible     Rifampin <=1 ug/ml Susceptible     Tetracycline <=4 ug/ml Susceptible     Trimethoprim + Sulfamethoxazole <=0.5/9.5 ug/ml Susceptible     Vancomycin 1 ug/ml Susceptible                    Blood Culture - Blood, [[id number]]  (Normal) Collected:  10/06/18 2020    Lab Status:  Final result Specimen:  Blood from Arm, Right Updated:  10/11/18 2101     Blood Culture No growth at 5 days    Respiratory Culture - Sputum, NT Suction [[id number]]  (Abnormal)  (Susceptibility) Collected:   10/07/18 0108    Lab Status:  Final result Specimen:  Sputum from NT Suction Updated:  10/10/18 1437     Respiratory Culture Scant growth (1+) Yeast isolated (A)      Scant growth (1+) Staphylococcus aureus, MRSA (A)     Comment:   Methicillin resistant Staphylococcus aureus, Patient may be an isolation risk.        Gram Stain Result Many (4+) WBCs per low power field      Rare (1+) Epithelial cells per low power field      No organisms seen    Susceptibility      Staphylococcus aureus, MRSA     GONSALO     Ciprofloxacin >2 ug/ml Resistant     Clindamycin >4 ug/ml Resistant     Erythromycin >4 ug/ml Resistant     Gentamicin <=4 ug/ml Susceptible     Levofloxacin >4 ug/ml Resistant     Linezolid 2 ug/ml Susceptible     Oxacillin >2 ug/ml Resistant     Penicillin G >8 ug/ml Resistant     Quinupristin + Dalfopristin <=0.5 ug/ml Susceptible     Rifampin <=1 ug/ml Susceptible     Tetracycline <=4 ug/ml Susceptible     Trimethoprim + Sulfamethoxazole <=0.5/9.5 ug/ml Susceptible     Vancomycin 2 ug/ml Susceptible                          Imaging Results (last 24 hours)     Procedure Component Value Units Date/Time    XR Chest 1 View [[id number]] Collected:  10/13/18 0856     Updated:  10/13/18 0856    Narrative:          EXAMINATION: XR CHEST 1 VW-      INDICATION: mucous plugging; Z74.09-Other reduced mobility;  R13.12-Dysphagia, oropharyngeal phase; Z74.09-Other reduced mobility;  J69.0-Pneumonitis due to inhalation of food and vomit; R49.9-Unspecified  voice and resonance disorder      COMPARISON: 10/12/2018     FINDINGS: Christina chest reveals tracheostomy tube in satisfactory position  above the jacobo. There is ill-defined opacification left lung base with  small left pleural effusion improving in the interval. The right lung  remains clear. Degenerative changes seen within the spine. The heart is  enlarged.           Impression:       Significant improvement seen in aeration of the left lung  base. There is a small left  pleural effusion identified.          XR Chest 1 View [[id number]] Collected:  10/12/18 0856     Updated:  10/12/18 1417    Narrative:       EXAMINATION: XR CHEST 1 VW- 10/12/2018     INDICATION: Pneumonia, mucous plugging; Z74.09-Other reduced mobility;  R13.12-Dysphagia, oropharyngeal phase; Z74.09-Other reduced mobility;  J69.0-Pneumonitis due to inhalation of food and vomit; R49.9-Unspecified  voice and resonance disorder      COMPARISON: 10/10/2018     FINDINGS: Portable chest reveals heart to be enlarged. Tracheostomy tube  in satisfactory position above the jacobo. The left lung reveals  worsening of the markings in the left mid and lower lung field. Small  left pleural effusion. Shift of the mediastinum to the left suggesting  possible collapse. There is a cutoff sign identified within the left  main stem bronchus. Consider bronchoscopy for possible treatment of  mucous plugging.           Impression:       Cutoff sign Identified in left main stem bronchus with  possible mucous plugging. There is worsening of the airspace disease  within the left mid and lower lung field suggesting collapse with shift  of the mediastinum to the left. Small left pleural effusion. The right  lung remains clear.        D:  10/12/2018  E:  10/12/2018     This report was finalized on 10/12/2018 2:15 PM by Dr. Meagan Randhawa MD.           Results for orders placed during the hospital encounter of 10/06/18   Adult Transthoracic Echo Complete W/ Cont if Necessary Per Protocol    Narrative · Left ventricular systolic function is hyperdynamic (EF > 70).  · Left ventricular wall thickness is consistent with moderate concentric   hypertrophy.  · Left ventricular diastolic dysfunction (grade I) consistent with   impaired relaxation.  · Moderate to severe aortic stenosis (mean gradient 39 mmHg) is present.  · Mild mitral valve regurgitation is present          I have reviewed the medications.      [START ON 10/15/2018] Pharmacy  Consult  Does not apply Once   acetylcysteine 3 mL Nebulization BID - RT   aspirin 162 mg Oral Daily   atorvastatin 40 mg Oral Nightly   aztreonam 2 g Intravenous Q8H   busPIRone 15 mg Oral Q8H   enoxaparin 40 mg Subcutaneous Q24H   hydrocortisone  Rectal BID   insulin detemir 25 Units Subcutaneous Nightly   insulin lispro 0-7 Units Subcutaneous 4x Daily With Meals & Nightly   insulin lispro 8 Units Subcutaneous TID With Meals   ipratropium-albuterol 3 mL Nebulization 4x Daily - RT   midodrine 10 mg Oral TID AC   rOPINIRole 4 mg Oral Nightly   sodium chloride 3 mL Intravenous Q12H   vancomycin 1,500 mg Intravenous Q24H         Assessment/Plan   Assessment / Plan     Hospital Problem List     Pneumonia    Type 2 diabetes mellitus (CMS/[facility])    Hypotension    Overview Signed 10/7/2018  3:25 PM by Meme Acevedo APRN     on midodrine         Hyperlipidemia    Depression    Anxiety    COPD (chronic obstructive pulmonary disease) (CMS/[facility])    Dysphagia    Overview Signed 10/7/2018  4:13 PM by Meme Acevedo APRN     Was on mechanical soft and nectar thick at SNF, noncompliant          Coronary artery disease    CHF (congestive heart failure) (CMS/[facility])    GERD (gastroesophageal reflux disease)    Chronic respiratory failure with hypoxia and hypercapnia (CMS/[facility])             Brief Hospital Course to date:  Nallely Junior is a 78 y.o. female  with history of chronic respiratory failure s/p tracheostomy, HLD, T2DM, bedbound state who presents as a transfer/direct admission from [facility] for nonresolving PNA, mucus plugging with opacification of left lung and tracheostomy issues. Patient was admitted to OSH on 10/2 from her NH, for several days of sob, cough, increasing amount of sputum. Patient was initially diagnosed with left sided PNA at OSH, treated with broad spectrum abx- merrem, vanc, levaquin since 10/2. Patient was also noted to be hypotensive to as low as 90s  systolic on presentation, though this resolved. Sputum cx from admission was reported to grow MRSA, sensitivities pending, as well as GNR (speciations/sensitivities pending). Patient was reported to initially be improving as WBC noted on admission 20k--- went down to 10k on day of transfer (10/6), however repeat CXR from 10/3-10/4 revealed worsening opacification of left hemithorax concerning for mucus plugging, also noted reports of small left sided plueral effusion. [facility] was called for transfer, however no beds were available until this time. unclear what patient's home baseline oxygen requirement is, she says no oxygen at home prior but per sister ,the patient fell about a year and a half ago and broke her leg.  She went to  and had to have surgery.  She was failure to wean from the vent after a month, so she had a trach and PEG placed.  She now is a [employer]      Assessment & Plan:  Acute on chronic respiratory failure  PNA, MRSA/GNR ,LLL, BETTIE, RLL PNA on CT   Mucus plugging   Possible aspiration   -- patient was being treated with IV vanc/merrem/levaquin at OSH  -- sputum cx had resulted with MRSA/GNR  --continue  vanc/azactam (PCN allergy) here for now which should cover both MRSA/GNR, Likely through 10/20/18.   ID consulted and following.    pulm following. No need for bronch at this point per pulm  - continue scheduled nebs, steroids (patient was on TID solumedrol at OSH prior to transfer)  - speech consulted and mech soft with nectar thick liquids. Tolerating well.  --Mucomyst nebs  --Chest Physiotherapy  --AM labs  --This a.m. repeat CXR shows some improved aeration of the left lung.        T2DM (A1c 8.9)  - home meds per OSH med rec list 30 units levemir, increased to 20 units qhs as well as SSI, elevated yesterday   --today 10/13 glucose is still running in the 200s.  We'll increase mealtime insulin to 8 units 3 times a day with hold parameters.  --Continue to monitor and adjust     HLD  - continue  statin     RLS  - continue ropinirole     Depression  - continue buspar     H/o Hypotension  - continue home midodrine, closely monitor   --much improved today after IVFs yesterday/stable now  --continue to monitor    Elevated Lactic Acid   --improved     Incentive spirometer  Likely need CPAP but unclear how to interface with trach       DVT prophylaxis: Lovenox     CODE STATUS:   Code Status and Medical Interventions:   Ordered at: 10/06/18 1946     Level Of Support Discussed With:    Patient     Code Status:    CPR     Medical Interventions (Level of Support Prior to Arrest):    Full       Disposition: I expect the patient to be discharged tbd      Electronically signed by Kari Field, ARELI, 10/13/18, 11:51 AM.

## 2018-10-14 LAB
GLUCOSE BLDC GLUCOMTR-MCNC: 131 MG/DL (ref 70–130)
GLUCOSE BLDC GLUCOMTR-MCNC: 139 MG/DL (ref 70–130)
GLUCOSE BLDC GLUCOMTR-MCNC: 203 MG/DL (ref 70–130)
GLUCOSE BLDC GLUCOMTR-MCNC: 70 MG/DL (ref 70–130)

## 2018-10-14 PROCEDURE — 97530 THERAPEUTIC ACTIVITIES: CPT

## 2018-10-14 PROCEDURE — 94760 N-INVAS EAR/PLS OXIMETRY 1: CPT

## 2018-10-14 PROCEDURE — 94799 UNLISTED PULMONARY SVC/PX: CPT

## 2018-10-14 PROCEDURE — 99232 SBSQ HOSP IP/OBS MODERATE 35: CPT | Performed by: HOSPITALIST

## 2018-10-14 PROCEDURE — 94640 AIRWAY INHALATION TREATMENT: CPT

## 2018-10-14 PROCEDURE — 82962 GLUCOSE BLOOD TEST: CPT

## 2018-10-14 PROCEDURE — 63710000001 INSULIN DETEMIR PER 5 UNITS: Performed by: HOSPITALIST

## 2018-10-14 PROCEDURE — 97110 THERAPEUTIC EXERCISES: CPT

## 2018-10-14 PROCEDURE — 25010000002 VANCOMYCIN 10 G RECONSTITUTED SOLUTION

## 2018-10-14 PROCEDURE — 25010000002 ENOXAPARIN PER 10 MG: Performed by: INTERNAL MEDICINE

## 2018-10-14 PROCEDURE — 94668 MNPJ CHEST WALL SBSQ: CPT

## 2018-10-14 RX ADMIN — ROPINIROLE HYDROCHLORIDE 4 MG: 2 TABLET, FILM COATED ORAL at 21:34

## 2018-10-14 RX ADMIN — INSULIN LISPRO 3 UNITS: 100 INJECTION, SOLUTION INTRAVENOUS; SUBCUTANEOUS at 21:35

## 2018-10-14 RX ADMIN — IPRATROPIUM BROMIDE AND ALBUTEROL SULFATE 3 ML: 2.5; .5 SOLUTION RESPIRATORY (INHALATION) at 16:07

## 2018-10-14 RX ADMIN — MIDODRINE HYDROCHLORIDE 10 MG: 5 TABLET ORAL at 06:26

## 2018-10-14 RX ADMIN — ACETYLCYSTEINE 3 ML: 200 SOLUTION ORAL; RESPIRATORY (INHALATION) at 07:59

## 2018-10-14 RX ADMIN — Medication 3 ML: at 08:43

## 2018-10-14 RX ADMIN — VANCOMYCIN HYDROCHLORIDE 1500 MG: 10 INJECTION, POWDER, LYOPHILIZED, FOR SOLUTION INTRAVENOUS at 12:21

## 2018-10-14 RX ADMIN — ENOXAPARIN SODIUM 40 MG: 40 INJECTION SUBCUTANEOUS at 21:34

## 2018-10-14 RX ADMIN — MIDODRINE HYDROCHLORIDE 10 MG: 5 TABLET ORAL at 08:41

## 2018-10-14 RX ADMIN — ACETAMINOPHEN 650 MG: 325 TABLET ORAL at 06:26

## 2018-10-14 RX ADMIN — IPRATROPIUM BROMIDE AND ALBUTEROL SULFATE 3 ML: 2.5; .5 SOLUTION RESPIRATORY (INHALATION) at 19:37

## 2018-10-14 RX ADMIN — IPRATROPIUM BROMIDE AND ALBUTEROL SULFATE 3 ML: 2.5; .5 SOLUTION RESPIRATORY (INHALATION) at 12:23

## 2018-10-14 RX ADMIN — HYDROCORTISONE 2.5%: 25 CREAM TOPICAL at 08:43

## 2018-10-14 RX ADMIN — AZTREONAM 2 G: 2 INJECTION, POWDER, LYOPHILIZED, FOR SOLUTION INTRAMUSCULAR; INTRAVENOUS at 08:41

## 2018-10-14 RX ADMIN — ATORVASTATIN CALCIUM 40 MG: 40 TABLET, FILM COATED ORAL at 21:35

## 2018-10-14 RX ADMIN — AZTREONAM 2 G: 2 INJECTION, POWDER, LYOPHILIZED, FOR SOLUTION INTRAMUSCULAR; INTRAVENOUS at 00:17

## 2018-10-14 RX ADMIN — BUSPIRONE HYDROCHLORIDE 15 MG: 15 TABLET ORAL at 14:37

## 2018-10-14 RX ADMIN — AZTREONAM 2 G: 2 INJECTION, POWDER, LYOPHILIZED, FOR SOLUTION INTRAMUSCULAR; INTRAVENOUS at 23:50

## 2018-10-14 RX ADMIN — ASPIRIN 325 MG ORAL TABLET 162 MG: 325 PILL ORAL at 08:41

## 2018-10-14 RX ADMIN — AZTREONAM 2 G: 2 INJECTION, POWDER, LYOPHILIZED, FOR SOLUTION INTRAMUSCULAR; INTRAVENOUS at 16:20

## 2018-10-14 RX ADMIN — IPRATROPIUM BROMIDE AND ALBUTEROL SULFATE 3 ML: 2.5; .5 SOLUTION RESPIRATORY (INHALATION) at 07:59

## 2018-10-14 RX ADMIN — BUSPIRONE HYDROCHLORIDE 15 MG: 15 TABLET ORAL at 21:35

## 2018-10-14 RX ADMIN — BUSPIRONE HYDROCHLORIDE 15 MG: 15 TABLET ORAL at 06:26

## 2018-10-14 RX ADMIN — INSULIN DETEMIR 25 UNITS: 100 INJECTION, SOLUTION SUBCUTANEOUS at 21:34

## 2018-10-14 RX ADMIN — ACETYLCYSTEINE 3 ML: 200 SOLUTION ORAL; RESPIRATORY (INHALATION) at 19:37

## 2018-10-14 RX ADMIN — MIDODRINE HYDROCHLORIDE 10 MG: 5 TABLET ORAL at 16:20

## 2018-10-14 RX ADMIN — MIDODRINE HYDROCHLORIDE 10 MG: 5 TABLET ORAL at 12:21

## 2018-10-14 NOTE — PROGRESS NOTES
[facility] Medicine Services  PROGRESS NOTE    Patient Name: Nallely Junior  : 1940  MRN: [id number]    Date of Admission: 10/6/2018  Length of Stay: 8  Primary Care Physician: Ray Strickland MD    Subjective   Subjective     CC:  pna     HPI:  Sitting up today.  Seen by Pulmonary over weekend.  Delicate respiratory status.  Improved after hypotension yesterday.  Denies shortness of breath.    Review of Systems  Gen- No fevers, chills  CV- No chest pain, palpitations  Resp- No cough,pos dyspnea, trach   GI- No N/V/D, abd pain      Otherwise ROS is negative except as mentioned in the HPI.    Objective   Objective     Vital Signs:   Temp:  [97.9 °F (36.6 °C)-99 °F (37.2 °C)] 98.9 °F (37.2 °C)  Heart Rate:  [66-84] 75  Resp:  [18-20] 18  BP: (103-152)/(44-66) 120/44        Physical Exam:  Constitutional: No acute distress, awake, alert, trached.  Appears chronically debilitated. Sitting upright in bed.  No visitors at bedside.  HENT: NCAT, mucous membranes moist  Respiratory: Few fine scattered expiratory wheezes and coarse with trach, coarse bilateral rhonchi, clears some with ineffective cough.    Cardiovascular: RRR, no murmurs, rubs, or gallops, palpable pedal pulses bilaterally  Gastrointestinal: Positive bowel sounds, soft, nontender, nondistended.  Obese  Musculoskeletal: No bilateral ankle edema.  ALMAGUER spont  Psychiatric: Appropriate affect, cooperative and pleasant.  Neurologic: Oriented x 3, strength symmetric in all extremities, Cranial Nerves grossly intact to confrontation, speech clear and appropriate.  Slightly garbled as she is speaking with trach in place.  Follows commands.  Skin: No rashes        Results Reviewed:  I have personally reviewed current lab, radiology, and data and agree.      Results from last 7 days  Lab Units 10/11/18  0524 10/10/18  0436 10/09/18  0525   WBC 10*3/mm3 9.25 13.56* 8.90   HEMOGLOBIN g/dL 11.5 12.1 11.5   HEMATOCRIT % 36.2 38.2 36.5    PLATELETS 10*3/mm3 223 291 235       Results from last 7 days  Lab Units 10/12/18  0449 10/11/18  0648 10/10/18  0436 10/09/18  0525   SODIUM mmol/L 133 134 136 133   POTASSIUM mmol/L 3.7 3.8 3.3* 3.6   CHLORIDE mmol/L 91* 93* 95* 95*   CO2 mmol/L 34.0* 36.0* 37.0* 34.0*   BUN mg/dL 21 19 19 15   CREATININE mg/dL 0.58* 0.53* 0.55* 0.50*   GLUCOSE mg/dL 345* 255* 218* 302*   CALCIUM mg/dL 8.4* 8.0* 8.3* 8.2*   ALT (SGPT) U/L  --   --  57* 45*   AST (SGOT) U/L  --   --  46* 43*     Estimated Creatinine Clearance: 59.4 mL/min (A) (by C-G formula based on SCr of 0.58 mg/dL (L)).  BNP   Date Value Ref Range Status   10/12/2018 234.0 (H) 0.0 - 100.0 pg/mL Final     Comment:     Results may be falsely decreased if patient taking Biotin.       Microbiology Results Abnormal     Procedure Component Value - Date/Time    Wound Culture - Wound, Abdominal Wall [[id number]]  (Abnormal)  (Susceptibility) Collected:  10/07/18 0109    Lab Status:  Final result Specimen:  Wound from Abdominal Wall Updated:  10/12/18 1422     Wound Culture Moderate growth (3+) Proteus mirabilis (A)      Moderate growth (3+) Candida glabrata (A)      Moderate growth (3+) Candida krusei (A)      Light growth (2+) Enterococcus raffinosus (A)      Light growth (2+) Staphylococcus aureus, MRSA (A)     Comment:   Methicillin resistant Staphylococcus aureus, Patient may be an isolation risk.        Gram Stain Result No WBCs seen      Few (2+) Gram positive bacilli      Many (4+) Yeast    Susceptibility      Proteus mirabilis    Enterococcus raffinosus       GONSALO    GONSALO       Amikacin <=16 ug/ml Susceptible             Ampicillin >16 ug/ml Resistant    8 ug/ml Susceptible       Ampicillin + Sulbactam 16/8 ug/ml Intermediate             Aztreonam <=8 ug/ml Susceptible             Cefepime <=8 ug/ml Susceptible             Cefotaxime <=2 ug/ml Susceptible             Ceftriaxone <=8 ug/ml Susceptible             Cefuroxime sodium <=4 ug/ml Susceptible              Ciprofloxacin >2 ug/ml Resistant             Ertapenem <=1 ug/ml Susceptible             Gentamicin >8 ug/ml Resistant             Gentamicin High Level Synergy       <=500 ug/ml Susceptible       Levofloxacin 4 ug/ml Intermediate             Linezolid       <=1 ug/ml Susceptible       Meropenem <=1 ug/ml Susceptible             Penicillin G       >8 ug/ml Resistant       Piperacillin + Tazobactam <=16 ug/ml Susceptible             Streptomycin High Level Synergy       >1,000 ug/ml Resistant       Tobramycin >8 ug/ml Resistant             Trimethoprim + Sulfamethoxazole >2/38 ug/ml Resistant             Vancomycin       1 ug/ml Susceptible                  Susceptibility      Staphylococcus aureus, MRSA     GONSALO     Ciprofloxacin >2 ug/ml Resistant     Clindamycin >4 ug/ml Resistant     Daptomycin <=0.5 ug/ml Susceptible     Erythromycin >4 ug/ml Resistant     Gentamicin <=4 ug/ml Susceptible     Levofloxacin >4 ug/ml Resistant     Linezolid 2 ug/ml Susceptible     Oxacillin >2 ug/ml Resistant     Penicillin G 8 ug/ml Resistant     Quinupristin + Dalfopristin <=0.5 ug/ml Susceptible     Rifampin <=1 ug/ml Susceptible     Tetracycline <=4 ug/ml Susceptible     Trimethoprim + Sulfamethoxazole <=0.5/9.5 ug/ml Susceptible     Vancomycin 1 ug/ml Susceptible                    Blood Culture - Blood, [[id number]]  (Normal) Collected:  10/06/18 2020    Lab Status:  Final result Specimen:  Blood from Arm, Right Updated:  10/11/18 2101     Blood Culture No growth at 5 days    Respiratory Culture - Sputum, NT Suction [[id number]]  (Abnormal)  (Susceptibility) Collected:  10/07/18 0108    Lab Status:  Final result Specimen:  Sputum from NT Suction Updated:  10/10/18 1437     Respiratory Culture Scant growth (1+) Yeast isolated (A)      Scant growth (1+) Staphylococcus aureus, MRSA (A)     Comment:   Methicillin resistant Staphylococcus aureus, Patient may be an isolation risk.        Gram Stain Result Many (4+) WBCs per low power  field      Rare (1+) Epithelial cells per low power field      No organisms seen    Susceptibility      Staphylococcus aureus, MRSA     GONSALO     Ciprofloxacin >2 ug/ml Resistant     Clindamycin >4 ug/ml Resistant     Erythromycin >4 ug/ml Resistant     Gentamicin <=4 ug/ml Susceptible     Levofloxacin >4 ug/ml Resistant     Linezolid 2 ug/ml Susceptible     Oxacillin >2 ug/ml Resistant     Penicillin G >8 ug/ml Resistant     Quinupristin + Dalfopristin <=0.5 ug/ml Susceptible     Rifampin <=1 ug/ml Susceptible     Tetracycline <=4 ug/ml Susceptible     Trimethoprim + Sulfamethoxazole <=0.5/9.5 ug/ml Susceptible     Vancomycin 2 ug/ml Susceptible                          Imaging Results (last 24 hours)     ** No results found for the last 24 hours. **        Results for orders placed during the hospital encounter of 10/06/18   Adult Transthoracic Echo Complete W/ Cont if Necessary Per Protocol    Narrative · Left ventricular systolic function is hyperdynamic (EF > 70).  · Left ventricular wall thickness is consistent with moderate concentric   hypertrophy.  · Left ventricular diastolic dysfunction (grade I) consistent with   impaired relaxation.  · Moderate to severe aortic stenosis (mean gradient 39 mmHg) is present.  · Mild mitral valve regurgitation is present          I have reviewed the medications.      [START ON 10/15/2018] Pharmacy Consult  Does not apply Once   acetylcysteine 3 mL Nebulization BID - RT   aspirin 162 mg Oral Daily   atorvastatin 40 mg Oral Nightly   aztreonam 2 g Intravenous Q8H   busPIRone 15 mg Oral Q8H   enoxaparin 40 mg Subcutaneous Q24H   hydrocortisone  Rectal BID   insulin detemir 25 Units Subcutaneous Nightly   insulin lispro 0-7 Units Subcutaneous 4x Daily With Meals & Nightly   insulin lispro 8 Units Subcutaneous TID With Meals   ipratropium-albuterol 3 mL Nebulization 4x Daily - RT   midodrine 10 mg Oral TID AC   rOPINIRole 4 mg Oral Nightly   sodium chloride 3 mL Intravenous Q12H    vancomycin 1,500 mg Intravenous Q24H         Assessment/Plan   Assessment / Plan     Hospital Problem List     Pneumonia    Type 2 diabetes mellitus (CMS/formerly [facility])    Hypotension    Overview Signed 10/7/2018  3:25 PM by Mmee Acevedo APRN     on midodrine         Hyperlipidemia    Depression    Anxiety    COPD (chronic obstructive pulmonary disease) (CMS/formerly [facility])    Dysphagia    Overview Signed 10/7/2018  4:13 PM by Meme Acevedo APRN     Was on mechanical soft and nectar thick at SNF, noncompliant          Coronary artery disease    CHF (congestive heart failure) (CMS/formerly [facility])    GERD (gastroesophageal reflux disease)    Chronic respiratory failure with hypoxia and hypercapnia (CMS/formerly [facility])             Brief Hospital Course to date:  Nallely Junior is a 78 y.o. female  with history of chronic respiratory failure s/p tracheostomy, HLD, T2DM, bedbound state who presents as a transfer/direct admission from [facility] for nonresolving PNA, mucus plugging with opacification of left lung and tracheostomy issues. Patient was admitted to OSH on 10/2 from her NH, for several days of sob, cough, increasing amount of sputum. Patient was initially diagnosed with left sided PNA at OSH, treated with broad spectrum abx- merrem, vanc, levaquin since 10/2. Patient was also noted to be hypotensive to as low as 90s systolic on presentation, though this resolved. Sputum cx from admission was reported to grow MRSA, sensitivities pending, as well as GNR (speciations/sensitivities pending). Patient was reported to initially be improving as WBC noted on admission 20k--- went down to 10k on day of transfer (10/6), however repeat CXR from 10/3-10/4 revealed worsening opacification of left hemithorax concerning for mucus plugging, also noted reports of small left sided plueral effusion. [facility] was called for transfer, however no beds were available until this time. unclear what patient's home baseline oxygen  requirement is, she says no oxygen at home prior but per sister ,the patient fell about a year and a half ago and broke her leg.  She went to  and had to have surgery.  She was failure to wean from the vent after a month, so she had a trach and PEG placed.  She now is a NHR      Assessment & Plan:  Acute on chronic respiratory failure  PNA, MRSA/GNR ,LLL, BETTIE, RLL PNA on CT   Mucus plugging   Possible aspiration   -- patient was being treated with IV vanc/merrem/levaquin at OSH  -- sputum cx had resulted with MRSA/GNR  --continue  vanc/azactam (PCN allergy) here for now which should cover both MRSA/GNR, Likely through 10/20/18.   ID consulted and following.    pulm following. No need for bronch at this point per pulm  - continue scheduled nebs, steroids (patient was on TID solumedrol at OSH prior to transfer)  - speech consulted and mech soft with nectar thick liquids. Tolerating well.  --Mucomyst nebs  --Chest Physiotherapy  --AM labs  --This a.m. repeat CXR shows some improved aeration of the left lung.        T2DM (A1c 8.9)  - home meds per OSH med rec list 30 units levemir, increased to 20 units qhs as well as SSI, elevated yesterday   --today 10/13 glucose is still running in the 200s.  We'll increase mealtime insulin to 8 units 3 times a day with hold parameters.  --Continue to monitor and adjust     HLD  - continue statin     RLS  - continue ropinirole     Depression  - continue buspar     H/o Hypotension  - continue home midodrine, closely monitor   --much improved today after IVFs yesterday/stable now  --continue to monitor    Elevated Lactic Acid   --improved     Incentive spirometer    Pulmonary Medicine appreciated     DVT prophylaxis: Lovenox     CODE STATUS:   Code Status and Medical Interventions:   Ordered at: 10/06/18 1946     Level Of Support Discussed With:    Patient     Code Status:    CPR     Medical Interventions (Level of Support Prior to Arrest):    Full       Disposition: I expect the  patient to be discharged tbd      Electronically signed by Jovanny Michael MD, 10/14/18, 4:54 PM.

## 2018-10-14 NOTE — PLAN OF CARE
Problem: Patient Care Overview  Goal: Plan of Care Review  Outcome: Ongoing (interventions implemented as appropriate)   10/14/18 7414   Coping/Psychosocial   Plan of Care Reviewed With patient   OTHER   Outcome Summary VSS. NSR on monitor. Patient states she feels better. No current complaints from patient today. Will continue to monitor.    Plan of Care Review   Progress improving

## 2018-10-14 NOTE — PLAN OF CARE
Problem: Patient Care Overview  Goal: Plan of Care Review   10/14/18 8369   Coping/Psychosocial   Plan of Care Reviewed With patient   OTHER   Outcome Summary Utilized mechanical lift for bed to recliner transfer. MODA for rolling L/R for sling placement. Tolerated LE ther ex in chair. Will continue to progress pt as able per POC.   Plan of Care Review   Progress no change

## 2018-10-14 NOTE — THERAPY TREATMENT NOTE
Acute Care - Physical Therapy Treatment Note  [facility]     Patient Name: Nallely Junior  : 1940  MRN: [id number]  Today's Date: 10/14/2018  Onset of Illness/Injury or Date of Surgery: 10/06/18  Date of Referral to PT: 10/06/18  Referring Physician: TIN Murphy MD    Admit Date: 10/6/2018    Visit Dx:    ICD-10-CM ICD-9-CM   1. Impaired mobility and ADLs Z74.09 799.89   2. Oropharyngeal dysphagia R13.12 787.22   3. Impaired functional mobility, balance, gait, and endurance Z74.09 V49.89   4. Aspiration pneumonia of left lower lobe, unspecified aspiration pneumonia type (CMS/HCC) J69.0 507.0   5. Voice and resonance disorder R49.9 784.40     Patient Active Problem List   Diagnosis   • Pneumonia   • Type 2 diabetes mellitus (CMS/[facility])   • Hypotension   • Hyperlipidemia   • Depression   • Anxiety   • COPD (chronic obstructive pulmonary disease) (CMS/[facility])   • Dysphagia   • Coronary artery disease   • CHF (congestive heart failure) (CMS/[facility])   • GERD (gastroesophageal reflux disease)   • Chronic respiratory failure with hypoxia and hypercapnia (CMS/[facility])       Therapy Treatment          Rehabilitation Treatment Summary     Row Name 10/14/18 1403             Treatment Time/Intention    Discipline physical therapist  -VG      Document Type therapy note (daily note)  -VG      Subjective Information no complaints  -VG      Mode of Treatment individual therapy;physical therapy  -VG      Patient/Family Observations In bed, IV x 2, coronado, supplemental O2, trach, tele; no family present.  -VG      Care Plan Review patient/other agree to care plan  -VG      Therapy Frequency (PT Clinical Impression) daily  -VG      Patient Effort good  -VG      Existing Precautions/Restrictions fall;oxygen therapy device and L/min;other (see comments)   trach, coronado  -VG      Recorded by [VG] Zabrina Goode, PT 10/14/18 8269      Row Name 10/14/18 1596             Cognitive Assessment/Intervention- PT/OT    Affect/Mental Status  (Cognitive) WFL  -VG      Orientation Status (Cognition) oriented to;person  -VG      Follows Commands (Cognition) follows one step commands;over 90% accuracy  -VG      Cognitive Function (Cognitive) WFL  -VG      Personal Safety Interventions fall prevention program maintained;supervised activity  -VG      Recorded by [VG] Zabrina Goode, PT 10/14/18 1435      Row Name 10/14/18 1403             Bed Mobility Assessment/Treatment    Bed Mobility Assessment/Treatment rolling left;rolling right  -VG      Rolling Left [place] (Bed Mobility) moderate assist (50% patient effort)  -VG      Rolling Right [place] (Bed Mobility) moderate assist (50% patient effort)  -VG      Assistive Device (Bed Mobility) bed rails;draw sheet  -VG      Comment (Bed Mobility) MODA for rolling L/R for sling placement.  -VG      Recorded by [VG] Zabrina Goode, PT 10/14/18 1435      Row Name 10/14/18 1403             Transfer Assessment/Treatment    Transfer Assessment/Treatment bed-chair transfer  -VG      Comment (Transfers) Utilized mechanical lift.  -VG      Recorded by [VG] Zabrina Goode, PT 10/14/18 1435      Row Name 10/14/18 1403             Bed-Chair Transfer    Bed-Chair [place] (Transfers) dependent (less than 25% patient effort)  -VG      Assistive Device (Bed-Chair Transfers) mechanical lift/aid  -VG      Recorded by [VG] Zabrina Goode, PT 10/14/18 1435      Row Name 10/14/18 1403             Gait/Stairs Assessment/Training    Comment (Gait/Stairs) Non ambulatory at this time.  -VG      Recorded by [VG] Zabrina Goode, PT 10/14/18 1435      Row Name 10/14/18 1403             Therapeutic Exercise    28509 - PT Therapeutic Exercise Minutes 10  -VG      70134 - PT Therapeutic Activity Minutes 17  -VG      Recorded by [VG] Zabrina Goode, PT 10/14/18 1435      Row Name 10/14/18 1403             Therapeutic Exercise    Lower Extremity (Therapeutic Exercise) LAQ (long arc quad), bilateral;marching while  seated  -VG      Lower Extremity Range of Motion (Therapeutic Exercise) hip abduction/adduction, bilateral;ankle dorsiflexion/plantar flexion, bilateral  -VG      Exercise Type (Therapeutic Exercise) AROM (active range of motion);AAROM (active assistive range of motion)  -VG      Position (Therapeutic Exercise) seated  -VG      Sets/Reps (Therapeutic Exercise) 10x  -VG      Comment (Therapeutic Exercise) Cues for technique.  -VG      Recorded by [VG] Zabrina Goode, PT 10/14/18 1435      Row Name 10/14/18 1403             Positioning and Restraints    Pre-Treatment Position in bed  -VG      Post Treatment Position chair  -VG      In Chair reclined;call light within reach;encouraged to call for assist;exit alarm on;waffle cushion;on mechanical lift sling;legs elevated;heels elevated  -VG      Recorded by [VG] Zabrina Goode, PT 10/14/18 1435      Row Name 10/14/18 1403             Pain Scale: Numbers Pre/Post-Treatment    Pain Scale: Numbers, Pretreatment 0/10 - no pain  -VG      Pain Scale: Numbers, Post-Treatment 0/10 - no pain  -VG      Recorded by [VG] Zabrina Goode, PT 10/14/18 1435      Row Name 10/14/18 1403             Coping    Observed Emotional State accepting  -VG      Verbalized Emotional State acceptance  -VG      Recorded by [VG] Zabrina Goode, PT 10/14/18 1435      Row Name 10/14/18 1403             Plan of Care Review    Plan of Care Reviewed With patient  -VG      Recorded by [VG] Zabrina Goode, PT 10/14/18 1435      Row Name 10/14/18 1403             Outcome Summary/Treatment Plan (PT)    Daily Summary of Progress (PT) progress toward functional goals is good  -VG      Anticipated Discharge Disposition (PT) extended care facility  -VG      Recorded by [VG] Zabrina Goode, PT 10/14/18 1435        User Key  (r) = Recorded By, (t) = Taken By, (c) = Cosigned By    Initials Name Effective Dates Discipline    VG Zabrina Goode, PT 05/29/18 -  PT                     Physical Therapy  Education     Title: PT OT SLP Therapies (Active)     Topic: Physical Therapy (Done)     Point: Mobility training (Done)    Learning Progress Summary     Learner Status Readiness Method Response Comment Documented by    Patient Done Acceptance E VU  VG 10/14/18 1403     Active Acceptance D,E NR HEP  10/12/18 1106          Point: Home exercise program (Done)    Learning Progress Summary     Learner Status Readiness Method Response Comment Documented by    Patient Done Acceptance E VU  VG 10/14/18 1403     Active Acceptance D,E NR HEP  10/12/18 1106     Active Acceptance E NR  KR 10/11/18 0953          Point: Body mechanics (Done)    Learning Progress Summary     Learner Status Readiness Method Response Comment Documented by    Patient Done Acceptance E VU  VG 10/14/18 1403     Active Acceptance D,E NR HEP  10/12/18 1106     Active Acceptance E NR  KR 10/11/18 0953          Point: Precautions (Done)    Learning Progress Summary     Learner Status Readiness Method Response Comment Documented by    Patient Done Acceptance E VU  VG 10/14/18 1403     Active Acceptance D,E NR HEP  10/12/18 1106     Active Acceptance E NR  KR 10/11/18 0953                      User Key     Initials Effective Dates Name Provider Type Discipline     06/19/15 -  Meme Schmid, PT Physical Therapist PT     04/03/18 -  Shelli Nunes, PT Physical Therapist PT     05/29/18 -  Zabrina Goode, PT Physical Therapist PT                    PT Recommendation and Plan  Anticipated Discharge Disposition (PT): extended care facility  Therapy Frequency (PT Clinical Impression): daily  Outcome Summary/Treatment Plan (PT)  Daily Summary of Progress (PT): progress toward functional goals is good  Anticipated Discharge Disposition (PT): extended care facility  Plan of Care Reviewed With: patient  Progress: no change  Outcome Summary: Utilized mechanical lift for bed to recliner transfer. MODA for rolling L/R for sling placement. Tolerated  LE ther ex in chair. Will continue to progress pt as able per POC.          Outcome Measures     Row Name 10/14/18 1403 10/12/18 1049          How much help from another person do you currently need...    Turning from your back to your side while in flat bed without using bedrails? 2  -VG 1  -SH     Moving from lying on back to sitting on the side of a flat bed without bedrails? 1  -VG 1  -SH     Moving to and from a bed to a chair (including a wheelchair)? 1  -VG 1  -SH     Standing up from a chair using your arms (e.g., wheelchair, bedside chair)? 1  -VG 1  -SH     Climbing 3-5 steps with a railing? 1  -VG 1  -SH     To walk in hospital room? 1  -VG 1  -SH     AM-PAC 6 Clicks Score 7  -VG 6  -SH        Functional Assessment    Outcome Measure Options AM-PAC 6 Clicks Basic Mobility (PT)  -VG AM-PAC 6 Clicks Basic Mobility (PT)  -SH       User Key  (r) = Recorded By, (t) = Taken By, (c) = Cosigned By    Initials Name Provider Type     Meme Schmid, PT Physical Therapist    VG Zabrina Goode, PT Physical Therapist           Time Calculation:         PT Charges     Row Name 10/14/18 1403             Time Calculation    Start Time 1403  -VG      PT Received On 10/14/18  -      PT Goal Re-Cert Due Date 10/18/18  -         Time Calculation- PT    Total Timed Code Minutes- PT 27 minute(s)  -VG         Timed Charges    29840 - PT Therapeutic Exercise Minutes 10  -VG      31572 - PT Therapeutic Activity Minutes 17  -VG        User Key  (r) = Recorded By, (t) = Taken By, (c) = Cosigned By    Initials Name Provider Type     Zabrina Goode, PT Physical Therapist        Therapy Suggested Charges     Code   Minutes Charges    11636 (CPT®) Hc Pt Neuromusc Re Education Ea 15 Min      45577 (CPT®) Hc Pt Ther Proc Ea 15 Min 10 1    53647 (CPT®) Hc Gait Training Ea 15 Min      74061 (CPT®) Hc Pt Therapeutic Act Ea 15 Min 17 1    82147 (CPT®) Hc Pt Manual Therapy Ea 15 Min      13152 (CPT®) Hc Pt Iontophoresis Ea 15  Min      34095 (CPT®) Hc Pt Elec Stim Ea-Per 15 Min      13741 (CPT®) Hc Pt Ultrasound Ea 15 Min      11851 (CPT®) Hc Pt Self Care/Mgmt/Train Ea 15 Min      75862 (CPT®) Hc Pt Prosthetic (S) Train Initial Encounter, Each 15 Min      64071 (CPT®) Hc Pt Orthotic(S)/Prosthetic(S) Encounter, Each 15 Min      80994 (CPT®) Hc Orthotic(S) Mgmt/Train Initial Encounter, Each 15min      Total  27 2        Therapy Charges for Today     Code Description Service Date Service Provider Modifiers Qty    00496041469 HC PT THER PROC EA 15 MIN 10/14/2018 Zabrina Goode, PT GP 1    45473866104 HC PT THERAPEUTIC ACT EA 15 MIN 10/14/2018 Zabrina Goode, PT GP 1          PT G-Codes  Outcome Measure Options: AM-PAC 6 Clicks Basic Mobility (PT)  AM-PAC 6 Clicks Score: 7  Score: 11  Functional Limitation: Mobility: Walking and moving around  Mobility: Walking and Moving Around Current Status (): At least 80 percent but less than 100 percent impaired, limited or restricted  Mobility: Walking and Moving Around Goal Status (): At least 60 percent but less than 80 percent impaired, limited or restricted    Bhumi Goode, PT  10/14/2018

## 2018-10-15 LAB
ANION GAP SERPL CALCULATED.3IONS-SCNC: 3 MMOL/L (ref 3–11)
BUN BLD-MCNC: 11 MG/DL (ref 9–23)
BUN/CREAT SERPL: 25.6 (ref 7–25)
CALCIUM SPEC-SCNC: 8.2 MG/DL (ref 8.7–10.4)
CHLORIDE SERPL-SCNC: 106 MMOL/L (ref 99–109)
CO2 SERPL-SCNC: 29 MMOL/L (ref 20–31)
CREAT BLD-MCNC: 0.43 MG/DL (ref 0.6–1.3)
GFR SERPL CREATININE-BSD FRML MDRD: 142 ML/MIN/1.73
GLUCOSE BLD-MCNC: 116 MG/DL (ref 70–100)
GLUCOSE BLDC GLUCOMTR-MCNC: 101 MG/DL (ref 70–130)
GLUCOSE BLDC GLUCOMTR-MCNC: 117 MG/DL (ref 70–130)
GLUCOSE BLDC GLUCOMTR-MCNC: 196 MG/DL (ref 70–130)
GLUCOSE BLDC GLUCOMTR-MCNC: 88 MG/DL (ref 70–130)
MAGNESIUM SERPL-MCNC: 1.6 MG/DL (ref 1.3–2.7)
POTASSIUM BLD-SCNC: 2.7 MMOL/L (ref 3.5–5.5)
SODIUM BLD-SCNC: 138 MMOL/L (ref 132–146)
VANCOMYCIN TROUGH SERPL-MCNC: 15.3 MCG/ML (ref 10–20)

## 2018-10-15 PROCEDURE — 99233 SBSQ HOSP IP/OBS HIGH 50: CPT | Performed by: INTERNAL MEDICINE

## 2018-10-15 PROCEDURE — 80202 ASSAY OF VANCOMYCIN: CPT

## 2018-10-15 PROCEDURE — 80048 BASIC METABOLIC PNL TOTAL CA: CPT

## 2018-10-15 PROCEDURE — 94668 MNPJ CHEST WALL SBSQ: CPT

## 2018-10-15 PROCEDURE — 94799 UNLISTED PULMONARY SVC/PX: CPT

## 2018-10-15 PROCEDURE — 63710000001 INSULIN DETEMIR PER 5 UNITS: Performed by: HOSPITALIST

## 2018-10-15 PROCEDURE — 97535 SELF CARE MNGMENT TRAINING: CPT

## 2018-10-15 PROCEDURE — 82962 GLUCOSE BLOOD TEST: CPT

## 2018-10-15 PROCEDURE — 99291 CRITICAL CARE FIRST HOUR: CPT | Performed by: INTERNAL MEDICINE

## 2018-10-15 PROCEDURE — 83735 ASSAY OF MAGNESIUM: CPT | Performed by: INTERNAL MEDICINE

## 2018-10-15 PROCEDURE — 25010000002 ENOXAPARIN PER 10 MG: Performed by: INTERNAL MEDICINE

## 2018-10-15 PROCEDURE — 94760 N-INVAS EAR/PLS OXIMETRY 1: CPT

## 2018-10-15 PROCEDURE — 25010000002 VANCOMYCIN 10 G RECONSTITUTED SOLUTION

## 2018-10-15 RX ORDER — IPRATROPIUM BROMIDE AND ALBUTEROL SULFATE 2.5; .5 MG/3ML; MG/3ML
3 SOLUTION RESPIRATORY (INHALATION)
Status: DISCONTINUED | OUTPATIENT
Start: 2018-10-15 | End: 2018-10-18 | Stop reason: HOSPADM

## 2018-10-15 RX ORDER — POTASSIUM CHLORIDE 7.45 MG/ML
10 INJECTION INTRAVENOUS
Status: DISCONTINUED | OUTPATIENT
Start: 2018-10-15 | End: 2018-10-18 | Stop reason: HOSPADM

## 2018-10-15 RX ORDER — POTASSIUM CHLORIDE 1.5 G/1.77G
40 POWDER, FOR SOLUTION ORAL AS NEEDED
Status: DISCONTINUED | OUTPATIENT
Start: 2018-10-15 | End: 2018-10-18 | Stop reason: HOSPADM

## 2018-10-15 RX ORDER — MAGNESIUM SULFATE HEPTAHYDRATE 40 MG/ML
2 INJECTION, SOLUTION INTRAVENOUS AS NEEDED
Status: DISCONTINUED | OUTPATIENT
Start: 2018-10-15 | End: 2018-10-18 | Stop reason: HOSPADM

## 2018-10-15 RX ORDER — ACETYLCYSTEINE 200 MG/ML
3 SOLUTION ORAL; RESPIRATORY (INHALATION)
Status: DISCONTINUED | OUTPATIENT
Start: 2018-10-15 | End: 2018-10-18

## 2018-10-15 RX ORDER — POTASSIUM CHLORIDE 750 MG/1
40 CAPSULE, EXTENDED RELEASE ORAL AS NEEDED
Status: DISCONTINUED | OUTPATIENT
Start: 2018-10-15 | End: 2018-10-18 | Stop reason: HOSPADM

## 2018-10-15 RX ORDER — MAGNESIUM SULFATE HEPTAHYDRATE 40 MG/ML
4 INJECTION, SOLUTION INTRAVENOUS AS NEEDED
Status: DISCONTINUED | OUTPATIENT
Start: 2018-10-15 | End: 2018-10-18 | Stop reason: HOSPADM

## 2018-10-15 RX ORDER — IPRATROPIUM BROMIDE AND ALBUTEROL SULFATE 2.5; .5 MG/3ML; MG/3ML
3 SOLUTION RESPIRATORY (INHALATION) EVERY 4 HOURS PRN
Status: DISCONTINUED | OUTPATIENT
Start: 2018-10-15 | End: 2018-10-18 | Stop reason: HOSPADM

## 2018-10-15 RX ADMIN — BUSPIRONE HYDROCHLORIDE 15 MG: 15 TABLET ORAL at 22:07

## 2018-10-15 RX ADMIN — INSULIN DETEMIR 25 UNITS: 100 INJECTION, SOLUTION SUBCUTANEOUS at 20:36

## 2018-10-15 RX ADMIN — MIDODRINE HYDROCHLORIDE 10 MG: 5 TABLET ORAL at 08:23

## 2018-10-15 RX ADMIN — BUSPIRONE HYDROCHLORIDE 15 MG: 15 TABLET ORAL at 05:26

## 2018-10-15 RX ADMIN — AZTREONAM 2 G: 2 INJECTION, POWDER, LYOPHILIZED, FOR SOLUTION INTRAMUSCULAR; INTRAVENOUS at 23:50

## 2018-10-15 RX ADMIN — ATORVASTATIN CALCIUM 40 MG: 40 TABLET, FILM COATED ORAL at 20:34

## 2018-10-15 RX ADMIN — POTASSIUM CHLORIDE 40 MEQ: 750 CAPSULE, EXTENDED RELEASE ORAL at 19:30

## 2018-10-15 RX ADMIN — IPRATROPIUM BROMIDE AND ALBUTEROL SULFATE 3 ML: 2.5; .5 SOLUTION RESPIRATORY (INHALATION) at 07:47

## 2018-10-15 RX ADMIN — VANCOMYCIN HYDROCHLORIDE 1500 MG: 10 INJECTION, POWDER, LYOPHILIZED, FOR SOLUTION INTRAVENOUS at 12:43

## 2018-10-15 RX ADMIN — IPRATROPIUM BROMIDE AND ALBUTEROL SULFATE 3 ML: 2.5; .5 SOLUTION RESPIRATORY (INHALATION) at 12:25

## 2018-10-15 RX ADMIN — AZTREONAM 2 G: 2 INJECTION, POWDER, LYOPHILIZED, FOR SOLUTION INTRAMUSCULAR; INTRAVENOUS at 15:35

## 2018-10-15 RX ADMIN — HYDROCORTISONE 2.5%: 25 CREAM TOPICAL at 20:35

## 2018-10-15 RX ADMIN — MIDODRINE HYDROCHLORIDE 10 MG: 5 TABLET ORAL at 18:17

## 2018-10-15 RX ADMIN — MIDODRINE HYDROCHLORIDE 10 MG: 5 TABLET ORAL at 12:44

## 2018-10-15 RX ADMIN — MAGNESIUM SULFATE HEPTAHYDRATE 4 G: 40 INJECTION, SOLUTION INTRAVENOUS at 15:19

## 2018-10-15 RX ADMIN — ROPINIROLE HYDROCHLORIDE 4 MG: 2 TABLET, FILM COATED ORAL at 20:34

## 2018-10-15 RX ADMIN — BUSPIRONE HYDROCHLORIDE 15 MG: 15 TABLET ORAL at 15:19

## 2018-10-15 RX ADMIN — INSULIN LISPRO 2 UNITS: 100 INJECTION, SOLUTION INTRAVENOUS; SUBCUTANEOUS at 12:46

## 2018-10-15 RX ADMIN — Medication 3 ML: at 08:26

## 2018-10-15 RX ADMIN — IPRATROPIUM BROMIDE AND ALBUTEROL SULFATE 3 ML: 2.5; .5 SOLUTION RESPIRATORY (INHALATION) at 15:38

## 2018-10-15 RX ADMIN — POTASSIUM CHLORIDE 40 MEQ: 750 CAPSULE, EXTENDED RELEASE ORAL at 23:18

## 2018-10-15 RX ADMIN — ASPIRIN 325 MG ORAL TABLET 162 MG: 325 PILL ORAL at 08:25

## 2018-10-15 RX ADMIN — POTASSIUM CHLORIDE 40 MEQ: 750 CAPSULE, EXTENDED RELEASE ORAL at 12:44

## 2018-10-15 RX ADMIN — ENOXAPARIN SODIUM 40 MG: 40 INJECTION SUBCUTANEOUS at 20:34

## 2018-10-15 RX ADMIN — AZTREONAM 2 G: 2 INJECTION, POWDER, LYOPHILIZED, FOR SOLUTION INTRAMUSCULAR; INTRAVENOUS at 08:30

## 2018-10-15 RX ADMIN — Medication 3 ML: at 20:48

## 2018-10-15 RX ADMIN — IPRATROPIUM BROMIDE AND ALBUTEROL SULFATE 3 ML: 2.5; .5 SOLUTION RESPIRATORY (INHALATION) at 19:13

## 2018-10-15 RX ADMIN — ACETYLCYSTEINE 3 ML: 200 SOLUTION ORAL; RESPIRATORY (INHALATION) at 07:48

## 2018-10-15 RX ADMIN — IPRATROPIUM BROMIDE AND ALBUTEROL SULFATE 3 ML: 2.5; .5 SOLUTION RESPIRATORY (INHALATION) at 23:25

## 2018-10-15 NOTE — NURSING NOTE
I spoke to dr garcia and notified him of K2.7. He will put replacement in. Also cancel discharge until tomorrow. Kori Conway RN

## 2018-10-15 NOTE — PLAN OF CARE
Problem: Patient Care Overview  Goal: Plan of Care Review   10/15/18 0914   Coping/Psychosocial   Plan of Care Reviewed With patient   Plan of Care Review   Progress improving

## 2018-10-15 NOTE — THERAPY TREATMENT NOTE
Acute Care - Occupational Therapy Treatment Note  [facility]     Patient Name: Nallely Junior  : 1940  MRN: [id number]  Today's Date: 10/15/2018  Onset of Illness/Injury or Date of Surgery: 10/06/18  Date of Referral to OT: 10/06/18  Referring Physician: TIN Murphy MD    Admit Date: 10/6/2018       ICD-10-CM ICD-9-CM   1. Impaired mobility and ADLs Z74.09 799.89   2. Oropharyngeal dysphagia R13.12 787.22   3. Impaired functional mobility, balance, gait, and endurance Z74.09 V49.89   4. Aspiration pneumonia of left lower lobe, unspecified aspiration pneumonia type (CMS/HCC) J69.0 507.0   5. Voice and resonance disorder R49.9 784.40     Patient Active Problem List   Diagnosis   • Pneumonia   • Type 2 diabetes mellitus (CMS/[facility])   • Hypotension   • Hyperlipidemia   • Depression   • Anxiety   • COPD (chronic obstructive pulmonary disease) (CMS/[facility])   • Dysphagia   • Coronary artery disease   • CHF (congestive heart failure) (CMS/[facility])   • GERD (gastroesophageal reflux disease)   • Chronic respiratory failure with hypoxia and hypercapnia (CMS/HCC)     Past Medical History:   Diagnosis Date   • Anemia    • Anxiety    • Aortic stenosis    • CHF (congestive heart failure) (CMS/[facility])    • Chronic respiratory failure with hypoxia and hypercapnia (CMS/HCC)    • CKD (chronic kidney disease), stage III (CMS/[facility])    • Constipation    • COPD (chronic obstructive pulmonary disease) (CMS/[facility])    • Coronary artery disease    • Dementia    • Depression    • Diabetes mellitus (CMS/[facility])    • Dysphagia    • Femur fracture, right (CMS/[facility])    • GERD (gastroesophageal reflux disease)    • Hyperlipidemia    • Hypertension    • Hypotension     on midodrine   • Restless legs syndrome (RLS)      Past Surgical History:   Procedure Laterality Date   • FOREARM SURGERY     • LEG SURGERY     • PEG TUBE INSERTION     • TRACHEOSTOMY         Therapy Treatment          Rehabilitation Treatment Summary     Row Name 10/15/18 1423              Treatment Time/Intention    Discipline occupational therapist  -CL      Document Type therapy note (daily note)  -CL      Subjective Information complains of;weakness;fatigue  -CL      Therapy Frequency (OT Eval) 3 times/wk  -CL      Patient Effort adequate  -CL      Existing Precautions/Restrictions fall;oxygen therapy device and L/min;other (see comments)   chronic trach, PEG  -CL      Recorded by [CL] Marie Pabon OT 10/15/18 1600      Row Name 10/15/18 1423             Vital Signs    Pre Systolic BP Rehab --   VSS, RN cleared for tx.   -CL      Recorded by [CL] Marie Pabon OT 10/15/18 1600      Row Name 10/15/18 1423             Cognitive Assessment/Intervention- PT/OT    Affect/Mental Status (Cognitive) confused  -CL      Orientation Status (Cognition) oriented to;person  -CL      Follows Commands (Cognition) follows one step commands;75-90% accuracy;verbal cues/prompting required;repetition of directions required  -CL      Cognitive Function (Cognitive) safety deficit  -CL      Safety Deficit (Cognitive) mild deficit;awareness of need for assistance;safety precautions awareness  -CL      Personal Safety Interventions fall prevention program maintained;nonskid shoes/slippers when out of bed;muscle strengthening facilitated  -CL      Recorded by [CL] Marie Pabon OT 10/15/18 1600      Row Name 10/15/18 1423             ADL Assessment/Intervention    53874 - OT Self Care/Mgmt Minutes 8  -CL      BADL Assessment/Intervention grooming  -CL      Recorded by [CL] Marie Pabon OT 10/15/18 1600      Row Name 10/15/18 1423             Grooming Assessment/Training    Irving Level (Grooming) wash face, hands;supervision;moderate assist (50% patient effort)   trach care  -CL      Grooming Position sitting up in bed  -CL      Recorded by [CL] Marie Pabon OT 10/15/18 1600      Row Name 10/15/18 1423             Therapeutic Exercise    82363 - OT Therapeutic Exercise Minutes 6  -CL      Recorded by [CL] Cm  LYDIA Salazar 10/15/18 1600      Row Name 10/15/18 1423             Upper Extremity Seated Therapeutic Exercise    Performed, Seated Upper Extremity (Therapeutic Exercise) shoulder flexion/extension;shoulder external/internal rotation;scapular protraction/retraction;digit flexion/extension;wrist radial/ulnar deviation;elbow flexion/extension  -CL      Exercise Type, Seated Upper Extremity (Therapeutic Exercise) AROM (active range of motion)  -CL      Sets/Reps Detail, Seated Upper Extremity (Therapeutic Exercise) 1/10  -CL      Comment, Seated Upper Extremity (Therapeutic Exercise) BUE   -CL      Recorded by [CL] Marie Pabon OT 10/15/18 1600      Row Name 10/15/18 1423             Positioning and Restraints    Pre-Treatment Position in bed  -CL      Post Treatment Position bed  -CL      In Bed notified nsg;fowlers;call light within reach;encouraged to call for assist;exit alarm on;side rails up x2;RUE elevated;LUE elevated;legs elevated;heels elevated  -CL      Recorded by [CL] Marie Pabon OT 10/15/18 1600      Row Name 10/15/18 1423             Pain Scale: Numbers Pre/Post-Treatment    Pain Scale: Numbers, Pretreatment 0/10 - no pain  -CL      Pain Scale: Numbers, Post-Treatment 0/10 - no pain  -CL      Recorded by [CL] Marie Pabon OT 10/15/18 1600        User Key  (r) = Recorded By, (t) = Taken By, (c) = Cosigned By    Initials Name Effective Dates Discipline    CL Marie Pabon OT 04/03/18 -  OT               Occupational Therapy Education     Title: PT OT SLP Therapies (Active)     Topic: Occupational Therapy (Active)     Point: ADL training (Active)     Description: Instruct learner(s) on proper safety adaptation and remediation techniques during self care or transfers.   Instruct in proper use of assistive devices.   Learning Progress Summary     Learner Status Readiness Method Response Comment Documented by    Patient Active Acceptance E NR Pt educated on appropriate safety precautions, positioning, HEP,  and benefits of therapy.  10/15/18 1600     Active Acceptance E NR Pt educated on appropriate safety precautions, positioning, HEP, and benefits of therapy.  10/11/18 1445     Active Acceptance E NR Pt educated on HEP, positioning, role of OT, and benefits of therapy.  10/09/18 1548     Done Acceptance E,D VU,NR Body mechanics with bed mobility; reinforced need for call for assist with OOB activities; role of OT. TA 10/07/18 0928          Point: Home exercise program (Active)     Description: Instruct learner(s) on appropriate technique for monitoring, assisting and/or progressing therapeutic exercises/activities.   Learning Progress Summary     Learner Status Readiness Method Response Comment Documented by    Patient Active Acceptance E NR Pt educated on appropriate safety precautions, positioning, HEP, and benefits of therapy.  10/15/18 1600     Active Acceptance E NR Pt educated on appropriate safety precautions, positioning, HEP, and benefits of therapy.  10/11/18 1445     Active Acceptance E NR Pt educated on HEP, positioning, role of OT, and benefits of therapy.  10/09/18 1548          Point: Precautions (Active)     Description: Instruct learner(s) on prescribed precautions during self-care and functional transfers.   Learning Progress Summary     Learner Status Readiness Method Response Comment Documented by    Patient Active Acceptance E NR Pt educated on appropriate safety precautions, positioning, HEP, and benefits of therapy.  10/15/18 1600     Active Acceptance E NR Pt educated on appropriate safety precautions, positioning, HEP, and benefits of therapy.  10/11/18 1445     Active Acceptance E NR Pt educated on HEP, positioning, role of OT, and benefits of therapy.  10/09/18 1548     Done Acceptance E,D VU,NR Body mechanics with bed mobility; reinforced need for call for assist with OOB activities; role of OT. TA 10/07/18 0928          Point: Body mechanics (Done)     Description:  Instruct learner(s) on proper positioning and spine alignment during self-care, functional mobility activities and/or exercises.   Learning Progress Summary     Learner Status Readiness Method Response Comment Documented by    Patient Done Acceptance EREYNA,SAWYER Body mechanics with bed mobility; reinforced need for call for assist with OOB activities; role of OT.  10/07/18 0928                      User Key     Initials Effective Dates Name Provider Type Discipline    TA 03/14/16 -  Christiano Marie, OT Occupational Therapist OT    CL 04/03/18 -  Marie Pabon OT Occupational Therapist OT                OT Recommendation and Plan  Therapy Frequency (OT Eval): 3 times/wk  Plan of Care Review  Plan of Care Reviewed With: patient  Plan of Care Reviewed With: patient  Outcome Summary: Pt tolerated bed level ther-ex and grooming tasks this date. Pt limited d/t fatigue and noted confusion. Recommend cont skilled IPOT POC 3x/wk. Recommend pt DC to SNF.         Outcome Measures     Row Name 10/15/18 1423 10/14/18 1403          How much help from another person do you currently need...    Turning from your back to your side while in flat bed without using bedrails?  -- 2  -VG     Moving from lying on back to sitting on the side of a flat bed without bedrails?  -- 1  -VG     Moving to and from a bed to a chair (including a wheelchair)?  -- 1  -VG     Standing up from a chair using your arms (e.g., wheelchair, bedside chair)?  -- 1  -VG     Climbing 3-5 steps with a railing?  -- 1  -VG     To walk in hospital room?  -- 1  -VG     AM-PAC 6 Clicks Score  -- 7  -VG        How much help from another is currently needed...    Putting on and taking off regular lower body clothing? 1  -CL  --     Bathing (including washing, rinsing, and drying) 1  -CL  --     Toileting (which includes using toilet bed pan or urinal) 1  -CL  --     Putting on and taking off regular upper body clothing 2  -CL  --     Taking care of personal grooming  (such as brushing teeth) 3  -CL  --     Eating meals 3  -CL  --     Score 11  -CL  --        Functional Assessment    Outcome Measure Options AM-PAC 6 Clicks Daily Activity (OT)  -CL AM-PAC 6 Clicks Basic Mobility (PT)  -VG       User Key  (r) = Recorded By, (t) = Taken By, (c) = Cosigned By    Initials Name Provider Type    CL Marie Pabon, OT Occupational Therapist    VG Zabrina Goode, PT Physical Therapist           Time Calculation:         Time Calculation- OT     Row Name 10/15/18 1423             Time Calculation- OT    OT Start Time 1423  -CL      OT Received On 10/15/18  -CL      OT Goal Re-Cert Due Date 10/17/18  -CL         Timed Charges    20303 - OT Therapeutic Exercise Minutes 6  -CL      96314 - OT Self Care/Mgmt Minutes 8  -CL        User Key  (r) = Recorded By, (t) = Taken By, (c) = Cosigned By    Initials Name Provider Type    CL Marie Pabon, OT Occupational Therapist           Therapy Suggested Charges     Code   Minutes Charges    79675 (CPT®) Hc Ot Neuromusc Re Education Ea 15 Min      53225 (CPT®) Hc Ot Ther Proc Ea 15 Min 6     03994 (CPT®) Hc Ot Therapeutic Act Ea 15 Min      71739 (CPT®) Hc Ot Manual Therapy Ea 15 Min      82290 (CPT®) Hc Ot Iontophoresis Ea 15 Min      99446 (CPT®) Hc Ot Elec Stim Ea-Per 15 Min      69909 (CPT®) Hc Ot Ultrasound Ea 15 Min      43995 (CPT®) Hc Ot Self Care/Mgmt/Train Ea 15 Min 8 1    Total  14 1        Therapy Charges for Today     Code Description Service Date Service Provider Modifiers Qty    [id number] HC OT SELF CARE/MGMT/TRAIN EA 15 MIN 10/15/2018 Marie Pabon OT GO 1               Marie Pabon OT  10/15/2018

## 2018-10-15 NOTE — PLAN OF CARE
Problem: Patient Care Overview  Goal: Plan of Care Review  Outcome: Ongoing (interventions implemented as appropriate)   10/15/18 1601   Coping/Psychosocial   Plan of Care Reviewed With patient   OTHER   Outcome Summary Pt tolerated bed level ther-ex and grooming tasks this date. Pt limited d/t fatigue and noted confusion. Recommend cont skilled IPOT POC 3x/wk. Recommend pt DC to SNF.

## 2018-10-15 NOTE — PROGRESS NOTES
"Pharmacy Consult-Vancomycin Dosing  Nallely Junior is a  78 y.o. female receiving vancomycin therapy.     Indication: Pneumonia  Consulting Provider: Dr. Murphy  ID Consult: yes    Goal Trough: 15-20mcg/mL    Current Antimicrobial Therapy    Vancomycin - day 10  Aztreonam 2gm q8h    Allergies  Allergies as of 10/06/2018 - Reviewed 10/06/2018   Allergen Reaction Noted   • Penicillins Unknown (See Comments) 10/06/2018   • Sulfa antibiotics Unknown (See Comments) 10/06/2018     LabsResults from last 7 days   Lab Units 10/15/18  1110 10/12/18  0449 10/11/18  0648   SODIUM mmol/L 138 133 134   POTASSIUM mmol/L 2.7* 3.7 3.8   CHLORIDE mmol/L 106 91* 93*   CO2 mmol/L 29.0 34.0* 36.0*   BUN mg/dL 11 21 19   CREATININE mg/dL 0.43* 0.58* 0.53*   GLUCOSE mg/dL 116* 345* 255*   CALCIUM mg/dL 8.2* 8.4* 8.0*    Results from last 7 days   Lab Units 10/11/18  0524 10/10/18  0436 10/09/18  0525   WBC 10*3/mm3 9.25 13.56* 8.90   HEMOGLOBIN g/dL 11.5 12.1 11.5   HEMATOCRIT % 36.2 38.2 36.5   PLATELETS 10*3/mm3 223 291 235      Evaluation of Dosing     Ht - 162.6 cm (64\")  Wt - 80.3 kg (177 lb)    Estimated Creatinine Clearance: 59.4 mL/min (A) (by C-G formula based on SCr of 0.43 mg/dL (L)).    Intake & Output (last 3 days)         10/12 0701 - 10/13 0700 10/13 0701 - 10/14 0700 10/14 0701 - 10/15 0700 10/15 0701 - 10/16 0700    P.O. 500 700 360 240    I.V. (mL/kg) 1500 (18.7)       IV Piggyback 100 700      Total Intake(mL/kg) 2100 (26.2) 1400 (17.4) 360 (4.5) 240 (3)    Urine (mL/kg/hr) 3575 (1.9) 1100 (0.6) 925 (0.5)     Total Output 3575 1100 925      Net -1475 +300 -565 +240            Unmeasured Stool Occurrence 1 x 4 x 3 x 1 x          Microbiology and Radiology    Microbiology Results (last 10 days)       Procedure Component Value - Date/Time    Wound Culture - Wound, Abdominal Wall [[id number]]  (Abnormal)  (Susceptibility) Collected:  10/07/18 0109    Lab Status:  Final result Specimen:  Wound from Abdominal Wall Updated:  " 10/12/18 1422     Wound Culture Moderate growth (3+) Proteus mirabilis (A)      Moderate growth (3+) Candida glabrata (A)      Moderate growth (3+) Candida krusei (A)      Light growth (2+) Enterococcus raffinosus (A)      Light growth (2+) Staphylococcus aureus, MRSA (A)     Comment:   Methicillin resistant Staphylococcus aureus, Patient may be an isolation risk.        Gram Stain Result No WBCs seen      Few (2+) Gram positive bacilli      Many (4+) Yeast    Susceptibility        Proteus mirabilis    Enterococcus raffinosus       GONSALO    GONSALO       Amikacin <=16 ug/ml Susceptible             Ampicillin >16 ug/ml Resistant    8 ug/ml Susceptible       Ampicillin + Sulbactam 16/8 ug/ml Intermediate             Aztreonam <=8 ug/ml Susceptible             Cefepime <=8 ug/ml Susceptible             Cefotaxime <=2 ug/ml Susceptible             Ceftriaxone <=8 ug/ml Susceptible             Cefuroxime sodium <=4 ug/ml Susceptible             Ciprofloxacin >2 ug/ml Resistant             Ertapenem <=1 ug/ml Susceptible             Gentamicin >8 ug/ml Resistant             Gentamicin High Level Synergy       <=500 ug/ml Susceptible       Levofloxacin 4 ug/ml Intermediate             Linezolid       <=1 ug/ml Susceptible       Meropenem <=1 ug/ml Susceptible             Penicillin G       >8 ug/ml Resistant       Piperacillin + Tazobactam <=16 ug/ml Susceptible             Streptomycin High Level Synergy       >1,000 ug/ml Resistant       Tobramycin >8 ug/ml Resistant             Trimethoprim + Sulfamethoxazole >2/38 ug/ml Resistant             Vancomycin       1 ug/ml Susceptible                    Susceptibility        Staphylococcus aureus, MRSA     GONSALO     Ciprofloxacin >2 ug/ml Resistant     Clindamycin >4 ug/ml Resistant     Daptomycin <=0.5 ug/ml Susceptible     Erythromycin >4 ug/ml Resistant     Gentamicin <=4 ug/ml Susceptible     Levofloxacin >4 ug/ml Resistant     Linezolid 2 ug/ml Susceptible     Oxacillin >2 ug/ml  Resistant     Penicillin G 8 ug/ml Resistant     Quinupristin + Dalfopristin <=0.5 ug/ml Susceptible     Rifampin <=1 ug/ml Susceptible     Tetracycline <=4 ug/ml Susceptible     Trimethoprim + Sulfamethoxazole <=0.5/9.5 ug/ml Susceptible     Vancomycin 1 ug/ml Susceptible                      Respiratory Culture - Sputum, NT Suction [[id number]]  (Abnormal)  (Susceptibility) Collected:  10/07/18 0108    Lab Status:  Final result Specimen:  Sputum from NT Suction Updated:  10/10/18 1437     Respiratory Culture Scant growth (1+) Yeast isolated (A)      Scant growth (1+) Staphylococcus aureus, MRSA (A)     Comment:   Methicillin resistant Staphylococcus aureus, Patient may be an isolation risk.        Gram Stain Result Many (4+) WBCs per low power field      Rare (1+) Epithelial cells per low power field      No organisms seen    Susceptibility        Staphylococcus aureus, MRSA     GONSALO     Ciprofloxacin >2 ug/ml Resistant     Clindamycin >4 ug/ml Resistant     Erythromycin >4 ug/ml Resistant     Gentamicin <=4 ug/ml Susceptible     Levofloxacin >4 ug/ml Resistant     Linezolid 2 ug/ml Susceptible     Oxacillin >2 ug/ml Resistant     Penicillin G >8 ug/ml Resistant     Quinupristin + Dalfopristin <=0.5 ug/ml Susceptible     Rifampin <=1 ug/ml Susceptible     Tetracycline <=4 ug/ml Susceptible     Trimethoprim + Sulfamethoxazole <=0.5/9.5 ug/ml Susceptible     Vancomycin 2 ug/ml Susceptible                      Blood Culture - Blood, [[id number]]  (Normal) Collected:  10/06/18 2020    Lab Status:  Final result Specimen:  Blood from Arm, Right Updated:  10/11/18 2101     Blood Culture No growth at 5 days          Evaluation of Level    Lab Results   Component Value Date    [facility] 15.30 10/15/2018       Assessment/Plan:    10/15 vancomycin trough - 15.3 mcg/ml @1110  Vancomycin trough drawn 21 hours following dose at steady-state concentration  Goal trough 15-20 mcg/ml  SCr -0.43, 24hrTmax- 99.0    Will continue  vancomycin 1500mg q24h  Plan duration of treatment to 10/20 per ID.  Monitor response to treatment, s/s nephrotoxicity and infusion related reactions.   Pharmacy will continue to follow.    Thanks.  Jan Martínez RPH  10/15/2018  12:13 PM

## 2018-10-15 NOTE — PROGRESS NOTES
[facility] Progress Note    Admission Date: 10/6/2018    Nallely Junior  1940  [id number]    Date: 10/15/2018    Antibiotics:  Anti-Infectives     Ordered     Dose/Rate Route Frequency Start Stop    10/13/18 0847  vancomycin 1500 mg/500 mL 0.9% NS IVPB ([facility])     Ordering Provider:  Jenniffer Munoz, RPH    1,500 mg  over 90 Minutes Intravenous Every 24 Hours 10/13/18 1200 10/21/18 1159    10/09/18 0723  aztreonam (AZACTAM) 2 g in sodium chloride 0.9 % 100 mL IVPB-MBP     Ordering Provider:  Jan Martínez RPH    2 g  over 4 Hours Intravenous Every 8 Hours 10/09/18 0800 10/20/18 0759    10/09/18 0723  Pharmacy to dose vancomycin     Ordering Provider:  Jan Martínez RPH     Does not apply Continuous PRN 10/09/18 0721 10/20/18 0720    10/06/18 2328  aztreonam (AZACTAM) 2 g in sodium chloride 0.9 % 100 mL IVPB-MBP     Ordering Provider:  Olivia Murphy MD    2 g  200 mL/hr over 30 Minutes Intravenous Once 10/07/18 0015 10/07/18 0146    10/06/18 2023  vancomycin 2000 mg/500 mL 0.9% NS IVPB ([facility])     Ordering Provider:  Olivia Murphy MD    2,000 mg  over 120 Minutes Intravenous Once 10/06/18 2115 10/07/18 0138             CC:  Pneumonia, MRSA (sensitive to tetracycline and Bactrim)    HPI:  Patient is a 78 y.o.  Yr old female with dementia, diabetes mellitus type 2, chronic respiratory failure status post tracheostomy,hypertension, hyperlipidemia, bedbound, previous critical aortic stenosis, dysphagia, diastolic dysfunction, COPD, restless leg, GERD, anxiety, CAD, ho was recently admitted to [facility] for pneumonia and mucous plugging with opacification of the left lung. Patient is a poor historian.The patienthad been treated with meropenem, vancomycin, and levofloxacin since 10/2/18. She was transferred to [facility] on 10/6/18 with non-resolving pneumonia. Left hemithorax continued to be opacified.  I was consulted on 10/8/18 for further evaluation and treatment.  Candy  known immunocompromised state, travel history, zoonotic exposures,TB, or HIV.  10/9/18 hx rev.  Some sob.  Zulay vanc and aztreonam till 10/20 for pneum.  GNR in sputum.  No fever.  10/10/18 history reviewed. Tolerating vancomycin and aztreonam until 10/20 for pneumonia.  No high fevers. Minimal shortness of breath.  10/11/18 hx rev.  Receiving vanc and aztreo till 10/20 for pneum.  No high fever.  Min sputum prod.  Some sob.  10/12/18 history reviewed.  Receiving vancomycin and aztreonam until 10/20 for pneumonia.  MRSA in sputum.  Previous Pseudomonas.  No fever.  Minimal shortness of breath.  10/15/18 history reviewed. Tolerating vancomycin and aztreona until 10/20  For  MRSA and Pseudomonas pneumonia.  No high fevers.    ROS:  No f/c/s. No n/v/d. No rash. No new ADR to Abx.     Constitutional-- No Fever, chills or sweats.  Appetite good, and no malaise. No fatigue.  Heent-- No new vision, hearing or throat complaints.  No epistaxis or oral sores.  Denies odynophagia or dysphagia.  No flashers, floaters or eye pain. No odynophagia or dysphagia. No headache, photophobia or neck stiffness.  CV-- No chest pain, palpitation or syncope  Resp-- Some SOB/nocough/Hemoptysis, no wheezing  GI- No nausea, vomiting, or diarrhea.  No hematochezia, melena, or hematemesis. Denies jaundice or chronic liver disease.  -- No dysuria, hematuria, or flank pain.    Lymph- no swollen lymph nodes in neck/axilla or groin.   Heme- No active bruising or bleeding  MS-- no swelling or pain in the bones or joints of arms/legs.  No new back pain.  Neuro-- No acute focal weakness or numbness in the arms or legs.  No seizures.  Skin--No rash, nodules, blisters.    Objective   PE:  Vital Signs  Temp  Min: 98.3 °F (36.8 °C)  Max: 99 °F (37.2 °C)  BP  Min: 102/46  Max: 125/54  Pulse  Min: 65  Max: 76  Resp  Min: 18  Max: 20  SpO2  Min: 96 %  Max: 100 %    GENERAL: Awake and alert, in minimal distress. Appears older than stated age.    HEENT:  Normocephalic, atraumatic.  EOMI. No conjunctival injection. No icterus. Oropharynx clear without evidence of thrush or exudate.   NECK: Supple without nuchal rigidity. No mass.  LYMPH: No cervical, axillary or inguinal lymphadenopathy.  HEART: irregularRR; No murmur, rubs, gallops.  No JVD.  LUNGS: rales bases. Normal respiratory effort. Nonlabored. No dullness.no wheeze.  ABDOMEN: Soft, nontender, nondistended. Positive bowel sounds. No rebound or guarding. NO mass or HSM.  Obese.  EXT:  No cyanosis, clubbing or edema. No cord.  MSK: FROM without joint effusions noted arms/legs.    SKIN: Warm and dry without cutaneous eruptions on Inspection/palpation.    NEURO: Oriented to name. No focal deficits on motor/sensory exam at arms/legs.    Laboratory Data      Results from last 7 days  Lab Units 10/11/18  0524 10/10/18  0436 10/09/18  0525   WBC 10*3/mm3 9.25 13.56* 8.90   HEMOGLOBIN g/dL 11.5 12.1 11.5   HEMATOCRIT % 36.2 38.2 36.5   PLATELETS 10*3/mm3 223 291 235       Results from last 7 days  Lab Units 10/12/18  0449   SODIUM mmol/L 133   POTASSIUM mmol/L 3.7   CHLORIDE mmol/L 91*   CO2 mmol/L 34.0*   BUN mg/dL 21   CREATININE mg/dL 0.58*   GLUCOSE mg/dL 345*   CALCIUM mg/dL 8.4*       Results from last 7 days  Lab Units 10/10/18  0436   ALK PHOS U/L 83   BILIRUBIN mg/dL 0.6   ALT (SGPT) U/L 57*   AST (SGOT) U/L 46*               Results from last 7 days  Lab Units 10/09/18  0525   CK TOTAL U/L 31       Results from last 7 days  Lab Units 10/13/18  0548 10/11/18  0524 10/09/18  0525   VANCOMYCIN TR mcg/mL 24.00* 18.80 9.80*       Results from last 7 days  Lab Units 10/09/18  1037   LACTATE mmol/L 1.7     Estimated Creatinine Clearance: 59.4 mL/min (A) (by C-G formula based on SCr of 0.58 mg/dL (L)).      Microbiology:  Microbiology Results Abnormal     Procedure Component Value - Date/Time    Wound Culture - Wound, Abdominal Wall [[id number]]  (Abnormal)  (Susceptibility) Collected:  10/07/18 0109    Lab Status:   Final result Specimen:  Wound from Abdominal Wall Updated:  10/12/18 1422     Wound Culture Moderate growth (3+) Proteus mirabilis (A)      Moderate growth (3+) Candida glabrata (A)      Moderate growth (3+) Candida krusei (A)      Light growth (2+) Enterococcus raffinosus (A)      Light growth (2+) Staphylococcus aureus, MRSA (A)     Comment:   Methicillin resistant Staphylococcus aureus, Patient may be an isolation risk.        Gram Stain Result No WBCs seen      Few (2+) Gram positive bacilli      Many (4+) Yeast    Susceptibility      Proteus mirabilis    Enterococcus raffinosus       GONSALO    GONSALO       Amikacin <=16 ug/ml Susceptible             Ampicillin >16 ug/ml Resistant    8 ug/ml Susceptible       Ampicillin + Sulbactam 16/8 ug/ml Intermediate             Aztreonam <=8 ug/ml Susceptible             Cefepime <=8 ug/ml Susceptible             Cefotaxime <=2 ug/ml Susceptible             Ceftriaxone <=8 ug/ml Susceptible             Cefuroxime sodium <=4 ug/ml Susceptible             Ciprofloxacin >2 ug/ml Resistant             Ertapenem <=1 ug/ml Susceptible             Gentamicin >8 ug/ml Resistant             Gentamicin High Level Synergy       <=500 ug/ml Susceptible       Levofloxacin 4 ug/ml Intermediate             Linezolid       <=1 ug/ml Susceptible       Meropenem <=1 ug/ml Susceptible             Penicillin G       >8 ug/ml Resistant       Piperacillin + Tazobactam <=16 ug/ml Susceptible             Streptomycin High Level Synergy       >1,000 ug/ml Resistant       Tobramycin >8 ug/ml Resistant             Trimethoprim + Sulfamethoxazole >2/38 ug/ml Resistant             Vancomycin       1 ug/ml Susceptible                  Susceptibility      Staphylococcus aureus, MRSA     GONSALO     Ciprofloxacin >2 ug/ml Resistant     Clindamycin >4 ug/ml Resistant     Daptomycin <=0.5 ug/ml Susceptible     Erythromycin >4 ug/ml Resistant     Gentamicin <=4 ug/ml Susceptible     Levofloxacin >4 ug/ml Resistant      Linezolid 2 ug/ml Susceptible     Oxacillin >2 ug/ml Resistant     Penicillin G 8 ug/ml Resistant     Quinupristin + Dalfopristin <=0.5 ug/ml Susceptible     Rifampin <=1 ug/ml Susceptible     Tetracycline <=4 ug/ml Susceptible     Trimethoprim + Sulfamethoxazole <=0.5/9.5 ug/ml Susceptible     Vancomycin 1 ug/ml Susceptible                    Blood Culture - Blood, [[id number]]  (Normal) Collected:  10/06/18 2020    Lab Status:  Final result Specimen:  Blood from Arm, Right Updated:  10/11/18 2101     Blood Culture No growth at 5 days    Respiratory Culture - Sputum, NT Suction [[id number]]  (Abnormal)  (Susceptibility) Collected:  10/07/18 0108    Lab Status:  Final result Specimen:  Sputum from NT Suction Updated:  10/10/18 1437     Respiratory Culture Scant growth (1+) Yeast isolated (A)      Scant growth (1+) Staphylococcus aureus, MRSA (A)     Comment:   Methicillin resistant Staphylococcus aureus, Patient may be an isolation risk.        Gram Stain Result Many (4+) WBCs per low power field      Rare (1+) Epithelial cells per low power field      No organisms seen    Susceptibility      Staphylococcus aureus, MRSA     GONSALO     Ciprofloxacin >2 ug/ml Resistant     Clindamycin >4 ug/ml Resistant     Erythromycin >4 ug/ml Resistant     Gentamicin <=4 ug/ml Susceptible     Levofloxacin >4 ug/ml Resistant     Linezolid 2 ug/ml Susceptible     Oxacillin >2 ug/ml Resistant     Penicillin G >8 ug/ml Resistant     Quinupristin + Dalfopristin <=0.5 ug/ml Susceptible     Rifampin <=1 ug/ml Susceptible     Tetracycline <=4 ug/ml Susceptible     Trimethoprim + Sulfamethoxazole <=0.5/9.5 ug/ml Susceptible     Vancomycin 2 ug/ml Susceptible                          Radiology:  Imaging Results (last 72 hours)     Procedure Component Value Units Date/Time    XR Chest 1 View [[id number]] Collected:  10/09/18 0809     Updated:  10/09/18 0954    Narrative:       EXAMINATION: XR CHEST 1 VW-10/09/2018:      INDICATION: Pneumonia;  Z74.09-Other reduced mobility; R13.12-Dysphagia,  oropharyngeal phase; Z74.09-Other reduced mobility.      COMPARISON: Chest x-ray 10/08/2018.      FINDINGS: Significant interval improvement in left lung aeration with  decreased opacifications left lung apex and within the left lung base  from prior. Cardiac silhouette enlarged with only minimal left mid lung  opacification likely atelectasis. Right hemithorax grossly clear. No  pneumothorax or large effusion.       Impression:       Significant interval reduction in opacifications of the left  hemithorax with a considerable increase in aeration of the left lung and  only mid lung opacities likely represent mild atelectasis remaining.     D:  10/09/2018  E:  10/09/2018             CT Chest Without Contrast [[id number]] Collected:  10/08/18 0852     Updated:  10/08/18 2123    Narrative:       EXAM:    CT Chest Without Intravenous Contrast     EXAM DATE/TIME:    10/8/2018 8:52 AM     CLINICAL HISTORY:    78 years old, female; Dysphagia, oropharyngeal phase; Other reduced mobility;   Other reduced mobility; Abnormal findings; Abnormal radiologic exam of lung or   chest; Additional info: Pneumonia complicated / unresolved     TECHNIQUE:    Axial computed tomography images of the chest without intravenous contrast.    All CT scans at this facility use at least one of these dose optimization   techniques: automated exposure control; mA and/or kV adjustment per patient   size (includes targeted exams where dose is matched to clinical indication); or   iterative reconstruction.    Coronal and sagittal reformatted images were created and reviewed.     COMPARISON:    CR XR CHEST 1 VW 10/8/2018 1:31 AM     FINDINGS:    Tubes, catheters and devices:  Tracheostomy tube position appears   satisfactory.    Lungs:  Bilateral infiltrates/atelectasis, predominantly on left, involving   LLL, BETTIE, and RLL. Clinically correlate to rule out pneumonia. Retained   secretions scattered in  left bronchial tree.    Pleural space:  Small to moderate left pleural effusion, small right pleural   effusion. No pneumothorax.    Heart:  Mild cardiomegaly. Mitral annulus calcifications.  CAD.   Aorta:  No aortic aneurysm. Aortic atherosclerosis.    Great vessels off aortic arch and other great vessels:  Atherosclerotic   plaques are scattered along some vessels.    Other arteries:  There are coronary artery atheromatous calcifications,   consistent with CAD.    Lymph nodes:  No obvious lymphadenopathy, but resolution of left hilum is   obscured by adjacent air space disease.    Bones/joints:  Plate and screws ORIF hardware at proximal right humerus. DJD   of right glenohumeral joint. Degenerative changes of the spine. Mild scoliotic   curvature, positional versus fixed.    Soft tissues: Unremarkable.    Gallbladder and bile ducts:  Cholecystectomy.    Kidneys and ureters: Nonobstructive right nephrolithiasis.    Stomach and bowel:  Some contrast in the bowel.       Impression:       1. Small to moderate left pleural effusion, small right pleural effusion.   2. Bilateral infiltrates/atelectasis, predominantly on left, involving LLL,   BETTIE, and RLL.   3. CAD.   4. Mild cardiomegaly.   5.  Nonobstructive right nephrolithiasis.     THIS DOCUMENT HAS BEEN ELECTRONICALLY SIGNED BY KEENAN KIM MD    FL Video Swallow With Speech [[id number]] Collected:  10/08/18 1514     Updated:  10/08/18 1532    Narrative:       EXAMINATION: FL VIDEO SWALLOW W SPEECH-     INDICATION: dysphagia; Z74.09-Other reduced mobility; R13.10-Dysphagia,  unspecified; Z74.09-Other reduced mobility     TECHNIQUE: 1 minute and 54 seconds of fluoroscopic time was used for  this exam. 1 associated image was saved. The patient was evaluated in  the seated lateral position while taking a variety of consistencies of  barium by mouth under the direction of speech pathology.     COMPARISON: NONE     FINDINGS: There was aspiration with sips of thin  barium. There was no  penetration and no aspiration with nectar, pudding, or solid consistency  barium.          Impression:       Fluoroscopy provided for a modified barium swallow. Please  see speech therapy report for full details and recommendations.         This report was finalized on 10/8/2018 3:30 PM by Dr. Christiano Mcmillan MD.       XR Chest 1 View [[id number]] Collected:  10/08/18 0814     Updated:  10/08/18 1311    Narrative:       EXAMINATION: XR CHEST 1 VW-      INDICATION: Respiratory failure, pneumonia, atelectasis; Z74.09-Other  reduced mobility; R13.10-Dysphagia, unspecified.      COMPARISON: 10/06/2018.     FINDINGS: Portable chest reveals patchy ill-defined opacification seen  within the left upper lobe. Tracheostomy tube in satisfactory position  above the jacobo. Ill-defined opacification seen at the lung bases. Mild  elevation identified of the right hemidiaphragm. Small left pleural  effusion.           Impression:       Worsening identified of the airspace disease in the left  upper lobe. Small bilateral pleural effusions. The heart is enlarged.     D:  10/08/2018  E:  10/08/2018     This report was finalized on 10/8/2018 1:09 PM by Dr. Meagan Randhawa MD.       XR Chest 1 View [[id number]] Collected:  10/07/18 1222     Updated:  10/07/18 1449    Narrative:       EXAMINATION: XR CHEST 1 VW - 10/6/2018     INDICATION: Pneumonia.     TECHNIQUE:  Single view frontal chest.     COMPARISONS: None.     FINDINGS:  Tracheostomy in place. Calcification of the aortic knob.  There is an arc-like calcification projecting over the cardiac  silhouette; this could be valvular or in the ventricular wall. There is  extensive opacity involving the left lung. The right lung is grossly  clear. No pneumothorax. Right proximal humerus ORIF hardware noted.       Impression:       Extensive opacification of the left lung. This is  compatible with the given history of pneumonia.     DICTATED:   10/07/2018  EDITED/ls :    10/07/2018         This report was finalized on 10/7/2018 2:47 PM by Sonny Perez.             I personally reviewed the radiographic studies     Assessment/Plan   IMPRESSION:   1.  Pneumonia, left chest, with recurrent mucus plugging, with previous cultures in the past positive for MRSA and Pseudomonas. Recurrence likely related to recurrent aspiration.  MRSA positive on this admission to date.  2. Acute on chronic respiratory hypoxic failure.  3. Dementia.  Ongoing.  4. Encephalopathy, metabolic.  5. Anemia, chronic disease.  Resolved.  6. Diabetes mellitus type 2 with increased risk for infection.  7. Hypocalcemia, 8.4.  8.  ALT 57, AST 46, elevated. Probably related to medications versus other.  9. Hypokalemia, resolved.  10.  Cultures from abdominal wall with multiple organisms, colonized.  Colonized with yeast.     Plan:     1.  Diagnostically, continue to follow patient's physical exam, CBC, CMP, CRP.  2. Therapeutically, continue vancomycin and aztreonam pending further culture data. Duration of antibiotics until 10/20/18.  3. Oxygen support therapy.    Increased risk for adverse drug reactions, complications from line, recurrent pneumonia.    Maxim Lundberg MD  10/15/2018

## 2018-10-15 NOTE — PROGRESS NOTES
"Intensivist Note     10/15/2018  Hospital Day: 9  * No surgery found *      Ms. Nallely Junior, 78 y.o. female is followed for:    Pneumonia    Type 2 diabetes mellitus (CMS/[facility])    Hypotension    Hyperlipidemia    Depression    Anxiety    COPD (chronic obstructive pulmonary disease) (CMS/[facility])    Dysphagia    Coronary artery disease    CHF (congestive heart failure) (CMS/HCC)    GERD (gastroesophageal reflux disease)    Chronic respiratory failure with hypoxia and hypercapnia (CMS/[facility])       SUBJECTIVE     78-year-old white female nursing home patient with a chronic tracheostomy tube and PEG feeding tube for the last year and a half placed due to inability to wean post orthopedic surgery for fracture at [facility]. Has inability to handle oral secretions as well as dysphagia but is still allowed to eat and the PEG feeding tube is not used. Remains on broad-spectrum antimicrobial coverage for pneumonia as well as atelectasis due to mucous plugging. She requires frequent suctioning and does not do well with a Passy-Rebuck valve. Has required neb treatments with Mucomyst and when they are stopped she again becomes atelectatic. She remains on broad-spectrum antimicrobial coverage (vancomycin and aztreonam) per ID. The only positive cultures from her sputum were obtained 10/6/18 (grew MRSA and yeast).    The patient's relevant past medical, surgical and social history were reviewed and updated in Epic as appropriate.    OBJECTIVE     /43 (BP Location: Right arm, Patient Position: Lying)   Pulse 80   Temp 97.7 °F (36.5 °C) (Oral)   Resp 18   Ht 162.6 cm (64\")   Wt 80.3 kg (177 lb)   SpO2 100%   BMI 30.38 kg/m²   Oxygen Concentration (%): 35      Flowsheet Rows      First Filed Value   Admission Height  162.6 cm (64\") Documented at 10/07/2018 0500   Admission Weight  80.7 kg (177 lb 14.6 oz) Documented at 10/07/2018 0500        Intake & Output (last day)       10/14 0701 - 10/15 0700 10/15 0701 - " 10/16 0700    P.O. 360 240    Total Intake(mL/kg) 360 (4.5) 240 (3)    Urine (mL/kg/hr) 925 (0.5)     Total Output 925      Net -565 +240          Unmeasured Stool Occurrence 3 x 1 x          Exam:  General Exam:  Pleasant elderly debilitated white female. Supine in bed in NAD  HEENT: Pupils equal and reactive. Nose and throat clear.  Neck:                          Supple, no JVD, thyromegaly, or adenopathy. Trach tube in place.  Lungs: Coarse bilateral rhonchi  Cardiovascular: RRR without murmurs or gallops.  Abdomen: Soft nontender without organomegaly or masses.   and rectal: Deferred.  Extremities: No cyanosis clubbing edema.  Neurologic:                 Symmetric strength but diffusely weak. No focal deficits.    Chest X-Ray 10/13/18: Mild cardiomegaly but no decompensation. Trach tube in place. Inhomogeneous opacity at the left base that is significantly improved with less volume loss. Unable to determine if there is significant fluid here.      Results from last 7 days  Lab Units 10/11/18  0524 10/10/18  0436 10/09/18  0525   WBC 10*3/mm3 9.25 13.56* 8.90   HEMOGLOBIN g/dL 11.5 12.1 11.5   HEMATOCRIT % 36.2 38.2 36.5   PLATELETS 10*3/mm3 223 291 235       Results from last 7 days  Lab Units 10/15/18  1110 10/12/18  0449   SODIUM mmol/L 138 133   POTASSIUM mmol/L 2.7* 3.7   CHLORIDE mmol/L 106 91*   CO2 mmol/L 29.0 34.0*   BUN mg/dL 11 21   CREATININE mg/dL 0.43* 0.58*   GLUCOSE mg/dL 116* 345*   CALCIUM mg/dL 8.2* 8.4*       Results from last 7 days  Lab Units 10/15/18  1110 10/12/18  0449 10/11/18  0648   MAGNESIUM mg/dL 1.6 1.7 1.8   PHOSPHORUS mg/dL  --  3.1  --        Results from last 7 days  Lab Units 10/10/18  0436 10/09/18  0525   ALK PHOS U/L 83 79   BILIRUBIN mg/dL 0.6 0.6   ALT (SGPT) U/L 57* 45*   AST (SGOT) U/L 46* 43*       No results found for: SEDRATE  Lab Results   Component Value Date    .0 (H) 10/12/2018     Lab Results   Component Value Date    CKTOTAL 31 10/09/2018     No results  found for: TSH  Lab Results   Component Value Date    LACTATE 1.7 10/09/2018     No results found for: CORTISOL      I reviewed the patient's results, images and medication.    Assessment/Plan   ASSESSMENT        Pneumonia    Type 2 diabetes mellitus (CMS/HCC)    Hypotension    Hyperlipidemia    Depression    Anxiety    COPD (chronic obstructive pulmonary disease) (CMS/[facility])    Dysphagia    Coronary artery disease    CHF (congestive heart failure) (CMS/[facility])    GERD (gastroesophageal reflux disease)    Chronic respiratory failure with hypoxia and hypercapnia (CMS/[facility])      DISCUSSION: Judging by her exam and appearance I do not see the situation changing significantly. She has an inability to clear her oral secretions and she chronically aspirates. We may clear her up for a while but then she will aspirate larger volumes and the cycle will start again. The only solution is to make her NPO and begin using her PEG feeding tube again. If her overall status should somehow improve, can always retest her swallowing. If however she wishes to not undergo aggressive management (like ventilatory support or CPR and code), could call palliative care and allow her to have a comfort diet.    PLAN     1. Recommend patient be NPO and she be started on enteral feeds  2. Continue antimicrobial coverage per ID  3. Continue bronchodilator therapy as well as aggressive pulmonary toilet  4. If patient and family insist on oral intake should revisit her CODE STATUS    Plan of care and goals reviewed with mulitdisciplinary team at daily rounds.    I discussed the patient's findings and my recommendations with patient and nursing staff    Time spent Critical care 35 min (It does not include procedure time).    Elvis Armstrong MD  Intensive Care Medicine  10/15/18 4:06 PM

## 2018-10-15 NOTE — PROGRESS NOTES
Continued Stay Note  [facility]     Patient Name: Nallely Junior  MRN: [id number]  Today's Date: 10/15/2018    Admit Date: 10/6/2018          Discharge Plan     Row Name 10/15/18 1134       Plan    Plan Comments Called Kelley at [phone] and updated her that pt has an ambulance today at 1500.PCS on chart.  Nurse to call report to [phone]. Fax summary to [phone]. CM will cont to follow.               Discharge Codes    No documentation.       Expected Discharge Date and Time     Expected Discharge Date Expected Discharge Time    Oct 12, 2018             Patrica Bryant

## 2018-10-15 NOTE — PROGRESS NOTES
Continued Stay Note  [facility]     Patient Name: Nallely Junior  MRN: [id number]  Today's Date: 10/15/2018    Admit Date: 10/6/2018          Discharge Plan     Row Name 10/15/18 1241       Plan    Plan Return to [facility]    Patient/Family in Agreement with Plan yes    Plan Comments Per MD, pt not medically ready to return today, possibly tomorrow. Replacing K. Notified Kelley at [facility] and [facility]. Ambulance rescheduled for Tuesday 10/16 at 1500. Call report to [phone] and fax summary to [phone]. CM will cont to follow.     Row Name 10/15/18 6577       Plan    Plan Comments Called Kelley at [phone] and updated her that pt has an ambulance today at 1500.PCS on chart.  Nurse to call report to [phone]. Fax summary to [phone]. CM will cont to follow.               Discharge Codes    No documentation.       Expected Discharge Date and Time     Expected Discharge Date Expected Discharge Time    Oct 12, 2018             Patrica Bryant

## 2018-10-15 NOTE — PROGRESS NOTES
[facility] Medicine Services  PROGRESS NOTE    Patient Name: Nallely Junior  : 1940  MRN: [id number]    Date of Admission: 10/6/2018  Length of Stay: 9  Primary Care Physician: Ray Strickland MD    Subjective   Subjective     CC:  pna     HPI:  Feels better today. Breathing back to baseline.    Review of Systems  Gen- No fevers, chills  CV- No chest pain, palpitations  Resp- No cough,pos dyspnea, trach   GI- No N/V/D, abd pain      Otherwise ROS is negative except as mentioned in the HPI.    Objective   Objective     Vital Signs:   Temp:  [98.3 °F (36.8 °C)-98.9 °F (37.2 °C)] 98.6 °F (37 °C)  Heart Rate:  [65-76] 68  Resp:  [18-20] 20  BP: (102-125)/(44-57) 102/46        Physical Exam:  Constitutional -no acute distress, non toxic, in bed  HEENT-NCAT, mucous membranes moist  CV-RRR, S1 S2 normal, no m/r/g  Resp-trach, course upper airway breath sounds  Abd-soft, non-tender, non-distended, normo active bowel sounds; overweight  Ext-No lower extremity cyanosis, clubbing or edema bilaterally  Neuro-alert, right hemiparesis, particularly right upper extremity  Psych-normal affect   Skin- No rash on exposed UE or LE bilaterally          Results Reviewed:  I have personally reviewed current lab, radiology, and data and agree.      Results from last 7 days  Lab Units 10/11/18  0524 10/10/18  0436 10/09/18  0525   WBC 10*3/mm3 9.25 13.56* 8.90   HEMOGLOBIN g/dL 11.5 12.1 11.5   HEMATOCRIT % 36.2 38.2 36.5   PLATELETS 10*3/mm3 223 291 235       Results from last 7 days  Lab Units 10/15/18  1110 10/12/18  0449 10/11/18  0648 10/10/18  0436 10/09/18  0525   SODIUM mmol/L 138 133 134 136 133   POTASSIUM mmol/L 2.7* 3.7 3.8 3.3* 3.6   CHLORIDE mmol/L 106 91* 93* 95* 95*   CO2 mmol/L 29.0 34.0* 36.0* 37.0* 34.0*   BUN mg/dL 11 21 19 19 15   CREATININE mg/dL 0.43* 0.58* 0.53* 0.55* 0.50*   GLUCOSE mg/dL 116* 345* 255* 218* 302*   CALCIUM mg/dL 8.2* 8.4* 8.0* 8.3* 8.2*   ALT (SGPT) U/L  --   --   --   57* 45*   AST (SGOT) U/L  --   --   --  46* 43*     Estimated Creatinine Clearance: 59.4 mL/min (A) (by C-G formula based on SCr of 0.43 mg/dL (L)).  No results found for: BNP    Microbiology Results Abnormal     Procedure Component Value - Date/Time    Wound Culture - Wound, Abdominal Wall [[id number]]  (Abnormal)  (Susceptibility) Collected:  10/07/18 0109    Lab Status:  Final result Specimen:  Wound from Abdominal Wall Updated:  10/12/18 1422     Wound Culture Moderate growth (3+) Proteus mirabilis (A)      Moderate growth (3+) Candida glabrata (A)      Moderate growth (3+) Candida krusei (A)      Light growth (2+) Enterococcus raffinosus (A)      Light growth (2+) Staphylococcus aureus, MRSA (A)     Comment:   Methicillin resistant Staphylococcus aureus, Patient may be an isolation risk.        Gram Stain Result No WBCs seen      Few (2+) Gram positive bacilli      Many (4+) Yeast    Susceptibility      Proteus mirabilis    Enterococcus raffinosus       GNOSALO    GONSALO       Amikacin <=16 ug/ml Susceptible             Ampicillin >16 ug/ml Resistant    8 ug/ml Susceptible       Ampicillin + Sulbactam 16/8 ug/ml Intermediate             Aztreonam <=8 ug/ml Susceptible             Cefepime <=8 ug/ml Susceptible             Cefotaxime <=2 ug/ml Susceptible             Ceftriaxone <=8 ug/ml Susceptible             Cefuroxime sodium <=4 ug/ml Susceptible             Ciprofloxacin >2 ug/ml Resistant             Ertapenem <=1 ug/ml Susceptible             Gentamicin >8 ug/ml Resistant             Gentamicin High Level Synergy       <=500 ug/ml Susceptible       Levofloxacin 4 ug/ml Intermediate             Linezolid       <=1 ug/ml Susceptible       Meropenem <=1 ug/ml Susceptible             Penicillin G       >8 ug/ml Resistant       Piperacillin + Tazobactam <=16 ug/ml Susceptible             Streptomycin High Level Synergy       >1,000 ug/ml Resistant       Tobramycin >8 ug/ml Resistant             Trimethoprim +  Sulfamethoxazole >2/38 ug/ml Resistant             Vancomycin       1 ug/ml Susceptible                  Susceptibility      Staphylococcus aureus, MRSA     GONSALO     Ciprofloxacin >2 ug/ml Resistant     Clindamycin >4 ug/ml Resistant     Daptomycin <=0.5 ug/ml Susceptible     Erythromycin >4 ug/ml Resistant     Gentamicin <=4 ug/ml Susceptible     Levofloxacin >4 ug/ml Resistant     Linezolid 2 ug/ml Susceptible     Oxacillin >2 ug/ml Resistant     Penicillin G 8 ug/ml Resistant     Quinupristin + Dalfopristin <=0.5 ug/ml Susceptible     Rifampin <=1 ug/ml Susceptible     Tetracycline <=4 ug/ml Susceptible     Trimethoprim + Sulfamethoxazole <=0.5/9.5 ug/ml Susceptible     Vancomycin 1 ug/ml Susceptible                    Blood Culture - Blood, [[id number]]  (Normal) Collected:  10/06/18 2020    Lab Status:  Final result Specimen:  Blood from Arm, Right Updated:  10/11/18 2101     Blood Culture No growth at 5 days    Respiratory Culture - Sputum, NT Suction [[id number]]  (Abnormal)  (Susceptibility) Collected:  10/07/18 0108    Lab Status:  Final result Specimen:  Sputum from NT Suction Updated:  10/10/18 1437     Respiratory Culture Scant growth (1+) Yeast isolated (A)      Scant growth (1+) Staphylococcus aureus, MRSA (A)     Comment:   Methicillin resistant Staphylococcus aureus, Patient may be an isolation risk.        Gram Stain Result Many (4+) WBCs per low power field      Rare (1+) Epithelial cells per low power field      No organisms seen    Susceptibility      Staphylococcus aureus, MRSA     GONSALO     Ciprofloxacin >2 ug/ml Resistant     Clindamycin >4 ug/ml Resistant     Erythromycin >4 ug/ml Resistant     Gentamicin <=4 ug/ml Susceptible     Levofloxacin >4 ug/ml Resistant     Linezolid 2 ug/ml Susceptible     Oxacillin >2 ug/ml Resistant     Penicillin G >8 ug/ml Resistant     Quinupristin + Dalfopristin <=0.5 ug/ml Susceptible     Rifampin <=1 ug/ml Susceptible     Tetracycline <=4 ug/ml Susceptible      Trimethoprim + Sulfamethoxazole <=0.5/9.5 ug/ml Susceptible     Vancomycin 2 ug/ml Susceptible                          Imaging Results (last 24 hours)     ** No results found for the last 24 hours. **        Results for orders placed during the hospital encounter of 10/06/18   Adult Transthoracic Echo Complete W/ Cont if Necessary Per Protocol    Narrative · Left ventricular systolic function is hyperdynamic (EF > 70).  · Left ventricular wall thickness is consistent with moderate concentric   hypertrophy.  · Left ventricular diastolic dysfunction (grade I) consistent with   impaired relaxation.  · Moderate to severe aortic stenosis (mean gradient 39 mmHg) is present.  · Mild mitral valve regurgitation is present          I have reviewed the medications.      Pharmacy Consult  Does not apply Once   acetylcysteine 3 mL Nebulization BID - RT   aspirin 162 mg Oral Daily   atorvastatin 40 mg Oral Nightly   aztreonam 2 g Intravenous Q8H   busPIRone 15 mg Oral Q8H   enoxaparin 40 mg Subcutaneous Q24H   hydrocortisone  Rectal BID   insulin detemir 25 Units Subcutaneous Nightly   insulin lispro 0-7 Units Subcutaneous 4x Daily With Meals & Nightly   insulin lispro 8 Units Subcutaneous TID With Meals   ipratropium-albuterol 3 mL Nebulization 4x Daily - RT   midodrine 10 mg Oral TID AC   rOPINIRole 4 mg Oral Nightly   sodium chloride 3 mL Intravenous Q12H   vancomycin 1,500 mg Intravenous Q24H         Assessment/Plan   Assessment / Plan     Hospital Problem List     Pneumonia    Type 2 diabetes mellitus (CMS/[facility])    Hypotension    Overview Signed 10/7/2018  3:25 PM by Meme Acevedo APRN     on midodrine         Hyperlipidemia    Depression    Anxiety    COPD (chronic obstructive pulmonary disease) (CMS/[facility])    Dysphagia    Overview Signed 10/7/2018  4:13 PM by Meme Acevedo APRN     Was on mechanical soft and nectar thick at SNF, noncompliant          Coronary artery disease    CHF  (congestive heart failure) (CMS/HCC)    GERD (gastroesophageal reflux disease)    Chronic respiratory failure with hypoxia and hypercapnia (CMS/[facility])             Brief Hospital Course to date:  Nallely Junior is a 78 y.o. female  with history of chronic respiratory failure s/p tracheostomy, HLD, T2DM, bedbound state who presents as a transfer/direct admission from [facility] for nonresolving PNA, mucus plugging with opacification of left lung and tracheostomy issues. Patient was admitted to OSH on 10/2 from her NH, for several days of sob, cough, increasing amount of sputum. Patient was initially diagnosed with left sided PNA at OSH, treated with broad spectrum abx- merrem, vanc, levaquin since 10/2. Patient was also noted to be hypotensive to as low as 90s systolic on presentation, though this resolved. Sputum cx from admission was reported to grow MRSA, sensitivities pending, as well as GNR (speciations/sensitivities pending). Patient was reported to initially be improving as WBC noted on admission 20k--- went down to 10k on day of transfer (10/6), however repeat CXR from 10/3-10/4 revealed worsening opacification of left hemithorax concerning for mucus plugging, also noted reports of small left sided plueral effusion. [facility] was called for transfer, however no beds were available until this time. unclear what patient's home baseline oxygen requirement is, she says no oxygen at home prior but per sister ,the patient fell about a year and a half ago and broke her leg.  She went to  and had to have surgery.  She was failure to wean from the vent after a month, so she had a trach and PEG placed.  She now is a N[employer]      Assessment & Plan:  Acute on chronic respiratory failure    PNA LLL, MRSA and GNR  - patient showing clinical improvment, chest x-ray clearing  - mucous plugging - chest physiotherapy QID and mucomyst nebs  - possible element of aspiration (patient reportedly non compliant with modified  diet)  - tentative plan for antibiotics through 10/20    Mucus plugging     Dysphagia  - level 4 diet with nectar thick liquids    T2DM (A1c 8.9)  - basal bolus     H/o Hypotension  - continue home midodrine, closely monitor     Elevated Lactic Acid   --improved     Hypokalemia  - replace K/mag, repeat bmp and mag in am       DVT prophylaxis: Lovenox     CODE STATUS:   Code Status and Medical Interventions:   Ordered at: 10/06/18 1946     Level Of Support Discussed With:    Patient     Code Status:    CPR     Medical Interventions (Level of Support Prior to Arrest):    Full       Disposition: I expect the patient to be discharged back to [facility] likely Tuesday.      Electronically signed by Darwin Orr MD, 10/15/18, 12:33 PM.

## 2018-10-15 NOTE — PROGRESS NOTES
Clinical Nutrition   Reason For Visit: Follow-up protocol    Patient Name: Nallely Junior  YOB: 1940  MRN: [id number]  Date of Encounter: 10/15/18 12:07 PM  Admission date: 10/6/2018          Nutrition Assessment     Admission Problem List:    Pneumonia    Acute and chronic respiratory failure with hypoxia (CMS/HCC)    Type 2 diabetes mellitus (CMS/HCC)    Dysphagia            Applicable medical tests/procedures since admission:   10/8 MBS- rec's for Dysphagia 4 with nectar thick liquids      PMH: She  has a past medical history of Anemia; Anxiety; Aortic stenosis; CHF (congestive heart failure) (CMS/HCC); Chronic respiratory failure with hypoxia and hypercapnia (CMS/HCC); CKD (chronic kidney disease), stage III (CMS/HCC); Constipation; COPD (chronic obstructive pulmonary disease) (CMS/HCC); Coronary artery disease; Dementia; Depression; Diabetes mellitus (CMS/HCC); Dysphagia; Femur fracture, right (CMS/HCC); GERD (gastroesophageal reflux disease); Hyperlipidemia; Hypertension; Hypotension; and Restless legs syndrome (RLS).   PSxH: She  has a past surgical history that includes Tracheostomy tube placement; Peg Tube Insertion; Leg Surgery; and Forearm surgery.           Labs reviewed   Labs reviewed: Yes  Medications reviewed   Medications reviewed: Yes       Current Nutrition Prescription   PO: Diet Dysphagia; IV - Mechanical Soft No Mixed Consistencies; Nectar / Syrup Thick; Consistent Carbohydrate  Oral Nutrition Supplement:  Boost GC 2x/day    PO intake: 79% of 6 meals    Nutrition Diagnosis     Problem No nutrition diagnosis at this time   Etiology    Signs/Symptoms          Intervention   Intervention: Follow treatment progress, Care plan reviewed      Goal:   General: Maintain nutrition        Monitoring/Evaluation:       Monitoring/Evaluation: Per protocol  Will Continue to follow per protocol  Meme Alonzo RD  Time Spent: 20 min

## 2018-10-15 NOTE — PLAN OF CARE
Problem: Patient Care Overview  Goal: Plan of Care Review  Outcome: Ongoing (interventions implemented as appropriate)   10/15/18 0510   Coping/Psychosocial   Plan of Care Reviewed With patient   OTHER   Outcome Summary VSS no complaints tonight eating frequently compliant with dysphagia diet will continue to monitor and await DC decisiion   Plan of Care Review   Progress no change

## 2018-10-16 ENCOUNTER — APPOINTMENT (OUTPATIENT)
Dept: GENERAL RADIOLOGY | Facility: HOSPITAL | Age: 78
End: 2018-10-16

## 2018-10-16 LAB
ANION GAP SERPL CALCULATED.3IONS-SCNC: 3 MMOL/L (ref 3–11)
ARTERIAL PATENCY WRIST A: ABNORMAL
ATMOSPHERIC PRESS: ABNORMAL MMHG
BASE EXCESS BLDA CALC-SCNC: 6.5 MMOL/L (ref 0–2)
BASOPHILS # BLD AUTO: 0 10*3/MM3 (ref 0–0.2)
BASOPHILS NFR BLD AUTO: 0 % (ref 0–1)
BDY SITE: ABNORMAL
BUN BLD-MCNC: 9 MG/DL (ref 9–23)
BUN/CREAT SERPL: 25.7 (ref 7–25)
CALCIUM SPEC-SCNC: 7.9 MG/DL (ref 8.7–10.4)
CHLORIDE SERPL-SCNC: 108 MMOL/L (ref 99–109)
CO2 BLDA-SCNC: 32.7 MMOL/L (ref 22–33)
CO2 SERPL-SCNC: 30 MMOL/L (ref 20–31)
COHGB MFR BLD: 0.9 % (ref 0–2)
CREAT BLD-MCNC: 0.35 MG/DL (ref 0.6–1.3)
DEPRECATED RDW RBC AUTO: 47.9 FL (ref 37–54)
EOSINOPHIL # BLD AUTO: 0.17 10*3/MM3 (ref 0–0.3)
EOSINOPHIL NFR BLD AUTO: 2.5 % (ref 0–3)
ERYTHROCYTE [DISTWIDTH] IN BLOOD BY AUTOMATED COUNT: 14.3 % (ref 11.3–14.5)
ERYTHROCYTE [SEDIMENTATION RATE] IN BLOOD: 22 MM/HR (ref 0–30)
GFR SERPL CREATININE-BSD FRML MDRD: >150 ML/MIN/1.73
GLUCOSE BLD-MCNC: 151 MG/DL (ref 70–100)
GLUCOSE BLDC GLUCOMTR-MCNC: 116 MG/DL (ref 70–130)
GLUCOSE BLDC GLUCOMTR-MCNC: 127 MG/DL (ref 70–130)
GLUCOSE BLDC GLUCOMTR-MCNC: 130 MG/DL (ref 70–130)
HCO3 BLDA-SCNC: 31.3 MMOL/L (ref 20–26)
HCT VFR BLD AUTO: 32.8 % (ref 34.5–44)
HCT VFR BLD CALC: 31.6 %
HGB BLD-MCNC: 10.4 G/DL (ref 11.5–15.5)
HGB BLDA-MCNC: 10.3 G/DL (ref 14–18)
HOROWITZ INDEX BLD+IHG-RTO: 35 %
IMM GRANULOCYTES # BLD: 0.03 10*3/MM3 (ref 0–0.03)
IMM GRANULOCYTES NFR BLD: 0.4 % (ref 0–0.6)
LYMPHOCYTES # BLD AUTO: 2.14 10*3/MM3 (ref 0.6–4.8)
LYMPHOCYTES NFR BLD AUTO: 31 % (ref 24–44)
MAGNESIUM SERPL-MCNC: 2.1 MG/DL (ref 1.3–2.7)
MCH RBC QN AUTO: 29.4 PG (ref 27–31)
MCHC RBC AUTO-ENTMCNC: 31.7 G/DL (ref 32–36)
MCV RBC AUTO: 92.7 FL (ref 80–99)
METHGB BLD QL: 1.1 % (ref 0–1.5)
MODALITY: ABNORMAL
MONOCYTES # BLD AUTO: 0.54 10*3/MM3 (ref 0–1)
MONOCYTES NFR BLD AUTO: 7.8 % (ref 0–12)
NEUTROPHILS # BLD AUTO: 4.06 10*3/MM3 (ref 1.5–8.3)
NEUTROPHILS NFR BLD AUTO: 58.7 % (ref 41–71)
OXYHGB MFR BLDV: 95.1 % (ref 94–99)
PCO2 BLDA: 44.9 MM HG (ref 35–45)
PH BLDA: 7.45 PH UNITS (ref 7.35–7.45)
PLATELET # BLD AUTO: 145 10*3/MM3 (ref 150–450)
PMV BLD AUTO: 11.4 FL (ref 6–12)
PO2 BLDA: 93.1 MM HG (ref 83–108)
POTASSIUM BLD-SCNC: 3.6 MMOL/L (ref 3.5–5.5)
RBC # BLD AUTO: 3.54 10*6/MM3 (ref 3.89–5.14)
SODIUM BLD-SCNC: 141 MMOL/L (ref 132–146)
WBC NRBC COR # BLD: 6.91 10*3/MM3 (ref 3.5–10.8)

## 2018-10-16 PROCEDURE — 94668 MNPJ CHEST WALL SBSQ: CPT

## 2018-10-16 PROCEDURE — 85652 RBC SED RATE AUTOMATED: CPT | Performed by: INTERNAL MEDICINE

## 2018-10-16 PROCEDURE — 94799 UNLISTED PULMONARY SVC/PX: CPT

## 2018-10-16 PROCEDURE — 83735 ASSAY OF MAGNESIUM: CPT | Performed by: INTERNAL MEDICINE

## 2018-10-16 PROCEDURE — 63710000001 INSULIN DETEMIR PER 5 UNITS: Performed by: HOSPITALIST

## 2018-10-16 PROCEDURE — 94640 AIRWAY INHALATION TREATMENT: CPT

## 2018-10-16 PROCEDURE — 82805 BLOOD GASES W/O2 SATURATION: CPT | Performed by: INTERNAL MEDICINE

## 2018-10-16 PROCEDURE — 36600 WITHDRAWAL OF ARTERIAL BLOOD: CPT | Performed by: INTERNAL MEDICINE

## 2018-10-16 PROCEDURE — 85025 COMPLETE CBC W/AUTO DIFF WBC: CPT | Performed by: INTERNAL MEDICINE

## 2018-10-16 PROCEDURE — 80048 BASIC METABOLIC PNL TOTAL CA: CPT | Performed by: INTERNAL MEDICINE

## 2018-10-16 PROCEDURE — 82962 GLUCOSE BLOOD TEST: CPT

## 2018-10-16 PROCEDURE — 94760 N-INVAS EAR/PLS OXIMETRY 1: CPT

## 2018-10-16 PROCEDURE — 25010000002 ENOXAPARIN PER 10 MG: Performed by: INTERNAL MEDICINE

## 2018-10-16 PROCEDURE — 25010000002 VANCOMYCIN 10 G RECONSTITUTED SOLUTION

## 2018-10-16 PROCEDURE — 74018 RADEX ABDOMEN 1 VIEW: CPT

## 2018-10-16 PROCEDURE — 99233 SBSQ HOSP IP/OBS HIGH 50: CPT | Performed by: INTERNAL MEDICINE

## 2018-10-16 RX ADMIN — ASPIRIN 325 MG ORAL TABLET 162 MG: 325 PILL ORAL at 08:14

## 2018-10-16 RX ADMIN — IPRATROPIUM BROMIDE AND ALBUTEROL SULFATE 3 ML: 2.5; .5 SOLUTION RESPIRATORY (INHALATION) at 12:57

## 2018-10-16 RX ADMIN — BUSPIRONE HYDROCHLORIDE 15 MG: 15 TABLET ORAL at 17:26

## 2018-10-16 RX ADMIN — POTASSIUM CHLORIDE 40 MEQ: 1.5 POWDER, FOR SOLUTION ORAL at 17:26

## 2018-10-16 RX ADMIN — BUSPIRONE HYDROCHLORIDE 15 MG: 15 TABLET ORAL at 05:52

## 2018-10-16 RX ADMIN — ENOXAPARIN SODIUM 40 MG: 40 INJECTION SUBCUTANEOUS at 23:06

## 2018-10-16 RX ADMIN — IPRATROPIUM BROMIDE AND ALBUTEROL SULFATE 3 ML: 2.5; .5 SOLUTION RESPIRATORY (INHALATION) at 21:06

## 2018-10-16 RX ADMIN — ROPINIROLE HYDROCHLORIDE 4 MG: 2 TABLET, FILM COATED ORAL at 23:00

## 2018-10-16 RX ADMIN — MIDODRINE HYDROCHLORIDE 10 MG: 5 TABLET ORAL at 08:13

## 2018-10-16 RX ADMIN — ACETYLCYSTEINE 3 ML: 200 SOLUTION ORAL; RESPIRATORY (INHALATION) at 21:05

## 2018-10-16 RX ADMIN — ACETYLCYSTEINE 3 ML: 200 SOLUTION ORAL; RESPIRATORY (INHALATION) at 08:13

## 2018-10-16 RX ADMIN — BUSPIRONE HYDROCHLORIDE 15 MG: 15 TABLET ORAL at 23:00

## 2018-10-16 RX ADMIN — INSULIN DETEMIR 25 UNITS: 100 INJECTION, SOLUTION SUBCUTANEOUS at 23:01

## 2018-10-16 RX ADMIN — AZTREONAM 2 G: 2 INJECTION, POWDER, LYOPHILIZED, FOR SOLUTION INTRAMUSCULAR; INTRAVENOUS at 08:14

## 2018-10-16 RX ADMIN — HYDROCORTISONE 2.5%: 25 CREAM TOPICAL at 08:20

## 2018-10-16 RX ADMIN — IPRATROPIUM BROMIDE AND ALBUTEROL SULFATE 3 ML: 2.5; .5 SOLUTION RESPIRATORY (INHALATION) at 16:31

## 2018-10-16 RX ADMIN — POTASSIUM CHLORIDE 40 MEQ: 750 CAPSULE, EXTENDED RELEASE ORAL at 08:14

## 2018-10-16 RX ADMIN — ATORVASTATIN CALCIUM 40 MG: 40 TABLET, FILM COATED ORAL at 23:00

## 2018-10-16 RX ADMIN — Medication 3 ML: at 23:02

## 2018-10-16 RX ADMIN — VANCOMYCIN HYDROCHLORIDE 1500 MG: 10 INJECTION, POWDER, LYOPHILIZED, FOR SOLUTION INTRAVENOUS at 12:22

## 2018-10-16 RX ADMIN — Medication 3 ML: at 08:15

## 2018-10-16 RX ADMIN — IPRATROPIUM BROMIDE AND ALBUTEROL SULFATE 3 ML: 2.5; .5 SOLUTION RESPIRATORY (INHALATION) at 08:13

## 2018-10-16 RX ADMIN — MIDODRINE HYDROCHLORIDE 10 MG: 5 TABLET ORAL at 17:27

## 2018-10-16 RX ADMIN — HYDROCORTISONE 2.5%: 25 CREAM TOPICAL at 23:01

## 2018-10-16 RX ADMIN — AZTREONAM 2 G: 2 INJECTION, POWDER, LYOPHILIZED, FOR SOLUTION INTRAMUSCULAR; INTRAVENOUS at 17:22

## 2018-10-16 NOTE — PLAN OF CARE
Problem: Patient Care Overview  Goal: Plan of Care Review  Outcome: Ongoing (interventions implemented as appropriate)   10/16/18 3042   Coping/Psychosocial   Plan of Care Reviewed With patient   OTHER   Outcome Summary coughing up secretions well slept better tonight continues with 35% trach lcollar and sinus rhythm awaiting possible discharge this afternoon pending labs   Plan of Care Review   Progress improving

## 2018-10-16 NOTE — PROGRESS NOTES
Continued Stay Note  [facility]     Patient Name: Nallely Junior  MRN: [id number]  Today's Date: 10/16/2018    Admit Date: 10/6/2018          Discharge Plan     Row Name 10/16/18 1022       Plan    Plan Comments Pt is not medically ready to return to [facility] and rehab today. Plan is for pt to be NPO and restart tube feedings. Discussed plan with Dr. Orr in rounds and will plan for discharge on Thursday 10/18, [facility] ambulance rescheduled for 1500. CM spoke with Kelley at [facility] and rehab and updated on pt care plan including restarting tube feedings and hopeful for discharge on Thursday 10/18. Kelley agreeable and CM will keep them updated on pt needs. CM will continue to follow.               Discharge Codes    No documentation.       Expected Discharge Date and Time     Expected Discharge Date Expected Discharge Time    Oct 12, 2018             Chasity May

## 2018-10-16 NOTE — PROGRESS NOTES
[facility] Medicine Services  PROGRESS NOTE    Patient Name: Nallely Junior  : 1940  MRN: [id number]    Date of Admission: 10/6/2018  Length of Stay: 10  Primary Care Physician: Ray Strickland MD    Subjective   Subjective     CC:  pna     HPI:  Still some cough. Strength at baseline    Review of Systems  Gen- No fevers, chills  CV- No chest pain, palpitations  Resp- some cough,pos dyspnea, trach   GI- No N/V/D, abd pain      Otherwise ROS is negative except as mentioned in the HPI.    Objective   Objective     Vital Signs:   Temp:  [97.7 °F (36.5 °C)-98.8 °F (37.1 °C)] 98.6 °F (37 °C)  Heart Rate:  [58-81] 81  Resp:  [18-25] 20  BP: (104-122)/(43-55) 107/51        Physical Exam:  Constitutional - no acute distress, in bed  HEENT-NCAT, mucous membranes moist  CV-RRR, S1 S2 normal, no m/r/g  Resp-trach, course upper airway sounds  Abd-soft, non-tender, non-distended, normo active bowel sounds; overweight; PEG  Ext-No lower extremity cyanosis, clubbing or edema bilaterally  Neuro-alert, right hemiparesis, particularly right upper extremity  Psych-normal affect   Skin- No rash on exposed UE or LE bilaterally          Results Reviewed:  I have personally reviewed current lab, radiology, and data and agree.      Results from last 7 days  Lab Units 10/16/18  0548 10/11/18  0524 10/10/18  0436   WBC 10*3/mm3 6.91 9.25 13.56*   HEMOGLOBIN g/dL 10.4* 11.5 12.1   HEMATOCRIT % 32.8* 36.2 38.2   PLATELETS 10*3/mm3 145* 223 291       Results from last 7 days  Lab Units 10/16/18  0548 10/15/18  1110 10/12/18  0449  10/10/18  0436   SODIUM mmol/L 141 138 133  < > 136   POTASSIUM mmol/L 3.6 2.7* 3.7  < > 3.3*   CHLORIDE mmol/L 108 106 91*  < > 95*   CO2 mmol/L 30.0 29.0 34.0*  < > 37.0*   BUN mg/dL 9 11 21  < > 19   CREATININE mg/dL 0.35* 0.43* 0.58*  < > 0.55*   GLUCOSE mg/dL 151* 116* 345*  < > 218*   CALCIUM mg/dL 7.9* 8.2* 8.4*  < > 8.3*   ALT (SGPT) U/L  --   --   --   --  57*   AST (SGOT) U/L   --   --   --   --  46*   < > = values in this interval not displayed.  Estimated Creatinine Clearance: 59.4 mL/min (A) (by C-G formula based on SCr of 0.35 mg/dL (L)).  No results found for: BNP    Microbiology Results Abnormal     Procedure Component Value - Date/Time    Wound Culture - Wound, Abdominal Wall [[id number]]  (Abnormal)  (Susceptibility) Collected:  10/07/18 0109    Lab Status:  Final result Specimen:  Wound from Abdominal Wall Updated:  10/12/18 1422     Wound Culture Moderate growth (3+) Proteus mirabilis (A)      Moderate growth (3+) Candida glabrata (A)      Moderate growth (3+) Candida krusei (A)      Light growth (2+) Enterococcus raffinosus (A)      Light growth (2+) Staphylococcus aureus, MRSA (A)     Comment:   Methicillin resistant Staphylococcus aureus, Patient may be an isolation risk.        Gram Stain Result No WBCs seen      Few (2+) Gram positive bacilli      Many (4+) Yeast    Susceptibility      Proteus mirabilis    Enterococcus raffinosus       GONSALO    GONSALO       Amikacin <=16 ug/ml Susceptible             Ampicillin >16 ug/ml Resistant    8 ug/ml Susceptible       Ampicillin + Sulbactam 16/8 ug/ml Intermediate             Aztreonam <=8 ug/ml Susceptible             Cefepime <=8 ug/ml Susceptible             Cefotaxime <=2 ug/ml Susceptible             Ceftriaxone <=8 ug/ml Susceptible             Cefuroxime sodium <=4 ug/ml Susceptible             Ciprofloxacin >2 ug/ml Resistant             Ertapenem <=1 ug/ml Susceptible             Gentamicin >8 ug/ml Resistant             Gentamicin High Level Synergy       <=500 ug/ml Susceptible       Levofloxacin 4 ug/ml Intermediate             Linezolid       <=1 ug/ml Susceptible       Meropenem <=1 ug/ml Susceptible             Penicillin G       >8 ug/ml Resistant       Piperacillin + Tazobactam <=16 ug/ml Susceptible             Streptomycin High Level Synergy       >1,000 ug/ml Resistant       Tobramycin >8 ug/ml Resistant              Trimethoprim + Sulfamethoxazole >2/38 ug/ml Resistant             Vancomycin       1 ug/ml Susceptible                  Susceptibility      Staphylococcus aureus, MRSA     GONSALO     Ciprofloxacin >2 ug/ml Resistant     Clindamycin >4 ug/ml Resistant     Daptomycin <=0.5 ug/ml Susceptible     Erythromycin >4 ug/ml Resistant     Gentamicin <=4 ug/ml Susceptible     Levofloxacin >4 ug/ml Resistant     Linezolid 2 ug/ml Susceptible     Oxacillin >2 ug/ml Resistant     Penicillin G 8 ug/ml Resistant     Quinupristin + Dalfopristin <=0.5 ug/ml Susceptible     Rifampin <=1 ug/ml Susceptible     Tetracycline <=4 ug/ml Susceptible     Trimethoprim + Sulfamethoxazole <=0.5/9.5 ug/ml Susceptible     Vancomycin 1 ug/ml Susceptible                    Blood Culture - Blood, [[id number]]  (Normal) Collected:  10/06/18 2020    Lab Status:  Final result Specimen:  Blood from Arm, Right Updated:  10/11/18 2101     Blood Culture No growth at 5 days    Respiratory Culture - Sputum, NT Suction [[id number]]  (Abnormal)  (Susceptibility) Collected:  10/07/18 0108    Lab Status:  Final result Specimen:  Sputum from NT Suction Updated:  10/10/18 1437     Respiratory Culture Scant growth (1+) Yeast isolated (A)      Scant growth (1+) Staphylococcus aureus, MRSA (A)     Comment:   Methicillin resistant Staphylococcus aureus, Patient may be an isolation risk.        Gram Stain Result Many (4+) WBCs per low power field      Rare (1+) Epithelial cells per low power field      No organisms seen    Susceptibility      Staphylococcus aureus, MRSA     GONSALO     Ciprofloxacin >2 ug/ml Resistant     Clindamycin >4 ug/ml Resistant     Erythromycin >4 ug/ml Resistant     Gentamicin <=4 ug/ml Susceptible     Levofloxacin >4 ug/ml Resistant     Linezolid 2 ug/ml Susceptible     Oxacillin >2 ug/ml Resistant     Penicillin G >8 ug/ml Resistant     Quinupristin + Dalfopristin <=0.5 ug/ml Susceptible     Rifampin <=1 ug/ml Susceptible     Tetracycline <=4 ug/ml  Susceptible     Trimethoprim + Sulfamethoxazole <=0.5/9.5 ug/ml Susceptible     Vancomycin 2 ug/ml Susceptible                          Imaging Results (last 24 hours)     ** No results found for the last 24 hours. **        Results for orders placed during the hospital encounter of 10/06/18   Adult Transthoracic Echo Complete W/ Cont if Necessary Per Protocol    Narrative · Left ventricular systolic function is hyperdynamic (EF > 70).  · Left ventricular wall thickness is consistent with moderate concentric   hypertrophy.  · Left ventricular diastolic dysfunction (grade I) consistent with   impaired relaxation.  · Moderate to severe aortic stenosis (mean gradient 39 mmHg) is present.  · Mild mitral valve regurgitation is present          I have reviewed the medications.      Pharmacy Consult  Does not apply Once   acetylcysteine 3 mL Nebulization BID - RT   aspirin 162 mg Oral Daily   atorvastatin 40 mg Oral Nightly   aztreonam 2 g Intravenous Q8H   busPIRone 15 mg Oral Q8H   enoxaparin 40 mg Subcutaneous Q24H   hydrocortisone  Rectal BID   insulin detemir 25 Units Subcutaneous Nightly   insulin lispro 0-7 Units Subcutaneous 4x Daily With Meals & Nightly   insulin lispro 8 Units Subcutaneous TID With Meals   ipratropium-albuterol 3 mL Nebulization 4x Daily AC & at Bedtime   midodrine 10 mg Oral TID AC   rOPINIRole 4 mg Oral Nightly   sodium chloride 3 mL Intravenous Q12H   vancomycin 1,500 mg Intravenous Q24H         Assessment/Plan   Assessment / Plan     Hospital Problem List     Pneumonia    Type 2 diabetes mellitus (CMS/[facility])    Hypotension    Overview Signed 10/7/2018  3:25 PM by Meme Acevedo APRN     on midodrine         Hyperlipidemia    Depression    Anxiety    COPD (chronic obstructive pulmonary disease) (CMS/[facility])    Dysphagia    Overview Signed 10/7/2018  4:13 PM by Meme Acevedo APRN     Was on mechanical soft and nectar thick at SNF, noncompliant          Coronary  artery disease    CHF (congestive heart failure) (CMS/[facility])    GERD (gastroesophageal reflux disease)    Chronic respiratory failure with hypoxia and hypercapnia (CMS/[facility])             Brief Hospital Course to date:  Nallely Junior is a 78 y.o. female  with history of chronic respiratory failure s/p tracheostomy, HLD, T2DM, bedbound state who presents as a transfer/direct admission from [facility] for nonresolving PNA, mucus plugging with opacification of left lung and tracheostomy issues. Patient was admitted to OSH on 10/2 from her NH, for several days of sob, cough, increasing amount of sputum. Patient was initially diagnosed with left sided PNA at OSH, treated with broad spectrum abx- merrem, vanc, levaquin since 10/2. Patient was also noted to be hypotensive to as low as 90s systolic on presentation, though this resolved. Sputum cx from admission was reported to grow MRSA, sensitivities pending, as well as GNR (speciations/sensitivities pending). Patient was reported to initially be improving as WBC noted on admission 20k--- went down to 10k on day of transfer (10/6), however repeat CXR from 10/3-10/4 revealed worsening opacification of left hemithorax concerning for mucus plugging, also noted reports of small left sided plueral effusion. [facility] was called for transfer, however no beds were available until this time. unclear what patient's home baseline oxygen requirement is, she says no oxygen at home prior but per sister ,the patient fell about a year and a half ago and broke her leg.  She went to  and had to have surgery.  She was failure to wean from the vent after a month, so she had a trach and PEG placed.  She now is a [employer]      Assessment & Plan:  Acute on chronic respiratory failure    PNA LLL, MRSA and GNR  - patient showing clinical improvement on antibiotics  - mucous plugging - chest physiotherapy QID and mucomyst nebs  - likely element of continued aspiration and difficulty handling  oral secretions (patient reportedly non compliant with modified diet). Patient seen by Pulmonary Dr Armstrong yesterday and he recommends NPO and resumption of tube feeds with ongoing speech therapy (if patient declines tube feeds, recommends Palliative consult and possible comfort diet). Discussed with patient, she is agreeable to NPO status and tube feeds. Nutrition consulted for tube feed initiation. Check KUB to assess PEG placement. Ask Speech to continue to follow for ongoing therapy - will need speech therapy and SNF as well.  - tentative plan for antibiotics through 10/20    Mucus plugging     Dysphagia  - NPO with tube feeds as above    T2DM (A1c 8.9)  - basal bolus     H/o Hypotension  - continue home midodrine, closely monitor     Elevated Lactic Acid   --improved     Hypokalemia  - improved       DVT prophylaxis: Lovenox     CODE STATUS:   Code Status and Medical Interventions:   Ordered at: 10/06/18 1946     Level Of Support Discussed With:    Patient     Code Status:    CPR     Medical Interventions (Level of Support Prior to Arrest):    Full       Disposition: I [facility] the [facility].      Electronically signed by Darwin Orr MD, 10/16/18, 9:56 AM.

## 2018-10-16 NOTE — PLAN OF CARE
Problem: Patient Care Overview  Goal: Plan of Care Review  Outcome: Ongoing (interventions implemented as appropriate)   10/16/18 1848   Coping/Psychosocial   Plan of Care Reviewed With patient   OTHER   Outcome Summary VSS no c/o pain today. lg BM this am. restarted peg feedings increasing as ordered.    Plan of Care Review   Progress no change       Problem: Pneumonia (Adult)  Goal: Signs and Symptoms of Listed Potential Problems Will be Absent, Minimized or Managed (Pneumonia)  Outcome: Ongoing (interventions implemented as appropriate)   10/16/18 1848   Goal/Outcome Evaluation   Problems Assessed (Pneumonia) respiratory compromise;infection progression   Problems Present (Pneumonia) respiratory compromise;infection progression

## 2018-10-16 NOTE — PROGRESS NOTES
[facility] Progress Note    Admission Date: 10/6/2018    Nallely Junior  1940  [id number]    Date: 10/16/2018    Antibiotics:  Anti-Infectives     Ordered     Dose/Rate Route Frequency Start Stop    10/13/18 0847  vancomycin 1500 mg/500 mL 0.9% NS IVPB ([facility])     Ordering Provider:  Jenniffer Munoz, RPH    1,500 mg  over 90 Minutes Intravenous Every 24 Hours 10/13/18 1200 10/21/18 1159    10/09/18 0723  aztreonam (AZACTAM) 2 g in sodium chloride 0.9 % 100 mL IVPB-MBP     Ordering Provider:  Jan Martínez RPH    2 g  over 4 Hours Intravenous Every 8 Hours 10/09/18 0800 10/20/18 0759    10/09/18 0723  Pharmacy to dose vancomycin     Ordering Provider:  Jan Martínez RPH     Does not apply Continuous PRN 10/09/18 0721 10/20/18 0720    10/06/18 2328  aztreonam (AZACTAM) 2 g in sodium chloride 0.9 % 100 mL IVPB-MBP     Ordering Provider:  Olivia Murphy MD    2 g  200 mL/hr over 30 Minutes Intravenous Once 10/07/18 0015 10/07/18 0146    10/06/18 2023  vancomycin 2000 mg/500 mL 0.9% NS IVPB ([facility])     Ordering Provider:  Olivia Murphy MD    2,000 mg  over 120 Minutes Intravenous Once 10/06/18 2115 10/07/18 0138             CC:  Pneumonia, MRSA (sensitive to tetracycline and Bactrim)    HPI:  Patient is a 78 y.o.  Yr old female with dementia, diabetes mellitus type 2, chronic respiratory failure status post tracheostomy,hypertension, hyperlipidemia, bedbound, previous critical aortic stenosis, dysphagia, diastolic dysfunction, COPD, restless leg, GERD, anxiety, CAD, ho was recently admitted to [facility] for pneumonia and mucous plugging with opacification of the left lung. Patient is a poor historian.The patienthad been treated with meropenem, vancomycin, and levofloxacin since 10/2/18. She was transferred to [facility] on 10/6/18 with non-resolving pneumonia. Left hemithorax continued to be opacified.  I was consulted on 10/8/18 for further evaluation and treatment.  Candy  known immunocompromised state, travel history, zoonotic exposures,TB, or HIV.  10/9/18 hx rev.  Some sob.  Zulay vanc and aztreonam till 10/20 for pneum.  GNR in sputum.  No fever.  10/10/18 history reviewed. Tolerating vancomycin and aztreonam until 10/20 for pneumonia.  No high fevers. Minimal shortness of breath.  10/11/18 hx rev.  Receiving vanc and aztreo till 10/20 for pneum.  No high fever.  Min sputum prod.  Some sob.  10/12/18 history reviewed.  Receiving vancomycin and aztreonam until 10/20 for pneumonia.  MRSA in sputum.  Previous Pseudomonas.  No fever.  Minimal shortness of breath.  10/15/18 history reviewed. Tolerating vancomycin and aztreona until 10/20  For  MRSA and Pseudomonas pneumonia.  No high fevers.  10/16/18 history reviewed.  Tolerating vancomycin and aztreonam until 10/20.  No significant coughing.  Pain controlled.  No high fevers.    ROS:  No f/c/s. No n/v/d. No rash. No new ADR to Abx.     Constitutional-- No Fever, chills or sweats.  Appetite good, and no malaise. No fatigue.  Heent-- No new vision, hearing or throat complaints.  No epistaxis or oral sores.  Denies odynophagia or dysphagia.  No flashers, floaters or eye pain. No odynophagia or dysphagia. No headache, photophobia or neck stiffness.  CV-- No chest pain, palpitation or syncope  Resp-- Some SOB/nocough/Hemoptysis, no wheezing  GI- No nausea, vomiting, or diarrhea.  No hematochezia, melena, or hematemesis. Denies jaundice or chronic liver disease.  -- No dysuria, hematuria, or flank pain.    Lymph- no swollen lymph nodes in neck/axilla or groin.   Heme- No active bruising or bleeding  MS-- no swelling or pain in the bones or joints of arms/legs.  No new back pain.  Neuro-- No acute focal weakness or numbness in the arms or legs.  Skin--No rash, nodules, blisters.    Objective   PE:  Vital Signs  Temp  Min: 98.1 °F (36.7 °C)  Max: 98.8 °F (37.1 °C)  BP  Min: 107/51  Max: 122/55  Pulse  Min: 58  Max: 81  Resp  Min: 18  Max:  25  SpO2  Min: 95 %  Max: 100 %    GENERAL: Awake and alert, in minimal distress. Appears older than stated age.  Not toxic.  HEENT: Normocephalic, atraumatic.  EOMI. No conjunctival injection. No icterus. Oropharynx clear without evidence of thrush or exudate.   NECK: Supple without nuchal rigidity. No mass.  LYMPH: No cervical, axillary or inguinal lymphadenopathy.  HEART: irregularRR; No rubs, gallops.  2/6 systolic ejection murmur left sternal border.  No JVD.  LUNGS: rales bases. Normal respiratory effort. Nonlabored. No dullness.no wheeze.  ABDOMEN: Soft, nontender, nondistended. Positive bowel sounds. No rebound or guarding. NO mass or HSM.  Obese.  EXT:  No cyanosis, clubbing or edema. No cord.  MSK: FROM without joint effusions noted arms/legs.    SKIN: Warm and dry without cutaneous eruptions on Inspection/palpation.    NEURO: Oriented to name. No focal deficits on motor/sensory exam at arms/legs.    Laboratory Data      Results from last 7 days  Lab Units 10/16/18  0548 10/11/18  0524 10/10/18  0436   WBC 10*3/mm3 6.91 9.25 13.56*   HEMOGLOBIN g/dL 10.4* 11.5 12.1   HEMATOCRIT % 32.8* 36.2 38.2   PLATELETS 10*3/mm3 145* 223 291       Results from last 7 days  Lab Units 10/16/18  0548   SODIUM mmol/L 141   POTASSIUM mmol/L 3.6   CHLORIDE mmol/L 108   CO2 mmol/L 30.0   BUN mg/dL 9   CREATININE mg/dL 0.35*   GLUCOSE mg/dL 151*   CALCIUM mg/dL 7.9*       Results from last 7 days  Lab Units 10/10/18  0436   ALK PHOS U/L 83   BILIRUBIN mg/dL 0.6   ALT (SGPT) U/L 57*   AST (SGOT) U/L 46*       Results from last 7 days  Lab Units 10/16/18  0548   SED RATE mm/hr 22               Results from last 7 days  Lab Units 10/15/18  1110 10/13/18  0548 10/11/18  0524   VANCOMYCIN TR mcg/mL 15.30 24.00* 18.80         Estimated Creatinine Clearance: 59.4 mL/min (A) (by C-G formula based on SCr of 0.35 mg/dL (L)).      Microbiology:  Microbiology Results Abnormal     Procedure Component Value - Date/Time    Wound Culture -  Wound, Abdominal Wall [[id number]]  (Abnormal)  (Susceptibility) Collected:  10/07/18 0109    Lab Status:  Final result Specimen:  Wound from Abdominal Wall Updated:  10/12/18 1422     Wound Culture Moderate growth (3+) Proteus mirabilis (A)      Moderate growth (3+) Candida glabrata (A)      Moderate growth (3+) Candida krusei (A)      Light growth (2+) Enterococcus raffinosus (A)      Light growth (2+) Staphylococcus aureus, MRSA (A)     Comment:   Methicillin resistant Staphylococcus aureus, Patient may be an isolation risk.        Gram Stain Result No WBCs seen      Few (2+) Gram positive bacilli      Many (4+) Yeast    Susceptibility      Proteus mirabilis    Enterococcus raffinosus       GONSALO    GONSALO       Amikacin <=16 ug/ml Susceptible             Ampicillin >16 ug/ml Resistant    8 ug/ml Susceptible       Ampicillin + Sulbactam 16/8 ug/ml Intermediate             Aztreonam <=8 ug/ml Susceptible             Cefepime <=8 ug/ml Susceptible             Cefotaxime <=2 ug/ml Susceptible             Ceftriaxone <=8 ug/ml Susceptible             Cefuroxime sodium <=4 ug/ml Susceptible             Ciprofloxacin >2 ug/ml Resistant             Ertapenem <=1 ug/ml Susceptible             Gentamicin >8 ug/ml Resistant             Gentamicin High Level Synergy       <=500 ug/ml Susceptible       Levofloxacin 4 ug/ml Intermediate             Linezolid       <=1 ug/ml Susceptible       Meropenem <=1 ug/ml Susceptible             Penicillin G       >8 ug/ml Resistant       Piperacillin + Tazobactam <=16 ug/ml Susceptible             Streptomycin High Level Synergy       >1,000 ug/ml Resistant       Tobramycin >8 ug/ml Resistant             Trimethoprim + Sulfamethoxazole >2/38 ug/ml Resistant             Vancomycin       1 ug/ml Susceptible                  Susceptibility      Staphylococcus aureus, MRSA     GONSALO     Ciprofloxacin >2 ug/ml Resistant     Clindamycin >4 ug/ml Resistant     Daptomycin <=0.5 ug/ml Susceptible      Erythromycin >4 ug/ml Resistant     Gentamicin <=4 ug/ml Susceptible     Levofloxacin >4 ug/ml Resistant     Linezolid 2 ug/ml Susceptible     Oxacillin >2 ug/ml Resistant     Penicillin G 8 ug/ml Resistant     Quinupristin + Dalfopristin <=0.5 ug/ml Susceptible     Rifampin <=1 ug/ml Susceptible     Tetracycline <=4 ug/ml Susceptible     Trimethoprim + Sulfamethoxazole <=0.5/9.5 ug/ml Susceptible     Vancomycin 1 ug/ml Susceptible                    Blood Culture - Blood, [[id number]]  (Normal) Collected:  10/06/18 2020    Lab Status:  Final result Specimen:  Blood from Arm, Right Updated:  10/11/18 2101     Blood Culture No growth at 5 days    Respiratory Culture - Sputum, NT Suction [[id number]]  (Abnormal)  (Susceptibility) Collected:  10/07/18 0108    Lab Status:  Final result Specimen:  Sputum from NT Suction Updated:  10/10/18 1437     Respiratory Culture Scant growth (1+) Yeast isolated (A)      Scant growth (1+) Staphylococcus aureus, MRSA (A)     Comment:   Methicillin resistant Staphylococcus aureus, Patient may be an isolation risk.        Gram Stain Result Many (4+) WBCs per low power field      Rare (1+) Epithelial cells per low power field      No organisms seen    Susceptibility      Staphylococcus aureus, MRSA     GONSALO     Ciprofloxacin >2 ug/ml Resistant     Clindamycin >4 ug/ml Resistant     Erythromycin >4 ug/ml Resistant     Gentamicin <=4 ug/ml Susceptible     Levofloxacin >4 ug/ml Resistant     Linezolid 2 ug/ml Susceptible     Oxacillin >2 ug/ml Resistant     Penicillin G >8 ug/ml Resistant     Quinupristin + Dalfopristin <=0.5 ug/ml Susceptible     Rifampin <=1 ug/ml Susceptible     Tetracycline <=4 ug/ml Susceptible     Trimethoprim + Sulfamethoxazole <=0.5/9.5 ug/ml Susceptible     Vancomycin 2 ug/ml Susceptible                          Radiology:  Imaging Results (last 72 hours)     Procedure Component Value Units Date/Time    XR Chest 1 View [[id number]] Collected:  10/09/18 0809      Updated:  10/09/18 0954    Narrative:       EXAMINATION: XR CHEST 1 VW-10/09/2018:      INDICATION: Pneumonia; Z74.09-Other reduced mobility; R13.12-Dysphagia,  oropharyngeal phase; Z74.09-Other reduced mobility.      COMPARISON: Chest x-ray 10/08/2018.      FINDINGS: Significant interval improvement in left lung aeration with  decreased opacifications left lung apex and within the left lung base  from prior. Cardiac silhouette enlarged with only minimal left mid lung  opacification likely atelectasis. Right hemithorax grossly clear. No  pneumothorax or large effusion.       Impression:       Significant interval reduction in opacifications of the left  hemithorax with a considerable increase in aeration of the left lung and  only mid lung opacities likely represent mild atelectasis remaining.     D:  10/09/2018  E:  10/09/2018             CT Chest Without Contrast [[id number]] Collected:  10/08/18 0852     Updated:  10/08/18 2123    Narrative:       EXAM:    CT Chest Without Intravenous Contrast     EXAM DATE/TIME:    10/8/2018 8:52 AM     CLINICAL HISTORY:    78 years old, female; Dysphagia, oropharyngeal phase; Other reduced mobility;   Other reduced mobility; Abnormal findings; Abnormal radiologic exam of lung or   chest; Additional info: Pneumonia complicated / unresolved     TECHNIQUE:    Axial computed tomography images of the chest without intravenous contrast.    All CT scans at this facility use at least one of these dose optimization   techniques: automated exposure control; mA and/or kV adjustment per patient   size (includes targeted exams where dose is matched to clinical indication); or   iterative reconstruction.    Coronal and sagittal reformatted images were created and reviewed.     COMPARISON:    CR XR CHEST 1 VW 10/8/2018 1:31 AM     FINDINGS:    Tubes, catheters and devices:  Tracheostomy tube position appears   satisfactory.    Lungs:  Bilateral infiltrates/atelectasis, predominantly on left,  involving   LLL, BETTIE, and RLL. Clinically correlate to rule out pneumonia. Retained   secretions scattered in left bronchial tree.    Pleural space:  Small to moderate left pleural effusion, small right pleural   effusion. No pneumothorax.    Heart:  Mild cardiomegaly. Mitral annulus calcifications.  CAD.   Aorta:  No aortic aneurysm. Aortic atherosclerosis.    Great vessels off aortic arch and other great vessels:  Atherosclerotic   plaques are scattered along some vessels.    Other arteries:  There are coronary artery atheromatous calcifications,   consistent with CAD.    Lymph nodes:  No obvious lymphadenopathy, but resolution of left hilum is   obscured by adjacent air space disease.    Bones/joints:  Plate and screws ORIF hardware at proximal right humerus. DJD   of right glenohumeral joint. Degenerative changes of the spine. Mild scoliotic   curvature, positional versus fixed.    Soft tissues: Unremarkable.    Gallbladder and bile ducts:  Cholecystectomy.    Kidneys and ureters: Nonobstructive right nephrolithiasis.    Stomach and bowel:  Some contrast in the bowel.       Impression:       1. Small to moderate left pleural effusion, small right pleural effusion.   2. Bilateral infiltrates/atelectasis, predominantly on left, involving LLL,   BETTIE, and RLL.   3. CAD.   4. Mild cardiomegaly.   5.  Nonobstructive right nephrolithiasis.     THIS DOCUMENT HAS BEEN ELECTRONICALLY SIGNED BY KEENAN KIM MD    FL Video Swallow With Speech [[id number]] Collected:  10/08/18 1514     Updated:  10/08/18 1532    Narrative:       EXAMINATION: FL VIDEO SWALLOW W SPEECH-     INDICATION: dysphagia; Z74.09-Other reduced mobility; R13.10-Dysphagia,  unspecified; Z74.09-Other reduced mobility     TECHNIQUE: 1 minute and 54 seconds of fluoroscopic time was used for  this exam. 1 associated image was saved. The patient was evaluated in  the seated lateral position while taking a variety of consistencies of  barium by mouth under  the direction of speech pathology.     COMPARISON: NONE     FINDINGS: There was aspiration with sips of thin barium. There was no  penetration and no aspiration with nectar, pudding, or solid consistency  barium.          Impression:       Fluoroscopy provided for a modified barium swallow. Please  see speech therapy report for full details and recommendations.         This report was finalized on 10/8/2018 3:30 PM by Dr. Christiano Mcmillan MD.       XR Chest 1 View [[id number]] Collected:  10/08/18 0814     Updated:  10/08/18 1311    Narrative:       EXAMINATION: XR CHEST 1 VW-      INDICATION: Respiratory failure, pneumonia, atelectasis; Z74.09-Other  reduced mobility; R13.10-Dysphagia, unspecified.      COMPARISON: 10/06/2018.     FINDINGS: Portable chest reveals patchy ill-defined opacification seen  within the left upper lobe. Tracheostomy tube in satisfactory position  above the jacobo. Ill-defined opacification seen at the lung bases. Mild  elevation identified of the right hemidiaphragm. Small left pleural  effusion.           Impression:       Worsening identified of the airspace disease in the left  upper lobe. Small bilateral pleural effusions. The heart is enlarged.     D:  10/08/2018  E:  10/08/2018     This report was finalized on 10/8/2018 1:09 PM by Dr. Meagan Randhawa MD.       XR Chest 1 View [[id number]] Collected:  10/07/18 1222     Updated:  10/07/18 1449    Narrative:       EXAMINATION: XR CHEST 1 VW - 10/6/2018     INDICATION: Pneumonia.     TECHNIQUE:  Single view frontal chest.     COMPARISONS: None.     FINDINGS:  Tracheostomy in place. Calcification of the aortic knob.  There is an arc-like calcification projecting over the cardiac  silhouette; this could be valvular or in the ventricular wall. There is  extensive opacity involving the left lung. The right lung is grossly  clear. No pneumothorax. Right proximal humerus ORIF hardware noted.       Impression:       Extensive opacification of the  left lung. This is  compatible with the given history of pneumonia.     DICTATED:   10/07/2018  EDITED/ls :   10/07/2018         This report was finalized on 10/7/2018 2:47 PM by Sonny Perez.             I personally reviewed the radiographic studies     Assessment/Plan   IMPRESSION:   1.  Pneumonia, left chest, with recurrent mucus plugging, with previous cultures in the past positive for MRSA and Pseudomonas. Recurrence likely related to recurrent aspiration.  MRSA positive on this admission to date.  2. Acute on chronic respiratory hypoxic failure.  3. Dementia.  Ongoing.  4. Encephalopathy, metabolic.  5. Anemia, chronic disease.  Worse.  6. Diabetes mellitus type 2 with increased risk for infection.  7. Hypocalcemia, worse, 7.9.  8.  ALT 57, AST 46, elevated. Probably related to medications versus other.  9. Hypokalemia, resolved.  10.  Cultures from abdominal wall with multiple organisms, colonized.  Colonized with yeast.  11.  Thrombocytopenia, new.  Probably medicine related.     Plan:     1.  Diagnostically, continue to follow patient's physical exam, CBC, CMP, CRP.  2. Therapeutically, continue vancomycin and aztreonam. Duration of antibiotics until 10/20/18.  3. Oxygen support therapy.  4.  Rehabilitation.  5.  Aspiration precautions.    Increased risk for adverse drug reactions, complications from line, recurrent pneumonia.    Maxim Lundberg MD  10/16/2018

## 2018-10-16 NOTE — PROGRESS NOTES
Adult Nutrition  Assessment/PES    Patient Name:  Nallely Junior  YOB: 1940  MRN: [id number]  Admit Date:  10/6/2018    Assessment Date:  10/16/2018            Reason for Assessment     Row Name 10/16/18 1516          Reason for Assessment    Reason For Assessment TF/PN   45 mins     Diagnosis --   dysphagia, and difficulty handling oral secretions with dysphagia per MD notes. Noted that Dr. Armstrong recs NPO and alternate feeding route and pt agreeable.  Complete list of dx per MD notes this adm.               Anthropometrics     Row Name 10/16/18 1526          Anthropometrics    Weight --   ht=64in, fe=341fp on 10/12, though no wt method documented.  Pt is 148% IBW.             Labs/Tests/Procedures/Meds     Row Name 10/16/18 1529          Labs/Procedures/Meds    Lab Results Reviewed reviewed               Estimated/Assessed Needs     Row Name 10/16/18 1530          Estimated/Assessed Needs    Additional Documentation Calorie Requirements (Group);Protein Requirements (Group)        Calorie Requirements    Estimated Calorie Need Method --   Est kcal needs:  8286-0357 daniel/d based on 25-30cal/kg IBW of 120lb        Protein Requirements    Est Protein Requirement Amount (gms/kg) --   est pro needs:  68-82g/d, based on 1.25-1.5g/kg IBW of 120lb             Nutrition Prescription Ordered     Row Name 10/16/18 1529 10/16/18 1527       Nutrition Prescription EN    Enteral Route  -- PEG    Product  -- Replete with Fiber (Promote with Fiber)   TF per Dr. Armstrong's orders today.    TF Delivery Method  -- Continuous    Continuous TF Goal Rate (mL/hr) --   TF at goal rate provides 1680 daniel/105g pro/ 1411ml TF fw and 2131ml total fw/d. 70 mL/hr   x 24h/d    Continuous TF Goal Volume (mL)  -- 1680 mL   based on 24h/d delivery    Water flush (mL)   -- 30 mL    Water Flush Frequency  -- Per hour    Row Name 10/16/18 1526          Nutrition Prescription PO    Current PO Diet NPO                Problem/Interventions:        Problem 1     Row Name 10/16/18 1530          Nutrition Diagnoses Problem 1    Problem 1 Needs Alternate Route     Etiology (related to) --   dysphagia     Signs/Symptoms (evidenced by) NPO                     Intervention Goal     Row Name 10/16/18 1541          Intervention Goal    General Nutrition support treatment             Nutrition Intervention     Row Name 10/16/18 1543 10/16/18 1541       Nutrition Intervention    RD/Tech Action --   Consider change TF to ISosource 1.5 @ goal rate of 50ml/h x 22h/d with 30ml fw x 24h/d to [id number] pro/858ml TF fw/ 1578ml total fw/d. --   Rec consider decreasing fw to 10ml/h if continue with current TF orders to provide total of 1651ml total fw/d.              Education/Evaluation     Row Name 10/16/18 1541          Monitor/Evaluation    Monitor Per protocol         Electronically signed by:  Greta Welsh MS,RD,LD  10/16/18 3:45 PM

## 2018-10-17 ENCOUNTER — APPOINTMENT (OUTPATIENT)
Dept: GENERAL RADIOLOGY | Facility: HOSPITAL | Age: 78
End: 2018-10-17

## 2018-10-17 LAB
ALBUMIN SERPL-MCNC: 3.02 G/DL (ref 3.2–4.8)
ALBUMIN/GLOB SERPL: 1.6 G/DL (ref 1.5–2.5)
ALP SERPL-CCNC: 74 U/L (ref 25–100)
ALT SERPL W P-5'-P-CCNC: 67 U/L (ref 7–40)
ANION GAP SERPL CALCULATED.3IONS-SCNC: 2 MMOL/L (ref 3–11)
AST SERPL-CCNC: 35 U/L (ref 0–33)
BASOPHILS # BLD AUTO: 0.01 10*3/MM3 (ref 0–0.2)
BASOPHILS NFR BLD AUTO: 0.1 % (ref 0–1)
BILIRUB SERPL-MCNC: 0.8 MG/DL (ref 0.3–1.2)
BUN BLD-MCNC: 12 MG/DL (ref 9–23)
BUN/CREAT SERPL: 30 (ref 7–25)
CALCIUM SPEC-SCNC: 8.4 MG/DL (ref 8.7–10.4)
CHLORIDE SERPL-SCNC: 109 MMOL/L (ref 99–109)
CO2 SERPL-SCNC: 30 MMOL/L (ref 20–31)
CREAT BLD-MCNC: 0.4 MG/DL (ref 0.6–1.3)
DEPRECATED RDW RBC AUTO: 49.5 FL (ref 37–54)
EOSINOPHIL # BLD AUTO: 0.15 10*3/MM3 (ref 0–0.3)
EOSINOPHIL NFR BLD AUTO: 2.1 % (ref 0–3)
ERYTHROCYTE [DISTWIDTH] IN BLOOD BY AUTOMATED COUNT: 14.6 % (ref 11.3–14.5)
GFR SERPL CREATININE-BSD FRML MDRD: >150 ML/MIN/1.73
GLOBULIN UR ELPH-MCNC: 1.9 GM/DL
GLUCOSE BLD-MCNC: 160 MG/DL (ref 70–100)
GLUCOSE BLDC GLUCOMTR-MCNC: 112 MG/DL (ref 70–130)
GLUCOSE BLDC GLUCOMTR-MCNC: 128 MG/DL (ref 70–130)
GLUCOSE BLDC GLUCOMTR-MCNC: 129 MG/DL (ref 70–130)
GLUCOSE BLDC GLUCOMTR-MCNC: 175 MG/DL (ref 70–130)
HCT VFR BLD AUTO: 35.4 % (ref 34.5–44)
HGB BLD-MCNC: 11 G/DL (ref 11.5–15.5)
IMM GRANULOCYTES # BLD: 0.01 10*3/MM3 (ref 0–0.03)
IMM GRANULOCYTES NFR BLD: 0.1 % (ref 0–0.6)
LYMPHOCYTES # BLD AUTO: 1.81 10*3/MM3 (ref 0.6–4.8)
LYMPHOCYTES NFR BLD AUTO: 25.4 % (ref 24–44)
MAGNESIUM SERPL-MCNC: 1.8 MG/DL (ref 1.3–2.7)
MCH RBC QN AUTO: 29.2 PG (ref 27–31)
MCHC RBC AUTO-ENTMCNC: 31.1 G/DL (ref 32–36)
MCV RBC AUTO: 93.9 FL (ref 80–99)
MONOCYTES # BLD AUTO: 0.44 10*3/MM3 (ref 0–1)
MONOCYTES NFR BLD AUTO: 6.2 % (ref 0–12)
NEUTROPHILS # BLD AUTO: 4.73 10*3/MM3 (ref 1.5–8.3)
NEUTROPHILS NFR BLD AUTO: 66.2 % (ref 41–71)
PHOSPHATE SERPL-MCNC: 2.8 MG/DL (ref 2.4–5.1)
PLATELET # BLD AUTO: 137 10*3/MM3 (ref 150–450)
PMV BLD AUTO: 11.4 FL (ref 6–12)
POTASSIUM BLD-SCNC: 4.2 MMOL/L (ref 3.5–5.5)
PROT SERPL-MCNC: 4.9 G/DL (ref 5.7–8.2)
RBC # BLD AUTO: 3.77 10*6/MM3 (ref 3.89–5.14)
SODIUM BLD-SCNC: 141 MMOL/L (ref 132–146)
WBC NRBC COR # BLD: 7.14 10*3/MM3 (ref 3.5–10.8)

## 2018-10-17 PROCEDURE — 92507 TX SP LANG VOICE COMM INDIV: CPT

## 2018-10-17 PROCEDURE — 25010000002 VANCOMYCIN 10 G RECONSTITUTED SOLUTION

## 2018-10-17 PROCEDURE — 71045 X-RAY EXAM CHEST 1 VIEW: CPT

## 2018-10-17 PROCEDURE — 63710000001 INSULIN DETEMIR PER 5 UNITS: Performed by: HOSPITALIST

## 2018-10-17 PROCEDURE — 25010000002 ENOXAPARIN PER 10 MG: Performed by: INTERNAL MEDICINE

## 2018-10-17 PROCEDURE — 94799 UNLISTED PULMONARY SVC/PX: CPT

## 2018-10-17 PROCEDURE — 63710000001 INSULIN LISPRO (HUMAN) PER 5 UNITS: Performed by: FAMILY MEDICINE

## 2018-10-17 PROCEDURE — 80053 COMPREHEN METABOLIC PANEL: CPT | Performed by: INTERNAL MEDICINE

## 2018-10-17 PROCEDURE — 84100 ASSAY OF PHOSPHORUS: CPT | Performed by: INTERNAL MEDICINE

## 2018-10-17 PROCEDURE — 97110 THERAPEUTIC EXERCISES: CPT

## 2018-10-17 PROCEDURE — 94640 AIRWAY INHALATION TREATMENT: CPT

## 2018-10-17 PROCEDURE — 83735 ASSAY OF MAGNESIUM: CPT | Performed by: INTERNAL MEDICINE

## 2018-10-17 PROCEDURE — 82962 GLUCOSE BLOOD TEST: CPT

## 2018-10-17 PROCEDURE — 99233 SBSQ HOSP IP/OBS HIGH 50: CPT | Performed by: INTERNAL MEDICINE

## 2018-10-17 PROCEDURE — 92526 ORAL FUNCTION THERAPY: CPT

## 2018-10-17 PROCEDURE — 94668 MNPJ CHEST WALL SBSQ: CPT

## 2018-10-17 PROCEDURE — 85025 COMPLETE CBC W/AUTO DIFF WBC: CPT | Performed by: INTERNAL MEDICINE

## 2018-10-17 PROCEDURE — 94760 N-INVAS EAR/PLS OXIMETRY 1: CPT

## 2018-10-17 RX ADMIN — ROPINIROLE HYDROCHLORIDE 4 MG: 2 TABLET, FILM COATED ORAL at 22:33

## 2018-10-17 RX ADMIN — AZTREONAM 2 G: 2 INJECTION, POWDER, LYOPHILIZED, FOR SOLUTION INTRAMUSCULAR; INTRAVENOUS at 00:23

## 2018-10-17 RX ADMIN — ACETYLCYSTEINE 3 ML: 200 SOLUTION ORAL; RESPIRATORY (INHALATION) at 20:58

## 2018-10-17 RX ADMIN — AZTREONAM 2 G: 2 INJECTION, POWDER, LYOPHILIZED, FOR SOLUTION INTRAMUSCULAR; INTRAVENOUS at 17:16

## 2018-10-17 RX ADMIN — MIDODRINE HYDROCHLORIDE 10 MG: 5 TABLET ORAL at 10:00

## 2018-10-17 RX ADMIN — ATORVASTATIN CALCIUM 40 MG: 40 TABLET, FILM COATED ORAL at 22:34

## 2018-10-17 RX ADMIN — INSULIN DETEMIR 25 UNITS: 100 INJECTION, SOLUTION SUBCUTANEOUS at 22:47

## 2018-10-17 RX ADMIN — IPRATROPIUM BROMIDE AND ALBUTEROL SULFATE 3 ML: 2.5; .5 SOLUTION RESPIRATORY (INHALATION) at 20:58

## 2018-10-17 RX ADMIN — MIDODRINE HYDROCHLORIDE 10 MG: 5 TABLET ORAL at 13:46

## 2018-10-17 RX ADMIN — BUSPIRONE HYDROCHLORIDE 15 MG: 15 TABLET ORAL at 22:33

## 2018-10-17 RX ADMIN — ACETYLCYSTEINE 3 ML: 200 SOLUTION ORAL; RESPIRATORY (INHALATION) at 08:45

## 2018-10-17 RX ADMIN — ENOXAPARIN SODIUM 40 MG: 40 INJECTION SUBCUTANEOUS at 22:34

## 2018-10-17 RX ADMIN — BUSPIRONE HYDROCHLORIDE 15 MG: 15 TABLET ORAL at 07:07

## 2018-10-17 RX ADMIN — IPRATROPIUM BROMIDE AND ALBUTEROL SULFATE 3 ML: 2.5; .5 SOLUTION RESPIRATORY (INHALATION) at 08:44

## 2018-10-17 RX ADMIN — Medication 3 ML: at 10:01

## 2018-10-17 RX ADMIN — Medication 3 ML: at 22:41

## 2018-10-17 RX ADMIN — AZTREONAM 2 G: 2 INJECTION, POWDER, LYOPHILIZED, FOR SOLUTION INTRAMUSCULAR; INTRAVENOUS at 09:59

## 2018-10-17 RX ADMIN — ASPIRIN 325 MG ORAL TABLET 162 MG: 325 PILL ORAL at 10:00

## 2018-10-17 RX ADMIN — IPRATROPIUM BROMIDE AND ALBUTEROL SULFATE 3 ML: 2.5; .5 SOLUTION RESPIRATORY (INHALATION) at 13:03

## 2018-10-17 RX ADMIN — MIDODRINE HYDROCHLORIDE 10 MG: 5 TABLET ORAL at 17:17

## 2018-10-17 RX ADMIN — BUSPIRONE HYDROCHLORIDE 15 MG: 15 TABLET ORAL at 13:46

## 2018-10-17 RX ADMIN — IPRATROPIUM BROMIDE AND ALBUTEROL SULFATE 3 ML: 2.5; .5 SOLUTION RESPIRATORY (INHALATION) at 16:10

## 2018-10-17 RX ADMIN — MAGNESIUM SULFATE HEPTAHYDRATE 4 G: 40 INJECTION, SOLUTION INTRAVENOUS at 09:59

## 2018-10-17 RX ADMIN — INSULIN LISPRO 2 UNITS: 100 INJECTION, SOLUTION INTRAVENOUS; SUBCUTANEOUS at 07:07

## 2018-10-17 RX ADMIN — HYDROCORTISONE 2.5%: 25 CREAM TOPICAL at 22:34

## 2018-10-17 RX ADMIN — VANCOMYCIN HYDROCHLORIDE 1500 MG: 10 INJECTION, POWDER, LYOPHILIZED, FOR SOLUTION INTRAVENOUS at 13:45

## 2018-10-17 NOTE — PROGRESS NOTES
[facility] Progress Note    Admission Date: 10/6/2018    Nallely Junior  1940  [id number]    Date: 10/17/2018    Antibiotics:  Anti-Infectives     Ordered     Dose/Rate Route Frequency Start Stop    10/13/18 0847  vancomycin 1500 mg/500 mL 0.9% NS IVPB ([facility])     Ordering Provider:  Jenniffer Munoz, RPH    1,500 mg  over 90 Minutes Intravenous Every 24 Hours 10/13/18 1200 10/21/18 1159    10/09/18 0723  aztreonam (AZACTAM) 2 g in sodium chloride 0.9 % 100 mL IVPB-MBP     Ordering Provider:  Jan Martínez RPH    2 g  over 4 Hours Intravenous Every 8 Hours 10/09/18 0800 10/20/18 0759    10/09/18 0723  Pharmacy to dose vancomycin     Ordering Provider:  Jan Martínez RPH     Does not apply Continuous PRN 10/09/18 0721 10/20/18 0720    10/06/18 2328  aztreonam (AZACTAM) 2 g in sodium chloride 0.9 % 100 mL IVPB-MBP     Ordering Provider:  Olivia Murphy MD    2 g  200 mL/hr over 30 Minutes Intravenous Once 10/07/18 0015 10/07/18 0146    10/06/18 2023  vancomycin 2000 mg/500 mL 0.9% NS IVPB ([facility])     Ordering Provider:  Olivia Murphy MD    2,000 mg  over 120 Minutes Intravenous Once 10/06/18 2115 10/07/18 0138             CC:  Pneumonia, MRSA (sensitive to tetracycline and Bactrim)    HPI:  Patient is a 78 y.o.  Yr old female with dementia, diabetes mellitus type 2, chronic respiratory failure status post tracheostomy,hypertension, hyperlipidemia, bedbound, previous critical aortic stenosis, dysphagia, diastolic dysfunction, COPD, restless leg, GERD, anxiety, CAD, ho was recently admitted to [facility] for pneumonia and mucous plugging with opacification of the left lung. Patient is a poor historian.The patienthad been treated with meropenem, vancomycin, and levofloxacin since 10/2/18. She was transferred to [facility] on 10/6/18 with non-resolving pneumonia. Left hemithorax continued to be opacified.  I was consulted on 10/8/18 for further evaluation and treatment.  Candy  known immunocompromised state, travel history, zoonotic exposures,TB, or HIV.  10/9/18 hx rev.  Some sob.  Zulay vanc and aztreonam till 10/20 for pneum.  GNR in sputum.  No fever.  10/10/18 history reviewed. Tolerating vancomycin and aztreonam until 10/20 for pneumonia.  No high fevers. Minimal shortness of breath.  10/11/18 hx rev.  Receiving vanc and aztreo till 10/20 for pneum.  No high fever.  Min sputum prod.  Some sob.  10/12/18 history reviewed.  Receiving vancomycin and aztreonam until 10/20 for pneumonia.  MRSA in sputum.  Previous Pseudomonas.  No fever.  Minimal shortness of breath.  10/15/18 history reviewed. Tolerating vancomycin and aztreona until 10/20  For  MRSA and Pseudomonas pneumonia.  No high fevers.  10/16/18 history reviewed.  Tolerating vancomycin and aztreonam until 10/20.  No significant coughing.  Pain controlled.  No high fevers.  10/17/18 history reviewed.  Tolerating vancomycin and aztreonam  Until 10/20.  Occasional coughing.  No pain.    ROS:  No f/c/s. No n/v/d. No rash. No new ADR to Abx.     Constitutional-- No Fever, chills or sweats.  Appetite fair, and no malaise. No fatigue.  Heent-- No new vision, hearing or throat complaints.  No epistaxis or oral sores.  Denies odynophagia or dysphagia.  No flashers, floaters or eye pain. No odynophagia or dysphagia. No headache, photophobia or neck stiffness.  CV-- No chest pain, palpitation or syncope  Resp-- Some SOB/no cough/Hemoptysis, no wheezing  GI- No nausea, vomiting, or diarrhea.  No hematochezia, melena, or hematemesis. Denies jaundice or chronic liver disease.  -- No dysuria, hematuria, or flank pain.    Lymph- no swollen lymph nodes in neck/axilla or groin.   Heme- No active bruising or bleeding  MS-- no swelling or pain in the bones or joints of arms/legs.  No new back pain.  Neuro-- No acute focal weakness or numbness in the arms or legs.  Skin--No rash, nodules, blisters.    Objective   PE:  Vital Signs  Temp  Min: 98.3 °F  (36.8 °C)  Max: 99 °F (37.2 °C)  BP  Min: 108/93  Max: 123/64  Pulse  Min: 71  Max: 76  Resp  Min: 16  Max: 20  SpO2  Min: 92 %  Max: 100 %    GENERAL: Awake and alert, in minor distress. Appears older than stated age.  Not toxic.  Chronically ill.  HEENT: Normocephalic, atraumatic.  EOMI. No conjunctival injection. No icterus. Oropharynx clear without evidence of thrush or exudate.   NECK: Supple without nuchal rigidity. No mass.  LYMPH: No cervical, axillary or inguinal lymphadenopathy.  HEART: irregularRR; No rubs, gallops.  2/6 systolic ejection murmur left sternal border.   LUNGS: rales bases. Normal respiratory effort. Nonlabored. No dullness.no wheeze.  ABDOMEN: Soft, nontender, nondistended. Positive bowel sounds. No rebound or guarding. NO mass or HSM.  Obese.  EXT:  No cyanosis, clubbing or edema. No cord.  MSK: FROM without joint effusions noted arms/legs.    SKIN: Warm and dry without cutaneous eruptions on Inspection/palpation.    NEURO: Oriented to name. No focal deficits on motor/sensory exam at arms/legs.    Laboratory Data      Results from last 7 days  Lab Units 10/17/18  0434 10/16/18  0548 10/11/18  0524   WBC 10*3/mm3 7.14 6.91 9.25   HEMOGLOBIN g/dL 11.0* 10.4* 11.5   HEMATOCRIT % 35.4 32.8* 36.2   PLATELETS 10*3/mm3 137* 145* 223       Results from last 7 days  Lab Units 10/17/18  0434   SODIUM mmol/L 141   POTASSIUM mmol/L 4.2   CHLORIDE mmol/L 109   CO2 mmol/L 30.0   BUN mg/dL 12   CREATININE mg/dL 0.40*   GLUCOSE mg/dL 160*   CALCIUM mg/dL 8.4*       Results from last 7 days  Lab Units 10/17/18  0434   ALK PHOS U/L 74   BILIRUBIN mg/dL 0.8   ALT (SGPT) U/L 67*   AST (SGOT) U/L 35*       Results from last 7 days  Lab Units 10/16/18  0548   SED RATE mm/hr 22               Results from last 7 days  Lab Units 10/15/18  1110 10/13/18  0548 10/11/18  0524   VANCOMYCIN TR mcg/mL 15.30 24.00* 18.80         Estimated Creatinine Clearance: 57.5 mL/min (A) (by C-G formula based on SCr of 0.4 mg/dL  (L)).      Microbiology:  Microbiology Results Abnormal     Procedure Component Value - Date/Time    Wound Culture - Wound, Abdominal Wall [[id number]]  (Abnormal)  (Susceptibility) Collected:  10/07/18 0109    Lab Status:  Final result Specimen:  Wound from Abdominal Wall Updated:  10/12/18 1422     Wound Culture Moderate growth (3+) Proteus mirabilis (A)      Moderate growth (3+) Candida glabrata (A)      Moderate growth (3+) Candida krusei (A)      Light growth (2+) Enterococcus raffinosus (A)      Light growth (2+) Staphylococcus aureus, MRSA (A)     Comment:   Methicillin resistant Staphylococcus aureus, Patient may be an isolation risk.        Gram Stain Result No WBCs seen      Few (2+) Gram positive bacilli      Many (4+) Yeast    Susceptibility      Proteus mirabilis    Enterococcus raffinosus       GONSALO    GONSALO       Amikacin <=16 ug/ml Susceptible             Ampicillin >16 ug/ml Resistant    8 ug/ml Susceptible       Ampicillin + Sulbactam 16/8 ug/ml Intermediate             Aztreonam <=8 ug/ml Susceptible             Cefepime <=8 ug/ml Susceptible             Cefotaxime <=2 ug/ml Susceptible             Ceftriaxone <=8 ug/ml Susceptible             Cefuroxime sodium <=4 ug/ml Susceptible             Ciprofloxacin >2 ug/ml Resistant             Ertapenem <=1 ug/ml Susceptible             Gentamicin >8 ug/ml Resistant             Gentamicin High Level Synergy       <=500 ug/ml Susceptible       Levofloxacin 4 ug/ml Intermediate             Linezolid       <=1 ug/ml Susceptible       Meropenem <=1 ug/ml Susceptible             Penicillin G       >8 ug/ml Resistant       Piperacillin + Tazobactam <=16 ug/ml Susceptible             Streptomycin High Level Synergy       >1,000 ug/ml Resistant       Tobramycin >8 ug/ml Resistant             Trimethoprim + Sulfamethoxazole >2/38 ug/ml Resistant             Vancomycin       1 ug/ml Susceptible                  Susceptibility      Staphylococcus aureus, MRSA     GONSALO      Ciprofloxacin >2 ug/ml Resistant     Clindamycin >4 ug/ml Resistant     Daptomycin <=0.5 ug/ml Susceptible     Erythromycin >4 ug/ml Resistant     Gentamicin <=4 ug/ml Susceptible     Levofloxacin >4 ug/ml Resistant     Linezolid 2 ug/ml Susceptible     Oxacillin >2 ug/ml Resistant     Penicillin G 8 ug/ml Resistant     Quinupristin + Dalfopristin <=0.5 ug/ml Susceptible     Rifampin <=1 ug/ml Susceptible     Tetracycline <=4 ug/ml Susceptible     Trimethoprim + Sulfamethoxazole <=0.5/9.5 ug/ml Susceptible     Vancomycin 1 ug/ml Susceptible                    Blood Culture - Blood, [[id number]]  (Normal) Collected:  10/06/18 2020    Lab Status:  Final result Specimen:  Blood from Arm, Right Updated:  10/11/18 2101     Blood Culture No growth at 5 days    Respiratory Culture - Sputum, NT Suction [[id number]]  (Abnormal)  (Susceptibility) Collected:  10/07/18 0108    Lab Status:  Final result Specimen:  Sputum from NT Suction Updated:  10/10/18 1437     Respiratory Culture Scant growth (1+) Yeast isolated (A)      Scant growth (1+) Staphylococcus aureus, MRSA (A)     Comment:   Methicillin resistant Staphylococcus aureus, Patient may be an isolation risk.        Gram Stain Result Many (4+) WBCs per low power field      Rare (1+) Epithelial cells per low power field      No organisms seen    Susceptibility      Staphylococcus aureus, MRSA     GONSALO     Ciprofloxacin >2 ug/ml Resistant     Clindamycin >4 ug/ml Resistant     Erythromycin >4 ug/ml Resistant     Gentamicin <=4 ug/ml Susceptible     Levofloxacin >4 ug/ml Resistant     Linezolid 2 ug/ml Susceptible     Oxacillin >2 ug/ml Resistant     Penicillin G >8 ug/ml Resistant     Quinupristin + Dalfopristin <=0.5 ug/ml Susceptible     Rifampin <=1 ug/ml Susceptible     Tetracycline <=4 ug/ml Susceptible     Trimethoprim + Sulfamethoxazole <=0.5/9.5 ug/ml Susceptible     Vancomycin 2 ug/ml Susceptible                          Radiology:  Imaging Results (last 72 hours)      Procedure Component Value Units Date/Time    XR Chest 1 View [[id number]] Collected:  10/09/18 0809     Updated:  10/09/18 0954    Narrative:       EXAMINATION: XR CHEST 1 VW-10/09/2018:      INDICATION: Pneumonia; Z74.09-Other reduced mobility; R13.12-Dysphagia,  oropharyngeal phase; Z74.09-Other reduced mobility.      COMPARISON: Chest x-ray 10/08/2018.      FINDINGS: Significant interval improvement in left lung aeration with  decreased opacifications left lung apex and within the left lung base  from prior. Cardiac silhouette enlarged with only minimal left mid lung  opacification likely atelectasis. Right hemithorax grossly clear. No  pneumothorax or large effusion.       Impression:       Significant interval reduction in opacifications of the left  hemithorax with a considerable increase in aeration of the left lung and  only mid lung opacities likely represent mild atelectasis remaining.     D:  10/09/2018  E:  10/09/2018             CT Chest Without Contrast [[id number]] Collected:  10/08/18 0852     Updated:  10/08/18 2123    Narrative:       EXAM:    CT Chest Without Intravenous Contrast     EXAM DATE/TIME:    10/8/2018 8:52 AM     CLINICAL HISTORY:    78 years old, female; Dysphagia, oropharyngeal phase; Other reduced mobility;   Other reduced mobility; Abnormal findings; Abnormal radiologic exam of lung or   chest; Additional info: Pneumonia complicated / unresolved     TECHNIQUE:    Axial computed tomography images of the chest without intravenous contrast.    All CT scans at this facility use at least one of these dose optimization   techniques: automated exposure control; mA and/or kV adjustment per patient   size (includes targeted exams where dose is matched to clinical indication); or   iterative reconstruction.    Coronal and sagittal reformatted images were created and reviewed.     COMPARISON:    CR XR CHEST 1 VW 10/8/2018 1:31 AM     FINDINGS:    Tubes, catheters and devices:  Tracheostomy  tube position appears   satisfactory.    Lungs:  Bilateral infiltrates/atelectasis, predominantly on left, involving   LLL, BETTIE, and RLL. Clinically correlate to rule out pneumonia. Retained   secretions scattered in left bronchial tree.    Pleural space:  Small to moderate left pleural effusion, small right pleural   effusion. No pneumothorax.    Heart:  Mild cardiomegaly. Mitral annulus calcifications.  CAD.   Aorta:  No aortic aneurysm. Aortic atherosclerosis.    Great vessels off aortic arch and other great vessels:  Atherosclerotic   plaques are scattered along some vessels.    Other arteries:  There are coronary artery atheromatous calcifications,   consistent with CAD.    Lymph nodes:  No obvious lymphadenopathy, but resolution of left hilum is   obscured by adjacent air space disease.    Bones/joints:  Plate and screws ORIF hardware at proximal right humerus. DJD   of right glenohumeral joint. Degenerative changes of the spine. Mild scoliotic   curvature, positional versus fixed.    Soft tissues: Unremarkable.    Gallbladder and bile ducts:  Cholecystectomy.    Kidneys and ureters: Nonobstructive right nephrolithiasis.    Stomach and bowel:  Some contrast in the bowel.       Impression:       1. Small to moderate left pleural effusion, small right pleural effusion.   2. Bilateral infiltrates/atelectasis, predominantly on left, involving LLL,   BETTIE, and RLL.   3. CAD.   4. Mild cardiomegaly.   5.  Nonobstructive right nephrolithiasis.     THIS DOCUMENT HAS BEEN ELECTRONICALLY SIGNED BY KEENAN KIM MD    FL Video Swallow With Speech [[id number]] Collected:  10/08/18 1514     Updated:  10/08/18 1532    Narrative:       EXAMINATION: FL VIDEO SWALLOW W SPEECH-     INDICATION: dysphagia; Z74.09-Other reduced mobility; R13.10-Dysphagia,  unspecified; Z74.09-Other reduced mobility     TECHNIQUE: 1 minute and 54 seconds of fluoroscopic time was used for  this exam. 1 associated image was saved. The patient was  evaluated in  the seated lateral position while taking a variety of consistencies of  barium by mouth under the direction of speech pathology.     COMPARISON: NONE     FINDINGS: There was aspiration with sips of thin barium. There was no  penetration and no aspiration with nectar, pudding, or solid consistency  barium.          Impression:       Fluoroscopy provided for a modified barium swallow. Please  see speech therapy report for full details and recommendations.         This report was finalized on 10/8/2018 3:30 PM by Dr. Christiano Mcmillan MD.       XR Chest 1 View [[id number]] Collected:  10/08/18 0814     Updated:  10/08/18 1311    Narrative:       EXAMINATION: XR CHEST 1 VW-      INDICATION: Respiratory failure, pneumonia, atelectasis; Z74.09-Other  reduced mobility; R13.10-Dysphagia, unspecified.      COMPARISON: 10/06/2018.     FINDINGS: Portable chest reveals patchy ill-defined opacification seen  within the left upper lobe. Tracheostomy tube in satisfactory position  above the jacobo. Ill-defined opacification seen at the lung bases. Mild  elevation identified of the right hemidiaphragm. Small left pleural  effusion.           Impression:       Worsening identified of the airspace disease in the left  upper lobe. Small bilateral pleural effusions. The heart is enlarged.     D:  10/08/2018  E:  10/08/2018     This report was finalized on 10/8/2018 1:09 PM by Dr. Meagan Randhawa MD.       XR Chest 1 View [[id number]] Collected:  10/07/18 1222     Updated:  10/07/18 1449    Narrative:       EXAMINATION: XR CHEST 1 VW - 10/6/2018     INDICATION: Pneumonia.     TECHNIQUE:  Single view frontal chest.     COMPARISONS: None.     FINDINGS:  Tracheostomy in place. Calcification of the aortic knob.  There is an arc-like calcification projecting over the cardiac  silhouette; this could be valvular or in the ventricular wall. There is  extensive opacity involving the left lung. The right lung is grossly  clear. No  pneumothorax. Right proximal humerus ORIF hardware noted.       Impression:       Extensive opacification of the left lung. This is  compatible with the given history of pneumonia.     DICTATED:   10/07/2018  EDITED/ls :   10/07/2018         This report was finalized on 10/7/2018 2:47 PM by Sonny Perez.             I personally reviewed the radiographic studies     Assessment/Plan   IMPRESSION:   1.  Pneumonia, left chest, with recurrent mucus plugging, with previous cultures in the past positive for MRSA and Pseudomonas. Recurrence likely related to recurrent aspiration.  MRSA positive on this admission to date.  2. Acute on chronic respiratory hypoxic failure.  3. Dementia.  Ongoing.  4. Encephalopathy, metabolic.  5. Anemia, chronic disease.   6. Diabetes mellitus type 2 with increased risk for infection.  7. Hypocalcemia, 8.4.  8.  ALT 67, worse,, AST 35, elevated. Probably related to medications versus other.  9. Hypokalemia, resolved.  10.  Cultures from abdominal wall with multiple organisms, colonized.  Colonized with yeast.  11.  Thrombocytopenia, worse.  Probably medicine related.     Plan:     1.  Diagnostically, continue to follow patient's physical exam, CBC, CMP, CRP.  2. Therapeutically, continue vancomycin and aztreonam. Duration of antibiotics until 10/20/18.  3. Oxygen support therapy.  4.  Rehabilitation.  5.  Aspiration precautions.    Increased risk for adverse drug reactions, complications from line, recurrent pneumonia.  Poor long-term prognosis.    Maxim Lundberg MD  10/17/2018

## 2018-10-17 NOTE — PLAN OF CARE
Problem: Patient Care Overview  Goal: Interprofessional Rounds/Family Conf  Outcome: Ongoing (interventions implemented as appropriate)  Palliative Team Conference: NARINDER Major RN, CHPN; TORRI Mitchell RN, CHPN; ABDOUL Coello MDiv; DICK David DO; TIN Everett, RN, CHPN; MONSERRAT Glover, ARELI; ARELI Reyna   10/17/18 1421   Interdisciplinary Rounds/Family Conf   Summary Palliative consult for GOC/ACP. GOC discussion by ARELI Ross with pt; remains full code, verbalized understanding that any PO intake puts her at high risk for aspiration and PNA; stated she wants to utilize her PEG for nutrition and will remain NPO. Plan for discharge back to [facility] when medically ready. Palliative follow up tomorrow on GOC discussion prior to discharge.       Problem: Palliative Care (Adult)  Intervention: Minimize Discomfort   10/17/18 1421   Promote Oxygenation/Perfusion   Pain Management Interventions pain management plan reviewed with patient/caregiver     Intervention: Optimize Function   10/17/18 1421   Musculoskeletal Interventions   Fatigue Management frequent rest breaks encouraged;paced activity encouraged     Intervention: Promote Informed Decision Making and Goal Setting   10/17/18 1421   Coping/Psychosocial Interventions   Life Transition/Adjustment palliative care discussed;palliative care initiated     Intervention: Support/Optimize Psychosocial Response   10/17/18 1421   Coping/Psychosocial Interventions   Supportive Measures decision-making supported;goal setting facilitated;positive reinforcement provided;self-reflection promoted;verbalization of feelings encouraged       Goal: Identify Related Risk Factors and Signs and Symptoms  Outcome: Ongoing (interventions implemented as appropriate)   10/17/18 1421   Palliative Care (Adult)   Palliative Care: Related Risk Factors advanced age;chronic illness;worsening symptoms   Palliative Care: Signs and Symptoms breathlessness;fatigue     Goal: Maximized Comfort  Outcome: Ongoing  (interventions implemented as appropriate)   10/17/18 1421   Palliative Care (Adult)   Maximized Comfort making progress toward outcome     Goal: Enhanced Quality of Life  Outcome: Ongoing (interventions implemented as appropriate)   10/17/18 1421   Palliative Care (Adult)   Enhanced Quality of Life making progress toward outcome

## 2018-10-17 NOTE — PROGRESS NOTES
Continued Stay Note   Sukumar     Patient Name: Nallely Junior  MRN: [id number]  Today's Date: 10/17/2018    Admit Date: 10/6/2018          Discharge Plan     Row Name 10/17/18 1445       Plan    Plan Return to [facility]    Patient/Family in Agreement with Plan yes    Plan Comments Spoke to Kelley at [facility]. Anticipated transfer back to facility is Thursday 10/18 with ambulance arranged with [facility] at 1500. Pt started on tube feedings. CM will f/u with Kelley [facility] morning to verify if pt is returning so she can call family to come in facility to complete her paperwork. CM will cont to follow.               Discharge Codes    No documentation.       Expected Discharge Date and Time     Expected Discharge Date Expected Discharge Time    Oct 18, 2018             Patrica Bryant

## 2018-10-17 NOTE — PLAN OF CARE
Problem: Fall Risk (Adult)  Goal: Absence of Fall  Outcome: Ongoing (interventions implemented as appropriate)      Problem: Skin Injury Risk (Adult)  Goal: Skin Health and Integrity  Outcome: Ongoing (interventions implemented as appropriate)      Problem: Patient Care Overview  Goal: Plan of Care Review  Outcome: Ongoing (interventions implemented as appropriate)   10/17/18 0428   Coping/Psychosocial   Plan of Care Reviewed With patient   OTHER   Outcome Summary VSS. NSR. PEG feedings restarted. Pt is requesting food and said she does not want to be fed by the tube. I spoke with her and she requested that a doctor speak with her more about it today. Will continue to monitor.    Plan of Care Review   Progress no change     Goal: Individualization and Mutuality  Outcome: Ongoing (interventions implemented as appropriate)    Goal: Discharge Needs Assessment  Outcome: Ongoing (interventions implemented as appropriate)    Goal: Interprofessional Rounds/Family Conf  Outcome: Ongoing (interventions implemented as appropriate)

## 2018-10-17 NOTE — CONSULTS
Palliative Care Initial Consult Note    Patient Name:  Nallely Junior   : 1940   Sex: female    Patient Care Team:  Ray Strickland MD as PCP - General (Internal Medicine)    Advance care planning discussed: yes  Code Status: FULL  Advance Directive:   Surrogate decision maker: sister, Carlita Sharif     Referring Provider: Darwin Orr   Reason for Consult: discuss goals of care including TF per PG vs comfort feed with change of code status     Subjective   Patient is a 78 y.o. female  with history of chronic respiratory failure s/p tracheostomy, HLD, T2DM, bedbound state admitted on 10/06 from OSH  for nonresolving PNA, mucus plugging with opacification of left lung and tracheostomy issues. PMH includes fx leg about 18 months ago, hospitalization at [facility] for repair which included failure to wean form vent after 30 days thus she was trach/PEG tube placement. She was DCd back to [facility] in [place]; over past few months she has been taking po food/fluids with recurrent aspirations. She was DX at OSH with left sided PNA; treated with broad spectrum abx- merrem, vanc, levaquin since 10/2. Sputum cx from admission was reported to grow MRSA, sensitivities pending, as well as GNR (speciations/sensitivities pending). Patient was reported to initially be improving as WBC noted on admission 20k--- went down to 10k on day of transfer (10/6), however repeat CXR from 10/3-10/4 revealed worsening opacification of left hemithorax concerning for mucus plugging, also noted reports of small left sided plueral effusion. [facility] was called for transfer,  She/sister reported no home O2 needs.     She is currently being tx for PNA LLL, MRSA and GNR and showing clinical improvement.  She continues to request po food/fluid, she remains FULL CODE status. Palliative Consult today to determine goals of care; educate about choice of NPO/PEG TF vs comfort feedings with recurrent aspirations pneumonias and change to code status. .     CODE STATUS:      "  Code Status and Medical Interventions:   Ordered at: 10/06/18 1946     Level Of Support Discussed With:     Patient     Code Status:     CPR     Medical Interventions (Level of Support Prior to Arrest):     Full     Patient's response when asked, \"are you uncomfortable because of pain?\": no     Review of Systems  Review of Systems   Constitutional: Positive for fatigue and fever.   HENT: Positive for congestion, trouble swallowing and voice change.         Trache   Respiratory: Positive for cough, shortness of breath and wheezing.    Gastrointestinal: Positive for abdominal distention.   Genitourinary:        F/c   Skin: Positive for pallor.   Hematological: Bruises/bleeds easily.   All other systems reviewed and are negative.    History  Past Medical History:   Diagnosis Date   • Anemia    • Anxiety    • Aortic stenosis    • CHF (congestive heart failure) (CMS/[facility])    • Chronic respiratory failure with hypoxia and hypercapnia (CMS/[facility])    • CKD (chronic kidney disease), stage III (CMS/[facility])    • Constipation    • COPD (chronic obstructive pulmonary disease) (CMS/[facility])    • Coronary artery disease    • Dementia    • Depression    • Diabetes mellitus (CMS/[facility])    • Dysphagia    • Femur fracture, right (CMS/[facility])    • GERD (gastroesophageal reflux disease)    • Hyperlipidemia    • Hypertension    • Hypotension     on midodrine   • Restless legs syndrome (RLS)      Past Surgical History:   Procedure Laterality Date   • FOREARM SURGERY     • LEG SURGERY     • PEG TUBE INSERTION     • TRACHEOSTOMY       Current Facility-Administered Medications   Medication Dose Route Frequency Provider Last Rate Last Dose   • ! Vancomycin trough 10/15 @1130. Please hold 1200 dose until evaluated by pharmacy.   Does not apply Once Jenniffer Munoz, [facility]       • acetaminophen (TYLENOL) tablet 650 mg  650 mg Oral Q6H PRN Shaila Davidson, DO   650 mg at 10/14/18 0626   • acetylcysteine (MUCOMYST) 20 % solution 3 mL  3 mL Nebulization BID - RT " Elvis Armstrong MD   3 mL at 10/17/18 0845   • aspirin tablet 162 mg  162 mg Oral Daily Olivia Murphy MD   162 mg at 10/17/18 1000   • atorvastatin (LIPITOR) tablet 40 mg  40 mg Oral Nightly Olivia Murphy MD   40 mg at 10/16/18 2300   • aztreonam (AZACTAM) 2 g in sodium chloride 0.9 % 100 mL IVPB-MBP  2 g Intravenous Q8H Jan Martínez RPH   2 g at 10/17/18 0959   • busPIRone (BUSPAR) tablet 15 mg  15 mg Oral Q8H Olivia Murphy MD   15 mg at 10/17/18 0707   • dextrose (D50W) 25 g/ 50mL Intravenous Solution 25 g  25 g Intravenous Q15 Min PRN Olivia Murphy MD       • dextrose (GLUTOSE) oral gel 15 g  15 g Oral Q15 Min PRN Olivia Murphy MD       • enoxaparin (LOVENOX) syringe 40 mg  40 mg Subcutaneous Q24H Olivia Murphy MD   40 mg at 10/16/18 2306   • glucagon (human recombinant) (GLUCAGEN DIAGNOSTIC) injection 1 mg  1 mg Subcutaneous PRN Olivia Murphy MD       • hydrocortisone (ANUSOL-HC) 2.5 % rectal cream   Rectal BID Yumi [place] APRN   Stopped at 10/17/18 1001   • insulin detemir (LEVEMIR) injection 25 Units  25 Units Subcutaneous Nightly Joavnny Michael MD   25 Units at 10/16/18 2301   • insulin lispro (humaLOG) injection 0-7 Units  0-7 Units Subcutaneous Q6H Chasity Cortes MD   2 Units at 10/17/18 0707   • insulin lispro (humaLOG) injection 8 Units  8 Units Subcutaneous TID With Meals Kari Field APRN   8 Units at 10/17/18 0959   • ipratropium-albuterol (DUO-NEB) nebulizer solution 3 mL  3 mL Nebulization 4x Daily AC & at Bedtime Elvis Armstrong MD   3 mL at 10/17/18 0844   • ipratropium-albuterol (DUO-NEB) nebulizer solution 3 mL  3 mL Nebulization Q4H PRN Elvis Armstrong MD       • Magnesium Sulfate 2 gram Bolus, followed by 8 gram infusion (total Mg dose 10 grams)- Mg less than or equal to 1mg/dL  2 g Intravenous PRN Darwin Orr MD        Or   • Magnesium Sulfate 2 gram / 50mL Infusion (GIVE X 3 BAGS TO EQUAL 6GM TOTAL DOSE) - Mg 1.1 - 1.5 mg/dl  2 g  Intravenous PRN Darwin Orr MD        Or   • Magnesium Sulfate 4 gram infusion- Mg 1.6-1.9 mg/dL  4 g Intravenous PRN Darwin Orr MD 25 mL/hr at 10/17/18 0959 4 g at 10/17/18 0959   • midodrine (PROAMATINE) tablet 10 mg  10 mg Oral TID AC Olivia Murphy MD   10 mg at 10/17/18 1000   • ondansetron (ZOFRAN) injection 4 mg  4 mg Intravenous Q6H PRN Francesca Fry APRN   4 mg at 10/10/18 0248   • Pharmacy to dose vancomycin   Does not apply Continuous PRN Jan Martínez, [facility]       • potassium chloride (MICRO-K) CR capsule 40 mEq  40 mEq Oral PRN Darwin Orr MD   40 mEq at 10/16/18 0814    Or   • potassium chloride (KLOR-CON) packet 40 mEq  40 mEq Oral PRN Darwin Orr MD   40 mEq at 10/16/18 1726    Or   • potassium chloride 10 mEq in 100 mL IVPB  10 mEq Intravenous Q1H PRN Darwin Orr MD       • rOPINIRole (REQUIP) tablet 4 mg  4 mg Oral Nightly Olivia Murphy MD   4 mg at 10/16/18 2300   • sodium chloride 0.9 % flush 3 mL  3 mL Intravenous Q12H Olivia Murphy MD   3 mL at 10/17/18 1001   • sodium chloride 0.9 % flush 3-10 mL  3-10 mL Intravenous PRN Olivia Murphy MD       • vancomycin 1500 mg/500 mL 0.9% NS IVPB ([facility])  1,500 mg Intravenous Q24H Jenniffer Munoz, [facility]   1,500 mg at 10/16/18 1222       Pharmacy to dose vancomycin      acetaminophen  •  dextrose  •  dextrose  •  glucagon (human recombinant)  •  ipratropium-albuterol  •  magnesium sulfate **OR** magnesium sulfate **OR** magnesium sulfate  •  ondansetron  •  Pharmacy to dose vancomycin  •  potassium chloride **OR** potassium chloride **OR** potassium chloride  •  sodium chloride  Allergies   Allergen Reactions   • Penicillins Unknown (See Comments)     Per med rec, pt is a poor historian    • Sulfa Antibiotics Unknown (See Comments)     Per med rec, pt is a poor historian     Family History   Problem Relation Age of Onset   • Family history unknown: Yes     Social History     Social History   • Marital status:       Spouse name: N/A   • Number of children: N/A   • Years of education: N/A     Occupational History   • Not on file.     Social History Main Topics   • Smoking status: Former Smoker     Years: 25.00   • Smokeless tobacco: Never Used   • Alcohol use No   • Drug use: No   • Sexual activity: Defer     Other Topics Concern   • Not on file     Social History Narrative   • No narrative on file       Objective  resting in bed, alert/oriented. Answers questions by whispers/handwriting.  BBS with overall rhonchi heard from doorway however pt denies soa. TC O2 at 40%. sats 89-92% . Pt reports she is not sure of last po intake, nurse Yarely at bedside thinks 24-36 hrs.  Pt now NPO/TF is patent per PEG Tube.      Vital Signs  Temp:  [96.8 °F (36 °C)-99 °F (37.2 °C)] 98.3 °F (36.8 °C)  Heart Rate:  [71-76] 74  Resp:  [16-20] 16  BP: ()/(52-93) 123/64    PPS:  30% based on the following measures:   Ambulation: Totally bed bound  Activity and Evidence of Disease: Unable to do any work, extensive evidence of disease  Self-Care: Total care  Intake: Reduced   LOC: Full, drowsy or confusion    Physical Exam   Constitutional: She is oriented to person, place, and time. She appears well-developed and well-nourished.   HENT:   Head: Normocephalic.   Eyes: Pupils are equal, round, and reactive to light.   Neck:   Trach midline, TCO2 @ 40%   Cardiovascular: Normal rate and regular rhythm.    Difficult to auscultate over rhonchi    Pulmonary/Chest: She is in respiratory distress. She has rales.   Abdominal: Soft. Bowel sounds are normal.    tube patent with insertion site c&d    Genitourinary:   Genitourinary Comments: F/c BSD uop light tequila, clear    Musculoskeletal:   Decreased BLE function, bedfast, OOB to chair with lift   Neurological: She is alert and oriented to person, place, and time.   communicates by writing    Skin: Skin is warm and dry. Capillary refill takes 2 to 3 seconds. There is pallor.   Psychiatric: She has a  normal mood and affect. Her behavior is normal.   Nursing note and vitals reviewed.      Results Review:   Lab Results   Component Value Date    HGBA1C 8.90 (H) 10/06/2018       Lab Results   Component Value Date    GLUCOSE 160 (H) 10/17/2018    BUN 12 10/17/2018    CREATININE 0.40 (L) 10/17/2018    EGFRIFNONA >150 10/17/2018    BCR 30.0 (H) 10/17/2018    K 4.2 10/17/2018    CO2 30.0 10/17/2018    CALCIUM 8.4 (L) 10/17/2018    ALBUMIN 3.02 (L) 10/17/2018    AST 35 (H) 10/17/2018    ALT 67 (H) 10/17/2018       WBC   Date Value Ref Range Status   10/17/2018 7.14 3.50 - 10.80 10*3/mm3 Final     RBC   Date Value Ref Range Status   10/17/2018 3.77 (L) 3.89 - 5.14 10*6/mm3 Final     Hemoglobin   Date Value Ref Range Status   10/17/2018 11.0 (L) 11.5 - 15.5 g/dL Final     Hematocrit   Date Value Ref Range Status   10/17/2018 35.4 34.5 - 44.0 % Final     MCV   Date Value Ref Range Status   10/17/2018 93.9 80.0 - 99.0 fL Final     MCH   Date Value Ref Range Status   10/17/2018 29.2 27.0 - 31.0 pg Final     MCHC   Date Value Ref Range Status   10/17/2018 31.1 (L) 32.0 - 36.0 g/dL Final     RDW   Date Value Ref Range Status   10/17/2018 14.6 (H) 11.3 - 14.5 % Final     RDW-SD   Date Value Ref Range Status   10/17/2018 49.5 37.0 - 54.0 fl Final     MPV   Date Value Ref Range Status   10/17/2018 11.4 6.0 - 12.0 fL Final     Platelets   Date Value Ref Range Status   10/17/2018 137 (L) 150 - 450 10*3/mm3 Final     Neutrophil %   Date Value Ref Range Status   10/17/2018 66.2 41.0 - 71.0 % Final     Lymphocyte %   Date Value Ref Range Status   10/17/2018 25.4 24.0 - 44.0 % Final     Monocyte %   Date Value Ref Range Status   10/17/2018 6.2 0.0 - 12.0 % Final     Eosinophil %   Date Value Ref Range Status   10/17/2018 2.1 0.0 - 3.0 % Final     Basophil %   Date Value Ref Range Status   10/17/2018 0.1 0.0 - 1.0 % Final     Immature Grans %   Date Value Ref Range Status   10/17/2018 0.1 0.0 - 0.6 % Final     Neutrophils, Absolute    Date Value Ref Range Status   10/17/2018 4.73 1.50 - 8.30 10*3/mm3 Final     Lymphocytes, Absolute   Date Value Ref Range Status   10/17/2018 1.81 0.60 - 4.80 10*3/mm3 Final     Monocytes, Absolute   Date Value Ref Range Status   10/17/2018 0.44 0.00 - 1.00 10*3/mm3 Final     Eosinophils, Absolute   Date Value Ref Range Status   10/17/2018 0.15 0.00 - 0.30 10*3/mm3 Final     Basophils, Absolute   Date Value Ref Range Status   10/17/2018 0.01 0.00 - 0.20 10*3/mm3 Final     Immature Grans, Absolute   Date Value Ref Range Status   10/17/2018 0.01 0.00 - 0.03 10*3/mm3 Final       Pneumonia    Type 2 diabetes mellitus (CMS/[facility])    Hypotension    Hyperlipidemia    Depression    Anxiety    COPD (chronic obstructive pulmonary disease) (CMS/[facility])    Dysphagia    Coronary artery disease    CHF (congestive heart failure) (CMS/[facility])    GERD (gastroesophageal reflux disease)    Chronic respiratory failure with hypoxia and hypercapnia (CMS/[facility])    Assessment/Plan:   1   Pneumonia    Type 2 diabetes mellitus (CMS/[facility])    Hypotension    Hyperlipidemia    Depression    Anxiety    COPD (chronic obstructive pulmonary disease) (CMS/[facility])    Dysphagia    Coronary artery disease    CHF (congestive heart failure) (CMS/[facility])    GERD (gastroesophageal reflux disease)    Chronic respiratory failure with hypoxia and hypercapnia (CMS/[facility])  1) PNA LLL, MRSA and GNR, overall rhonchi. intermittent mucus plugs: TF patent per PEG, repeat swallow this afternoon. continue IV ABX  Azactam/Vanc through 10/20.   Pain: well controlled with PRN Tylenol   Dyspnea: TC O2, scheduled and PRN mucomyst nebs/duonebs/chest physiotherapy   Nausea/Vomiting: PRN ondansetron (not used)   Anxiety/Agitation: Buspar 15 mg q 8 hrs   Confusion/Delerium: na  Depression: Buspar   Bowel/bladder: f/c BSD, BM 10/16.    Nutrition: NPO. TF with nutrition following.  Speech to repeat swallow today   Sleep: ropinorole 4 mg q HS   ADLs: max assist    Psychoscial: lives in LTC[facility],  identifies self as decision maker but sister as helps. (above)  Other: DVT PX Lovenox   2) CAD/CHF: asa 81 mg q day, atorvastin   3) HTN: continue home midodirone   3)  DMT2: continue Levmir 25 U q night; bolus insulins QID with meals/HS   4) hemorrhoids: scheduled rectal cream     Lengthy discussion with pt (nurse Pritchett present) to educate on choice of NPO status/PEG TF vs comfort feeds with expected recurrent aspiration pneumonias that will eventually not respond to ABX and may be death event. Ms. Junior was adamant that she wants NPO/PEG TF;  I further explained that TF may also cause aspiration altho risk is lower; she demonstrated understanding of all of above.  We then discussed code status and she requests to remain FULL Code status.  I will f/u tomorrow just to ascertain she has not changed her mind after 24-48 hrs of NPO.      Medication adjustments: none. Thank you for this consult and allowing us to participate in patient's plan of care. Palliative Care Team will continue to follow patient. Please call for needs    Total Visit Time: 60   Face to Face Time: 45    Zahra Glover, APRN  [phone]  10/17/18  10:14 AM

## 2018-10-17 NOTE — PLAN OF CARE
Problem: Patient Care Overview  Goal: Plan of Care Review   10/17/18 1140   Coping/Psychosocial   Plan of Care Reviewed With patient   Plan of Care Review   Progress improving

## 2018-10-17 NOTE — THERAPY TREATMENT NOTE
Acute Care - Physical Therapy Treatment Note  [facility]     Patient Name: Nallely Junior  : 1940  MRN: [id number]  Today's Date: 10/17/2018  Onset of Illness/Injury or Date of Surgery: 10/06/18  Date of Referral to PT: 10/06/18  Referring Physician: TIN Murphy MD    Admit Date: 10/6/2018    Visit Dx:    ICD-10-CM ICD-9-CM   1. Impaired mobility and ADLs Z74.09 799.89   2. Oropharyngeal dysphagia R13.12 787.22   3. Impaired functional mobility, balance, gait, and endurance Z74.09 V49.89   4. Aspiration pneumonia of left lower lobe, unspecified aspiration pneumonia type (CMS/HCC) J69.0 507.0   5. Voice and resonance disorder R49.9 784.40     Patient Active Problem List   Diagnosis   • Pneumonia   • Type 2 diabetes mellitus (CMS/[facility])   • Hypotension   • Hyperlipidemia   • Depression   • Anxiety   • COPD (chronic obstructive pulmonary disease) (CMS/[facility])   • Dysphagia   • Coronary artery disease   • CHF (congestive heart failure) (CMS/[facility])   • GERD (gastroesophageal reflux disease)   • Chronic respiratory failure with hypoxia and hypercapnia (CMS/[facility])       Therapy Treatment          Rehabilitation Treatment Summary     Row Name 10/17/18 1016 10/17/18 1000          Treatment Time/Intention    Discipline physical therapist  - speech language pathologist  -AV     Document Type therapy note (daily note)  -2 therapy note (daily note)  -AV     Subjective Information no complaints  -SJ2 no complaints  -AV     Mode of Treatment individual therapy  -SJ2 individual therapy  -AV     Care Plan Review care plan/treatment goals reviewed;risks/benefits reviewed;current/potential barriers reviewed;patient/other agree to care plan  -2 care plan/treatment goals reviewed  -AV     Therapy Frequency (PT Clinical Impression) daily  -SJ2  --     Patient Effort good  -SJ2 adequate  -AV     Existing Precautions/Restrictions fall;oxygen therapy device and L/min;other (see comments)  -SJ2  --     Recorded by [] Meme Levin,  PT 10/17/18 1039  [SJ2] Meme Levin, PT 10/17/18 1045 [AV] Kenya Slater, MS CCC-SLP 10/17/18 1016     Row Name 10/17/18 1016             Vital Signs    Pre Systolic BP Rehab --   VSS throughout session  -SJ      Recorded by [SJ] Meme Levin, PT 10/17/18 1045      Row Name 10/17/18 1016             Cognitive Assessment/Intervention- PT/OT    Affect/Mental Status (Cognitive) WFL  -SJ      Orientation Status (Cognition) oriented to;person;place  -SJ      Follows Commands (Cognition) follows one step commands;over 90% accuracy  -SJ      Safety Deficit (Cognitive) insight into deficits/self awareness  -SJ      Personal Safety Interventions fall prevention program maintained;muscle strengthening facilitated  -SJ      Recorded by [SJ] Meme Levin, PT 10/17/18 1045      Row Name 10/17/18 1016             Safety Issues, Functional Mobility    Safety Issues Affecting Function (Mobility) awareness of need for assistance;insight into deficits/self awareness;sequencing abilities  -SJ      Impairments Affecting Function (Mobility) endurance/activity tolerance;strength;range of motion (ROM)  -SJ      Recorded by [SJ] Meme Levin, PT 10/17/18 1045      Row Name 10/17/18 1016             Therapeutic Exercise    Therapeutic Exercise supine, lower extremities  -      91618 - PT Therapeutic Exercise Minutes 23  -SJ      Recorded by [SJ] Meme Levin, PT 10/17/18 1045      Row Name 10/17/18 1016             Lower Extremity Supine Therapeutic Exercise    Performed, Supine Lower Extremity (Therapeutic Exercise) hip abduction/adduction;ankle dorsiflexion/plantarflexion;SLR (straight leg raise);quadriceps sets;ankle pumps;heel slides;SAQ (short arc quad) over bolster  -SJ      Exercise Type, Supine Lower Extremity (Therapeutic Exercise) AAROM (active assistive range of motion)  -SJ      Sets/Reps Detail, Supine Lower Extremity (Therapeutic Exercise) 2/10  -SJ      Recorded by [SJ] Meme Levin, PT  10/17/18 1045      Row Name 10/17/18 1016             Therapeutic Exercise    Lower Extremity (Therapeutic Exercise) gastroc stretch, bilateral  -SJ      Recorded by [SJ] Meme Levin, PT 10/17/18 1045      Row Name 10/17/18 1016             Positioning and Restraints    Pre-Treatment Position in bed  -SJ      Post Treatment Position bed  -SJ      In Bed supine;call light within reach;encouraged to call for assist;exit alarm on;with family/caregiver;heels elevated  -SJ      Recorded by [SJ] Meme Levin, PT 10/17/18 1045      Row Name 10/17/18 1000             Pain Assessment    Additional Documentation Pain Scale: FACES Pre/Post-Treatment (Group)  -AV      Recorded by [AV] Kenya Slater, MS CCC-SLP 10/17/18 1016      Row Name 10/17/18 1016             Pain Scale: Numbers Pre/Post-Treatment    Pain Scale: Numbers, Pretreatment 0/10 - no pain  -SJ      Pain Scale: Numbers, Post-Treatment 0/10 - no pain  -SJ      Recorded by [SJ] Meme Levin, PT 10/17/18 1045      Row Name 10/17/18 1000             Pain Scale: FACES Pre/Post-Treatment    Pain: FACES Scale, Pretreatment 0-->no hurt  -AV      Pain: FACES Scale, Post-Treatment 0-->no hurt  -AV      Recorded by [AV] Kenya Slater, MS CCC-SLP 10/17/18 1016      Row Name 10/17/18 1016             Coping    Observed Emotional State calm;pleasant  -SJ      Verbalized Emotional State acceptance  -SJ      Recorded by [SJ] Meme Levin, PT 10/17/18 1045      Row Name 10/17/18 1016             Outcome Summary/Treatment Plan (PT)    Daily Summary of Progress (PT) progress towards functional goals is fair  -SJ      Recorded by [SJ] Meme Levin, PT 10/17/18 1045      Row Name 10/17/18 1000             Outcome Summary/Treatment Plan (SLP)    Daily Summary of Progress (SLP) progress towards functional goals is fair  -AV      Plan for Continued Treatment (SLP) Continued PMV trials as tolerates however patient may benefit from consideration of a  fenestrated trach as still having signiciant back pressure with PMV.  RN reports now NPO. Rec MBS tomorrow to further assess.    -AV      Anticipated Dischage Disposition unknown;anticipate therapy at next level of care  -AV      Recorded by [AV] Kenya Slater, MS CCC-SLP 10/17/18 1016        User Key  (r) = Recorded By, (t) = Taken By, (c) = Cosigned By    Initials Name Effective Dates Discipline    SJ Meme Levin, PT 06/19/15 -  PT    AV Kenya Slater, MS CCC-SLP 04/03/18 -  SLP                     Physical Therapy Education     Title: PT OT SLP Therapies (Active)     Topic: Physical Therapy (Active)     Point: Mobility training (Active)    Learning Progress Summary     Learner Status Readiness Method Response Comment Documented by    Patient Active Acceptance E NR  SJ 10/17/18 1046     Done Acceptance E VU  VG 10/14/18 1403     Active Acceptance D,E NR HEP SH 10/12/18 1106          Point: Home exercise program (Active)    Learning Progress Summary     Learner Status Readiness Method Response Comment Documented by    Patient Active Acceptance E NR  SJ 10/17/18 1046     Done Acceptance E VU  VG 10/14/18 1403     Active Acceptance D,E NR HEP SH 10/12/18 1106     Active Acceptance E NR  KR 10/11/18 0953          Point: Body mechanics (Active)    Learning Progress Summary     Learner Status Readiness Method Response Comment Documented by    Patient Active Acceptance E NR  SJ 10/17/18 1046     Done Acceptance E VU  VG 10/14/18 1403     Active Acceptance D,E NR HEP SH 10/12/18 1106     Active Acceptance E NR  KR 10/11/18 0953          Point: Precautions (Active)    Learning Progress Summary     Learner Status Readiness Method Response Comment Documented by    Patient Active Acceptance E NR  SJ 10/17/18 1046     Done Acceptance E VU  VG 10/14/18 1403     Active Acceptance D,E NR HEP SH 10/12/18 1106     Active Acceptance E NR  KR 10/11/18 0953                      User Key     Initials Effective Dates Name  Provider Type Discipline     06/19/15 -  Meme Schmid, PT Physical Therapist PT     06/19/15 -  Meme Levin, PT Physical Therapist PT    KR 04/03/18 -  Shelli Nunes, PT Physical Therapist PT    VG 05/29/18 -  Zabrina Goode, PT Physical Therapist PT                    PT Recommendation and Plan  Therapy Frequency (PT Clinical Impression): daily  Outcome Summary/Treatment Plan (PT)  Daily Summary of Progress (PT): progress towards functional goals is fair  Progress: no change  Outcome Summary: Pt participated well with supine therex in all planes. VSS. Continue POC.          Outcome Measures     Row Name 10/17/18 1016 10/15/18 1423 10/14/18 1403       How much help from another person do you currently need...    Turning from your back to your side while in flat bed without using bedrails? 2  -SJ  -- 2  -VG    Moving from lying on back to sitting on the side of a flat bed without bedrails? 1  -SJ  -- 1  -VG    Moving to and from a bed to a chair (including a wheelchair)? 1  -SJ  -- 1  -VG    Standing up from a chair using your arms (e.g., wheelchair, bedside chair)? 1  -SJ  -- 1  -VG    Climbing 3-5 steps with a railing? 1  -SJ  -- 1  -VG    To walk in hospital room? 1  -SJ  -- 1  -VG    AM-PAC 6 Clicks Score 7  -SJ  -- 7  -VG       How much help from another is currently needed...    Putting on and taking off regular lower body clothing?  -- 1  -CL  --    Bathing (including washing, rinsing, and drying)  -- 1  -CL  --    Toileting (which includes using toilet bed pan or urinal)  -- 1  -CL  --    Putting on and taking off regular upper body clothing  -- 2  -CL  --    Taking care of personal grooming (such as brushing teeth)  -- 3  -CL  --    Eating meals  -- 3  -CL  --    Score  -- 11  -CL  --       Functional Assessment    Outcome Measure Options AM-PAC 6 Clicks Basic Mobility (PT)  -SJ AM-PAC 6 Clicks Daily Activity (OT)  -CL AM-PAC 6 Clicks Basic Mobility (PT)  -VG      User Key  (r) = Recorded  By, (t) = Taken By, (c) = Cosigned By    Initials Name Provider Type     Meme Levin, PT Physical Therapist    Marie Medina, OT Occupational Therapist    Zabrina Bell, PT Physical Therapist           Time Calculation:         PT Charges     Row Name 10/17/18 1046 10/17/18 1016          Time Calculation    Start Time 1016  -SJ  --     PT Received On 10/17/18  -  --     PT Goal Re-Cert Due Date 10/18/18  -  --        Time Calculation- PT    Total Timed Code Minutes- PT 23 minute(s)  -SJ  --        Timed Charges    90529 - PT Therapeutic Exercise Minutes  -- 23  -SJ       User Key  (r) = Recorded By, (t) = Taken By, (c) = Cosigned By    Initials Name Provider Type     Meme Levin, PT Physical Therapist        Therapy Suggested Charges     Code   Minutes Charges    74779 (CPT®) Hc Pt Neuromusc Re Education Ea 15 Min      44460 (CPT®) Hc Pt Ther Proc Ea 15 Min 23 2    22797 (CPT®) Hc Gait Training Ea 15 Min      03207 (CPT®) Hc Pt Therapeutic Act Ea 15 Min      18790 (CPT®) Hc Pt Manual Therapy Ea 15 Min      43976 (CPT®) Hc Pt Iontophoresis Ea 15 Min      24195 (CPT®) Hc Pt Elec Stim Ea-Per 15 Min      82047 (CPT®) Hc Pt Ultrasound Ea 15 Min      39064 (CPT®) Hc Pt Self Care/Mgmt/Train Ea 15 Min      08739 (CPT®) Hc Pt Prosthetic (S) Train Initial Encounter, Each 15 Min      71798 (CPT®) Hc Pt Orthotic(S)/Prosthetic(S) Encounter, Each 15 Min      58406 (CPT®) Hc Orthotic(S) Mgmt/Train Initial Encounter, Each 15min      Total  23 2        Therapy Charges for Today     Code Description Service Date Service Provider Modifiers Qty    76787227810 HC PT THER PROC EA 15 MIN 10/17/2018 Meme Levin, PT GP 2          PT G-Codes  Outcome Measure Options: AM-PAC 6 Clicks Basic Mobility (PT)  AM-PAC 6 Clicks Score: 7  Score: 11  Functional Limitation: Mobility: Walking and moving around  Mobility: Walking and Moving Around Current Status (): At least 80 percent but less than 100 percent  impaired, limited or restricted  Mobility: Walking and Moving Around Goal Status (): At least 60 percent but less than 80 percent impaired, limited or restricted    Meme Levin, PT  10/17/2018

## 2018-10-17 NOTE — PLAN OF CARE
Problem: Patient Care Overview  Goal: Plan of Care Review  Outcome: Ongoing (interventions implemented as appropriate)   10/17/18 1016   Coping/Psychosocial   Plan of Care Reviewed With patient   Plan of Care Review   Progress no change   SLP treatment completed. Will continue to address dysphagia and PMV. Please see note for further details and recommendations.

## 2018-10-17 NOTE — PLAN OF CARE
Problem: Patient Care Overview  Goal: Plan of Care Review  Outcome: Ongoing (interventions implemented as appropriate)   10/17/18 1045   OTHER   Outcome Summary Pt participated well with supine therex in all planes. VSS. Continue POC.   Plan of Care Review   Progress no change

## 2018-10-17 NOTE — PROGRESS NOTES
Clinical Nutrition   Reason For Visit: Follow-up protocol, EN    Patient Name: Nallely Junior  YOB: 1940  MRN: [id number]  Date of Encounter: 10/17/18 3:10 PM  Admission date: 10/6/2018      Nutrition Assessment     Admission Problem List:    Pneumonia    Acute and chronic respiratory failure with hypoxia (CMS/HCC)    Type 2 diabetes mellitus (CMS/[facility])    Dysphagia      PMH: She  has a past medical history of Anemia; Anxiety; Aortic stenosis; CHF (congestive heart failure) (CMS/[facility]); Chronic respiratory failure with hypoxia and hypercapnia (CMS/[facility]); CKD (chronic kidney disease), stage III (CMS/[facility]); Constipation; COPD (chronic obstructive pulmonary disease) (CMS/[facility]); Coronary artery disease; Dementia; Depression; Diabetes mellitus (CMS/[facility]); Dysphagia; Femur fracture, right (CMS/[facility]); GERD (gastroesophageal reflux disease); Hyperlipidemia; Hypertension; Hypotension; and Restless legs syndrome (RLS).   PSxH: She  has a past surgical history that includes Tracheostomy tube placement; Peg Tube Insertion; Leg Surgery; and Forearm surgery.        Anthropometrics     10/17/2018 75.342 kg 166 lb 1.6 oz Bed scale   10/16/2018 (No Data)  (No Data)  -   10/12/2018 80.287 kg 177 lb -   10/7/2018 80.7 kg 177 lb 14.6 oz            Labs reviewed   Labs reviewed: Yes  Medications reviewed   Medications reviewed: Yes      Intake/Ouptut 24 hrs (7:00AM - 6:59 AM)     Intake & Output (last day)       10/16 0701 - 10/17 0700 10/17 0701 - 10/18 0700    Other 60     NG/GT 60     IV Piggyback 600     Total Intake(mL/kg) 720 (9)     Urine (mL/kg/hr) 1850 (1) 600 (1)    Stool 1     Total Output 1851 600    Net -1131 -600          Unmeasured Stool Occurrence 1 x             Needs Assessment   Height used:   Weight used:     Estimated Calories needs:   Est kcal needs:  9957-1737 daniel/d based on 25-30cal/kg IBW of 120lb     Estimated Protein needs:   est pro needs:  68-82g/d, based on 1.25-1.5g/kg IBW of 120lb       Current  Nutrition Prescription   PO: NPO Diet  Oral Nutrition Supplement:  EN: IsoSource 1.5   Route: PEG  Tube Feeding Formula: Isosource 1.5 (Calorically Dense)    Tube Feeding Type Continuous    Continuous Tube Feeding Start Rate (mL/hr) 30    Then Advance Rate By (mL/hr) 10    Every __ Hours 4    To Goal Rate of (mL/hr) 50    Water Flush Amount (mL) 30    Sterile Water Flush Frequency: Every 1 Hour          Evaluation of Received Nutrient/Fluid Intake:  Insufficient data       Nutrition Diagnosis     Problem Swallowing difficulty   Etiology dysphagia   Signs/Symptoms NPO         Intervention   Intervention: Follow treatment progress, Care plan reviewed      Goal:   General: Nutrition support treatment    EN/PN: Establish EN tolerance, Tolerate EN at goal          Monitoring/Evaluation:       Monitoring/Evaluation: Per protocol, I&O, Pertinent labs, EN delivery/tolerance  Will Continue to follow per protocol  Meme Alonzo RD  Time Spent: 30 min

## 2018-10-17 NOTE — THERAPY TREATMENT NOTE
Acute Care - Speech Language Pathology Progress Note  [facility]     Patient Name: Nallely Junior  : 1940  MRN: [id number]  Today's Date: 10/17/2018         Admit Date: 10/6/2018    Visit Dx:      ICD-10-CM ICD-9-CM   1. Impaired mobility and ADLs Z74.09 799.89   2. Oropharyngeal dysphagia R13.12 787.22   3. Impaired functional mobility, balance, gait, and endurance Z74.09 V49.89   4. Aspiration pneumonia of left lower lobe, unspecified aspiration pneumonia type (CMS/HCC) J69.0 507.0   5. Voice and resonance disorder R49.9 784.40     Patient Active Problem List   Diagnosis   • Pneumonia   • Type 2 diabetes mellitus (CMS/HCC)   • Hypotension   • Hyperlipidemia   • Depression   • Anxiety   • COPD (chronic obstructive pulmonary disease) (CMS/[facility])   • Dysphagia   • Coronary artery disease   • CHF (congestive heart failure) (CMS/[facility])   • GERD (gastroesophageal reflux disease)   • Chronic respiratory failure with hypoxia and hypercapnia (CMS/[facility])        Therapy Treatment        Rehabilitation Treatment Summary     Row Name 10/17/18 1000             Treatment Time/Intention    Discipline speech language pathologist  -AV      Document Type therapy note (daily note)  -AV      Subjective Information no complaints  -AV      Mode of Treatment individual therapy  -AV      Care Plan Review care plan/treatment goals reviewed  -AV      Patient Effort adequate  -AV      Recorded by [AV] Kenya Slater, MS CCC-SLP 10/17/18 1016      Row Name 10/17/18 1000             Pain Assessment    Additional Documentation Pain Scale: FACES Pre/Post-Treatment (Group)  -AV      Recorded by [AV] Kenya Slater, MS CCC-SLP 10/17/18 1016      Row Name 10/17/18 1000             Pain Scale: FACES Pre/Post-Treatment    Pain: FACES Scale, Pretreatment 0-->no hurt  -AV      Pain: FACES Scale, Post-Treatment 0-->no hurt  -AV      Recorded by [AV] Kenya Slater, MS CCC-SLP 10/17/18 1016      Row Name 10/17/18 1000             Outcome  Summary/Treatment Plan (SLP)    Daily Summary of Progress (SLP) progress towards functional goals is fair  -AV      Plan for Continued Treatment (SLP) Continued PMV trials as tolerates however patient may benefit from consideration of a fenestrated trach as still having signiciant back pressure with PMV.  RN reports now NPO. Rec [facility] tomorrow to further assess.    -AV      Anticipated Dischage Disposition unknown;anticipate therapy at next level of care  -AV      Recorded by [AV] Kenya Slater, MS CCC-SLP 10/17/18 1016        User Key  (r) = Recorded By, (t) = Taken By, (c) = Cosigned By    Initials Name Effective Dates Discipline    AV Kenya Slater, MS CCC-SLP 04/03/18 -  SLP            EDUCATION  The patient has been educated in the following areas:   Speaking Valve.    SLP Recommendation and Plan           Anticipated Dischage Disposition: unknown, anticipate therapy at next level of care                Plan of Care Reviewed With: patient  Plan of Care Review  Plan of Care Reviewed With: patient  Daily Summary of Progress (SLP): progress towards functional goals is fair  Plan for Continued Treatment (SLP): Continued PMV trials as tolerates however patient may benefit from consideration of a fenestrated trach as still having signiciant back pressure with PMV.  RN reports now NPO. Rec [facility] tomorrow to further assess.    Progress: no change          SLP GOALS     Row Name 10/17/18 1000             Oral Nutrition/Hydration Goal 1 (SLP)    Oral Nutrition/Hydration Goal 1, SLP LTG: Pt will tolerate soft diet and thin liquids w/ 100% accuracy w/o cues.   -AV      Time Frame (Oral Nutrition/Hydration Goal 1, SLP) by discharge  -AV      Barriers (Oral Nutrition/Hydration Goal 1, SLP) patient non-compliant per MD and is now NPO   -AV         Tolerate Speaking Valve Placement Goal 1 (SLP)    Tolerate Speaking Valve Placement Goal 1 (SLP) speaking valve 20min;100%;independently (over 90% accuracy)  -AV      Time  Frame (Tolerate Speaking Valve Placement Goal 1, SLP) short term goal (STG);by discharge  -AV      Progress (Tolerate Speaking Valve Placement Goal 1, SLP) 30%;with moderate cues (50-74%)  -AV      Progress/Outcomes (Tolerate Speaking Valve Placement Goal 1, SLP) continuing progress toward goal  -AV      Comment (Tolerate Speaking Valve Placement Goal 1, SLP) --   Tolerated 4-5 minutes before needed removed 2' SOB   -AV         Additional Goal 1 (SLP)    Additional Goal 1, SLP LTG: Comunicate wants/needs with PMV speaking valve while in hospital setting with 100% accruacy and no cues  -AV      Time Frame (Additional Goal 1, SLP) by discharge  -AV      Barriers (Additional Goal 1, SLP) Patient in #6 Shiley XL cuffless. Still significant back pressure and nto tolerating 2' SOB symptoms. Patient may benefit from fenestrated trach trials for PMV tolerance.   -AV      Progress/Outcomes (Additional Goal 1, SLP) good progress toward goal  -AV        User Key  (r) = Recorded By, (t) = Taken By, (c) = Cosigned By    Initials Name Provider Type    Kenya Betts MS CCC-SLP Speech and Language Pathologist                Time Calculation:           Time Calculation- SLP     Row Name 10/17/18 1016             Time Calculation- SLP    SLP Start Time 0945  -AV      SLP Received On 10/17/18  -AV        User Key  (r) = Recorded By, (t) = Taken By, (c) = Cosigned By    Initials Name Provider Type    Kenya Betts MS CCC-SLP Speech and Language Pathologist          Therapy Charges for Today     Code Description Service Date Service Provider Modifiers Qty    49277022476 HC ST TREATMENT SPEECH 3 10/17/2018 Kenya Slater MS CCC-SLP GN 1    28574945880 HC ST TREATMENT SWALLOW 1 10/17/2018 Kenya Slater MS CCC-SLP GN 1                     Kenya Slater MS CCC-SLP  10/17/2018   and Acute Care - Speech Language Pathology   Swallow Progress Note  [facility]     Patient Name: Nallely COWAN Vernon BOJORQUEZB:  1940  MRN: [id number]  Today's Date: 10/17/2018  Onset of Illness/Injury or Date of Surgery: 10/06/18     Referring Physician: TIN Murphy MD      Admit Date: 10/6/2018    Visit Dx:      ICD-10-CM ICD-9-CM   1. Impaired mobility and ADLs Z74.09 799.89   2. Oropharyngeal dysphagia R13.12 787.22   3. Impaired functional mobility, balance, gait, and endurance Z74.09 V49.89   4. Aspiration pneumonia of left lower lobe, unspecified aspiration pneumonia type (CMS/HCC) J69.0 507.0   5. Voice and resonance disorder R49.9 784.40     Patient Active Problem List   Diagnosis   • Pneumonia   • Type 2 diabetes mellitus (CMS/[facility])   • Hypotension   • Hyperlipidemia   • Depression   • Anxiety   • COPD (chronic obstructive pulmonary disease) (CMS/[facility])   • Dysphagia   • Coronary artery disease   • CHF (congestive heart failure) (CMS/[facility])   • GERD (gastroesophageal reflux disease)   • Chronic respiratory failure with hypoxia and hypercapnia (CMS/[facility])       Therapy Treatment        Rehabilitation Treatment Summary     Row Name 10/17/18 1000             Treatment Time/Intention    Discipline speech language pathologist  -AV      Document Type therapy note (daily note)  -AV      Subjective Information no complaints  -AV      Mode of Treatment individual therapy  -AV      Care Plan Review care plan/treatment goals reviewed  -AV      Patient Effort adequate  -AV      Recorded by [AV] Kenya Slater, MS CCC-SLP 10/17/18 1016      Row Name 10/17/18 1000             Pain Assessment    Additional Documentation Pain Scale: FACES Pre/Post-Treatment (Group)  -AV      Recorded by [AV] Kenya Slater, MS CCC-SLP 10/17/18 1016      Row Name 10/17/18 1000             Pain Scale: FACES Pre/Post-Treatment    Pain: FACES Scale, Pretreatment 0-->no hurt  -AV      Pain: FACES Scale, Post-Treatment 0-->no hurt  -AV      Recorded by [AV] Kenya Slater, MS CCC-SLP 10/17/18 1016      Row Name 10/17/18 1000             Outcome Summary/Treatment  Plan (SLP)    Daily Summary of Progress (SLP) progress towards functional goals is fair  -AV      Plan for Continued Treatment (SLP) Continued PMV trials as tolerates however patient may benefit from consideration of a fenestrated trach as still having signiciant back pressure with PMV.  RN reports now NPO. Rec [facility] [place] to further assess.    -AV      Anticipated Dischage Disposition unknown;anticipate therapy at next level of care  -AV      Recorded by [AV] Kenya Slater, MS CCC-SLP 10/17/18 1016        User Key  (r) = Recorded By, (t) = Taken By, (c) = Cosigned By    Initials Name Effective Dates Discipline    AV Kenya Slater, MS CCC-SLP 04/03/18 -  SLP          Outcome Summary  Outcome Summary/Treatment Plan (SLP)  Daily Summary of Progress (SLP): progress towards functional goals is fair (10/17/18 1000 : Kenya Slater, MS CCC-SLP)  Plan for Continued Treatment (SLP): Continued PMV trials as tolerates however patient may benefit from consideration of a fenestrated trach as still having signiciant back pressure with PMV.  RN reports now NPO. Rec [facility] [place] to further assess.   (10/17/18 1000 : Kenya Slater, MS CCC-SLP)  Anticipated Dischage Disposition: unknown, anticipate therapy at next level of care (10/17/18 1000 : Kenya Slater, MS CCC-[facility])            SLP GOALS     Row Name 10/17/18 1000             Oral Nutrition/Hydration Goal 1 (SLP)    Oral Nutrition/Hydration Goal 1, SLP LTG: Pt will tolerate soft diet and thin liquids w/ 100% accuracy w/o cues.   -AV      Time Frame (Oral Nutrition/Hydration Goal 1, SLP) by discharge  -AV      Barriers (Oral Nutrition/Hydration Goal 1, SLP) patient non-compliant per MD and is now NPO   -AV         Tolerate Speaking Valve Placement Goal 1 (SLP)    Tolerate Speaking Valve Placement Goal 1 (SLP) speaking valve 20min;100%;independently (over 90% accuracy)  -AV      Time Frame (Tolerate Speaking Valve Placement Goal 1, SLP) short term goal  (STG);by discharge  -AV      Progress (Tolerate Speaking Valve Placement Goal 1, SLP) 30%;with moderate cues (50-74%)  -AV      Progress/Outcomes (Tolerate Speaking Valve Placement Goal 1, SLP) continuing progress toward goal  -AV      Comment (Tolerate Speaking Valve Placement Goal 1, SLP) --   Tolerated 4-5 minutes before needed removed 2' SOB   -AV         Additional Goal 1 (SLP)    Additional Goal 1, SLP LTG: Comunicate wants/needs with PMV speaking valve while in hospital setting with 100% accruacy and no cues  -AV      Time Frame (Additional Goal 1, SLP) by discharge  -AV      Barriers (Additional Goal 1, SLP) Patient in #6 Shiley XL cuffless. Still significant back pressure and nto tolerating 2' SOB symptoms. Patient may benefit from fenestrated trach trials for PMV tolerance.   -AV      Progress/Outcomes (Additional Goal 1, SLP) good progress toward goal  -AV        User Key  (r) = Recorded By, (t) = Taken By, (c) = Cosigned By    Initials Name Provider Type    Kenya Betts MS CCC-SLP Speech and Language Pathologist          EDUCATION  The patient has been educated in the following areas:   Dysphagia (Swallowing Impairment) Oral Care/Hydration NPO rationale.    SLP Recommendation and Plan                       Anticipated Dischage Disposition: unknown, anticipate therapy at next level of care                Plan of Care Reviewed With: patient  Plan of Care Review  Plan of Care Reviewed With: patient  Daily Summary of Progress (SLP): progress towards functional goals is fair  Plan for Continued Treatment (SLP): Continued PMV trials as tolerates however patient may benefit from consideration of a fenestrated trach as still having signiciant back pressure with PMV.  RN reports now NPO. Rec [facility] tomorrow to further assess.    Progress: no change           Time Calculation:         Time Calculation- SLP     Row Name 10/17/18 1016             Time Calculation- SLP    SLP Start Time 0956  -AV      SLP  Received On 10/17/18  -JOCY        User Key  (r) = Recorded By, (t) = Taken By, (c) = Cosigned By    Initials Name Provider Type    Kenya Betts, MS CCC-SLP Speech and Language Pathologist          Therapy Charges for Today     Code Description Service Date Service Provider Modifiers Qty    56184429496 HC ST TREATMENT SPEECH 3 10/17/2018 Kenya Salter, MS CCC-SLP GN 1    [id number] HC ST TREATMENT SWALLOW 1 10/17/2018 Kenya Slater, MS NARVAEZ-SLP GN 1                 eKnya Slater MS CCC-SLP  10/17/2018

## 2018-10-17 NOTE — PROGRESS NOTES
[facility] Medicine Services  PROGRESS NOTE    Patient Name: Nallely Junior  : 1940  MRN: [id number]    Date of Admission: 10/6/2018  Length of Stay: 11  Primary Care Physician: Ray Strickland MD    Subjective   Subjective     CC:  pna     HPI:  Some cough remains. Strength remains at baseline. Tells me she wants to eat.    Review of Systems  Gen- No fevers, chills  CV- No chest pain, palpitations  Resp- some cough,pos dyspnea, trach   GI- No N/V/D, abd pain      Otherwise ROS is negative except as mentioned in the HPI.    Objective   Objective     Vital Signs:   Temp:  [98 °F (36.7 °C)-99 °F (37.2 °C)] 98 °F (36.7 °C)  Heart Rate:  [66-76] 66  Resp:  [15-20] 16  BP: (108-123)/(52-93) 120/54        Physical Exam:  Constitutional - no acute distress, in bed  HEENT-NCAT, mucous membranes moist, trach.  CV-RRR, S1 S2 normal, no m/r/g  Resp-coarse breath sounds bilaterally  Abd-soft, non-tender, non-distended, normo active bowel sounds; overweight; PEG  Ext-No lower extremity cyanosis, clubbing or edema bilaterally  Neuro-alert, right hemiparesis, particularly right upper extremity  Psych-normal affect   Skin- No rash on exposed UE or LE bilaterally          Results Reviewed:  I have personally reviewed current lab, radiology, and data and agree.      Results from last 7 days  Lab Units 10/17/18  0434 10/16/18  0548 10/11/18  0524   WBC 10*3/mm3 7.14 6.91 9.25   HEMOGLOBIN g/dL 11.0* 10.4* 11.5   HEMATOCRIT % 35.4 32.8* 36.2   PLATELETS 10*3/mm3 137* 145* 223       Results from last 7 days  Lab Units 10/17/18  0434 10/16/18  0548 10/15/18  1110   SODIUM mmol/L 141 141 138   POTASSIUM mmol/L 4.2 3.6 2.7*   CHLORIDE mmol/L 109 108 106   CO2 mmol/L 30.0 30.0 29.0   BUN mg/dL 12 9 11   CREATININE mg/dL 0.40* 0.35* 0.43*   GLUCOSE mg/dL 160* 151* 116*   CALCIUM mg/dL 8.4* 7.9* 8.2*   ALT (SGPT) U/L 67*  --   --    AST (SGOT) U/L 35*  --   --      Estimated Creatinine Clearance: 57.5 mL/min (A)  (by C-G formula based on SCr of 0.4 mg/dL (L)).  No results found for: BNP    Microbiology Results Abnormal     Procedure Component Value - Date/Time    Wound Culture - Wound, Abdominal Wall [[id number]]  (Abnormal)  (Susceptibility) Collected:  10/07/18 0109    Lab Status:  Final result Specimen:  Wound from Abdominal Wall Updated:  10/12/18 1422     Wound Culture Moderate growth (3+) Proteus mirabilis (A)      Moderate growth (3+) Candida glabrata (A)      Moderate growth (3+) Candida krusei (A)      Light growth (2+) Enterococcus raffinosus (A)      Light growth (2+) Staphylococcus aureus, MRSA (A)     Comment:   Methicillin resistant Staphylococcus aureus, Patient may be an isolation risk.        Gram Stain Result No WBCs seen      Few (2+) Gram positive bacilli      Many (4+) Yeast    Susceptibility      Proteus mirabilis    Enterococcus raffinosus       GONSALO    GONSALO       Amikacin <=16 ug/ml Susceptible             Ampicillin >16 ug/ml Resistant    8 ug/ml Susceptible       Ampicillin + Sulbactam 16/8 ug/ml Intermediate             Aztreonam <=8 ug/ml Susceptible             Cefepime <=8 ug/ml Susceptible             Cefotaxime <=2 ug/ml Susceptible             Ceftriaxone <=8 ug/ml Susceptible             Cefuroxime sodium <=4 ug/ml Susceptible             Ciprofloxacin >2 ug/ml Resistant             Ertapenem <=1 ug/ml Susceptible             Gentamicin >8 ug/ml Resistant             Gentamicin High Level Synergy       <=500 ug/ml Susceptible       Levofloxacin 4 ug/ml Intermediate             Linezolid       <=1 ug/ml Susceptible       Meropenem <=1 ug/ml Susceptible             Penicillin G       >8 ug/ml Resistant       Piperacillin + Tazobactam <=16 ug/ml Susceptible             Streptomycin High Level Synergy       >1,000 ug/ml Resistant       Tobramycin >8 ug/ml Resistant             Trimethoprim + Sulfamethoxazole >2/38 ug/ml Resistant             Vancomycin       1 ug/ml Susceptible                   Susceptibility      Staphylococcus aureus, MRSA     GONSALO     Ciprofloxacin >2 ug/ml Resistant     Clindamycin >4 ug/ml Resistant     Daptomycin <=0.5 ug/ml Susceptible     Erythromycin >4 ug/ml Resistant     Gentamicin <=4 ug/ml Susceptible     Levofloxacin >4 ug/ml Resistant     Linezolid 2 ug/ml Susceptible     Oxacillin >2 ug/ml Resistant     Penicillin G 8 ug/ml Resistant     Quinupristin + Dalfopristin <=0.5 ug/ml Susceptible     Rifampin <=1 ug/ml Susceptible     Tetracycline <=4 ug/ml Susceptible     Trimethoprim + Sulfamethoxazole <=0.5/9.5 ug/ml Susceptible     Vancomycin 1 ug/ml Susceptible                    Blood Culture - Blood, [[id number]]  (Normal) Collected:  10/06/18 2020    Lab Status:  Final result Specimen:  Blood from Arm, Right Updated:  10/11/18 2101     Blood Culture No growth at 5 days    Respiratory Culture - Sputum, NT Suction [[id number]]  (Abnormal)  (Susceptibility) Collected:  10/07/18 0108    Lab Status:  Final result Specimen:  Sputum from NT Suction Updated:  10/10/18 1437     Respiratory Culture Scant growth (1+) Yeast isolated (A)      Scant growth (1+) Staphylococcus aureus, MRSA (A)     Comment:   Methicillin resistant Staphylococcus aureus, Patient may be an isolation risk.        Gram Stain Result Many (4+) WBCs per low power field      Rare (1+) Epithelial cells per low power field      No organisms seen    Susceptibility      Staphylococcus aureus, MRSA     GONSALO     Ciprofloxacin >2 ug/ml Resistant     Clindamycin >4 ug/ml Resistant     Erythromycin >4 ug/ml Resistant     Gentamicin <=4 ug/ml Susceptible     Levofloxacin >4 ug/ml Resistant     Linezolid 2 ug/ml Susceptible     Oxacillin >2 ug/ml Resistant     Penicillin G >8 ug/ml Resistant     Quinupristin + Dalfopristin <=0.5 ug/ml Susceptible     Rifampin <=1 ug/ml Susceptible     Tetracycline <=4 ug/ml Susceptible     Trimethoprim + Sulfamethoxazole <=0.5/9.5 ug/ml Susceptible     Vancomycin 2 ug/ml Susceptible                           Imaging Results (last 24 hours)     Procedure Component Value Units Date/Time    XR Chest 1 View [[id number]] Collected:  10/17/18 0851     Updated:  10/17/18 0851    Narrative:       EXAMINATION: XR CHEST 1 VW- 10/17/2018     INDICATION: Follow up atelectasis; Z74.09-Other reduced mobility;  R13.12-Dysphagia, oropharyngeal phase;  J69.0-Pneumonitis due to  inhalation of food and vomit; R49.9-Unspecified voice and resonance  disorder      COMPARISON: 10/13/2018     FINDINGS: Tracheostomy tube is noted. The heart is enlarged. The  vasculature is upper limits of normal. Discoid atelectasis in the left  mid lung, and mild left basilar atelectasis or effusion appears stable  or slightly increased. Right lung remains grossly clear. No pneumothorax  is identified.       Impression:       Left basilar atelectasis and left midlung discoid  atelectasis, stable to slightly increased from 10/13/2018. No  significant new chest disease elsewhere.        D:  10/17/2018  E:  10/17/2018           Results for orders placed during the hospital encounter of 10/06/18   Adult Transthoracic Echo Complete W/ Cont if Necessary Per Protocol    Narrative · Left ventricular systolic function is hyperdynamic (EF > 70).  · Left ventricular wall thickness is consistent with moderate concentric   hypertrophy.  · Left ventricular diastolic dysfunction (grade I) consistent with   impaired relaxation.  · Moderate to severe aortic stenosis (mean gradient 39 mmHg) is present.  · Mild mitral valve regurgitation is present          I have reviewed the medications.      acetylcysteine 3 mL Nebulization BID - RT   aspirin 162 mg Oral Daily   atorvastatin 40 mg Oral Nightly   aztreonam 2 g Intravenous Q8H   busPIRone 15 mg Oral Q8H   enoxaparin 40 mg Subcutaneous Q24H   hydrocortisone  Rectal BID   insulin detemir 25 Units Subcutaneous Nightly   insulin lispro 0-7 Units Subcutaneous Q6H   insulin lispro 8 Units Subcutaneous TID With Meals    ipratropium-albuterol 3 mL Nebulization 4x Daily AC & at Bedtime   midodrine 10 mg Oral TID AC   rOPINIRole 4 mg Oral Nightly   sodium chloride 3 mL Intravenous Q12H   vancomycin 1,500 mg Intravenous Q24H         Assessment/Plan   Assessment / Plan     Hospital Problem List     Pneumonia    Type 2 diabetes mellitus (CMS/[facility])    Hypotension    Overview Signed 10/7/2018  3:25 PM by Meme Acevedo APRN     on midodrine         Hyperlipidemia    Depression    Anxiety    COPD (chronic obstructive pulmonary disease) (CMS/[facility])    Dysphagia    Overview Signed 10/7/2018  4:13 PM by Meme Acevedo APRN     Was on mechanical soft and nectar thick at SNF, noncompliant          Coronary artery disease    CHF (congestive heart failure) (CMS/[facility])    GERD (gastroesophageal reflux disease)    Chronic respiratory failure with hypoxia and hypercapnia (CMS/HCC)             Brief Hospital Course to date:  Nallely Junior is a 78 y.o. female  with history of chronic respiratory failure s/p tracheostomy, HLD, T2DM, bedbound state who presents as a transfer/direct admission from [facility] for nonresolving PNA, mucus plugging with opacification of left lung and tracheostomy issues. Patient was admitted to OSH on 10/2 from her NH, for several days of sob, cough, increasing amount of sputum. Patient was initially diagnosed with left sided PNA at OSH, treated with broad spectrum abx- merrem, vanc, levaquin since 10/2. Patient was also noted to be hypotensive to as low as 90s systolic on presentation, though this resolved. Sputum cx from admission was reported to grow MRSA, sensitivities pending, as well as GNR (speciations/sensitivities pending). Patient was reported to initially be improving as WBC noted on admission 20k--- went down to 10k on day of transfer (10/6), however repeat CXR from 10/3-10/4 revealed worsening opacification of left hemithorax concerning for mucus plugging, also noted  reports of small left sided plueral effusion. [facility] was called for transfer, however no beds were available until this time. unclear what patient's home baseline oxygen requirement is, she says no oxygen at home prior but per sister ,the patient fell about a year and a half ago and broke her leg.  She went to  and had to have surgery.  She was failure to wean from the vent after a month, so she had a trach and PEG placed.  She now is a [employer]      Assessment & Plan:  Acute on chronic respiratory failure    PNA LLL, MRSA and GNR  - patient showing clinical improvement on antibiotics  - mucous plugging - chest physiotherapy QID and mucomyst nebs  - likely element of continued aspiration and difficulty handling oral secretions (patient reportedly non compliant with modified diet). Patient seen by Pulmonary Dr Armstrong yesterday and he recommends NPO and resumption of tube feeds with ongoing speech therapy (if patient declines tube feeds, recommends Palliative consult and possible comfort diet).   - patient initially agreeable to NPO and tube feeds, but now rethinking that decision. Speech re-evaluation underway. She does understand that if she chooses a comfort diet, risk of aspiration and recurrent pneumonia is significant. Discussed goals of care and code status. Palliative follows  - tentative plan for antibiotics through 10/20    Mucus plugging     Dysphagia  - NPO with tube feeds as above, MBS tomorrow. Patient still considering whether to switch back to comfort diet if she fails MBS.    T2DM (A1c 8.9)  - basal bolus     H/o Hypotension  - continue home midodrine, closely monitor     Elevated Lactic Acid   --improved     Hypokalemia  - improved       DVT prophylaxis: Lovenox     CODE STATUS:   Code Status and Medical Interventions:   Ordered at: 10/06/18 1946     Level Of Support Discussed With:    Patient     Code Status:    CPR     Medical Interventions (Level of Support Prior to Arrest):    Full       Disposition:  I expect the patient to be discharged back to [facility] soon      Electronically signed by Darwin Orr MD, 10/17/18, 3:49 PM.

## 2018-10-18 ENCOUNTER — APPOINTMENT (OUTPATIENT)
Dept: GENERAL RADIOLOGY | Facility: HOSPITAL | Age: 78
End: 2018-10-18

## 2018-10-18 VITALS
HEIGHT: 64 IN | TEMPERATURE: 97.9 F | HEART RATE: 74 BPM | RESPIRATION RATE: 20 BRPM | DIASTOLIC BLOOD PRESSURE: 51 MMHG | SYSTOLIC BLOOD PRESSURE: 93 MMHG | OXYGEN SATURATION: 92 % | WEIGHT: 166.1 LBS | BODY MASS INDEX: 28.36 KG/M2

## 2018-10-18 LAB
GLUCOSE BLDC GLUCOMTR-MCNC: 111 MG/DL (ref 70–130)
GLUCOSE BLDC GLUCOMTR-MCNC: 153 MG/DL (ref 70–130)
GLUCOSE BLDC GLUCOMTR-MCNC: 92 MG/DL (ref 70–130)
MAGNESIUM SERPL-MCNC: 2 MG/DL (ref 1.3–2.7)

## 2018-10-18 PROCEDURE — 25010000002 VANCOMYCIN 10 G RECONSTITUTED SOLUTION

## 2018-10-18 PROCEDURE — 94799 UNLISTED PULMONARY SVC/PX: CPT

## 2018-10-18 PROCEDURE — 83735 ASSAY OF MAGNESIUM: CPT

## 2018-10-18 PROCEDURE — 74230 X-RAY XM SWLNG FUNCJ C+: CPT

## 2018-10-18 PROCEDURE — 82962 GLUCOSE BLOOD TEST: CPT

## 2018-10-18 PROCEDURE — 97530 THERAPEUTIC ACTIVITIES: CPT | Performed by: OCCUPATIONAL THERAPIST

## 2018-10-18 PROCEDURE — 92611 MOTION FLUOROSCOPY/SWALLOW: CPT

## 2018-10-18 PROCEDURE — 97110 THERAPEUTIC EXERCISES: CPT

## 2018-10-18 PROCEDURE — 25010000002 ONDANSETRON PER 1 MG: Performed by: NURSE PRACTITIONER

## 2018-10-18 PROCEDURE — 99239 HOSP IP/OBS DSCHRG MGMT >30: CPT | Performed by: NURSE PRACTITIONER

## 2018-10-18 PROCEDURE — 94640 AIRWAY INHALATION TREATMENT: CPT

## 2018-10-18 PROCEDURE — 97535 SELF CARE MNGMENT TRAINING: CPT | Performed by: OCCUPATIONAL THERAPIST

## 2018-10-18 RX ORDER — INSULIN GLARGINE 100 [IU]/ML
25 INJECTION, SOLUTION SUBCUTANEOUS NIGHTLY
Refills: 12
Start: 2018-10-18

## 2018-10-18 RX ADMIN — AZTREONAM 2 G: 2 INJECTION, POWDER, LYOPHILIZED, FOR SOLUTION INTRAMUSCULAR; INTRAVENOUS at 01:34

## 2018-10-18 RX ADMIN — BARIUM SULFATE 20 ML: 400 PASTE ORAL at 12:02

## 2018-10-18 RX ADMIN — IPRATROPIUM BROMIDE AND ALBUTEROL SULFATE 3 ML: 2.5; .5 SOLUTION RESPIRATORY (INHALATION) at 15:57

## 2018-10-18 RX ADMIN — IPRATROPIUM BROMIDE AND ALBUTEROL SULFATE 3 ML: 2.5; .5 SOLUTION RESPIRATORY (INHALATION) at 13:03

## 2018-10-18 RX ADMIN — BUSPIRONE HYDROCHLORIDE 15 MG: 15 TABLET ORAL at 13:49

## 2018-10-18 RX ADMIN — MIDODRINE HYDROCHLORIDE 10 MG: 5 TABLET ORAL at 13:49

## 2018-10-18 RX ADMIN — MIDODRINE HYDROCHLORIDE 10 MG: 5 TABLET ORAL at 09:52

## 2018-10-18 RX ADMIN — ASPIRIN 325 MG ORAL TABLET 162 MG: 325 PILL ORAL at 09:52

## 2018-10-18 RX ADMIN — BARIUM SULFATE 100 ML: 0.81 POWDER, FOR SUSPENSION ORAL at 12:02

## 2018-10-18 RX ADMIN — ONDANSETRON HYDROCHLORIDE 4 MG: 2 INJECTION, SOLUTION INTRAMUSCULAR; INTRAVENOUS at 04:09

## 2018-10-18 RX ADMIN — VANCOMYCIN HYDROCHLORIDE 1500 MG: 10 INJECTION, POWDER, LYOPHILIZED, FOR SOLUTION INTRAVENOUS at 13:49

## 2018-10-18 RX ADMIN — INSULIN LISPRO 2 UNITS: 100 INJECTION, SOLUTION INTRAVENOUS; SUBCUTANEOUS at 06:44

## 2018-10-18 RX ADMIN — BUSPIRONE HYDROCHLORIDE 15 MG: 15 TABLET ORAL at 06:44

## 2018-10-18 RX ADMIN — AZTREONAM 2 G: 2 INJECTION, POWDER, LYOPHILIZED, FOR SOLUTION INTRAMUSCULAR; INTRAVENOUS at 09:51

## 2018-10-18 RX ADMIN — IPRATROPIUM BROMIDE AND ALBUTEROL SULFATE 3 ML: 2.5; .5 SOLUTION RESPIRATORY (INHALATION) at 08:31

## 2018-10-18 NOTE — PLAN OF CARE
Problem: Patient Care Overview  Goal: Interprofessional Rounds/Family Conf  Outcome: Outcome(s) achieved Date Met: 10/18/18  Palliative Team Conference: NARINDER Major RN, PN; OTRRI Mitchell RN, PN; ADBOUL Coello MDiv; DICK David DO; TIN Everett RN, PN; MONSERRAT Glover, APRN; MONIK Quinn, APRN   10/18/18 1410   Interdisciplinary Rounds/Family Conf   Summary Pt remains firm that she wishes to undergo CPR and all resuscitative measures in the event of cardiac arrest or pulmonary insufficiency. Documentation indicates pt repeats that she wishes to eat; however, when risk of aspiration reviewed, pt again states that she wants to utilize PEG for nutrition, and not risk recurrent PNA. Plan is to complete antibiotic course, then discharge back to LTC facility. Goals are established, denies symptoms. Palliative Care will sign off. Please reconsult should need arise.

## 2018-10-18 NOTE — PLAN OF CARE
Problem: Patient Care Overview  Goal: Plan of Care Review  Outcome: Ongoing (interventions implemented as appropriate)   10/18/18 0901   Coping/Psychosocial   Plan of Care Reviewed With patient   OTHER   Outcome Summary Pt lethargic and tolerated P/AAROM X 4 extremities. Will decrease frequency to 3x/wk based on baseline function of total lift oob

## 2018-10-18 NOTE — PROGRESS NOTES
Case Management Discharge Note    Final Note: Efaxed d/c summary with SLP recommendations to [facility]. Notified Kelley at [facility] of pt returning. PCS in chart. Nurse to call report to [phone]. CM will cont to follow.    Destination - Selection Complete     Service Request Status Selected Specialties Address Phone Number Fax Number    [facility] Selected Intermediate Care Facility [address], [place] [address] [phone]      Durable Medical Equipment     No service has been selected for the patient.      Dialysis/Infusion     No service has been selected for the patient.      Home Medical Care     No service has been selected for the patient.      Social Care     No service has been selected for the patient.        Ambulance: [facility]    Final Discharge Disposition Code: 04 - intermediate care facility

## 2018-10-18 NOTE — DISCHARGE PLACEMENT REQUEST
"Amy Junior (78 y.o. Female)     Date of Birth Social Security Number Address Home Phone MRN    1940  [address]  [facility] [address] [phone] [id number]    [employer] Marital Status          Yarsani        Admission Date Admission Type Admitting Provider Attending Provider Department, Room/Bed    10/6/18 Urgent Darwin Orr MD Sloan, Walker E, MD [facility] 6A, N602/1    Discharge Date Discharge Disposition Discharge Destination         Skilled Nursing Facility (DC - External)              Attending Provider:  Darwin Orr MD    Allergies:  Penicillins, Sulfa Antibiotics    Isolation:  None   Infection:  MRSA (10/10/18)   Code Status:  CPR    Ht:  162.6 cm (64\")   Wt:  75.3 kg (166 lb 1.6 oz)    Admission Cmt:  None   Principal Problem:  None                Active Insurance as of 10/6/2018     Primary Coverage     Payor Plan Insurance Group Employer/Plan Group    MEDICARE MEDICARE A & B      Payor Plan Address Payor Plan Phone Number Effective From Effective To    [address] [phone] 2005     [facility] [id number]       Subscriber Name Subscriber Birth Date Member ID       AMY JUNIOR 1940 [id number]           Secondary Coverage     Payor Plan Insurance Group Employer/Plan Group    AAR MED SUPP AAR HEALTH CARE OPTIONS      Payor Plan Address Payor Plan Phone Number Effective From Effective To    [facility] [phone] 2017     [address]       [facility] [address]       Subscriber Name Subscriber Birth Date Member ID       AMY JUNIOR 1940 [id number]                 Emergency Contacts      (Rel.) Home Phone Work Phone Mobile Phone    Carlita Sharif (Daughter) -- -- [phone]               Discharge Summary      Salina Mahajan, APRN at 10/18/2018 12:36 PM              [facility] Medicine Services  DISCHARGE SUMMARY    Patient Name: Amy Junior  : 1940  MRN: " [id number]    Date of Admission: 10/6/2018  Date of Discharge:  10/18/18  Primary Care Physician: Ray Strickland MD    Consults     Date and Time Order Name Status Description    10/16/2018 1324 Inpatient Palliative Care MD Consult Completed     10/8/2018 0030 Inpatient Infectious Diseases Consult Completed     10/6/2018 1946 Inpatient Pulmonology Consult Completed         Hospital Course     Presenting Problem:   Pleural effusion [J90]  Pneumonia [J18.9]    Active Hospital Problems    Diagnosis Date Noted   • Hypotension [I95.9] 10/07/2018   • Hyperlipidemia [E78.5] 10/07/2018   • Depression [F32.9] 10/07/2018   • Anxiety [F41.9] 10/07/2018   • COPD (chronic obstructive pulmonary disease) (CMS/[facility]) [J44.9] 10/07/2018   • Dysphagia [R13.10] 10/07/2018   • Coronary artery disease [I25.10] 10/07/2018   • CHF (congestive heart failure) (CMS/[facility]) [I50.9] 10/07/2018   • GERD (gastroesophageal reflux disease) [K21.9] 10/07/2018   • Chronic respiratory failure with hypoxia and hypercapnia (CMS/[facility]) [J96.11, J96.12] 10/07/2018   • Pneumonia [J18.9] 10/06/2018   • Type 2 diabetes mellitus (CMS/[facility]) [E11.9] 10/06/2018      Resolved Hospital Problems    Diagnosis Date Noted Date Resolved   No resolved problems to display.          Hospital Course:  Nallely Junior is a 78 y.o. female   with history of chronic respiratory failure s/p tracheostomy, HLD, T2DM, bedbound state who presents as a transfer/direct admission from [facility] for nonresolving PNA, mucus plugging with opacification of left lung and tracheostomy issues. Patient was admitted to OSH on 10/2 from her NH, for several days of sob, cough, increasing amount of sputum. Patient was initially diagnosed with left sided PNA at OSH, treated with broad spectrum abx- merrem, vanc, levaquin since 10/2. Patient was also noted to be hypotensive to as low as 90s systolic on presentation, but has improved. Sputum cx from admission was reported to grow MRSA, , as  well as Pseudomonas. She has been transitioned to IV vancomycin and Azactam which will continue through 10/20/18 per Dr. Lundberg, [facility]. She can resume home oxygen and respiratory support.     Pulmonary was consulted for refractory left sided consolidation and patient's high risk for aspiration. Has been discussed with patient that she is at a continued high risk for aspiration if she chooses to continue with oral intake. Patient did repeat MBS today and cleared for soft chopped diet with thin liquids via cup, no straws. Patient would like to continue with recommended diet at discharge and stop tube feedings. Recommend aggressive oral care.    Patient to transfer back to [facility] today. Would recommend palliative medicine to consult/ to follow while there.     Discharge Follow Up Recommendations for labs/diagnostics:  PCP 1 week    Day of Discharge     HPI:   Patient sitting up in chair reading. Denies soa and states breathing is much better. Just returned from [facility] states she wishes to continue with modified diet per SLP recommendations. She does not desire tube feedings at this time, even if she is at increased risk for aspiration. Long discussion with patient who wants to continue palliative diet. She remains full code and wishes for treatment of pneumonia should it reoccur. Agreeable to have palliative medicine follow at SNF.    Review of Systems  Gen- No fevers, chills  CV- No chest pain, palpitations  Resp- No cough, dyspnea  GI- No N/V/D, abd pain      Otherwise ROS is negative except as mentioned in the HPI.    Vital Signs:   Temp:  [97.1 °F (36.2 °C)-98.7 °F (37.1 °C)] 98.6 °F (37 °C)  Heart Rate:  [66-75] 68  Resp:  [16-22] 20  BP: ()/(43-56) 93/43     Physical Exam:  Constitutional: No acute distress, awake, alert  HENT: NCAT, mucous membranes moist  Respiratory: Coarse bilaterally, trach present, respiratory effort normal   Cardiovascular: RRR, + systolic murmur, rubs, or gallops,  palpable pedal pulses bilaterally  Gastrointestinal: Positive bowel sounds, soft, nontender, nondistended  Musculoskeletal: No bilateral ankle edema  Psychiatric: Appropriate affect, cooperative  Neurologic: Oriented x 3, strength symmetric in all extremities, Cranial Nerves grossly intact to confrontation  Skin: No rashes      Pertinent  and/or Most Recent Results       Results from last 7 days  Lab Units 10/17/18  0434 10/16/18  0548 10/15/18  1110 10/12/18  0449   WBC 10*3/mm3 7.14 6.91  --   --    HEMOGLOBIN g/dL 11.0* 10.4*  --   --    HEMATOCRIT % 35.4 32.8*  --   --    PLATELETS 10*3/mm3 137* 145*  --   --    SODIUM mmol/L 141 141 138 133   POTASSIUM mmol/L 4.2 3.6 2.7* 3.7   CHLORIDE mmol/L 109 108 106 91*   CO2 mmol/L 30.0 30.0 29.0 34.0*   BUN mg/dL 12 9 11 21   CREATININE mg/dL 0.40* 0.35* 0.43* 0.58*   GLUCOSE mg/dL 160* 151* 116* 345*   CALCIUM mg/dL 8.4* 7.9* 8.2* 8.4*       Results from last 7 days  Lab Units 10/17/18  0434   BILIRUBIN mg/dL 0.8   ALK PHOS U/L 74   ALT (SGPT) U/L 67*   AST (SGOT) U/L 35*           Invalid input(s): TG, LDLCALC, LDLREALC    Results from last 7 days  Lab Units 10/12/18  0449   BNP pg/mL 234.0*     Brief Urine Lab Results  (Last result in the past 365 days)      Color   Clarity   Blood   Leuk Est   Nitrite   Protein   CREAT   Urine HCG        10/06/18 1854 Yellow Clear Negative Negative Negative Negative               Microbiology Results Abnormal     Procedure Component Value - Date/Time    Wound Culture - Wound, Abdominal Wall [[id number]]  (Abnormal)  (Susceptibility) Collected:  10/07/18 0109    Lab Status:  Final result Specimen:  Wound from Abdominal Wall Updated:  10/12/18 1422     Wound Culture Moderate growth (3+) Proteus mirabilis (A)      Moderate growth (3+) Candida glabrata (A)      Moderate growth (3+) Candida krusei (A)      Light growth (2+) Enterococcus raffinosus (A)      Light growth (2+) Staphylococcus aureus, MRSA (A)     Comment:   Methicillin  resistant Staphylococcus aureus, Patient may be an isolation risk.        Gram Stain Result No WBCs seen      Few (2+) Gram positive bacilli      Many (4+) Yeast    Susceptibility      Proteus mirabilis    Enterococcus raffinosus       GONSALO    GONSALO       Amikacin <=16 ug/ml Susceptible             Ampicillin >16 ug/ml Resistant    8 ug/ml Susceptible       Ampicillin + Sulbactam 16/8 ug/ml Intermediate             Aztreonam <=8 ug/ml Susceptible             Cefepime <=8 ug/ml Susceptible             Cefotaxime <=2 ug/ml Susceptible             Ceftriaxone <=8 ug/ml Susceptible             Cefuroxime sodium <=4 ug/ml Susceptible             Ciprofloxacin >2 ug/ml Resistant             Ertapenem <=1 ug/ml Susceptible             Gentamicin >8 ug/ml Resistant             Gentamicin High Level Synergy       <=500 ug/ml Susceptible       Levofloxacin 4 ug/ml Intermediate             Linezolid       <=1 ug/ml Susceptible       Meropenem <=1 ug/ml Susceptible             Penicillin G       >8 ug/ml Resistant       Piperacillin + Tazobactam <=16 ug/ml Susceptible             Streptomycin High Level Synergy       >1,000 ug/ml Resistant       Tobramycin >8 ug/ml Resistant             Trimethoprim + Sulfamethoxazole >2/38 ug/ml Resistant             Vancomycin       1 ug/ml Susceptible                  Susceptibility      Staphylococcus aureus, MRSA     GONSALO     Ciprofloxacin >2 ug/ml Resistant     Clindamycin >4 ug/ml Resistant     Daptomycin <=0.5 ug/ml Susceptible     Erythromycin >4 ug/ml Resistant     Gentamicin <=4 ug/ml Susceptible     Levofloxacin >4 ug/ml Resistant     Linezolid 2 ug/ml Susceptible     Oxacillin >2 ug/ml Resistant     Penicillin G 8 ug/ml Resistant     Quinupristin + Dalfopristin <=0.5 ug/ml Susceptible     Rifampin <=1 ug/ml Susceptible     Tetracycline <=4 ug/ml Susceptible     Trimethoprim + Sulfamethoxazole <=0.5/9.5 ug/ml Susceptible     Vancomycin 1 ug/ml Susceptible                    Blood  Culture - Blood, [[id number]]  (Normal) Collected:  10/06/18 2020    Lab Status:  Final result Specimen:  Blood from Arm, Right Updated:  10/11/18 2101     Blood Culture No growth at 5 days    Respiratory Culture - Sputum, NT Suction [[id number]]  (Abnormal)  (Susceptibility) Collected:  10/07/18 0108    Lab Status:  Final result Specimen:  Sputum from NT Suction Updated:  10/10/18 1437     Respiratory Culture Scant growth (1+) Yeast isolated (A)      Scant growth (1+) Staphylococcus aureus, MRSA (A)     Comment:   Methicillin resistant Staphylococcus aureus, Patient may be an isolation risk.        Gram Stain Result Many (4+) WBCs per low power field      Rare (1+) Epithelial cells per low power field      No organisms seen    Susceptibility      Staphylococcus aureus, MRSA     GONSALO     Ciprofloxacin >2 ug/ml Resistant     Clindamycin >4 ug/ml Resistant     Erythromycin >4 ug/ml Resistant     Gentamicin <=4 ug/ml Susceptible     Levofloxacin >4 ug/ml Resistant     Linezolid 2 ug/ml Susceptible     Oxacillin >2 ug/ml Resistant     Penicillin G >8 ug/ml Resistant     Quinupristin + Dalfopristin <=0.5 ug/ml Susceptible     Rifampin <=1 ug/ml Susceptible     Tetracycline <=4 ug/ml Susceptible     Trimethoprim + Sulfamethoxazole <=0.5/9.5 ug/ml Susceptible     Vancomycin 2 ug/ml Susceptible                          Imaging Results (all)     Procedure Component Value Units Date/Time    FL Video Swallow With Speech [[id number]] Updated:  10/18/18 1202    XR Chest 1 View [[id number]] Collected:  10/17/18 0851     Updated:  10/17/18 2158    Narrative:       EXAMINATION: XR CHEST 1 VW- 10/17/2018     INDICATION: Follow up atelectasis; Z74.09-Other reduced mobility;  R13.12-Dysphagia, oropharyngeal phase;  J69.0-Pneumonitis due to  inhalation of food and vomit; R49.9-Unspecified voice and resonance  disorder      COMPARISON: 10/13/2018     FINDINGS: Tracheostomy tube is noted. The heart is enlarged. The  vasculature is upper  limits of normal. Discoid atelectasis in the left  mid lung, and mild left basilar atelectasis or effusion appears stable  or slightly increased. Right lung remains grossly clear. No pneumothorax  is identified.       Impression:       Left basilar atelectasis and left midlung discoid  atelectasis, stable to slightly increased from 10/13/2018. No  significant new chest disease elsewhere.        D:  10/17/2018  E:  10/17/2018     This report was finalized on 10/17/2018 9:55 PM by DR. Eric Ortiz MD.       XR Abdomen KUB [[id number]] Collected:  10/16/18 1045     Updated:  10/16/18 1344    Narrative:       EXAMINATION: XR ABDOMEN, KUB-10/16/2018:      INDICATION: PEG tube position; Z74.09-Other reduced mobility;  R13.12-Dysphagia, oropharyngeal phase; Z74.09-Other reduced mobility;  J69.0-Pneumonitis due to inhalation of food and vomit; R49.9-Unspecified  voice and resonance disorder.      COMPARISON: NONE.     FINDINGS: Nonspecific, nonobstructive bowel gas pattern. Percutaneous  gastrostomy tube overlies the proximal partially gas-distended gastric  lumen.           Impression:       PEG tube overlies the proximal partially gas distended  gastric lumen. Nonobstructive bowel gas pattern.     D:  10/16/2018  E:  10/16/2018     This report was finalized on 10/16/2018 1:42 PM by Dr. Raymond Shelton.       XR Chest 1 View [[id number]] Collected:  10/13/18 0856     Updated:  10/13/18 1210    Narrative:          EXAMINATION: XR CHEST 1 VW - 10/13/2018     INDICATION: R13.12-Dysphagia, oropharyngeal phase; Z74.09-Other reduced  mobility; J69.0-Pneumonitis due to inhalation of food and vomit;  R49.9-Unspecified voice and resonance disorder.      COMPARISON: 10/12/2018.     FINDINGS: Portable chest reveals tracheostomy tube in satisfactory  position above the jacobo. There is ill-defined opacification at the  left lung base with small left pleural effusion, improving in the  interval. The right lung remains clear. Degenerative  change is seen  within the spine. The heart is enlarged.           Impression:       Significant improvement seen in aeration of the left lung  base. There is a small left pleural effusion identified.     DICTATED:   10/13/2018  EDITED/ls :   10/13/2018      This report was finalized on 10/13/2018 12:08 PM by Dr. Meagan Randhawa MD.       XR Chest 1 View [[id number]] Collected:  10/12/18 0856     Updated:  10/12/18 1417    Narrative:       EXAMINATION: XR CHEST 1 VW- 10/12/2018     INDICATION: Pneumonia, mucous plugging; Z74.09-Other reduced mobility;  R13.12-Dysphagia, oropharyngeal phase; Z74.09-Other reduced mobility;  J69.0-Pneumonitis due to inhalation of food and vomit; R49.9-Unspecified  voice and resonance disorder      COMPARISON: 10/10/2018     FINDINGS: Portable chest reveals heart to be enlarged. Tracheostomy tube  in satisfactory position above the jacobo. The left lung reveals  worsening of the markings in the left mid and lower lung field. Small  left pleural effusion. Shift of the mediastinum to the left suggesting  possible collapse. There is a cutoff sign identified within the left  main stem bronchus. Consider bronchoscopy for possible treatment of  mucous plugging.           Impression:       Cutoff sign Identified in left main stem bronchus with  possible mucous plugging. There is worsening of the airspace disease  within the left mid and lower lung field suggesting collapse with shift  of the mediastinum to the left. Small left pleural effusion. The right  lung remains clear.        D:  10/12/2018  E:  10/12/2018     This report was finalized on 10/12/2018 2:15 PM by Dr. Meagan Randhawa MD.       XR Chest 1 View [[id number]] Collected:  10/10/18 0953     Updated:  10/10/18 0955    Narrative:       EXAMINATION: XR CHEST 1 VW- 10/10/2018     INDICATION: Pneumonia, atelectasis; Z74.09-Other reduced mobility;  R13.12-Dysphagia, oropharyngeal phase     TECHNIQUE:  Single view frontal chest.      COMPARISONS: 10/09/2018     FINDINGS:  Stable support apparatus. Unchanged small volume left and  trace right pleural effusion. No pneumothorax. ORIF hardware right  proximal humerus. Cardiomediastinal silhouette is unchanged.        Impression:       Stable exam.     D:  10/10/2018  E:  10/10/2018     This report was finalized on 10/10/2018 9:53 AM by Sonny Perez.       XR Chest 1 View [[id number]] Collected:  10/09/18 0809     Updated:  10/09/18 1038    Narrative:       EXAMINATION: XR CHEST 1 VW-10/09/2018:      INDICATION: Pneumonia; Z74.09-Other reduced mobility; R13.12-Dysphagia,  oropharyngeal phase; Z74.09-Other reduced mobility.      COMPARISON: Chest x-ray 10/08/2018.      FINDINGS: Significant interval improvement in left lung aeration with  decreased opacifications left lung apex and within the left lung base  from prior. Cardiac silhouette enlarged with only minimal left mid lung  opacification likely atelectasis. Right hemithorax grossly clear. No  pneumothorax or large effusion.       Impression:       Significant interval reduction in opacifications of the left  hemithorax with a considerable increase in aeration of the left lung and  only mid lung opacities likely represent mild atelectasis remaining.     D:  10/09/2018  E:  10/09/2018     This report was finalized on 10/9/2018 10:36 AM by Dr. Raymond Shelton.       CT Chest Without Contrast [[id number]] Collected:  10/08/18 0852     Updated:  10/08/18 2123    Narrative:       EXAM:    CT Chest Without Intravenous Contrast     EXAM DATE/TIME:    10/8/2018 8:52 AM     CLINICAL HISTORY:    78 years old, female; Dysphagia, oropharyngeal phase; Other reduced mobility;   Other reduced mobility; Abnormal findings; Abnormal radiologic exam of lung or   chest; Additional info: Pneumonia complicated / unresolved     TECHNIQUE:    Axial computed tomography images of the chest without intravenous contrast.    All CT scans at this facility use at least one of  these dose optimization   techniques: automated exposure control; mA and/or kV adjustment per patient   size (includes targeted exams where dose is matched to clinical indication); or   iterative reconstruction.    Coronal and sagittal reformatted images were created and reviewed.     COMPARISON:    CR XR CHEST 1 VW 10/8/2018 1:31 AM     FINDINGS:    Tubes, catheters and devices:  Tracheostomy tube position appears   satisfactory.    Lungs:  Bilateral infiltrates/atelectasis, predominantly on left, involving   LLL, BETTIE, and RLL. Clinically correlate to rule out pneumonia. Retained   secretions scattered in left bronchial tree.    Pleural space:  Small to moderate left pleural effusion, small right pleural   effusion. No pneumothorax.    Heart:  Mild cardiomegaly. Mitral annulus calcifications.  CAD.   Aorta:  No aortic aneurysm. Aortic atherosclerosis.    Great vessels off aortic arch and other great vessels:  Atherosclerotic   plaques are scattered along some vessels.    Other arteries:  There are coronary artery atheromatous calcifications,   consistent with CAD.    Lymph nodes:  No obvious lymphadenopathy, but resolution of left hilum is   obscured by adjacent air space disease.    Bones/joints:  Plate and screws ORIF hardware at proximal right humerus. DJD   of right glenohumeral joint. Degenerative changes of the spine. Mild scoliotic   curvature, positional versus fixed.    Soft tissues: Unremarkable.    Gallbladder and bile ducts:  Cholecystectomy.    Kidneys and ureters: Nonobstructive right nephrolithiasis.    Stomach and bowel:  Some contrast in the bowel.       Impression:       1. Small to moderate left pleural effusion, small right pleural effusion.   2. Bilateral infiltrates/atelectasis, predominantly on left, involving LLL,   BETTIE, and RLL.   3. CAD.   4. Mild cardiomegaly.   5.  Nonobstructive right nephrolithiasis.     THIS DOCUMENT HAS BEEN ELECTRONICALLY SIGNED BY KEENAN KIM MD    FL Video  Swallow With Speech [[id number]] Collected:  10/08/18 1514     Updated:  10/08/18 1532    Narrative:       EXAMINATION: FL VIDEO SWALLOW W SPEECH-     INDICATION: dysphagia; Z74.09-Other reduced mobility; R13.10-Dysphagia,  unspecified; Z74.09-Other reduced mobility     TECHNIQUE: 1 minute and 54 seconds of fluoroscopic time was used for  this exam. 1 associated image was saved. The patient was evaluated in  the seated lateral position while taking a variety of consistencies of  barium by mouth under the direction of speech pathology.     COMPARISON: NONE     FINDINGS: There was aspiration with sips of thin barium. There was no  penetration and no aspiration with nectar, pudding, or solid consistency  barium.          Impression:       Fluoroscopy provided for a modified barium swallow. Please  see speech therapy report for full details and recommendations.         This report was finalized on 10/8/2018 3:30 PM by Dr. Christiano Mcmillan MD.       XR Chest 1 View [[id number]] Collected:  10/08/18 0814     Updated:  10/08/18 1311    Narrative:       EXAMINATION: XR CHEST 1 VW-      INDICATION: Respiratory failure, pneumonia, atelectasis; Z74.09-Other  reduced mobility; R13.10-Dysphagia, unspecified.      COMPARISON: 10/06/2018.     FINDINGS: Portable chest reveals patchy ill-defined opacification seen  within the left upper lobe. Tracheostomy tube in satisfactory position  above the jacobo. Ill-defined opacification seen at the lung bases. Mild  elevation identified of the right hemidiaphragm. Small left pleural  effusion.           Impression:       Worsening identified of the airspace disease in the left  upper lobe. Small bilateral pleural effusions. The heart is enlarged.     D:  10/08/2018  E:  10/08/2018     This report was finalized on 10/8/2018 1:09 PM by Dr. Meagan Randhawa MD.       XR Chest 1 View [[id number]] Collected:  10/07/18 1222     Updated:  10/07/18 1449    Narrative:       EXAMINATION: XR CHEST 1 VW -  10/6/2018     INDICATION: Pneumonia.     TECHNIQUE:  Single view frontal chest.     COMPARISONS: None.     FINDINGS:  Tracheostomy in place. Calcification of the aortic knob.  There is an arc-like calcification projecting over the cardiac  silhouette; this could be valvular or in the ventricular wall. There is  extensive opacity involving the left lung. The right lung is grossly  clear. No pneumothorax. Right proximal humerus ORIF hardware noted.       Impression:       Extensive opacification of the left lung. This is  compatible with the given history of pneumonia.     DICTATED:   10/07/2018  EDITED/ls :   10/07/2018         This report was finalized on 10/7/2018 2:47 PM by Sonny Perez.                       Results for orders placed during the hospital encounter of 10/06/18   Adult Transthoracic Echo Complete W/ Cont if Necessary Per Protocol    Narrative · Left ventricular systolic function is hyperdynamic (EF > 70).  · Left ventricular wall thickness is consistent with moderate concentric   hypertrophy.  · Left ventricular diastolic dysfunction (grade I) consistent with   impaired relaxation.  · Moderate to severe aortic stenosis (mean gradient 39 mmHg) is present.  · Mild mitral valve regurgitation is present            Discharge Details        Discharge Medications      New Medications      Instructions Start Date   aztreonam 2 g in sodium chloride 0.9 % 100 mL IVPB   2 g, Intravenous, Every 8 Hours      hydrocortisone 2.5 % rectal cream  Commonly known as:  ANUSOL-HC   Rectal, 2 Times Daily      insulin lispro 100 UNIT/ML injection  Commonly known as:  humaLOG   0-7 Units, Subcutaneous, Every 6 Hours      vancomycin   1,500 mg, Intravenous, Every 24 Hours         Changes to Medications      Instructions Start Date   insulin glargine 100 UNIT/ML injection  Commonly known as:  LANTUS  What changed:  how much to take   25 Units, Subcutaneous, Nightly         Continue These Medications      Instructions Start  Date   acetaminophen 500 MG tablet  Commonly known as:  TYLENOL   500 mg, Oral, Every 4 Hours PRN      albuterol 1.25 MG/3ML nebulizer solution  Commonly known as:  ACCUNEB   1 ampule, Nebulization, Every 4 Hours PRN      aspirin 81 MG chewable tablet   81 mg, Oral, Daily      atorvastatin 40 MG tablet  Commonly known as:  LIPITOR   40 mg, Oral, Daily      busPIRone 15 MG tablet  Commonly known as:  BUSPAR   15 mg, Oral, 3 Times Daily      docusate sodium 100 MG capsule  Commonly known as:  COLACE   100 mg, Oral, 2 Times Daily      enoxaparin 40 MG/0.4ML solution syringe  Commonly known as:  LOVENOX   40 mg, Subcutaneous, Every 24 Hours Scheduled      ferrous sulfate 325 (65 FE) MG tablet   325 mg, Oral, Daily With Breakfast      fish oil 1000 MG capsule capsule   Oral, 2 Times Daily With Meals      furosemide 40 MG tablet  Commonly known as:  LASIX   20 mg, Oral, 2 Times Daily      ipratropium-albuterol 0.5-2.5 mg/3 ml nebulizer  Commonly known as:  DUO-NEB   3 mL, Nebulization, 4 Times Daily PRN      midodrine 5 MG tablet  Commonly known as:  PROAMATINE   10 mg, Oral, 3 Times Daily      ondansetron 4 MG tablet  Commonly known as:  ZOFRAN   4 mg, Oral, Every 6 Hours PRN      pantoprazole 40 MG EC tablet  Commonly known as:  PROTONIX   40 mg, Oral, Daily      rOPINIRole 4 MG tablet  Commonly known as:  REQUIP   4 mg, Oral, 3 Times Daily      sodium bicarbonate 325 MG tablet   325 mg, Oral, 4 Times Daily         Stop These Medications    acetylcysteine 10 % nebulizer solution  Commonly known as:  MUCOMYST     clonazePAM 1 MG tablet  Commonly known as:  KlonoPIN     oxyCODONE-acetaminophen 5-325 MG per tablet  Commonly known as:  PERCOCET              Discharge Disposition:  Skilled Nursing Facility (DC - External)    Discharge Diet:  Diet Instructions     Diet: Tube Feeding; Continuous; isosource 1.5       Discharge Diet:  Tube Feeding    Feeding Type:  Continuous    Formula & Rate:  isosource 1.5          Discharge  Activity:   Activity Instructions     Activity as Tolerated                 Special Instructions:      Code Status/Level of Support:  Code Status and Medical Interventions:   Ordered at: 10/06/18 1946     Level Of Support Discussed With:    Patient     Code Status:    CPR     Medical Interventions (Level of Support Prior to Arrest):    Full       No future appointments.    Additional Instructions for the Follow-ups that You Need to Schedule     Discharge Follow-up with PCP    As directed      Currently Documented PCP:  Ray Strickland MD  PCP Phone Number:  [phone]    Follow Up Details:  1 week               Time Spent on Discharge:  40 minutes    Electronically signed by ARELI Torres, 10/18/18, 12:37 PM.        Electronically signed by Salina Mahajan APRN at 10/18/2018  1:36 PM       Yola Khan MS CCC-SLP Speech and Language Pathologist Signed Speech Therapy  Plan of Care Date of Service: 10/18/2018 12:18 PM         Problem: Patient Care Overview  Goal: Plan of Care Review  Outcome: Ongoing (interventions implemented as appropriate)    10/18/18 1206   Coping/Psychosocial   Plan of Care Reviewed With patient   SLP re-evaluation completed. Repeat MBS revealed improved swallow function. Mild pharyngeal dysphagia. PMV not worn during MBS. Aspiration appreciated only w/ large/consecutive drinks of thin liquid. Aspiration was silent. No penetration/aspiration noted w/ thin liquid barium via tsp or cup drink, barium pudding, or cracker coated in barium pudding. Pt able to independently take small sip. Per MBS results, pt appears safe for soft/chopped diet and thin liquid via cup (no straws). Noted Dr. Armstrong's concern for aspiration of oral secretions and ongoing cycle of pneumonia. Pt may be at general risk for aspiration given respiratory status and reduced sensation. Will defer to MD re: POC decisions. Would recommend aggressive oral care BID & PRN whether decision is made for PO diet or NPO w/  PEG to reduce risk of aspiration pneumonia in event pt aspirates oral secretions. Please see note for further details and recommendations.

## 2018-10-18 NOTE — THERAPY TREATMENT NOTE
Acute Care - Physical Therapy Treatment Note  [facility]     Patient Name: Nallely Junior  : 1940  MRN: [id number]  Today's Date: 10/18/2018  Onset of Illness/Injury or Date of Surgery: 10/06/18  Date of Referral to PT: 10/06/18  Referring Physician: TIN Murphy MD    Admit Date: 10/6/2018    Visit Dx:    ICD-10-CM ICD-9-CM   1. Impaired mobility and ADLs Z74.09 799.89   2. Oropharyngeal dysphagia R13.12 787.22   3. Impaired functional mobility, balance, gait, and endurance Z74.09 V49.89   4. Aspiration pneumonia of left lower lobe, unspecified aspiration pneumonia type (CMS/HCC) J69.0 507.0   5. Voice and resonance disorder R49.9 784.40     Patient Active Problem List   Diagnosis   • Pneumonia   • Type 2 diabetes mellitus (CMS/[facility])   • Hypotension   • Hyperlipidemia   • Depression   • Anxiety   • COPD (chronic obstructive pulmonary disease) (CMS/[facility])   • Dysphagia   • Coronary artery disease   • CHF (congestive heart failure) (CMS/[facility])   • GERD (gastroesophageal reflux disease)   • Chronic respiratory failure with hypoxia and hypercapnia (CMS/[facility])       Therapy Treatment          Rehabilitation Treatment Summary     Row Name 10/18/18 0901             Treatment Time/Intention    Discipline physical therapist  -KM      Document Type therapy note (daily note)  -KM      Subjective Information no complaints   will ask for drink but is NPO on PEG  -KM      Mode of Treatment physical therapy  -KM      Care Plan Review care plan/treatment goals reviewed;risks/benefits reviewed;patient/other agree to care plan  -KM      Therapy Frequency (PT Clinical Impression) 3 times/wk  -KM      Patient Effort fair  -KM      Comment Pt is lethargic and kept eyes closed during rx  -KM      Existing Precautions/Restrictions fall;oxygen therapy device and L/min   PEG, trach  -KM      Recorded by [KM] Nancy Bean, PT 10/18/18 0952      Row Name 10/18/18 0901             Cognitive Assessment/Intervention- PT/OT     Affect/Mental Status (Cognitive) WNL  -KM      Orientation Status (Cognition) oriented to;person;place  -KM      Follows Commands (Cognition) follows one step commands;over 90% accuracy  -KM      Personal Safety Interventions fall prevention program maintained  -KM      Recorded by [KM] Nancy Bean, PT 10/18/18 0952      Row Name 10/18/18 0901             Transfer Assessment/Treatment    Comment (Transfers) --   Pt is dependent lift  -KM      Recorded by [KM] Nnacy Bean, PT 10/18/18 0952      Row Name 10/18/18 0901             Gait/Stairs Assessment/Training    Comment (Gait/Stairs) --   no ambulatory  -KM      Recorded by [KM] Nancy Bean, PT 10/18/18 0952      Row Name 10/18/18 0901             Therapeutic Exercise    05257 - PT Therapeutic Exercise Minutes 23  -KM      Recorded by [KM] Nancy Bean, PT 10/18/18 0952      Row Name 10/18/18 0901             Upper Extremity Supine Therapeutic Exercise    Performed, Supine Upper Extremity (Therapeutic Exercise) shoulder flexion/extension;shoulder abduction/adduction;shoulder external/internal rotation;shoulder horizontal abduction/adduction;elbow flexion/extension  -KM      Exercise Type, Supine Upper Extremity (Therapeutic Exercise) AAROM (active assistive range of motion)  -KM      Sets/Reps Detail, Supine Upper Extremity (Therapeutic Exercise) 1/10  -KM      Recorded by [KM] Nancy Bean, PT 10/18/18 0952      Row Name 10/18/18 0901             Lower Extremity Seated Therapeutic Exercise    Performed, Seated Lower Extremity (Therapeutic Exercise) hip flexion/extension;hip abduction/adduction;hip external/internal rotation;ankle dorsiflexion/plantarflexion;SAQ (short arc quad), knee extension;hamstring stretch   gastroc stretch  -KM      Exercise Type, Seated Lower Extremity (Therapeutic Exercise) AAROM (active assistive range of motion)   AA/PROM  -KM      Recorded by [KM] Nancy Bean, PT 10/18/18  0952      Row Name 10/18/18 0901             Therapeutic Exercise    Lower Extremity (Therapeutic Exercise) gastroc stretch, bilateral;hamstring stretch, bilateral  -KM      Recorded by [KM] Nancy Bean, PT 10/18/18 0952      Row Name 10/18/18 0901             Pain Scale: Numbers Pre/Post-Treatment    Pain Scale: Numbers, Pretreatment 0/10 - no pain  -KM      Pain Scale: Numbers, Post-Treatment 0/10 - no pain  -KM      Recorded by [KM] Nancy Bean, PT 10/18/18 0952      Row Name 10/18/18 0901             Pain Scale: FACES Pre/Post-Treatment    Pain: FACES Scale, Pretreatment 0-->no hurt  -KM      Pain: FACES Scale, Post-Treatment 0-->no hurt  -KM      Recorded by [KM] Nancy Bean, PT 10/18/18 0952      Row Name 10/18/18 0901             Plan of Care Review    Plan of Care Reviewed With patient  -KM      Recorded by [LANDON] Nancy Bean, PT 10/18/18 0952        User Key  (r) = Recorded By, (t) = Taken By, (c) = Cosigned By    Initials Name Effective Dates Discipline    KM Nancy Bean, PT 06/19/15 -  PT                     Physical Therapy Education     Title: PT OT SLP Therapies (Active)     Topic: Physical Therapy (Active)     Point: Mobility training (Active)    Learning Progress Summary     Learner Status Readiness Method Response Comment Documented by    Patient Active Acceptance E NR  KM 10/18/18 0901     Active Acceptance E NR  SJ 10/17/18 1046     Done Acceptance E VU  VG 10/14/18 1403     Active Acceptance D,E NR HEP SH 10/12/18 1106          Point: Home exercise program (Active)    Learning Progress Summary     Learner Status Readiness Method Response Comment Documented by    Patient Active Acceptance E NR  KM 10/18/18 0901     Active Acceptance E NR  SJ 10/17/18 1046     Done Acceptance E VU  VG 10/14/18 1403     Active Acceptance D,E NR HEP SH 10/12/18 1106     Active Acceptance E NR  KR 10/11/18 0953          Point: Body mechanics (Active)    Learning  Progress Summary     Learner Status Readiness Method Response Comment Documented by    Patient Active Acceptance E NR  KM 10/18/18 0901     Active Acceptance E NR  SJ 10/17/18 1046     Done Acceptance E VU  VG 10/14/18 1403     Active Acceptance D,E NR HEP  10/12/18 1106     Active Acceptance E NR  KR 10/11/18 0953          Point: Precautions (Active)    Learning Progress Summary     Learner Status Readiness Method Response Comment Documented by    Patient Active Acceptance E NR  KM 10/18/18 0901     Active Acceptance E NR  SJ 10/17/18 1046     Done Acceptance E VU  VG 10/14/18 1403     Active Acceptance D,E NR HEP  10/12/18 1106     Active Acceptance E NR  KR 10/11/18 0953                      User Key     Initials Effective Dates Name Provider Type Discipline     06/19/15 -  Meme Schmid, PT Physical Therapist PT     06/19/15 -  Meme Levin, PT Physical Therapist PT     06/19/15 -  Nancy Bean, PT Physical Therapist PT    KR 04/03/18 -  Shelli Nunes, PT Physical Therapist PT     05/29/18 -  Zabrina Goode, PT Physical Therapist PT                    PT Recommendation and Plan  Therapy Frequency (PT Clinical Impression): 3 times/wk  Plan of Care Reviewed With: patient  Outcome Summary: Pt lethargic and tolerated P/AAROM X 4 extremities. Will decrease frequency to 3x/wk based on baseline function of total lift oob          Outcome Measures     Row Name 10/18/18 0901 10/17/18 1016 10/15/18 1423       How much help from another person do you currently need...    Turning from your back to your side while in flat bed without using bedrails? 2  -KM 2  -SJ  --    Moving from lying on back to sitting on the side of a flat bed without bedrails? 1  -KM 1  -SJ  --    Moving to and from a bed to a chair (including a wheelchair)? 1  -KM 1  -SJ  --    Standing up from a chair using your arms (e.g., wheelchair, bedside chair)? 1  -KM 1  -SJ  --    Climbing 3-5 steps with a railing? 1  -KM 1   -SJ  --    To walk in hospital room? 1  -KM 1  -SJ  --    AM-PAC 6 Clicks Score 7  -KM 7  -SJ  --       How much help from another is currently needed...    Putting on and taking off regular lower body clothing?  --  -- 1  -CL    Bathing (including washing, rinsing, and drying)  --  -- 1  -CL    Toileting (which includes using toilet bed pan or urinal)  --  -- 1  -CL    Putting on and taking off regular upper body clothing  --  -- 2  -CL    Taking care of personal grooming (such as brushing teeth)  --  -- 3  -CL    Eating meals  --  -- 3  -CL    Score  --  -- 11  -CL       Functional Assessment    Outcome Measure Options AM-PAC 6 Clicks Basic Mobility (PT)  -KM AM-PAC 6 Clicks Basic Mobility (PT)  -SJ AM-PAC 6 Clicks Daily Activity (OT)  -CL      User Key  (r) = Recorded By, (t) = Taken By, (c) = Cosigned By    Initials Name Provider Type    Meme Acosta, PT Physical Therapist    KM Nancy Bean, PT Physical Therapist    CL Marie Pabon, OT Occupational Therapist           Time Calculation:         PT Charges     Row Name 10/18/18 0901             Time Calculation    Start Time 0901  -KM      PT Received On 10/18/18  -KM      PT Goal Re-Cert Due Date 10/18/18  -KM         Time Calculation- PT    Total Timed Code Minutes- PT 23 minute(s)  -KM         Timed Charges    45535 - PT Therapeutic Exercise Minutes 23  -KM        User Key  (r) = Recorded By, (t) = Taken By, (c) = Cosigned By    Initials Name Provider Type     Nancy Bean, PT Physical Therapist        Therapy Suggested Charges     Code   Minutes Charges    07757 (CPT®) Hc Pt Neuromusc Re Education Ea 15 Min      08314 (CPT®) Hc Pt Ther Proc Ea 15 Min 23 2    04093 (CPT®) Hc Gait Training Ea 15 Min      22394 (CPT®) Hc Pt Therapeutic Act Ea 15 Min      92003 (CPT®) Hc Pt Manual Therapy Ea 15 Min      36694 (CPT®) Hc Pt Iontophoresis Ea 15 Min      31200 (CPT®) Hc Pt Elec Stim Ea-Per 15 Min      41278 (CPT®) Hc Pt Ultrasound Ea 15  Min      35961 (CPT®) Hc Pt Self Care/Mgmt/Train Ea 15 Min      26413 (CPT®) Hc Pt Prosthetic (S) Train Initial Encounter, Each 15 Min      67435 (CPT®) Hc Pt Orthotic(S)/Prosthetic(S) Encounter, Each 15 Min      04672 (CPT®) Hc Orthotic(S) Mgmt/Train Initial Encounter, Each 15min      Total  23 2        Therapy Charges for Today     Code Description Service Date Service Provider Modifiers Qty    68415505463 HC PT THER PROC EA 15 MIN 10/18/2018 Nancy Bean, PT GP 2          PT G-Codes  Outcome Measure Options: AM-PAC 6 Clicks Basic Mobility (PT)  AM-PAC 6 Clicks Score: 7  Score: 11  Functional Limitation: Mobility: Walking and moving around  Mobility: Walking and Moving Around Current Status (): At least 80 percent but less than 100 percent impaired, limited or restricted  Mobility: Walking and Moving Around Goal Status (): At least 60 percent but less than 80 percent impaired, limited or restricted    Nancy Bean, PT  10/18/2018

## 2018-10-18 NOTE — MBS/VFSS/FEES
Acute Care - Speech Language Pathology   Swallow Re-Evaluation [facility]   Modified Barium Swallow Study (MBS)     Patient Name: Nallely Junior  : 1940  MRN: [id number]  Today's Date: 10/18/2018  Onset of Illness/Injury or Date of Surgery: 10/06/18     Referring Physician: TIN Murphy MD      Admit Date: 10/6/2018    Visit Dx:     ICD-10-CM ICD-9-CM   1. Impaired mobility and ADLs Z74.09 799.89   2. Oropharyngeal dysphagia R13.12 787.22   3. Impaired functional mobility, balance, gait, and endurance Z74.09 V49.89   4. Aspiration pneumonia of left lower lobe, unspecified aspiration pneumonia type (CMS/HCC) J69.0 507.0   5. Voice and resonance disorder R49.9 784.40     Patient Active Problem List   Diagnosis   • Pneumonia   • Type 2 diabetes mellitus (CMS/HCC)   • Hypotension   • Hyperlipidemia   • Depression   • Anxiety   • COPD (chronic obstructive pulmonary disease) (CMS/[facility])   • Dysphagia   • Coronary artery disease   • CHF (congestive heart failure) (CMS/[facility])   • GERD (gastroesophageal reflux disease)   • Chronic respiratory failure with hypoxia and hypercapnia (CMS/HCC)     Past Medical History:   Diagnosis Date   • Anemia    • Anxiety    • Aortic stenosis    • CHF (congestive heart failure) (CMS/[facility])    • Chronic respiratory failure with hypoxia and hypercapnia (CMS/HCC)    • CKD (chronic kidney disease), stage III (CMS/[facility])    • Constipation    • COPD (chronic obstructive pulmonary disease) (CMS/[facility])    • Coronary artery disease    • Dementia    • Depression    • Diabetes mellitus (CMS/[facility])    • Dysphagia    • Femur fracture, right (CMS/[facility])    • GERD (gastroesophageal reflux disease)    • Hyperlipidemia    • Hypertension    • Hypotension     on midodrine   • Restless legs syndrome (RLS)      Past Surgical History:   Procedure Laterality Date   • FOREARM SURGERY     • LEG SURGERY     • PEG TUBE INSERTION     • TRACHEOSTOMY            SWALLOW EVALUATION (last 72 hours)      SLP Adult Swallow Evaluation      Row Name 10/18/18 1145                   Rehab Evaluation    Document Type re-evaluation  -AC        Subjective Information no complaints  -AC        Patient Observations alert;cooperative  -AC        Patient/Family Observations No family present.   -AC        Patient Effort good  -AC           General Information    Patient Profile Reviewed yes  -AC        Pertinent History Of Current Problem Adm w/ nonresolving pna, mucous plugging. Hx trach placement & PEG few months ago.   -AC        Current Method of Nutrition NPO;gastrostomy feedings  -AC        Plans/Goals Discussed with patient;agreed upon  -        Barriers to Rehab medically complex  -        Patient's Goals for Discharge patient did not state  -AC           Pain Assessment    Additional Documentation Pain Scale: Numbers Pre/Post-Treatment (Group)  -AC           Pain Scale: Numbers Pre/Post-Treatment    Pain Scale: Numbers, Pretreatment 0/10 - no pain  -AC        Pain Scale: Numbers, Post-Treatment 0/10 - no pain  -AC           General Eating/Swallowing Observations    Respiratory Support Currently in Use tracheostomy;trach collar;oxygen mask;other (see comments)   not wearing PMV during study  -AC        Eating/Swallowing Skills self-fed;fed by staff/caregiver;appropriate self-feeding skills observed  -AC        Positioning During Eating upright 90 degree;upright in chair;other (see comments)   lateral view  -AC           MBS/VFSS    Utensils Used spoon;cup;straw  -AC        Consistencies Trialed thin liquids;pudding thick;regular textures  -AC           MBS/VFSS Interpretation    Oral Prep Phase impaired oral phase of swallowing  -AC        Oral Transit Phase impaired  -AC        Oral Residue WFL  -AC           Oral Preparatory Phase    Oral Preparatory Phase prolonged manipulation  -AC        Prolonged Manipulation regular textures;secondary to reduced lingual strength;secondary to reduced lingual range of motion  -AC           Oral Transit  Phase    Impaired Oral Transit Phase increased A-P transit time;piecemeal oral transit  -AC        Increased A-P Transit Time all consistencies tested;discoordination of lingual movement  -AC        Piecemeal Oral Transit all consistencies tested;discoordination of lingual movement  -AC           Initiation of Pharyngeal Swallow    Initiation of Pharyngeal Swallow bolus in pyriform sinuses  -AC        Pharyngeal Phase impaired pharyngeal phase of swallowing  -AC        Penetration Before the Swallow thin liquids;secondary to delayed swallow initiation or mistiming;other (see comments)   via large straw drinks only  -AC        Aspiration During the Swallow thin liquids;secondary to delayed swallow initiation or mistiming;other (see comments)   via large straw drinks only  -AC        Response to Aspiration no response, silent aspiration;other (see comments)   cued cough cleared subglottic material  -AC        Rosenbek's Scale thin:;8--->level 8;pudding/puree:;regular textures:;1--->level 1  -AC        Attempted Compensatory Maneuvers bolus size;bolus presentation style  -AC        Response to Attempted Compensatory Maneuvers prevented aspiration  -AC        Summary Pt tested w/o PMV. Mild oropharyngeal dysphagia. Penetration before the swallow w/ aspiration during the swallow w/ large/consecutive drinks of thin liquid via straw. Aspiration was silent. Minimal penetration before the swallow w/ thin via small cup sip; penetrated material easily expelled laryngeal vestibule w/ completion of the swallow w/o aspiration. Pt was able to independently take small sip. No penetration/aspiration w/ pudding or solid.     Per MBS results, pt appears safe for soft/chopped diet and thin liquid via cup (no straws). Noted Dr. Armstrong's concern for aspiration of oral secretions and ongoing cycle of pneumonia. Pt may be at general risk for aspiration given respiratory status and reduced sensation. Will defer to MD re: POC decisions.  Would recommend aggressive oral care BID & PRN whether decision is made for PO diet or NPO w/ PEG to reduce risk of aspiration pneumonia in event pt aspirates oral secretions.   -AC           Clinical Impression    SLP Swallowing Diagnosis mild;oral dysfunction;pharyngeal dysfunction  -        Functional Impact risk of aspiration/pneumonia;risk of malnutrition;risk of dehydration  -        Rehab Potential/Prognosis, Swallowing good, to achieve stated therapy goals  -        Swallow Criteria for Skilled Therapeutic Interventions Met demonstrates skilled criteria  -           Recommendations    Therapy Frequency (Swallow) 5 days per week  -AC        Predicted Duration Therapy Intervention (Days) until discharge  -AC        SLP Diet Recommendation other (see comments)   will defer to MD - please see summary  -        Recommended Precautions and Strategies upright posture during/after eating;small bites of food and sips of liquid;no straw;other (see comments)   if PO diet ordered by MD  -AC        SLP Rec. for Method of Medication Administration meds whole;with pudding or applesauce   if MD orders PO diet  -        Monitor for Signs of Aspiration yes;notify SLP if any concerns  -        Anticipated Dischage Disposition skilled nursing facility;anticipate therapy at next level of care  -          User Key  (r) = Recorded By, (t) = Taken By, (c) = Cosigned By    Initials Name Effective Dates     Yola Khan, MS CCC-SLP 07/27/17 -         EDUCATION  The patient has been educated in the following areas:   Dysphagia (Swallowing Impairment) Oral Care/Hydration.    SLP Recommendation and Plan  SLP Swallowing Diagnosis: mild, oral dysfunction, pharyngeal dysfunction  SLP Diet Recommendation: other (see comments) (will defer to MD - please see summary)  Recommended Precautions and Strategies: upright posture during/after eating, small bites of food and sips of liquid, no straw, other (see comments) (if PO  diet ordered by MD)     Monitor for Signs of Aspiration: yes, notify SLP if any concerns     Swallow Criteria for Skilled Therapeutic Interventions Met: demonstrates skilled criteria  Anticipated Dischage Disposition: skilled nursing facility, anticipate therapy at next level of care  Rehab Potential/Prognosis, Swallowing: good, to achieve stated therapy goals  Therapy Frequency (Swallow): 5 days per week  Predicted Duration Therapy Intervention (Days): until discharge     Time Calculation:         Time Calculation- SLP     Row Name 10/18/18 1351             Time Calculation- SLP    SLP Start Time 1145  -      SLP Received On 10/18/18  -        User Key  (r) = Recorded By, (t) = Taken By, (c) = Cosigned By    Initials Name Provider Type     Yola Khan MS CCC-SLP Speech and Language Pathologist          Therapy Charges for Today     Code Description Service Date Service Provider Modifiers Qty    39482767334 HC ST MOTION FLUORO EVAL SWALLOW 5 10/18/2018 Yola Khan MS CCC-SLP GN 1               Yola Khan MS CCC-SLP  10/18/2018

## 2018-10-18 NOTE — DISCHARGE INSTR - DIET
MBS/VFSS/FEES 10/18/18    Reason for Referral  Patient was referred for a MBS to assess the efficiency of his/her swallow function, rule out aspiration and make recommendations regarding safe dietary consistencies, effective compensatory strategies, and safe eating environment.     Referring Physician: TIN Murphy MD      Recommendations/Treatment  Oral Prep Phase: impaired oral phase of swallowing  Oral Transit Phase: impaired  Oral Residue: WFL       SLP Swallowing Diagnosis: mild, oral dysfunction, pharyngeal dysfunction  Functional Impact: risk of aspiration/pneumonia, risk of malnutrition, risk of dehydration  Rehab Potential/Prognosis, Swallowing: good, to achieve stated therapy goals  Swallow Criteria for Skilled Therapeutic Interventions Met: demonstrates skilled criteria    Therapy Frequency (Swallow): 5 days per week  Predicted Duration Therapy Intervention (Days): until discharge  SLP Diet Recommendation: other (see comments) (will defer to MD - please see summary)  Recommended Precautions and Strategies: upright posture during/after eating, small bites of food and sips of liquid, no straw, other (see comments) (if PO diet ordered by MD)  SLP Rec. for Method of Medication Administration: meds whole, with pudding or applesauce (if MD orders PO diet)  Monitor for Signs of Aspiration: yes, notify SLP if any concerns  Anticipated Dischage Disposition: skilled nursing facility, anticipate therapy at next level of care      Oral Preparation/ Oral Phase  Oral Prep Phase: impaired oral phase of swallowing  Prolonged Manipulation: regular textures, secondary to reduced lingual strength, secondary to reduced lingual range of motion         Pharyngeal Phase  Initiation of Pharyngeal Swallow: bolus in pyriform sinuses  Pharyngeal Phase: impaired pharyngeal phase of swallowing  Penetration Before the Swallow: thin liquids, secondary to delayed swallow initiation or mistiming, other (see comments) (via large straw  drinks only)  Aspiration During the Swallow: thin liquids, secondary to delayed swallow initiation or mistiming, other (see comments) (via large straw drinks only)  Response to Aspiration: no response, silent aspiration, other (see comments) (cued cough cleared subglottic material)  Attempted Compensatory Maneuvers: bolus size, bolus presentation style  Response to Attempted Compensatory Maneuvers: prevented aspiration  Pharyngeal Phase, Comment: Pt tested w/o PMV. Mild oropharyngeal dysphagia. Penetration before the swallow w/ aspiration during the swallow w/ large/consecutive drinks of thin liquid via straw. Aspiration was silent. Minimal penetration before the swallow w/ thin via small cup sip; penetrated material easily expelled laryngeal vestibule w/ completion of the swallow w/o aspiration. Pt was able to independently take small sip. No penetration/aspiration w/ pudding or solid. Per MBS results, pt appears safe for soft/chopped diet and thin liquid via cup (no straws). Noted Dr. Armstrong's concern for aspiration of oral secretions and ongoing cycle of pneumonia. Pt may be at general risk for aspiration given respiratory status and reduced sensation. Will defer to MD re: POC decisions. Would recommend aggressive oral care BID & PRN whether decision is made for PO diet or NPO w/ PEG to reduce risk of aspiration pneumonia in event pt aspirates oral secretions.          Prognosis  Rehab Potential/Prognosis: good  SLC Criteria for Skilled Therapy Interventions Met: yes      Yola Khan, MS CCC-SLP 10/18/2018

## 2018-10-18 NOTE — PROGRESS NOTES
"Palliative Care Consult Note    Patient Name: Nallely Junior   : 1940  Sex: female    Advance care planning discussed: no  Code Status:  FULL   Advance Directive: own decision maker   Surrogate decision maker:  Carlita Sharif, clarified as sister   Documentation reflects advanced care planning previously discussed.    Date of Admission: 10/6/2018    Referring Provider: Mallika Jacobs MD  Reason for Consult:  goals of care; educate about choice of NPO/PEG TF vs comfort feedings with recurrent aspirations pneumonias and change to code status. .       Subjective:  Resting in bed with TCO2 patent, lung congestion some improved today, watching tv, a&o, communicates verbally and written notes.  IV ABX tx continues for PNA LLL, MRSA/GNR.     Patient reported pain was brought to a comfortable level within 48 hours after initial assessment: YES     ROS:  Review of Systems   HENT: Positive for congestion.    Respiratory: Positive for shortness of breath.    Genitourinary: Positive for difficulty urinating.   Skin: Positive for pallor.   Neurological: Positive for weakness.   All other systems reviewed and are negative.      Reviewed current scheduled and prn medications for route, type, dose and frequency.    Pharmacy to dose vancomycin      •  acetaminophen  •  dextrose  •  dextrose  •  glucagon (human recombinant)  •  ipratropium-albuterol  •  magnesium sulfate **OR** magnesium sulfate **OR** magnesium sulfate  •  ondansetron  •  Pharmacy to dose vancomycin  •  potassium chloride **OR** potassium chloride **OR** potassium chloride  •  sodium chloride    Objective:   BP 93/43 (BP Location: Right arm, Patient Position: Lying)   Pulse 68   Temp 98.6 °F (37 °C) (Oral)   Resp 20   Ht 162.6 cm (64\")   Wt 75.3 kg (166 lb 1.6 oz)   SpO2 95%   BMI 28.51 kg/m²    Intake & Output (last day)       10/17 0701 - 10/18 0700 10/18 07 - 10/19 0700    I.V. (mL/kg) 1300 (17.3)     Other 1673     NG/GT 1217     IV Piggyback 800     " Total Intake(mL/kg) 4990 (66.3)     Urine (mL/kg/hr) 2600 (1.4)     Total Output 2600      Net +2390            Unmeasured Stool Occurrence 2 x         Lab Results (last 24 hours)     Procedure Component Value Units Date/Time    Magnesium [[id number]]  (Normal) Collected:  10/18/18 0619    Specimen:  Blood Updated:  10/18/18 0827     Magnesium 2.0 mg/dL     POC Glucose Once [[id number]]  (Abnormal) Collected:  10/18/18 0641    Specimen:  Blood Updated:  10/18/18 0643     Glucose 153 (H) mg/dL     Narrative:       Meter: [id number] : [id number] Eleonora Payan    POC Glucose Once [[id number]]  (Normal) Collected:  10/18/18 0026    Specimen:  Blood Updated:  10/18/18 0027     Glucose 111 mg/dL     Narrative:       Meter: [id number] : [id number] Ty Alvaradoe    POC Glucose Once [[id number]]  (Normal) Collected:  10/17/18 1712    Specimen:  Blood Updated:  10/17/18 1714     Glucose 112 mg/dL     Narrative:       Meter: [id number] : [id number] Mita Gooden    POC Glucose Once [[id number]]  (Normal) Collected:  10/17/18 1213    Specimen:  Blood Updated:  10/17/18 1215     Glucose 128 mg/dL     Narrative:       Meter: [id number] : [id number] Mita Gooden        Imaging Results (last 24 hours)     Procedure Component Value Units Date/Time    XR Chest 1 View [[id number]] Collected:  10/17/18 0851     Updated:  10/17/18 2158    Narrative:       EXAMINATION: XR CHEST 1 VW- 10/17/2018     INDICATION: Follow up atelectasis; Z74.09-Other reduced mobility;  R13.12-Dysphagia, oropharyngeal phase;  J69.0-Pneumonitis due to  inhalation of food and vomit; R49.9-Unspecified voice and resonance  disorder      COMPARISON: 10/13/2018     FINDINGS: Tracheostomy tube is noted. The heart is enlarged. The  vasculature is upper limits of normal. Discoid atelectasis in the left  mid lung, and mild left basilar atelectasis or effusion appears stable  or slightly increased. Right lung remains grossly clear. No  pneumothorax  is identified.       Impression:       Left basilar atelectasis and left midlung discoid  atelectasis, stable to slightly increased from 10/13/2018. No  significant new chest disease elsewhere.        D:  10/17/2018  E:  10/17/2018     This report was finalized on 10/17/2018 9:55 PM by DR. Eric Ortiz MD.             PPS:   30% based on the following measures:   Ambulation: Totally bed bound  Activity and Evidence of Disease: Unable to do any work, extensive evidence of disease  Self-Care: Total care  Intake: Reduced   LOC: Full, drowsy or confusion    Physical Exam  Constitutional: She is oriented to person, place, and time. She appears well-developed and well-nourished.   HENT: upper airway congestion   Head: Normocephalic.   Eyes: Pupils are equal, round, and reactive to light.   Neck:   Trach midline, TCO2 @ 40%   Cardiovascular: Normal rate and regular rhythm.    Difficult to auscultate over rhonchi    Pulmonary/Chest: She is in respiratory distress. She has overall rhonchi, < bibasialr. Wet cough    Abdominal: Soft. Bowel sounds are normal.    PEG tube patent with insertion site c&d    Genitourinary:  F/c BSD uop light tequila, clear    Musculoskeletal:   Decreased BLE function, bedfast, OOB to chair with lift   Neurological: She is alert and oriented to person, place, and time.   communicates by writing    Skin: Skin is warm and dry. Capillary refill takes 2 to 3 seconds. There is pallor.   Psychiatric: She has a normal mood and affect. Her behavior is normal.   Nursing note and vitals reviewed.      Pneumonia    Type 2 diabetes mellitus (CMS/[facility])    Hypotension    Hyperlipidemia    Depression    Anxiety    COPD (chronic obstructive pulmonary disease) (CMS/HCC)    Dysphagia    Coronary artery disease    CHF (congestive heart failure) (CMS/HCC)    GERD (gastroesophageal reflux disease)    Chronic respiratory failure with hypoxia and hypercapnia (CMS/[facility])    Assessment/Plan:   1) PNA LLL, MRSA and  "GNR, overall rhonchi. intermittent mucus plugs: TF patent per PEG, continue IV ABX  Azactam/Vanc through 10/20.   Pain: well controlled with PRN Tylenol   Dyspnea: TC O2, scheduled and PRN mucomyst nebs/duonebs/chest physiotherapy   Nausea/Vomiting: PRN ondansetron 4 mg @ 0403 this am, pt states effective.    Anxiety/Agitation: Buspar 15 mg q 8 hrs   Confusion/Delerium: na  Depression: Buspar   Bowel/bladder: f/c BSD, BM 10/16.    Nutrition: NPO. TF with nutrition following.  Speech to repeat swallow today   Sleep: ropinorole 4 mg q HS   ADLs: max assist    Psychoscial: lives in LTCF, identifies self as decision maker but sister as helps. (above)  Other: DVT PX Lovenox   2) CAD/CHF: asa 81 mg q day, atorvastin   3) HTN: continue home midodirone   3)  DMT2: continue Levmir 25 U q night; bolus insulins QID with meals/HS   4) hemorrhoids: scheduled rectal cream      Continued discussion with pt about choice of NPO status/PEG TF vs comfort feeds with expected recurrent aspiration pneumonias that will eventually not respond to ABX and may be death event. Ms. Junior chooses to remain NPO/PEG TF and states \"I don't have a choice if I want to get better\". Nurse Yarely believes she gets confused/forgets the choice she made but does reorient quickly and has stated previously this am that she wants NPO/TF.      Medication adjustments: none. Thank you for this consult and allowing us to participate in patient's plan of care. Palliative Care Team will continue to follow patient. Please call for needs     Goals of Care: plan to DC back [facility] with NPO/PEG TF when IV ABX complete.   Family/Support/Spiritual:    Total Visit Time: 45  Face to Face Time: 30     ARELI Larry  [phone]  10/18/18  11:18 AM  "

## 2018-10-18 NOTE — THERAPY TREATMENT NOTE
Acute Care - Occupational Therapy Treatment Note  [facility]     Patient Name: Nallely Junior  : 1940  MRN: [id number]  Today's Date: 10/18/2018  Onset of Illness/Injury or Date of Surgery: 10/06/18  Date of Referral to OT: 10/06/18  Referring Physician: TIN Murphy MD    Admit Date: 10/6/2018       ICD-10-CM ICD-9-CM   1. Impaired mobility and ADLs Z74.09 799.89   2. Oropharyngeal dysphagia R13.12 787.22   3. Impaired functional mobility, balance, gait, and endurance Z74.09 V49.89   4. Aspiration pneumonia of left lower lobe, unspecified aspiration pneumonia type (CMS/[facility]) J69.0 507.0   5. Voice and resonance disorder R49.9 784.40     Patient Active Problem List   Diagnosis   • Pneumonia   • Type 2 diabetes mellitus (CMS/[facility])   • Hypotension   • Hyperlipidemia   • Depression   • Anxiety   • COPD (chronic obstructive pulmonary disease) (CMS/[facility])   • Dysphagia   • Coronary artery disease   • CHF (congestive heart failure) (CMS/[facility])   • GERD (gastroesophageal reflux disease)   • Chronic respiratory failure with hypoxia and hypercapnia (CMS/[facility])     Past Medical History:   Diagnosis Date   • Anemia    • Anxiety    • Aortic stenosis    • CHF (congestive heart failure) (CMS/[facility])    • Chronic respiratory failure with hypoxia and hypercapnia (CMS/[facility])    • CKD (chronic kidney disease), stage III (CMS/[facility])    • Constipation    • COPD (chronic obstructive pulmonary disease) (CMS/[facility])    • Coronary artery disease    • Dementia    • Depression    • Diabetes mellitus (CMS/[facility])    • Dysphagia    • Femur fracture, right (CMS/[facility])    • GERD (gastroesophageal reflux disease)    • Hyperlipidemia    • Hypertension    • Hypotension     on midodrine   • Restless legs syndrome (RLS)      Past Surgical History:   Procedure Laterality Date   • FOREARM SURGERY     • LEG SURGERY     • PEG TUBE INSERTION     • TRACHEOSTOMY         Therapy Treatment          Rehabilitation Treatment Summary     Row Name 10/18/18 1059 10/18/18 0901           Treatment Time/Intention    Discipline occupational therapist  -SD physical therapist  -KM     Document Type therapy note (daily note)  -SD therapy note (daily note)  -KM     Subjective Information no complaints  -SD no complaints   will ask for drink but is NPO on PEG  -KM     Mode of Treatment occupational therapy  -SD physical therapy  -KM     Patient/Family Observations Pt. asleep in bed, no family present. Pt. w/trach collar & O2 collar. Pt. with IV, coronado cath, & PEG.  -SD  --     Care Plan Review care plan/treatment goals reviewed;risks/benefits reviewed;current/potential barriers reviewed;patient/other agree to care plan  -SD care plan/treatment goals reviewed;risks/benefits reviewed;patient/other agree to care plan  -KM     Therapy Frequency (PT Clinical Impression)  -- 3 times/wk  -KM     Total Minutes, Occupational Therapy Treatment 28  -SD  --     Patient Effort good  -SD fair  -KM     Comment  -- Pt is lethargic and kept eyes closed during rx  -KM     Existing Precautions/Restrictions  -- fall;oxygen therapy device and L/min   PEG, trach  -KM     Recorded by [SD] Saritha Nathan, OT 10/18/18 1136 [KM] Nancy Bean, PT 10/18/18 0952     Row Name 10/18/18 1059             Vital Signs    Pre Patient Position Supine  -SD      Intra Patient Position Standing  -SD      Post Patient Position Sitting  -SD      Recorded by [SD] Saritha Nathan, OT 10/18/18 1136      Row Name 10/18/18 1059 10/18/18 0901          Cognitive Assessment/Intervention- PT/OT    Affect/Mental Status (Cognitive) WFL  -SD WNL  -KM     Orientation Status (Cognition) oriented to;person;place;situation  -SD oriented to;person;place  -KM     Follows Commands (Cognition) follows one step commands;over 90% accuracy;verbal cues/prompting required  -SD follows one step commands;over 90% accuracy  -KM     Cognitive Function (Cognitive) safety deficit  -SD  --     Safety Deficit (Cognitive) mild deficit;awareness of  need for assistance;insight into deficits/self awareness;safety precautions awareness;safety precautions follow-through/compliance  -SD  --     Personal Safety Interventions fall prevention program maintained;gait belt;nonskid shoes/slippers when out of bed  -SD fall prevention program maintained  -KM     Recorded by [SD] Saritha Nathan OT 10/18/18 1136 [KM] Nancy Bean, PT 10/18/18 0952     Row Name 10/18/18 1059             Bed Mobility Assessment/Treatment    Rolling Right [place] (Bed Mobility) minimum assist (75% patient effort)  -SD      Scooting/Bridging [place] (Bed Mobility) minimum assist (75% patient effort)  -SD      Supine-Sit [place] (Bed Mobility) minimum assist (75% patient effort)  -SD      Bed Mobility, Safety Issues decreased use of arms for pushing/pulling;decreased use of legs for bridging/pushing  -SD      Assistive Device (Bed Mobility) bed rails;draw sheet;head of bed elevated  -SD      Recorded by [SD] Saritha Nathan OT 10/18/18 1136      Row Name 10/18/18 0901             Transfer Assessment/Treatment    Comment (Transfers) --   Pt is dependent lift  -KM      Recorded by [KM] Nancy Bean, PT 10/18/18 0952      Row Name 10/18/18 1059             Bed-Chair Transfer    Bed-Chair [place] (Transfers) moderate assist (50% patient effort);2 person assist  -SD      Assistive Device (Bed-Chair Transfers) other (see comments)   stand pivot  -SD      Recorded by [SD] Saritha Nathan OT 10/18/18 1136      Row Name 10/18/18 0901             Gait/Stairs Assessment/Training    Comment (Gait/Stairs) --   no ambulatory  -KM      Recorded by [KM] Nancy Bean, PT 10/18/18 0952      Row Name 10/18/18 1059             ADL Assessment/Intervention    34452 - OT Self Care/Mgmt Minutes 10  -SD      BADL Assessment/Intervention grooming;toileting  -SD      Recorded by [SD] Saritha Nathan OT 10/18/18 1136      Row Name 10/18/18  1059             Grooming Assessment/Training    [employer] Level (Grooming) hair care, combing/brushing;oral care regimen;wash face, hands;set up;contact guard assist  -SD      Grooming Position edge of bed sitting  -SD      Recorded by [SD] Saritha Nathan, OT 10/18/18 1136      Row Name 10/18/18 1059             Toileting Assessment/Training    [employer] Level (Toileting) perform perineal hygiene;dependent (less than 25% patient effort);two person assist required;other (see comments)   2 people to assist pt. in standing while 3rd person cleaned  -SD      Toileting Position supported standing  -SD      Recorded by [SD] Saritha Nathan, LYDIA 10/18/18 1136      Row Name 10/18/18 1059 10/18/18 0901          Therapeutic Exercise    70521 - PT Therapeutic Exercise Minutes  -- 23  -KM     56955 - OT Therapeutic Activity Minutes 18  -SD  --     Recorded by [SD] Saritha Nathan OT 10/18/18 1136 [KM] Nancy Bean, PT 10/18/18 0952     Row Name 10/18/18 0901             Upper Extremity Supine Therapeutic Exercise    Performed, Supine Upper Extremity (Therapeutic Exercise) shoulder flexion/extension;shoulder abduction/adduction;shoulder external/internal rotation;shoulder horizontal abduction/adduction;elbow flexion/extension  -KM      Exercise Type, Supine Upper Extremity (Therapeutic Exercise) AAROM (active assistive range of motion)  -KM      Sets/Reps Detail, Supine Upper Extremity (Therapeutic Exercise) 1/10  -KM      Recorded by [KM] Nancy Bean, PT 10/18/18 0952      Row Name 10/18/18 0901             Lower Extremity Seated Therapeutic Exercise    Performed, Seated Lower Extremity (Therapeutic Exercise) hip flexion/extension;hip abduction/adduction;hip external/internal rotation;ankle dorsiflexion/plantarflexion;SAQ (short arc quad), knee extension;hamstring stretch   gastroc stretch  -KM      Exercise Type, Seated Lower Extremity (Therapeutic Exercise) AAROM (active  assistive range of motion)   AA/PROM  -KM      Recorded by [KM] Nancy Bean, PT 10/18/18 0952      Row Name 10/18/18 0901             Therapeutic Exercise    Lower Extremity (Therapeutic Exercise) gastroc stretch, bilateral;hamstring stretch, bilateral  -KM      Recorded by [KM] Nancy Bean, PT 10/18/18 0952      Row Name 10/18/18 1059             Balance    Balance static standing balance  -SD      Recorded by [SD] Saritha Nathan, OT 10/18/18 1136      Row Name 10/18/18 1059             Static Sitting Balance    Level of [place] (Unsupported Sitting, Static Balance) standby assist  -SD      Sitting Position (Unsupported Sitting, Static Balance) sitting on edge of bed  -SD      Time Able to Maintain Position (Unsupported Sitting, Static Balance) more than 5 minutes  -SD      Recorded by [SD] Saritha Nathan, OT 10/18/18 1136      Row Name 10/18/18 1059             Static Standing Balance    Level of [place] (Supported Standing, Static Balance) moderate assist, 50 to 74% patient effort;other (see comments)   x2  -SD      Time Able to Maintain Position (Supported Standing, Static Balance) 1 to 2 minutes  -SD      Comment (Supported Standing, Static Balance) pt. cleaned up from BM while in standing  -SD      Recorded by [SD] Saritha Nathan, OT 10/18/18 1136      Row Name 10/18/18 1059             Positioning and Restraints    Pre-Treatment Position in bed  -SD      Post Treatment Position chair  -SD      In Chair notified nsg;reclined;call light within reach;encouraged to call for assist;exit alarm on;legs elevated  -SD      Recorded by [SD] Saritha Nathan, OT 10/18/18 1136      Row Name 10/18/18 1059 10/18/18 0901          Pain Scale: Numbers Pre/Post-Treatment    Pain Scale: Numbers, Pretreatment 0/10 - no pain  -SD 0/10 - no pain  -KM     Pain Scale: Numbers, Post-Treatment 0/10 - no pain  -SD 0/10 - no pain  -KM     Recorded by [SD] Saritha Nathan  Sharmin, OT 10/18/18 1136 [KM] Nancy Bean, PT 10/18/18 0952     Row Name 10/18/18 0901             Pain Scale: FACES Pre/Post-Treatment    Pain: FACES Scale, Pretreatment 0-->no hurt  -KM      Pain: FACES Scale, Post-Treatment 0-->no hurt  -KM      Recorded by [KM] Nancy Bean, PT 10/18/18 0952      Row Name 10/18/18 1059             Coping    Observed Emotional State accepting;calm;cooperative  -SD      Recorded by [SD] Saritha Nathan, OT 10/18/18 1136      Row Name 10/18/18 1059 10/18/18 0901          Plan of Care Review    Plan of Care Reviewed With patient  -SD patient  -KM     Recorded by [SD] Saritha Nathan, OT 10/18/18 1136 [KM] Nancy Bean, PT 10/18/18 0952     Row Name 10/18/18 1059             Outcome Summary/Treatment Plan (OT)    Daily Summary of Progress (OT) progress toward functional goals is good  -SD      Plan for Continued Treatment (OT) cont OT POC  -SD      Anticipated Discharge Disposition (OT) extended care facility  -SD      Recorded by [SD] Saritha Nathan, OT 10/18/18 1136        User Key  (r) = Recorded By, (t) = Taken By, (c) = Cosigned By    Initials Name Effective Dates Discipline    SD Saritha Nathan, OT 06/08/18 -  OT    KM Nacny Bean, PT 06/19/15 -  PT               Occupational Therapy Education     Title: PT OT SLP Therapies (Active)     Topic: Occupational Therapy (Active)     Point: ADL training (Active)     Description: Instruct learner(s) on proper safety adaptation and remediation techniques during self care or transfers.   Instruct in proper use of assistive devices.   Learning Progress Summary     Learner Status Readiness Method Response Comment Documented by    Patient Active Acceptance E NR Pt educated on appropriate safety precautions, positioning, HEP, and benefits of therapy. CL 10/15/18 1600     Active Acceptance E NR Pt educated on appropriate safety precautions, positioning, HEP, and  benefits of therapy. CL 10/11/18 1445     Active Acceptance E NR Pt educated on HEP, positioning, role of OT, and benefits of therapy. CL 10/09/18 1548     Done Acceptance E,D VU,NR Body mechanics with bed mobility; reinforced need for call for assist with OOB activities; role of OT. TA 10/07/18 0928          Point: Home exercise program (Active)     Description: Instruct learner(s) on appropriate technique for monitoring, assisting and/or progressing therapeutic exercises/activities.   Learning Progress Summary     Learner Status Readiness Method Response Comment Documented by    Patient Active Acceptance E NR Pt educated on appropriate safety precautions, positioning, HEP, and benefits of therapy. CL 10/15/18 1600     Active Acceptance E NR Pt educated on appropriate safety precautions, positioning, HEP, and benefits of therapy. CL 10/11/18 1445     Active Acceptance E NR Pt educated on HEP, positioning, role of OT, and benefits of therapy. CL 10/09/18 1548          Point: Precautions (Active)     Description: Instruct learner(s) on prescribed precautions during self-care and functional transfers.   Learning Progress Summary     Learner Status Readiness Method Response Comment Documented by    Patient Active Acceptance E NR Pt educated on appropriate safety precautions, positioning, HEP, and benefits of therapy. CL 10/15/18 1600     Active Acceptance E NR Pt educated on appropriate safety precautions, positioning, HEP, and benefits of therapy. CL 10/11/18 1445     Active Acceptance E NR Pt educated on HEP, positioning, role of OT, and benefits of therapy. CL 10/09/18 1548     Done Acceptance E,D VU,NR Body mechanics with bed mobility; reinforced need for call for assist with OOB activities; role of OT. TA 10/07/18 0928          Point: Body mechanics (Done)     Description: Instruct learner(s) on proper positioning and spine alignment during self-care, functional mobility activities and/or exercises.   Learning  Progress Summary     Learner Status Readiness Method Response Comment Documented by    Patient Done Acceptance E,REYNA ALBARRAN,NR Body mechanics with bed mobility; reinforced need for call for assist with OOB activities; role of OT.  10/07/18 0928                      User Key     Initials Effective Dates Name Provider Type Discipline    TA 03/14/16 -  Christiano Marie, OT Occupational Therapist OT    CL 04/03/18 -  Marie Pabon OT Occupational Therapist OT                OT Recommendation and Plan  Outcome Summary/Treatment Plan (OT)  Daily Summary of Progress (OT): progress toward functional goals is good  Plan for Continued Treatment (OT): cont OT POC  Anticipated Discharge Disposition (OT): extended care facility  Daily Summary of Progress (OT): progress toward functional goals is good  Plan of Care Review  Plan of Care Reviewed With: patient  Plan of Care Reviewed With: patient  Outcome Summary: Pt. supine to sit on EOB with min A. Pt. sat EOB for >5 min with SBA. Pt. participated in grooming tasks while sitting EOB. Pt. sit to stand with mod A x 2. Pt. dependent for toileting in standing. Pt. stand pivot to chair with mod A x 2.        Outcome Measures     Row Name 10/18/18 1059 10/18/18 0901 10/17/18 1016       How much help from another person do you currently need...    Turning from your back to your side while in flat bed without using bedrails?  -- 2  -KM 2  -SJ    Moving from lying on back to sitting on the side of a flat bed without bedrails?  -- 1  -KM 1  -SJ    Moving to and from a bed to a chair (including a wheelchair)?  -- 1  -KM 1  -SJ    Standing up from a chair using your arms (e.g., wheelchair, bedside chair)?  -- 1  -KM 1  -SJ    Climbing 3-5 steps with a railing?  -- 1  -KM 1  -SJ    To walk in hospital room?  -- 1  -KM 1  -SJ    AM-PAC 6 Clicks Score  -- 7  -KM 7  -SJ       How much help from another is currently needed...    Putting on and taking off regular lower body clothing? 2  -SD  --  --     Bathing (including washing, rinsing, and drying) 2  -SD  --  --    Toileting (which includes using toilet bed pan or urinal) 2  -SD  --  --    Putting on and taking off regular upper body clothing 3  -SD  --  --    Taking care of personal grooming (such as brushing teeth) 3  -SD  --  --    Eating meals 3  -SD  --  --    Score 15  -SD  --  --       Functional Assessment    Outcome Measure Options AM-PAC 6 Clicks Daily Activity (OT)  -SD AM-PAC 6 Clicks Basic Mobility (PT)  -KM AM-PAC 6 Clicks Basic Mobility (PT)  -    Row Name 10/15/18 1423             How much help from another is currently needed...    Putting on and taking off regular lower body clothing? 1  -CL      Bathing (including washing, rinsing, and drying) 1  -CL      Toileting (which includes using toilet bed pan or urinal) 1  -CL      Putting on and taking off regular upper body clothing 2  -CL      Taking care of personal grooming (such as brushing teeth) 3  -CL      Eating meals 3  -CL      Score 11  -CL         Functional Assessment    Outcome Measure Options AM-PAC 6 Clicks Daily Activity (OT)  -CL        User Key  (r) = Recorded By, (t) = Taken By, (c) = Cosigned By    Initials Name Provider Type    Saritha Meléndez, OT Occupational Therapist    SJ Meme Levin, PT Physical Therapist    KM Nancy Bean, PT Physical Therapist    CL Marie Pabon, OT Occupational Therapist           Time Calculation:         Time Calculation- OT     Row Name 10/18/18 1059             Time Calculation- OT    OT Start Time 1059  -SD      OT Stop Time 1127  -SD      OT Time Calculation (min) 28 min  -SD      OT Received On 10/18/18  -SD      OT Goal Re-Cert Due Date 10/17/18  -SD         Timed Charges    76507 - OT Therapeutic Activity Minutes 18  -SD      63252 - OT Self Care/Mgmt Minutes 10  -SD        User Key  (r) = Recorded By, (t) = Taken By, (c) = Cosigned By    Initials Name Provider Type    Saritha Meléndez, OT Occupational  Therapist           Therapy Suggested Charges     Code   Minutes Charges    40044 (CPT®) Hc Ot Neuromusc Re Education Ea 15 Min      57388 (CPT®) Hc Ot Ther Proc Ea 15 Min      16442 (CPT®) Hc Ot Therapeutic Act Ea 15 Min 18 1    72688 (CPT®) Hc Ot Manual Therapy Ea 15 Min      43213 (CPT®) Hc Ot Iontophoresis Ea 15 Min      73734 (CPT®) Hc Ot Elec Stim Ea-Per 15 Min      61637 (CPT®) Hc Ot Ultrasound Ea 15 Min      85461 (CPT®) Hc Ot Self Care/Mgmt/Train Ea 15 Min 10 1    Total  28 2        Therapy Charges for Today     Code Description Service Date Service Provider Modifiers Qty    91101182725 HC OT THERAPEUTIC ACT EA 15 MIN 10/18/2018 Saritha Nathan, OT GO 1    19646452834 HC OT SELF CARE/MGMT/TRAIN EA 15 MIN 10/18/2018 Saritha Nathan, OT GO 1               Saritha Nathan OT  10/18/2018

## 2018-10-18 NOTE — PLAN OF CARE
Problem: Patient Care Overview  Goal: Plan of Care Review  Outcome: Ongoing (interventions implemented as appropriate)   10/18/18 1206   Coping/Psychosocial   Plan of Care Reviewed With patient   SLP re-evaluation completed. Repeat MBS revealed improved swallow function. Mild oropharyngeal dysphagia. PMV not worn during MBS. Aspiration appreciated only w/ large/consecutive drinks of thin liquid. Aspiration was silent. No aspiration noted w/ thin liquid barium via tsp or cup drink, barium pudding, or cracker coated in barium pudding. Pt able to independently take small sip. Per MBS results, pt appears safe for soft/chopped diet and thin liquid via cup (no straws). Noted Dr. Armstrong's concern for aspiration of oral secretions and ongoing cycle of pneumonia. Pt may be at general risk for aspiration given respiratory status and reduced sensation. Will defer to MD re: POC decisions. Would recommend aggressive oral care BID & PRN whether decision is made for PO diet or NPO w/ PEG to reduce risk of aspiration pneumonia in event pt aspirates oral secretions. Please see note for further details and recommendations.

## 2018-10-18 NOTE — PLAN OF CARE
Problem: Patient Care Overview  Goal: Plan of Care Review   10/18/18 1051   Coping/Psychosocial   Plan of Care Reviewed With patient   Plan of Care Review   Progress improving

## 2018-10-18 NOTE — PLAN OF CARE
Problem: Patient Care Overview  Goal: Plan of Care Review  Outcome: Ongoing (interventions implemented as appropriate)   10/18/18 1136   Coping/Psychosocial   Plan of Care Reviewed With patient   OTHER   Outcome Summary Pt. supine to sit on EOB with min A. Pt. sat EOB for >5 min with SBA. Pt. participated in grooming tasks while sitting EOB. Pt. sit to stand with mod A x 2. Pt. dependent for toileting in standing. Pt. stand pivot to chair with mod A x 2.   Plan of Care Review   Progress improving

## 2018-10-18 NOTE — DISCHARGE SUMMARY
[facility] Medicine Services  DISCHARGE SUMMARY    Patient Name: Nallely Junior  : 1940  MRN: [id number]    Date of Admission: 10/6/2018  Date of Discharge:  10/18/18  Primary Care Physician: Ray Strickland MD    Consults     Date and Time Order Name Status Description    10/16/2018 1324 Inpatient Palliative Care MD Consult Completed     10/8/2018 0030 Inpatient Infectious Diseases Consult Completed     10/6/2018 1946 Inpatient Pulmonology Consult Completed         Hospital Course     Presenting Problem:   Pleural effusion [J90]  Pneumonia [J18.9]    Active Hospital Problems    Diagnosis Date Noted   • Hypotension [I95.9] 10/07/2018   • Hyperlipidemia [E78.5] 10/07/2018   • Depression [F32.9] 10/07/2018   • Anxiety [F41.9] 10/07/2018   • COPD (chronic obstructive pulmonary disease) (CMS/formerly [facility]) [J44.9] 10/07/2018   • Dysphagia [R13.10] 10/07/2018   • Coronary artery disease [I25.10] 10/07/2018   • CHF (congestive heart failure) (CMS/formerly [facility]) [I50.9] 10/07/2018   • GERD (gastroesophageal reflux disease) [K21.9] 10/07/2018   • Chronic respiratory failure with hypoxia and hypercapnia (CMS/formerly [facility]) [J96.11, J96.12] 10/07/2018   • Pneumonia [J18.9] 10/06/2018   • Type 2 diabetes mellitus (CMS/formerly [facility]) [E11.9] 10/06/2018      Resolved Hospital Problems    Diagnosis Date Noted Date Resolved   No resolved problems to display.          Hospital Course:  Nallely Junior is a 78 y.o. female   with history of chronic respiratory failure s/p tracheostomy, HLD, T2DM, bedbound state who presents as a transfer/direct admission from [facility] for nonresolving PNA, mucus plugging with opacification of left lung and tracheostomy issues. Patient was admitted to OSH on 10/2 from her NH, for several days of sob, cough, increasing amount of sputum. Patient was initially diagnosed with left sided PNA at OSH, treated with broad spectrum abx- merrem, vanc, levaquin since 10/2. Patient was also noted to be  hypotensive to as low as 90s systolic on presentation, but has improved. Sputum cx from admission was reported to grow MRSA, , as well as Pseudomonas. She has been transitioned to IV vancomycin and Azactam which will continue through 10/20/18 per Dr. Lundberg, [facility]. She can resume home oxygen and respiratory support.     Pulmonary was consulted for refractory left sided consolidation and patient's high risk for aspiration. Has been discussed with patient that she is at a continued high risk for aspiration if she chooses to continue with oral intake. Patient did repeat MBS today and cleared for soft chopped diet with thin liquids via cup, no straws. Patient would like to continue with recommended diet at discharge and stop tube feedings. Recommend aggressive oral care.    Patient to transfer back to [facility] today. Would recommend palliative medicine to consult/ to follow while there.     Discharge Follow Up Recommendations for labs/diagnostics:  PCP 1 week    Day of Discharge     HPI:   Patient sitting up in chair reading. Denies soa and states breathing is much better. Just returned [place] [facility] states she wishes to continue with modified diet per SLP recommendations. She does not desire tube feedings at this time, even if she is at increased risk for aspiration. Long discussion with patient who wants to continue palliative diet. She remains full code and wishes for treatment of pneumonia should it reoccur. Agreeable to have palliative medicine follow at SNF.    Review of Systems  Gen- No fevers, chills  CV- No chest pain, palpitations  Resp- No cough, dyspnea  GI- No N/V/D, abd pain      Otherwise ROS is negative except as mentioned in the HPI.    Vital Signs:   Temp:  [97.1 °F (36.2 °C)-98.7 °F (37.1 °C)] 98.6 °F (37 °C)  Heart Rate:  [66-75] 68  Resp:  [16-22] 20  BP: ()/(43-56) 93/43     Physical Exam:  Constitutional: No acute distress, awake, alert  HENT: NCAT, mucous membranes  moist  Respiratory: Coarse bilaterally, trach present, respiratory effort normal   Cardiovascular: RRR, + systolic murmur, rubs, or gallops, palpable pedal pulses bilaterally  Gastrointestinal: Positive bowel sounds, soft, nontender, nondistended  Musculoskeletal: No bilateral ankle edema  Psychiatric: Appropriate affect, cooperative  Neurologic: Oriented x 3, strength symmetric in all extremities, Cranial Nerves grossly intact to confrontation  Skin: No rashes      Pertinent  and/or Most Recent Results       Results from last 7 days  Lab Units 10/17/18  0434 10/16/18  0548 10/15/18  1110 10/12/18  0449   WBC 10*3/mm3 7.14 6.91  --   --    HEMOGLOBIN g/dL 11.0* 10.4*  --   --    HEMATOCRIT % 35.4 32.8*  --   --    PLATELETS 10*3/mm3 137* 145*  --   --    SODIUM mmol/L 141 141 138 133   POTASSIUM mmol/L 4.2 3.6 2.7* 3.7   CHLORIDE mmol/L 109 108 106 91*   CO2 mmol/L 30.0 30.0 29.0 34.0*   BUN mg/dL 12 9 11 21   CREATININE mg/dL 0.40* 0.35* 0.43* 0.58*   GLUCOSE mg/dL 160* 151* 116* 345*   CALCIUM mg/dL 8.4* 7.9* 8.2* 8.4*       Results from last 7 days  Lab Units 10/17/18  0434   BILIRUBIN mg/dL 0.8   ALK PHOS U/L 74   ALT (SGPT) U/L 67*   AST (SGOT) U/L 35*           Invalid input(s): TG, LDLCALC, LDLREALC    Results from last 7 days  Lab Units 10/12/18  0449   BNP pg/mL 234.0*     Brief Urine Lab Results  (Last result in the past 365 days)      Color   Clarity   Blood   Leuk Est   Nitrite   Protein   CREAT   Urine HCG        10/06/18 1854 Yellow Clear Negative Negative Negative Negative               Microbiology Results Abnormal     Procedure Component Value - Date/Time    Wound Culture - Wound, Abdominal Wall [[id number]]  (Abnormal)  (Susceptibility) Collected:  10/07/18 0109    Lab Status:  Final result Specimen:  Wound from Abdominal Wall Updated:  10/12/18 1422     Wound Culture Moderate growth (3+) Proteus mirabilis (A)      Moderate growth (3+) Candida glabrata (A)      Moderate growth (3+) Candida krusei  (A)      Light growth (2+) Enterococcus raffinosus (A)      Light growth (2+) Staphylococcus aureus, MRSA (A)     Comment:   Methicillin resistant Staphylococcus aureus, Patient may be an isolation risk.        Gram Stain Result No WBCs seen      Few (2+) Gram positive bacilli      Many (4+) Yeast    Susceptibility      Proteus mirabilis    Enterococcus raffinosus       GONSALO    GONSALO       Amikacin <=16 ug/ml Susceptible             Ampicillin >16 ug/ml Resistant    8 ug/ml Susceptible       Ampicillin + Sulbactam 16/8 ug/ml Intermediate             Aztreonam <=8 ug/ml Susceptible             Cefepime <=8 ug/ml Susceptible             Cefotaxime <=2 ug/ml Susceptible             Ceftriaxone <=8 ug/ml Susceptible             Cefuroxime sodium <=4 ug/ml Susceptible             Ciprofloxacin >2 ug/ml Resistant             Ertapenem <=1 ug/ml Susceptible             Gentamicin >8 ug/ml Resistant             Gentamicin High Level Synergy       <=500 ug/ml Susceptible       Levofloxacin 4 ug/ml Intermediate             Linezolid       <=1 ug/ml Susceptible       Meropenem <=1 ug/ml Susceptible             Penicillin G       >8 ug/ml Resistant       Piperacillin + Tazobactam <=16 ug/ml Susceptible             Streptomycin High Level Synergy       >1,000 ug/ml Resistant       Tobramycin >8 ug/ml Resistant             Trimethoprim + Sulfamethoxazole >2/38 ug/ml Resistant             Vancomycin       1 ug/ml Susceptible                  Susceptibility      Staphylococcus aureus, MRSA     GONSALO     Ciprofloxacin >2 ug/ml Resistant     Clindamycin >4 ug/ml Resistant     Daptomycin <=0.5 ug/ml Susceptible     Erythromycin >4 ug/ml Resistant     Gentamicin <=4 ug/ml Susceptible     Levofloxacin >4 ug/ml Resistant     Linezolid 2 ug/ml Susceptible     Oxacillin >2 ug/ml Resistant     Penicillin G 8 ug/ml Resistant     Quinupristin + Dalfopristin <=0.5 ug/ml Susceptible     Rifampin <=1 ug/ml Susceptible     Tetracycline <=4 ug/ml  Susceptible     Trimethoprim + Sulfamethoxazole <=0.5/9.5 ug/ml Susceptible     Vancomycin 1 ug/ml Susceptible                    Blood Culture - Blood, [[id number]]  (Normal) Collected:  10/06/18 2020    Lab Status:  Final result Specimen:  Blood from Arm, Right Updated:  10/11/18 2101     Blood Culture No growth at 5 days    Respiratory Culture - Sputum, NT Suction [[id number]]  (Abnormal)  (Susceptibility) Collected:  10/07/18 0108    Lab Status:  Final result Specimen:  Sputum from NT Suction Updated:  10/10/18 1437     Respiratory Culture Scant growth (1+) Yeast isolated (A)      Scant growth (1+) Staphylococcus aureus, MRSA (A)     Comment:   Methicillin resistant Staphylococcus aureus, Patient may be an isolation risk.        Gram Stain Result Many (4+) WBCs per low power field      Rare (1+) Epithelial cells per low power field      No organisms seen    Susceptibility      Staphylococcus aureus, MRSA     GONSALO     Ciprofloxacin >2 ug/ml Resistant     Clindamycin >4 ug/ml Resistant     Erythromycin >4 ug/ml Resistant     Gentamicin <=4 ug/ml Susceptible     Levofloxacin >4 ug/ml Resistant     Linezolid 2 ug/ml Susceptible     Oxacillin >2 ug/ml Resistant     Penicillin G >8 ug/ml Resistant     Quinupristin + Dalfopristin <=0.5 ug/ml Susceptible     Rifampin <=1 ug/ml Susceptible     Tetracycline <=4 ug/ml Susceptible     Trimethoprim + Sulfamethoxazole <=0.5/9.5 ug/ml Susceptible     Vancomycin 2 ug/ml Susceptible                          Imaging Results (all)     Procedure Component Value Units Date/Time    FL Video Swallow With Speech [[id number]] Updated:  10/18/18 1202    XR Chest 1 View [[id number]] Collected:  10/17/18 0851     Updated:  10/17/18 2158    Narrative:       EXAMINATION: XR CHEST 1 VW- 10/17/2018     INDICATION: Follow up atelectasis; Z74.09-Other reduced mobility;  R13.12-Dysphagia, oropharyngeal phase;  J69.0-Pneumonitis due to  inhalation of food and vomit; R49.9-Unspecified voice and  resonance  disorder      COMPARISON: 10/13/2018     FINDINGS: Tracheostomy tube is noted. The heart is enlarged. The  vasculature is upper limits of normal. Discoid atelectasis in the left  mid lung, and mild left basilar atelectasis or effusion appears stable  or slightly increased. Right lung remains grossly clear. No pneumothorax  is identified.       Impression:       Left basilar atelectasis and left midlung discoid  atelectasis, stable to slightly increased from 10/13/2018. No  significant new chest disease elsewhere.        D:  10/17/2018  E:  10/17/2018     This report was finalized on 10/17/2018 9:55 PM by DR. Eric Ortiz MD.       XR Abdomen KUB [[id number]] Collected:  10/16/18 1045     Updated:  10/16/18 1344    Narrative:       EXAMINATION: XR ABDOMEN, KUB-10/16/2018:      INDICATION: PEG tube position; Z74.09-Other reduced mobility;  R13.12-Dysphagia, oropharyngeal phase; Z74.09-Other reduced mobility;  J69.0-Pneumonitis due to inhalation of food and vomit; R49.9-Unspecified  voice and resonance disorder.      COMPARISON: NONE.     FINDINGS: Nonspecific, nonobstructive bowel gas pattern. Percutaneous  gastrostomy tube overlies the proximal partially gas-distended gastric  lumen.           Impression:       PEG tube overlies the proximal partially gas distended  gastric lumen. Nonobstructive bowel gas pattern.     D:  10/16/2018  E:  10/16/2018     This report was finalized on 10/16/2018 1:42 PM by Dr. Raymond Shelton.       XR Chest 1 View [[id number]] Collected:  10/13/18 0856     Updated:  10/13/18 1210    Narrative:          EXAMINATION: XR CHEST 1 VW - 10/13/2018     INDICATION: R13.12-Dysphagia, oropharyngeal phase; Z74.09-Other reduced  mobility; J69.0-Pneumonitis due to inhalation of food and vomit;  R49.9-Unspecified voice and resonance disorder.      COMPARISON: 10/12/2018.     FINDINGS: Portable chest reveals tracheostomy tube in satisfactory  position above the jacobo. There is ill-defined  opacification at the  left lung base with small left pleural effusion, improving in the  interval. The right lung remains clear. Degenerative change is seen  within the spine. The heart is enlarged.           Impression:       Significant improvement seen in aeration of the left lung  base. There is a small left pleural effusion identified.     DICTATED:   10/13/2018  EDITED/ls :   10/13/2018      This report was finalized on 10/13/2018 12:08 PM by Dr. Meagan Randhawa MD.       XR Chest 1 View [[id number]] Collected:  10/12/18 0856     Updated:  10/12/18 1417    Narrative:       EXAMINATION: XR CHEST 1 VW- 10/12/2018     INDICATION: Pneumonia, mucous plugging; Z74.09-Other reduced mobility;  R13.12-Dysphagia, oropharyngeal phase; Z74.09-Other reduced mobility;  J69.0-Pneumonitis due to inhalation of food and vomit; R49.9-Unspecified  voice and resonance disorder      COMPARISON: 10/10/2018     FINDINGS: Portable chest reveals heart to be enlarged. Tracheostomy tube  in satisfactory position above the jacobo. The left lung reveals  worsening of the markings in the left mid and lower lung field. Small  left pleural effusion. Shift of the mediastinum to the left suggesting  possible collapse. There is a cutoff sign identified within the left  main stem bronchus. Consider bronchoscopy for possible treatment of  mucous plugging.           Impression:       Cutoff sign Identified in left main stem bronchus with  possible mucous plugging. There is worsening of the airspace disease  within the left mid and lower lung field suggesting collapse with shift  of the mediastinum to the left. Small left pleural effusion. The right  lung remains clear.        D:  10/12/2018  E:  10/12/2018     This report was finalized on 10/12/2018 2:15 PM by Dr. Meagan Randhawa MD.       XR Chest 1 View [[id number]] Collected:  10/10/18 0953     Updated:  10/10/18 0955    Narrative:       EXAMINATION: XR CHEST 1 VW- 10/10/2018      INDICATION: Pneumonia, atelectasis; Z74.09-Other reduced mobility;  R13.12-Dysphagia, oropharyngeal phase     TECHNIQUE:  Single view frontal chest.     COMPARISONS: 10/09/2018     FINDINGS:  Stable support apparatus. Unchanged small volume left and  trace right pleural effusion. No pneumothorax. ORIF hardware right  proximal humerus. Cardiomediastinal silhouette is unchanged.        Impression:       Stable exam.     D:  10/10/2018  E:  10/10/2018     This report was finalized on 10/10/2018 9:53 AM by Sonny Perez.       XR Chest 1 View [[id number]] Collected:  10/09/18 0809     Updated:  10/09/18 1038    Narrative:       EXAMINATION: XR CHEST 1 VW-10/09/2018:      INDICATION: Pneumonia; Z74.09-Other reduced mobility; R13.12-Dysphagia,  oropharyngeal phase; Z74.09-Other reduced mobility.      COMPARISON: Chest x-ray 10/08/2018.      FINDINGS: Significant interval improvement in left lung aeration with  decreased opacifications left lung apex and within the left lung base  from prior. Cardiac silhouette enlarged with only minimal left mid lung  opacification likely atelectasis. Right hemithorax grossly clear. No  pneumothorax or large effusion.       Impression:       Significant interval reduction in opacifications of the left  hemithorax with a considerable increase in aeration of the left lung and  only mid lung opacities likely represent mild atelectasis remaining.     D:  10/09/2018  E:  10/09/2018     This report was finalized on 10/9/2018 10:36 AM by Dr. Raymond Shelton.       CT Chest Without Contrast [[id number]] Collected:  10/08/18 0852     Updated:  10/08/18 2123    Narrative:       EXAM:    CT Chest Without Intravenous Contrast     EXAM DATE/TIME:    10/8/2018 8:52 AM     CLINICAL HISTORY:    78 years old, female; Dysphagia, oropharyngeal phase; Other reduced mobility;   Other reduced mobility; Abnormal findings; Abnormal radiologic exam of lung or   chest; Additional info: Pneumonia complicated /  unresolved     TECHNIQUE:    Axial computed tomography images of the chest without intravenous contrast.    All CT scans at this facility use at least one of these dose optimization   techniques: automated exposure control; mA and/or kV adjustment per patient   size (includes targeted exams where dose is matched to clinical indication); or   iterative reconstruction.    Coronal and sagittal reformatted images were created and reviewed.     COMPARISON:    CR XR CHEST 1 VW 10/8/2018 1:31 AM     FINDINGS:    Tubes, catheters and devices:  Tracheostomy tube position appears   satisfactory.    Lungs:  Bilateral infiltrates/atelectasis, predominantly on left, involving   LLL, BETTIE, and RLL. Clinically correlate to rule out pneumonia. Retained   secretions scattered in left bronchial tree.    Pleural space:  Small to moderate left pleural effusion, small right pleural   effusion. No pneumothorax.    Heart:  Mild cardiomegaly. Mitral annulus calcifications.  CAD.   Aorta:  No aortic aneurysm. Aortic atherosclerosis.    Great vessels off aortic arch and other great vessels:  Atherosclerotic   plaques are scattered along some vessels.    Other arteries:  There are coronary artery atheromatous calcifications,   consistent with CAD.    Lymph nodes:  No obvious lymphadenopathy, but resolution of left hilum is   obscured by adjacent air space disease.    Bones/joints:  Plate and screws ORIF hardware at proximal right humerus. DJD   of right glenohumeral joint. Degenerative changes of the spine. Mild scoliotic   curvature, positional versus fixed.    Soft tissues: Unremarkable.    Gallbladder and bile ducts:  Cholecystectomy.    Kidneys and ureters: Nonobstructive right nephrolithiasis.    Stomach and bowel:  Some contrast in the bowel.       Impression:       1. Small to moderate left pleural effusion, small right pleural effusion.   2. Bilateral infiltrates/atelectasis, predominantly on left, involving LLL,   BETTIE, and RLL.   3.  CAD.   4. Mild cardiomegaly.   5.  Nonobstructive right nephrolithiasis.     THIS DOCUMENT HAS BEEN ELECTRONICALLY SIGNED BY KEENAN KIM MD    FL Video Swallow With Speech [[id number]] Collected:  10/08/18 1514     Updated:  10/08/18 1532    Narrative:       EXAMINATION: FL VIDEO SWALLOW W SPEECH-     INDICATION: dysphagia; Z74.09-Other reduced mobility; R13.10-Dysphagia,  unspecified; Z74.09-Other reduced mobility     TECHNIQUE: 1 minute and 54 seconds of fluoroscopic time was used for  this exam. 1 associated image was saved. The patient was evaluated in  the seated lateral position while taking a variety of consistencies of  barium by mouth under the direction of speech pathology.     COMPARISON: NONE     FINDINGS: There was aspiration with sips of thin barium. There was no  penetration and no aspiration with nectar, pudding, or solid consistency  barium.          Impression:       Fluoroscopy provided for a modified barium swallow. Please  see speech therapy report for full details and recommendations.         This report was finalized on 10/8/2018 3:30 PM by Dr. Christiano Mcmillan MD.       XR Chest 1 View [[id number]] Collected:  10/08/18 0814     Updated:  10/08/18 1311    Narrative:       EXAMINATION: XR CHEST 1 VW-      INDICATION: Respiratory failure, pneumonia, atelectasis; Z74.09-Other  reduced mobility; R13.10-Dysphagia, unspecified.      COMPARISON: 10/06/2018.     FINDINGS: Portable chest reveals patchy ill-defined opacification seen  within the left upper lobe. Tracheostomy tube in satisfactory position  above the jacobo. Ill-defined opacification seen at the lung bases. Mild  elevation identified of the right hemidiaphragm. Small left pleural  effusion.           Impression:       Worsening identified of the airspace disease in the left  upper lobe. Small bilateral pleural effusions. The heart is enlarged.     D:  10/08/2018  E:  10/08/2018     This report was finalized on 10/8/2018 1:09 PM by   Meagan Randhawa MD.       XR Chest 1 View [[id number]] Collected:  10/07/18 1222     Updated:  10/07/18 1449    Narrative:       EXAMINATION: XR CHEST 1 VW - 10/6/2018     INDICATION: Pneumonia.     TECHNIQUE:  Single view frontal chest.     COMPARISONS: None.     FINDINGS:  Tracheostomy in place. Calcification of the aortic knob.  There is an arc-like calcification projecting over the cardiac  silhouette; this could be valvular or in the ventricular wall. There is  extensive opacity involving the left lung. The right lung is grossly  clear. No pneumothorax. Right proximal humerus ORIF hardware noted.       Impression:       Extensive opacification of the left lung. This is  compatible with the given history of pneumonia.     DICTATED:   10/07/2018  EDITED/ls :   10/07/2018         This report was finalized on 10/7/2018 2:47 PM by Sonny Perez.                       Results for orders placed during the hospital encounter of 10/06/18   Adult Transthoracic Echo Complete W/ Cont if Necessary Per Protocol    Narrative · Left ventricular systolic function is hyperdynamic (EF > 70).  · Left ventricular wall thickness is consistent with moderate concentric   hypertrophy.  · Left ventricular diastolic dysfunction (grade I) consistent with   impaired relaxation.  · Moderate to severe aortic stenosis (mean gradient 39 mmHg) is present.  · Mild mitral valve regurgitation is present            Discharge Details        Discharge Medications      New Medications      Instructions Start Date   aztreonam 2 g in sodium chloride 0.9 % 100 mL IVPB   2 g, Intravenous, Every 8 Hours      hydrocortisone 2.5 % rectal cream  Commonly known as:  ANUSOL-HC   Rectal, 2 Times Daily      insulin lispro 100 UNIT/ML injection  Commonly known as:  humaLOG   0-7 Units, Subcutaneous, Every 6 Hours      vancomycin   1,500 mg, Intravenous, Every 24 Hours         Changes to Medications      Instructions Start Date   insulin glargine 100 UNIT/ML  injection  Commonly known as:  LANTUS  What changed:  how much to take   25 Units, Subcutaneous, Nightly         Continue These Medications      Instructions Start Date   acetaminophen 500 MG tablet  Commonly known as:  TYLENOL   500 mg, Oral, Every 4 Hours PRN      albuterol 1.25 MG/3ML nebulizer solution  Commonly known as:  ACCUNEB   1 ampule, Nebulization, Every 4 Hours PRN      aspirin 81 MG chewable tablet   81 mg, Oral, Daily      atorvastatin 40 MG tablet  Commonly known as:  LIPITOR   40 mg, Oral, Daily      busPIRone 15 MG tablet  Commonly known as:  BUSPAR   15 mg, Oral, 3 Times Daily      docusate sodium 100 MG capsule  Commonly known as:  COLACE   100 mg, Oral, 2 Times Daily      enoxaparin 40 MG/0.4ML solution syringe  Commonly known as:  LOVENOX   40 mg, Subcutaneous, Every 24 Hours Scheduled      ferrous sulfate 325 (65 FE) MG tablet   325 mg, Oral, Daily With Breakfast      fish oil 1000 MG capsule capsule   Oral, 2 Times Daily With Meals      furosemide 40 MG tablet  Commonly known as:  LASIX   20 mg, Oral, 2 Times Daily      ipratropium-albuterol 0.5-2.5 mg/3 ml nebulizer  Commonly known as:  DUO-NEB   3 mL, Nebulization, 4 Times Daily PRN      midodrine 5 MG tablet  Commonly known as:  PROAMATINE   10 mg, Oral, 3 Times Daily      ondansetron 4 MG tablet  Commonly known as:  ZOFRAN   4 mg, Oral, Every 6 Hours PRN      pantoprazole 40 MG EC tablet  Commonly known as:  PROTONIX   40 mg, Oral, Daily      rOPINIRole 4 MG tablet  Commonly known as:  REQUIP   4 mg, Oral, 3 Times Daily      sodium bicarbonate 325 MG tablet   325 mg, Oral, 4 Times Daily         Stop These Medications    acetylcysteine 10 % nebulizer solution  Commonly known as:  MUCOMYST     clonazePAM 1 MG tablet  Commonly known as:  KlonoPIN     oxyCODONE-acetaminophen 5-325 MG per tablet  Commonly known as:  PERCOCET              Discharge Disposition:  Skilled Nursing Facility (DC - External)    Discharge Diet:  Diet Instructions      Diet: Tube Feeding; Continuous; isosource 1.5       Discharge Diet:  Tube Feeding    Feeding Type:  Continuous    Formula & Rate:  isosource 1.5          Discharge Activity:   Activity Instructions     Activity as Tolerated                 Special Instructions:      Code Status/Level of Support:  Code Status and Medical Interventions:   Ordered at: 10/06/18 1946     Level Of Support Discussed With:    Patient     Code Status:    CPR     Medical Interventions (Level of Support Prior to Arrest):    Full       No future appointments.    Additional Instructions for the Follow-ups that You Need to Schedule     Discharge Follow-up with PCP    As directed      Currently Documented PCP:  Ray Strickland MD  PCP Phone Number:  [phone]    Follow Up Details:  1 week               Time Spent on Discharge:  40 minutes    Electronically signed by ARELI Torres, 10/18/18, 12:37 PM.

## 2018-10-18 NOTE — PROGRESS NOTES
[facility] Progress Note    Admission Date: 10/6/2018    Nallely Junior  1940  [id number]    Date: 10/18/2018    Antibiotics:  Anti-Infectives     Ordered     Dose/Rate Route Frequency Start Stop    10/18/18 1236  aztreonam 2 g in sodium chloride 0.9 % 100 mL IVPB     Ordering Provider:  Salina Mahajan, APRN    2 g  over 4 Hours Intravenous Every 8 Hours 10/18/18 0000 10/20/18 2359    10/18/18 1236  vancomycin 1500 mg/500 mL 0.9% NS IVPB ([facility])     Ordering Provider:  Salina Mahajan APRN    1,500 mg  over 90 Minutes Intravenous Every 24 Hours 10/18/18 0000 10/21/18 2359    10/13/18 0847  vancomycin 1500 mg/500 mL 0.9% NS IVPB ([facility])     Ordering Provider:  Jenniffer Munoz, RPH    1,500 mg  over 90 Minutes Intravenous Every 24 Hours 10/13/18 1200 10/21/18 1159    10/09/18 0723  aztreonam (AZACTAM) 2 g in sodium chloride 0.9 % 100 mL IVPB-MBP     Ordering Provider:  Jan Martínez RPH    2 g  over 4 Hours Intravenous Every 8 Hours 10/09/18 0800 10/20/18 0759    10/09/18 0723  Pharmacy to dose vancomycin     Ordering Provider:  Jan Martínez RPH     Does not apply Continuous PRN 10/09/18 0721 10/20/18 0720    10/06/18 2328  aztreonam (AZACTAM) 2 g in sodium chloride 0.9 % 100 mL IVPB-MBP     Ordering Provider:  Olivia Murphy MD    2 g  200 mL/hr over 30 Minutes Intravenous Once 10/07/18 0015 10/07/18 0146    10/06/18 2023  vancomycin 2000 mg/500 mL 0.9% NS IVPB ([facility])     Ordering Provider:  Olivia Murphy MD    2,000 mg  over 120 Minutes Intravenous Once 10/06/18 2115 10/07/18 0138             CC:  Pneumonia, MRSA (sensitive to tetracycline and Bactrim)    HPI:  Patient is a 78 y.o.  Yr old female with dementia, diabetes mellitus type 2, chronic respiratory failure status post tracheostomy,hypertension, hyperlipidemia, bedbound, previous critical aortic stenosis, dysphagia, diastolic dysfunction, COPD, restless leg, GERD, anxiety, CAD, ho was recently admitted to [facility] for  pneumonia and mucous plugging with opacification of the left lung. Patient is a poor historian.The patienthad been treated with meropenem, vancomycin, and levofloxacin since 10/2/18. She was transferred to [facility] on 10/6/18 with non-resolving pneumonia. Left hemithorax continued to be opacified.  I was consulted on 10/8/18 for further evaluation and treatment.  No known immunocompromised state, travel history, zoonotic exposures,TB, or HIV.  10/9/18 hx rev.  Some sob.  Zulay vanc and aztreonam till 10/20 for pneum.  GNR in sputum.  No fever.  10/10/18 history reviewed. Tolerating vancomycin and aztreonam until 10/20 for pneumonia.  No high fevers. Minimal shortness of breath.  10/11/18 hx rev.  Receiving vanc and aztreo till 10/20 for pneum.  No high fever.  Min sputum prod.  Some sob.  10/12/18 history reviewed.  Receiving vancomycin and aztreonam until 10/20 for pneumonia.  MRSA in sputum.  Previous Pseudomonas.  No fever.  Minimal shortness of breath.  10/15/18 history reviewed. Tolerating vancomycin and aztreona until 10/20  For  MRSA and Pseudomonas pneumonia.  No high fevers.  10/16/18 history reviewed.  Tolerating vancomycin and aztreonam until 10/20.  No significant coughing.  Pain controlled.  No high fevers.  10/17/18 history reviewed.  Tolerating vancomycin and aztreonam  Until 10/20.  Occasional coughing.  No pain.  10/18/18 hx rev.  Zulay vanc and aztreon till 10/20 and feels better.  Less coughing.  NO fever or pain.    ROS:  No f/c/s. No n/v/d. No rash. No new ADR to Abx.     Constitutional-- No Fever, chills or sweats.  Appetite good, and no malaise. No fatigue.  Heent-- No new vision, hearing or throat complaints.  No epistaxis or oral sores.  Denies odynophagia or dysphagia.  No flashers, floaters or eye pain.  CV-- No chest pain, palpitation or syncope  Resp-- Some SOB/no cough, min sputum/Hemoptysis, no wheezing  GI- No nausea, vomiting, or diarrhea.  No hematochezia,  melena, or hematemesis. Denies jaundice or chronic liver disease.  -- No dysuria, hematuria, or flank pain.    Lymph- no swollen lymph nodes in neck/axilla or groin.   Heme- No active bruising or bleeding  MS-- no swelling or pain in the bones or joints of arms/legs.  No new back pain.  Neuro-- No acute focal weakness or numbness in the arms or legs.    Skin--No rash, nodules, blisters.    Objective   PE:  Vital Signs  Temp  Min: 97.1 °F (36.2 °C)  Max: 98.7 °F (37.1 °C)  BP  Min: 93/51  Max: 128/56  Pulse  Min: 62  Max: 75  Resp  Min: 16  Max: 22  SpO2  Min: 93 %  Max: 99 %    GENERAL: Awake and alert, in minimal distress. Appears older than stated age.  Not toxic.    HEENT: Normocephalic, atraumatic.  EOMI. No conjunctival injection. No icterus. Oropharynx clear without evidence of thrush or exudate.   NECK: Supple without nuchal rigidity. No mass.  LYMPH: No cervical, axillary or inguinal lymphadenopathy.  HEART: irregularRR; No rubs, gallops.  2/6 systolic ejection murmur left sternal border.   LUNGS: rales bases. Normal respiratory effort. Nonlabored. No dullness.no wheeze.  ABDOMEN: Soft, nontender, nondistended. Positive bowel sounds. No rebound or guarding. NO mass or HSM.  Obese.  EXT:  No cyanosis, clubbing or edema.   MSK: FROM without joint effusions noted arms/legs.    SKIN: Warm and dry without cutaneous eruptions on Inspection/palpation.    NEURO: Oriented to name. No focal deficits on motor/sensory exam at arms/legs.    Laboratory Data      Results from last 7 days  Lab Units 10/17/18  0434 10/16/18  0548   WBC 10*3/mm3 7.14 6.91   HEMOGLOBIN g/dL 11.0* 10.4*   HEMATOCRIT % 35.4 32.8*   PLATELETS 10*3/mm3 137* 145*       Results from last 7 days  Lab Units 10/17/18  0434   SODIUM mmol/L 141   POTASSIUM mmol/L 4.2   CHLORIDE mmol/L 109   CO2 mmol/L 30.0   BUN mg/dL 12   CREATININE mg/dL 0.40*   GLUCOSE mg/dL 160*   CALCIUM mg/dL 8.4*       Results from last 7 days  Lab Units 10/17/18  0434   ALK  PHOS U/L 74   BILIRUBIN mg/dL 0.8   ALT (SGPT) U/L 67*   AST (SGOT) U/L 35*       Results from last 7 days  Lab Units 10/16/18  0548   SED RATE mm/hr 22               Results from last 7 days  Lab Units 10/15/18  1110 10/13/18  0548   VANCOMYCIN TR mcg/mL 15.30 24.00*         Estimated Creatinine Clearance: 57.5 mL/min (A) (by C-G formula based on SCr of 0.4 mg/dL (L)).      Microbiology:  Microbiology Results Abnormal     Procedure Component Value - Date/Time    Wound Culture - Wound, Abdominal Wall [[id number]]  (Abnormal)  (Susceptibility) Collected:  10/07/18 0109    Lab Status:  Final result Specimen:  Wound from Abdominal Wall Updated:  10/12/18 1422     Wound Culture Moderate growth (3+) Proteus mirabilis (A)      Moderate growth (3+) Candida glabrata (A)      Moderate growth (3+) Candida krusei (A)      Light growth (2+) Enterococcus raffinosus (A)      Light growth (2+) Staphylococcus aureus, MRSA (A)     Comment:   Methicillin resistant Staphylococcus aureus, Patient may be an isolation risk.        Gram Stain Result No WBCs seen      Few (2+) Gram positive bacilli      Many (4+) Yeast    Susceptibility      Proteus mirabilis    Enterococcus raffinosus       GONSALO    GONSALO       Amikacin <=16 ug/ml Susceptible             Ampicillin >16 ug/ml Resistant    8 ug/ml Susceptible       Ampicillin + Sulbactam 16/8 ug/ml Intermediate             Aztreonam <=8 ug/ml Susceptible             Cefepime <=8 ug/ml Susceptible             Cefotaxime <=2 ug/ml Susceptible             Ceftriaxone <=8 ug/ml Susceptible             Cefuroxime sodium <=4 ug/ml Susceptible             Ciprofloxacin >2 ug/ml Resistant             Ertapenem <=1 ug/ml Susceptible             Gentamicin >8 ug/ml Resistant             Gentamicin High Level Synergy       <=500 ug/ml Susceptible       Levofloxacin 4 ug/ml Intermediate             Linezolid       <=1 ug/ml Susceptible       Meropenem <=1 ug/ml Susceptible             Penicillin G       >8  ug/ml Resistant       Piperacillin + Tazobactam <=16 ug/ml Susceptible             Streptomycin High Level Synergy       >1,000 ug/ml Resistant       Tobramycin >8 ug/ml Resistant             Trimethoprim + Sulfamethoxazole >2/38 ug/ml Resistant             Vancomycin       1 ug/ml Susceptible                  Susceptibility      Staphylococcus aureus, MRSA     GONSALO     Ciprofloxacin >2 ug/ml Resistant     Clindamycin >4 ug/ml Resistant     Daptomycin <=0.5 ug/ml Susceptible     Erythromycin >4 ug/ml Resistant     Gentamicin <=4 ug/ml Susceptible     Levofloxacin >4 ug/ml Resistant     Linezolid 2 ug/ml Susceptible     Oxacillin >2 ug/ml Resistant     Penicillin G 8 ug/ml Resistant     Quinupristin + Dalfopristin <=0.5 ug/ml Susceptible     Rifampin <=1 ug/ml Susceptible     Tetracycline <=4 ug/ml Susceptible     Trimethoprim + Sulfamethoxazole <=0.5/9.5 ug/ml Susceptible     Vancomycin 1 ug/ml Susceptible                    Blood Culture - Blood, [[id number]]  (Normal) Collected:  10/06/18 2020    Lab Status:  Final result Specimen:  Blood from Arm, Right Updated:  10/11/18 2101     Blood Culture No growth at 5 days    Respiratory Culture - Sputum, NT Suction [[id number]]  (Abnormal)  (Susceptibility) Collected:  10/07/18 0108    Lab Status:  Final result Specimen:  Sputum from NT Suction Updated:  10/10/18 1437     Respiratory Culture Scant growth (1+) Yeast isolated (A)      Scant growth (1+) Staphylococcus aureus, MRSA (A)     Comment:   Methicillin resistant Staphylococcus aureus, Patient may be an isolation risk.        Gram Stain Result Many (4+) WBCs per low power field      Rare (1+) Epithelial cells per low power field      No organisms seen    Susceptibility      Staphylococcus aureus, MRSA     GONSALO     Ciprofloxacin >2 ug/ml Resistant     Clindamycin >4 ug/ml Resistant     Erythromycin >4 ug/ml Resistant     Gentamicin <=4 ug/ml Susceptible     Levofloxacin >4 ug/ml Resistant     Linezolid 2 ug/ml  Susceptible     Oxacillin >2 ug/ml Resistant     Penicillin G >8 ug/ml Resistant     Quinupristin + Dalfopristin <=0.5 ug/ml Susceptible     Rifampin <=1 ug/ml Susceptible     Tetracycline <=4 ug/ml Susceptible     Trimethoprim + Sulfamethoxazole <=0.5/9.5 ug/ml Susceptible     Vancomycin 2 ug/ml Susceptible                          Radiology:  Imaging Results (last 72 hours)     Procedure Component Value Units Date/Time    XR Chest 1 View [[id number]] Collected:  10/09/18 0809     Updated:  10/09/18 0954    Narrative:       EXAMINATION: XR CHEST 1 VW-10/09/2018:      INDICATION: Pneumonia; Z74.09-Other reduced mobility; R13.12-Dysphagia,  oropharyngeal phase; Z74.09-Other reduced mobility.      COMPARISON: Chest x-ray 10/08/2018.      FINDINGS: Significant interval improvement in left lung aeration with  decreased opacifications left lung apex and within the left lung base  from prior. Cardiac silhouette enlarged with only minimal left mid lung  opacification likely atelectasis. Right hemithorax grossly clear. No  pneumothorax or large effusion.       Impression:       Significant interval reduction in opacifications of the left  hemithorax with a considerable increase in aeration of the left lung and  only mid lung opacities likely represent mild atelectasis remaining.     D:  10/09/2018  E:  10/09/2018             CT Chest Without Contrast [[id number]] Collected:  10/08/18 0852     Updated:  10/08/18 2123    Narrative:       EXAM:    CT Chest Without Intravenous Contrast     EXAM DATE/TIME:    10/8/2018 8:52 AM     CLINICAL HISTORY:    78 years old, female; Dysphagia, oropharyngeal phase; Other reduced mobility;   Other reduced mobility; Abnormal findings; Abnormal radiologic exam of lung or   chest; Additional info: Pneumonia complicated / unresolved     TECHNIQUE:    Axial computed tomography images of the chest without intravenous contrast.    All CT scans at this facility use at least one of these dose  optimization   techniques: automated exposure control; mA and/or kV adjustment per patient   size (includes targeted exams where dose is matched to clinical indication); or   iterative reconstruction.    Coronal and sagittal reformatted images were created and reviewed.     COMPARISON:    CR XR CHEST 1 VW 10/8/2018 1:31 AM     FINDINGS:    Tubes, catheters and devices:  Tracheostomy tube position appears   satisfactory.    Lungs:  Bilateral infiltrates/atelectasis, predominantly on left, involving   LLL, BETTIE, and RLL. Clinically correlate to rule out pneumonia. Retained   secretions scattered in left bronchial tree.    Pleural space:  Small to moderate left pleural effusion, small right pleural   effusion. No pneumothorax.    Heart:  Mild cardiomegaly. Mitral annulus calcifications.  CAD.   Aorta:  No aortic aneurysm. Aortic atherosclerosis.    Great vessels off aortic arch and other great vessels:  Atherosclerotic   plaques are scattered along some vessels.    Other arteries:  There are coronary artery atheromatous calcifications,   consistent with CAD.    Lymph nodes:  No obvious lymphadenopathy, but resolution of left hilum is   obscured by adjacent air space disease.    Bones/joints:  Plate and screws ORIF hardware at proximal right humerus. DJD   of right glenohumeral joint. Degenerative changes of the spine. Mild scoliotic   curvature, positional versus fixed.    Soft tissues: Unremarkable.    Gallbladder and bile ducts:  Cholecystectomy.    Kidneys and ureters: Nonobstructive right nephrolithiasis.    Stomach and bowel:  Some contrast in the bowel.       Impression:       1. Small to moderate left pleural effusion, small right pleural effusion.   2. Bilateral infiltrates/atelectasis, predominantly on left, involving LLL,   BETTIE, and RLL.   3. CAD.   4. Mild cardiomegaly.   5.  Nonobstructive right nephrolithiasis.     THIS DOCUMENT HAS BEEN ELECTRONICALLY SIGNED BY KEENAN KIM MD    FL Video Swallow With  Speech [[id number]] Collected:  10/08/18 1514     Updated:  10/08/18 1532    Narrative:       EXAMINATION: FL VIDEO SWALLOW W SPEECH-     INDICATION: dysphagia; Z74.09-Other reduced mobility; R13.10-Dysphagia,  unspecified; Z74.09-Other reduced mobility     TECHNIQUE: 1 minute and 54 seconds of fluoroscopic time was used for  this exam. 1 associated image was saved. The patient was evaluated in  the seated lateral position while taking a variety of consistencies of  barium by mouth under the direction of speech pathology.     COMPARISON: NONE     FINDINGS: There was aspiration with sips of thin barium. There was no  penetration and no aspiration with nectar, pudding, or solid consistency  barium.          Impression:       Fluoroscopy provided for a modified barium swallow. Please  see speech therapy report for full details and recommendations.         This report was finalized on 10/8/2018 3:30 PM by Dr. Christiano Mcmillan MD.       XR Chest 1 View [[id number]] Collected:  10/08/18 0814     Updated:  10/08/18 1311    Narrative:       EXAMINATION: XR CHEST 1 VW-      INDICATION: Respiratory failure, pneumonia, atelectasis; Z74.09-Other  reduced mobility; R13.10-Dysphagia, unspecified.      COMPARISON: 10/06/2018.     FINDINGS: Portable chest reveals patchy ill-defined opacification seen  within the left upper lobe. Tracheostomy tube in satisfactory position  above the jacobo. Ill-defined opacification seen at the lung bases. Mild  elevation identified of the right hemidiaphragm. Small left pleural  effusion.           Impression:       Worsening identified of the airspace disease in the left  upper lobe. Small bilateral pleural effusions. The heart is enlarged.     D:  10/08/2018  E:  10/08/2018     This report was finalized on 10/8/2018 1:09 PM by Dr. Meagan Randhawa MD.       XR Chest 1 View [[id number]] Collected:  10/07/18 1222     Updated:  10/07/18 1449    Narrative:       EXAMINATION: XR CHEST 1 VW - 10/6/2018      INDICATION: Pneumonia.     TECHNIQUE:  Single view frontal chest.     COMPARISONS: None.     FINDINGS:  Tracheostomy in place. Calcification of the aortic knob.  There is an arc-like calcification projecting over the cardiac  silhouette; this could be valvular or in the ventricular wall. There is  extensive opacity involving the left lung. The right lung is grossly  clear. No pneumothorax. Right proximal humerus ORIF hardware noted.       Impression:       Extensive opacification of the left lung. This is  compatible with the given history of pneumonia.     DICTATED:   10/07/2018  EDITED/ls :   10/07/2018         This report was finalized on 10/7/2018 2:47 PM by Sonny Perez.             I personally reviewed the radiographic studies     Assessment/Plan   IMPRESSION:   1.  Pneumonia, left chest, with recurrent mucus plugging, with previous cultures in the past positive for MRSA and Pseudomonas. Recurrence likely related to recurrent aspiration.  MRSA positive on this admission to date.  2. Acute on chronic respiratory hypoxic failure.  3. Dementia.  Ongoing.  4. Encephalopathy, metabolic.  5. Anemia, chronic disease.   6. Diabetes mellitus type 2 with increased risk for infection.  7. Hypocalcemia, 8.4.  8.  ALT 67, worse,, AST 35, elevated. Probably related to medications versus other.  9. Hypokalemia, resolved.  10.  Cultures from abdominal wall with multiple organisms, colonized.  Colonized with yeast.  (MRSA, Proteus, other).  11.  Thrombocytopenia.  Probably medicine related.     Plan:     1.  Diagnostically, continue to follow patient's physical exam, CBC, CMP, CRP.  2. Therapeutically, continue vancomycin and aztreonam. Duration of antibiotics until 10/20/18.  Then observe off abx.  3. Oxygen support therapy.  4.  Rehabilitation.  5.  Aspiration precautions.    Increased risk for adverse drug reactions, complications from line, recurrent pneumonia.  Poor long-term prognosis.    Maxim Lundberg,  MD  10/18/2018

## 2018-10-18 NOTE — PROGRESS NOTES
Continued Stay Note   S[facility]     Patient Name: Nallely Junior  MRN: [id number]  Today's Date: 10/18/2018    Admit Date: 10/6/2018          Discharge Plan     Row Name 10/18/18 1217       Plan    Plan Return to [facility]     Patient/Family in Agreement with Plan yes    Plan Comments Spoke to Kelley at [facility] and they can accept pt with IV Vanc and AZACTAM till 10/20. Pt down for swallow study. Please advise if she will need tube feedings at facility. Pt has ambulance scheduled for today at 1500. Pt's family has been notified and are filling out paperwork today for her return.               Discharge Codes    No documentation.       Expected Discharge Date and Time     Expected Discharge Date Expected Discharge Time    Oct 18, 2018             Patrica Bryant

## 2018-10-18 NOTE — PLAN OF CARE
Problem: Skin Injury Risk (Adult)  Goal: Skin Health and Integrity  Outcome: Ongoing (interventions implemented as appropriate)      Problem: Patient Care Overview  Goal: Plan of Care Review  Outcome: Ongoing (interventions implemented as appropriate)   10/18/18 0516   Coping/Psychosocial   Plan of Care Reviewed With patient   OTHER   Outcome Summary VSS, NSR. feedings changed to isosource 1.5. Pt tolerating. Transportation to arrive at 1500 to transport back to [place]. repeat swallow eval today. Will continue to monitor.    Plan of Care Review   Progress no change     Goal: Individualization and Mutuality  Outcome: Ongoing (interventions implemented as appropriate)    Goal: Discharge Needs Assessment  Outcome: Outcome(s) achieved Date Met: 10/18/18

## 2018-10-24 ENCOUNTER — OUTSIDE SERVICES (OUTPATIENT)
Dept: PRIMARY CARE CLINIC | Age: 78
End: 2018-10-24
Payer: MEDICARE

## 2018-10-24 DIAGNOSIS — N18.30 TYPE 2 DIABETES MELLITUS WITH STAGE 3 CHRONIC KIDNEY DISEASE, WITH LONG-TERM CURRENT USE OF INSULIN (HCC): Primary | ICD-10-CM

## 2018-10-24 DIAGNOSIS — Z79.4 TYPE 2 DIABETES MELLITUS WITH STAGE 3 CHRONIC KIDNEY DISEASE, WITH LONG-TERM CURRENT USE OF INSULIN (HCC): Primary | ICD-10-CM

## 2018-10-24 DIAGNOSIS — Z98.890 H/O TRACHEOSTOMY: ICD-10-CM

## 2018-10-24 DIAGNOSIS — G89.29 OTHER CHRONIC PAIN: ICD-10-CM

## 2018-10-24 DIAGNOSIS — R13.10 DYSPHAGIA, UNSPECIFIED TYPE: ICD-10-CM

## 2018-10-24 DIAGNOSIS — I10 ESSENTIAL HYPERTENSION: ICD-10-CM

## 2018-10-24 DIAGNOSIS — F03.90 DEMENTIA WITHOUT BEHAVIORAL DISTURBANCE, UNSPECIFIED DEMENTIA TYPE: ICD-10-CM

## 2018-10-24 DIAGNOSIS — R54 AGE-RELATED PHYSICAL DEBILITY: ICD-10-CM

## 2018-10-24 DIAGNOSIS — E11.22 TYPE 2 DIABETES MELLITUS WITH STAGE 3 CHRONIC KIDNEY DISEASE, WITH LONG-TERM CURRENT USE OF INSULIN (HCC): Primary | ICD-10-CM

## 2018-10-24 DIAGNOSIS — I35.0 NONRHEUMATIC AORTIC (VALVE) STENOSIS: ICD-10-CM

## 2018-10-24 DIAGNOSIS — J44.9 CHRONIC OBSTRUCTIVE PULMONARY DISEASE, UNSPECIFIED COPD TYPE (HCC): ICD-10-CM

## 2018-10-24 DIAGNOSIS — G25.81 RESTLESS LEG: ICD-10-CM

## 2018-10-24 DIAGNOSIS — F41.9 ANXIETY: ICD-10-CM

## 2018-10-24 DIAGNOSIS — I50.30 DIASTOLIC HEART FAILURE, UNSPECIFIED HF CHRONICITY (HCC): ICD-10-CM

## 2018-10-24 DIAGNOSIS — K21.9 GASTROESOPHAGEAL REFLUX DISEASE WITHOUT ESOPHAGITIS: ICD-10-CM

## 2018-10-24 DIAGNOSIS — N18.30 STAGE 3 CHRONIC KIDNEY DISEASE (HCC): ICD-10-CM

## 2018-10-24 DIAGNOSIS — I25.10 ATHEROSCLEROSIS OF NATIVE CORONARY ARTERY OF NATIVE HEART WITHOUT ANGINA PECTORIS: ICD-10-CM

## 2018-10-24 DIAGNOSIS — D50.9 IRON DEFICIENCY ANEMIA, UNSPECIFIED IRON DEFICIENCY ANEMIA TYPE: ICD-10-CM

## 2018-10-24 PROCEDURE — 99308 SBSQ NF CARE LOW MDM 20: CPT | Performed by: PEDIATRICS

## 2018-10-25 ENCOUNTER — HOSPITAL ENCOUNTER (OUTPATIENT)
Facility: HOSPITAL | Age: 78
Discharge: HOME | End: 2018-10-25
Payer: MEDICARE

## 2018-10-25 PROCEDURE — 87205 SMEAR GRAM STAIN: CPT

## 2018-10-25 PROCEDURE — 87070 CULTURE OTHR SPECIMN AEROBIC: CPT

## 2018-10-28 LAB
CULTURE, RESPIRATORY: NORMAL
GRAM STAIN RESULT: NORMAL

## 2018-11-01 ENCOUNTER — TELEPHONE (OUTPATIENT)
Dept: PRIMARY CARE CLINIC | Age: 78
End: 2018-11-01

## 2018-11-20 ENCOUNTER — OUTSIDE SERVICES (OUTPATIENT)
Dept: PRIMARY CARE CLINIC | Age: 78
End: 2018-11-20
Payer: MEDICARE

## 2018-11-20 DIAGNOSIS — R13.10 DYSPHAGIA, UNSPECIFIED TYPE: ICD-10-CM

## 2018-11-20 DIAGNOSIS — G89.29 OTHER CHRONIC PAIN: ICD-10-CM

## 2018-11-20 DIAGNOSIS — F03.90 DEMENTIA WITHOUT BEHAVIORAL DISTURBANCE, UNSPECIFIED DEMENTIA TYPE: ICD-10-CM

## 2018-11-20 DIAGNOSIS — E11.22 TYPE 2 DIABETES MELLITUS WITH STAGE 3 CHRONIC KIDNEY DISEASE, WITH LONG-TERM CURRENT USE OF INSULIN (HCC): Primary | ICD-10-CM

## 2018-11-20 DIAGNOSIS — F41.9 ANXIETY: ICD-10-CM

## 2018-11-20 DIAGNOSIS — G25.81 RESTLESS LEG: ICD-10-CM

## 2018-11-20 DIAGNOSIS — I35.0 NONRHEUMATIC AORTIC (VALVE) STENOSIS: ICD-10-CM

## 2018-11-20 DIAGNOSIS — Z98.890 H/O TRACHEOSTOMY: ICD-10-CM

## 2018-11-20 DIAGNOSIS — J44.9 CHRONIC OBSTRUCTIVE PULMONARY DISEASE, UNSPECIFIED COPD TYPE (HCC): ICD-10-CM

## 2018-11-20 DIAGNOSIS — D50.9 IRON DEFICIENCY ANEMIA, UNSPECIFIED IRON DEFICIENCY ANEMIA TYPE: ICD-10-CM

## 2018-11-20 DIAGNOSIS — I10 ESSENTIAL HYPERTENSION: ICD-10-CM

## 2018-11-20 DIAGNOSIS — N18.30 TYPE 2 DIABETES MELLITUS WITH STAGE 3 CHRONIC KIDNEY DISEASE, WITH LONG-TERM CURRENT USE OF INSULIN (HCC): Primary | ICD-10-CM

## 2018-11-20 DIAGNOSIS — Z79.4 TYPE 2 DIABETES MELLITUS WITH STAGE 3 CHRONIC KIDNEY DISEASE, WITH LONG-TERM CURRENT USE OF INSULIN (HCC): Primary | ICD-10-CM

## 2018-11-20 DIAGNOSIS — R54 AGE-RELATED PHYSICAL DEBILITY: ICD-10-CM

## 2018-11-20 DIAGNOSIS — N18.30 STAGE 3 CHRONIC KIDNEY DISEASE (HCC): ICD-10-CM

## 2018-11-20 DIAGNOSIS — K21.9 GASTROESOPHAGEAL REFLUX DISEASE WITHOUT ESOPHAGITIS: ICD-10-CM

## 2018-11-20 DIAGNOSIS — I25.10 ATHEROSCLEROSIS OF NATIVE CORONARY ARTERY OF NATIVE HEART WITHOUT ANGINA PECTORIS: ICD-10-CM

## 2018-11-20 DIAGNOSIS — I50.30 DIASTOLIC HEART FAILURE, UNSPECIFIED HF CHRONICITY (HCC): ICD-10-CM

## 2018-11-20 PROCEDURE — 99307 SBSQ NF CARE SF MDM 10: CPT | Performed by: PEDIATRICS

## 2018-12-06 ENCOUNTER — TELEPHONE (OUTPATIENT)
Dept: PRIMARY CARE CLINIC | Age: 78
End: 2018-12-06

## 2018-12-06 RX ORDER — GUAIFENESIN 600 MG/1
600 TABLET, EXTENDED RELEASE ORAL 2 TIMES DAILY PRN
Qty: 24 TABLET | Refills: 0 | Status: SHIPPED | OUTPATIENT
Start: 2018-12-06

## 2018-12-18 ENCOUNTER — TELEPHONE (OUTPATIENT)
Dept: PRIMARY CARE CLINIC | Age: 78
End: 2018-12-18

## 2018-12-18 DIAGNOSIS — J20.9 ACUTE BRONCHITIS, UNSPECIFIED ORGANISM: Primary | ICD-10-CM

## 2018-12-18 RX ORDER — DOXYCYCLINE HYCLATE 100 MG/1
100 CAPSULE ORAL 2 TIMES DAILY
Qty: 20 CAPSULE | Refills: 0 | Status: SHIPPED | OUTPATIENT
Start: 2018-12-18 | End: 2018-12-28

## 2018-12-18 NOTE — TELEPHONE ENCOUNTER
Ok to do chest xray, send sputum culture if can be correctly obtained and transported, also need blood culture x 2, cbc, cmp

## 2018-12-19 ENCOUNTER — HOSPITAL ENCOUNTER (OUTPATIENT)
Facility: HOSPITAL | Age: 78
Discharge: HOME | End: 2018-12-19
Payer: MEDICARE

## 2018-12-19 LAB
A/G RATIO: 1.3 (ref 0.8–2)
ALBUMIN SERPL-MCNC: 3.6 G/DL (ref 3.4–4.8)
ALP BLD-CCNC: 83 U/L (ref 25–100)
ALT SERPL-CCNC: 37 U/L (ref 4–36)
ANION GAP SERPL CALCULATED.3IONS-SCNC: 10 MMOL/L (ref 3–16)
AST SERPL-CCNC: 28 U/L (ref 8–33)
BILIRUB SERPL-MCNC: 0.4 MG/DL (ref 0.3–1.2)
BUN BLDV-MCNC: 23 MG/DL (ref 6–20)
CALCIUM SERPL-MCNC: 9.1 MG/DL (ref 8.5–10.5)
CHLORIDE BLD-SCNC: 99 MMOL/L (ref 98–107)
CO2: 30 MMOL/L (ref 20–30)
CREAT SERPL-MCNC: <0.5 MG/DL (ref 0.4–1.2)
GFR AFRICAN AMERICAN: >59
GFR NON-AFRICAN AMERICAN: >60
GLOBULIN: 2.7 G/DL
GLUCOSE BLD-MCNC: 259 MG/DL (ref 74–106)
HCT VFR BLD CALC: 37.6 % (ref 37–47)
HEMOGLOBIN: 11.4 G/DL (ref 11.5–16.5)
MCH RBC QN AUTO: 28.5 PG (ref 27–32)
MCHC RBC AUTO-ENTMCNC: 30.3 G/DL (ref 31–35)
MCV RBC AUTO: 94 FL (ref 80–100)
PDW BLD-RTO: 13.7 % (ref 11–16)
PLATELET # BLD: 215 K/UL (ref 150–400)
PMV BLD AUTO: 10.7 FL (ref 6–10)
POTASSIUM SERPL-SCNC: 4.2 MMOL/L (ref 3.4–5.1)
RBC # BLD: 4 M/UL (ref 3.8–5.8)
SODIUM BLD-SCNC: 139 MMOL/L (ref 136–145)
TOTAL PROTEIN: 6.3 G/DL (ref 6.4–8.3)
WBC # BLD: 8.4 K/UL (ref 4–11)

## 2018-12-19 PROCEDURE — 87186 SC STD MICRODIL/AGAR DIL: CPT

## 2018-12-19 PROCEDURE — 80053 COMPREHEN METABOLIC PANEL: CPT

## 2018-12-19 PROCEDURE — 87040 BLOOD CULTURE FOR BACTERIA: CPT

## 2018-12-19 PROCEDURE — 85027 COMPLETE CBC AUTOMATED: CPT

## 2018-12-19 PROCEDURE — 87205 SMEAR GRAM STAIN: CPT

## 2018-12-19 PROCEDURE — 87077 CULTURE AEROBIC IDENTIFY: CPT

## 2018-12-19 PROCEDURE — 87070 CULTURE OTHR SPECIMN AEROBIC: CPT

## 2018-12-25 LAB
BLOOD CULTURE, ROUTINE: NORMAL
CULTURE, BLOOD 2: NORMAL

## 2018-12-27 ENCOUNTER — OUTSIDE SERVICES (OUTPATIENT)
Dept: PRIMARY CARE CLINIC | Age: 78
End: 2018-12-27
Payer: MEDICARE

## 2018-12-27 DIAGNOSIS — Z79.4 TYPE 2 DIABETES MELLITUS WITH STAGE 3 CHRONIC KIDNEY DISEASE, WITH LONG-TERM CURRENT USE OF INSULIN (HCC): Primary | ICD-10-CM

## 2018-12-27 DIAGNOSIS — N18.30 TYPE 2 DIABETES MELLITUS WITH STAGE 3 CHRONIC KIDNEY DISEASE, WITH LONG-TERM CURRENT USE OF INSULIN (HCC): Primary | ICD-10-CM

## 2018-12-27 DIAGNOSIS — Z98.890 H/O TRACHEOSTOMY: ICD-10-CM

## 2018-12-27 DIAGNOSIS — R54 AGE-RELATED PHYSICAL DEBILITY: ICD-10-CM

## 2018-12-27 DIAGNOSIS — I50.30 DIASTOLIC HEART FAILURE, UNSPECIFIED HF CHRONICITY (HCC): ICD-10-CM

## 2018-12-27 DIAGNOSIS — G25.81 RESTLESS LEG: ICD-10-CM

## 2018-12-27 DIAGNOSIS — J44.9 CHRONIC OBSTRUCTIVE PULMONARY DISEASE, UNSPECIFIED COPD TYPE (HCC): ICD-10-CM

## 2018-12-27 DIAGNOSIS — I10 ESSENTIAL HYPERTENSION: ICD-10-CM

## 2018-12-27 DIAGNOSIS — I25.10 ATHEROSCLEROSIS OF NATIVE CORONARY ARTERY OF NATIVE HEART WITHOUT ANGINA PECTORIS: ICD-10-CM

## 2018-12-27 DIAGNOSIS — D50.9 IRON DEFICIENCY ANEMIA, UNSPECIFIED IRON DEFICIENCY ANEMIA TYPE: ICD-10-CM

## 2018-12-27 DIAGNOSIS — R13.10 DYSPHAGIA, UNSPECIFIED TYPE: ICD-10-CM

## 2018-12-27 DIAGNOSIS — K21.9 GASTROESOPHAGEAL REFLUX DISEASE WITHOUT ESOPHAGITIS: ICD-10-CM

## 2018-12-27 DIAGNOSIS — I35.0 NONRHEUMATIC AORTIC (VALVE) STENOSIS: ICD-10-CM

## 2018-12-27 DIAGNOSIS — F41.9 ANXIETY: ICD-10-CM

## 2018-12-27 DIAGNOSIS — E11.22 TYPE 2 DIABETES MELLITUS WITH STAGE 3 CHRONIC KIDNEY DISEASE, WITH LONG-TERM CURRENT USE OF INSULIN (HCC): Primary | ICD-10-CM

## 2018-12-27 DIAGNOSIS — G89.29 OTHER CHRONIC PAIN: ICD-10-CM

## 2018-12-27 DIAGNOSIS — N18.30 STAGE 3 CHRONIC KIDNEY DISEASE (HCC): ICD-10-CM

## 2018-12-27 DIAGNOSIS — F03.90 DEMENTIA WITHOUT BEHAVIORAL DISTURBANCE, UNSPECIFIED DEMENTIA TYPE: ICD-10-CM

## 2018-12-27 PROCEDURE — 99307 SBSQ NF CARE SF MDM 10: CPT | Performed by: PEDIATRICS

## 2019-01-24 ENCOUNTER — OUTSIDE SERVICES (OUTPATIENT)
Dept: PRIMARY CARE CLINIC | Age: 79
End: 2019-01-24
Payer: MEDICARE

## 2019-01-24 DIAGNOSIS — R13.10 DYSPHAGIA, UNSPECIFIED TYPE: ICD-10-CM

## 2019-01-24 DIAGNOSIS — Z98.890 H/O TRACHEOSTOMY: ICD-10-CM

## 2019-01-24 DIAGNOSIS — M54.50 CHRONIC BILATERAL LOW BACK PAIN WITHOUT SCIATICA: Primary | ICD-10-CM

## 2019-01-24 DIAGNOSIS — N18.30 STAGE 3 CHRONIC KIDNEY DISEASE (HCC): ICD-10-CM

## 2019-01-24 DIAGNOSIS — I35.0 NONRHEUMATIC AORTIC (VALVE) STENOSIS: ICD-10-CM

## 2019-01-24 DIAGNOSIS — I50.30 DIASTOLIC HEART FAILURE, UNSPECIFIED HF CHRONICITY (HCC): ICD-10-CM

## 2019-01-24 DIAGNOSIS — G89.29 CHRONIC BILATERAL LOW BACK PAIN WITHOUT SCIATICA: Primary | ICD-10-CM

## 2019-01-24 DIAGNOSIS — I10 ESSENTIAL HYPERTENSION: ICD-10-CM

## 2019-01-24 DIAGNOSIS — D50.9 IRON DEFICIENCY ANEMIA, UNSPECIFIED IRON DEFICIENCY ANEMIA TYPE: ICD-10-CM

## 2019-01-24 DIAGNOSIS — N18.30 TYPE 2 DIABETES MELLITUS WITH STAGE 3 CHRONIC KIDNEY DISEASE, WITH LONG-TERM CURRENT USE OF INSULIN (HCC): Primary | ICD-10-CM

## 2019-01-24 DIAGNOSIS — F03.90 DEMENTIA WITHOUT BEHAVIORAL DISTURBANCE, UNSPECIFIED DEMENTIA TYPE: ICD-10-CM

## 2019-01-24 DIAGNOSIS — K21.9 GASTROESOPHAGEAL REFLUX DISEASE WITHOUT ESOPHAGITIS: ICD-10-CM

## 2019-01-24 DIAGNOSIS — I25.10 ATHEROSCLEROSIS OF NATIVE CORONARY ARTERY OF NATIVE HEART WITHOUT ANGINA PECTORIS: ICD-10-CM

## 2019-01-24 DIAGNOSIS — E11.22 TYPE 2 DIABETES MELLITUS WITH STAGE 3 CHRONIC KIDNEY DISEASE, WITH LONG-TERM CURRENT USE OF INSULIN (HCC): Primary | ICD-10-CM

## 2019-01-24 DIAGNOSIS — R54 AGE-RELATED PHYSICAL DEBILITY: ICD-10-CM

## 2019-01-24 DIAGNOSIS — F41.9 ANXIETY: ICD-10-CM

## 2019-01-24 DIAGNOSIS — J44.9 CHRONIC OBSTRUCTIVE PULMONARY DISEASE, UNSPECIFIED COPD TYPE (HCC): ICD-10-CM

## 2019-01-24 DIAGNOSIS — G25.81 RESTLESS LEG: ICD-10-CM

## 2019-01-24 DIAGNOSIS — G89.29 OTHER CHRONIC PAIN: ICD-10-CM

## 2019-01-24 DIAGNOSIS — Z79.4 TYPE 2 DIABETES MELLITUS WITH STAGE 3 CHRONIC KIDNEY DISEASE, WITH LONG-TERM CURRENT USE OF INSULIN (HCC): Primary | ICD-10-CM

## 2019-01-24 PROCEDURE — 99307 SBSQ NF CARE SF MDM 10: CPT | Performed by: PEDIATRICS

## 2019-01-24 RX ORDER — CLONAZEPAM 1 MG/1
1 TABLET ORAL 2 TIMES DAILY
Qty: 60 TABLET | Refills: 3 | Status: SHIPPED | OUTPATIENT
Start: 2019-01-24 | End: 2019-03-11 | Stop reason: SDUPTHER

## 2019-01-24 RX ORDER — OXYCODONE HYDROCHLORIDE AND ACETAMINOPHEN 5; 325 MG/1; MG/1
1 TABLET ORAL EVERY 12 HOURS PRN
Qty: 28 TABLET | Refills: 0 | Status: SHIPPED | OUTPATIENT
Start: 2019-01-24 | End: 2019-02-06 | Stop reason: SDUPTHER

## 2019-02-06 DIAGNOSIS — G89.29 CHRONIC BILATERAL LOW BACK PAIN WITHOUT SCIATICA: ICD-10-CM

## 2019-02-06 DIAGNOSIS — M54.50 CHRONIC BILATERAL LOW BACK PAIN WITHOUT SCIATICA: ICD-10-CM

## 2019-02-06 RX ORDER — OXYCODONE HYDROCHLORIDE AND ACETAMINOPHEN 5; 325 MG/1; MG/1
1 TABLET ORAL EVERY 12 HOURS PRN
Qty: 28 TABLET | Refills: 0 | Status: SHIPPED | OUTPATIENT
Start: 2019-02-06 | End: 2019-02-07 | Stop reason: SDUPTHER

## 2019-02-07 DIAGNOSIS — G89.29 CHRONIC BILATERAL LOW BACK PAIN WITHOUT SCIATICA: ICD-10-CM

## 2019-02-07 DIAGNOSIS — M54.50 CHRONIC BILATERAL LOW BACK PAIN WITHOUT SCIATICA: ICD-10-CM

## 2019-02-07 RX ORDER — OXYCODONE HYDROCHLORIDE AND ACETAMINOPHEN 5; 325 MG/1; MG/1
1 TABLET ORAL EVERY 6 HOURS PRN
Qty: 120 TABLET | Refills: 0 | Status: SHIPPED | OUTPATIENT
Start: 2019-02-07 | End: 2019-02-27 | Stop reason: SDUPTHER

## 2019-02-27 ENCOUNTER — TELEPHONE (OUTPATIENT)
Dept: PRIMARY CARE CLINIC | Age: 79
End: 2019-02-27

## 2019-02-27 DIAGNOSIS — G89.29 CHRONIC BILATERAL LOW BACK PAIN WITHOUT SCIATICA: ICD-10-CM

## 2019-02-27 DIAGNOSIS — M54.50 CHRONIC BILATERAL LOW BACK PAIN WITHOUT SCIATICA: ICD-10-CM

## 2019-02-27 RX ORDER — OXYCODONE HYDROCHLORIDE AND ACETAMINOPHEN 5; 325 MG/1; MG/1
1 TABLET ORAL EVERY 6 HOURS PRN
Qty: 120 TABLET | Refills: 0 | Status: SHIPPED | OUTPATIENT
Start: 2019-02-27 | End: 2019-03-29

## 2019-02-28 ENCOUNTER — TELEPHONE (OUTPATIENT)
Dept: PRIMARY CARE CLINIC | Age: 79
End: 2019-02-28

## 2019-02-28 ENCOUNTER — HOSPITAL ENCOUNTER (OUTPATIENT)
Facility: HOSPITAL | Age: 79
Discharge: HOME | End: 2019-02-28
Payer: MEDICARE

## 2019-02-28 PROCEDURE — 87070 CULTURE OTHR SPECIMN AEROBIC: CPT

## 2019-02-28 PROCEDURE — 87205 SMEAR GRAM STAIN: CPT

## 2019-03-02 LAB
CULTURE, RESPIRATORY: NORMAL
GRAM STAIN RESULT: NORMAL

## 2019-03-11 DIAGNOSIS — F41.9 ANXIETY: ICD-10-CM

## 2019-03-11 RX ORDER — CLONAZEPAM 1 MG/1
1 TABLET ORAL 3 TIMES DAILY
Qty: 90 TABLET | Refills: 3 | Status: SHIPPED | OUTPATIENT
Start: 2019-03-11 | End: 2019-07-09

## 2019-03-25 PROBLEM — J18.9 PNEUMONIA DUE TO ORGANISM: Status: RESOLVED | Noted: 2018-10-02 | Resolved: 2019-03-25

## 2019-03-25 PROBLEM — N30.00 ACUTE CYSTITIS: Status: RESOLVED | Noted: 2018-10-02 | Resolved: 2019-03-25

## 2019-03-25 PROBLEM — I95.9 HYPOTENSION: Status: RESOLVED | Noted: 2017-12-03 | Resolved: 2019-03-25

## 2019-03-25 PROBLEM — E11.9 TYPE 2 DIABETES MELLITUS (HCC): Status: RESOLVED | Noted: 2018-10-02 | Resolved: 2019-03-25

## 2019-03-25 ASSESSMENT — ENCOUNTER SYMPTOMS
NAUSEA: 0
EYE DISCHARGE: 0
SORE THROAT: 0
VOMITING: 0
NAUSEA: 0
COUGH: 1
EYE DISCHARGE: 0
SINUS PRESSURE: 0
BACK PAIN: 1
SHORTNESS OF BREATH: 0
ABDOMINAL PAIN: 0
EYE DISCHARGE: 0
NAUSEA: 0
VOMITING: 0
NAUSEA: 0
SHORTNESS OF BREATH: 0
VOMITING: 0
SINUS PRESSURE: 0
SORE THROAT: 0
WHEEZING: 0
WHEEZING: 0
EYE DISCHARGE: 0
ABDOMINAL PAIN: 0
ABDOMINAL PAIN: 0
BACK PAIN: 1
BACK PAIN: 1
SINUS PRESSURE: 0
COUGH: 1
WHEEZING: 0
BACK PAIN: 1
SHORTNESS OF BREATH: 0
SORE THROAT: 0
COUGH: 1
SHORTNESS OF BREATH: 0
SINUS PRESSURE: 0
WHEEZING: 0
COUGH: 1
VOMITING: 0
ABDOMINAL PAIN: 0
SORE THROAT: 0

## 2019-04-02 ENCOUNTER — APPOINTMENT (OUTPATIENT)
Dept: CARDIOLOGY | Facility: HOSPITAL | Age: 79
End: 2019-04-02

## 2019-04-02 ENCOUNTER — APPOINTMENT (OUTPATIENT)
Dept: GENERAL RADIOLOGY | Facility: HOSPITAL | Age: 79
End: 2019-04-02

## 2019-04-02 ENCOUNTER — HOSPITAL ENCOUNTER (INPATIENT)
Facility: HOSPITAL | Age: 79
LOS: 13 days | Discharge: NURSING, REHAB OR LONG-TERM CARE | End: 2019-04-15
Attending: INTERNAL MEDICINE | Admitting: INTERNAL MEDICINE

## 2019-04-02 ENCOUNTER — HOSPITAL ENCOUNTER (EMERGENCY)
Facility: HOSPITAL | Age: 79
Discharge: ANOTHER HOSPITAL | End: 2019-04-02
Attending: EMERGENCY MEDICINE
Payer: MEDICARE

## 2019-04-02 ENCOUNTER — TELEPHONE (OUTPATIENT)
Dept: PRIMARY CARE CLINIC | Age: 79
End: 2019-04-02

## 2019-04-02 ENCOUNTER — APPOINTMENT (OUTPATIENT)
Dept: GENERAL RADIOLOGY | Facility: HOSPITAL | Age: 79
End: 2019-04-02
Payer: MEDICARE

## 2019-04-02 VITALS
RESPIRATION RATE: 16 BRPM | SYSTOLIC BLOOD PRESSURE: 53 MMHG | DIASTOLIC BLOOD PRESSURE: 26 MMHG | HEART RATE: 112 BPM | OXYGEN SATURATION: 93 % | WEIGHT: 175 LBS | BODY MASS INDEX: 28.12 KG/M2 | TEMPERATURE: 97.5 F | HEIGHT: 66 IN

## 2019-04-02 DIAGNOSIS — R09.02 HYPOXIA: ICD-10-CM

## 2019-04-02 DIAGNOSIS — R13.12 OROPHARYNGEAL DYSPHAGIA: ICD-10-CM

## 2019-04-02 DIAGNOSIS — Z78.9 IMPAIRED MOBILITY AND ADLS: ICD-10-CM

## 2019-04-02 DIAGNOSIS — Z74.09 IMPAIRED MOBILITY AND ADLS: ICD-10-CM

## 2019-04-02 DIAGNOSIS — R49.9 VOICE AND RESONANCE DISORDER: ICD-10-CM

## 2019-04-02 DIAGNOSIS — I95.9 HYPOTENSION, UNSPECIFIED HYPOTENSION TYPE: ICD-10-CM

## 2019-04-02 DIAGNOSIS — I21.02 ACUTE ST ELEVATION MYOCARDIAL INFARCTION (STEMI) INVOLVING LEFT ANTERIOR DESCENDING (LAD) CORONARY ARTERY (HCC): Primary | ICD-10-CM

## 2019-04-02 DIAGNOSIS — J98.11 ATELECTASIS OF LEFT LUNG: ICD-10-CM

## 2019-04-02 DIAGNOSIS — I21.19 ACUTE ST ELEVATION MYOCARDIAL INFARCTION (STEMI) OF INFERIOR WALL (HCC): ICD-10-CM

## 2019-04-02 DIAGNOSIS — Z74.09 IMPAIRED FUNCTIONAL MOBILITY, BALANCE, GAIT, AND ENDURANCE: ICD-10-CM

## 2019-04-02 DIAGNOSIS — J96.21 ACUTE ON CHRONIC RESPIRATORY FAILURE WITH HYPOXIA (HCC): ICD-10-CM

## 2019-04-02 DIAGNOSIS — I95.9 HYPOTENSION, UNSPECIFIED HYPOTENSION TYPE: Primary | ICD-10-CM

## 2019-04-02 PROBLEM — I35.0 AORTIC VALVE STENOSIS: Chronic | Status: ACTIVE | Noted: 2019-04-02

## 2019-04-02 PROBLEM — I35.0 AORTIC VALVE STENOSIS: Status: ACTIVE | Noted: 2019-04-02

## 2019-04-02 PROBLEM — N18.30 STAGE 3 CHRONIC KIDNEY DISEASE (HCC): Chronic | Status: ACTIVE | Noted: 2019-04-02

## 2019-04-02 PROBLEM — J39.8 AIRWAY MALACIA: Status: ACTIVE | Noted: 2019-04-02

## 2019-04-02 PROBLEM — J44.9 COPD (CHRONIC OBSTRUCTIVE PULMONARY DISEASE) (HCC): Chronic | Status: ACTIVE | Noted: 2018-10-07

## 2019-04-02 PROBLEM — J15.212 MRSA PNEUMONIA (HCC): Status: ACTIVE | Noted: 2019-04-02

## 2019-04-02 PROBLEM — F03.90 DEMENTIA (HCC): Status: ACTIVE | Noted: 2019-04-02

## 2019-04-02 PROBLEM — E78.5 HYPERLIPIDEMIA: Chronic | Status: ACTIVE | Noted: 2018-10-07

## 2019-04-02 PROBLEM — N18.30 STAGE 3 CHRONIC KIDNEY DISEASE (HCC): Status: ACTIVE | Noted: 2019-04-02

## 2019-04-02 PROBLEM — E66.9 CLASS 1 OBESITY IN ADULT: Status: ACTIVE | Noted: 2019-04-02

## 2019-04-02 PROBLEM — R53.81 DEBILITY: Status: ACTIVE | Noted: 2019-04-02

## 2019-04-02 PROBLEM — I25.10 CORONARY ARTERY DISEASE: Chronic | Status: ACTIVE | Noted: 2018-10-07

## 2019-04-02 PROBLEM — Z87.891 FORMER SMOKER: Status: ACTIVE | Noted: 2019-04-02

## 2019-04-02 PROBLEM — E11.9 TYPE 2 DIABETES MELLITUS (HCC): Chronic | Status: ACTIVE | Noted: 2018-10-06

## 2019-04-02 LAB
A/G RATIO: 0.8 (ref 0.8–2)
ALBUMIN SERPL-MCNC: 3.2 G/DL (ref 3.4–4.8)
ALBUMIN SERPL-MCNC: 3.49 G/DL (ref 3.2–4.8)
ALBUMIN/GLOB SERPL: 1.3 G/DL (ref 1.5–2.5)
ALP BLD-CCNC: 68 U/L (ref 25–100)
ALP SERPL-CCNC: 124 U/L (ref 25–100)
ALT SERPL W P-5'-P-CCNC: 337 U/L (ref 7–40)
ALT SERPL-CCNC: 25 U/L (ref 4–36)
ANION GAP SERPL CALCULATED.3IONS-SCNC: 10 MMOL/L (ref 3–16)
ANION GAP SERPL CALCULATED.3IONS-SCNC: 6 MMOL/L (ref 3–11)
ARTERIAL PATENCY WRIST A: ABNORMAL
ARTERIAL PATENCY WRIST A: ABNORMAL
ARTICHOKE IGE QN: 43 MG/DL (ref 0–130)
AST SERPL-CCNC: 32 U/L (ref 8–33)
AST SERPL-CCNC: 588 U/L (ref 0–33)
ATMOSPHERIC PRESS: ABNORMAL MMHG
ATMOSPHERIC PRESS: ABNORMAL MMHG
BASE EXCESS ARTERIAL: 8.6 MMOL/L (ref -3–3)
BASE EXCESS BLDA CALC-SCNC: 3.1 MMOL/L (ref 0–2)
BASE EXCESS BLDA CALC-SCNC: 5.3 MMOL/L (ref 0–2)
BASOPHILS # BLD AUTO: 0.01 10*3/MM3 (ref 0–0.2)
BASOPHILS ABSOLUTE: 0 K/UL (ref 0–0.1)
BASOPHILS NFR BLD AUTO: 0.1 % (ref 0–1)
BASOPHILS RELATIVE PERCENT: 0.1 %
BDY SITE: ABNORMAL
BDY SITE: ABNORMAL
BILIRUB SERPL-MCNC: 0.9 MG/DL (ref 0.3–1.2)
BILIRUB SERPL-MCNC: 1.3 MG/DL (ref 0.3–1.2)
BODY TEMPERATURE: 37 C
BODY TEMPERATURE: 37 C
BUN BLD-MCNC: 23 MG/DL (ref 9–23)
BUN BLDV-MCNC: 21 MG/DL (ref 6–20)
BUN/CREAT SERPL: 19.3 (ref 7–25)
CALCIUM SERPL-MCNC: 9 MG/DL (ref 8.5–10.5)
CALCIUM SPEC-SCNC: 8.5 MG/DL (ref 8.7–10.4)
CHLORIDE BLD-SCNC: 97 MMOL/L (ref 98–107)
CHLORIDE SERPL-SCNC: 103 MMOL/L (ref 99–109)
CHOLEST SERPL-MCNC: 86 MG/DL (ref 0–200)
CO2 BLDA-SCNC: 34.4 MMOL/L (ref 23–27)
CO2 BLDA-SCNC: 40.1 MMOL/L (ref 23–27)
CO2 SERPL-SCNC: 34 MMOL/L (ref 20–31)
CO2: 38 MMOL/L (ref 20–30)
COHGB MFR BLD: 1.6 % (ref 0–2)
COHGB MFR BLD: 2.2 % (ref 0–2)
CREAT BLD-MCNC: 1.19 MG/DL (ref 0.6–1.3)
CREAT SERPL-MCNC: 0.9 MG/DL (ref 0.4–1.2)
D-LACTATE SERPL-SCNC: 1.1 MMOL/L (ref 0.5–2)
DEPRECATED RDW RBC AUTO: 62.7 FL (ref 37–54)
EOSINOPHIL # BLD AUTO: 0.02 10*3/MM3 (ref 0–0.3)
EOSINOPHIL NFR BLD AUTO: 0.2 % (ref 0–3)
EOSINOPHILS ABSOLUTE: 0.1 K/UL (ref 0–0.4)
EOSINOPHILS RELATIVE PERCENT: 1.3 %
ERYTHROCYTE [DISTWIDTH] IN BLOOD BY AUTOMATED COUNT: 16.7 % (ref 11.3–14.5)
FIO2: 0.5 %
GFR AFRICAN AMERICAN: >59
GFR NON-AFRICAN AMERICAN: >60
GFR SERPL CREATININE-BSD FRML MDRD: 44 ML/MIN/1.73
GLOBULIN UR ELPH-MCNC: 2.7 GM/DL
GLOBULIN: 3.8 G/DL
GLUCOSE BLD-MCNC: 229 MG/DL (ref 74–106)
GLUCOSE BLD-MCNC: 312 MG/DL (ref 70–100)
GLUCOSE BLDC GLUCOMTR-MCNC: 295 MG/DL (ref 70–130)
GLUCOSE BLDC GLUCOMTR-MCNC: 364 MG/DL (ref 70–130)
HBA1C MFR BLD: 6.7 % (ref 4.8–5.6)
HCO3 ARTERIAL: 38.6 MMOL/L (ref 22–26)
HCO3 BLDA-SCNC: 32.6 MMOL/L (ref 20–26)
HCO3 BLDA-SCNC: 36.5 MMOL/L (ref 20–26)
HCT VFR BLD AUTO: 46.2 % (ref 34.5–44)
HCT VFR BLD CALC: 40.9 %
HCT VFR BLD CALC: 40.9 %
HCT VFR BLD CALC: 45.6 % (ref 37–47)
HDLC SERPL-MCNC: 24 MG/DL (ref 40–60)
HEMOGLOBIN: 13.6 G/DL (ref 11.5–16.5)
HGB BLD-MCNC: 13.3 G/DL (ref 11.5–15.5)
HGB BLDA-MCNC: 13.3 G/DL (ref 14–18)
HGB BLDA-MCNC: 13.4 G/DL (ref 14–18)
HOROWITZ INDEX BLD+IHG-RTO: 100 %
HOROWITZ INDEX BLD+IHG-RTO: 85 %
IMM GRANULOCYTES # BLD AUTO: 0.04 10*3/MM3 (ref 0–0.05)
IMM GRANULOCYTES NFR BLD AUTO: 0.3 % (ref 0–0.6)
IMMATURE GRANULOCYTES #: 0 K/UL
IMMATURE GRANULOCYTES %: 0.2 % (ref 0–5)
LYMPHOCYTES # BLD AUTO: 0.67 10*3/MM3 (ref 0.6–4.8)
LYMPHOCYTES ABSOLUTE: 3.4 K/UL (ref 1.5–4)
LYMPHOCYTES NFR BLD AUTO: 5.7 % (ref 24–44)
LYMPHOCYTES RELATIVE PERCENT: 35.6 %
Lab: ABNORMAL
MAGNESIUM SERPL-MCNC: 1.9 MG/DL (ref 1.3–2.7)
MCH RBC QN AUTO: 30.2 PG (ref 27–31)
MCH RBC QN AUTO: 30.5 PG (ref 27–32)
MCHC RBC AUTO-ENTMCNC: 28.8 G/DL (ref 32–36)
MCHC RBC AUTO-ENTMCNC: 29.8 G/DL (ref 31–35)
MCV RBC AUTO: 102.2 FL (ref 80–100)
MCV RBC AUTO: 104.8 FL (ref 80–99)
METHGB BLD QL: 0.9 % (ref 0–1.5)
METHGB BLD QL: 1 % (ref 0–1.5)
MODALITY: ABNORMAL
MODALITY: ABNORMAL
MONOCYTES # BLD AUTO: 0.25 10*3/MM3 (ref 0–1)
MONOCYTES ABSOLUTE: 0.7 K/UL (ref 0.2–0.8)
MONOCYTES NFR BLD AUTO: 2.1 % (ref 0–12)
MONOCYTES RELATIVE PERCENT: 7.2 %
NEUTROPHILS # BLD AUTO: 10.76 10*3/MM3 (ref 1.5–8.3)
NEUTROPHILS ABSOLUTE: 5.3 K/UL (ref 2–7.5)
NEUTROPHILS NFR BLD AUTO: 91.9 % (ref 41–71)
NEUTROPHILS RELATIVE PERCENT: 55.6 %
NOTE: ABNORMAL
NOTE: ABNORMAL
NOTIFIED BY: ABNORMAL
NOTIFIED WHO: ABNORMAL
O2 SAT, ARTERIAL: 69.4 %
O2 THERAPY: ABNORMAL
OXYHGB MFR BLDV: 94.4 % (ref 94–99)
OXYHGB MFR BLDV: 97.1 % (ref 94–99)
PCO2 ARTERIAL: 83.8 MMHG (ref 35–45)
PCO2 BLDA: 116 MM HG (ref 35–45)
PCO2 BLDA: 58.4 MM HG (ref 35–45)
PCO2 TEMP ADJ BLD: 116 MM HG (ref 35–45)
PCO2 TEMP ADJ BLD: 58.4 MM HG (ref 35–45)
PDW BLD-RTO: 16.6 % (ref 11–16)
PH ARTERIAL: 7.28 (ref 7.35–7.45)
PH BLDA: 7.1 PH UNITS (ref 7.35–7.45)
PH BLDA: 7.36 PH UNITS (ref 7.35–7.45)
PH, TEMP CORRECTED: 7.1 PH UNITS
PH, TEMP CORRECTED: 7.36 PH UNITS
PHOSPHATE SERPL-MCNC: 6.1 MG/DL (ref 2.4–5.1)
PLATELET # BLD AUTO: 221 10*3/MM3 (ref 150–450)
PLATELET # BLD: 232 K/UL (ref 150–400)
PMV BLD AUTO: 10.1 FL (ref 6–10)
PMV BLD AUTO: 10.4 FL (ref 6–12)
PO2 ARTERIAL: 42.2 MMHG (ref 80–100)
PO2 BLDA: 127 MM HG (ref 83–108)
PO2 BLDA: 201 MM HG (ref 83–108)
PO2 TEMP ADJ BLD: 127 MM HG (ref 83–108)
PO2 TEMP ADJ BLD: 201 MM HG (ref 83–108)
POTASSIUM BLD-SCNC: 4.4 MMOL/L (ref 3.5–5.5)
POTASSIUM SERPL-SCNC: 4.2 MMOL/L (ref 3.4–5.1)
PRO-BNP: 2819 PG/ML (ref 0–1800)
PROT SERPL-MCNC: 6.2 G/DL (ref 5.7–8.2)
RBC # BLD AUTO: 4.41 10*6/MM3 (ref 3.89–5.14)
RBC # BLD: 4.46 M/UL (ref 3.8–5.8)
SODIUM BLD-SCNC: 143 MMOL/L (ref 132–146)
SODIUM BLD-SCNC: 145 MMOL/L (ref 136–145)
T4 FREE SERPL-MCNC: 1.03 NG/DL (ref 0.89–1.76)
TCO2 ARTERIAL: 41.2 MMOL/L (ref 24–30)
TOTAL PROTEIN: 7 G/DL (ref 6.4–8.3)
TRIGL SERPL-MCNC: 148 MG/DL (ref 0–150)
TROPONIN I SERPL-MCNC: 0.37 NG/ML
TROPONIN: <0.3 NG/ML
TSH SERPL DL<=0.05 MIU/L-ACNC: 0.94 MIU/ML (ref 0.35–5.35)
VENTILATOR MODE: ABNORMAL
VENTILATOR MODE: ABNORMAL
WBC # BLD: 9.4 K/UL (ref 4–11)
WBC NRBC COR # BLD: 11.71 10*3/MM3 (ref 3.5–10.8)

## 2019-04-02 PROCEDURE — 93321 DOPPLER ECHO F-UP/LMTD STD: CPT | Performed by: INTERNAL MEDICINE

## 2019-04-02 PROCEDURE — 36600 WITHDRAWAL OF ARTERIAL BLOOD: CPT

## 2019-04-02 PROCEDURE — 85025 COMPLETE CBC W/AUTO DIFF WBC: CPT

## 2019-04-02 PROCEDURE — 80061 LIPID PANEL: CPT | Performed by: NURSE PRACTITIONER

## 2019-04-02 PROCEDURE — 84484 ASSAY OF TROPONIN QUANT: CPT | Performed by: NURSE PRACTITIONER

## 2019-04-02 PROCEDURE — 80053 COMPREHEN METABOLIC PANEL: CPT | Performed by: NURSE PRACTITIONER

## 2019-04-02 PROCEDURE — 80053 COMPREHEN METABOLIC PANEL: CPT

## 2019-04-02 PROCEDURE — 83605 ASSAY OF LACTIC ACID: CPT | Performed by: NURSE PRACTITIONER

## 2019-04-02 PROCEDURE — 93308 TTE F-UP OR LMTD: CPT | Performed by: INTERNAL MEDICINE

## 2019-04-02 PROCEDURE — 84100 ASSAY OF PHOSPHORUS: CPT | Performed by: NURSE PRACTITIONER

## 2019-04-02 PROCEDURE — 93325 DOPPLER ECHO COLOR FLOW MAPG: CPT

## 2019-04-02 PROCEDURE — 71045 X-RAY EXAM CHEST 1 VIEW: CPT

## 2019-04-02 PROCEDURE — 74018 RADEX ABDOMEN 1 VIEW: CPT

## 2019-04-02 PROCEDURE — 85025 COMPLETE CBC W/AUTO DIFF WBC: CPT | Performed by: NURSE PRACTITIONER

## 2019-04-02 PROCEDURE — 36415 COLL VENOUS BLD VENIPUNCTURE: CPT

## 2019-04-02 PROCEDURE — 25010000002 FENTANYL CITRATE (PF) 100 MCG/2ML SOLUTION: Performed by: INTERNAL MEDICINE

## 2019-04-02 PROCEDURE — 93321 DOPPLER ECHO F-UP/LMTD STD: CPT

## 2019-04-02 PROCEDURE — 0BJ08ZZ INSPECTION OF TRACHEOBRONCHIAL TREE, VIA NATURAL OR ARTIFICIAL OPENING ENDOSCOPIC: ICD-10-PCS | Performed by: INTERNAL MEDICINE

## 2019-04-02 PROCEDURE — 25010000002 MIDAZOLAM PER 1 MG

## 2019-04-02 PROCEDURE — 83036 HEMOGLOBIN GLYCOSYLATED A1C: CPT | Performed by: NURSE PRACTITIONER

## 2019-04-02 PROCEDURE — 2580000003 HC RX 258: Performed by: EMERGENCY MEDICINE

## 2019-04-02 PROCEDURE — 99223 1ST HOSP IP/OBS HIGH 75: CPT | Performed by: INTERNAL MEDICINE

## 2019-04-02 PROCEDURE — 84443 ASSAY THYROID STIM HORMONE: CPT | Performed by: NURSE PRACTITIONER

## 2019-04-02 PROCEDURE — 94002 VENT MGMT INPAT INIT DAY: CPT

## 2019-04-02 PROCEDURE — 84439 ASSAY OF FREE THYROXINE: CPT | Performed by: NURSE PRACTITIONER

## 2019-04-02 PROCEDURE — 83880 ASSAY OF NATRIURETIC PEPTIDE: CPT

## 2019-04-02 PROCEDURE — 93005 ELECTROCARDIOGRAM TRACING: CPT

## 2019-04-02 PROCEDURE — 96375 TX/PRO/DX INJ NEW DRUG ADDON: CPT

## 2019-04-02 PROCEDURE — 0DH64UZ INSERTION OF FEEDING DEVICE INTO STOMACH, PERCUTANEOUS ENDOSCOPIC APPROACH: ICD-10-PCS | Performed by: INTERNAL MEDICINE

## 2019-04-02 PROCEDURE — 31622 DX BRONCHOSCOPE/WASH: CPT | Performed by: INTERNAL MEDICINE

## 2019-04-02 PROCEDURE — 5A1935Z RESPIRATORY VENTILATION, LESS THAN 24 CONSECUTIVE HOURS: ICD-10-PCS | Performed by: INTERNAL MEDICINE

## 2019-04-02 PROCEDURE — 6360000002 HC RX W HCPCS: Performed by: EMERGENCY MEDICINE

## 2019-04-02 PROCEDURE — 93325 DOPPLER ECHO COLOR FLOW MAPG: CPT | Performed by: INTERNAL MEDICINE

## 2019-04-02 PROCEDURE — 02HV33Z INSERTION OF INFUSION DEVICE INTO SUPERIOR VENA CAVA, PERCUTANEOUS APPROACH: ICD-10-PCS | Performed by: INTERNAL MEDICINE

## 2019-04-02 PROCEDURE — 93308 TTE F-UP OR LMTD: CPT

## 2019-04-02 PROCEDURE — 31720 CLEARANCE OF AIRWAYS: CPT

## 2019-04-02 PROCEDURE — 25010000002 FENTANYL CITRATE (PF) 2500 MCG/50ML SOLUTION: Performed by: NURSE PRACTITIONER

## 2019-04-02 PROCEDURE — 96374 THER/PROPH/DIAG INJ IV PUSH: CPT

## 2019-04-02 PROCEDURE — 2500000003 HC RX 250 WO HCPCS: Performed by: EMERGENCY MEDICINE

## 2019-04-02 PROCEDURE — 99291 CRITICAL CARE FIRST HOUR: CPT | Performed by: INTERNAL MEDICINE

## 2019-04-02 PROCEDURE — 6360000002 HC RX W HCPCS

## 2019-04-02 PROCEDURE — 36556 INSERT NON-TUNNEL CV CATH: CPT | Performed by: NURSE PRACTITIONER

## 2019-04-02 PROCEDURE — 82962 GLUCOSE BLOOD TEST: CPT

## 2019-04-02 PROCEDURE — 82805 BLOOD GASES W/O2 SATURATION: CPT

## 2019-04-02 PROCEDURE — 94799 UNLISTED PULMONARY SVC/PX: CPT

## 2019-04-02 PROCEDURE — 84484 ASSAY OF TROPONIN QUANT: CPT

## 2019-04-02 PROCEDURE — 25010000002 FENTANYL CITRATE (PF) 100 MCG/2ML SOLUTION: Performed by: NURSE PRACTITIONER

## 2019-04-02 PROCEDURE — 99285 EMERGENCY DEPT VISIT HI MDM: CPT

## 2019-04-02 PROCEDURE — 82803 BLOOD GASES ANY COMBINATION: CPT

## 2019-04-02 PROCEDURE — 83735 ASSAY OF MAGNESIUM: CPT | Performed by: NURSE PRACTITIONER

## 2019-04-02 RX ORDER — MIDAZOLAM HYDROCHLORIDE 1 MG/ML
INJECTION INTRAMUSCULAR; INTRAVENOUS
Status: COMPLETED
Start: 2019-04-02 | End: 2019-04-02

## 2019-04-02 RX ORDER — FENTANYL CITRATE 50 UG/ML
25 INJECTION, SOLUTION INTRAMUSCULAR; INTRAVENOUS ONCE
Status: COMPLETED | OUTPATIENT
Start: 2019-04-02 | End: 2019-04-02

## 2019-04-02 RX ORDER — IPRATROPIUM BROMIDE AND ALBUTEROL SULFATE 2.5; .5 MG/3ML; MG/3ML
3 SOLUTION RESPIRATORY (INHALATION) EVERY 6 HOURS PRN
Status: DISCONTINUED | OUTPATIENT
Start: 2019-04-02 | End: 2019-04-15 | Stop reason: HOSPADM

## 2019-04-02 RX ORDER — BUSPIRONE HYDROCHLORIDE 15 MG/1
15 TABLET ORAL 3 TIMES DAILY
Status: DISCONTINUED | OUTPATIENT
Start: 2019-04-02 | End: 2019-04-15 | Stop reason: HOSPADM

## 2019-04-02 RX ORDER — FENTANYL CITRATE 50 UG/ML
150 INJECTION, SOLUTION INTRAMUSCULAR; INTRAVENOUS ONCE
Status: COMPLETED | OUTPATIENT
Start: 2019-04-02 | End: 2019-04-02

## 2019-04-02 RX ORDER — ONDANSETRON 4 MG/1
4 TABLET, FILM COATED ORAL EVERY 6 HOURS PRN
Status: DISCONTINUED | OUTPATIENT
Start: 2019-04-02 | End: 2019-04-15 | Stop reason: HOSPADM

## 2019-04-02 RX ORDER — PANTOPRAZOLE SODIUM 40 MG/1
40 TABLET, DELAYED RELEASE ORAL DAILY
Status: DISCONTINUED | OUTPATIENT
Start: 2019-04-03 | End: 2019-04-02

## 2019-04-02 RX ORDER — SODIUM CHLORIDE 9 MG/ML
INJECTION, SOLUTION INTRAVENOUS ONCE
Status: COMPLETED | OUTPATIENT
Start: 2019-04-02 | End: 2019-04-02

## 2019-04-02 RX ORDER — METHYLPREDNISOLONE SODIUM SUCCINATE 125 MG/2ML
125 INJECTION, POWDER, LYOPHILIZED, FOR SOLUTION INTRAMUSCULAR; INTRAVENOUS ONCE
Status: COMPLETED | OUTPATIENT
Start: 2019-04-02 | End: 2019-04-02

## 2019-04-02 RX ORDER — FENTANYL CITRATE 50 UG/ML
100 INJECTION, SOLUTION INTRAMUSCULAR; INTRAVENOUS
Status: DISCONTINUED | OUTPATIENT
Start: 2019-04-02 | End: 2019-04-06

## 2019-04-02 RX ORDER — CHLORHEXIDINE GLUCONATE 0.12 MG/ML
15 RINSE ORAL EVERY 12 HOURS SCHEDULED
Status: DISCONTINUED | OUTPATIENT
Start: 2019-04-02 | End: 2019-04-15 | Stop reason: HOSPADM

## 2019-04-02 RX ORDER — MIDODRINE HYDROCHLORIDE 5 MG/1
10 TABLET ORAL 3 TIMES DAILY
Status: DISCONTINUED | OUTPATIENT
Start: 2019-04-02 | End: 2019-04-15 | Stop reason: HOSPADM

## 2019-04-02 RX ORDER — HEPARIN SODIUM 1000 [USP'U]/ML
60 INJECTION, SOLUTION INTRAVENOUS; SUBCUTANEOUS ONCE
Status: DISCONTINUED | OUTPATIENT
Start: 2019-04-02 | End: 2019-04-02 | Stop reason: HOSPADM

## 2019-04-02 RX ORDER — ASPIRIN 81 MG/1
81 TABLET, CHEWABLE ORAL DAILY
Status: DISCONTINUED | OUTPATIENT
Start: 2019-04-03 | End: 2019-04-15 | Stop reason: HOSPADM

## 2019-04-02 RX ORDER — ACETYLCYSTEINE 200 MG/ML
3 SOLUTION ORAL; RESPIRATORY (INHALATION) ONCE
Status: DISCONTINUED | OUTPATIENT
Start: 2019-04-02 | End: 2019-04-02

## 2019-04-02 RX ORDER — IPRATROPIUM BROMIDE AND ALBUTEROL SULFATE 2.5; .5 MG/3ML; MG/3ML
3 SOLUTION RESPIRATORY (INHALATION)
Status: DISCONTINUED | OUTPATIENT
Start: 2019-04-02 | End: 2019-04-15 | Stop reason: HOSPADM

## 2019-04-02 RX ORDER — NOREPINEPHRINE BIT/0.9 % NACL 8 MG/250ML
.02-.3 INFUSION BOTTLE (ML) INTRAVENOUS
Status: DISCONTINUED | OUTPATIENT
Start: 2019-04-03 | End: 2019-04-06

## 2019-04-02 RX ORDER — SODIUM CHLORIDE 0.9 % (FLUSH) 0.9 %
3-10 SYRINGE (ML) INJECTION AS NEEDED
Status: DISCONTINUED | OUTPATIENT
Start: 2019-04-02 | End: 2019-04-15 | Stop reason: HOSPADM

## 2019-04-02 RX ORDER — LIDOCAINE HYDROCHLORIDE 10 MG/ML
INJECTION, SOLUTION EPIDURAL; INFILTRATION; INTRACAUDAL; PERINEURAL
Status: DISPENSED
Start: 2019-04-02 | End: 2019-04-03

## 2019-04-02 RX ORDER — ACETAMINOPHEN 500 MG
500 TABLET ORAL EVERY 4 HOURS PRN
Status: DISCONTINUED | OUTPATIENT
Start: 2019-04-02 | End: 2019-04-15 | Stop reason: HOSPADM

## 2019-04-02 RX ORDER — ROPINIROLE 2 MG/1
4 TABLET, FILM COATED ORAL 3 TIMES DAILY
Status: DISCONTINUED | OUTPATIENT
Start: 2019-04-02 | End: 2019-04-15 | Stop reason: HOSPADM

## 2019-04-02 RX ORDER — PANTOPRAZOLE SODIUM 40 MG/10ML
40 INJECTION, POWDER, LYOPHILIZED, FOR SOLUTION INTRAVENOUS
Status: DISCONTINUED | OUTPATIENT
Start: 2019-04-03 | End: 2019-04-15 | Stop reason: HOSPADM

## 2019-04-02 RX ORDER — PANTOPRAZOLE SODIUM 40 MG/10ML
40 INJECTION, POWDER, LYOPHILIZED, FOR SOLUTION INTRAVENOUS
Status: DISCONTINUED | OUTPATIENT
Start: 2019-04-03 | End: 2019-04-02

## 2019-04-02 RX ORDER — ATORVASTATIN CALCIUM 40 MG/1
40 TABLET, FILM COATED ORAL DAILY
Status: DISCONTINUED | OUTPATIENT
Start: 2019-04-02 | End: 2019-04-03

## 2019-04-02 RX ORDER — HEPARIN SODIUM 5000 [USP'U]/ML
INJECTION, SOLUTION INTRAVENOUS; SUBCUTANEOUS
Status: COMPLETED
Start: 2019-04-02 | End: 2019-04-02

## 2019-04-02 RX ADMIN — FENTANYL CITRATE 25 MCG: 50 INJECTION INTRAMUSCULAR; INTRAVENOUS at 21:15

## 2019-04-02 RX ADMIN — METHYLPREDNISOLONE SODIUM SUCCINATE 125 MG: 125 INJECTION, POWDER, FOR SOLUTION INTRAMUSCULAR; INTRAVENOUS at 16:50

## 2019-04-02 RX ADMIN — NOREPINEPHRINE BITARTRATE 2 MCG/MIN: 1 INJECTION, SOLUTION, CONCENTRATE INTRAVENOUS at 16:51

## 2019-04-02 RX ADMIN — ATORVASTATIN CALCIUM 40 MG: 40 TABLET, FILM COATED ORAL at 23:35

## 2019-04-02 RX ADMIN — FENTANYL CITRATE 100 MCG/HR: 50 INJECTION, SOLUTION INTRAMUSCULAR; INTRAVENOUS at 23:05

## 2019-04-02 RX ADMIN — SODIUM CHLORIDE: 9 INJECTION, SOLUTION INTRAVENOUS at 16:20

## 2019-04-02 RX ADMIN — CHLORHEXIDINE GLUCONATE 0.12% ORAL RINSE 15 ML: 1.2 LIQUID ORAL at 23:35

## 2019-04-02 RX ADMIN — FENTANYL CITRATE 100 MCG: 50 INJECTION INTRAMUSCULAR; INTRAVENOUS at 22:00

## 2019-04-02 RX ADMIN — MIDAZOLAM HYDROCHLORIDE 2 MG: 1 INJECTION, SOLUTION INTRAMUSCULAR; INTRAVENOUS at 21:00

## 2019-04-02 RX ADMIN — HEPARIN SODIUM 4760 UNITS: 5000 INJECTION INTRAVENOUS; SUBCUTANEOUS at 16:43

## 2019-04-02 RX ADMIN — BUSPIRONE HYDROCHLORIDE 15 MG: 15 TABLET ORAL at 23:34

## 2019-04-02 RX ADMIN — MIDODRINE HYDROCHLORIDE 10 MG: 5 TABLET ORAL at 23:35

## 2019-04-02 RX ADMIN — ROPINIROLE 4 MG: 2 TABLET, FILM COATED ORAL at 23:35

## 2019-04-02 RX ADMIN — INSULIN LISPRO 8 UNITS: 100 INJECTION, SOLUTION INTRAVENOUS; SUBCUTANEOUS at 23:34

## 2019-04-02 RX ADMIN — FENTANYL CITRATE 25 MCG: 50 INJECTION INTRAMUSCULAR; INTRAVENOUS at 21:00

## 2019-04-02 ASSESSMENT — ENCOUNTER SYMPTOMS
NAUSEA: 0
CONSTIPATION: 0
RHINORRHEA: 0
VOMITING: 0
COUGH: 0
SINUS PRESSURE: 0
ABDOMINAL PAIN: 0
SORE THROAT: 0
EYE PAIN: 0
BACK PAIN: 0
DIARRHEA: 0
WHEEZING: 0
CHEST TIGHTNESS: 0
EYE REDNESS: 0
SHORTNESS OF BREATH: 1
TROUBLE SWALLOWING: 0
EYE DISCHARGE: 0

## 2019-04-02 ASSESSMENT — HEART SCORE: ECG: 2

## 2019-04-02 NOTE — H&P
[facility] at [facility]   History and physical      Patient Care Team:  Ray Strickland MD as PCP - General (Internal Medicine)    Past Medical History:   Diagnosis Date   • Anemia    • Anxiety    • Aortic stenosis    • CHF (congestive heart failure) (CMS/[facility])    • Chronic respiratory failure with hypoxia and hypercapnia (CMS/[facility])    • CKD (chronic kidney disease), stage III (CMS/[facility])    • Constipation    • COPD (chronic obstructive pulmonary disease) (CMS/[facility])    • Coronary artery disease    • Dementia    • Depression    • Diabetes mellitus (CMS/[facility])    • Dysphagia    • Femur fracture, right (CMS/[facility])    • GERD (gastroesophageal reflux disease)    • Hyperlipidemia    • Hypertension    • Hypotension     on midodrine   • Restless legs syndrome (RLS)        Past Surgical History:   Procedure Laterality Date   • FOREARM SURGERY     • LEG SURGERY     • PEG TUBE INSERTION     • TRACHEOSTOMY           Allergies   Allergen Reactions   • Penicillins Unknown (See Comments)     Per med rec, pt is a poor historian    • Sulfa Antibiotics Unknown (See Comments)     Per med rec, pt is a poor historian         No current facility-administered medications for this encounter.     Current Outpatient Medications:   •  acetaminophen (TYLENOL) 500 MG tablet, Take 500 mg by mouth Every 4 (Four) Hours As Needed for Mild Pain ., Disp: , Rfl:   •  albuterol (ACCUNEB) 1.25 MG/3ML nebulizer solution, Take 1 ampule by nebulization Every 4 (Four) Hours As Needed for Wheezing., Disp: , Rfl:   •  aspirin 81 MG chewable tablet, Chew 81 mg Daily., Disp: , Rfl:   •  atorvastatin (LIPITOR) 40 MG tablet, Take 40 mg by mouth Daily., Disp: , Rfl:   •  busPIRone (BUSPAR) 15 MG tablet, Take 15 mg by mouth 3 (Three) Times a Day., Disp: , Rfl:   •  docusate sodium (COLACE) 100 MG capsule, Take 100 mg by mouth 2 (Two) Times a Day., Disp: , Rfl:   •  enoxaparin (LOVENOX) 40 MG/0.4ML solution syringe, Inject 40 mg under the skin into the  appropriate area as directed Daily., Disp: , Rfl:   •  ferrous sulfate 325 (65 FE) MG tablet, Take 325 mg by mouth Daily With Breakfast., Disp: , Rfl:   •  furosemide (LASIX) 40 MG tablet, Take 20 mg by mouth 2 (Two) Times a Day., Disp: , Rfl:   •  hydrocortisone (ANUSOL-HC) 2.5 % rectal cream, Insert  into the rectum 2 (Two) Times a Day., Disp: , Rfl:   •  insulin glargine (LANTUS) 100 UNIT/ML injection, Inject 25 Units under the skin into the appropriate area as directed Every Night., Disp: , Rfl: 12  •  insulin lispro (humaLOG) 100 UNIT/ML injection, Inject 0-7 Units under the skin into the appropriate area as directed Every 6 (Six) Hours., Disp: , Rfl: 12  •  ipratropium-albuterol (DUO-NEB) 0.5-2.5 mg/3 ml nebulizer, Take 3 mL by nebulization 4 (Four) Times a Day As Needed for Wheezing., Disp: , Rfl:   •  midodrine (PROAMATINE) 5 MG tablet, Take 10 mg by mouth 3 (Three) Times a Day., Disp: , Rfl:   •  Omega-3 Fatty Acids (FISH OIL) 1000 MG capsule capsule, Take  by mouth 2 (Two) Times a Day With Meals., Disp: , Rfl:   •  ondansetron (ZOFRAN) 4 MG tablet, Take 4 mg by mouth Every 6 (Six) Hours As Needed for Nausea or Vomiting., Disp: , Rfl:   •  pantoprazole (PROTONIX) 40 MG EC tablet, Take 40 mg by mouth Daily., Disp: , Rfl:   •  rOPINIRole (REQUIP) 4 MG tablet, Take 4 mg by mouth 3 (Three) Times a Day., Disp: , Rfl:   •  sodium bicarbonate 325 MG tablet, Take 325 mg by mouth 4 (Four) Times a Day., Disp: , Rfl:       No current facility-administered medications for this encounter.     No medications prior to admission.         Subjective .   History of present illness:    Patient is a 78-year-old  female who was brought to [facility] for reported inferior ST elevated myocardial infarction.  She has been hospitalized here before for recurrent infection including MRSA and Proteus.  She is chronically trached and pegged.  She was at her nursing facility when reportedly she complained of left  shoulder discomfort.  EMS was called and taken her to the local hospital.  There she was diagnosed with ST elevation and transferred for emergent cardiac catheterization.  On arrival to cardiac catheterization lab she is pain-free.  Per EMS report (he reports they have transported her many times) they note that she is chronically hypotensive.  She is currently on low-dose levo fed.  She was also at the outside facility reported to be hypoxic with saturations of 70%.      Social History     Socioeconomic History   • Marital status:      Spouse name: Not on file   • Number of children: Not on file   • Years of education: Not on file   • Highest education level: Not on file   Tobacco Use   • Smoking status: Former Smoker     Years: 25.00   • Smokeless tobacco: Never Used   Substance and Sexual Activity   • Alcohol use: No   • Drug use: No   • Sexual activity: Defer     Family History   Family history unknown: Yes         Review of Systems:  Review of Systems   Reason unable to perform ROS: Not obtained secondary to lethargy and questionable mental status.        Objective   Vitals:  Weight 90.7 kg (200 lb).       Physical Exam   Constitutional: She appears well-developed and well-nourished. She appears lethargic.   HENT:   Head: Normocephalic and atraumatic.   Eyes: Right eye exhibits no discharge. Left eye exhibits no discharge.   Neck: No JVD present. No tracheal deviation present.   Tracheostomy in place.  No drainage or secretions noted.  Trach collar in place   Cardiovascular: Normal rate, regular rhythm and intact distal pulses. Exam reveals no friction rub.   Murmur (3/6 systolic murmur) heard.  Pulmonary/Chest: Effort normal and breath sounds normal. No respiratory distress.   Abdominal: Bowel sounds are normal. There is no tenderness.   Musculoskeletal: She exhibits no edema or deformity.   Neurological: She appears lethargic.   Lethargic but awakens with verbal stimuli and nods to yes/no questions    Skin: Skin is warm and dry.            Results Review:  I reviewed the patient's new clinical results.            Invalid input(s): LABALBU, PROT          No results found for: TROPONINI              No labs arrived with patient.  EKG from outside facility shows inferior ST elevation with reciprocal lateral depression and right bundle branch block    Tele: Sinus rhythm    EKG: Sinus rhythm with minimal ST elevation right bundle branch block (less than 1 mm) no previous available for comparison.      Assessment/Plan     1. Episode of left shoulder pain, and abnormal EKG.  Possible MI  2. Hypotensive with possible shock.  3. Aortic stenosis, moderate to severe by most recent echocardiogram in October of last year.  4. Chronic hypoxic respiratory failure with chronic tracheostomy.  5. General frailty  6. Reported history of coronary artery disease.  Unable to obtain accurate history from patient secondary to limitations from tracheostomy and lethargy.  7. History of MRSA      Plan:    1. Results reviewed, EKG performed on arrival to the Cath Lab.  Given that the patient is pain-free and with her multiple comorbid conditions it was felt that she was not a candidate for aggressive/invasive measures.  2. Case was discussed with Dr. Childs and it was felt best plan for the patient would be admission into ICU with medical management and further evaluation of her symptoms including hypotension.  3. She will need a stat echocardiogram.  4. Continue pressors for hypotension.  5. Hypoxia, with chronic trach per intensivist service  6. Hydrated with IV fluids  7. Aspirin via PEG   8. Generalized severe debilitation, with patient essentially being bedridden in nursing home.  9. History of dementia noted in chart.  Patient not communicative other than with nonverbal answers to yes/no questions only.      ARELI Grimes obtained past medical, family history, social history, review of systems and functioned as a  scribe for the remainder of the dictation for Dr. Padilla.      Electronically signed by ARELI Grimes, 04/02/19, 6:25 PM.  I, Candelario Padilla MD, personally performed the services described in this documentation as scribed by the above individual in my presence, and it is both accurate and complete      Dictated utilizing Dragon dictation

## 2019-04-02 NOTE — TELEPHONE ENCOUNTER
Per NH, pt is having left shoulder pain. BP 78/42  HR in the 120's  O2 was 85%, they bumped her to 3L and it dropped to 61%. She went via ambulance to ED.

## 2019-04-02 NOTE — ED PROVIDER NOTES
[address] [place] [address] Court  eMERGENCY dEPARTMENT eNCOUnter      Pt Name: Bethany Wan  MRN: [id number]  [place] 1940  Date of evaluation: 4/2/2019  Provider: David Burns MD    [address]       Chief Complaint   Patient presents with    Arm Pain    Other     hypoxia         HISTORY OF PRESENT ILLNESS   (Location/Symptom, Timing/Onset, Context/Setting, Quality, Duration, Modifying Factors, Severity)  Note limiting factors. Bethany Wan is a 66 y.o. female who presents to the emergency department BECAUSE OF LEFT ARM PAIN BUT NO INJURY. WHEN NH STAFF CHECKED ON HER, SHE WAS HYPOTENSIVE AND HYPOXIC. SHE HAS A TRACH AND IS USUALLY ON 3 LITERS AT HOME. SHE IS FULL CODE. Nursing Notes were reviewed. REVIEW OF SYSTEMS    (2-9 systems for level 4, 10 or more forlevel 5)     Review of Systems   Constitutional: Negative for chills and fever. HENT: Negative for congestion, ear pain, postnasal drip, rhinorrhea, sinus pressure, sneezing, sore throat and trouble swallowing. Eyes: Negative for pain, discharge and redness. Respiratory: Positive for shortness of breath. Negative for cough, chest tightness and wheezing. Cardiovascular: Negative for chest pain, palpitations and leg swelling. Gastrointestinal: Negative for abdominal pain, constipation, diarrhea, nausea and vomiting. Genitourinary: Negative for dysuria, flank pain, frequency, hematuria and urgency. Musculoskeletal: Negative for back pain, joint swelling and neck pain. LEFT ARM PAIN   Skin: Negative for pallor and rash. Neurological: Negative for dizziness, syncope, weakness, numbness and headaches. Psychiatric/Behavioral: Negative for confusion and hallucinations. The patient is not nervous/anxious.             PAST MEDICAL HISTORY     Past Medical History:   Diagnosis Date    Anemia     Anxiety     CAD (coronary artery disease)     CHF (congestive heart failure) (HCC)     Chronic kidney disease stage 3    Constipation     [facility]) ([facility])     Dementia     Diabetes mellitus (HCC)     Dysphagia     Femur fracture (HCC)     GERD (gastroesophageal reflux disease)     Hypercapnia     Hyperlipidemia     Hypertension     Hypotension     Larynx disorder     Nausea & vomiting     Obesity     Respiratory failure (HCC)     Restless leg syndrome     Stenosis of aortic valve          SURGICAL HISTORY       Past Surgical History:   Procedure Laterality Date    ARM SURGERY Right     GASTROSTOMY      LEG SURGERY Right     TRACHEOSTOMY           CURRENT MEDICATIONS       Previous Medications    ACETAMINOPHEN (TYLENOL) 500 MG TABLET    Take 500 mg by mouth every 4 hours as needed for Pain or Fever    ACETYLCYSTEINE (MUCOMYST) 10 % NEBULIZER SOLUTION    Inhale 4 mLs into the lungs every 4 hours as needed    ALBUTEROL (PROVENTIL) (2.5 MG/3ML) 0.083% NEBULIZER SOLUTION    Take 2.5 mg by nebulization every 4 hours as needed for Wheezing    ARGININE POWD    1 packet by Gastrostomy Tube route 2 times daily    ASPIRIN 81 MG CHEWABLE TABLET    Take 81 mg by mouth daily    ATORVASTATIN (LIPITOR) 40 MG TABLET    Take 40 mg by mouth nightly     BUSPIRONE (BUSPAR) 15 MG TABLET    Take 15 mg by mouth 3 times daily    CLONAZEPAM (KLONOPIN) 1 MG TABLET    Take 1 tablet by mouth three times daily for 120 days.     DOCUSATE SODIUM (COLACE) 100 MG CAPSULE    Take 100 mg by mouth daily    ENOXAPARIN (LOVENOX) 40 MG/0.4ML INJECTION    Inject into the skin daily    FERROUS SULFATE 325 (65 FE) MG TABLET    Take 1 tablet by mouth 2 times daily    FUROSEMIDE (LASIX) 40 MG TABLET    Take 0.5 tablets by mouth 2 times daily    GUAIFENESIN (MUCINEX) 600 MG EXTENDED RELEASE TABLET    Take 1 tablet by mouth 2 times daily as needed for Congestion    INSULIN GLARGINE (LANTUS) 100 UNIT/ML INJECTION VIAL    Inject 30 Units into the skin nightly    IPRATROPIUM-ALBUTEROL (DUONEB) 0.5-2.5 (3) MG/3ML SOLN connections:     Talks on phone: None     Gets together: None     Attends Christian service: None     Active member of club or organization: None     Attends meetings of clubs or organizations: None     Relationship status: None    Intimate partner violence:     Fear of current or ex partner: None     Emotionally abused: None     Physically abused: None     Forced sexual activity: None   Other Topics Concern    None   Social History Narrative    None       SCREENINGS      Heart Score for chest pain patients  History: Slightly Suspicious  ECG: Significant ST-deviation  Patient Age: > 65 years  *Risk factors for Atherosclerotic disease: Diabetes Mellitus, Coronary Artery Disease  Risk Factors: 1 or 2 risk factors  Troponin: < 1X normal limit  Heart Score Total: 5      PHYSICAL EXAM    (up to 7 for level 4, 8 or more for level 5)     ED Triage Vitals [04/02/19 1558]   BP Temp Temp Source Pulse Resp SpO2 Height Weight   (!) 78/37 97.5 °F (36.4 °C) Axillary 120 22 (!) 72 % 5' 6\" (1.676 m) 175 lb (79.4 kg)       Physical Exam   Constitutional: She is oriented to person, place, and time. She appears well-developed and well-nourished. HENT:   Head: Normocephalic and atraumatic. DRY BUCCAL MUCOSA. Eyes: Pupils are equal, round, and reactive to light. Conjunctivae and EOM are normal.   Neck: Neck supple. TRACH IN PLACE   Cardiovascular: Normal rate, regular rhythm and normal heart sounds. Pulmonary/Chest: Effort normal and breath sounds normal. No respiratory distress. She has no wheezes. Abdominal: Soft. Bowel sounds are normal. She exhibits no distension. There is no tenderness. Musculoskeletal: Normal range of motion. She exhibits no edema. Neurological: She is alert and oriented to person, place, and time. Skin: Skin is warm and dry. Psychiatric: She has a normal mood and affect.        DIAGNOSTIC RESULTS     EKG: All EKG's are interpreted by the Emergency Department Physician who either signs or Co-signs this chart in the absence of a cardiologist    EKG DONE SHOWED ACUTE MI    RADIOLOGY:   Non-plain film images such as CT, Ultrasound and MRI are read by the radiologist. Plain radiographic images are visualized andpreliminarily interpreted by the emergency physician with the below findings:    CXR SHOWED MILD EDEMA. Interpretationper the Radiologist below, if available at the time of this note:    XR CHEST PORTABLE   Final Result   Impression: Increased interstitial prominence may represent mild edema. Right basilar and left perihilar atelectasis. Persistent opacification of the left lung base.             ED BEDSIDE ULTRASOUND:   Performed by ED Physician - none    LABS:  Labs Reviewed   BLOOD GAS, ARTERIAL - Abnormal; Notable for the following components:       Result Value    pH, Arterial 7.281 (*)     pCO2, Arterial 83.8 (*)     pO2, Arterial 42.2 (*)     HCO3, Arterial 38.6 (*)     Base Excess, Arterial 8.6 (*)     O2 Sat, Arterial 69.4 (*)     TCO2, Arterial 41.2 (*)     All other components within normal limits    Narrative:     CALL  [place]  [facility] [phone],  ABG results called to and read back by Andrae Mukherjee RN, 04/02/2019 16:35,  by [facility]  Performed at:  [address] [place] [address] Laboratory  [address] [place] [address]  [place], [place]   Phone [phone]   CBC WITH AUTO DIFFERENTIAL - Abnormal; Notable for the following components:    .2 (*)     MCHC 29.8 (*)     RDW 16.6 (*)     MPV 10.1 (*)     All other components within normal limits    Narrative:     Performed at:  [address] [place] [address] Laboratory  [address] [place] [address],  [place], [place]   Phone [phone]   COMPREHENSIVE METABOLIC PANEL - Abnormal; Notable for the following components:    Chloride 97 (*)     CO2 38 (*)     Glucose 229 (*)     BUN 21 (*)     Alb 3.2 (*)     All other components within normal limits    Narrative:     Performed at:  [address] [place] [address] and [address],  [place], [place]   Phone [phone]   BRAIN NATRIURETIC PEPTIDE - Abnormal; Notable for the following components:    Pro-BNP 2,819 (*)     All other components within normal limits    Narrative:     Performed at:  [address] [place] [address] and [address],  [place], [place] [address]   Phone [phone]   CULTURE BLOOD #1   CULTURE BLOOD #2   TROPONIN    Narrative:     Performed at:  [address] [place] [address] and [facility] Laboratory  [address] [place] [address],  [place], [place] [address]   Phone [phone]   LACTIC ACID, PLASMA   APTT   APTT   APTT       All other labs were within normal range or not returned as of this dictation. EMERGENCY DEPARTMENT COURSE and DIFFERENTIAL DIAGNOSIS/MDM:   Vitals:    Vitals:    04/02/19 1558 04/02/19 1629   BP: (!) 78/37    Pulse: 120    Resp: 22    Temp: 97.5 °F (36.4 °C)    TempSrc: Axillary    SpO2: (!) 72% (!) 87%   Weight: 175 lb (79.4 kg)    Height: 5' 6\" (1.676 m)            CRITICAL CARE TIME   Total Critical Care time was 36  minutes, excluding separatelyreportable procedures. There was a high probability ofclinically significant/life threatening deterioration in the patient's condition which required my urgent intervention. CONSULTS:  None    PROCEDURES:  None    PROGRESS NOTES:    CALLED AND DISCUSSED PATIENT WITH DR IRIS CEE AT [facility]. EXPLAINED ASA AND PALVIX NOT GIVEN BECAUSE PATIENT UNABLE TO SWALLOW BUT STARTED HER ON HEPARIN. HE'S ACCEPTED PATIENT FOR TRANSFER TO [facility] CATH LAB    FINAL IMPRESSION      1. Acute ST elevation myocardial infarction (STEMI) involving left anterior descending (LAD) coronary artery ([address]    2. Hypoxia    3.  Hypotension, unspecified hypotension type          Final diagnoses:   Acute ST elevation myocardial infarction (STEMI) involving left anterior descending (LAD) coronary artery (HCC)   Hypoxia   Hypotension, unspecified hypotension type       DISPOSITION/PLAN   DISPOSITION Decision To Transfer 04/02/2019 04:47:07 PM      PATIENT REFERRED TO:  No follow-up provider specified.     DISCHARGE MEDICATIONS:  New Prescriptions    No medications on file         (Please note thatportions of this note may have been completed with a voice recognition program.  Efforts were [facility] edit the dictations but occasionally words are mis-transcribed.)    Xochitl Mayorga MD (electronically signed)  Attending Emergency Physician        Rj Ramon MD  04/02/19 9512

## 2019-04-02 NOTE — PROGRESS NOTES
Critical Care Note     LOS: 0 days   Patient Care Team:  Ray Strickland MD as PCP - General (Internal Medicine)    Chief Complaint/Reason for visit:    Chest pain  Acute on chronic respiratory failure  Left lung atelectasis  Diabetes  Moderate to severe aortic valve stenosis  Chronic kidney disease    Meet Junior is a 78 y.o. chronically debilitated female brought to [facility] from [facility] on 4/2 for reported inferior STEMI after experiencing left shoulder discomfort at her nursing facility. EMS reports that she chronically is hypotensive on midodrine currently on norepinephrine infusion and was hypoxic of 70% saturations on arrival. At OSH EKG read inferior STEMI with RBBB. Pertinent labs included BNP 2,819, H&H 13.6/45.6, and troponin <0.3 . ABG pH 7.281, CO2 83.8, pO2 42, HCO3 38.6, and BE 8.6. CXR showed right basilar and left perihilar atelectasis with left lung base opacification. She was not administered ASA and Plavix despite presence of PEG tube, placed on Heparin gtt, and transferred to [facility].     She was taken emergently to the cath lab per Dr. Padilla and noted to be pain-free at that time. Due to pain resolution accompanied by significant baseline co-morbid illnesses she was deemed not a candidate for aggressive/invasive measures and left heart cath was aborted. Preliminary echocardiogram revealed EF 70-75% with moderate-severe aortic stenosis minimally changed from October of 2018 reading.     She will be admitted to the ICU for higher level of care.     Interval History:     On ICU arrival the patient appears lethargic and remains hypotensive with BP 70s despite norepinephrine infusion. She does arouse to verbal stimulus for a short duration. There is currently no family present at bedside.     I have contacted the next of kin, Carlita Palm (sister) who informs me that the patient would like to be a full code. She informs me that the patient is practically bed bound within her nursing  facility and is socially withdrawn since she cannot speak.    ABG pH 7.1, CO2 116, pO2 127, and HCO3 36 on unit arrival, will we place her on mechanical ventilation at this time.   Apparently she was at the [facility] in 2017 after falling and sustaining a fracture.  Postoperatively she failed to wean from mechanical ventilation and underwent tracheostomy and PEG tube placement.  She has been nonambulatory since that hospital stay.  She was then admitted to our hospital in October 2018 with pneumonia, mucous plugging and left lung atelectasis requiring bronchoscopies.  Cultures were positive for MRSA and pseudomonas aeruginosa.  She was discharged to rehabilitation and subsequently placed in a nursing home.  She does have a PEG tube but did pass her swallow evaluation and was tolerating a p.o. diet.  On trach collar in October 2018 blood gas revealed a pH of 7.45, CO2 of 45, O2 of 93.    History taken from: Chart    Review of Systems:    All systems were reviewed and negative except as noted in subjective.    Medical history, surgical history, social history, family history reviewed    Objective     Intake/Output:  No intake or output data in the 24 hours ending 04/02/19 2150    Nutrition:  NPO Diet    Infusions:    fentanyl 10 mcg/mL  mcg/hr       Mechanical Ventilator Settings:         Vt (Set, L): 0.4 L  Resp Rate (Set): 22  Pressure Support (cm H2O): 0 cm H20  FiO2 (%): 85 %  PEEP/CPAP (cm H2O): 5 cm H20    Minute Ventilation (L/min) (Obs): 9.57 L/min  Resp Rate (Observed) Vent: 23  I:E Ratio (Set): 1:2.55  I:E Ratio (Obs): 1:2.50    PIP Observed (cm H2O): 29 cm H2O       Telemetry: Sinus rhythm             Vital Signs  Weight 90.7 kg (200 lb).    Physical Exam:    GENERAL: Patient lying in bed. Lethargic. No acute distress.   HEENT:  Shiley 6 XLP trach present with scant dried yellow drainage.  Tracheostomy changed to a cuffed Shiley and some mild bleeding at the stoma neck supple. Trachea  midline. PERRLA 3mm. EOM WNL. Tongue midline.     LUNGS:   Diminished chest excursion on the left with markedly decreased breath sounds. Decreased breath on the left.  No wheezes or rhonchi.     HEART:  3/6 Murmur noted. No JVD. No carotid bruit. Regular rate and rhythm. NSR. No rub or gallop.    ABDOMEN:  Obese, soft, round, nondistended, and nontender. LUE PEG intact. Large abdominal wall hernia. Bowel sounds hypoactive in all quadrants. No HSM.    EXTREMITIES:  No clubbing, edema, or cyanosis. Peripheral pulses 2+ and regular. Capillary refill <3sec-     NEURO/PSYCH:  Somnolent. Moves all extremities. No obvious focal deficit.         Results Review:     I reviewed the patient's new clinical results.   Results from last 7 days   Lab Units 04/02/19 2018   SODIUM mmol/L 143   POTASSIUM mmol/L 4.4   CHLORIDE mmol/L 103   CO2 mmol/L 34.0*   BUN mg/dL 23   CREATININE mg/dL 1.19   CALCIUM mg/dL 8.5*   BILIRUBIN mg/dL 1.3*   ALK PHOS U/L 124*   ALT (SGPT) U/L 337*   AST (SGOT) U/L 588*   GLUCOSE mg/dL 312*     Results from last 7 days   Lab Units 04/02/19 2018   WBC 10*3/mm3 11.71*   HEMOGLOBIN g/dL 13.3   HEMATOCRIT % 46.2*   PLATELETS 10*3/mm3 221     Results from last 7 days   Lab Units 04/02/19  2141   PH, ARTERIAL pH units 7.355   PO2 ART mm Hg 201.0*   PCO2, ARTERIAL mm Hg 58.4*   HCO3 ART mmol/L 32.6*     Lab Results   Component Value Date    BLOODCX No growth at 5 days 10/06/2018     No results found for: URINECX    I reviewed the patient's new imaging including images and reports.      All medications reviewed.         Assessment/Plan       Inferior STEMI     Acute on chronic respiratory failure with hypoxia     Atelectasis of left lung    Type 2 diabetes mellitus     Hypotension    COPD    Aortic valve stenosis    Stage 3 CKD    Hyperlipidemia    Dysphagia with chronic PEG     Coronary artery disease    GERD     Former smoker    Debility    Dementia    Class 1 obesity in adult    Chronically debilitated  78-year-old woman who is in a nursing home and nonambulatory.  She has obstructive lung disease and a chronic tracheostomy.  She also has significant aortic valve stenosis and chronic kidney disease, diabetes.  She is chronically hypotensive and takes Midrin.  She was sent to our hospital for possible MI but upon arrival her arm pain had resolved.  Unfortunately she was acutely hypercapnic requiring a change in her tracheostomy and mechanical ventilation.  In addition she has complete atelectasis of her left lung.  On prior hospitalization she required several bronchoscopies to remove mucus mucous plugging.  She had MRSA and pseudomonas aeruginosa grow from her sputum's.    PLAN:  1. Admit to ICU   2. Respiratory acidosis- place on mechanical ventilation   3. Cardiology following   4. Monitor for chest pain/arrhythmias  5. Wean norepinephrine for MAP >60  6. Insert central line   7. ASA/Midodrine/Statin per cardiology   8. Bronchodilators  9. Correction insulin   10. Bronchoscopy  11. Nebulized bronchodilators and mucolytic's      VTE Prophylaxis: Lovenox     Stress Ulcer Prophylaxis: Protonix     ARELI Meadows, [facility]   Pulmonary and Critical Care     I reviewed her record, did an independent physical examination, reviewed her lab and x-ray data, dictated the assessment and modified the plan and the note to reflect my additions and findings.    Milena Childs MD    Time: 55min  I personally provided care to this critically ill patient as documented above.  Critical care time does not include time spent on separately billed procedures.  Non of my critical care time was concurrent with other critical care providers.

## 2019-04-02 NOTE — ED NOTES
Report to nh that pt tx to [facility] cath lab- Lorenz Nyhan, [place]  04/02/19 5201

## 2019-04-03 LAB
ALBUMIN SERPL-MCNC: 3.7 G/DL (ref 3.2–4.8)
ALBUMIN/GLOB SERPL: 1.3 G/DL (ref 1.5–2.5)
ALP SERPL-CCNC: 139 U/L (ref 25–100)
ALT SERPL W P-5'-P-CCNC: 488 U/L (ref 7–40)
ANION GAP SERPL CALCULATED.3IONS-SCNC: 6 MMOL/L (ref 3–11)
ARTERIAL PATENCY WRIST A: ABNORMAL
AST SERPL-CCNC: 593 U/L (ref 0–33)
ATMOSPHERIC PRESS: ABNORMAL MMHG
BACTERIA UR QL AUTO: ABNORMAL /HPF
BASE EXCESS BLDA CALC-SCNC: 11.6 MMOL/L (ref 0–2)
BASOPHILS # BLD AUTO: 0 10*3/MM3 (ref 0–0.2)
BASOPHILS NFR BLD AUTO: 0 % (ref 0–1)
BDY SITE: ABNORMAL
BH CV ECHO MEAS - AO MAX PG (FULL): 55.4 MMHG
BH CV ECHO MEAS - AO MAX PG: 58.7 MMHG
BH CV ECHO MEAS - AO MEAN PG (FULL): 32.7 MMHG
BH CV ECHO MEAS - AO MEAN PG: 34.5 MMHG
BH CV ECHO MEAS - AO V2 MAX: 383.1 CM/SEC
BH CV ECHO MEAS - AO V2 MEAN: 270.9 CM/SEC
BH CV ECHO MEAS - AO V2 VTI: 75.4 CM
BH CV ECHO MEAS - AVA(I,A): 0.71 CM^2
BH CV ECHO MEAS - AVA(I,D): 0.71 CM^2
BH CV ECHO MEAS - AVA(V,A): 0.75 CM^2
BH CV ECHO MEAS - AVA(V,D): 0.75 CM^2
BH CV ECHO MEAS - LV MAX PG: 3.3 MMHG
BH CV ECHO MEAS - LV MEAN PG: 1.8 MMHG
BH CV ECHO MEAS - LV V1 MAX: 90.3 CM/SEC
BH CV ECHO MEAS - LV V1 MEAN: 61.1 CM/SEC
BH CV ECHO MEAS - LV V1 VTI: 16.8 CM
BH CV ECHO MEAS - LVOT AREA (M): 3.1 CM^2
BH CV ECHO MEAS - LVOT AREA: 3.2 CM^2
BH CV ECHO MEAS - LVOT DIAM: 2 CM
BH CV ECHO MEAS - MV MAX PG: 27.4 MMHG
BH CV ECHO MEAS - MV MEAN PG: 11.3 MMHG
BH CV ECHO MEAS - MV V2 MAX: 261.7 CM/SEC
BH CV ECHO MEAS - MV V2 MEAN: 143.2 CM/SEC
BH CV ECHO MEAS - MV V2 VTI: 45.9 CM
BH CV ECHO MEAS - MVA(VTI): 1.2 CM^2
BH CV ECHO MEAS - RAP SYSTOLE: 8 MMHG
BH CV ECHO MEAS - RVSP: 47 MMHG
BH CV ECHO MEAS - SV(LVOT): 53.6 ML
BH CV ECHO MEAS - TR MAX PG: 39 MMHG
BH CV ECHO MEAS - TR MAX VEL: 310.7 CM/SEC
BH CV VAS BP RIGHT ARM: NORMAL MMHG
BILIRUB SERPL-MCNC: 1 MG/DL (ref 0.3–1.2)
BILIRUB UR QL STRIP: NEGATIVE
BODY TEMPERATURE: 37 C
BUN BLD-MCNC: 25 MG/DL (ref 9–23)
BUN/CREAT SERPL: 23.8 (ref 7–25)
CALCIUM SPEC-SCNC: 8.8 MG/DL (ref 8.7–10.4)
CHLORIDE SERPL-SCNC: 104 MMOL/L (ref 99–109)
CLARITY UR: ABNORMAL
CO2 BLDA-SCNC: 38 MMOL/L (ref 23–27)
CO2 SERPL-SCNC: 35 MMOL/L (ref 20–31)
COHGB MFR BLD: 1 % (ref 0–2)
COLOR UR: ABNORMAL
CREAT BLD-MCNC: 1.05 MG/DL (ref 0.6–1.3)
DEPRECATED RDW RBC AUTO: 60.5 FL (ref 37–54)
EOSINOPHIL # BLD AUTO: 0 10*3/MM3 (ref 0–0.3)
EOSINOPHIL NFR BLD AUTO: 0 % (ref 0–3)
ERYTHROCYTE [DISTWIDTH] IN BLOOD BY AUTOMATED COUNT: 16.5 % (ref 11.3–14.5)
GFR SERPL CREATININE-BSD FRML MDRD: 51 ML/MIN/1.73
GLOBULIN UR ELPH-MCNC: 2.8 GM/DL
GLUCOSE BLD-MCNC: 271 MG/DL (ref 70–100)
GLUCOSE BLDC GLUCOMTR-MCNC: 155 MG/DL (ref 70–130)
GLUCOSE BLDC GLUCOMTR-MCNC: 171 MG/DL (ref 70–130)
GLUCOSE BLDC GLUCOMTR-MCNC: 180 MG/DL (ref 70–130)
GLUCOSE BLDC GLUCOMTR-MCNC: 227 MG/DL (ref 70–130)
GLUCOSE UR STRIP-MCNC: NEGATIVE MG/DL
HCO3 BLDA-SCNC: 36.5 MMOL/L (ref 20–26)
HCT VFR BLD AUTO: 44.3 % (ref 34.5–44)
HCT VFR BLD CALC: 39.6 %
HGB BLD-MCNC: 13.1 G/DL (ref 11.5–15.5)
HGB BLDA-MCNC: 12.9 G/DL (ref 14–18)
HGB UR QL STRIP.AUTO: ABNORMAL
HOROWITZ INDEX BLD+IHG-RTO: 65 %
HYALINE CASTS UR QL AUTO: ABNORMAL /LPF
IMM GRANULOCYTES # BLD AUTO: 0.01 10*3/MM3 (ref 0–0.05)
IMM GRANULOCYTES NFR BLD AUTO: 0.1 % (ref 0–0.6)
KETONES UR QL STRIP: NEGATIVE
LEUKOCYTE ESTERASE UR QL STRIP.AUTO: ABNORMAL
LV EF 2D ECHO EST: 70 %
LYMPHOCYTES # BLD AUTO: 1.54 10*3/MM3 (ref 0.6–4.8)
LYMPHOCYTES NFR BLD AUTO: 22.2 % (ref 24–44)
MAGNESIUM SERPL-MCNC: 1.9 MG/DL (ref 1.3–2.7)
MAXIMAL PREDICTED HEART RATE: 142 BPM
MCH RBC QN AUTO: 30.2 PG (ref 27–31)
MCHC RBC AUTO-ENTMCNC: 29.6 G/DL (ref 32–36)
MCV RBC AUTO: 102.1 FL (ref 80–99)
METHGB BLD QL: 0.8 % (ref 0–1.5)
MODALITY: ABNORMAL
MONOCYTES # BLD AUTO: 0.68 10*3/MM3 (ref 0–1)
MONOCYTES NFR BLD AUTO: 9.8 % (ref 0–12)
NEUTROPHILS # BLD AUTO: 4.71 10*3/MM3 (ref 1.5–8.3)
NEUTROPHILS NFR BLD AUTO: 68 % (ref 41–71)
NITRITE UR QL STRIP: POSITIVE
NOTE: ABNORMAL
OXYHGB MFR BLDV: 97.8 % (ref 94–99)
PCO2 BLDA: 47.5 MM HG (ref 35–45)
PCO2 TEMP ADJ BLD: 47.5 MM HG (ref 35–45)
PH BLDA: 7.5 PH UNITS (ref 7.35–7.45)
PH UR STRIP.AUTO: 8.5 [PH] (ref 5–8)
PH, TEMP CORRECTED: 7.5 PH UNITS
PHOSPHATE SERPL-MCNC: 3.1 MG/DL (ref 2.4–5.1)
PLATELET # BLD AUTO: 175 10*3/MM3 (ref 150–450)
PMV BLD AUTO: 10.7 FL (ref 6–12)
PO2 BLDA: 132 MM HG (ref 83–108)
PO2 TEMP ADJ BLD: 132 MM HG (ref 83–108)
POTASSIUM BLD-SCNC: 4.7 MMOL/L (ref 3.5–5.5)
PROCALCITONIN SERPL-MCNC: 0.19 NG/ML
PROT SERPL-MCNC: 6.5 G/DL (ref 5.7–8.2)
PROT UR QL STRIP: ABNORMAL
RBC # BLD AUTO: 4.34 10*6/MM3 (ref 3.89–5.14)
RBC # UR: ABNORMAL /HPF
REF LAB TEST METHOD: ABNORMAL
SODIUM BLD-SCNC: 145 MMOL/L (ref 132–146)
SP GR UR STRIP: 1.02 (ref 1–1.03)
SQUAMOUS #/AREA URNS HPF: ABNORMAL /HPF
STRESS TARGET HR: 121 BPM
TROPONIN I SERPL-MCNC: 6.62 NG/ML
UROBILINOGEN UR QL STRIP: ABNORMAL
VENTILATOR MODE: ABNORMAL
WBC NRBC COR # BLD: 6.93 10*3/MM3 (ref 3.5–10.8)
WBC UR QL AUTO: ABNORMAL /HPF

## 2019-04-03 PROCEDURE — 84484 ASSAY OF TROPONIN QUANT: CPT | Performed by: NURSE PRACTITIONER

## 2019-04-03 PROCEDURE — 82962 GLUCOSE BLOOD TEST: CPT

## 2019-04-03 PROCEDURE — 85025 COMPLETE CBC W/AUTO DIFF WBC: CPT | Performed by: INTERNAL MEDICINE

## 2019-04-03 PROCEDURE — 87086 URINE CULTURE/COLONY COUNT: CPT | Performed by: NURSE PRACTITIONER

## 2019-04-03 PROCEDURE — 94770: CPT

## 2019-04-03 PROCEDURE — 99232 SBSQ HOSP IP/OBS MODERATE 35: CPT | Performed by: INTERNAL MEDICINE

## 2019-04-03 PROCEDURE — 99222 1ST HOSP IP/OBS MODERATE 55: CPT | Performed by: PHYSICIAN ASSISTANT

## 2019-04-03 PROCEDURE — 87070 CULTURE OTHR SPECIMN AEROBIC: CPT | Performed by: INTERNAL MEDICINE

## 2019-04-03 PROCEDURE — 94640 AIRWAY INHALATION TREATMENT: CPT

## 2019-04-03 PROCEDURE — 84100 ASSAY OF PHOSPHORUS: CPT | Performed by: INTERNAL MEDICINE

## 2019-04-03 PROCEDURE — 81001 URINALYSIS AUTO W/SCOPE: CPT | Performed by: NURSE PRACTITIONER

## 2019-04-03 PROCEDURE — 83735 ASSAY OF MAGNESIUM: CPT | Performed by: INTERNAL MEDICINE

## 2019-04-03 PROCEDURE — 93010 ELECTROCARDIOGRAM REPORT: CPT | Performed by: INTERNAL MEDICINE

## 2019-04-03 PROCEDURE — 87186 SC STD MICRODIL/AGAR DIL: CPT | Performed by: INTERNAL MEDICINE

## 2019-04-03 PROCEDURE — 25010000002 MAGNESIUM SULFATE 2 GM/50ML SOLUTION: Performed by: INTERNAL MEDICINE

## 2019-04-03 PROCEDURE — 93005 ELECTROCARDIOGRAM TRACING: CPT | Performed by: INTERNAL MEDICINE

## 2019-04-03 PROCEDURE — 82805 BLOOD GASES W/O2 SATURATION: CPT

## 2019-04-03 PROCEDURE — 63710000001 INSULIN LISPRO (HUMAN) PER 5 UNITS: Performed by: INTERNAL MEDICINE

## 2019-04-03 PROCEDURE — 84145 PROCALCITONIN (PCT): CPT | Performed by: NURSE PRACTITIONER

## 2019-04-03 PROCEDURE — 99291 CRITICAL CARE FIRST HOUR: CPT | Performed by: INTERNAL MEDICINE

## 2019-04-03 PROCEDURE — 87077 CULTURE AEROBIC IDENTIFY: CPT | Performed by: INTERNAL MEDICINE

## 2019-04-03 PROCEDURE — 94003 VENT MGMT INPAT SUBQ DAY: CPT

## 2019-04-03 PROCEDURE — 36600 WITHDRAWAL OF ARTERIAL BLOOD: CPT

## 2019-04-03 PROCEDURE — 94799 UNLISTED PULMONARY SVC/PX: CPT

## 2019-04-03 PROCEDURE — 25010000002 VANCOMYCIN 10 G RECONSTITUTED SOLUTION

## 2019-04-03 PROCEDURE — 87205 SMEAR GRAM STAIN: CPT | Performed by: INTERNAL MEDICINE

## 2019-04-03 PROCEDURE — 63710000001 INSULIN DETEMIR PER 5 UNITS: Performed by: INTERNAL MEDICINE

## 2019-04-03 PROCEDURE — 80053 COMPREHEN METABOLIC PANEL: CPT | Performed by: INTERNAL MEDICINE

## 2019-04-03 PROCEDURE — 43762 RPLC GTUBE NO REVJ TRC: CPT | Performed by: INTERNAL MEDICINE

## 2019-04-03 PROCEDURE — 25010000002 ENOXAPARIN PER 10 MG: Performed by: INTERNAL MEDICINE

## 2019-04-03 PROCEDURE — 87186 SC STD MICRODIL/AGAR DIL: CPT | Performed by: NURSE PRACTITIONER

## 2019-04-03 PROCEDURE — 87077 CULTURE AEROBIC IDENTIFY: CPT | Performed by: NURSE PRACTITIONER

## 2019-04-03 RX ORDER — MAGNESIUM SULFATE HEPTAHYDRATE 40 MG/ML
4 INJECTION, SOLUTION INTRAVENOUS AS NEEDED
Status: DISCONTINUED | OUTPATIENT
Start: 2019-04-03 | End: 2019-04-15 | Stop reason: HOSPADM

## 2019-04-03 RX ORDER — DEXMEDETOMIDINE HYDROCHLORIDE 4 UG/ML
INJECTION, SOLUTION INTRAVENOUS
Status: COMPLETED
Start: 2019-04-03 | End: 2019-04-03

## 2019-04-03 RX ORDER — DEXMEDETOMIDINE HYDROCHLORIDE 4 UG/ML
.2-1.5 INJECTION, SOLUTION INTRAVENOUS
Status: DISCONTINUED | OUTPATIENT
Start: 2019-04-03 | End: 2019-04-06

## 2019-04-03 RX ORDER — MAGNESIUM SULFATE HEPTAHYDRATE 40 MG/ML
2 INJECTION, SOLUTION INTRAVENOUS AS NEEDED
Status: DISCONTINUED | OUTPATIENT
Start: 2019-04-03 | End: 2019-04-15 | Stop reason: HOSPADM

## 2019-04-03 RX ADMIN — ACETAMINOPHEN 500 MG: 500 TABLET, FILM COATED ORAL at 15:01

## 2019-04-03 RX ADMIN — ROPINIROLE 4 MG: 2 TABLET, FILM COATED ORAL at 21:45

## 2019-04-03 RX ADMIN — CHLORHEXIDINE GLUCONATE 0.12% ORAL RINSE 15 ML: 1.2 LIQUID ORAL at 21:44

## 2019-04-03 RX ADMIN — INSULIN LISPRO 4 UNITS: 100 INJECTION, SOLUTION INTRAVENOUS; SUBCUTANEOUS at 05:11

## 2019-04-03 RX ADMIN — VANCOMYCIN HYDROCHLORIDE 2000 MG: 10 INJECTION, POWDER, LYOPHILIZED, FOR SOLUTION INTRAVENOUS at 11:49

## 2019-04-03 RX ADMIN — SODIUM CHLORIDE 2 G: 9 INJECTION, SOLUTION INTRAVENOUS at 21:45

## 2019-04-03 RX ADMIN — INSULIN DETEMIR 12 UNITS: 100 INJECTION, SOLUTION SUBCUTANEOUS at 10:56

## 2019-04-03 RX ADMIN — INSULIN LISPRO 2 UNITS: 100 INJECTION, SOLUTION INTRAVENOUS; SUBCUTANEOUS at 23:55

## 2019-04-03 RX ADMIN — DEXMEDETOMIDINE HYDROCHLORIDE 1 MCG/KG/HR: 4 INJECTION, SOLUTION INTRAVENOUS at 14:29

## 2019-04-03 RX ADMIN — BUSPIRONE HYDROCHLORIDE 15 MG: 15 TABLET ORAL at 21:45

## 2019-04-03 RX ADMIN — DEXMEDETOMIDINE HYDROCHLORIDE 1 MCG/KG/HR: 4 INJECTION, SOLUTION INTRAVENOUS at 18:39

## 2019-04-03 RX ADMIN — DEXMEDETOMIDINE HYDROCHLORIDE 1 MCG/KG/HR: 4 INJECTION, SOLUTION INTRAVENOUS at 21:45

## 2019-04-03 RX ADMIN — MAGNESIUM SULFATE HEPTAHYDRATE 2 G: 40 INJECTION, SOLUTION INTRAVENOUS at 10:44

## 2019-04-03 RX ADMIN — INSULIN LISPRO 2 UNITS: 100 INJECTION, SOLUTION INTRAVENOUS; SUBCUTANEOUS at 18:22

## 2019-04-03 RX ADMIN — INSULIN LISPRO 2 UNITS: 100 INJECTION, SOLUTION INTRAVENOUS; SUBCUTANEOUS at 13:53

## 2019-04-03 RX ADMIN — IPRATROPIUM BROMIDE AND ALBUTEROL SULFATE 3 ML: 2.5; .5 SOLUTION RESPIRATORY (INHALATION) at 01:23

## 2019-04-03 RX ADMIN — ROPINIROLE 4 MG: 2 TABLET, FILM COATED ORAL at 15:01

## 2019-04-03 RX ADMIN — DEXMEDETOMIDINE HYDROCHLORIDE 1 MCG/KG/HR: 4 INJECTION, SOLUTION INTRAVENOUS at 10:44

## 2019-04-03 RX ADMIN — IPRATROPIUM BROMIDE AND ALBUTEROL SULFATE 3 ML: 2.5; .5 SOLUTION RESPIRATORY (INHALATION) at 12:03

## 2019-04-03 RX ADMIN — SODIUM CHLORIDE 2 G: 9 INJECTION, SOLUTION INTRAVENOUS at 11:49

## 2019-04-03 RX ADMIN — PANTOPRAZOLE SODIUM 40 MG: 40 INJECTION, POWDER, FOR SOLUTION INTRAVENOUS at 09:39

## 2019-04-03 RX ADMIN — BUSPIRONE HYDROCHLORIDE 15 MG: 15 TABLET ORAL at 15:01

## 2019-04-03 RX ADMIN — IPRATROPIUM BROMIDE AND ALBUTEROL SULFATE 3 ML: 2.5; .5 SOLUTION RESPIRATORY (INHALATION) at 19:08

## 2019-04-03 RX ADMIN — ENOXAPARIN SODIUM 40 MG: 100 INJECTION SUBCUTANEOUS at 09:39

## 2019-04-03 RX ADMIN — ACETAMINOPHEN 500 MG: 500 TABLET, FILM COATED ORAL at 21:45

## 2019-04-03 RX ADMIN — CHLORHEXIDINE GLUCONATE 0.12% ORAL RINSE 15 ML: 1.2 LIQUID ORAL at 09:39

## 2019-04-03 RX ADMIN — IPRATROPIUM BROMIDE AND ALBUTEROL SULFATE 3 ML: 2.5; .5 SOLUTION RESPIRATORY (INHALATION) at 06:29

## 2019-04-03 NOTE — PROCEDURES
After informed consent had been obtained and performed the PEG was removed with traction and replaced with a 20 Kosovan GONSALO tube.  Mild bleeding at the site post procedure    Replace GONSALO tube  Q 3 months

## 2019-04-03 NOTE — CONSULTS
[facility] Gastroenterology Consult    Referring Provider: Candelario Padilla MD    PCP: Ray Strickland MD    Reason for Consultation: Malfunctioning PEG tube     Chief complaint: Type II MI, Left shoulder pain     History of present illness:    Nalleyl Junior is a 78 y.o. female who is admitted with type II MI due to hypotension and LVH/aortic stenosis.   She is s/p tracheostomy and PEG.  She initially presented as a transfer from her long term skilled facility for left shoulder pain and concerns of STEMI on outpatient EKG.   However, upon arrival, she was pain free and cardiac catheterization was thus not performed due to her multiple comorbidities.   She was unfortunately acutely hypercapnic requiring change in her tracheostomy and mechanical ventilation as well as bronchoscopy to remove mucous plugging.    GI is consulted due to findings of malodorous and malfunctioning PEG tube.   She has chronic dysphagia and reportedly does not follow the modified diet/NPO status.   She is currently sedated and unable to answer questions.   No family is at bedside.      Allergies:  Penicillins and Sulfa antibiotics    Scheduled Meds:    aspirin 81 mg Oral Daily   aztreonam 2 g Intravenous Q8H   busPIRone 15 mg Oral TID   chlorhexidine 15 mL Mouth/Throat Q12H   enoxaparin 40 mg Subcutaneous Q24H   insulin detemir 12 Units Subcutaneous Daily   insulin lispro 0-9 Units Subcutaneous Q6H   ipratropium-albuterol 3 mL Nebulization Q6H - RT   midodrine 10 mg Oral TID   pantoprazole 40 mg Intravenous Q24H   pharmacy consult - MTM  Does not apply Daily   rOPINIRole 4 mg Oral TID   vancomycin (dosing per levels)  Does not apply Daily   vancomycin 2,000 mg Intravenous Once        Infusions:    dexmedetomidine 0.2-1.5 mcg/kg/hr Last Rate: 1 mcg/kg/hr (04/03/19 1044)   fentanyl 10 mcg/mL  mcg/hr Last Rate: 100 mcg/hr (04/02/19 2305)   norepinephrine 0.02-0.3 mcg/kg/min Last Rate: Stopped (04/02/19 2335)   Pharmacy to dose vancomycin         PRN  Meds:  •  acetaminophen  •  fentaNYL citrate (PF)  •  ipratropium-albuterol  •  magnesium sulfate **OR** magnesium sulfate **OR** magnesium sulfate  •  ondansetron  •  Pharmacy to dose vancomycin  •  sodium chloride    Home Meds:  Medications Prior to Admission   Medication Sig Dispense Refill Last Dose   • acetaminophen (TYLENOL) 500 MG tablet Take 500 mg by mouth Every 4 (Four) Hours As Needed for Mild Pain .      • albuterol (ACCUNEB) 1.25 MG/3ML nebulizer solution Take 1 ampule by nebulization Every 4 (Four) Hours As Needed for Wheezing.      • aspirin 81 MG chewable tablet Chew 81 mg Daily.      • atorvastatin (LIPITOR) 40 MG tablet Take 40 mg by mouth Daily.      • busPIRone (BUSPAR) 15 MG tablet Take 15 mg by mouth 3 (Three) Times a Day.      • docusate sodium (COLACE) 100 MG capsule Take 100 mg by mouth 2 (Two) Times a Day.      • enoxaparin (LOVENOX) 40 MG/0.4ML solution syringe Inject 40 mg under the skin into the appropriate area as directed Daily.      • ferrous sulfate 325 (65 FE) MG tablet Take 325 mg by mouth Daily With Breakfast.      • furosemide (LASIX) 40 MG tablet Take 20 mg by mouth 2 (Two) Times a Day.      • hydrocortisone (ANUSOL-HC) 2.5 % rectal cream Insert  into the rectum 2 (Two) Times a Day.      • insulin glargine (LANTUS) 100 UNIT/ML injection Inject 25 Units under the skin into the appropriate area as directed Every Night.  12    • insulin lispro (humaLOG) 100 UNIT/ML injection Inject 0-7 Units under the skin into the appropriate area as directed Every 6 (Six) Hours.  12    • ipratropium-albuterol (DUO-NEB) 0.5-2.5 mg/3 ml nebulizer Take 3 mL by nebulization 4 (Four) Times a Day As Needed for Wheezing.      • midodrine (PROAMATINE) 5 MG tablet Take 10 mg by mouth 3 (Three) Times a Day.      • ondansetron (ZOFRAN) 4 MG tablet Take 4 mg by mouth Every 6 (Six) Hours As Needed for Nausea or Vomiting.      • pantoprazole (PROTONIX) 40 MG EC tablet Take 40 mg by mouth Daily.      •  "rOPINIRole (REQUIP) 4 MG tablet Take 4 mg by mouth 3 (Three) Times a Day.      • sodium bicarbonate 325 MG tablet Take 325 mg by mouth 4 (Four) Times a Day.          ROS: Review of Systems   Unable to perform ROS: Intubated       PAST MED HX:  Past Medical History:   Diagnosis Date   • Anemia    • Anxiety    • Aortic stenosis    • CHF (congestive heart failure) (CMS/HCC)    • Chronic respiratory failure with hypoxia and hypercapnia (CMS/[facility])    • CKD (chronic kidney disease), stage III (CMS/HCC)    • Constipation    • COPD (chronic obstructive pulmonary disease) (CMS/HCC)    • Coronary artery disease    • Dementia    • Depression    • Diabetes mellitus (CMS/HCC)    • Dysphagia    • Femur fracture, right (CMS/[facility])    • GERD (gastroesophageal reflux disease)    • Hyperlipidemia    • Hypertension    • Hypotension     on midodrine   • Restless legs syndrome (RLS)        PAST SURG HX:  Past Surgical History:   Procedure Laterality Date   • FOREARM SURGERY     • LEG SURGERY     • PEG TUBE INSERTION     • TRACHEOSTOMY         FAM HX:  Family History   Family history unknown: Yes       SOC HX:  Social History     Socioeconomic History   • Marital status:      Spouse name: Not on file   • Number of children: Not on file   • Years of education: Not on file   • Highest education level: Not on file   Tobacco Use   • Smoking status: Former Smoker     Years: 25.00   • Smokeless tobacco: Never Used   Substance and Sexual Activity   • Alcohol use: No   • Drug use: No   • Sexual activity: Defer       PHYSICAL EXAM  /63   Pulse 71   Temp 97.3 °F (36.3 °C) (Axillary)   Resp 16   Ht 162.6 cm (64\")   Wt 90.7 kg (200 lb)   SpO2 97%   BMI 34.33 kg/m²   Wt Readings from Last 3 Encounters:   04/03/19 90.7 kg (200 lb)   10/17/18 75.3 kg (166 lb 1.6 oz)   ,body mass index is 34.33 kg/m².  Physical Exam   Constitutional: She is sedated and intubated.   HENT:   Head: Normocephalic and atraumatic.   Cardiovascular: Normal " rate and regular rhythm.   Pulmonary/Chest: She is intubated. No respiratory distress.   Decreased breath sounds at bases    Abdominal: Soft. There is no tenderness.   Obese.   PEG tube at left mid upper quadrant filled with dark debris   Musculoskeletal: She exhibits no edema.   Neurological:   Patient is sedated, does not follow commands    Psychiatric:   Unable to assess    Nursing note and vitals reviewed.      Results Review:   I reviewed the patient's new clinical results.    Lab Results   Component Value Date    WBC 6.93 04/03/2019    HGB 13.1 04/03/2019    HGB 13.3 04/02/2019    HGB 11.0 (L) 10/17/2018    HCT 44.3 (H) 04/03/2019    .1 (H) 04/03/2019     04/03/2019       No results found for: INR    Lab Results   Component Value Date    GLUCOSE 271 (H) 04/03/2019    BUN 25 (H) 04/03/2019    CREATININE 1.05 04/03/2019    EGFRIFNONA 51 (L) 04/03/2019    BCR 23.8 04/03/2019    CO2 35.0 (H) 04/03/2019    CALCIUM 8.8 04/03/2019    ALBUMIN 3.70 04/03/2019    ALKPHOS 139 (H) 04/03/2019    BILITOT 1.0 04/03/2019     (H) 04/03/2019     (H) 04/03/2019       ASSESSMENTS/PLANS    1. Malfunctioning PEG tube  2. Feeding difficulty  3. Acute on chronic respiratory failure, s/p tracheostomy, now on mechanical ventilator  4. Type II MI  5. Severe aortic stenosis   6. Elevated LFTs, likely shock liver     >>> Will replace gatrostomy tube at bedside today    I discussed the patients findings and my recommendations with patient, her SANDY Zazueta via telephone and primary RN.    ERWIN Phelan  04/03/19  12:29 PM

## 2019-04-03 NOTE — PROCEDURES
"Insert Central Line At Bedside  Date/Time: 4/2/2019 9:00 PM  Performed by: Fariba Navas APRN  Authorized by: Fariba Navas APRN   Consent: Verbal consent obtained.  Risks and benefits: risks, benefits and alternatives were discussed  Consent given by: Carlita Knapp next of kin   Patient states understanding of procedure being performed: Via sister   Patient's understanding of procedure matches consent: Via sister   Patient identity confirmed: arm band and provided demographic data  Time out: Immediately prior to procedure a \"time out\" was called to verify the correct patient, procedure, equipment, support staff and site/side marked as required.  Indications: vascular access and central pressure monitoring  Anesthesia: local infiltration    Anesthesia:  Local Anesthetic: lidocaine 1% without epinephrine  Anesthetic total: 5 mL  Preparation: skin prepped with ChloraPrep (X2)  Skin prep agent dried: skin prep agent completely dried prior to procedure  Sterile barriers: all five maximum sterile barriers used - cap, mask, sterile gown, sterile gloves, and large sterile sheet  Hand hygiene: hand hygiene performed prior to central venous catheter insertion  Location details: right internal jugular  Patient position: flat  Catheter type: triple lumen  Catheter size: 7 Fr  Pre-procedure: landmarks identified  Ultrasound guidance: yes  Sterile ultrasound techniques: sterile gel and sterile probe covers were used  Number of attempts: 1  Successful placement: yes  Post-procedure: line sutured and dressing applied  Assessment: blood return through all ports,  free fluid flow,  placement verified by x-ray and no pneumothorax on x-ray  Patient tolerance: Patient tolerated the procedure well with no immediate complications  Comments: Inserted to 16cm. Dark red non-pulsatile blood aspirated from all 3 ports. Guidewire placement confirmed intra-procedure with ultrasound. Sutures X3. Sterile dressing applied.     Fariba DE JESUS" ARELI Navas, [facility]   Pulmonary and Critical Care

## 2019-04-03 NOTE — PROGRESS NOTES
"Nutrition Services    Patient Name:  Nallely Junior  YOB: 1940  MRN: [id number]  Admit Date:  4/2/2019                      Clinical Nutrition     Nutrition Assessment  Reason for Visit:   JARRETT, CHELO      Patient Name: Nallely Junior  YOB: 1940  MRN: [id number]  Date of Encounter: 04/03/19 11:55 AM  Admission date: 4/2/2019      Comments: When ready to feed suggest Replete w Fiber 25 ml/hr advance w tolerance by 25 ml/hr ev 8 hr to goal 70 ml/hr Give Water at 10 ml/hr.    Nutrition Assessment     Hospital Problem List    Inferior STEMI     Type 2 diabetes mellitus     Hypotension    Hyperlipidemia    COPD    Dysphagia with chronic PEG     Coronary artery disease    GERD     Acute on chronic respiratory failure with hypoxia     Aortic valve stenosis    Stage 3 CKD    Former smoker    Debility    Dementia    Class 1 obesity in adult    Atelectasis of left lung    Bronchomalacia  Note: no MI            Resp F on vent (4/3)    PMH: She  has a past medical history of Anemia, Anxiety, Aortic stenosis, CHF (congestive heart failure) (CMS/HCC), Chronic respiratory failure with hypoxia and hypercapnia (CMS/HCC), CKD (chronic kidney disease), stage III (CMS/HCC), Constipation, COPD (chronic obstructive pulmonary disease) (CMS/HCC), Coronary artery disease, Dementia, Depression, Diabetes mellitus (CMS/HCC), Dysphagia, Femur fracture, right (CMS/HCC), GERD (gastroesophageal reflux disease), Hyperlipidemia, Hypertension, Hypotension, and Restless legs syndrome (RLS).   PSxH: She  has a past surgical history that includes Tracheostomy tube placement; Peg Tube Insertion; Leg Surgery; and Forearm surgery.     Other: Note hx of refusing TF and of non-compliance w Dys IV diet nectar thick liq              Hx MRSA, PNA    Reported/Observed/Food/Nutrition Related History:     GI to determine need to replace current PEG. Note pt is full code.     Anthropometrics     Height: 162.6 cm (64\")  Last filed wt: " Weight: 90.7 kg (200 lb) (04/03/19 0714)  Weight Method: Estimated    BMI: BMI (Calculated): 34.3  Obese Class I: 30-34.9kg/m2    Ideal Body Weight (IBW) (kg): 55    Weight Change   UBW: note from NH in Oct, 177 lb and here in Oct bed wt 166lb      Labs reviewed     Results from last 7 days   Lab Units 04/03/19 0417 04/02/19 2018   GLUCOSE mg/dL 271* 312*   BUN mg/dL 25* 23   CREATININE mg/dL 1.05 1.19   SODIUM mmol/L 145 143   CHLORIDE mmol/L 104 103   POTASSIUM mmol/L 4.7 4.4   PHOSPHORUS mg/dL 3.1 6.1*   MAGNESIUM mg/dL 1.9 1.9   ALT (SGPT) U/L 488* 337*     Results from last 7 days   Lab Units 04/03/19 0417 04/02/19 2018   ALBUMIN g/dL 3.70 3.49   CHOLESTEROL mg/dL  --  86   TRIGLYCERIDES mg/dL  --  148         Results from last 7 days   Lab Units 04/03/19  1108 04/03/19  0446 04/02/19  2334 04/02/19  1927   GLUCOSE mg/dL 171* 227* 364* 295*     Lab Results   Lab Value Date/Time    HGBA1C 6.70 (H) 04/02/2019 2018    HGBA1C 8.90 (H) 10/06/2018 2031         Medications reviewed   Pertinent: insulin, protonix Abx      Applicable medical tests/Procedures since admission: bronch    Intake/Ouptut 24 hrs (7:00AM - 6:59 AM)     Intake & Output (last day)       04/02 0701 - 04/03 0700 04/03 0701 - 04/04 0700    I.V. (mL/kg) 25 (0.3)     Total Intake(mL/kg) 25 (0.3)     Urine (mL/kg/hr) 400     Total Output 400     Net -375           Urine Unmeasured Occurrence 2 x           Needs Assessment   Height used: 162.6 cm  Weight used: 90.7 lbs current wt, 54.5 Kg IBW    Estimated Calories needs: 1400 Kcal/da based on PSU 1397, MSJ 1380     Estimated Protein needs: 82 g PRO/da based on IBW Kg x 1.5    Current Nutrition Prescription   PO: NPO Diet     Rec for EN: Replete w Fiber 25 ml/hr advance w tolerance by 25 ml/hr ev 8 hr to goal 70 ml/hr Give Water at 10 ml/hr.      At Goal will deliver: 1400 Kcal = 100% est need, 90 g PRO = 110% est need, 21 g Fiber , 1162 ml H20 in TF, 1362 total ml Free Water.     Nutrition Diagnosis        Problem Inadequate oral intake   Etiology On Vent   Signs/Symptoms NPO     Goal:   General: Nutrition support treatment  EN: Initiate EN      Nutrition Intervention    Follow treatment progress, Care plan reviewed, EN rec prepared      Monitoring/Evaluation:   I&O, Pertinent labs, EN delivery/tolerance, Weight, Symptoms      Will Continue to follow per protocol      Brittani Kendrick RD  Time Spent: 60 min        Electronically signed by:  Brittani Kendrick RD  04/03/19 11:55 AM

## 2019-04-03 NOTE — PROGRESS NOTES
INTENSIVIST   PROGRESS NOTE     Hospital:  LOS: 1 day     Ms. Nallely Junior, 78 y.o. female is followed for a Chief Complaint of: Acute/chronic respiratory failure      Subjective   S   Nallely Junior is a 78 y.o. chronically debilitated female brought to [facility] from [facility] on 4/2 for reported inferior STEMI after experiencing left shoulder discomfort at her nursing facility. EMS reports that she chronically is hypotensive on midodrine currently on norepinephrine infusion and was hypoxic of 70% saturations on arrival. At OSH EKG read inferior STEMI with RBBB. Pertinent labs included BNP 2,819, H&H 13.6/45.6, and troponin <0.3 . ABG pH 7.281, CO2 83.8, pO2 42, HCO3 38.6, and BE 8.6. CXR showed right basilar and left perihilar atelectasis with left lung base opacification. She was not administered ASA and Plavix despite presence of PEG tube, placed on Heparin gtt, and transferred to [facility].      She was taken emergently to the cath lab per Dr. Padilla and noted to be pain-free at that time. Due to pain resolution accompanied by significant baseline co-morbid illnesses she was deemed not a candidate for aggressive/invasive measures and left heart cath was aborted. Preliminary echocardiogram revealed EF 70-75% with moderate-severe aortic stenosis minimally changed from October of 2018 reading.     On ICU arrival she appearsed lethargic and remained hypotensive with BP 70s despite norepinephrine infusion. An ABG pH 7.1, CO2 116, pO2 127, and HCO3 36 on unit arrival, will we place her on mechanical ventilation at this time.       Interval History:  ABG improved this AM. She wakes up but does not follow commands. Nursing reports that her PEG tube is black and appears moldy. It came without a clamp and has a pair of dialysis hemostats clamping it now.     She has a history of dysphagia and has refused tube feeds in the past. She does not follow the modified diet set out for her.        The patient's relevant past  medical, surgical and social history were reviewed and updated in Epic as appropriate.      ROS: Unable to obtain secondary to mental status.     Objective   O     Vitals:  Temp  Min: 97.3 °F (36.3 °C)  Max: 98.6 °F (37 °C)  BP  Min: 80/46  Max: 161/68  Pulse  Min: 67  Max: 104  Resp  Min: 16  Max: 18  SpO2  Min: 94 %  Max: 100 % Flow (L/min)  Min: 40  Max: 80    Intake/Ouptut 24 hrs (7:00AM - 6:59 AM)  Intake & Output (last 3 days)       03/31 0701 - 04/01 0700 04/01 0701 - 04/02 0700 04/02 0701 - 04/03 0700 04/03 0701 - 04/04 0700    I.V. (mL/kg)   25 (0.3)     Total Intake(mL/kg)   25 (0.3)     Urine (mL/kg/hr)   400     Total Output   400     Net   -375             Urine Unmeasured Occurrence   2 x           Medications (drips):    fentanyl 10 mcg/mL Last Rate: 100 mcg/hr (04/02/19 2305)   norepinephrine Last Rate: Stopped (04/02/19 2335)       Mechanical Ventilator Settings:       Vt (Set, L): 0.4 L  Resp Rate (Set): 16  Pressure Support (cm H2O): (S) 12 cm H20  FiO2 (%): 40 %  PEEP/CPAP (cm H2O): 5 cm H20    Minute Ventilation (L/min) (Obs): 4.55 L/min  Resp Rate (Observed) Vent: 11  I:E Ratio (Set): 1:3.87  I:E Ratio (Obs): 1:1.90    PIP Observed (cm H2O): 17 cm H2O  Plateau Pressure (cm H2O): 44 cm H2O    Physical Examination  Telemetry:  Normal sinus rhythm.    Constitutional:  No acute distress.  On mechanical ventilation.    Cardiovascular: Normal rate, regular and rhythm. Normal heart sounds.  No murmurs, gallop or rub.   Respiratory: No respiratory distress. Normal respiratory effort.  Normal breath sounds  Diminished at the bases    Abdominal:  Soft. No masses. Non-tender. No distension. No HSM.   Extremities: No digital cyanosis. No clubbing.  No peripheral edema.   Neurological:   Awakens to stimulation.  Does not follow commands.             Results from last 7 days   Lab Units 04/03/19  0417 04/02/19 2018   WBC 10*3/mm3 6.93 11.71*   HEMOGLOBIN g/dL 13.1 13.3   MCV fL 102.1* 104.8*   PLATELETS  10*3/mm3 175 221     Results from last 7 days   Lab Units 04/03/19 0417 04/02/19 2018   SODIUM mmol/L 145 143   POTASSIUM mmol/L 4.7 4.4   CO2 mmol/L 35.0* 34.0*   CREATININE mg/dL 1.05 1.19   GLUCOSE mg/dL 271* 312*   MAGNESIUM mg/dL 1.9 1.9   PHOSPHORUS mg/dL 3.1 6.1*     Estimated Creatinine Clearance: 48.2 mL/min (by C-G formula based on SCr of 1.05 mg/dL).  Results from last 7 days   Lab Units 04/03/19 0417 04/02/19 2018   ALK PHOS U/L 139* 124*   BILIRUBIN mg/dL 1.0 1.3*   ALT (SGPT) U/L 488* 337*   AST (SGOT) U/L 593* 588*       Results from last 7 days   Lab Units 04/03/19  0407 04/02/19 2141 04/02/19 2008   PH, ARTERIAL pH units 7.495* 7.355 7.104*   PCO2, ARTERIAL mm Hg 47.5* 58.4* 116.0*   PO2 ART mm Hg 132.0* 201.0* 127.0*   FIO2 % 65 85 100       Images:  Imaging Results (last 24 hours)     Procedure Component Value Units Date/Time    XR Abdomen KUB [[id number]] Collected:  04/03/19 0016     Updated:  04/03/19 0018    Narrative:       CR Abdomen 1 Vw    INDICATION:   Verify gastrostomy tube placement    COMPARISON:   10/18/2018    FINDINGS:  AP radiographs of the abdomen. The renal shadows are symmetric. No renal calculi. No bladder calculi.    The bowel gas pattern is nonobstructive. No acute osseous abnormalities. A gastrostomy tube is seen projecting over the left upper quadrant and appears to be in good position in the stomach.      Impression:       Testosterone tube appears in satisfactory position.    Signer Name: Sundar Schofield MD   Signed: 4/3/2019 12:16 AM   Workstation Name: [id number]       XR Chest 1 View [[id number]] Collected:  04/02/19 2205     Updated:  04/02/19 2208    Narrative:       CR Chest 1 Vw    INDICATION:   TL D L placement.     COMPARISON:    Chest x-ray 4/2/2019    FINDINGS:  Single portable AP view of the chest.    There is a tracheostomy unchanged. There is a new right IJ approach catheter which appears to terminate at proximal right atrial level. There is no  pneumothorax. Patient is rotated and there is mild cardiac silhouette enlargement. There is small to  moderate left pleural effusion with left base airspace disease. Left lung aeration is improving. Atherosclerotic vascular calcifications are prominent at the knob. There is mild vascular congestion and interstitial edema otherwise mildly worsened.          Impression:       Interval placement of a right IJ approach central venous catheter terminating proximal right atrial level. No pneumothorax. Tracheostomy unchanged.  2. Mild worsening of volume status.  3. Overall improvement in left lung aeration. Continued airspace disease left lung base with small to moderate pleural effusion.    Signer Name: Liza Gabriel MD   Signed: 4/2/2019 10:05 PM   Workstation Name: [id number]       XR Chest 1 View [[id number]] Collected:  04/02/19 2016     Updated:  04/02/19 2018    Narrative:       CR Chest 1 Vw    INDICATION:   COPD     COMPARISON:    None can be retrieved for comparison    FINDINGS:  Single portable AP view of the chest.    There is a tracheostomy appliance in place.    Is mild interstitial prominence, but the right lung is otherwise normally aerated.    There is a moderate left effusion, and near complete left lung collapse.    No pneumothorax.      Impression:       Near-complete opacification left hemithorax. No older exams can be retrieved for comparison.    Signer Name: Christiano Rodriguez MD   Signed: 4/2/2019 8:16 PM   Workstation Name: [id number]               Results: Reviewed.  I reviewed the patient's new laboratory and imaging results.  I independently reviewed the patient's new images.    Medications: Reviewed.    Assessment/Plan   A / P     Ms. Junior is a 77yo F with a history of chronic tracheostomy, dysphagia s/p PEG tube placement and noncompliance with a modified diet who presented as a transfer from [facility] for a possible STEMI. She was evaluated by Cardiology who did not feel she was  having a STEMI. No LHC was performed as she would not be a candidate for intervention based on her chronic debilitated state. She was admitted to the ICU for medical management. She was found to be profoundly hypercapneic and was placed on mechanical ventilation. Her ABG has improved this AM. She now awakens but is unable to follow commands.     Nutrition:   NPO Diet  Advance Directives:   Code Status and Medical Interventions:   Ordered at: 04/02/19 2015     Code Status:    CPR     Medical Interventions (Level of Support Prior to Arrest):    Full       Active Hospital Problems    Diagnosis   • **Inferior STEMI    • Aortic valve stenosis     ECHO 10/2018:  ·  Left ventricular systolic function is hyperdynamic (EF > 70).  · Left ventricular wall thickness is consistent with moderate concentric hypertrophy.  · Left ventricular diastolic dysfunction (grade I) consistent with impaired relaxation.  · Moderate to severe aortic stenosis (mean gradient 39 mmHg) is present.  · Mild mitral valve regurgitation is present     • Stage 3 CKD   • Former smoker   • Debility   • Dementia   • Class 1 obesity in adult   • Atelectasis of left lung   • Bronchomalacia   • Hypotension     on midodrine     • Hyperlipidemia   • COPD   • Coronary artery disease   • GERD    • Acute on chronic respiratory failure with hypoxia      Chronic trach      • Dysphagia with chronic PEG    • Type 2 diabetes mellitus        Assessment / Plan:    1. Start Antibiotics to cover MRSA and Pseudomonas for possible pneumonia  2. Obtain sputum culture.   3. GI consult to evaluate if PEG tube needs to be changed.   4. Place on pressure support. Leave on PST as tolerated. Follow etCO2.   5. Start Levemir 12 units daily. Will need to increase if feeding is initiated.   6. Replace electrolytes  7. Continue to monitor renal function  8. Cardiology following for NSTEMI.   9. AM labs and CXR    Plan of care and goals reviewed during interdisciplinary rounds.  I  discussed the patient's findings and my recommendations with nursing staff    Critical Care Time: was greater than 35 minutes.(This excludes time spent performing separately reportable procedures and services). including high complexity decision making to assess, manipulate, and support vital organ system failure in this individual who has impairment of one or more vital organ systems such that there is a high probability of imminent or life threatening deterioration in the patient’s condition.      Alva Case, DO    Intensive Care Medicine and Pulmonary Medicine

## 2019-04-03 NOTE — PROGRESS NOTES
"  [facility] at [facility]   Inpatient Progress Note       LOS: 1 day   Patient Care Team:  Ray Strickland MD as PCP - General (Internal Medicine)    Chief Complaint:  Follow-up for possible MI and hypotension    Subjective     Interval History:   Patient not oriented. Does not nod appropriately to yes/no questions. Currently on mechanical ventilation.  Talk with patient's niece.      Review of Systems:   Pertinent positives noted in history, exam, and assessment. Otherwise reviewed and negative.      Objective     Vitals:  Blood pressure 148/63, pulse 72, temperature 97.3 °F (36.3 °C), temperature source Axillary, resp. rate 16, height 162.6 cm (64\"), weight 90.7 kg (200 lb), SpO2 98 %.     Intake/Output Summary (Last 24 hours) at 4/3/2019 0912  Last data filed at 4/3/2019 0600  Gross per 24 hour   Intake 25 ml   Output 400 ml   Net -375 ml     Physical Exam   Constitutional: She appears well-developed and well-nourished. No distress.   Patient restrained, on MV   Neck: No JVD present. No tracheal deviation present.   Cardiovascular: Normal rate, regular rhythm and intact distal pulses. Exam reveals no friction rub.   Murmur (3/6 MOODY) heard.  Pulmonary/Chest: No respiratory distress.   Decreased bases, trach in place and on mechanical ventilation   Abdominal: Soft. Bowel sounds are normal. There is no tenderness.   Musculoskeletal: She exhibits edema (trace le edema). She exhibits no deformity.   Neurological: She is alert.   Awake but not oriented   Skin: Skin is warm and dry.     I have examined the patient and agree with the above       Results Review:     I reviewed the patient's new clinical results.    Results from last 7 days   Lab Units 04/03/19  0417   WBC 10*3/mm3 6.93   HEMOGLOBIN g/dL 13.1   HEMATOCRIT % 44.3*   PLATELETS 10*3/mm3 175     Results from last 7 days   Lab Units 04/03/19  0417   SODIUM mmol/L 145   POTASSIUM mmol/L 4.7   CHLORIDE mmol/L 104   CO2 mmol/L 35.0*   BUN mg/dL 25* "   CREATININE mg/dL 1.05   CALCIUM mg/dL 8.8   BILIRUBIN mg/dL 1.0   ALK PHOS U/L 139*   ALT (SGPT) U/L 488*   AST (SGOT) U/L 593*   GLUCOSE mg/dL 271*     Results from last 7 days   Lab Units 04/03/19  0417   SODIUM mmol/L 145   POTASSIUM mmol/L 4.7   CHLORIDE mmol/L 104   CO2 mmol/L 35.0*   BUN mg/dL 25*   CREATININE mg/dL 1.05   GLUCOSE mg/dL 271*   CALCIUM mg/dL 8.8         Lab Results   Lab Value Date/Time    TROPONINI 0.370 (H) 04/02/2019 2018     Results from last 7 days   Lab Units 04/02/19 2018   TSH mIU/mL 0.942   FREE T4 ng/dL 1.03     Results from last 7 days   Lab Units 04/02/19 2018   CHOLESTEROL mg/dL 86   TRIGLYCERIDES mg/dL 148   HDL CHOL mg/dL 24*   LDL CHOL mg/dL 43             Tele:  SR    Assessment/Plan       Inferior STEMI     Type 2 diabetes mellitus     Hypotension    Hyperlipidemia    COPD    Dysphagia with chronic PEG     Coronary artery disease    GERD     Acute on chronic respiratory failure with hypoxia     Aortic valve stenosis    Stage 3 CKD    Former smoker    Debility    Dementia    Class 1 obesity in adult    Atelectasis of left lung    Bronchomalacia      1. Type II MI due to hypotension and LVH/aortic stenosis  2. Hypotensive with possible shock.  1. Some chronic hypotension per report from EMS but worse this admission.  3. Aortic stenosis,   1. Severe, normal LV function  4. Chronic hypoxic respiratory failure with chronic tracheostomy.  5. General frailty/severe debilitation  6. Reported history of coronary artery disease.    1. Unable to obtain accurate history from patient secondary to limitations from tracheostomy and lethargy.  7. History of MRSA  8. Bronchomalacia s/p bronchoscopy last evening.  9. Elevated LFT's  10. Dysphagia with chronic PEG  11. Dementia      Plan:  · LFT's significantly elevated will hold statin at this time.  · Check UA secondary to hypotension on admission and infectious history  · Follow-up troponin.  · Patient with significant aortic stenosis,  and the likely etiology of her elevated troponin is her left ear/LVH, and hypotension hypoxia (a type II MI).  Unfortunate she is not a candidate for any intervention on her valve.  · Once her power of  arrives, would recommend palliative care/hospice    Electronically signed by ARELI Grimes, 04/03/19, 9:12 AM.  I, Candelario Padilla MD, personally performed the services described in this documentation as scribed by the above individual in my presence, and it is both accurate and complete    Dictated utilizing Dragon dictation

## 2019-04-03 NOTE — PLAN OF CARE
Problem: Patient Care Overview  Goal: Plan of Care Review  Outcome: Ongoing (interventions implemented as appropriate)   04/03/19 5405   Coping/Psychosocial   Plan of Care Reviewed With patient;other (see comments)  (Jessica)   Plan of Care Review   Progress no change   OTHER   Outcome Summary WOC nurse consulted to assess coccyx. Pt has 2.4 X 1.5 X 0 cm red, non-blanchable area left coccyx - appears to be Stage 1 pressure injury (POA); remaining buttocks skin is blanchable; cleaned with blue skin wipes; Z-guard applied; covered with foam sacral dressing. Pt on Isolibrium bed; black heel boots in place. Skin interventions in place. WOC nurse will f/u. Please contact WOC nurse as needed for concerns.

## 2019-04-03 NOTE — PLAN OF CARE
Problem: Patient Care Overview  Goal: Plan of Care Review  Outcome: Ongoing (interventions implemented as appropriate)   04/03/19 0431   Coping/Psychosocial   Plan of Care Reviewed With patient;family   Plan of Care Review   Progress no change   OTHER   Outcome Summary pt. arrived on floor hypotensive and hypoxic. over the course of the night both have resolved. levo gtt has been off since ~MN and fent gtt started ~MN. ABG's have significantly improved since being placed on the vent. Pt. Sister misbah choudhury has been regularly updated on her POC. Will continue to monitor.       Problem: Restraint, Nonbehavioral (Nonviolent)  Goal: Rationale and Justification  Outcome: Ongoing (interventions implemented as appropriate)    Goal: Nonbehavioral (Nonviolent) Restraint: Absence of Injury/Harm  Outcome: Ongoing (interventions implemented as appropriate)    Goal: Nonbehavioral (Nonviolent) Restraint: Achievement of Discontinuation Criteria  Outcome: Ongoing (interventions implemented as appropriate)    Goal: Nonbehavioral (Nonviolent) Restraint: Preservation of Dignity and Wellbeing  Outcome: Ongoing (interventions implemented as appropriate)      Problem: Skin Injury Risk (Adult)  Goal: Identify Related Risk Factors and Signs and Symptoms  Outcome: Ongoing (interventions implemented as appropriate)    Goal: Skin Health and Integrity  Outcome: Ongoing (interventions implemented as appropriate)      Problem: Ventilation, Mechanical Invasive (Adult)  Goal: Signs and Symptoms of Listed Potential Problems Will be Absent, Minimized or Managed (Ventilation, Mechanical Invasive)  Outcome: Ongoing (interventions implemented as appropriate)      Problem: Cardiac: ACS (Acute Coronary Syndrome) (Adult)  Goal: Signs and Symptoms of Listed Potential Problems Will be Absent, Minimized or Managed (Cardiac: ACS)  Outcome: Ongoing (interventions implemented as appropriate)

## 2019-04-03 NOTE — PROGRESS NOTES
"Pharmacy Consult-Vancomycin Dosing  Nallely Junior is a  78 y.o. female receiving vancomycin therapy.     Indication: Pneumonia  Consulting Provider: Intensivist  ID Consult: No    Goal Trough: 15-20 mcg/mL    Current Antimicrobial Therapy  Vancomycin, PTD (4/3): Day 1  Aztreonam 2 g q8h (4/3): Day 1      Allergies  Allergies as of 04/02/2019 - Reviewed 04/02/2019   Allergen Reaction Noted   • Penicillins Unknown (See Comments) 10/06/2018   • Sulfa antibiotics Unknown (See Comments) 10/06/2018       Labs    Results from last 7 days   Lab Units 04/03/19  0417 04/02/19 2018   BUN mg/dL 25* 23   CREATININE mg/dL 1.05 1.19       Results from last 7 days   Lab Units 04/03/19  0417 04/02/19 2018   WBC 10*3/mm3 6.93 11.71*       Evaluation of Dosing     Last Dose Received in the ED/Outside Facility: No documented antibiotic administration in records from [facility]  Is Patient on Dialysis or Renal Replacement: No    Ht - 162.6 cm (64\")  Wt - 90.7 kg (200 lb)    Estimated Creatinine Clearance: 48.2 mL/min (by C-G formula based on SCr of 1.05 mg/dL).    Intake & Output (last 3 days)         03/31 0701 - 04/01 0700 04/01 0701 - 04/02 0700 04/02 0701 - 04/03 0700 04/03 0701 - 04/04 0700    I.V. (mL/kg)   25 (0.3)     Total Intake(mL/kg)   25 (0.3)     Urine (mL/kg/hr)   400     Total Output   400     Net   -375             Urine Unmeasured Occurrence   2 x             Microbiology and Radiology  Microbiology Results (last 10 days)       ** No results found for the last 240 hours. **            Evaluation of Level    Lab Results   Component Value Date    [facility] 15.30 10/15/2018       Assessment/Plan:  1. Pharmacy to dose vancomycin for treatment of pneumonia. Patient has history of MRSA pneumonia in October 2018.  2. Patient is practically bed-bound and currently has an elevated SCr of 1.05, increased from previous admission here in October 2018 (~0.5 mg/dL). Based on the patient's renal function and clinical " status, a one-time loading dose of vancomycin 2000 mg IV (22 mg/kg) was ordered with subsequent dosing per levels.   3. Vancomycin random level scheduled for 4/4 with AM labs.  4. Pharmacy will continue to monitor and adjust vancomycin dose as necessary based on renal function, cultures, labs, and clinical status.    Luciano Guthrie, PharmD  Pharmacy Resident  4/3/2019  11:06 AM

## 2019-04-03 NOTE — PROCEDURES
"Bronchoscopy  Date/Time: 4/2/2019 10:16 PM  Performed by: Milena Childs MD  Authorized by: Milena Childs MD   Consent: Verbal consent obtained.  Risks and benefits: risks, benefits and alternatives were discussed  Consent given by: daughter.  Relevant documents: relevant documents present and verified  Imaging studies: imaging studies available  Patient identity confirmed: arm band  Time out: Immediately prior to procedure a \"time out\" was called to verify the correct patient, procedure, equipment, support staff and site/side marked as required.  Local anesthesia used: no    Anesthesia:  Local anesthesia used: no    Sedation:  Patient sedated: yes  Sedatives: fentanyl  Vitals: Vital signs were monitored during sedation.    Patient tolerance: Patient tolerated the procedure well with no immediate complications        Chest x-ray revealed atelectasis of the left lung.  She has a previous history of requiring bronchoscopy for mucous plugging.  Consent obtained from her daughter.  She received a total of 100 mcg of fentanyl.  Adapter placed on her tracheostomy.  Small bronchoscope used because her tracheostomy size was only a # 6 Shiley.  Trachea with mild erythema.  Jacquelyn sharp.  No large mucous plugs identified.  Bronchomalacia with collapse of the airways on expiration.  Lavage of the left lower lobe revealed scant clear mucoid secretions.  No large airway mucous plugs identified.      "

## 2019-04-04 ENCOUNTER — APPOINTMENT (OUTPATIENT)
Dept: GENERAL RADIOLOGY | Facility: HOSPITAL | Age: 79
End: 2019-04-04

## 2019-04-04 LAB
ALBUMIN SERPL-MCNC: 3.37 G/DL (ref 3.2–4.8)
ALBUMIN/GLOB SERPL: 1.3 G/DL (ref 1.5–2.5)
ALP SERPL-CCNC: 113 U/L (ref 25–100)
ALT SERPL W P-5'-P-CCNC: 269 U/L (ref 7–40)
ANION GAP SERPL CALCULATED.3IONS-SCNC: 8 MMOL/L (ref 3–11)
ARTERIAL PATENCY WRIST A: ABNORMAL
AST SERPL-CCNC: 169 U/L (ref 0–33)
ATMOSPHERIC PRESS: ABNORMAL MMHG
BASE EXCESS BLDA CALC-SCNC: 6.4 MMOL/L (ref 0–2)
BDY SITE: ABNORMAL
BILIRUB SERPL-MCNC: 1.6 MG/DL (ref 0.3–1.2)
BODY TEMPERATURE: 37 C
BUN BLD-MCNC: 18 MG/DL (ref 9–23)
BUN/CREAT SERPL: 26.9 (ref 7–25)
CALCIUM SPEC-SCNC: 8.3 MG/DL (ref 8.7–10.4)
CHLORIDE SERPL-SCNC: 104 MMOL/L (ref 99–109)
CO2 BLDA-SCNC: 31.1 MMOL/L (ref 23–27)
CO2 SERPL-SCNC: 27 MMOL/L (ref 20–31)
COHGB MFR BLD: 1 % (ref 0–2)
CREAT BLD-MCNC: 0.67 MG/DL (ref 0.6–1.3)
DEPRECATED RDW RBC AUTO: 56.8 FL (ref 37–54)
ERYTHROCYTE [DISTWIDTH] IN BLOOD BY AUTOMATED COUNT: 16.2 % (ref 11.3–14.5)
GFR SERPL CREATININE-BSD FRML MDRD: 85 ML/MIN/1.73
GLOBULIN UR ELPH-MCNC: 2.6 GM/DL
GLUCOSE BLD-MCNC: 175 MG/DL (ref 70–100)
GLUCOSE BLDC GLUCOMTR-MCNC: 109 MG/DL (ref 70–130)
GLUCOSE BLDC GLUCOMTR-MCNC: 89 MG/DL (ref 70–130)
HCO3 BLDA-SCNC: 29.9 MMOL/L (ref 20–26)
HCT VFR BLD AUTO: 42.8 % (ref 34.5–44)
HCT VFR BLD CALC: 42.6 %
HGB BLD-MCNC: 13.4 G/DL (ref 11.5–15.5)
HGB BLDA-MCNC: 13.9 G/DL (ref 14–18)
HOROWITZ INDEX BLD+IHG-RTO: 40 %
MAGNESIUM SERPL-MCNC: 2 MG/DL (ref 1.3–2.7)
MCH RBC QN AUTO: 30.4 PG (ref 27–31)
MCHC RBC AUTO-ENTMCNC: 31.3 G/DL (ref 32–36)
MCV RBC AUTO: 97.1 FL (ref 80–99)
METHGB BLD QL: 0.9 % (ref 0–1.5)
MODALITY: ABNORMAL
NOTE: ABNORMAL
OXYHGB MFR BLDV: 96.3 % (ref 94–99)
PCO2 BLDA: 38.3 MM HG (ref 35–45)
PCO2 TEMP ADJ BLD: 38.3 MM HG (ref 35–45)
PH BLDA: 7.5 PH UNITS (ref 7.35–7.45)
PH, TEMP CORRECTED: 7.5 PH UNITS
PHOSPHATE SERPL-MCNC: 2 MG/DL (ref 2.4–5.1)
PLATELET # BLD AUTO: 169 10*3/MM3 (ref 150–450)
PMV BLD AUTO: 9.9 FL (ref 6–12)
PO2 BLDA: 99.3 MM HG (ref 83–108)
PO2 TEMP ADJ BLD: 99.3 MM HG (ref 83–108)
POTASSIUM BLD-SCNC: 4 MMOL/L (ref 3.5–5.5)
PROT SERPL-MCNC: 6 G/DL (ref 5.7–8.2)
RBC # BLD AUTO: 4.41 10*6/MM3 (ref 3.89–5.14)
SODIUM BLD-SCNC: 139 MMOL/L (ref 132–146)
VANCOMYCIN SERPL-MCNC: 14.9 MCG/ML (ref 5–40)
VENTILATOR MODE: ABNORMAL
WBC NRBC COR # BLD: 11.24 10*3/MM3 (ref 3.5–10.8)

## 2019-04-04 PROCEDURE — 80202 ASSAY OF VANCOMYCIN: CPT

## 2019-04-04 PROCEDURE — 80053 COMPREHEN METABOLIC PANEL: CPT | Performed by: INTERNAL MEDICINE

## 2019-04-04 PROCEDURE — 94003 VENT MGMT INPAT SUBQ DAY: CPT

## 2019-04-04 PROCEDURE — 94799 UNLISTED PULMONARY SVC/PX: CPT

## 2019-04-04 PROCEDURE — 94770: CPT

## 2019-04-04 PROCEDURE — 84100 ASSAY OF PHOSPHORUS: CPT | Performed by: INTERNAL MEDICINE

## 2019-04-04 PROCEDURE — 99232 SBSQ HOSP IP/OBS MODERATE 35: CPT | Performed by: INTERNAL MEDICINE

## 2019-04-04 PROCEDURE — 82805 BLOOD GASES W/O2 SATURATION: CPT

## 2019-04-04 PROCEDURE — 25010000002 VANCOMYCIN

## 2019-04-04 PROCEDURE — 82962 GLUCOSE BLOOD TEST: CPT

## 2019-04-04 PROCEDURE — 25010000002 ENOXAPARIN PER 10 MG: Performed by: INTERNAL MEDICINE

## 2019-04-04 PROCEDURE — 83735 ASSAY OF MAGNESIUM: CPT | Performed by: INTERNAL MEDICINE

## 2019-04-04 PROCEDURE — 63710000001 INSULIN LISPRO (HUMAN) PER 5 UNITS: Performed by: INTERNAL MEDICINE

## 2019-04-04 PROCEDURE — 99291 CRITICAL CARE FIRST HOUR: CPT | Performed by: INTERNAL MEDICINE

## 2019-04-04 PROCEDURE — 71045 X-RAY EXAM CHEST 1 VIEW: CPT

## 2019-04-04 PROCEDURE — 36600 WITHDRAWAL OF ARTERIAL BLOOD: CPT

## 2019-04-04 PROCEDURE — 85027 COMPLETE CBC AUTOMATED: CPT | Performed by: INTERNAL MEDICINE

## 2019-04-04 PROCEDURE — 63710000001 INSULIN DETEMIR PER 5 UNITS: Performed by: INTERNAL MEDICINE

## 2019-04-04 RX ORDER — CLONAZEPAM 1 MG/1
1 TABLET ORAL EVERY 8 HOURS
COMMUNITY

## 2019-04-04 RX ORDER — CHLORAL HYDRATE 500 MG
CAPSULE ORAL 2 TIMES DAILY WITH MEALS
COMMUNITY

## 2019-04-04 RX ADMIN — SODIUM CHLORIDE 2 G: 9 INJECTION, SOLUTION INTRAVENOUS at 21:00

## 2019-04-04 RX ADMIN — IPRATROPIUM BROMIDE AND ALBUTEROL SULFATE 3 ML: 2.5; .5 SOLUTION RESPIRATORY (INHALATION) at 12:33

## 2019-04-04 RX ADMIN — ROPINIROLE 4 MG: 2 TABLET, FILM COATED ORAL at 16:03

## 2019-04-04 RX ADMIN — IPRATROPIUM BROMIDE AND ALBUTEROL SULFATE 3 ML: 2.5; .5 SOLUTION RESPIRATORY (INHALATION) at 01:03

## 2019-04-04 RX ADMIN — ROPINIROLE 4 MG: 2 TABLET, FILM COATED ORAL at 08:29

## 2019-04-04 RX ADMIN — CHLORHEXIDINE GLUCONATE 0.12% ORAL RINSE 15 ML: 1.2 LIQUID ORAL at 08:29

## 2019-04-04 RX ADMIN — BUSPIRONE HYDROCHLORIDE 15 MG: 15 TABLET ORAL at 08:31

## 2019-04-04 RX ADMIN — INSULIN LISPRO 2 UNITS: 100 INJECTION, SOLUTION INTRAVENOUS; SUBCUTANEOUS at 06:16

## 2019-04-04 RX ADMIN — IPRATROPIUM BROMIDE AND ALBUTEROL SULFATE 3 ML: 2.5; .5 SOLUTION RESPIRATORY (INHALATION) at 19:10

## 2019-04-04 RX ADMIN — BUSPIRONE HYDROCHLORIDE 15 MG: 15 TABLET ORAL at 21:00

## 2019-04-04 RX ADMIN — INSULIN DETEMIR 12 UNITS: 100 INJECTION, SOLUTION SUBCUTANEOUS at 08:34

## 2019-04-04 RX ADMIN — ENOXAPARIN SODIUM 40 MG: 100 INJECTION SUBCUTANEOUS at 08:30

## 2019-04-04 RX ADMIN — ASPIRIN 81 MG 81 MG: 81 TABLET ORAL at 08:30

## 2019-04-04 RX ADMIN — IPRATROPIUM BROMIDE AND ALBUTEROL SULFATE 3 ML: 2.5; .5 SOLUTION RESPIRATORY (INHALATION) at 07:00

## 2019-04-04 RX ADMIN — ROPINIROLE 4 MG: 2 TABLET, FILM COATED ORAL at 21:00

## 2019-04-04 RX ADMIN — BUSPIRONE HYDROCHLORIDE 15 MG: 15 TABLET ORAL at 16:03

## 2019-04-04 RX ADMIN — MIDODRINE HYDROCHLORIDE 10 MG: 5 TABLET ORAL at 13:09

## 2019-04-04 RX ADMIN — SODIUM CHLORIDE 2 G: 9 INJECTION, SOLUTION INTRAVENOUS at 12:08

## 2019-04-04 RX ADMIN — SODIUM CHLORIDE 2 G: 9 INJECTION, SOLUTION INTRAVENOUS at 04:35

## 2019-04-04 RX ADMIN — PANTOPRAZOLE SODIUM 40 MG: 40 INJECTION, POWDER, FOR SOLUTION INTRAVENOUS at 08:29

## 2019-04-04 RX ADMIN — POTASSIUM PHOSPHATE, MONOBASIC AND POTASSIUM PHOSPHATE, DIBASIC 15 MMOL: 224; 236 INJECTION, SOLUTION INTRAVENOUS at 10:19

## 2019-04-04 RX ADMIN — CHLORHEXIDINE GLUCONATE 0.12% ORAL RINSE 15 ML: 1.2 LIQUID ORAL at 21:00

## 2019-04-04 RX ADMIN — MIDODRINE HYDROCHLORIDE 10 MG: 5 TABLET ORAL at 17:39

## 2019-04-04 RX ADMIN — VANCOMYCIN HYDROCHLORIDE 1250 MG: 10 INJECTION, POWDER, LYOPHILIZED, FOR SOLUTION INTRAVENOUS at 08:33

## 2019-04-04 NOTE — PROGRESS NOTES
INTENSIVIST   PROGRESS NOTE     Hospital:  LOS: 2 days     Ms. Nallely Junior, 78 y.o. female is followed for a Chief Complaint of: Acute/chronic respiratory failure      Subjective   S   Nallely Junior is a 78 y.o. chronically debilitated female brought to [facility] from [facility] on 4/2 for reported inferior STEMI after experiencing left shoulder discomfort at her nursing facility. EMS reports that she chronically is hypotensive on midodrine currently on norepinephrine infusion and was hypoxic of 70% saturations on arrival. At OSH EKG read inferior STEMI with RBBB. Pertinent labs included BNP 2,819, H&H 13.6/45.6, and troponin <0.3 . ABG pH 7.281, CO2 83.8, pO2 42, HCO3 38.6, and BE 8.6. CXR showed right basilar and left perihilar atelectasis with left lung base opacification. She was not administered ASA and Plavix despite presence of PEG tube, placed on Heparin gtt, and transferred to [facility].      She was taken emergently to the cath lab per Dr. Padilla and noted to be pain-free at that time. Due to pain resolution accompanied by significant baseline co-morbid illnesses she was deemed not a candidate for aggressive/invasive measures and left heart cath was aborted. Preliminary echocardiogram revealed EF 70-75% with moderate-severe aortic stenosis minimally changed from October of 2018 reading.     On ICU arrival she appearsed lethargic and remained hypotensive with BP 70s despite norepinephrine infusion. An ABG pH 7.1, CO2 116, pO2 127, and HCO3 36 on unit arrival, will we place her on mechanical ventilation at this time.       Interval History:  PEG tube changed out yesterday. Sister is at bedside this AM. She is concerned about the nursing home conditions.         The patient's relevant past medical, surgical and social history were reviewed and updated in Epic as appropriate.      ROS: Unable to obtain secondary to mental status.     Objective   O     Vitals:  Temp  Min: 99.5 °F (37.5 °C)  Max: 101.5 °F (38.6  °C)  BP  Min: 96/62  Max: 170/73  Pulse  Min: 55  Max: 74  Resp  Min: 16  Max: 20  SpO2  Min: 91 %  Max: 99 % Flow (L/min)  Min: 40  Max: 40    Intake/Ouptut 24 hrs (7:00AM - 6:59 AM)  Intake & Output (last 3 days)       04/01 0701 - 04/02 0700 04/02 0701 - 04/03 0700 04/03 0701 - 04/04 0700 04/04 0701 - 04/05 0700    I.V. (mL/kg)  25 (0.3) 920 (10.1)     NG/GT    120    IV Piggyback   244     Total Intake(mL/kg)  25 (0.3) 1164 (12.8) 120 (1.3)    Urine (mL/kg/hr)  400 2450 (1.1) 275 (0.8)    Total Output  400 2450 275    Net  -375 -1286 -155            Urine Unmeasured Occurrence  2 x            Medications (drips):    dexmedetomidine Last Rate: Stopped (04/04/19 0920)   fentanyl 10 mcg/mL Last Rate: 100 mcg/hr (04/02/19 2305)   norepinephrine Last Rate: Stopped (04/02/19 2335)   Pharmacy to dose vancomycin        Mechanical Ventilator Settings:       Vt (Set, L): 0.4 L  Resp Rate (Set): 16  Pressure Support (cm H2O): 10 cm H20  FiO2 (%): 40 %  PEEP/CPAP (cm H2O): 5 cm H20    Minute Ventilation (L/min) (Obs): 6.57 L/min  Resp Rate (Observed) Vent: 24  I:E Ratio (Set): 1:3.87  I:E Ratio (Obs): 1:1.40    PIP Observed (cm H2O): 15 cm H2O  Plateau Pressure (cm H2O): (S) 19 cm H2O    Physical Examination  Telemetry:  Normal sinus rhythm.    Constitutional:  No acute distress.  On mechanical ventilation.    Cardiovascular: Normal rate, regular and rhythm. Normal heart sounds.  No murmurs, gallop or rub.   Respiratory: No respiratory distress. Normal respiratory effort.  Normal breath sounds  Diminished at the bases    Abdominal:  Soft. No masses. Non-tender. No distension. No HSM.   Extremities: No digital cyanosis. No clubbing.  No peripheral edema.   Neurological:   Awakens to stimulation.  Follows some basic commands.             Results from last 7 days   Lab Units 04/04/19  0356 04/03/19  0417 04/02/19 2018   WBC 10*3/mm3 11.24* 6.93 11.71*   HEMOGLOBIN g/dL 13.4 13.1 13.3   MCV fL 97.1 102.1* 104.8*   PLATELETS  10*3/mm3 169 175 221     Results from last 7 days   Lab Units 04/04/19  0356 04/03/19 0417 04/02/19 2018   SODIUM mmol/L 139 145 143   POTASSIUM mmol/L 4.0 4.7 4.4   CO2 mmol/L 27.0 35.0* 34.0*   CREATININE mg/dL 0.67 1.05 1.19   GLUCOSE mg/dL 175* 271* 312*   MAGNESIUM mg/dL 2.0 1.9 1.9   PHOSPHORUS mg/dL 2.0* 3.1 6.1*     Estimated Creatinine Clearance: 63.2 mL/min (by C-G formula based on SCr of 0.67 mg/dL).  Results from last 7 days   Lab Units 04/04/19  0356 04/03/19 0417 04/02/19 2018   ALK PHOS U/L 113* 139* 124*   BILIRUBIN mg/dL 1.6* 1.0 1.3*   ALT (SGPT) U/L 269* 488* 337*   AST (SGOT) U/L 169* 593* 588*       Results from last 7 days   Lab Units 04/04/19  0346 04/03/19  0407 04/02/19  2141 04/02/19 2008   PH, ARTERIAL pH units 7.501* 7.495* 7.355 7.104*   PCO2, ARTERIAL mm Hg 38.3 47.5* 58.4* 116.0*   PO2 ART mm Hg 99.3 132.0* 201.0* 127.0*   FIO2 % 40 65 85 100       Images:  Imaging Results (last 24 hours)     Procedure Component Value Units Date/Time    XR Chest 1 View [[id number]] Collected:  04/04/19 1041     Updated:  04/04/19 1041    Narrative:       EXAMINATION: XR CHEST 1 VW-04/04/2019:      INDICATION: Respiratory failure, pneumonia; I95.9-Hypotension,  unspecified; J96.21-Acute and chronic respiratory failure with hypoxia;  I21.19-ST elevation (STEMI) myocardial infarction involving other  coronary artery of inferior wall; J98.11-Atelectasis.      COMPARISON: 04/02/2019.     FINDINGS: Tracheostomy tube is again noted. Right IJ catheter tip  remains in the mid SVC. Mediastinum is again shifted toward the left  chest. The heart is enlarged. The vasculature appears upper limits of  normal. Left basilar atelectasis and effusion appear stable. There is  mild but new patchy disease of the right base. Upper lungs remain clear.  No pneumothorax is seen.           Impression:       1.  Persistent patchy disease of the left lung base stable or slightly  increased from 04/02/2019 study.  2.  Mild new  patchy atelectasis or pneumonia in the right lung base.     D:  04/04/2019  E:  04/04/2019                     Results: Reviewed.  I reviewed the patient's new laboratory and imaging results.  I independently reviewed the patient's new images.    Medications: Reviewed.    Assessment/Plan   A / P     Ms. Junior is a 77yo F with a history of chronic tracheostomy, dysphagia s/p PEG tube placement and noncompliance with a modified diet who presented as a transfer from [facility] for a possible STEMI. She was evaluated by Cardiology who did not feel she was having a STEMI. No LHC was performed as she would not be a candidate for intervention based on her chronic debilitated state. She was admitted to the ICU for medical management. She was found to be profoundly hypercapneic and was placed on mechanical ventilation. Her ABG has improved with mechanical ventilation.     Nutrition:   NPO Diet  Advance Directives:   Code Status and Medical Interventions:   Ordered at: 04/02/19 2015     Code Status:    CPR     Medical Interventions (Level of Support Prior to Arrest):    Full       Active Hospital Problems    Diagnosis   • **Inferior STEMI    • Aortic valve stenosis     ECHO 10/2018:  ·  Left ventricular systolic function is hyperdynamic (EF > 70).  · Left ventricular wall thickness is consistent with moderate concentric hypertrophy.  · Left ventricular diastolic dysfunction (grade I) consistent with impaired relaxation.  · Moderate to severe aortic stenosis (mean gradient 39 mmHg) is present.  · Mild mitral valve regurgitation is present     • Stage 3 CKD   • Former smoker   • Debility   • Dementia   • Class 1 obesity in adult   • Atelectasis of left lung   • Bronchomalacia   • Hypotension     on midodrine     • Hyperlipidemia   • COPD   • Coronary artery disease   • GERD    • Acute on chronic respiratory failure with hypoxia      Chronic trach      • Dysphagia with chronic PEG    • Type 2 diabetes mellitus         Assessment / Plan:    1. Continue antibiotics to cover MRSA and Pseudomonas for possible pneumonia  2. F/u sputum culture.    3. Place on pressure support. Leave on PST as tolerated. Follow etCO2. If she tolerates PST today, plan to proceed with trach collar in the AM.   4. Continue current insulin regimen. Will likely need to increase as tube feeds are advanced.   5. Initiate tube feeds.   6. Replace electrolytes  7. Cardiology following for NSTEMI.   8. AM labs and CXR    I have attempted to discuss goals of care with the patient's sister who is her POA. She wants to continue full support despite the fact that the patient does not follow a modified diet and is likely to develop respiratory failure again in the future.     Plan of care and goals reviewed during interdisciplinary rounds.  I discussed the patient's findings and my recommendations with patient and nursing staff    Critical Care Time: was greater than 30 minutes.(This excludes time spent performing separately reportable procedures and services). including high complexity decision making to assess, manipulate, and support vital organ system failure in this individual who has impairment of one or more vital organ systems such that there is a high probability of imminent or life threatening deterioration in the patient’s condition.      Alva Pinedo, DO    Intensive Care Medicine and Pulmonary Medicine

## 2019-04-04 NOTE — PROGRESS NOTES
Nutrition Services    Patient Name:  Nallely Junior  YOB: 1940  MRN: [id number]  Admit Date:  4/2/2019                      Clinical Nutrition     Nutrition Assessment  Reason for Visit:   CHELO FARIAS      Patient Name: Nallely Junior  YOB: 1940  MRN: [id number]  Date of Encounter: 04/04/19 11:37 AM  Admission date: 4/2/2019      Comments: 20 Fr GONSALO tube placed. Order placed for Replete w Fiber 25 ml/hr advance w tolerance by 25 ml/hr ev 8 hr to goal 70 ml/hr Give Water at 10 ml/hr. F/U to assure accepting facility at discharge familiar w GONSALO tube.     Nutrition Assessment     Hospital Problem List    Inferior STEMI     Type 2 diabetes mellitus     Hypotension    Hyperlipidemia    COPD    Dysphagia with chronic PEG     Coronary artery disease    GERD     Acute on chronic respiratory failure with hypoxia     Aortic valve stenosis    Stage 3 CKD    Former smoker    Debility    Dementia    Class 1 obesity in adult    Atelectasis of left lung    Bronchomalacia  Note: no MI            Pt is full code             Resp F on vent (4/3)    PMH: She  has a past medical history of Anemia, Anxiety, Aortic stenosis, CHF (congestive heart failure) (CMS/HCC), Chronic respiratory failure with hypoxia and hypercapnia (CMS/HCC), CKD (chronic kidney disease), stage III (CMS/HCC), Constipation, COPD (chronic obstructive pulmonary disease) (CMS/HCC), Coronary artery disease, Dementia, Depression, Diabetes mellitus (CMS/HCC), Dysphagia, Femur fracture, right (CMS/HCC), GERD (gastroesophageal reflux disease), Hyperlipidemia, Hypertension, Hypotension, and Restless legs syndrome (RLS).   PSxH: She  has a past surgical history that includes Tracheostomy tube placement; Peg Tube Insertion; Leg Surgery; and Forearm surgery.     Other: Note hx of refusing TF and of non-compliance w Dys IV diet nectar thick liq              Hx MRSA, PNA              (4/4) 20 Fr GONSALO tube in place    Reported/Observed/Food/Nutrition  "Related History:     New tube in place.    Anthropometrics     Height: 162.6 cm (64\")  Last filed wt: Weight: 90.7 kg (200 lb) (04/03/19 0714)  Weight Method: Estimated    BMI: BMI (Calculated): 34.3  Obese Class I: 30-34.9kg/m2    Ideal Body Weight (IBW) (kg): 55    Weight Change   UBW: note from NH in Oct, 177 lb and here in Oct bed wt 166lb      Labs reviewed     Results from last 7 days   Lab Units 04/04/19  0356 04/03/19 0417 04/02/19 2018   GLUCOSE mg/dL 175* 271* 312*   BUN mg/dL 18 25* 23   CREATININE mg/dL 0.67 1.05 1.19   SODIUM mmol/L 139 145 143   CHLORIDE mmol/L 104 104 103   POTASSIUM mmol/L 4.0 4.7 4.4   PHOSPHORUS mg/dL 2.0* 3.1 6.1*   MAGNESIUM mg/dL 2.0 1.9 1.9   ALT (SGPT) U/L 269* 488* 337*     Results from last 7 days   Lab Units 04/04/19  0356 04/03/19 0417 04/02/19 2018   ALBUMIN g/dL 3.37 3.70 3.49   CHOLESTEROL mg/dL  --   --  86   TRIGLYCERIDES mg/dL  --   --  148         Results from last 7 days   Lab Units 04/03/19  2345 04/03/19  1822 04/03/19  1108 04/03/19  0446 04/02/19  2334 04/02/19  1927   GLUCOSE mg/dL 180* 155* 171* 227* 364* 295*     Lab Results   Lab Value Date/Time    HGBA1C 6.70 (H) 04/02/2019 2018    HGBA1C 8.90 (H) 10/06/2018 2031         Medications reviewed   Pertinent: insulin, protonix Abx      Applicable medical tests/Procedures since admission: bronch, PEG replaced    Intake/Ouptut 24 hrs (7:00AM - 6:59 AM)     Intake & Output (last day)       04/03 0701 - 04/04 0700 04/04 0701 - 04/05 0700    I.V. (mL/kg) 920 (10.1)     NG/GT  120    IV Piggyback 244     Total Intake(mL/kg) 1164 (12.8) 120 (1.3)    Urine (mL/kg/hr) 2450 (1.1) 275 (0.7)    Total Output 2450 275    Net -1286 -155                Needs Assessment   Height used: 162.6 cm  Weight used: 90.7 lbs current wt, 54.5 Kg IBW    Estimated Calories needs: 1400 Kcal/da based on PSU 1397, MSJ 1380     Estimated Protein needs: 82 g PRO/da based on IBW Kg x 1.5    Current Nutrition Prescription   PO: NPO Diet   "   EN: Replete w Fiber 25 ml/hr advance w tolerance by 25 ml/hr ev 8 hr to goal 70 ml/hr Give Water at 10 ml/hr.    to start today.    At Goal will deliver: 1400 Kcal = 100% est need, 90 g PRO = 110% est need, 21 g Fiber , 1162 ml H20 in TF, 1362 total ml Free Water.     Nutrition Diagnosis       Problem Inadequate oral intake   Etiology On Vent   Signs/Symptoms NPO     Goal:   General: Nutrition support treatment  EN: Initiate EN, hayden EN at goal      Nutrition Intervention    Follow treatment progress, Care plan reviewed, Nutrition support order placed      Monitoring/Evaluation:   I&O, Pertinent labs, EN delivery/tolerance, Weight, Symptoms      Will Continue to follow per protocol      Brittani Kendrick RD  Time Spent: 30 min        Electronically signed by:  Brittani Kendrick RD  04/04/19 11:37 AM

## 2019-04-04 NOTE — PLAN OF CARE
Problem: Patient Care Overview  Goal: Plan of Care Review  Outcome: Ongoing (interventions implemented as appropriate)   04/04/19 1700   Coping/Psychosocial   Plan of Care Reviewed With patient   Plan of Care Review   Progress improving   OTHER   Outcome Summary Pt on spontaneous for most of the shift and tolerating well. Stable and remains on bedrest. VSS       Problem: Skin Injury Risk (Adult)  Goal: Identify Related Risk Factors and Signs and Symptoms  Outcome: Ongoing (interventions implemented as appropriate)   04/04/19 1716   Skin Injury Risk (Adult)   Related Risk Factors (Skin Injury Risk) critical care admission;edema;medical devices;mobility impaired     Goal: Skin Health and Integrity  Outcome: Ongoing (interventions implemented as appropriate)   04/04/19 1716   Skin Injury Risk (Adult)   Skin Health and Integrity making progress toward outcome       Problem: Ventilation, Mechanical Invasive (Adult)  Goal: Signs and Symptoms of Listed Potential Problems Will be Absent, Minimized or Managed (Ventilation, Mechanical Invasive)  Outcome: Ongoing (interventions implemented as appropriate)   04/04/19 1716   Goal/Outcome Evaluation   Problems Assessed (Mechanical Ventilation, Invasive) all   Problems Present (Mech Vent, Invasive) immobility;inability to wean       Problem: Cardiac: ACS (Acute Coronary Syndrome) (Adult)  Goal: Signs and Symptoms of Listed Potential Problems Will be Absent, Minimized or Managed (Cardiac: ACS)  Outcome: Resolved for upgrade, new template will be applied      Problem: Fall Risk (Adult)  Goal: Identify Related Risk Factors and Signs and Symptoms  Outcome: Ongoing (interventions implemented as appropriate)   04/04/19 1716   Fall Risk (Adult)   Related Risk Factors (Fall Risk) fear of falling;environment unfamiliar   Signs and Symptoms (Fall Risk) presence of risk factors     Goal: Absence of Fall  Outcome: Ongoing (interventions implemented as appropriate)   04/04/19 1716   Fall Risk  (Adult)   Absence of Fall making progress toward outcome

## 2019-04-04 NOTE — PROGRESS NOTES
"Pharmacy Consult-Vancomycin Dosing  Nallely Junior is a  78 y.o. female receiving vancomycin therapy.     Indication: Pneumonia  Consulting Provider: Intensivist  ID Consult: No    Goal Trough: 15-20 mcg/mL    Current Antimicrobial Therapy  Vancomycin, PTD (4/3): Day 2  Aztreonam 2 g q8h (4/3): Day 2      Allergies  Allergies as of 04/02/2019 - Reviewed 04/02/2019   Allergen Reaction Noted   • Penicillins Unknown (See Comments) 10/06/2018   • Sulfa antibiotics Unknown (See Comments) 10/06/2018       Labs    Results from last 7 days   Lab Units 04/04/19  0356 04/03/19 0417 04/02/19 2018   BUN mg/dL 18 25* 23   CREATININE mg/dL 0.67 1.05 1.19       Results from last 7 days   Lab Units 04/04/19  0356 04/03/19 0417 04/02/19 2018   WBC 10*3/mm3 11.24* 6.93 11.71*       Evaluation of Dosing     Last Dose Received in the ED/Outside Facility: No documented antibiotic administration in records from [facility]  Is Patient on Dialysis or Renal Replacement: No    Ht - 162.6 cm (64\")  Wt - 90.7 kg (200 lb)    Estimated Creatinine Clearance: 63.2 mL/min (by C-G formula based on SCr of 0.67 mg/dL).    Intake & Output (last 3 days)         04/01 0701 - 04/02 0700 04/02 0701 - 04/03 0700 04/03 0701 - 04/04 0700    I.V. (mL/kg)  25 (0.3) 920 (10.1)    IV Piggyback   244    Total Intake(mL/kg)  25 (0.3) 1164 (12.8)    Urine (mL/kg/hr)  400 2450 (1.1)    Total Output  400 2450    Net  -375 -1286           Urine Unmeasured Occurrence  2 x             Microbiology and Radiology  Microbiology Results (last 10 days)       Procedure Component Value - Date/Time    Respiratory Culture - Sputum, ET Suction [[id number]] Collected:  04/03/19 0945    Lab Status:  Preliminary result Specimen:  Sputum from ET Suction Updated:  04/03/19 1309     Gram Stain Many (4+) WBCs per low power field      Occasional Epithelial cells per low power field      Many (4+) Gram positive cocci in pairs and chains            Evaluation of Level    Lab " Results   Component Value Date    ARIES 15.30 10/15/2018    ISAÍAS 14.90 04/04/2019   Vancomycin random level drawn ~16 hrs after load    Assessment/Plan:  1. Pharmacy to dose vancomycin for treatment of pneumonia. Patient has history of MRSA pneumonia in October 2018, therefore no MRSA PCR was ordered.  2. Patient is practically bed-bound and previously elevated SCr of 1.05 has decreased today closer to baseline (0.67 mg/dL). Urine output adequate.  3. Based on the patient's renal function and clinical status, will order vancomycin 1250 mg IV q24h (14 mg/kg).  4. Vancomycin trough scheduled for 4/8 prior to the 5th scheduled dose.  5. Pharmacy will continue to monitor and adjust vancomycin dose as necessary based on renal function, cultures, labs, and clinical status.    Luciano Guthrie, PharmD  Pharmacy Resident  4/4/2019  6:54 AM

## 2019-04-04 NOTE — PLAN OF CARE
Problem: Patient Care Overview  Goal: Plan of Care Review  Outcome: Ongoing (interventions implemented as appropriate)   04/04/19 0253   Coping/Psychosocial   Plan of Care Reviewed With patient;family   Plan of Care Review   Progress no change   OTHER   Outcome Summary VSS, pt. continues to be severely drowsy since weaning precedex gtt down throughout the night. no acute changes. spoke with family about current POC. plan to wean vent and second attempt to place pt. on pressure support in AM. will continue to monitor.        Problem: Restraint, Nonbehavioral (Nonviolent)  Goal: Rationale and Justification  Outcome: Outcome(s) achieved Date Met: 04/04/19    Goal: Nonbehavioral (Nonviolent) Restraint: Absence of Injury/Harm  Outcome: Outcome(s) achieved Date Met: 04/04/19    Goal: Nonbehavioral (Nonviolent) Restraint: Achievement of Discontinuation Criteria  Outcome: Outcome(s) achieved Date Met: 04/04/19    Goal: Nonbehavioral (Nonviolent) Restraint: Preservation of Dignity and Wellbeing  Outcome: Outcome(s) achieved Date Met: 04/04/19      Problem: Skin Injury Risk (Adult)  Goal: Identify Related Risk Factors and Signs and Symptoms  Outcome: Ongoing (interventions implemented as appropriate)    Goal: Skin Health and Integrity  Outcome: Ongoing (interventions implemented as appropriate)      Problem: Ventilation, Mechanical Invasive (Adult)  Goal: Signs and Symptoms of Listed Potential Problems Will be Absent, Minimized or Managed (Ventilation, Mechanical Invasive)  Outcome: Ongoing (interventions implemented as appropriate)      Problem: Cardiac: ACS (Acute Coronary Syndrome) (Adult)  Goal: Signs and Symptoms of Listed Potential Problems Will be Absent, Minimized or Managed (Cardiac: ACS)  Outcome: Ongoing (interventions implemented as appropriate)      Problem: Fall Risk (Adult)  Goal: Identify Related Risk Factors and Signs and Symptoms  Outcome: Ongoing (interventions implemented as appropriate)    Goal: Absence  of Fall  Outcome: Ongoing (interventions implemented as appropriate)

## 2019-04-04 NOTE — PROGRESS NOTES
"  [facility]   Inpatient Progress Note       LOS: 2 days   Patient Care Team:  Ray Strickland MD as PCP - General (Internal Medicine)    Chief Complaint:  Follow-up for possible MI and hypotension    Subjective     Interval History:     Patient alert. Nods to yes/no questions. Per RN has been following commands this morning and restraints have been removed    Review of Systems:   Pertinent positives noted in history, exam, and assessment. Otherwise reviewed and negative.      Objective     Vitals:  Blood pressure 138/91, pulse 59, temperature (!) 101.5 °F (38.6 °C), temperature source Axillary, resp. rate 16, height 162.6 cm (64\"), weight 90.7 kg (200 lb), SpO2 91 %.     Intake/Output Summary (Last 24 hours) at 4/4/2019 0927  Last data filed at 4/4/2019 0600  Gross per 24 hour   Intake 1164 ml   Output 2175 ml   Net -1011 ml     Physical Exam   Constitutional: She appears well-developed and well-nourished. No distress.   on MV   Neck: No JVD present. No tracheal deviation present.   Cardiovascular: Normal rate, regular rhythm and intact distal pulses. Exam reveals no friction rub.   Murmur (3/6 MOODY) heard.  Pulmonary/Chest: No respiratory distress.   Decreased bases, trach in place and on mechanical ventilation   Abdominal: Soft. Bowel sounds are normal. There is no tenderness.   Musculoskeletal: She exhibits edema (trace le edema). She exhibits no deformity.   Neurological: She is alert.   Awake but still does not appear to be completely oriented   Skin: Skin is warm and dry.     I have examined the patient and agree with the above         Results Review:     I reviewed the patient's new clinical results.    Results from last 7 days   Lab Units 04/04/19  0356   WBC 10*3/mm3 11.24*   HEMOGLOBIN g/dL 13.4   HEMATOCRIT % 42.8   PLATELETS 10*3/mm3 169     Results from last 7 days   Lab Units 04/04/19  0356   SODIUM mmol/L 139   POTASSIUM mmol/L 4.0   CHLORIDE mmol/L 104   CO2 mmol/L 27.0   BUN " mg/dL 18   CREATININE mg/dL 0.67   CALCIUM mg/dL 8.3*   BILIRUBIN mg/dL 1.6*   ALK PHOS U/L 113*   ALT (SGPT) U/L 269*   AST (SGOT) U/L 169*   GLUCOSE mg/dL 175*     Results from last 7 days   Lab Units 04/04/19  0356   SODIUM mmol/L 139   POTASSIUM mmol/L 4.0   CHLORIDE mmol/L 104   CO2 mmol/L 27.0   BUN mg/dL 18   CREATININE mg/dL 0.67   GLUCOSE mg/dL 175*   CALCIUM mg/dL 8.3*         Lab Results   Lab Value Date/Time    TROPONINI 6.621 (C) 04/03/2019 0417    TROPONINI 0.370 (H) 04/02/2019 2018     Results from last 7 days   Lab Units 04/02/19 2018   TSH mIU/mL 0.942   FREE T4 ng/dL 1.03     Results from last 7 days   Lab Units 04/02/19 2018   CHOLESTEROL mg/dL 86   TRIGLYCERIDES mg/dL 148   HDL CHOL mg/dL 24*   LDL CHOL mg/dL 43             Tele:  SR    Assessment/Plan       Inferior STEMI     Type 2 diabetes mellitus     Hypotension    Hyperlipidemia    COPD    Dysphagia with chronic PEG     Coronary artery disease    GERD     Acute on chronic respiratory failure with hypoxia     Aortic valve stenosis    Stage 3 CKD    Former smoker    Debility    Dementia    Class 1 obesity in adult    Atelectasis of left lung    Bronchomalacia      1. Type II MI due to hypotension and LVH/aortic stenosis  2. Hypotensive with possible shock.  1. Some chronic hypotension per report from EMS but worse this admission.  2. Likely combination of cardiac and infectious process  3. Aortic stenosis,   1. Severe, normal LV function  4. Chronic hypoxic respiratory failure with chronic tracheostomy.  5. General frailty/severe debilitation  6. Reported history of coronary artery disease.    1. Unable to obtain accurate history from patient secondary to limitations from tracheostomy and lethargy.  7. History of MRSA  8. Bronchomalacia s/p bronchoscopy this admit.  9. Elevated LFT's  1. Improving   10. Dysphagia with chronic PEG  11. Dementia  12. UTI, POA. Management per attending service      Plan:  1. Continue current medical  therapy  2. No ACE or BB due to chronic hypotension  3. Continue Midodrine for chronic hypotension  4. Resume statin once LFT's improved.  5. Patient at baseline cardiovascular function.  Not candidate for further measures for her aortic stenosis.  Continue current cardiovascular medicines.  Please call if any further questions.    Electronically signed by ARELI Grimes, 04/03/19, 9:12 AM.  I, Candelario Padilla MD, personally performed the services described in this documentation as scribed by the above individual in my presence, and it is both accurate and complete      Dictated utilizing Dragon dictation

## 2019-04-05 LAB
ALBUMIN SERPL-MCNC: 3.28 G/DL (ref 3.2–4.8)
ALP SERPL-CCNC: 102 U/L (ref 25–100)
ALT SERPL W P-5'-P-CCNC: 171 U/L (ref 7–40)
ANION GAP SERPL CALCULATED.3IONS-SCNC: 11 MMOL/L (ref 3–11)
ARTERIAL PATENCY WRIST A: ABNORMAL
AST SERPL-CCNC: 87 U/L (ref 0–33)
ATMOSPHERIC PRESS: ABNORMAL MMHG
BACTERIA SPEC AEROBE CULT: ABNORMAL
BASE EXCESS BLDA CALC-SCNC: 1.3 MMOL/L (ref 0–2)
BDY SITE: ABNORMAL
BILIRUB SERPL-MCNC: 2 MG/DL (ref 0.3–1.2)
BODY TEMPERATURE: 37 C
BUN BLD-MCNC: 20 MG/DL (ref 9–23)
CALCIUM SPEC-SCNC: 8.2 MG/DL (ref 8.7–10.4)
CHLORIDE SERPL-SCNC: 104 MMOL/L (ref 99–109)
CHOLEST SERPL-MCNC: 92 MG/DL (ref 0–200)
CO2 BLDA-SCNC: 23.9 MMOL/L (ref 23–27)
CO2 SERPL-SCNC: 21 MMOL/L (ref 20–31)
COHGB MFR BLD: 1 % (ref 0–2)
CREAT BLD-MCNC: 0.66 MG/DL (ref 0.6–1.3)
DEPRECATED RDW RBC AUTO: 58.3 FL (ref 37–54)
ERYTHROCYTE [DISTWIDTH] IN BLOOD BY AUTOMATED COUNT: 16.4 % (ref 11.3–14.5)
GLUCOSE BLD-MCNC: 159 MG/DL (ref 70–100)
GLUCOSE BLDC GLUCOMTR-MCNC: 119 MG/DL (ref 70–130)
GLUCOSE BLDC GLUCOMTR-MCNC: 125 MG/DL (ref 70–130)
GLUCOSE BLDC GLUCOMTR-MCNC: 192 MG/DL (ref 70–130)
GLUCOSE BLDC GLUCOMTR-MCNC: 208 MG/DL (ref 70–130)
GLUCOSE BLDC GLUCOMTR-MCNC: 98 MG/DL (ref 70–130)
HCO3 BLDA-SCNC: 23.1 MMOL/L (ref 20–26)
HCT VFR BLD AUTO: 42.8 % (ref 34.5–44)
HCT VFR BLD CALC: 40.5 %
HGB BLD-MCNC: 13.2 G/DL (ref 11.5–15.5)
HGB BLDA-MCNC: 13.2 G/DL (ref 14–18)
HOROWITZ INDEX BLD+IHG-RTO: 40 %
MAGNESIUM SERPL-MCNC: 1.8 MG/DL (ref 1.3–2.7)
MAGNESIUM SERPL-MCNC: 1.8 MG/DL (ref 1.3–2.7)
MCH RBC QN AUTO: 30.1 PG (ref 27–31)
MCHC RBC AUTO-ENTMCNC: 30.8 G/DL (ref 32–36)
MCV RBC AUTO: 97.7 FL (ref 80–99)
METHGB BLD QL: 0 % (ref 0–1.5)
MODALITY: ABNORMAL
NOTE: ABNORMAL
OXYHGB MFR BLDV: 98.5 % (ref 94–99)
PCO2 BLDA: 27.8 MM HG (ref 35–45)
PCO2 TEMP ADJ BLD: 27.8 MM HG (ref 35–45)
PH BLDA: 7.53 PH UNITS (ref 7.35–7.45)
PH, TEMP CORRECTED: 7.53 PH UNITS
PHOSPHATE SERPL-MCNC: 2.5 MG/DL (ref 2.4–5.1)
PHOSPHATE SERPL-MCNC: 2.5 MG/DL (ref 2.4–5.1)
PLATELET # BLD AUTO: 211 10*3/MM3 (ref 150–450)
PMV BLD AUTO: 10.6 FL (ref 6–12)
PO2 BLDA: 94.1 MM HG (ref 83–108)
PO2 TEMP ADJ BLD: 94.1 MM HG (ref 83–108)
POTASSIUM BLD-SCNC: 4.4 MMOL/L (ref 3.5–5.5)
PROT SERPL-MCNC: 6 G/DL (ref 5.7–8.2)
RBC # BLD AUTO: 4.38 10*6/MM3 (ref 3.89–5.14)
SET MECH RESP RATE: 16
SODIUM BLD-SCNC: 136 MMOL/L (ref 132–146)
TRIGL SERPL-MCNC: 158 MG/DL (ref 0–150)
VENTILATOR MODE: AC
VT ON VENT VENT: 400 ML
WBC NRBC COR # BLD: 11.93 10*3/MM3 (ref 3.5–10.8)

## 2019-04-05 PROCEDURE — 80053 COMPREHEN METABOLIC PANEL: CPT

## 2019-04-05 PROCEDURE — 97163 PT EVAL HIGH COMPLEX 45 MIN: CPT

## 2019-04-05 PROCEDURE — 83735 ASSAY OF MAGNESIUM: CPT

## 2019-04-05 PROCEDURE — 83735 ASSAY OF MAGNESIUM: CPT | Performed by: INTERNAL MEDICINE

## 2019-04-05 PROCEDURE — 25010000002 FENTANYL CITRATE (PF) 100 MCG/2ML SOLUTION: Performed by: INTERNAL MEDICINE

## 2019-04-05 PROCEDURE — 85027 COMPLETE CBC AUTOMATED: CPT | Performed by: INTERNAL MEDICINE

## 2019-04-05 PROCEDURE — 94799 UNLISTED PULMONARY SVC/PX: CPT

## 2019-04-05 PROCEDURE — 63710000001 INSULIN DETEMIR PER 5 UNITS: Performed by: INTERNAL MEDICINE

## 2019-04-05 PROCEDURE — 36600 WITHDRAWAL OF ARTERIAL BLOOD: CPT

## 2019-04-05 PROCEDURE — 25010000002 VANCOMYCIN 10 G RECONSTITUTED SOLUTION

## 2019-04-05 PROCEDURE — 84100 ASSAY OF PHOSPHORUS: CPT | Performed by: INTERNAL MEDICINE

## 2019-04-05 PROCEDURE — 82465 ASSAY BLD/SERUM CHOLESTEROL: CPT

## 2019-04-05 PROCEDURE — 82805 BLOOD GASES W/O2 SATURATION: CPT

## 2019-04-05 PROCEDURE — 84478 ASSAY OF TRIGLYCERIDES: CPT

## 2019-04-05 PROCEDURE — 94003 VENT MGMT INPAT SUBQ DAY: CPT

## 2019-04-05 PROCEDURE — 84100 ASSAY OF PHOSPHORUS: CPT

## 2019-04-05 PROCEDURE — 99233 SBSQ HOSP IP/OBS HIGH 50: CPT | Performed by: INTERNAL MEDICINE

## 2019-04-05 PROCEDURE — 97165 OT EVAL LOW COMPLEX 30 MIN: CPT

## 2019-04-05 PROCEDURE — 25010000002 ENOXAPARIN PER 10 MG: Performed by: INTERNAL MEDICINE

## 2019-04-05 PROCEDURE — 82962 GLUCOSE BLOOD TEST: CPT

## 2019-04-05 RX ORDER — OXYCODONE HYDROCHLORIDE 5 MG/1
5 TABLET ORAL EVERY 6 HOURS PRN
Status: DISPENSED | OUTPATIENT
Start: 2019-04-05 | End: 2019-04-15

## 2019-04-05 RX ORDER — CLONAZEPAM 1 MG/1
1 TABLET ORAL EVERY 8 HOURS SCHEDULED
Status: DISCONTINUED | OUTPATIENT
Start: 2019-04-05 | End: 2019-04-06

## 2019-04-05 RX ORDER — SODIUM CHLORIDE 9 MG/ML
75 INJECTION, SOLUTION INTRAVENOUS CONTINUOUS
Status: DISCONTINUED | OUTPATIENT
Start: 2019-04-05 | End: 2019-04-08

## 2019-04-05 RX ORDER — BISACODYL 10 MG
10 SUPPOSITORY, RECTAL RECTAL DAILY PRN
Status: DISCONTINUED | OUTPATIENT
Start: 2019-04-05 | End: 2019-04-15 | Stop reason: HOSPADM

## 2019-04-05 RX ADMIN — CHLORHEXIDINE GLUCONATE 0.12% ORAL RINSE 15 ML: 1.2 LIQUID ORAL at 08:18

## 2019-04-05 RX ADMIN — INSULIN LISPRO 2 UNITS: 100 INJECTION, SOLUTION INTRAVENOUS; SUBCUTANEOUS at 12:43

## 2019-04-05 RX ADMIN — IPRATROPIUM BROMIDE AND ALBUTEROL SULFATE 3 ML: 2.5; .5 SOLUTION RESPIRATORY (INHALATION) at 06:44

## 2019-04-05 RX ADMIN — BUSPIRONE HYDROCHLORIDE 15 MG: 15 TABLET ORAL at 21:43

## 2019-04-05 RX ADMIN — MIDODRINE HYDROCHLORIDE 10 MG: 5 TABLET ORAL at 12:43

## 2019-04-05 RX ADMIN — ROPINIROLE 4 MG: 2 TABLET, FILM COATED ORAL at 15:50

## 2019-04-05 RX ADMIN — MIDODRINE HYDROCHLORIDE 10 MG: 5 TABLET ORAL at 08:12

## 2019-04-05 RX ADMIN — INSULIN LISPRO 4 UNITS: 100 INJECTION, SOLUTION INTRAVENOUS; SUBCUTANEOUS at 23:39

## 2019-04-05 RX ADMIN — ROPINIROLE 4 MG: 2 TABLET, FILM COATED ORAL at 21:43

## 2019-04-05 RX ADMIN — CHLORHEXIDINE GLUCONATE 0.12% ORAL RINSE 15 ML: 1.2 LIQUID ORAL at 21:42

## 2019-04-05 RX ADMIN — SODIUM CHLORIDE 2 G: 9 INJECTION, SOLUTION INTRAVENOUS at 12:43

## 2019-04-05 RX ADMIN — CLONAZEPAM 1 MG: 1 TABLET ORAL at 13:02

## 2019-04-05 RX ADMIN — VANCOMYCIN HYDROCHLORIDE 1250 MG: 10 INJECTION, POWDER, LYOPHILIZED, FOR SOLUTION INTRAVENOUS at 08:12

## 2019-04-05 RX ADMIN — SODIUM CHLORIDE 500 ML: 9 INJECTION, SOLUTION INTRAVENOUS at 16:47

## 2019-04-05 RX ADMIN — IPRATROPIUM BROMIDE AND ALBUTEROL SULFATE 3 ML: 2.5; .5 SOLUTION RESPIRATORY (INHALATION) at 12:19

## 2019-04-05 RX ADMIN — IPRATROPIUM BROMIDE AND ALBUTEROL SULFATE 3 ML: 2.5; .5 SOLUTION RESPIRATORY (INHALATION) at 19:10

## 2019-04-05 RX ADMIN — INSULIN DETEMIR 12 UNITS: 100 INJECTION, SOLUTION SUBCUTANEOUS at 08:18

## 2019-04-05 RX ADMIN — SODIUM CHLORIDE 2 G: 9 INJECTION, SOLUTION INTRAVENOUS at 03:21

## 2019-04-05 RX ADMIN — ENOXAPARIN SODIUM 40 MG: 100 INJECTION SUBCUTANEOUS at 08:23

## 2019-04-05 RX ADMIN — PANTOPRAZOLE SODIUM 40 MG: 40 INJECTION, POWDER, FOR SOLUTION INTRAVENOUS at 08:18

## 2019-04-05 RX ADMIN — SODIUM CHLORIDE 75 ML/HR: 9 INJECTION, SOLUTION INTRAVENOUS at 16:47

## 2019-04-05 RX ADMIN — ROPINIROLE 4 MG: 2 TABLET, FILM COATED ORAL at 08:12

## 2019-04-05 RX ADMIN — MIDODRINE HYDROCHLORIDE 10 MG: 5 TABLET ORAL at 17:06

## 2019-04-05 RX ADMIN — BUSPIRONE HYDROCHLORIDE 15 MG: 15 TABLET ORAL at 08:13

## 2019-04-05 RX ADMIN — MAGNESIUM SULFATE HEPTAHYDRATE 4 G: 40 INJECTION, SOLUTION INTRAVENOUS at 05:35

## 2019-04-05 RX ADMIN — OXYCODONE HYDROCHLORIDE 5 MG: 5 TABLET ORAL at 21:42

## 2019-04-05 RX ADMIN — ASPIRIN 81 MG 81 MG: 81 TABLET ORAL at 08:12

## 2019-04-05 RX ADMIN — BISACODYL 10 MG: 10 SUPPOSITORY RECTAL at 03:21

## 2019-04-05 RX ADMIN — BUSPIRONE HYDROCHLORIDE 15 MG: 15 TABLET ORAL at 15:40

## 2019-04-05 RX ADMIN — CLONAZEPAM 1 MG: 1 TABLET ORAL at 21:42

## 2019-04-05 RX ADMIN — SODIUM CHLORIDE 2 G: 9 INJECTION, SOLUTION INTRAVENOUS at 21:42

## 2019-04-05 RX ADMIN — IPRATROPIUM BROMIDE AND ALBUTEROL SULFATE 3 ML: 2.5; .5 SOLUTION RESPIRATORY (INHALATION) at 00:57

## 2019-04-05 RX ADMIN — FENTANYL CITRATE 50 MCG: 50 INJECTION INTRAMUSCULAR; INTRAVENOUS at 02:47

## 2019-04-05 NOTE — THERAPY DISCHARGE NOTE
Acute Care - Occupational Therapy Initial Eval/Discharge  [facility]     Patient Name: Nallely Junior  : 1940  MRN: [id number]  Today's Date: 2019  Onset of Illness/Injury or Date of Surgery: 19  Date of Referral to OT: 19  Referring Physician: ARELI Navas      Admit Date: 2019       ICD-10-CM ICD-9-CM   1. Hypotension, unspecified hypotension type I95.9 458.9   2. Acute on chronic respiratory failure with hypoxia  J96.21 518.84     799.02   3. Inferior STEMI  I21.19 410.40   4. Atelectasis of left lung J98.11 518.0   5. Impaired mobility and ADLs Z74.09 799.89     Patient Active Problem List   Diagnosis   • Pneumonia   • Type 2 diabetes mellitus    • Hypotension   • Hyperlipidemia   • Depression   • Anxiety   • COPD   • Dysphagia with chronic PEG    • Coronary artery disease   • CHF    • GERD    • Acute on chronic respiratory failure with hypoxia    • Aortic valve stenosis   • Stage 3 CKD   • Former smoker   • Inferior STEMI    • H/O MRSA pneumonia 10/2018    • Debility   • Dementia   • Class 1 obesity in adult   • Atelectasis of left lung   • Bronchomalacia     Past Medical History:   Diagnosis Date   • Anemia    • Anxiety    • Aortic stenosis    • CHF (congestive heart failure) (CMS/HCC)    • Chronic respiratory failure with hypoxia and hypercapnia (CMS/[facility])    • CKD (chronic kidney disease), stage III (CMS/HCC)    • Constipation    • COPD (chronic obstructive pulmonary disease) (CMS/[facility])    • Coronary artery disease    • Dementia    • Depression    • Diabetes mellitus (CMS/HCC)    • Dysphagia    • Femur fracture, right (CMS/[facility])    • GERD (gastroesophageal reflux disease)    • Hyperlipidemia    • Hypertension    • Hypotension     on midodrine   • Restless legs syndrome (RLS)      Past Surgical History:   Procedure Laterality Date   • FOREARM SURGERY     • LEG SURGERY     • PEG TUBE INSERTION     • TRACHEOSTOMY            OT ASSESSMENT FLOWSHEET (last 12 hours)      Occupational  Therapy Evaluation     Row Name 04/05/19 1305                   OT Evaluation Time/Intention    Subjective Information  no complaints  -        Document Type  discharge evaluation/summary  -AC        Mode of Treatment  occupational therapy  -AC        Patient Effort  adequate  -AC           General Information    Patient Profile Reviewed?  yes  -AC        Onset of Illness/Injury or Date of Surgery  04/02/19  -        Referring Physician  ARELI Navas  -        Patient Observations  alert;cooperative;agree to therapy  -AC        Patient/Family Observations  No family present  -        General Observations of Patient  Pt received in bed.  RN in room cleaning pt  -AC        Prior Level of Function  dependent:;transfer;bed mobility;dressing;bathing;mod assist:;feeding;grooming  -AC        Pertinent History of Current Functional Problem  Pt admit with Type II MI , L shoulder pain, and she was acutely hypercapnic requiring change in her tracheostomy and mechanical ventilation as well as bronchoscopy to remove mucous plugging  -AC        Existing Precautions/Restrictions  fall  (Significant)  trach, PEG  -AC        Risks Reviewed  patient:;increased discomfort  -AC        Benefits Reviewed  patient:;improve function;increase knowledge  -AC        Barriers to Rehab  medically complex;previous functional deficit  -           Relationship/Environment    Lives With  facility resident [facility]  -           Resource/Environmental Concerns    Current Living Arrangements  residential facility  -           Cognitive Assessment/Interventions    Additional Documentation  Cognitive Assessment/Intervention (Group)  -           Cognitive Assessment/Intervention- PT/OT    Orientation Status (Cognition)  oriented to;person  -AC        Follows Commands (Cognition)  follows one step commands;over 90% accuracy  -        Personal Safety Interventions  fall prevention program maintained  -AC           Bed  Mobility Assessment/Treatment    Bed Mobility Assessment/Treatment  rolling left;rolling right rolled for sling placement  -        Rolling Left [place] (Bed Mobility)  dependent (less than 25% patient effort)  -        Rolling Right [place] (Bed Mobility)  dependent (less than 25% patient effort)  -        Comment (Bed Mobility)  pt stating she was dependnet prior for all bed mobility  -           Functional Mobility    Functional Mobility- Comment  Not appropriate - pt reports she was not ambualting prior to admit  -           Transfer Assessment/Treatment    Transfer Assessment/Treatment  bed-chair transfer  -        Comment (Transfers)  Pt reports lift used for transfers at [facility]  -           Bed-Chair Transfer    Bed-Chair [place] (Transfers)  dependent (less than 25% patient effort)  -        Assistive Device (Bed-Chair Transfers)  mechanical lift/aid  -           General ROM    GENERAL ROM COMMENTS  LUE AROM WFL, RUE AROM limited 50%  -           MMT (Manual Muscle Testing)    General MMT Comments  L shld flex 3-/5,  otherwise LUE grossly 4-/5,   R UE grossly 2+/5  -           Sensory Assessment/Intervention    Additional Documentation  Vision Assessment/Intervention (Group)  -           Vision Assessment/Intervention    Visual Impairment/Limitations  corrective lenses full time  -           Positioning and Restraints    Pre-Treatment Position  in bed  -AC        Post Treatment Position  chair  -AC           Wound 04/02/19 1900 Bilateral midline coccyx pressure injury    Wound - Properties Group Date first assessed: 04/02/19  -ROBINSON Time first assessed: 1900  -ROBINSON Present On Admission : yes  -ROBINSON Side: Bilateral  -ROBINSON Orientation: midline  -ROBINSON Location: coccyx  -ROBINSON Type: pressure injury  -ROBINSON Stage, Pressure Injury: deep tissue injury  -ROBINSON Additional Comments: probable stage 1 pressure injury  -CP       Wound 04/03/19 0700 Left anterior toe abrasion;other (see comments)     Wound - Properties Group Date first assessed: 04/03/19  -KJ Time first assessed: 0700  -KJ Present On Admission : yes  -KJ Side: Left  -KJ Orientation: anterior  -KJ Location: toe  -KJ Type: abrasion;other (see comments)  -KJ, scabbing        Plan of Care Review    Plan of Care Reviewed With  patient  -AC           Clinical Impression (OT)    Date of Referral to OT  04/03/19  -AC        OT Diagnosis  pt at baseline  -AC        Criteria for Skilled Therapeutic Interventions Met (OT Eval)  no problems identified which require skilled intervention;no significant expected improvement in functional status  -AC        Therapy Frequency (OT Eval)  evaluation only  -AC        Care Plan Review (OT)  evaluation/treatment results reviewed  -AC        Anticipated Discharge Disposition (OT)  extended care facility  -           Living Environment    Home Accessibility  wheelchair accessible  -          User Key  (r) = Recorded By, (t) = Taken By, (c) = Cosigned By    Initials Name Effective Dates    AC Graciela Herzog, OT 06/23/15 -     Jennifer Coronado APRN 02/05/19 -     Vikas Conti RN 06/16/16 -     Jessica Morales RN 06/16/16 -           Occupational Therapy Education     Title: PT OT SLP Therapies (Done)     Topic: Occupational Therapy (Done)     Point: ADL training (Done)     Description: Instruct learner(s) on proper safety adaptation and remediation techniques during self care or transfers.   Instruct in proper use of assistive devices.    Learning Progress Summary           Patient Acceptance, E, Bed IU by  at 4/5/2019  1:05 PM    Comment:  role of OT    Acceptance, E, VU,NR by ROBINSON at 4/4/2019  3:00 AM   Family Acceptance, E, VU,NR by ROBINSON at 4/4/2019  3:00 AM    Acceptance, E, VU,NR by ROBINSON at 4/3/2019  4:30 AM                   Point: Home exercise program (Done)     Description: Instruct learner(s) on appropriate technique for monitoring, assisting and/or progressing therapeutic  exercises/activities.    Learning Progress Summary           Patient Acceptance, E, VU,NR by ROBINSON at 4/4/2019  3:00 AM   Family Acceptance, E, VU,NR by ROBINSON at 4/4/2019  3:00 AM                   Point: Precautions (Done)     Description: Instruct learner(s) on prescribed precautions during self-care and functional transfers.    Learning Progress Summary           Patient Acceptance, E, VU,NR by ROBINSON at 4/4/2019  3:00 AM   Family Acceptance, E, VU,NR by ROBINSON at 4/4/2019  3:00 AM    Acceptance, E, VU,NR by ROBINSON at 4/3/2019  4:30 AM                   Point: Body mechanics (Done)     Description: Instruct learner(s) on proper positioning and spine alignment during self-care, functional mobility activities and/or exercises.    Learning Progress Summary           Patient Acceptance, E, VU,NR by ROBINSON at 4/4/2019  3:00 AM   Family Acceptance, E, VU,NR by ROBINSON at 4/4/2019  3:00 AM                               User Key     Initials Effective Dates Name Provider Type Discipline     06/23/15 -  Graciela Herzog, OT Occupational Therapist OT     06/16/16 -  Vikas Bynum RN Registered Nurse Nurse                OT Recommendation and Plan  Outcome Summary/Treatment Plan (OT)  Anticipated Discharge Disposition (OT): extended care facility  Therapy Frequency (OT Eval): evaluation only  Plan of Care Review  Plan of Care Reviewed With: patient  Plan of Care Reviewed With: patient  Outcome Summary: OT eval complete.  Pt dependent to roll R/L, dependent lift to chair.  Pt is at baseline with self care and mobility, and no significant expected improvement in function expected.  Will d/c from OT services at this time.          Outcome Measures     Row Name 04/05/19 1557             How much help from another is currently needed...    Putting on and taking off regular lower body clothing?  1  -AC      Bathing (including washing, rinsing, and drying)  2  -AC      Toileting (which includes using toilet bed pan or urinal)  1  -AC      Putting on  and taking off regular upper body clothing  2  -AC      Taking care of personal grooming (such as brushing teeth)  2  -AC      Eating meals  1 PEG  -AC      Score  9  -AC         Functional Assessment    Outcome Measure Options  AM-PAC 6 Clicks Daily Activity (OT)  -AC        User Key  (r) = Recorded By, (t) = Taken By, (c) = Cosigned By    Initials Name Provider Type    Graciela Cobos, OT Occupational Therapist          Time Calculation:   Time Calculation- OT     Row Name 04/05/19 1305             Time Calculation- OT    OT Start Time  1305  -      OT Received On  04/05/19  -      OT Goal Re-Cert Due Date  04/15/19  -        User Key  (r) = Recorded By, (t) = Taken By, (c) = Cosigned By    Initials Name Provider Type    Graciela Cobos, OT Occupational Therapist        Therapy Suggested Charges     Code   Minutes Charges    None           Therapy Charges for Today     Code Description Service Date Service Provider Modifiers Qty    [id number]  OT EVAL LOW COMPLEXITY 3 4/5/2019 Graciela Herzog OT GO 1               OT Discharge Summary  Anticipated Discharge Disposition (OT): extended care facility    Graciela Hrezog OT  4/5/2019

## 2019-04-05 NOTE — PROGRESS NOTES
Nutrition Services    Patient Name:  Nallely Junior  YOB: 1940  MRN: [id number]  Admit Date:  4/2/2019                      Clinical Nutrition     Nutrition Assessment  Reason for Visit:   CHELO FARIAS      Patient Name: Nallely Junior  YOB: 1940  MRN: [id number]  Date of Encounter: 04/05/19 6:15 PM  Admission date: 4/2/2019      Comments: Replete w Fiber at 50 ml/hr at time of visit, goal 70 ml/hr. Water at 10 ml/hr.      Nutrition Assessment     Hospital Problem List    Inferior STEMI     Type 2 diabetes mellitus     Hypotension    Hyperlipidemia    COPD    Dysphagia with chronic PEG     Coronary artery disease    GERD     Acute on chronic respiratory failure with hypoxia     Aortic valve stenosis    Stage 3 CKD    Former smoker    Debility    Dementia    Class 1 obesity in adult    Atelectasis of left lung    Bronchomalacia  Note: no MI            Pt is full code             Resp F on vent (4/3)    PMH: She  has a past medical history of Anemia, Anxiety, Aortic stenosis, CHF (congestive heart failure) (CMS/HCC), Chronic respiratory failure with hypoxia and hypercapnia (CMS/HCC), CKD (chronic kidney disease), stage III (CMS/HCC), Constipation, COPD (chronic obstructive pulmonary disease) (CMS/HCC), Coronary artery disease, Dementia, Depression, Diabetes mellitus (CMS/HCC), Dysphagia, Femur fracture, right (CMS/HCC), GERD (gastroesophageal reflux disease), Hyperlipidemia, Hypertension, Hypotension, and Restless legs syndrome (RLS).   PSxH: She  has a past surgical history that includes Tracheostomy tube placement; Peg Tube Insertion; Leg Surgery; and Forearm surgery.     Other: Note hx of refusing TF and of non-compliance w Dys IV diet nectar thick liq              Hx MRSA, PNA              (4/4) 20 Fr GONSALO tube in place    Reported/Observed/Food/Nutrition Related History:     RN ? If EN charting complete as was running sev hrs yesterday and overnight.  Liq stool noted.    Anthropometrics  "    Height: 162.6 cm (64\")  Last filed wt: Weight: 90.7 kg (200 lb) (04/03/19 0714)  Weight Method: Estimated    BMI: BMI (Calculated): 34.3  Obese Class I: 30-34.9kg/m2    Ideal Body Weight (IBW) (kg): 55    Weight Change   UBW: note from NH in Oct, 177 lb and here in Oct bed wt 166lb      Labs reviewed     Results from last 7 days   Lab Units 04/05/19  0434 04/04/19  0356 04/03/19  0417   GLUCOSE mg/dL 159* 175* 271*   BUN mg/dL 20 18 25*   CREATININE mg/dL 0.66 0.67 1.05   SODIUM mmol/L 136 139 145   CHLORIDE mmol/L 104 104 104   POTASSIUM mmol/L 4.4 4.0 4.7   PHOSPHORUS mg/dL 2.5  2.5 2.0* 3.1   MAGNESIUM mg/dL 1.8  1.8 2.0 1.9   ALT (SGPT) U/L 171* 269* 488*     Results from last 7 days   Lab Units 04/05/19  0434 04/04/19  0356 04/03/19  0417 04/02/19 2018   ALBUMIN g/dL 3.28 3.37 3.70 3.49   CHOLESTEROL mg/dL 92  --   --  86   TRIGLYCERIDES mg/dL 158*  --   --  148         Results from last 7 days   Lab Units 04/05/19  1705 04/05/19  1147 04/05/19  0533 04/04/19  2356 04/04/19  1759 04/04/19  1146   GLUCOSE mg/dL 119 192* 125 98 89 109     Lab Results   Lab Value Date/Time    HGBA1C 6.70 (H) 04/02/2019 2018    HGBA1C 8.90 (H) 10/06/2018 2031         Medications reviewed   Pertinent: insulin,Fentanil Abx (vanc)protoniex, bisacodyl, K+ phosphate, MgSo4,      Applicable medical tests/Procedures since admission: bronch, PEG replaced    Intake/Ouptut 24 hrs (7:00AM - 6:59 AM)     Intake & Output (last day)       04/04 0701 - 04/05 0700 04/05 0701 - 04/06 0700    I.V. (mL/kg) 431 (4.8) 191.3 (2.1)    Other 271 171    NG/ 660    IV Piggyback 550 600    Total Intake(mL/kg) 1741 (19.2) 1622.3 (17.9)    Urine (mL/kg/hr) 965 (0.4) 200 (0.2)    Total Output 965 200    Net +776 +1422.3                Needs Assessment   Height used: 162.6 cm  Weight used: 90.7 lbs current wt, 54.5 Kg IBW    Estimated Calories needs: 1400 Kcal/da based on PSU 1397, MSJ 1380     Estimated Protein needs: 82 g PRO/da based on IBW Kg x " 1.5    Current Nutrition Prescription   PO: NPO Diet     EN: Replete w Fiber 25 ml/hr advance w tolerance by 25 ml/hr ev 8 hr to goal 70 ml/hr Give Water at 10 ml/hr.    to start today.    At Goal will deliver: 1400 Kcal = 100% est need, 90 g PRO = 110% est need, 21 g Fiber , 1162 ml H20 in TF, 1362 total ml Free Water.     EN Delivery: insuffic data for 4/4    Nutrition Diagnosis     4/4, 4/5  Problem Inadequate oral intake   Etiology On Vent   Signs/Symptoms NPO   Status: EN now infusing advancing to goal    Goal:   General: Nutrition support treatment  EN: Initiate EN, hayden EN at goal      Nutrition Intervention    Follow treatment progress, Care plan reviewed      Monitoring/Evaluation:   I&O, Pertinent labs, EN delivery/tolerance, Weight, Symptoms      Will Continue to follow per protocol      Brittani Kendrick RD  Time Spent: 30 min        Electronically signed by:  Brittani Kendrick RD  04/05/19 6:15 PM

## 2019-04-05 NOTE — PLAN OF CARE
Problem: Patient Care Overview  Goal: Plan of Care Review  Outcome: Ongoing (interventions implemented as appropriate)   04/05/19 6914   Coping/Psychosocial   Plan of Care Reviewed With patient   Plan of Care Review   Progress no change   OTHER   Outcome Summary OT eval complete. Pt dependent to roll R/L, dependent lift to chair. Pt is at baseline with self care and mobility, and no significant expected improvement in function expected. Will d/c from OT services at this time.

## 2019-04-05 NOTE — PLAN OF CARE
Problem: Patient Care Overview  Goal: Plan of Care Review  Outcome: Ongoing (interventions implemented as appropriate)   04/05/19 0351   Coping/Psychosocial   Plan of Care Reviewed With patient   Plan of Care Review   Progress improving   OTHER   Outcome Summary Rested on vent all night. Consistently tachypneic w/ RR 20-30's however denies feeling SOA. AM ABG pending. Moderate amount of white / tan secretions from tracheostomy. Is now having frequent loose bowel movements. Otherwise is tolerating TF, now running at 50 mL/hr. Will increase to goal rate of 75 mL/hr at 0800 this AM.        Problem: Skin Injury Risk (Adult)  Goal: Identify Related Risk Factors and Signs and Symptoms  Outcome: Ongoing (interventions implemented as appropriate)   04/05/19 0351   Skin Injury Risk (Adult)   Related Risk Factors (Skin Injury Risk) advanced age;critical care admission;hospitalization prolonged;medical devices;mechanical forces;infection;mobility impaired;moisture     Goal: Skin Health and Integrity  Outcome: Ongoing (interventions implemented as appropriate)   04/05/19 0351   Skin Injury Risk (Adult)   Skin Health and Integrity making progress toward outcome       Problem: Ventilation, Mechanical Invasive (Adult)  Goal: Signs and Symptoms of Listed Potential Problems Will be Absent, Minimized or Managed (Ventilation, Mechanical Invasive)  Outcome: Ongoing (interventions implemented as appropriate)   04/05/19 0351   Goal/Outcome Evaluation   Problems Assessed (Mechanical Ventilation, Invasive) all   Problems Present (Mech Vent, Invasive) immobility;situational response;inability to wean

## 2019-04-05 NOTE — THERAPY DISCHARGE NOTE
Acute Care - Physical Therapy Initial Eval/Discharge  [facility]     Patient Name: Nallely Junior  : 1940  MRN: [id number]  Today's Date: 2019   Onset of Illness/Injury or Date of Surgery: 19  Date of Referral to PT: 19  Referring Physician: ARELI Navas      Admit Date: 2019    Visit Dx:    ICD-10-CM ICD-9-CM   1. Hypotension, unspecified hypotension type I95.9 458.9   2. Acute on chronic respiratory failure with hypoxia  J96.21 518.84     799.02   3. Inferior STEMI  I21.19 410.40   4. Atelectasis of left lung J98.11 518.0   5. Impaired mobility and ADLs Z74.09 799.89   6. Impaired functional mobility, balance, gait, and endurance Z74.09 V49.89     Patient Active Problem List   Diagnosis   • Pneumonia   • Type 2 diabetes mellitus    • Hypotension   • Hyperlipidemia   • Depression   • Anxiety   • COPD   • Dysphagia with chronic PEG    • Coronary artery disease   • CHF    • GERD    • Acute on chronic respiratory failure with hypoxia    • Aortic valve stenosis   • Stage 3 CKD   • Former smoker   • Inferior STEMI    • H/O MRSA pneumonia 10/2018    • Debility   • Dementia   • Class 1 obesity in adult   • Atelectasis of left lung   • Bronchomalacia     Past Medical History:   Diagnosis Date   • Anemia    • Anxiety    • Aortic stenosis    • CHF (congestive heart failure) (CMS/[facility])    • Chronic respiratory failure with hypoxia and hypercapnia (CMS/[facility])    • CKD (chronic kidney disease), stage III (CMS/[facility])    • Constipation    • COPD (chronic obstructive pulmonary disease) (CMS/[facility])    • Coronary artery disease    • Dementia    • Depression    • Diabetes mellitus (CMS/[facility])    • Dysphagia    • Femur fracture, right (CMS/[facility])    • GERD (gastroesophageal reflux disease)    • Hyperlipidemia    • Hypertension    • Hypotension     on midodrine   • Restless legs syndrome (RLS)      Past Surgical History:   Procedure Laterality Date   • FOREARM SURGERY     • LEG SURGERY     • PEG TUBE INSERTION     •  TRACHEOSTOMY            PT ASSESSMENT (last 12 hours)      Physical Therapy Evaluation     Row Name 04/05/19 1250          PT Evaluation Time/Intention    Subjective Information  no complaints;weakness  -ROBINSON     Document Type  discharge evaluation/summary  -ROBINSON     Mode of Treatment  physical therapy  -ROBINSON     Patient Effort  adequate  -ROBINSON     Row Name 04/05/19 1250          General Information    Patient Profile Reviewed?  yes  -ROBINSON     Onset of Illness/Injury or Date of Surgery  04/02/19  -ROBINSON     Referring Physician  MD Case  -ROBINSON     Patient Observations  alert;cooperative;agree to therapy  -ROBINSON     Patient/Family Observations  no family present  -ROBINSON     General Observations of Patient  patient has trach and PEG  -ROBINSON     Prior Level of Function  dependent:;transfer;bed mobility;ADL's  -ROBINSON     Pertinent History of Current Functional Problem  patient was admitted from a nursing facility after having C/O shoulder pain she was positive for MI she underwent bronchoscopy to remove mucous plug 4/2 and gastrostomy 4/3 to replace PEG tube  -ROBINSON     Existing Precautions/Restrictions  fall  -ROBINSON     Limitations/Impairments  other (see comments) trach  -ROBINSON     Risks Reviewed  patient:;increased discomfort;change in vital signs  -ROBINSON     Benefits Reviewed  patient:;decrease risk of DVT  -ROBINSON     Barriers to Rehab  medically complex;previous functional deficit  -ROBINSON     Row Name 04/05/19 1250          Relationship/Environment    Lives With  facility resident  -ROBINSON     Row Name 04/05/19 1250          Resource/Environmental Concerns    Current Living Arrangements  residential facility  -ROBINSON     Resource/Environmental Concerns  none  -ROBINSON     Row Name 04/05/19 1250          Cognitive Assessment/Intervention- PT/OT    Orientation Status (Cognition)  oriented to;person;verbal cues/prompts needed for orientation;place;time  -ROBINSON     Follows Commands (Cognition)  follows one step commands;over 90% accuracy  -ROBINSON     Personal Safety Interventions   fall prevention program maintained  -[facility] Name 04/05/19 1250          Safety Issues, Functional Mobility    Safety Issues Affecting Function (Mobility)  awareness of need for assistance;insight into deficits/self awareness;safety precaution awareness;safety precautions follow-through/compliance  -     Impairments Affecting Function (Mobility)  strength  -[facility] Name 04/05/19 1250          Bed Mobility Assessment/Treatment    Rolling Left [place] (Bed Mobility)  dependent (less than 25% patient effort)  -ROBINSON     Rolling Right [place] (Bed Mobility)  dependent (less than 25% patient effort)  -ROBINSON     Comment (Bed Mobility)  patient does help with UE's but is dependent to get hips and trunk turned  -[facility] Name 04/05/19 1250          Transfer Assessment/Treatment    Transfer Assessment/Treatment  bed-chair transfer  -     Bed-Chair [place] (Transfers)  dependent (less than 25% patient effort)  -     Assistive Device (Bed-Chair Transfers)  mechanical lift/aid  -[facility] Name 04/05/19 1250          Gait/Stairs Assessment/Training    Comment (Gait/Stairs)  N/A  -[facility] Name 04/05/19 1250          General ROM    GENERAL ROM COMMENTS  BLE knee flex limited to 40 degrees bilat ankles with foot drop and little movement   -[facility] Name 04/05/19 1250          MMT (Manual Muscle Testing)    General MMT Comments  BLE 2+/5  -[facility] Name 04/05/19 1250          Motor Assessment/Intervention    Additional Documentation  Balance (Group);Therapeutic Exercise (Group)  -[facility] Name 04/05/19 1250          Therapeutic Exercise    Therapeutic Exercise  seated, lower extremities  -     Additional Documentation  Therapeutic Exercise (Row)  -[facility] Name 04/05/19 1250          Lower Extremity Seated Therapeutic Exercise    Performed, Seated Lower Extremity (Therapeutic Exercise)  hip flexion/extension;hip abduction/adduction;SAQ (short arc quad), knee extension  -     Comment, Seated  Lower Extremity (Therapeutic Exercise)  very little movement at ankles, patient is at baseline with strength she is dependent with all mobility at the nursing home  -     Row Name 04/05/19 1250          Balance    Balance  static sitting balance  -ROBINSON     Row Name 04/05/19 1250          Static Sitting Balance    Level of [place] (Unsupported Sitting, Static Balance)  unable to perform activity  -ROBINSON     Comment (Unsupported Sitting, Static Balance)  patient states she doesn't sit on the edge of the bed they just lift her to a chair  -ROBINSON     Row Name 04/05/19 1250          Pain Assessment    Additional Documentation  Pain Scale: Numbers Pre/Post-Treatment (Group)  -ROBINSON     Row Name 04/05/19 1250          Pain Scale: Numbers Pre/Post-Treatment    Pain Scale: Numbers, Pretreatment  0/10 - no pain  -ROBINSON     Pain Scale: Numbers, Post-Treatment  0/10 - no pain  -     Row Name             Wound 04/02/19 1900 Bilateral midline coccyx pressure injury    Wound - Properties Group Date first assessed: 04/02/19  -JBA Time first assessed: 1900  -JBA Present On Admission : yes  -JBA Side: Bilateral  -JBA Orientation: midline  -JBA Location: coccyx  -JBA Type: pressure injury  -JBA Stage, Pressure Injury: deep tissue injury  -JBA Additional Comments: probable stage 1 pressure injury  -CP    Row Name             Wound 04/03/19 0700 Left anterior toe abrasion;other (see comments)    Wound - Properties Group Date first assessed: 04/03/19  -KJ Time first assessed: 0700  -KJ Present On Admission : yes  -KJ Side: Left  -KJ Orientation: anterior  -KJ Location: toe  -KJ Type: abrasion;other (see comments)  -KJ, scabbing     Row Name 04/05/19 1250          Coping    Observed Emotional State  calm;cooperative  -     Verbalized Emotional State  acceptance  -     Row Name 04/05/19 1250          Plan of Care Review    Plan of Care Reviewed With  patient  -     Row Name 04/05/19 1250          Physical Therapy Clinical Impression     Date of Referral to PT  04/05/19  -ROBINSON     PT Diagnosis (PT Clinical Impression)  impaired mobility decreased strength  -ROBINSON     Patient/Family Goals Statement (PT Clinical Impression)  patient to go back to NH  -ROBINSON     Criteria for Skilled Interventions Met (PT Clinical Impression)  yes;current level of function same as previous level of function;no significant expected improvement in functional status  -ROBINSON     Rehab Potential (PT Clinical Summary)  other (see comments)  -ROBINSON     Care Plan Review (PT)  evaluation/treatment results reviewed;care plan/treatment goals reviewed;risks/benefits reviewed;patient/other agree to care plan  -ROBINSON     Row Name 04/05/19 1250          Vital Signs    Pretreatment Heart Rate (beats/min)  70  -ROBINSON     Posttreatment Heart Rate (beats/min)  78  -ROBINSON     Pre SpO2 (%)  98  -ROBINSON     O2 Delivery Pre Treatment  trach collar  -ROBINSON     Post SpO2 (%)  95  -ROBINSON     O2 Delivery Post Treatment  trach collar  -ROBINSON     Pre Patient Position  Supine  -ROBINSON     Intra Patient Position  Supine  -ROBINSON     Post Patient Position  Sitting  -ROBINSON     Row Name 04/05/19 1250          Positioning and Restraints    Pre-Treatment Position  in bed  -ROBINSON     Post Treatment Position  chair  -ROBINSON     In Chair  notified nsg;reclined;sitting;call light within reach;exit alarm on;with nsg  -ROBINSON     Row Name 04/05/19 1250          Physical Therapy Discharge Summary    Additional Documentation  Discharge Summary, PT Eval (Group)  -ROBINSON     Row Name 04/05/19 1250          Discharge Summary, PT Eval    Reason for Discharge (PT Discharge Summary)  no further expectation of functional progress  -ROBINSON     Row Name 04/05/19 1250          Living Environment    Home Accessibility  wheelchair accessible  -ROBINSON       User Key  (r) = Recorded By, (t) = Taken By, (c) = Cosigned By    Initials Name Provider Type    Chelsey Dupont, PT Physical Therapist    Jennifer Coronado, APRN Registered Nurse    Vikas Yee RN Registered Nurse     Jessica Morales RN Registered Nurse          Physical Therapy Education     Title: PT OT SLP Therapies (Done)     Topic: Physical Therapy (Done)     Point: Mobility training (Done)     Learning Progress Summary           Patient Acceptance, E, VU by ROBINSON at 4/5/2019 12:50 PM    Acceptance, E, VU,NR by [facility] at 4/4/2019  3:00 AM   Family Acceptance, E, VU,NR by JB at 4/4/2019  3:00 AM                   Point: Home exercise program (Done)     Learning Progress Summary           Patient Acceptance, E, VU by ROBINSON at 4/5/2019 12:50 PM    Acceptance, E, VU,NR by JB at 4/4/2019  3:00 AM   Family Acceptance, E, VU,NR by JB at 4/4/2019  3:00 AM                   Point: Body mechanics (Done)     Learning Progress Summary           Patient Acceptance, E, VU by RBOINSON at 4/5/2019 12:50 PM    Acceptance, E, VU,NR by [facility] at 4/4/2019  3:00 AM   Family Acceptance, E, VU,NR by [facility] at 4/4/2019  3:00 AM                   Point: Precautions (Done)     Learning Progress Summary           Patient Acceptance, E, VU by ROBINSON at 4/5/2019 12:50 PM    Acceptance, E, VU,NR by [facility] at 4/4/2019  3:00 AM   Family Acceptance, E, VU,NR by [facility] at 4/4/2019  3:00 AM                               User Key     Initials Effective Dates Name Provider Type Discipline     06/19/15 -  Chelsey Aguirre PT Physical Therapist PT    [facility] 06/16/16 -  Vikas Bynum RN Registered Nurse Nurse                PT Recommendation and Plan  Anticipated Discharge Disposition (PT): extended care facility  Therapy Frequency (PT Clinical Impression): evaluation only  Outcome Summary/Treatment Plan (PT)  Anticipated Discharge Disposition (PT): extended care facility  Reason for Discharge (PT Discharge Summary): no further expectation of functional progress  Plan of Care Reviewed With: patient  Outcome Summary: PT eval completed patient is dependent with all mobility her base line is totally dependent we will D/C from PT due to no improvement in fuction expected. patient to  go back to NH at D/C    Outcome Measures     Row Name 04/05/19 1305 04/05/19 1250          How much help from another person do you currently need...    Turning from your back to your side while in flat bed without using bedrails?  --  2  -ROBINSON     Moving from lying on back to sitting on the side of a flat bed without bedrails?  --  1  -ROBINSON     Moving to and from a bed to a chair (including a wheelchair)?  --  1  -ROBINSON     Standing up from a chair using your arms (e.g., wheelchair, bedside chair)?  --  1  -ROBINSON     Climbing 3-5 steps with a railing?  --  1  -ROBINSON     To walk in hospital room?  --  1  -ROBINSON     AM-PAC 6 Clicks Score  --  7  -ROBINSON        How much help from another is currently needed...    Putting on and taking off regular lower body clothing?  1  -AC  --     Bathing (including washing, rinsing, and drying)  2  -AC  --     Toileting (which includes using toilet bed pan or urinal)  1  -AC  --     Putting on and taking off regular upper body clothing  2  -AC  --     Taking care of personal grooming (such as brushing teeth)  2  -AC  --     Eating meals  1 PEG  -AC  --     Score  9  -AC  --        Functional Assessment    Outcome Measure Options  AM-PAC 6 Clicks Daily Activity (OT)  -AC  AM-PAC 6 Clicks Basic Mobility (PT)  -ROBINSON       User Key  (r) = Recorded By, (t) = Taken By, (c) = Cosigned By    Initials Name Provider Type    Chelsey Dupont, PT Physical Therapist    Graciela Cobos, OT Occupational Therapist           Time Calculation:   PT Charges     Row Name 04/05/19 1250             Time Calculation    Start Time  1250  -ROBINSON      PT Received On  04/05/19  -ROBINSON      PT Goal Re-Cert Due Date  04/05/19  -ROBINSON        User Key  (r) = Recorded By, (t) = Taken By, (c) = Cosigned By    Initials Name Provider Type    Chelsey Dupont, PT Physical Therapist        Therapy Charges for Today     Code Description Service Date Service Provider Modifiers Qty    38673994249 HC PT EVAL HIGH COMPLEXITY 4 4/5/2019  Chelsey Aguirre, PT GP 1          PT G-Codes  Outcome Measure Options: AM-PAC 6 Clicks Daily Activity (OT)  AM-PAC 6 Clicks Score: 7  Score: 9    PT Discharge Summary  Anticipated Discharge Disposition (PT): extended care facility  Reason for Discharge: At baseline function  Outcomes Achieved: Unable to make functional progress toward goals at this time  Discharge Destination: Extended care facility - LTC    Chelsey Aguirre, PT  4/5/2019

## 2019-04-05 NOTE — PROGRESS NOTES
INTENSIVIST   PROGRESS NOTE     Hospital:  LOS: 3 days     Ms. Nallely Junior, 78 y.o. female is followed for a Chief Complaint of: Acute/chronic respiratory failure      Subjective   S   Nallely Junior is a 78 y.o. chronically debilitated female brought to [facility] from [facility] on 4/2 for reported inferior STEMI after experiencing left shoulder discomfort at her nursing facility. EMS reports that she chronically is hypotensive on midodrine currently on norepinephrine infusion and was hypoxic of 70% saturations on arrival. At OSH EKG read inferior STEMI with RBBB. Pertinent labs included BNP 2,819, H&H 13.6/45.6, and troponin <0.3 . ABG pH 7.281, CO2 83.8, pO2 42, HCO3 38.6, and BE 8.6. CXR showed right basilar and left perihilar atelectasis with left lung base opacification. She was not administered ASA and Plavix despite presence of PEG tube, placed on Heparin gtt, and transferred to [facility].      She was taken emergently to the cath lab per Dr. Padilla and noted to be pain-free at that time. Due to pain resolution accompanied by significant baseline co-morbid illnesses she was deemed not a candidate for aggressive/invasive measures and left heart cath was aborted. Preliminary echocardiogram revealed EF 70-75% with moderate-severe aortic stenosis minimally changed from October of 2018 reading.     On ICU arrival she appearsed lethargic and remained hypotensive with BP 70s despite norepinephrine infusion. An ABG pH 7.1, CO2 116, pO2 127, and HCO3 36 on unit arrival, will we place her on mechanical ventilation at this time.       Interval History:  Tolerated PST yesterday. More awake this AM. Having diarrhea.         The patient's relevant past medical, surgical and social history were reviewed and updated in Epic as appropriate.      ROS:   Constitutional: Negative for fever.   Respiratory: Negative for dyspnea.   Cardiovascular: Negative for chest pain.   Gastrointestinal: Positive for diarrhea. No nausea and  vomiting.        Objective   O     Vitals:  Temp  Min: 98.8 °F (37.1 °C)  Max: 100.6 °F (38.1 °C)  BP  Min: 81/35  Max: 124/56  Pulse  Min: 57  Max: 109  Resp  Min: 19  Max: 35  SpO2  Min: 87 %  Max: 100 % No Data Recorded    Intake/Ouptut 24 hrs (7:00AM - 6:59 AM)  Intake & Output (last 3 days)       04/02 0701 - 04/03 0700 04/03 0701 - 04/04 0700 04/04 0701 - 04/05 0700 04/05 0701 - 04/06 0700    I.V. (mL/kg) 25 (0.3) 920 (10.1) 431 (4.8) 100 (1.1)    Other   271 81    NG/GT   489 250    IV Piggyback  244 550     Total Intake(mL/kg) 25 (0.3) 1164 (12.8) 1741 (19.2) 431 (4.8)    Urine (mL/kg/hr) 400 2450 (1.1) 965 (0.4) 200 (0.3)    Total Output 400 2450 965 200    Net -375 -1286 +776 +231            Urine Unmeasured Occurrence 2 x             Medications (drips):    dexmedetomidine Last Rate: Stopped (04/04/19 0920)   fentanyl 10 mcg/mL Last Rate: 100 mcg/hr (04/02/19 2305)   norepinephrine Last Rate: Stopped (04/02/19 2335)       Mechanical Ventilator Settings:       Vt (Set, L): 0.4 L  Resp Rate (Set): 16  Pressure Support (cm H2O): 10 cm H20  FiO2 (%): 40 %  PEEP/CPAP (cm H2O): 5 cm H20    Minute Ventilation (L/min) (Obs): 6.9 L/min  Resp Rate (Observed) Vent: 28  I:E Ratio (Set): 1:3.87  I:E Ratio (Obs): 1:1.60    PIP Observed (cm H2O): 16 cm H2O  Plateau Pressure (cm H2O): (S) 17 cm H2O    Physical Examination  Telemetry:  Normal sinus rhythm.    Constitutional:  No acute distress.  On mechanical ventilation.    Cardiovascular: Normal rate, regular and rhythm. Normal heart sounds.  No murmurs, gallop or rub.   Respiratory: No respiratory distress. Normal respiratory effort.  Normal breath sounds  Diminished at the bases    Abdominal:  Soft. No masses. Non-tender. No distension. No HSM.   Extremities: No digital cyanosis. No clubbing.  No peripheral edema.   Neurological:   Alert and interactive.             Results from last 7 days   Lab Units 04/05/19  0434 04/04/19  0356 04/03/19  0417   WBC 10*3/mm3 11.93*  11.24* 6.93   HEMOGLOBIN g/dL 13.2 13.4 13.1   MCV fL 97.7 97.1 102.1*   PLATELETS 10*3/mm3 211 169 175     Results from last 7 days   Lab Units 04/05/19 0434 04/04/19 0356 04/03/19 0417   SODIUM mmol/L 136 139 145   POTASSIUM mmol/L 4.4 4.0 4.7   CO2 mmol/L 21.0 27.0 35.0*   CREATININE mg/dL 0.66 0.67 1.05   GLUCOSE mg/dL 159* 175* 271*   MAGNESIUM mg/dL 1.8  1.8 2.0 1.9   PHOSPHORUS mg/dL 2.5  2.5 2.0* 3.1     Estimated Creatinine Clearance: 63.2 mL/min (by C-G formula based on SCr of 0.66 mg/dL).  Results from last 7 days   Lab Units 04/05/19 0434 04/04/19 0356 04/03/19 0417   ALK PHOS U/L 102* 113* 139*   BILIRUBIN mg/dL 2.0* 1.6* 1.0   ALT (SGPT) U/L 171* 269* 488*   AST (SGOT) U/L 87* 169* 593*       Results from last 7 days   Lab Units 04/05/19  0351 04/04/19 0346 04/03/19 0407 04/02/19  2141 04/02/19 2008   PH, ARTERIAL pH units 7.527* 7.501* 7.495* 7.355 7.104*   PCO2, ARTERIAL mm Hg 27.8* 38.3 47.5* 58.4* 116.0*   PO2 ART mm Hg 94.1 99.3 132.0* 201.0* 127.0*   FIO2 % 40 40 65 85 100       Images:  Imaging Results (last 24 hours)     Procedure Component Value Units Date/Time    XR Chest 1 View [[id number]] Collected:  04/04/19 1041     Updated:  04/04/19 2051    Narrative:       EXAMINATION: XR CHEST 1 VW-04/04/2019:      INDICATION: Respiratory failure, pneumonia; I95.9-Hypotension,  unspecified; J96.21-Acute and chronic respiratory failure with hypoxia;  I21.19-ST elevation (STEMI) myocardial infarction involving other  coronary artery of inferior wall; J98.11-Atelectasis.      COMPARISON: 04/02/2019.     FINDINGS: Tracheostomy tube is again noted. Right IJ catheter tip  remains in the mid SVC. Mediastinum is again shifted toward the left  chest. The heart is enlarged. The vasculature appears upper limits of  normal. Left basilar atelectasis and effusion appear stable. There is  mild but new patchy disease of the right base. Upper lungs remain clear.  No pneumothorax is seen.            Impression:       1.  Persistent patchy disease of the left lung base stable or slightly  increased from 04/02/2019 study.  2.  Mild new patchy atelectasis or pneumonia in the right lung base.     D:  04/04/2019  E:  04/04/2019     This report was finalized on 4/4/2019 8:48 PM by DR. Eric Ortiz MD.               Results: Reviewed.  I reviewed the patient's new laboratory and imaging results.  I independently reviewed the patient's new images.    Medications: Reviewed.    Assessment/Plan   A / P     Ms. Junior is a 79yo F with a history of chronic tracheostomy, dysphagia s/p PEG tube placement and noncompliance with a modified diet who presented as a transfer from [facility] for a possible STEMI. She was evaluated by Cardiology who did not feel she was having a STEMI. No LHC was performed as she would not be a candidate for intervention based on her chronic debilitated state. She was admitted to the ICU for medical management. She was found to be profoundly hypercapneic and was placed on mechanical ventilation. Her ABG has improved with mechanical ventilation.     Nutrition:   NPO Diet  Advance Directives:   Code Status and Medical Interventions:   Ordered at: 04/02/19 2015     Code Status:    CPR     Medical Interventions (Level of Support Prior to Arrest):    Full       Active Hospital Problems    Diagnosis   • **Inferior STEMI    • Aortic valve stenosis     ECHO 10/2018:  ·  Left ventricular systolic function is hyperdynamic (EF > 70).  · Left ventricular wall thickness is consistent with moderate concentric hypertrophy.  · Left ventricular diastolic dysfunction (grade I) consistent with impaired relaxation.  · Moderate to severe aortic stenosis (mean gradient 39 mmHg) is present.  · Mild mitral valve regurgitation is present     • Stage 3 CKD   • Former smoker   • Debility   • Dementia   • Class 1 obesity in adult   • Atelectasis of left lung   • Bronchomalacia   • Hypotension     on midodrine     •  Hyperlipidemia   • COPD   • Coronary artery disease   • GERD    • Acute on chronic respiratory failure with hypoxia      Chronic trach      • Dysphagia with chronic PEG    • Type 2 diabetes mellitus        Assessment / Plan:    1. Sputum w/ Proteus and Urine culture w/ ESBL E. Coli.   2. D/c Vancomycin  3. Will likely need to change Aztreonam to a Penem.     4. Trach collar trial today with Pressure support at night.   5. Continue current insulin regimen. Will likely need to increase as tube feeds are advanced.   6. Continue tube feeds.   7. Will need a dysphagia evaluation once on trach collar 24 hours daily.   8. Restart home Klonopin.   9. Add low-dose Oxycodone.    10. AM labs and CXR       Plan of care and goals reviewed during interdisciplinary rounds.  I discussed the patient's findings and my recommendations with patient and nursing staff          Alva Pinedo, DO    Intensive Care Medicine and Pulmonary Medicine

## 2019-04-05 NOTE — PROGRESS NOTES
Continued Stay Note   Sukumar     Patient Name: Nallely Junior  MRN: [id number]  Today's Date: 4/5/2019    Admit Date: 4/2/2019    Discharge Plan     Row Name 04/05/19 1125       Plan    Plan  Ongoing     Patient/Family in Agreement with Plan  yes    Plan Comments  Patient on trach collar trials today. Continue tube feeds via new peg. I visited patient at bedside. She smiled at me and mouthed words appropriately. Plan to place rectal tube today for diarrhea and skin integrity. CM following for discharge needs/plans. Krista @ [phone]     Final Discharge Disposition Code  30 - still a patient        Discharge Codes    No documentation.             Lula Trujillo RN

## 2019-04-05 NOTE — PLAN OF CARE
Problem: Patient Care Overview  Goal: Plan of Care Review  Outcome: Ongoing (interventions implemented as appropriate)   04/05/19 1250   Coping/Psychosocial   Plan of Care Reviewed With patient   OTHER   Outcome Summary PT eval completed patient is dependent with all mobility her base line is totally dependent we will D/C from PT due to no improvement in fuction expected. patient to go back to NH at D/C

## 2019-04-05 NOTE — PLAN OF CARE
Problem: Patient Care Overview  Goal: Plan of Care Review  Outcome: Ongoing (interventions implemented as appropriate)   04/05/19 1622   Coping/Psychosocial   Plan of Care Reviewed With patient   Plan of Care Review   Progress improving   OTHER   Outcome Summary 1. Neuro- Intact. Pt unable to stand d/t muscular wasting (per patient - baseline).     2. Respiratory- Trach collar trials all day. Sats > 95%    3. Cardiac- Sinus rhythm (65-75 bpm) and SBP  mmhg. 500 NS bolus x 1.     4. GI- Replete with Fiber @ 50 mlhr. BM x 8. FMS placed.     5. - Urine 250 ml plus occurrence with BM. (coronado d/c'd in a.m.).        Problem: Skin Injury Risk (Adult)  Goal: Skin Health and Integrity  Outcome: Ongoing (interventions implemented as appropriate)   04/05/19 1622   Skin Injury Risk (Adult)   Skin Health and Integrity making progress toward outcome       Problem: Ventilation, Mechanical Invasive (Adult)  Goal: Signs and Symptoms of Listed Potential Problems Will be Absent, Minimized or Managed (Ventilation, Mechanical Invasive)  Outcome: Ongoing (interventions implemented as appropriate)   04/05/19 1622   Goal/Outcome Evaluation   Problems Assessed (Mechanical Ventilation, Invasive) all   Problems Present (Mech Vent, Invasive) inability to wean;immobility       Problem: Fall Risk (Adult)  Goal: Absence of Fall  Outcome: Ongoing (interventions implemented as appropriate)   04/05/19 1622   Fall Risk (Adult)   Absence of Fall making progress toward outcome

## 2019-04-06 LAB
ALBUMIN SERPL-MCNC: 2.97 G/DL (ref 3.2–4.8)
ALBUMIN/GLOB SERPL: 1.3 G/DL (ref 1.5–2.5)
ALP SERPL-CCNC: 85 U/L (ref 25–100)
ALT SERPL W P-5'-P-CCNC: 100 U/L (ref 7–40)
ANION GAP SERPL CALCULATED.3IONS-SCNC: 5 MMOL/L (ref 3–11)
AST SERPL-CCNC: 34 U/L (ref 0–33)
BACTERIA SPEC RESP CULT: ABNORMAL
BACTERIA SPEC RESP CULT: ABNORMAL
BILIRUB SERPL-MCNC: 0.8 MG/DL (ref 0.3–1.2)
BUN BLD-MCNC: 16 MG/DL (ref 9–23)
BUN/CREAT SERPL: 30.2 (ref 7–25)
CA-I SERPL ISE-MCNC: 1.21 MMOL/L (ref 1.12–1.32)
CALCIUM SPEC-SCNC: 7.7 MG/DL (ref 8.7–10.4)
CHLORIDE SERPL-SCNC: 109 MMOL/L (ref 99–109)
CO2 SERPL-SCNC: 24 MMOL/L (ref 20–31)
CREAT BLD-MCNC: 0.53 MG/DL (ref 0.6–1.3)
DEPRECATED RDW RBC AUTO: 58.2 FL (ref 37–54)
ERYTHROCYTE [DISTWIDTH] IN BLOOD BY AUTOMATED COUNT: 16.3 % (ref 11.3–14.5)
GFR SERPL CREATININE-BSD FRML MDRD: 112 ML/MIN/1.73
GLOBULIN UR ELPH-MCNC: 2.3 GM/DL
GLUCOSE BLD-MCNC: 237 MG/DL (ref 70–100)
GLUCOSE BLDC GLUCOMTR-MCNC: 117 MG/DL (ref 70–130)
GLUCOSE BLDC GLUCOMTR-MCNC: 120 MG/DL (ref 70–130)
GLUCOSE BLDC GLUCOMTR-MCNC: 167 MG/DL (ref 70–130)
GLUCOSE BLDC GLUCOMTR-MCNC: 232 MG/DL (ref 70–130)
GRAM STN SPEC: ABNORMAL
HCT VFR BLD AUTO: 38.6 % (ref 34.5–44)
HGB BLD-MCNC: 11.7 G/DL (ref 11.5–15.5)
MAGNESIUM SERPL-MCNC: 2 MG/DL (ref 1.3–2.7)
MCH RBC QN AUTO: 29.9 PG (ref 27–31)
MCHC RBC AUTO-ENTMCNC: 30.3 G/DL (ref 32–36)
MCV RBC AUTO: 98.7 FL (ref 80–99)
PLATELET # BLD AUTO: 176 10*3/MM3 (ref 150–450)
PMV BLD AUTO: 10.8 FL (ref 6–12)
POTASSIUM BLD-SCNC: 3.7 MMOL/L (ref 3.5–5.5)
PROT SERPL-MCNC: 5.3 G/DL (ref 5.7–8.2)
RBC # BLD AUTO: 3.91 10*6/MM3 (ref 3.89–5.14)
SODIUM BLD-SCNC: 138 MMOL/L (ref 132–146)
WBC NRBC COR # BLD: 10.94 10*3/MM3 (ref 3.5–10.8)

## 2019-04-06 PROCEDURE — 82962 GLUCOSE BLOOD TEST: CPT

## 2019-04-06 PROCEDURE — 63710000001 INSULIN DETEMIR PER 5 UNITS: Performed by: INTERNAL MEDICINE

## 2019-04-06 PROCEDURE — 94799 UNLISTED PULMONARY SVC/PX: CPT

## 2019-04-06 PROCEDURE — 80053 COMPREHEN METABOLIC PANEL: CPT | Performed by: INTERNAL MEDICINE

## 2019-04-06 PROCEDURE — 83735 ASSAY OF MAGNESIUM: CPT | Performed by: INTERNAL MEDICINE

## 2019-04-06 PROCEDURE — 82330 ASSAY OF CALCIUM: CPT | Performed by: INTERNAL MEDICINE

## 2019-04-06 PROCEDURE — 25010000002 ENOXAPARIN PER 10 MG: Performed by: INTERNAL MEDICINE

## 2019-04-06 PROCEDURE — 85027 COMPLETE CBC AUTOMATED: CPT | Performed by: INTERNAL MEDICINE

## 2019-04-06 PROCEDURE — 99233 SBSQ HOSP IP/OBS HIGH 50: CPT | Performed by: INTERNAL MEDICINE

## 2019-04-06 PROCEDURE — 25010000002 ERTAPENEM PER 500 MG: Performed by: INTERNAL MEDICINE

## 2019-04-06 PROCEDURE — 94003 VENT MGMT INPAT SUBQ DAY: CPT

## 2019-04-06 RX ORDER — CLONAZEPAM 0.5 MG/1
0.5 TABLET ORAL EVERY 8 HOURS SCHEDULED
Status: DISPENSED | OUTPATIENT
Start: 2019-04-06 | End: 2019-04-15

## 2019-04-06 RX ADMIN — BUSPIRONE HYDROCHLORIDE 15 MG: 15 TABLET ORAL at 16:55

## 2019-04-06 RX ADMIN — ENOXAPARIN SODIUM 40 MG: 100 INJECTION SUBCUTANEOUS at 08:35

## 2019-04-06 RX ADMIN — PANTOPRAZOLE SODIUM 40 MG: 40 INJECTION, POWDER, FOR SOLUTION INTRAVENOUS at 08:36

## 2019-04-06 RX ADMIN — SODIUM CHLORIDE 75 ML/HR: 9 INJECTION, SOLUTION INTRAVENOUS at 10:18

## 2019-04-06 RX ADMIN — CLONAZEPAM 0.5 MG: 0.5 TABLET ORAL at 21:22

## 2019-04-06 RX ADMIN — OXYCODONE HYDROCHLORIDE 5 MG: 5 TABLET ORAL at 21:22

## 2019-04-06 RX ADMIN — CHLORHEXIDINE GLUCONATE 0.12% ORAL RINSE 15 ML: 1.2 LIQUID ORAL at 21:22

## 2019-04-06 RX ADMIN — MIDODRINE HYDROCHLORIDE 10 MG: 5 TABLET ORAL at 08:35

## 2019-04-06 RX ADMIN — MIDODRINE HYDROCHLORIDE 10 MG: 5 TABLET ORAL at 12:12

## 2019-04-06 RX ADMIN — INSULIN LISPRO 4 UNITS: 100 INJECTION, SOLUTION INTRAVENOUS; SUBCUTANEOUS at 06:33

## 2019-04-06 RX ADMIN — ASPIRIN 81 MG 81 MG: 81 TABLET ORAL at 08:36

## 2019-04-06 RX ADMIN — BUSPIRONE HYDROCHLORIDE 15 MG: 15 TABLET ORAL at 08:36

## 2019-04-06 RX ADMIN — CLONAZEPAM 0.5 MG: 0.5 TABLET ORAL at 15:07

## 2019-04-06 RX ADMIN — IPRATROPIUM BROMIDE AND ALBUTEROL SULFATE 3 ML: 2.5; .5 SOLUTION RESPIRATORY (INHALATION) at 18:50

## 2019-04-06 RX ADMIN — INSULIN LISPRO 2 UNITS: 100 INJECTION, SOLUTION INTRAVENOUS; SUBCUTANEOUS at 18:36

## 2019-04-06 RX ADMIN — CLONAZEPAM 1 MG: 1 TABLET ORAL at 06:31

## 2019-04-06 RX ADMIN — ROPINIROLE 4 MG: 2 TABLET, FILM COATED ORAL at 16:55

## 2019-04-06 RX ADMIN — IPRATROPIUM BROMIDE AND ALBUTEROL SULFATE 3 ML: 2.5; .5 SOLUTION RESPIRATORY (INHALATION) at 01:15

## 2019-04-06 RX ADMIN — ROPINIROLE 4 MG: 2 TABLET, FILM COATED ORAL at 21:22

## 2019-04-06 RX ADMIN — ERTAPENEM SODIUM 1 G: 1 INJECTION, POWDER, LYOPHILIZED, FOR SOLUTION INTRAMUSCULAR; INTRAVENOUS at 12:12

## 2019-04-06 RX ADMIN — SODIUM CHLORIDE 2 G: 9 INJECTION, SOLUTION INTRAVENOUS at 04:15

## 2019-04-06 RX ADMIN — MIDODRINE HYDROCHLORIDE 10 MG: 5 TABLET ORAL at 18:35

## 2019-04-06 RX ADMIN — ROPINIROLE 4 MG: 2 TABLET, FILM COATED ORAL at 08:35

## 2019-04-06 RX ADMIN — INSULIN DETEMIR 12 UNITS: 100 INJECTION, SOLUTION SUBCUTANEOUS at 08:36

## 2019-04-06 RX ADMIN — BUSPIRONE HYDROCHLORIDE 15 MG: 15 TABLET ORAL at 21:22

## 2019-04-06 RX ADMIN — CHLORHEXIDINE GLUCONATE 0.12% ORAL RINSE 15 ML: 1.2 LIQUID ORAL at 08:36

## 2019-04-06 RX ADMIN — IPRATROPIUM BROMIDE AND ALBUTEROL SULFATE 3 ML: 2.5; .5 SOLUTION RESPIRATORY (INHALATION) at 07:07

## 2019-04-06 NOTE — PLAN OF CARE
Problem: Patient Care Overview  Goal: Plan of Care Review  Outcome: Ongoing (interventions implemented as appropriate)   04/06/19 0548   Coping/Psychosocial   Plan of Care Reviewed With patient   Plan of Care Review   Progress no change   OTHER   Outcome Summary No changes overnight. Continues to have frequent loose stool. FMS discontinued d/t continuous leaking. Tolerated trach collar well yesterday, placed on vent on spont. last night. Back on trach collar this AM. Hypotensive again this AM despite maintenance fluids.        Problem: Skin Injury Risk (Adult)  Goal: Identify Related Risk Factors and Signs and Symptoms  Outcome: Ongoing (interventions implemented as appropriate)   04/06/19 0548   Skin Injury Risk (Adult)   Related Risk Factors (Skin Injury Risk) advanced age;critical care admission;mobility impaired;medical devices;mechanical forces     Goal: Skin Health and Integrity  Outcome: Ongoing (interventions implemented as appropriate)   04/06/19 0548   Skin Injury Risk (Adult)   Skin Health and Integrity making progress toward outcome       Problem: Ventilation, Mechanical Invasive (Adult)  Goal: Signs and Symptoms of Listed Potential Problems Will be Absent, Minimized or Managed (Ventilation, Mechanical Invasive)  Outcome: Ongoing (interventions implemented as appropriate)

## 2019-04-06 NOTE — PROGRESS NOTES
"INTENSIVIST NOTE     Hospital Day: 4      Ms. Nallely Junior, 78 y.o. female is followed for:   Acute on chronic respiratory failure       SUBJECTIVE     79yo F with a history of chronic tracheostomy, dysphagia s/p PEG tube placement and noncompliance with a modified diet who presented as a transfer from [facility] for a possible STEMI. She was evaluated by Cardiology who did not feel she was having a STEMI. No LHC was performed as she would not be a candidate for intervention based on her chronic debilitated state. She was admitted to the ICU for medical management. She was found to be profoundly hypercapneic and was placed on mechanical ventilation.  She had atelectasis of the left lung which improved with mechanical ventilation.  She was found to have an ESBL E. coli UTI.    Interval history:    Has remained hypotensive.  Otherwise fever curve has improved.  She is awake and alert and interactive currently but nurses note some drowsiness.  Klonopin was resumed yesterday.    The patient's relevant past medical, surgical and social history were reviewed and updated in Epic as appropriate.       OBJECTIVE     Vital Sign Min/Max for last 24 hours  Temp  Min: 98.3 °F (36.8 °C)  Max: 100.1 °F (37.8 °C)   BP  Min: 78/54  Max: 123/54   Pulse  Min: 61  Max: 97   Resp  Min: 20  Max: 28   SpO2  Min: 93 %  Max: 100 %   No Data Recorded   No Data Recorded      Intake/Output Summary (Last 24 hours) at 4/6/2019 1031  Last data filed at 4/6/2019 1000  Gross per 24 hour   Intake 4493.25 ml   Output 150 ml   Net 4343.25 ml      Flowsheet Rows      First Filed Value   Admission Height  162.6 cm (64.02\") Documented at 04/03/2019 0320   Admission Weight  90.7 kg (200 lb) Documented at 04/02/2019 1800             04/02/19  1800 04/03/19  0320 04/03/19  0714   Weight: 90.7 kg (200 lb) 90.7 kg (199 lb 15.3 oz) 90.7 kg (200 lb)            Objective:  General Appearance:  In no acute distress.    Vital signs: (most recent): Blood " "pressure (!) 84/35, pulse 73, temperature 98.3 °F (36.8 °C), temperature source Oral, resp. rate 24, height 162.6 cm (64\"), weight 90.7 kg (200 lb), SpO2 97 %.    HEENT: Normal HEENT exam.  (Right internal jugular triple lumen catheter  Cuffed Shiley tracheostomy tube)    Lungs:  Normal effort and normal respiratory rate.  Breath sounds clear to auscultation.  She is not in respiratory distress.  No rales, wheezes or rhonchi.    Heart: Normal rate.  Regular rhythm.  S1 normal and S2 normal.  Positive for murmur.  No gallop or friction rub. (Systolic ejection murmur)  Chest: Symmetric chest wall expansion.   Abdomen: Abdomen is soft and non-distended.  (PEG tube in place.).  Bowel sounds are normal.   There is no abdominal tenderness.   There is no mass. There is no splenomegaly. There is no hepatomegaly.   Extremities: There is dependent edema.  There is no deformity.    Neurological: Patient is alert.    Pupils:  Pupils are equal, round, and reactive to light.    Skin:  Warm and dry.              I reviewed the patient's new clinical results.  I reviewed the patient's new imaging results/reports including actual images and agree with reports.      Chest X-Ray: No additional    INFUSIONS    sodium chloride 75 mL/hr Last Rate: 75 mL/hr (04/06/19 1018)       Results from last 7 days   Lab Units 04/06/19  0347 04/05/19  0434 04/04/19  0356   WBC 10*3/mm3 10.94* 11.93* 11.24*   HEMOGLOBIN g/dL 11.7 13.2 13.4   HEMATOCRIT % 38.6 42.8 42.8   PLATELETS 10*3/mm3 176 211 169     Results from last 7 days   Lab Units 04/06/19  0347 04/05/19  0434 04/04/19  0356   SODIUM mmol/L 138 136 139   POTASSIUM mmol/L 3.7 4.4 4.0   CHLORIDE mmol/L 109 104 104   CO2 mmol/L 24.0 21.0 27.0   BUN mg/dL 16 20 18   GLUCOSE mg/dL 237* 159* 175*   CREATININE mg/dL 0.53* 0.66 0.67   MAGNESIUM mg/dL 2.0 1.8  1.8 2.0   CALCIUM mg/dL 7.7* 8.2* 8.3*         Results from last 7 days   Lab Units 04/05/19  0351 04/04/19  0346 04/03/19  0407 " 04/02/19 2141 04/02/19 2008   PH, ARTERIAL pH units 7.527* 7.501* 7.495* 7.355 7.104*   PCO2, ARTERIAL mm Hg 27.8* 38.3 47.5* 58.4* 116.0*   PO2 ART mm Hg 94.1 99.3 132.0* 201.0* 127.0*   FIO2 % 40 40 65 85 100         [facility] Ventilation: FiO2 (%):  [40 %] 40 %  PEEP/CPAP (cm H2O):  [5 cm H20] 5 cm H20  IL SUP:  [10 cm H20] 10 cm H20  MAP (cm H2O):  [7.3-8.5] 8.4    I reviewed the patient's medications.    Assessment/Plan   ASSESSMENT      Active Hospital Problems    Diagnosis   • **Inferior STEMI    • Acute on chronic respiratory failure with hypoxia      Chronic trach      • Hypotension     on midodrine     • COPD   • Atelectasis of left lung   • Aortic valve stenosis     ECHO 10/2018:  ·  Left ventricular systolic function is hyperdynamic (EF > 70).  · Left ventricular wall thickness is consistent with moderate concentric hypertrophy.  · Left ventricular diastolic dysfunction (grade I) consistent with impaired relaxation.  · Moderate to severe aortic stenosis (mean gradient 39 mmHg) is present.  · Mild mitral valve regurgitation is present     • Stage 3 CKD   • Former smoker   • Debility   • Dementia   • Class 1 obesity in adult   • Bronchomalacia   • Hyperlipidemia   • Coronary artery disease   • GERD    • Dysphagia with chronic PEG    • Type 2 diabetes mellitus          78-year-old admitted with what is felt to be a type II MI due to hypotension and LVH/aortic stenosis.  She has chronic hypotension.  Her aortic stenosis is severe.  She has developed acute on chronic respiratory failure requiring mechanical ventilation though is on trach collar during the day.  She is been treated for ESBL E. coli UTI.    We will continue Middaugh drain for chronic hypotension but no evidence of shock clinically so we will not utilize any pressors.  Her renal function and mentation appear adequate.  She is a not a candidate for further measures for her aortic stenosis per cardiology.        PLAN     1. Continue  midodrine  2. Reduce Klonopin dose slightly  3. As long as no evidence of shock, will tolerate hypotension due to severe aortic stenosis  4. Change Azactam to ertapenem  5. Continue tube feeds  6. Continue IV fluids for today but will probably need to reduce these to avoid fluid overload in near future.  7. Pressure support ventilation nightly with goal towards resuming trach collar trials 24 hours/day         I discussed the patient's findings and my recommendations with patient and nursing staff     Plan of care and goals reviewed with multidisciplinary team at daily rounds.    High level of risk due to:  illness with threat to life or bodily function.    Michael Pretty MD  Pulmonary and Critical Care Medicine  04/06/19 10:31 AM

## 2019-04-06 NOTE — PLAN OF CARE
Problem: Patient Care Overview  Goal: Plan of Care Review  Outcome: Ongoing (interventions implemented as appropriate)   04/06/19 1856   Coping/Psychosocial   Plan of Care Reviewed With patient   Plan of Care Review   Progress no change   OTHER   Outcome Summary Pt neuro intact. VSS conts to be hypotensive with midodrine TID. Multiple loose stools, Tollerating trach collar. Cont care in ICU       Problem: Skin Injury Risk (Adult)  Goal: Identify Related Risk Factors and Signs and Symptoms  Outcome: Outcome(s) achieved Date Met: 04/06/19 04/06/19 1856   Skin Injury Risk (Adult)   Related Risk Factors (Skin Injury Risk) critical care admission;mobility impaired;nutritional deficiencies;body weight extremes;advanced age;moisture     Goal: Skin Health and Integrity  Outcome: Ongoing (interventions implemented as appropriate)   04/06/19 1856   Skin Injury Risk (Adult)   Skin Health and Integrity making progress toward outcome       Problem: Ventilation, Mechanical Invasive (Adult)  Goal: Signs and Symptoms of Listed Potential Problems Will be Absent, Minimized or Managed (Ventilation, Mechanical Invasive)  Outcome: Ongoing (interventions implemented as appropriate)   04/06/19 1856   Goal/Outcome Evaluation   Problems Assessed (Mechanical Ventilation, Invasive) all   Problems Present (Mech Vent, Invasive) situational response       Problem: Fall Risk (Adult)  Goal: Identify Related Risk Factors and Signs and Symptoms  Outcome: Outcome(s) achieved Date Met: 04/06/19 04/06/19 1856   Fall Risk (Adult)   Related Risk Factors (Fall Risk) environment unfamiliar;gait/mobility problems;fatigue/slow reaction;confusion/agitation   Signs and Symptoms (Fall Risk) presence of risk factors     Goal: Absence of Fall  Outcome: Ongoing (interventions implemented as appropriate)   04/06/19 1856   Fall Risk (Adult)   Absence of Fall making progress toward outcome

## 2019-04-07 ENCOUNTER — APPOINTMENT (OUTPATIENT)
Dept: GENERAL RADIOLOGY | Facility: HOSPITAL | Age: 79
End: 2019-04-07

## 2019-04-07 LAB
ALBUMIN SERPL-MCNC: 2.91 G/DL (ref 3.2–4.8)
ANION GAP SERPL CALCULATED.3IONS-SCNC: 3 MMOL/L (ref 3–11)
BASOPHILS # BLD AUTO: 0.01 10*3/MM3 (ref 0–0.2)
BASOPHILS NFR BLD AUTO: 0.2 % (ref 0–1)
BUN BLD-MCNC: 13 MG/DL (ref 9–23)
BUN/CREAT SERPL: 31 (ref 7–25)
CALCIUM SPEC-SCNC: 7.8 MG/DL (ref 8.7–10.4)
CHLORIDE SERPL-SCNC: 109 MMOL/L (ref 99–109)
CO2 SERPL-SCNC: 26 MMOL/L (ref 20–31)
CREAT BLD-MCNC: 0.42 MG/DL (ref 0.6–1.3)
DEPRECATED RDW RBC AUTO: 57 FL (ref 37–54)
EOSINOPHIL # BLD AUTO: 0.14 10*3/MM3 (ref 0–0.3)
EOSINOPHIL NFR BLD AUTO: 2.2 % (ref 0–3)
ERYTHROCYTE [DISTWIDTH] IN BLOOD BY AUTOMATED COUNT: 16 % (ref 11.3–14.5)
GFR SERPL CREATININE-BSD FRML MDRD: 146 ML/MIN/1.73
GLUCOSE BLD-MCNC: 202 MG/DL (ref 70–100)
GLUCOSE BLDC GLUCOMTR-MCNC: 145 MG/DL (ref 70–130)
GLUCOSE BLDC GLUCOMTR-MCNC: 166 MG/DL (ref 70–130)
GLUCOSE BLDC GLUCOMTR-MCNC: 189 MG/DL (ref 70–130)
GLUCOSE BLDC GLUCOMTR-MCNC: 204 MG/DL (ref 70–130)
GLUCOSE BLDC GLUCOMTR-MCNC: 207 MG/DL (ref 70–130)
HCT VFR BLD AUTO: 34.9 % (ref 34.5–44)
HGB BLD-MCNC: 10.9 G/DL (ref 11.5–15.5)
IMM GRANULOCYTES # BLD AUTO: 0.01 10*3/MM3 (ref 0–0.05)
IMM GRANULOCYTES NFR BLD AUTO: 0.2 % (ref 0–0.6)
LYMPHOCYTES # BLD AUTO: 1.91 10*3/MM3 (ref 0.6–4.8)
LYMPHOCYTES NFR BLD AUTO: 29.6 % (ref 24–44)
MAGNESIUM SERPL-MCNC: 1.9 MG/DL (ref 1.3–2.7)
MCH RBC QN AUTO: 30.4 PG (ref 27–31)
MCHC RBC AUTO-ENTMCNC: 31.2 G/DL (ref 32–36)
MCV RBC AUTO: 97.5 FL (ref 80–99)
MONOCYTES # BLD AUTO: 0.68 10*3/MM3 (ref 0–1)
MONOCYTES NFR BLD AUTO: 10.5 % (ref 0–12)
NEUTROPHILS # BLD AUTO: 3.7 10*3/MM3 (ref 1.5–8.3)
NEUTROPHILS NFR BLD AUTO: 57.3 % (ref 41–71)
PHOSPHATE SERPL-MCNC: 2.2 MG/DL (ref 2.4–5.1)
PLATELET # BLD AUTO: 146 10*3/MM3 (ref 150–450)
PMV BLD AUTO: 10.5 FL (ref 6–12)
POTASSIUM BLD-SCNC: 3.7 MMOL/L (ref 3.5–5.5)
RBC # BLD AUTO: 3.58 10*6/MM3 (ref 3.89–5.14)
SODIUM BLD-SCNC: 138 MMOL/L (ref 132–146)
WBC NRBC COR # BLD: 6.45 10*3/MM3 (ref 3.5–10.8)

## 2019-04-07 PROCEDURE — 82962 GLUCOSE BLOOD TEST: CPT

## 2019-04-07 PROCEDURE — 83735 ASSAY OF MAGNESIUM: CPT | Performed by: INTERNAL MEDICINE

## 2019-04-07 PROCEDURE — 63710000001 INSULIN DETEMIR PER 5 UNITS: Performed by: INTERNAL MEDICINE

## 2019-04-07 PROCEDURE — 63710000001 INSULIN LISPRO (HUMAN) PER 5 UNITS: Performed by: INTERNAL MEDICINE

## 2019-04-07 PROCEDURE — 80069 RENAL FUNCTION PANEL: CPT | Performed by: INTERNAL MEDICINE

## 2019-04-07 PROCEDURE — 99233 SBSQ HOSP IP/OBS HIGH 50: CPT | Performed by: INTERNAL MEDICINE

## 2019-04-07 PROCEDURE — 93010 ELECTROCARDIOGRAM REPORT: CPT | Performed by: INTERNAL MEDICINE

## 2019-04-07 PROCEDURE — 94799 UNLISTED PULMONARY SVC/PX: CPT

## 2019-04-07 PROCEDURE — 93005 ELECTROCARDIOGRAM TRACING: CPT | Performed by: INTERNAL MEDICINE

## 2019-04-07 PROCEDURE — 25010000002 ERTAPENEM PER 500 MG: Performed by: INTERNAL MEDICINE

## 2019-04-07 PROCEDURE — 85025 COMPLETE CBC W/AUTO DIFF WBC: CPT | Performed by: INTERNAL MEDICINE

## 2019-04-07 PROCEDURE — 94003 VENT MGMT INPAT SUBQ DAY: CPT

## 2019-04-07 PROCEDURE — 71045 X-RAY EXAM CHEST 1 VIEW: CPT

## 2019-04-07 PROCEDURE — 25010000002 ENOXAPARIN PER 10 MG: Performed by: INTERNAL MEDICINE

## 2019-04-07 RX ADMIN — PANTOPRAZOLE SODIUM 40 MG: 40 INJECTION, POWDER, FOR SOLUTION INTRAVENOUS at 08:39

## 2019-04-07 RX ADMIN — ENOXAPARIN SODIUM 40 MG: 100 INJECTION SUBCUTANEOUS at 08:39

## 2019-04-07 RX ADMIN — ROPINIROLE 4 MG: 2 TABLET, FILM COATED ORAL at 08:38

## 2019-04-07 RX ADMIN — MIDODRINE HYDROCHLORIDE 10 MG: 5 TABLET ORAL at 08:38

## 2019-04-07 RX ADMIN — CLONAZEPAM 0.5 MG: 0.5 TABLET ORAL at 21:12

## 2019-04-07 RX ADMIN — IPRATROPIUM BROMIDE AND ALBUTEROL SULFATE 3 ML: 2.5; .5 SOLUTION RESPIRATORY (INHALATION) at 01:46

## 2019-04-07 RX ADMIN — BUSPIRONE HYDROCHLORIDE 15 MG: 15 TABLET ORAL at 16:54

## 2019-04-07 RX ADMIN — ASPIRIN 81 MG 81 MG: 81 TABLET ORAL at 08:38

## 2019-04-07 RX ADMIN — IPRATROPIUM BROMIDE AND ALBUTEROL SULFATE 3 ML: 2.5; .5 SOLUTION RESPIRATORY (INHALATION) at 18:53

## 2019-04-07 RX ADMIN — IPRATROPIUM BROMIDE AND ALBUTEROL SULFATE 3 ML: 2.5; .5 SOLUTION RESPIRATORY (INHALATION) at 14:11

## 2019-04-07 RX ADMIN — CLONAZEPAM 0.5 MG: 0.5 TABLET ORAL at 14:58

## 2019-04-07 RX ADMIN — MAGNESIUM SULFATE HEPTAHYDRATE 4 G: 40 INJECTION, SOLUTION INTRAVENOUS at 12:41

## 2019-04-07 RX ADMIN — MIDODRINE HYDROCHLORIDE 10 MG: 5 TABLET ORAL at 17:04

## 2019-04-07 RX ADMIN — ROPINIROLE 4 MG: 2 TABLET, FILM COATED ORAL at 16:54

## 2019-04-07 RX ADMIN — MIDODRINE HYDROCHLORIDE 10 MG: 5 TABLET ORAL at 12:26

## 2019-04-07 RX ADMIN — BUSPIRONE HYDROCHLORIDE 15 MG: 15 TABLET ORAL at 08:38

## 2019-04-07 RX ADMIN — INSULIN DETEMIR 12 UNITS: 100 INJECTION, SOLUTION SUBCUTANEOUS at 08:39

## 2019-04-07 RX ADMIN — CLONAZEPAM 0.5 MG: 0.5 TABLET ORAL at 05:51

## 2019-04-07 RX ADMIN — IPRATROPIUM BROMIDE AND ALBUTEROL SULFATE 3 ML: 2.5; .5 SOLUTION RESPIRATORY (INHALATION) at 06:57

## 2019-04-07 RX ADMIN — ERTAPENEM SODIUM 1 G: 1 INJECTION, POWDER, LYOPHILIZED, FOR SOLUTION INTRAMUSCULAR; INTRAVENOUS at 12:26

## 2019-04-07 RX ADMIN — BUSPIRONE HYDROCHLORIDE 15 MG: 15 TABLET ORAL at 20:34

## 2019-04-07 RX ADMIN — INSULIN DETEMIR 10 UNITS: 100 INJECTION, SOLUTION SUBCUTANEOUS at 20:35

## 2019-04-07 RX ADMIN — INSULIN LISPRO 2 UNITS: 100 INJECTION, SOLUTION INTRAVENOUS; SUBCUTANEOUS at 12:27

## 2019-04-07 RX ADMIN — SODIUM CHLORIDE 75 ML/HR: 9 INJECTION, SOLUTION INTRAVENOUS at 12:26

## 2019-04-07 RX ADMIN — CHLORHEXIDINE GLUCONATE 0.12% ORAL RINSE 15 ML: 1.2 LIQUID ORAL at 08:39

## 2019-04-07 RX ADMIN — CHLORHEXIDINE GLUCONATE 0.12% ORAL RINSE 15 ML: 1.2 LIQUID ORAL at 20:34

## 2019-04-07 RX ADMIN — INSULIN LISPRO 4 UNITS: 100 INJECTION, SOLUTION INTRAVENOUS; SUBCUTANEOUS at 00:36

## 2019-04-07 RX ADMIN — INSULIN LISPRO 4 UNITS: 100 INJECTION, SOLUTION INTRAVENOUS; SUBCUTANEOUS at 05:59

## 2019-04-07 RX ADMIN — ROPINIROLE 4 MG: 2 TABLET, FILM COATED ORAL at 20:34

## 2019-04-07 RX ADMIN — INSULIN LISPRO 2 UNITS: 100 INJECTION, SOLUTION INTRAVENOUS; SUBCUTANEOUS at 18:17

## 2019-04-07 NOTE — PROGRESS NOTES
Intensive Care Follow-up      LOS: 5 days     Ms. Nallely Junior, 78 y.o. female is followed for: Acute ST elevation myocardial infarction (STEMI) of inferior wall (CMS/HCC)     Subjective - Interval History     77yo F with a history of chronic tracheostomy, dysphagia s/p PEG tube placement and noncompliance with a modified diet who presented as a transfer from [facility] for a possible STEMI. She was evaluated by Cardiology who did not feel she was having a STEMI. No LHC was performed as she would not be a candidate for intervention based on her chronic debilitated state. She was admitted to the ICU for medical management. She was found to be profoundly hypercapneic and was placed on mechanical ventilation.  She had atelectasis of the left lung which improved with mechanical ventilation.  She was found to have an ESBL E. coli UTI.     Interval history:     Temperature has improved  Currently on Invanz for ESBL E. coli and Proteus  Tolerating tracheostomy collar for 12 hours.  Blood sugars remain elevated  Tolerating tube feeding  Would like to eat         The patient's relevant past medical, surgical and social history were reviewed and updated in Epic as appropriate.     Objective     Infusions:    sodium chloride 75 mL/hr Last Rate: 75 mL/hr (04/07/19 1226)     Medications:    aspirin 81 mg Oral Daily   busPIRone 15 mg Oral TID   chlorhexidine 15 mL Mouth/Throat Q12H   clonazePAM 0.5 mg Oral Q8H   enoxaparin 40 mg Subcutaneous Q24H   ertapenem 1 g Intravenous Q24H   insulin detemir 10 Units Subcutaneous Q12H   insulin lispro 0-9 Units Subcutaneous Q6H   ipratropium-albuterol 3 mL Nebulization Q6H - RT   midodrine 10 mg Oral TID   pantoprazole 40 mg Intravenous Q24H   rOPINIRole 4 mg Oral TID     Intake/Output       04/06/19 0700 - 04/07/19 0659 04/07/19 0700 - 04/08/19 0659    Intake (ml) 3673 1880    Output (ml) -- 100    Net (ml) 3673 1780        Vital Sign Min/Max for last 24 hours  Temp  Min: 97.8 °F  (36.6 °C)  Max: 99.1 °F (37.3 °C)   BP  Min: 84/48  Max: 112/47   Pulse  Min: 58  Max: 74   Resp  Min: 17  Max: 28   SpO2  Min: 94 %  Max: 99 %   Flow (L/min)  Min: 14  Max: 14        Physical Exam:   GENERAL: Awake, no distress   HEENT: Tracheostomy site okay.  Right IJ triple-lumen catheter site okay   LUNGS: Decreased breath sounds without wheezes   HEART: Regular rate and rhythm without murmurs   GI: Soft, nontender.  PEG site okay   EXTREMITIES: Trace edema.  Palpable pulses.  No clubbing   NEURO/PSYCH: Awake and alert.  Follows commands    Results from last 7 days   Lab Units 04/07/19  0426 04/06/19 0347 04/05/19 0434   WBC 10*3/mm3 6.45 10.94* 11.93*   HEMOGLOBIN g/dL 10.9* 11.7 13.2   PLATELETS 10*3/mm3 146* 176 211     Results from last 7 days   Lab Units 04/07/19 0426 04/06/19 0347 04/05/19  0434 04/04/19  0356   SODIUM mmol/L 138 138 136 139   POTASSIUM mmol/L 3.7 3.7 4.4 4.0   CO2 mmol/L 26.0 24.0 21.0 27.0   BUN mg/dL 13 16 20 18   CREATININE mg/dL 0.42* 0.53* 0.66 0.67   MAGNESIUM mg/dL 1.9 2.0 1.8  1.8 2.0   PHOSPHORUS mg/dL 2.2*  --  2.5  2.5 2.0*   GLUCOSE mg/dL 202* 237* 159* 175*     Estimated Creatinine Clearance: 63.2 mL/min (A) (by C-G formula based on SCr of 0.42 mg/dL (L)).    Results from last 7 days   Lab Units 04/02/19  2018   HEMOGLOBIN A1C % 6.70*       Results from last 7 days   Lab Units 04/05/19  0351   PH, ARTERIAL pH units 7.527*   PCO2, ARTERIAL mm Hg 27.8*   PO2 ART mm Hg 94.1     Lab Results   Component Value Date    LACTATE 1.1 04/02/2019          Images: Chest x-ray reveals some clearing in right basilar infiltrate    I reviewed the patient's results and images.     Impression      Active Hospital Problems    Diagnosis   • **Inferior STEMI    • Aortic valve stenosis     ECHO 10/2018:  ·  Left ventricular systolic function is hyperdynamic (EF > 70).  · Left ventricular wall thickness is consistent with moderate concentric hypertrophy.  · Left ventricular diastolic  dysfunction (grade I) consistent with impaired relaxation.  · Moderate to severe aortic stenosis (mean gradient 39 mmHg) is present.  · Mild mitral valve regurgitation is present     • Stage 3 CKD   • Former smoker   • Debility   • Dementia   • Class 1 obesity in adult   • Atelectasis of left lung   • Bronchomalacia   • Hypotension     on midodrine     • Hyperlipidemia   • COPD   • Coronary artery disease   • GERD    • Acute on chronic respiratory failure with hypoxia      Chronic trach      • Dysphagia with chronic PEG    • Type 2 diabetes mellitus             Plan        Complete course of antibiotics for Proteus and E. coli  Dysphasia evaluation.  Transition from tube feeding to oral intake if passes dysphagia evaluation  Increase tracheostomy collar times 24 hours a day  Increase long-acting insulin for better blood sugar control     Plan of care and goals reviewed with Multidisciplinary Team and Antibiotic Stewardship rounds   I discussed the patient's findings and my recommendations with patient and nursing staff      High level of risk due to:  severe exacerbation of chronic illness and illness with threat to life or bodily function.     MONIK Rabago MD  Pulmonary and Critical Care Medicine

## 2019-04-07 NOTE — PLAN OF CARE
Problem: Patient Care Overview  Goal: Plan of Care Review  Outcome: Ongoing (interventions implemented as appropriate)   04/07/19 1604   Coping/Psychosocial   Plan of Care Reviewed With patient   Plan of Care Review   Progress no change   OTHER   Outcome Summary Pt VSS, improved SBP with midodrine. Follows commands, tolerating trach collar back on vent if needed. Tube feeding at goal, pt complains of hunger and wanting food. Speech to be consulted for dysphagia? Pt incontinent of bowl and bladder. cont care in ICU       Problem: Skin Injury Risk (Adult)  Goal: Skin Health and Integrity  Outcome: Ongoing (interventions implemented as appropriate)   04/07/19 1604   Skin Injury Risk (Adult)   Skin Health and Integrity making progress toward outcome       Problem: Ventilation, Mechanical Invasive (Adult)  Goal: Signs and Symptoms of Listed Potential Problems Will be Absent, Minimized or Managed (Ventilation, Mechanical Invasive)  Outcome: Ongoing (interventions implemented as appropriate)   04/07/19 1604   Goal/Outcome Evaluation   Problems Assessed (Mechanical Ventilation, Invasive) all   Problems Present (Mech Vent, Invasive) situational response       Problem: Fall Risk (Adult)  Goal: Absence of Fall  Outcome: Ongoing (interventions implemented as appropriate)   04/07/19 1604   Fall Risk (Adult)   Absence of Fall making progress toward outcome

## 2019-04-07 NOTE — PLAN OF CARE
Problem: Patient Care Overview  Goal: Plan of Care Review  Outcome: Ongoing (interventions implemented as appropriate)   04/07/19 0622   Coping/Psychosocial   Plan of Care Reviewed With patient   Plan of Care Review   Progress improving   OTHER   Outcome Summary Pt. vitals stable and denies pain. SBP has been 80's-100's. BM x3 during shift, tollerated trach collar, will continue to monitor. See orders/notes.

## 2019-04-08 ENCOUNTER — APPOINTMENT (OUTPATIENT)
Dept: GENERAL RADIOLOGY | Facility: HOSPITAL | Age: 79
End: 2019-04-08

## 2019-04-08 LAB
ALBUMIN SERPL-MCNC: 3.02 G/DL (ref 3.2–4.8)
ALP SERPL-CCNC: 70 U/L (ref 25–100)
ALT SERPL W P-5'-P-CCNC: 55 U/L (ref 7–40)
ANION GAP SERPL CALCULATED.3IONS-SCNC: 4 MMOL/L (ref 3–11)
ARTERIAL PATENCY WRIST A: ABNORMAL
AST SERPL-CCNC: 21 U/L (ref 0–33)
ATMOSPHERIC PRESS: ABNORMAL MMHG
BASE EXCESS BLDA CALC-SCNC: 3.8 MMOL/L (ref 0–2)
BDY SITE: ABNORMAL
BILIRUB SERPL-MCNC: 0.4 MG/DL (ref 0.3–1.2)
BODY TEMPERATURE: 37 C
BUN BLD-MCNC: 10 MG/DL (ref 9–23)
CA-I SERPL ISE-MCNC: 1.27 MMOL/L (ref 1.12–1.32)
CALCIUM SPEC-SCNC: 8.2 MG/DL (ref 8.7–10.4)
CHLORIDE SERPL-SCNC: 111 MMOL/L (ref 99–109)
CHOLEST SERPL-MCNC: 87 MG/DL (ref 0–200)
CO2 BLDA-SCNC: 32.7 MMOL/L (ref 23–27)
CO2 SERPL-SCNC: 28 MMOL/L (ref 20–31)
COHGB MFR BLD: 1.1 % (ref 0–2)
CREAT BLD-MCNC: 0.34 MG/DL (ref 0.6–1.3)
CRP SERPL-MCNC: 1.67 MG/DL (ref 0–0.5)
DEPRECATED RDW RBC AUTO: 57 FL (ref 37–54)
ERYTHROCYTE [DISTWIDTH] IN BLOOD BY AUTOMATED COUNT: 16 % (ref 11.3–14.5)
GLUCOSE BLD-MCNC: 144 MG/DL (ref 70–100)
GLUCOSE BLDC GLUCOMTR-MCNC: 121 MG/DL (ref 70–130)
GLUCOSE BLDC GLUCOMTR-MCNC: 125 MG/DL (ref 70–130)
GLUCOSE BLDC GLUCOMTR-MCNC: 157 MG/DL (ref 70–130)
GLUCOSE BLDC GLUCOMTR-MCNC: 172 MG/DL (ref 70–130)
HCO3 BLDA-SCNC: 30.9 MMOL/L (ref 20–26)
HCT VFR BLD AUTO: 35.6 % (ref 34.5–44)
HCT VFR BLD CALC: 34.6 %
HGB BLD-MCNC: 11 G/DL (ref 11.5–15.5)
HGB BLDA-MCNC: 11.3 G/DL (ref 14–18)
HOROWITZ INDEX BLD+IHG-RTO: 35 %
MAGNESIUM SERPL-MCNC: 2.4 MG/DL (ref 1.3–2.7)
MAGNESIUM SERPL-MCNC: 2.4 MG/DL (ref 1.3–2.7)
MCH RBC QN AUTO: 30.2 PG (ref 27–31)
MCHC RBC AUTO-ENTMCNC: 30.9 G/DL (ref 32–36)
MCV RBC AUTO: 97.8 FL (ref 80–99)
METHGB BLD QL: 0.8 % (ref 0–1.5)
MODALITY: ABNORMAL
NOTE: ABNORMAL
OXYHGB MFR BLDV: 95.6 % (ref 94–99)
PCO2 BLDA: 58.3 MM HG (ref 35–45)
PCO2 TEMP ADJ BLD: 58.3 MM HG (ref 35–45)
PH BLDA: 7.33 PH UNITS (ref 7.35–7.45)
PH, TEMP CORRECTED: 7.33 PH UNITS
PHOSPHATE SERPL-MCNC: 2.2 MG/DL (ref 2.4–5.1)
PHOSPHATE SERPL-MCNC: 2.2 MG/DL (ref 2.4–5.1)
PLATELET # BLD AUTO: 156 10*3/MM3 (ref 150–450)
PMV BLD AUTO: 10.4 FL (ref 6–12)
PO2 BLDA: 88.5 MM HG (ref 83–108)
PO2 TEMP ADJ BLD: 88.5 MM HG (ref 83–108)
POTASSIUM BLD-SCNC: 3.9 MMOL/L (ref 3.5–5.5)
PREALB SERPL-MCNC: 12 MG/DL (ref 20–40)
PROT SERPL-MCNC: 5.5 G/DL (ref 5.7–8.2)
RBC # BLD AUTO: 3.64 10*6/MM3 (ref 3.89–5.14)
SODIUM BLD-SCNC: 143 MMOL/L (ref 132–146)
TRIGL SERPL-MCNC: 101 MG/DL (ref 0–150)
VENTILATOR MODE: ABNORMAL
WBC NRBC COR # BLD: 6.23 10*3/MM3 (ref 3.5–10.8)

## 2019-04-08 PROCEDURE — 83735 ASSAY OF MAGNESIUM: CPT

## 2019-04-08 PROCEDURE — 86140 C-REACTIVE PROTEIN: CPT

## 2019-04-08 PROCEDURE — 84478 ASSAY OF TRIGLYCERIDES: CPT

## 2019-04-08 PROCEDURE — 94799 UNLISTED PULMONARY SVC/PX: CPT

## 2019-04-08 PROCEDURE — 85027 COMPLETE CBC AUTOMATED: CPT | Performed by: INTERNAL MEDICINE

## 2019-04-08 PROCEDURE — 99233 SBSQ HOSP IP/OBS HIGH 50: CPT | Performed by: INTERNAL MEDICINE

## 2019-04-08 PROCEDURE — 82962 GLUCOSE BLOOD TEST: CPT

## 2019-04-08 PROCEDURE — 84100 ASSAY OF PHOSPHORUS: CPT | Performed by: INTERNAL MEDICINE

## 2019-04-08 PROCEDURE — 84134 ASSAY OF PREALBUMIN: CPT

## 2019-04-08 PROCEDURE — 25010000002 ERTAPENEM PER 500 MG: Performed by: INTERNAL MEDICINE

## 2019-04-08 PROCEDURE — 82465 ASSAY BLD/SERUM CHOLESTEROL: CPT

## 2019-04-08 PROCEDURE — 82330 ASSAY OF CALCIUM: CPT | Performed by: INTERNAL MEDICINE

## 2019-04-08 PROCEDURE — 63710000001 INSULIN DETEMIR PER 5 UNITS: Performed by: INTERNAL MEDICINE

## 2019-04-08 PROCEDURE — 93005 ELECTROCARDIOGRAM TRACING: CPT | Performed by: INTERNAL MEDICINE

## 2019-04-08 PROCEDURE — 36600 WITHDRAWAL OF ARTERIAL BLOOD: CPT

## 2019-04-08 PROCEDURE — 83735 ASSAY OF MAGNESIUM: CPT | Performed by: INTERNAL MEDICINE

## 2019-04-08 PROCEDURE — 71045 X-RAY EXAM CHEST 1 VIEW: CPT

## 2019-04-08 PROCEDURE — 92597 ORAL SPEECH DEVICE EVAL: CPT

## 2019-04-08 PROCEDURE — 25010000002 ENOXAPARIN PER 10 MG: Performed by: INTERNAL MEDICINE

## 2019-04-08 PROCEDURE — L8501 TRACHEOSTOMY SPEAKING VALVE: HCPCS

## 2019-04-08 PROCEDURE — 93010 ELECTROCARDIOGRAM REPORT: CPT | Performed by: INTERNAL MEDICINE

## 2019-04-08 PROCEDURE — 80053 COMPREHEN METABOLIC PANEL: CPT

## 2019-04-08 PROCEDURE — 84100 ASSAY OF PHOSPHORUS: CPT

## 2019-04-08 PROCEDURE — 92610 EVALUATE SWALLOWING FUNCTION: CPT

## 2019-04-08 PROCEDURE — 82805 BLOOD GASES W/O2 SATURATION: CPT

## 2019-04-08 RX ADMIN — ROPINIROLE 4 MG: 2 TABLET, FILM COATED ORAL at 18:21

## 2019-04-08 RX ADMIN — ROPINIROLE 4 MG: 2 TABLET, FILM COATED ORAL at 20:41

## 2019-04-08 RX ADMIN — INSULIN LISPRO 2 UNITS: 100 INJECTION, SOLUTION INTRAVENOUS; SUBCUTANEOUS at 05:52

## 2019-04-08 RX ADMIN — INSULIN DETEMIR 10 UNITS: 100 INJECTION, SOLUTION SUBCUTANEOUS at 08:18

## 2019-04-08 RX ADMIN — CLONAZEPAM 0.5 MG: 0.5 TABLET ORAL at 14:55

## 2019-04-08 RX ADMIN — MIDODRINE HYDROCHLORIDE 10 MG: 5 TABLET ORAL at 18:21

## 2019-04-08 RX ADMIN — ERTAPENEM SODIUM 1 G: 1 INJECTION, POWDER, LYOPHILIZED, FOR SOLUTION INTRAMUSCULAR; INTRAVENOUS at 12:28

## 2019-04-08 RX ADMIN — IPRATROPIUM BROMIDE AND ALBUTEROL SULFATE 3 ML: 2.5; .5 SOLUTION RESPIRATORY (INHALATION) at 19:15

## 2019-04-08 RX ADMIN — CLONAZEPAM 0.5 MG: 0.5 TABLET ORAL at 05:52

## 2019-04-08 RX ADMIN — IPRATROPIUM BROMIDE AND ALBUTEROL SULFATE 3 ML: 2.5; .5 SOLUTION RESPIRATORY (INHALATION) at 00:54

## 2019-04-08 RX ADMIN — CLONAZEPAM 0.5 MG: 0.5 TABLET ORAL at 21:50

## 2019-04-08 RX ADMIN — ENOXAPARIN SODIUM 40 MG: 100 INJECTION SUBCUTANEOUS at 08:16

## 2019-04-08 RX ADMIN — CHLORHEXIDINE GLUCONATE 0.12% ORAL RINSE 15 ML: 1.2 LIQUID ORAL at 20:42

## 2019-04-08 RX ADMIN — MIDODRINE HYDROCHLORIDE 10 MG: 5 TABLET ORAL at 09:39

## 2019-04-08 RX ADMIN — IPRATROPIUM BROMIDE AND ALBUTEROL SULFATE 3 ML: 2.5; .5 SOLUTION RESPIRATORY (INHALATION) at 12:03

## 2019-04-08 RX ADMIN — CHLORHEXIDINE GLUCONATE 0.12% ORAL RINSE 15 ML: 1.2 LIQUID ORAL at 08:16

## 2019-04-08 RX ADMIN — BUSPIRONE HYDROCHLORIDE 15 MG: 15 TABLET ORAL at 08:16

## 2019-04-08 RX ADMIN — ROPINIROLE 4 MG: 2 TABLET, FILM COATED ORAL at 08:18

## 2019-04-08 RX ADMIN — BUSPIRONE HYDROCHLORIDE 15 MG: 15 TABLET ORAL at 20:40

## 2019-04-08 RX ADMIN — ASPIRIN 81 MG 81 MG: 81 TABLET ORAL at 08:16

## 2019-04-08 RX ADMIN — PANTOPRAZOLE SODIUM 40 MG: 40 INJECTION, POWDER, FOR SOLUTION INTRAVENOUS at 08:17

## 2019-04-08 RX ADMIN — BUSPIRONE HYDROCHLORIDE 15 MG: 15 TABLET ORAL at 18:20

## 2019-04-08 RX ADMIN — IPRATROPIUM BROMIDE AND ALBUTEROL SULFATE 3 ML: 2.5; .5 SOLUTION RESPIRATORY (INHALATION) at 07:32

## 2019-04-08 RX ADMIN — MIDODRINE HYDROCHLORIDE 10 MG: 5 TABLET ORAL at 14:56

## 2019-04-08 RX ADMIN — INSULIN DETEMIR 10 UNITS: 100 INJECTION, SOLUTION SUBCUTANEOUS at 21:50

## 2019-04-08 RX ADMIN — INSULIN LISPRO 2 UNITS: 100 INJECTION, SOLUTION INTRAVENOUS; SUBCUTANEOUS at 12:27

## 2019-04-08 RX ADMIN — OXYCODONE HYDROCHLORIDE 5 MG: 5 TABLET ORAL at 16:40

## 2019-04-08 NOTE — PLAN OF CARE
Problem: Patient Care Overview  Goal: Plan of Care Review  Outcome: Ongoing (interventions implemented as appropriate)   04/08/19 5936   Coping/Psychosocial   Plan of Care Reviewed With patient   SLP PMV and clinical dysphagia evaluation completed. Will address communication/voice impairment 2' trach during PMV treatment and r/o pharyngeal dysphagia w/ FEES tomorrow. Please see note for further details and recommendations.

## 2019-04-08 NOTE — PROGRESS NOTES
"Nutrition Services    Patient Name:  Nallely Junior  YOB: 1940  MRN: [id number]  Admit Date:  4/2/2019                      Clinical Nutrition     Nutrition Assessment  Reason for Visit:   JARRETT, CHELO      Patient Name: Nallely Junior  YOB: 1940  MRN: [id number]  Date of Encounter: 04/08/19 6:54 PM  Admission date: 4/2/2019      Comments: TF at goal      Nutrition Assessment     Hospital Problem List    Inferior STEMI     Type 2 diabetes mellitus     Hypotension    Hyperlipidemia    COPD    Dysphagia with chronic PEG     Coronary artery disease    GERD     Acute on chronic respiratory failure with hypoxia     Aortic valve stenosis    Stage 3 CKD    Former smoker    Debility    Dementia    Class 1 obesity in adult    Atelectasis of left lung    Bronchomalacia  Note: no MI            Pt is full code             Resp F on vent (4/3)            Trach collar (4/5)    PMH: She  has a past medical history of Anemia, Anxiety, Aortic stenosis, CHF (congestive heart failure) (CMS/HCC), Chronic respiratory failure with hypoxia and hypercapnia (CMS/HCC), CKD (chronic kidney disease), stage III (CMS/HCC), Constipation, COPD (chronic obstructive pulmonary disease) (CMS/HCC), Coronary artery disease, Dementia, Depression, Diabetes mellitus (CMS/HCC), Dysphagia, Femur fracture, right (CMS/HCC), GERD (gastroesophageal reflux disease), Hyperlipidemia, Hypertension, Hypotension, and Restless legs syndrome (RLS).   PSxH: She  has a past surgical history that includes Tracheostomy tube placement; Peg Tube Insertion; Leg Surgery; and Forearm surgery.     Other: Note hx of refusing TF and of non-compliance w Dys IV diet nectar thick liq              Hx MRSA, PNA              (4/4) 20 Fr GONSALO tube in place    Reported/Observed/Food/Nutrition Related History:     Stool no longer liquid now \"pastey\" MD to discontinue IVF, pressure appears okay.     Anthropometrics     Height: 162.6 cm (64\")  Last filed wt: Weight: " 90.7 kg (200 lb) (04/03/19 0714)  Weight Method: Estimated    BMI: BMI (Calculated): 34.3  Obese Class I: 30-34.9kg/m2    Ideal Body Weight (IBW) (kg): 55    Weight Change   UBW: note from NH in Oct, 177 lb and here in Oct bed wt 166lb      Labs reviewed     Results from last 7 days   Lab Units 04/08/19  0357 04/07/19 0426 04/06/19 0347 04/05/19  0434   GLUCOSE mg/dL 144* 202* 237* 159*   BUN mg/dL 10 13 16 20   CREATININE mg/dL 0.34* 0.42* 0.53* 0.66   SODIUM mmol/L 143 138 138 136   CHLORIDE mmol/L 111* 109 109 104   POTASSIUM mmol/L 3.9 3.7 3.7 4.4   PHOSPHORUS mg/dL 2.2*  2.2* 2.2*  --  2.5  2.5   MAGNESIUM mg/dL 2.4  2.4 1.9 2.0 1.8  1.8   ALT (SGPT) U/L 55*  --  100* 171*     Results from last 7 days   Lab Units 04/08/19  1121 04/08/19  0357 04/07/19 0426 04/06/19  0347 04/05/19  0434  04/02/19 2018   ALBUMIN g/dL  --  3.02* 2.91* 2.97* 3.28   < > 3.49   PREALBUMIN mg/dL 12.0*  --   --   --   --   --   --    CRP mg/dL 1.67*  --   --   --   --   --   --    IONIZED CALCIUM mmol/L  --  1.27  --  1.21  --   --   --    CHOLESTEROL mg/dL  --  87  --   --  92  --  86   TRIGLYCERIDES mg/dL  --  101  --   --  158*  --  148    < > = values in this interval not displayed.         Results from last 7 days   Lab Units 04/08/19  1802 04/08/19  1126 04/08/19  0552 04/07/19  2343 04/07/19  1753 04/07/19  1209   GLUCOSE mg/dL 121 172* 157* 145* 166* 189*     Lab Results   Lab Value Date/Time    HGBA1C 6.70 (H) 04/02/2019 2018    HGBA1C 8.90 (H) 10/06/2018 2031         Medications reviewed   Pertinent: insulin, Abx protonix, bisacodyl, K+ phosphate, MgSo4,      Applicable medical tests/Procedures since admission: bronch, PEG replaced, trach collar    Intake/Ouptut 24 hrs (7:00AM - 6:59 AM)     Intake & Output (last day)       04/07 0701 - 04/08 0700 04/08 0701 - 04/09 0700    I.V. (mL/kg) 1764 (19.4)     Other 241 1022    NG/GT 1291 191    IV Piggyback 50     Total Intake(mL/kg) 3346 (36.9) 1213 (13.4)    Urine  (mL/kg/hr) 385 (0.2) 950 (0.9)    Stool  0    Total Output 385 950    Net +2961 +263          Urine Unmeasured Occurrence 3 x 3 x    Stool Unmeasured Occurrence  3 x          Needs Assessment   Height used: 162.6 cm  Weight used: 90.7 lbs current wt, 54.5 Kg IBW    Estimated Calories needs: 1400 Kcal/da based on PSU 1397, MSJ 1380     Estimated Protein needs: 82 g PRO/da based on IBW Kg x 1.5    Current Nutrition Prescription   PO: NPO Diet     EN: Replete w Fiber 70 ml/hr Give Water at 10 ml/hr.    to start today.    At Goal will deliver: 1400 Kcal = 100% est need, 90 g PRO = 110% est need, 21 g Fiber , 1162 ml H20 in TF, 1362 total ml Free Water.     EN Delivery: 3 day av Kcal = 87% of est need, 78 g PRO = 95% of est need, 18.6 g Fiber, 1016 ml H20 in TF 1370 total ml Free Water.    Nutrition Diagnosis     , ,   Problem Inadequate oral intake   Etiology On Vent   Signs/Symptoms NPO   Status: EN now at goal    Goal:   General: Nutrition support treatment  EN: maintain EN    Nutrition Intervention    Follow treatment progress, Care plan reviewed      Monitoring/Evaluation:   I&O, Pertinent labs, EN delivery/tolerance, Weight, Symptoms      Will Continue to follow per protocol      Brittani Kendrick RD  Time Spent: 30 min        Electronically signed by:  Brittani Kendrick RD  19 6:54 PM

## 2019-04-08 NOTE — PLAN OF CARE
Problem: Patient Care Overview  Goal: Plan of Care Review  Outcome: Ongoing (interventions implemented as appropriate)   04/08/19 1547   Coping/Psychosocial   Plan of Care Reviewed With patient   Plan of Care Review   Progress improving   OTHER   Outcome Summary Trach changed out to noncuff today. Unable to tolerate Passymuir trial. Failed swallow eval and FEES test scheduled for tomorrow for further review. Remains on Trach collar and tolerating well.        Problem: Skin Injury Risk (Adult)  Goal: Skin Health and Integrity  Outcome: Ongoing (interventions implemented as appropriate)   04/08/19 1547   Skin Injury Risk (Adult)   Skin Health and Integrity making progress toward outcome       Problem: Fall Risk (Adult)  Goal: Absence of Fall  Outcome: Ongoing (interventions implemented as appropriate)   04/08/19 1547   Fall Risk (Adult)   Absence of Fall making progress toward outcome       Problem: Airway, Artificial (Adult)  Goal: Signs and Symptoms of Listed Potential Problems Will be Absent, Minimized or Managed (Airway, Artificial)  Outcome: Ongoing (interventions implemented as appropriate)   04/08/19 1547   Goal/Outcome Evaluation   Problems Assessed (Artificial Airway) all   Problems Present (Artificial Airway) none

## 2019-04-08 NOTE — PROGRESS NOTES
"INTENSIVIST NOTE     Hospital Day: 6      Ms. Nallely Junior, 78 y.o. female is followed for:   Acute on chronic respiratory failure       SUBJECTIVE     79yo F with a history of chronic tracheostomy, dysphagia s/p PEG tube placement and noncompliance with a modified diet who presented as a transfer from [facility] for a possible STEMI. She was evaluated by Cardiology who did not feel she was having a STEMI. No LHC was performed as she would not be a candidate for intervention based on her chronic debilitated state. She was admitted to the ICU for medical management. She was found to be profoundly hypercapneic and was placed on mechanical ventilation.  She had atelectasis of the left lung which improved with mechanical ventilation.  She was found to have an ESBL E. coli UTI.    Interval history:    Off ventilator times 24 hours.  Tube feeds at 70.  Normal saline at 75/hour.  Blood pressure intermittently low but she remains asymptomatic.  This is likely close to her baseline as she is on midodrine chronically and has known aortic stenosis.    The patient's relevant past medical, surgical and social history were reviewed and updated in Epic as appropriate.       OBJECTIVE     Vital Sign Min/Max for last 24 hours  Temp  Min: 97.5 °F (36.4 °C)  Max: 98.6 °F (37 °C)   BP  Min: 84/48  Max: 120/58   Pulse  Min: 58  Max: 73   Resp  Min: 17  Max: 26   SpO2  Min: 94 %  Max: 99 %   No Data Recorded   No Data Recorded      Intake/Output Summary (Last 24 hours) at 4/8/2019 1030  Last data filed at 4/8/2019 0800  Gross per 24 hour   Intake 2730 ml   Output 635 ml   Net 2095 ml      Flowsheet Rows      First Filed Value   Admission Height  162.6 cm (64.02\") Documented at 04/03/2019 0320   Admission Weight  90.7 kg (200 lb) Documented at 04/02/2019 1800             04/02/19  1800 04/03/19  0320 04/03/19  0714   Weight: 90.7 kg (200 lb) 90.7 kg (199 lb 15.3 oz) 90.7 kg (200 lb)            Objective:  General Appearance:  In " "no acute distress.    Vital signs: (most recent): Blood pressure 100/46, pulse 67, temperature 97.5 °F (36.4 °C), temperature source Axillary, resp. rate 20, height 162.6 cm (64\"), weight 90.7 kg (200 lb), SpO2 98 %.    HEENT: Normal HEENT exam.  (Right internal jugular triple lumen catheter  Cuffed Shiley tracheostomy tube)    Lungs:  Normal effort and normal respiratory rate.  Breath sounds clear to auscultation.  She is not in respiratory distress.  No rales, wheezes or rhonchi.    Heart: Normal rate.  Regular rhythm.  S1 normal and S2 normal.  Positive for murmur.  No gallop or friction rub. (Systolic ejection murmur)  Chest: Symmetric chest wall expansion.   Abdomen: Abdomen is soft and non-distended.  (PEG tube in place.).  Bowel sounds are normal.   There is no abdominal tenderness.   There is no mass. There is no splenomegaly. There is no hepatomegaly.   Extremities: There is dependent edema.  There is no deformity.    Neurological: Patient is alert.    Pupils:  Pupils are equal, round, and reactive to light.    Skin:  Warm and dry.              I reviewed the patient's new clinical results.  I reviewed the patient's new imaging results/reports including actual images and agree with reports.      Chest X-Ray: Left effusion/atelectasis    INFUSIONS       Results from last 7 days   Lab Units 04/08/19  0357 04/07/19  0426 04/06/19  0347   WBC 10*3/mm3 6.23 6.45 10.94*   HEMOGLOBIN g/dL 11.0* 10.9* 11.7   HEMATOCRIT % 35.6 34.9 38.6   PLATELETS 10*3/mm3 156 146* 176     Results from last 7 days   Lab Units 04/08/19  0357 04/07/19  0426 04/06/19  0347   SODIUM mmol/L 143 138 138   POTASSIUM mmol/L 3.9 3.7 3.7   CHLORIDE mmol/L 111* 109 109   CO2 mmol/L 28.0 26.0 24.0   BUN mg/dL 10 13 16   GLUCOSE mg/dL 144* 202* 237*   CREATININE mg/dL 0.34* 0.42* 0.53*   MAGNESIUM mg/dL 2.4  2.4 1.9 2.0   CALCIUM mg/dL 8.2* 7.8* 7.7*         Results from last 7 days   Lab Units 04/08/19  0355 04/05/19  0351 04/04/19  0346 " 04/03/19  0407 04/02/19  2141   PH, ARTERIAL pH units 7.333* 7.527* 7.501* 7.495* 7.355   PCO2, ARTERIAL mm Hg 58.3* 27.8* 38.3 47.5* 58.4*   PO2 ART mm Hg 88.5 94.1 99.3 132.0* 201.0*   FIO2 % 35 40 40 65 85         [facility] Ventilation:      I reviewed the patient's medications.    Assessment/Plan   ASSESSMENT      Active Hospital Problems    Diagnosis   • **Inferior STEMI    • Acute on chronic respiratory failure with hypoxia      Chronic trach      • Hypotension     on midodrine     • COPD   • Atelectasis of left lung   • Aortic valve stenosis     ECHO 10/2018:  ·  Left ventricular systolic function is hyperdynamic (EF > 70).  · Left ventricular wall thickness is consistent with moderate concentric hypertrophy.  · Left ventricular diastolic dysfunction (grade I) consistent with impaired relaxation.  · Moderate to severe aortic stenosis (mean gradient 39 mmHg) is present.  · Mild mitral valve regurgitation is present     • Stage 3 CKD   • Former smoker   • Debility   • Dementia   • Class 1 obesity in adult   • Bronchomalacia   • Hyperlipidemia   • Coronary artery disease   • GERD    • Dysphagia with chronic PEG    • Type 2 diabetes mellitus          78-year-old admitted with what is felt to be a type II MI due to hypotension and LVH/aortic stenosis.  She has chronic hypotension.  Her aortic stenosis is severe.  She has developed acute on chronic respiratory failure requiring mechanical ventilation though is on trach collar during the day.  She is been treated for ESBL E. coli UTI.    We will continue midodrine for chronic hypotension which appears chronic.  Her renal function and mentation are adequate.  She is a not a candidate for further measures for her aortic stenosis per cardiology.        PLAN     1. Continue midodrine  2. As long as no evidence of shock, will tolerate hypotension due to severe aortic stenosis  3. Continue ertapenem  4. Continue tube feeds  5. Discontinue IV fluids  6. Change back to uncuffed  tracheostomy tube.  Per report, she had a #6 Shiley XLT uncuffed tracheostomy at the time of admission.         I discussed the patient's findings and my recommendations with patient and nursing staff     Plan of care and goals reviewed with multidisciplinary team at daily rounds.    High level of risk due to:  illness with threat to life or bodily function.    Michael Pretty MD  Pulmonary and Critical Care Medicine  04/08/19 10:30 AM

## 2019-04-08 NOTE — PROGRESS NOTES
Continued Stay Note  [facility]     Patient Name: Nallely Junior  MRN: [id number]  Today's Date: 4/8/2019    Admit Date: 4/2/2019    Discharge Plan     Row Name 04/08/19 1601       Plan    Plan Nursing Facility Placement    Plan Comments Remains in ICU.  Spoke with Radha @ [facility], patient currently has 14 day Medicaid bed hold and facility is planning for her to return when medically stable.  Faxed medical record to facility per request.  Will discuss with patient/sister in am post multidisciplinary rounds.  Chasity Quan, Ext. [phone]    Final Discharge Disposition Code 04 - intermediate care facility        Discharge Codes    No documentation.             JUNE Ball

## 2019-04-09 ENCOUNTER — APPOINTMENT (OUTPATIENT)
Dept: GENERAL RADIOLOGY | Facility: HOSPITAL | Age: 79
End: 2019-04-09

## 2019-04-09 LAB
ALBUMIN SERPL-MCNC: 2.6 G/DL (ref 3.5–5.2)
ANION GAP SERPL CALCULATED.3IONS-SCNC: 10 MMOL/L
BASOPHILS # BLD AUTO: 0.01 10*3/MM3 (ref 0–0.2)
BASOPHILS NFR BLD AUTO: 0.1 % (ref 0–1.5)
BUN BLD-MCNC: 10 MG/DL (ref 8–23)
BUN/CREAT SERPL: 27.8 (ref 7–25)
CALCIUM SPEC-SCNC: 8.3 MG/DL (ref 8.6–10.5)
CHLORIDE SERPL-SCNC: 103 MMOL/L (ref 98–107)
CO2 SERPL-SCNC: 28 MMOL/L (ref 22–29)
CREAT BLD-MCNC: 0.36 MG/DL (ref 0.57–1)
DEPRECATED RDW RBC AUTO: 57.5 FL (ref 37–54)
EOSINOPHIL # BLD AUTO: 0.12 10*3/MM3 (ref 0–0.4)
EOSINOPHIL NFR BLD AUTO: 1.6 % (ref 0.3–6.2)
ERYTHROCYTE [DISTWIDTH] IN BLOOD BY AUTOMATED COUNT: 15.9 % (ref 12.3–15.4)
GFR SERPL CREATININE-BSD FRML MDRD: >150 ML/MIN/1.73
GLUCOSE BLD-MCNC: 148 MG/DL (ref 65–99)
GLUCOSE BLDC GLUCOMTR-MCNC: 150 MG/DL (ref 70–130)
GLUCOSE BLDC GLUCOMTR-MCNC: 152 MG/DL (ref 70–130)
GLUCOSE BLDC GLUCOMTR-MCNC: 158 MG/DL (ref 70–130)
HCT VFR BLD AUTO: 36.3 % (ref 34–46.6)
HGB BLD-MCNC: 10.9 G/DL (ref 12–15.9)
IMM GRANULOCYTES # BLD AUTO: 0.02 10*3/MM3 (ref 0–0.05)
IMM GRANULOCYTES NFR BLD AUTO: 0.3 % (ref 0–0.5)
LYMPHOCYTES # BLD AUTO: 1.61 10*3/MM3 (ref 0.7–3.1)
LYMPHOCYTES NFR BLD AUTO: 21.6 % (ref 19.6–45.3)
MAGNESIUM SERPL-MCNC: 2 MG/DL (ref 1.6–2.4)
MCH RBC QN AUTO: 29.9 PG (ref 26.6–33)
MCHC RBC AUTO-ENTMCNC: 30 G/DL (ref 31.5–35.7)
MCV RBC AUTO: 99.7 FL (ref 79–97)
MONOCYTES # BLD AUTO: 0.51 10*3/MM3 (ref 0.1–0.9)
MONOCYTES NFR BLD AUTO: 6.9 % (ref 5–12)
NEUTROPHILS # BLD AUTO: 5.19 10*3/MM3 (ref 1.4–7)
NEUTROPHILS NFR BLD AUTO: 69.8 % (ref 42.7–76)
PHOSPHATE SERPL-MCNC: 2 MG/DL (ref 2.5–4.5)
PLATELET # BLD AUTO: 181 10*3/MM3 (ref 140–450)
PMV BLD AUTO: 10.8 FL (ref 6–12)
POTASSIUM BLD-SCNC: 4.2 MMOL/L (ref 3.5–5.2)
RBC # BLD AUTO: 3.64 10*6/MM3 (ref 3.77–5.28)
SODIUM BLD-SCNC: 141 MMOL/L (ref 136–145)
WBC NRBC COR # BLD: 7.44 10*3/MM3 (ref 3.4–10.8)

## 2019-04-09 PROCEDURE — 94799 UNLISTED PULMONARY SVC/PX: CPT

## 2019-04-09 PROCEDURE — 63710000001 INSULIN DETEMIR PER 5 UNITS: Performed by: INTERNAL MEDICINE

## 2019-04-09 PROCEDURE — 82962 GLUCOSE BLOOD TEST: CPT

## 2019-04-09 PROCEDURE — 71045 X-RAY EXAM CHEST 1 VIEW: CPT

## 2019-04-09 PROCEDURE — 25010000002 FUROSEMIDE PER 20 MG: Performed by: INTERNAL MEDICINE

## 2019-04-09 PROCEDURE — 83735 ASSAY OF MAGNESIUM: CPT | Performed by: INTERNAL MEDICINE

## 2019-04-09 PROCEDURE — 94003 VENT MGMT INPAT SUBQ DAY: CPT

## 2019-04-09 PROCEDURE — 85025 COMPLETE CBC W/AUTO DIFF WBC: CPT | Performed by: INTERNAL MEDICINE

## 2019-04-09 PROCEDURE — 99233 SBSQ HOSP IP/OBS HIGH 50: CPT | Performed by: INTERNAL MEDICINE

## 2019-04-09 PROCEDURE — 25010000002 ENOXAPARIN PER 10 MG: Performed by: INTERNAL MEDICINE

## 2019-04-09 PROCEDURE — 25010000002 ERTAPENEM PER 500 MG: Performed by: INTERNAL MEDICINE

## 2019-04-09 PROCEDURE — 80069 RENAL FUNCTION PANEL: CPT | Performed by: INTERNAL MEDICINE

## 2019-04-09 RX ORDER — FUROSEMIDE 10 MG/ML
40 INJECTION INTRAMUSCULAR; INTRAVENOUS EVERY 12 HOURS
Status: COMPLETED | OUTPATIENT
Start: 2019-04-09 | End: 2019-04-09

## 2019-04-09 RX ADMIN — CHLORHEXIDINE GLUCONATE 0.12% ORAL RINSE 15 ML: 1.2 LIQUID ORAL at 20:40

## 2019-04-09 RX ADMIN — IPRATROPIUM BROMIDE AND ALBUTEROL SULFATE 3 ML: 2.5; .5 SOLUTION RESPIRATORY (INHALATION) at 07:05

## 2019-04-09 RX ADMIN — MIDODRINE HYDROCHLORIDE 10 MG: 5 TABLET ORAL at 08:33

## 2019-04-09 RX ADMIN — ASPIRIN 81 MG 81 MG: 81 TABLET ORAL at 08:32

## 2019-04-09 RX ADMIN — ROPINIROLE 4 MG: 2 TABLET, FILM COATED ORAL at 08:33

## 2019-04-09 RX ADMIN — MIDODRINE HYDROCHLORIDE 10 MG: 5 TABLET ORAL at 17:54

## 2019-04-09 RX ADMIN — CHLORHEXIDINE GLUCONATE 0.12% ORAL RINSE 15 ML: 1.2 LIQUID ORAL at 08:31

## 2019-04-09 RX ADMIN — FUROSEMIDE 40 MG: 10 INJECTION, SOLUTION INTRAMUSCULAR; INTRAVENOUS at 09:39

## 2019-04-09 RX ADMIN — BUSPIRONE HYDROCHLORIDE 15 MG: 15 TABLET ORAL at 08:32

## 2019-04-09 RX ADMIN — INSULIN DETEMIR 10 UNITS: 100 INJECTION, SOLUTION SUBCUTANEOUS at 08:32

## 2019-04-09 RX ADMIN — IPRATROPIUM BROMIDE AND ALBUTEROL SULFATE 3 ML: 2.5; .5 SOLUTION RESPIRATORY (INHALATION) at 19:49

## 2019-04-09 RX ADMIN — ROPINIROLE 4 MG: 2 TABLET, FILM COATED ORAL at 20:40

## 2019-04-09 RX ADMIN — PANTOPRAZOLE SODIUM 40 MG: 40 INJECTION, POWDER, FOR SOLUTION INTRAVENOUS at 08:31

## 2019-04-09 RX ADMIN — CLONAZEPAM 0.5 MG: 0.5 TABLET ORAL at 13:47

## 2019-04-09 RX ADMIN — BUSPIRONE HYDROCHLORIDE 15 MG: 15 TABLET ORAL at 17:02

## 2019-04-09 RX ADMIN — CLONAZEPAM 0.5 MG: 0.5 TABLET ORAL at 05:31

## 2019-04-09 RX ADMIN — ENOXAPARIN SODIUM 40 MG: 100 INJECTION SUBCUTANEOUS at 08:31

## 2019-04-09 RX ADMIN — INSULIN LISPRO 2 UNITS: 100 INJECTION, SOLUTION INTRAVENOUS; SUBCUTANEOUS at 12:20

## 2019-04-09 RX ADMIN — MIDODRINE HYDROCHLORIDE 10 MG: 5 TABLET ORAL at 13:47

## 2019-04-09 RX ADMIN — BUSPIRONE HYDROCHLORIDE 15 MG: 15 TABLET ORAL at 20:40

## 2019-04-09 RX ADMIN — IPRATROPIUM BROMIDE AND ALBUTEROL SULFATE 3 ML: 2.5; .5 SOLUTION RESPIRATORY (INHALATION) at 00:40

## 2019-04-09 RX ADMIN — INSULIN LISPRO 2 UNITS: 100 INJECTION, SOLUTION INTRAVENOUS; SUBCUTANEOUS at 05:35

## 2019-04-09 RX ADMIN — OXYCODONE HYDROCHLORIDE 5 MG: 5 TABLET ORAL at 00:46

## 2019-04-09 RX ADMIN — INSULIN DETEMIR 10 UNITS: 100 INJECTION, SOLUTION SUBCUTANEOUS at 20:40

## 2019-04-09 RX ADMIN — FUROSEMIDE 40 MG: 10 INJECTION, SOLUTION INTRAMUSCULAR; INTRAVENOUS at 22:07

## 2019-04-09 RX ADMIN — ERTAPENEM SODIUM 1 G: 1 INJECTION, POWDER, LYOPHILIZED, FOR SOLUTION INTRAMUSCULAR; INTRAVENOUS at 12:20

## 2019-04-09 RX ADMIN — CLONAZEPAM 0.5 MG: 0.5 TABLET ORAL at 22:07

## 2019-04-09 RX ADMIN — POTASSIUM PHOSPHATE, MONOBASIC AND POTASSIUM PHOSPHATE, DIBASIC 15 MMOL: 224; 236 INJECTION, SOLUTION INTRAVENOUS at 12:20

## 2019-04-09 RX ADMIN — INSULIN LISPRO 2 UNITS: 100 INJECTION, SOLUTION INTRAVENOUS; SUBCUTANEOUS at 17:54

## 2019-04-09 RX ADMIN — ROPINIROLE 4 MG: 2 TABLET, FILM COATED ORAL at 17:01

## 2019-04-09 RX ADMIN — IPRATROPIUM BROMIDE AND ALBUTEROL SULFATE 3 ML: 2.5; .5 SOLUTION RESPIRATORY (INHALATION) at 12:56

## 2019-04-09 NOTE — PLAN OF CARE
Problem: Patient Care Overview  Goal: Plan of Care Review  Outcome: Ongoing (interventions implemented as appropriate)   04/09/19 1800   Coping/Psychosocial   Plan of Care Reviewed With family   Plan of Care Review   Progress declining   OTHER   Outcome Summary pt remains on vent and setting needed to be increased r/t distress. SIMV not tolerated. Remains NPO and manipulating staff to get food. Remains A&O.        Problem: Skin Injury Risk (Adult)  Goal: Skin Health and Integrity  Outcome: Ongoing (interventions implemented as appropriate)   04/09/19 1857   Skin Injury Risk (Adult)   Skin Health and Integrity making progress toward outcome       Problem: Ventilation, Mechanical Invasive (Adult)  Goal: Signs and Symptoms of Listed Potential Problems Will be Absent, Minimized or Managed (Ventilation, Mechanical Invasive)  Outcome: Ongoing (interventions implemented as appropriate)   04/09/19 1800   Goal/Outcome Evaluation   Problems Assessed (Mechanical Ventilation, Invasive) all   Problems Present (Mech Vent, Invasive) none       Problem: Fall Risk (Adult)  Goal: Absence of Fall  Outcome: Ongoing (interventions implemented as appropriate)   04/09/19 1800   Fall Risk (Adult)   Absence of Fall making progress toward outcome       Problem: Airway, Artificial (Adult)  Goal: Signs and Symptoms of Listed Potential Problems Will be Absent, Minimized or Managed (Airway, Artificial)  Outcome: Ongoing (interventions implemented as appropriate)   04/09/19 1800   Goal/Outcome Evaluation   Problems Assessed (Artificial Airway) all   Problems Present (Artificial Airway) none

## 2019-04-09 NOTE — SIGNIFICANT NOTE
04/09/19 0928   SLP Deferred Reason   SLP Deferred Reason Unable to evaluate, medical status change  (Pt was scheduled for FEES today. RN reported pt now back on ventilator & not appropriate for eval @ this time. SLP will f/u, as appropriate.)

## 2019-04-09 NOTE — PROGRESS NOTES
"INTENSIVIST NOTE     Hospital Day: 7      Ms. Nallely Junior, 78 y.o. female is followed for:   Acute on chronic respiratory failure       SUBJECTIVE     77yo F with a history of chronic tracheostomy, dysphagia s/p PEG tube placement and noncompliance with a modified diet who presented as a transfer from [facility] for a possible STEMI. She was evaluated by Cardiology who did not feel she was having a STEMI. No LHC was performed as she would not be a candidate for intervention based on her chronic debilitated state. She was admitted to the ICU for medical management. She was found to be profoundly hypercapneic and was placed on mechanical ventilation.  She had atelectasis of the left lung which improved with mechanical ventilation.  She was found to have an ESBL E. coli UTI.    Interval history:    Changed to uncuffed tracheostomy tube yesterday.  Through the evening she developed increasing respiratory difficulty and was placed back on mechanical ventilation and a cuffed Shiley tube replaced.  She is now on pressure support and quite comfortable.  Afebrile and at this point hemodynamically and rhythmically stable.    The patient's relevant past medical, surgical and social history were reviewed and updated in Epic as appropriate.       OBJECTIVE     Vital Sign Min/Max for last 24 hours  Temp  Min: 97.9 °F (36.6 °C)  Max: 98.9 °F (37.2 °C)   BP  Min: 93/46  Max: 128/62   Pulse  Min: 52  Max: 88   Resp  Min: 19  Max: 32   SpO2  Min: 91 %  Max: 100 %   No Data Recorded   No Data Recorded      Intake/Output Summary (Last 24 hours) at 4/9/2019 1056  Last data filed at 4/9/2019 0900  Gross per 24 hour   Intake 1864 ml   Output 1100 ml   Net 764 ml      Flowsheet Rows      First Filed Value   Admission Height  162.6 cm (64.02\") Documented at 04/03/2019 0320   Admission Weight  90.7 kg (200 lb) Documented at 04/02/2019 1800             04/02/19  1800 04/03/19  0320 04/03/19  0714   Weight: 90.7 kg (200 lb) 90.7 kg " "(199 lb 15.3 oz) 90.7 kg (200 lb)            Objective:  General Appearance:  In no acute distress.    Vital signs: (most recent): Blood pressure 104/51, pulse 65, temperature 98.9 °F (37.2 °C), temperature source Oral, resp. rate 24, height 162.6 cm (64\"), weight 90.7 kg (200 lb), SpO2 100 %.    HEENT: Normal HEENT exam.  (Right internal jugular triple lumen catheter  Cuffed Shiley tracheostomy tube)    Lungs:  Normal effort and normal respiratory rate.  Breath sounds clear to auscultation.  She is not in respiratory distress.  No rales, wheezes or rhonchi.    Heart: Normal rate.  Regular rhythm.  S1 normal and S2 normal.  Positive for murmur.  No gallop or friction rub. (Systolic ejection murmur)  Chest: Symmetric chest wall expansion.   Abdomen: Abdomen is soft and non-distended.  (PEG tube in place.).  Bowel sounds are normal.   There is no abdominal tenderness.   There is no mass. There is no splenomegaly. There is no hepatomegaly.   Extremities: There is dependent edema.  There is no deformity.    Neurological: Patient is alert.    Pupils:  Pupils are equal, round, and reactive to light.    Skin:  Warm and dry.              I reviewed the patient's new clinical results.  I reviewed the patient's new imaging results/reports including actual images and agree with reports.      Chest X-Ray: Left effusion/atelectasis    INFUSIONS       Results from last 7 days   Lab Units 04/09/19 0418 04/08/19 0357 04/07/19 0426   WBC 10*3/mm3 7.44 6.23 6.45   HEMOGLOBIN g/dL 10.9* 11.0* 10.9*   HEMATOCRIT % 36.3 35.6 34.9   PLATELETS 10*3/mm3 181 156 146*     Results from last 7 days   Lab Units 04/09/19 0418 04/08/19 0357 04/07/19 0426   SODIUM mmol/L 141 143 138   POTASSIUM mmol/L 4.2 3.9 3.7   CHLORIDE mmol/L 103 111* 109   CO2 mmol/L 28.0 28.0 26.0   BUN mg/dL 10 10 13   GLUCOSE mg/dL 148* 144* 202*   CREATININE mg/dL 0.36* 0.34* 0.42*   MAGNESIUM mg/dL 2.0 2.4  2.4 1.9   CALCIUM mg/dL 8.3* 8.2* 7.8*         Results " from last 7 days   Lab Units 04/08/19  0355 04/05/19  0351 04/04/19  0346 04/03/19  0407 04/02/19  2141   PH, ARTERIAL pH units 7.333* 7.527* 7.501* 7.495* 7.355   PCO2, ARTERIAL mm Hg 58.3* 27.8* 38.3 47.5* 58.4*   PO2 ART mm Hg 88.5 94.1 99.3 132.0* 201.0*   FIO2 % 35 40 40 65 85         [facility] Ventilation: FiO2 (%):  [40 %] 40 %  PEEP/CPAP (cm H2O):  [5 cm H20] 5 cm H20  TX SUP:  [10 cm H20] 10 cm H20  MAP (cm H2O):  [7.5-9.9] 8.1    I reviewed the patient's medications.    Assessment/Plan   ASSESSMENT      Active Hospital Problems    Diagnosis   • **Inferior STEMI    • Acute on chronic respiratory failure with hypoxia      Chronic trach      • Hypotension     on midodrine     • COPD   • Atelectasis of left lung   • Aortic valve stenosis     ECHO 10/2018:  ·  Left ventricular systolic function is hyperdynamic (EF > 70).  · Left ventricular wall thickness is consistent with moderate concentric hypertrophy.  · Left ventricular diastolic dysfunction (grade I) consistent with impaired relaxation.  · Moderate to severe aortic stenosis (mean gradient 39 mmHg) is present.  · Mild mitral valve regurgitation is present     • Stage 3 CKD   • Former smoker   • Debility   • Dementia   • Class 1 obesity in adult   • Bronchomalacia   • Hyperlipidemia   • Coronary artery disease   • GERD    • Dysphagia with chronic PEG    • Type 2 diabetes mellitus          78-year-old admitted with what is felt to be a type II MI due to hypotension and LVH/aortic stenosis.  She has chronic hypotension.  Her aortic stenosis is severe.  She has developed acute on chronic respiratory failure requiring mechanical ventilation.  She is been treated for ESBL E. coli UTI.    She was weaned from mechanical ventilation and tracheostomy actually changed to an uncuffed Shiley yesterday.  However, through the early evening she developed tachypnea and eventually oxygen desaturation requiring replacement of the cuffed tracheostomy tube and resumption of  mechanical ventilation.  Currently on pressure support and completely comfortable.    On midodrine for hypotension which appears chronic.  She is a not a candidate for further measures for her aortic stenosis per cardiology.        PLAN     1. Continue midodrine  2. As long as no evidence of shock, will tolerate hypotension due to severe aortic stenosis  3. See no alternative but to attempt diuresis as I think she is volume overloaded based upon her exam and chest x-ray and this may be causing further respiratory compromise.  4. Continue ertapenem  5. Continue tube feeds    I initiated a discussion with the patient regarding her long-term plans if we have difficulty weaning her from mechanical ventilation.  She will basically need to decide between a completely palliative approach and return to a local nursing home or be transferred to a long-term ventilator facility such as [facility].  It is possible that with diuresis we can free her from the ventilator in the short-term but I think this decision will present itself again in the future regardless.           I discussed the patient's findings and my recommendations with patient and nursing staff     Plan of care and goals reviewed with multidisciplinary team at daily rounds.    High level of risk due to:  illness with threat to life or bodily function.    Michael Pretty MD  Pulmonary and Critical Care Medicine  04/09/19 10:56 AM

## 2019-04-09 NOTE — NURSING NOTE
During 1400 assessment and bed change, pt appeared to be in respiratory distress with resp >50.  O2 increased to 100%.  RT changed vent settings to SIMV and patient recovered quickly.  During event, the cuff pressure on the trach appeared to be leaking.  Dr Dwyer made aware and ordered a trach change.  However, with the current trach and quick respiratory recovery, O2 reduced back to 40% and respiration rates are now WNL.    1500-trach switch to new set up.  Same size.

## 2019-04-09 NOTE — PLAN OF CARE
Problem: Patient Care Overview  Goal: Plan of Care Review  Outcome: Ongoing (interventions implemented as appropriate)   04/09/19 0856   Coping/Psychosocial   Plan of Care Reviewed With patient;other (see comments)  ([place] RN)   Plan of Care Review   Progress improving   OTHER   Outcome Summary WOC nurse f/u to reassess buttocks skin. Bilateral gluteal areas pink, dry, blanchable. Skin interventions in place. Pt on Isolibrium bed. Black heel boots in place. WOC nurse will s/o at this time. Please contact WOC nurse as needed for concerns.

## 2019-04-09 NOTE — PROGRESS NOTES
Nutrition Services    Patient Name:  Nallely Junior  YOB: 1940  MRN: [id number]  Admit Date:  4/2/2019                      Clinical Nutrition     Nutrition Assessment  Reason for Visit:   MDR, Follow-up protocol, EN      Patient Name: Nallely Junior  YOB: 1940  MRN: [id number]  Date of Encounter: 04/09/19 6:21 PM  Admission date: 4/2/2019      Comments: Unable to wean. Cont on EN      Nutrition Assessment     Hospital Problem List    Inferior STEMI     Type 2 diabetes mellitus     Hypotension    Hyperlipidemia    COPD    Dysphagia with chronic PEG     Coronary artery disease    GERD     Acute on chronic respiratory failure with hypoxia     Aortic valve stenosis    Stage 3 CKD    Former smoker    Debility    Dementia    Class 1 obesity in adult    Atelectasis of left lung    Bronchomalacia  Note: no MI            Pt is full code             Resp F on vent (4/3)            Trach collar (4/5)            Return to vent (4/8)    PMH: She  has a past medical history of Anemia, Anxiety, Aortic stenosis, CHF (congestive heart failure) (CMS/HCC), Chronic respiratory failure with hypoxia and hypercapnia (CMS/HCC), CKD (chronic kidney disease), stage III (CMS/HCC), Constipation, COPD (chronic obstructive pulmonary disease) (CMS/HCC), Coronary artery disease, Dementia, Depression, Diabetes mellitus (CMS/HCC), Dysphagia, Femur fracture, right (CMS/HCC), GERD (gastroesophageal reflux disease), Hyperlipidemia, Hypertension, Hypotension, and Restless legs syndrome (RLS).   PSxH: She  has a past surgical history that includes Tracheostomy tube placement; Peg Tube Insertion; Leg Surgery; and Forearm surgery.     Other: Note hx of refusing TF and of non-compliance w Dys IV diet nectar thick liq              Hx MRSA, PNA              (4/4) 20 Fr GONSALO tube in place    Reported/Observed/Food/Nutrition Related History:     Unable to wean. MD allows will likely hayden hypotension. Will try to diurese & wean; but ?  "if can sustain being off vent    Anthropometrics     Height: 162.6 cm (64\")  Last filed wt: Weight: 90.7 kg (200 lb) (04/03/19 0714)  Weight Method: Estimated    BMI: BMI (Calculated): 34.3  Obese Class I: 30-34.9kg/m2    Ideal Body Weight (IBW) (kg): 55    Weight Change   UBW: note from NH in Oct, 177 lb and here in Oct bed wt 166lb      Labs reviewed     Results from last 7 days   Lab Units 04/09/19 0418 04/08/19 0357 04/07/19 0426 04/06/19 0347 04/05/19  0434   GLUCOSE mg/dL 148* 144* 202* 237* 159*   BUN mg/dL 10 10 13 16 20   CREATININE mg/dL 0.36* 0.34* 0.42* 0.53* 0.66   SODIUM mmol/L 141 143 138 138 136   CHLORIDE mmol/L 103 111* 109 109 104   POTASSIUM mmol/L 4.2 3.9 3.7 3.7 4.4   PHOSPHORUS mg/dL 2.0* 2.2*  2.2* 2.2*  --  2.5  2.5   MAGNESIUM mg/dL 2.0 2.4  2.4 1.9 2.0 1.8  1.8   ALT (SGPT) U/L  --  55*  --  100* 171*     Results from last 7 days   Lab Units 04/09/19 0418 04/08/19  1121 04/08/19 0357 04/07/19 0426 04/06/19 0347 04/05/19 0434 04/02/19 2018   ALBUMIN g/dL 2.60*  --  3.02* 2.91* 2.97* 3.28   < > 3.49   PREALBUMIN mg/dL  --  12.0*  --   --   --   --   --   --    CRP mg/dL  --  1.67*  --   --   --   --   --   --    IONIZED CALCIUM mmol/L  --   --  1.27  --  1.21  --   --   --    CHOLESTEROL mg/dL  --   --  87  --   --  92  --  86   TRIGLYCERIDES mg/dL  --   --  101  --   --  158*  --  148    < > = values in this interval not displayed.         Results from last 7 days   Lab Units 04/09/19  1742 04/09/19  1138 04/09/19  0534 04/08/19  2331 04/08/19  1802 04/08/19  1126   GLUCOSE mg/dL 150* 158* 152* 125 121 172*     Lab Results   Lab Value Date/Time    HGBA1C 6.70 (H) 04/02/2019 2018    HGBA1C 8.90 (H) 10/06/2018 2031         Medications reviewed   Pertinent:midodrine, lasix, insulin, Abx protonix, bisacodyl, K+ phosphate, MgSo4,oxycordone      Applicable medical tests/Procedures since admission: bronch, PEG replaced, trach collar, re-intubate    Intake/Ouptut 24 hrs (7:00AM - " 6:59 AM)     Intake & Output (last day)       04/08 0701 - 04/09 0700 04/09 0701 - 04/10 0700    I.V. (mL/kg) 137 (1.5)     Other 1073 30    NG/ 80    IV Piggyback      Total Intake(mL/kg) 1884 (20.8) 110 (1.2)    Urine (mL/kg/hr) 1350 (0.6) 2400 (2.3)    Stool 0 0    Total Output 1350 2400    Net +534 -2290          Urine Unmeasured Occurrence 6 x 1 x    Stool Unmeasured Occurrence 3 x 4 x          Needs Assessment   Height used: 162.6 cm  Weight used: 90.7 lbs current wt, 54.5 Kg IBW    Estimated Calories needs: 1400 Kcal/da based on PSU 1397, MSJ 1380     Estimated Protein needs: 82 g PRO/da based on IBW Kg x 1.5    Current Nutrition Prescription   PO: NPO Diet     EN: Replete w Fiber 70 ml/hr Give Water at 10 ml/hr.    to start today.    At Goal will deliver: 1400 Kcal = 100% est need, 90 g PRO = 110% est need, 21 g Fiber , 1162 ml H20 in TF, 1362 total ml Free Water.     EN Delivery: 1 day - off several hrs for procedures: 496 Kcal = 35% of est need, 32 g PRO = 39% of est need, 7.5 g Fiber,412 ml H20 in TF 1494 total ml Free Water.    Nutrition Diagnosis     4/4, 4/5,4/8,4/9   Problem Inadequate oral intake   Etiology On Vent   Signs/Symptoms NPO   Status: EN at goal when not off for procedures etc    Goal:   General: Nutrition support treatment  EN: maintain EN    Nutrition Intervention    Follow treatment progress, Care plan reviewed      Monitoring/Evaluation:   I&O, Pertinent labs, EN delivery/tolerance, Weight, Symptoms      Will Continue to follow per protocol      Brittani Kendrick RD  Time Spent: 30 min        Electronically signed by:  Brittani Kendrick RD  04/09/19 6:21 PM

## 2019-04-09 NOTE — PLAN OF CARE
Problem: Patient Care Overview  Goal: Plan of Care Review   04/09/19 0629   Coping/Psychosocial   Plan of Care Reviewed With patient   Plan of Care Review   Progress improving   OTHER   Outcome Summary At 2000 pt. o2 sat dropped to 81%, abdominal breathing, and tachypnic. ARELI Butts notified/bedside, trach cuff changed per order and on vent. Pt. Able to mouth words/use communication board,orientedx4. Pt. Very persistent on wanting to eat and drink. Discussed with pt. And re-educated. Pt. Asked to speak with Elias SINGLETON APRN at bedside and spoke with pt. On why she is unable to eat or drink at this time, and discussed palliative care. Pt. Stated she wanted to discuss with her sister(POA) in the morning about change of care, and stated she wants to go back to the nursing home.   Will continue to monitor. See orders/notes.

## 2019-04-10 ENCOUNTER — APPOINTMENT (OUTPATIENT)
Dept: GENERAL RADIOLOGY | Facility: HOSPITAL | Age: 79
End: 2019-04-10

## 2019-04-10 LAB
ALBUMIN SERPL-MCNC: 2.8 G/DL (ref 3.5–5.2)
ANION GAP SERPL CALCULATED.3IONS-SCNC: 7 MMOL/L
BASOPHILS # BLD AUTO: 0.01 10*3/MM3 (ref 0–0.2)
BASOPHILS NFR BLD AUTO: 0.1 % (ref 0–1.5)
BUN BLD-MCNC: 11 MG/DL (ref 8–23)
BUN/CREAT SERPL: 31.4 (ref 7–25)
CALCIUM SPEC-SCNC: 8.3 MG/DL (ref 8.6–10.5)
CHLORIDE SERPL-SCNC: 96 MMOL/L (ref 98–107)
CO2 SERPL-SCNC: 37 MMOL/L (ref 22–29)
CREAT BLD-MCNC: 0.35 MG/DL (ref 0.57–1)
DEPRECATED RDW RBC AUTO: 55.3 FL (ref 37–54)
EOSINOPHIL # BLD AUTO: 0.11 10*3/MM3 (ref 0–0.4)
EOSINOPHIL NFR BLD AUTO: 1.6 % (ref 0.3–6.2)
ERYTHROCYTE [DISTWIDTH] IN BLOOD BY AUTOMATED COUNT: 15.4 % (ref 12.3–15.4)
GFR SERPL CREATININE-BSD FRML MDRD: >150 ML/MIN/1.73
GLUCOSE BLD-MCNC: 167 MG/DL (ref 65–99)
GLUCOSE BLDC GLUCOMTR-MCNC: 123 MG/DL (ref 70–130)
GLUCOSE BLDC GLUCOMTR-MCNC: 159 MG/DL (ref 70–130)
GLUCOSE BLDC GLUCOMTR-MCNC: 180 MG/DL (ref 70–130)
GLUCOSE BLDC GLUCOMTR-MCNC: 200 MG/DL (ref 70–130)
HCT VFR BLD AUTO: 35.7 % (ref 34–46.6)
HGB BLD-MCNC: 10.8 G/DL (ref 12–15.9)
IMM GRANULOCYTES # BLD AUTO: 0.02 10*3/MM3 (ref 0–0.05)
IMM GRANULOCYTES NFR BLD AUTO: 0.3 % (ref 0–0.5)
LYMPHOCYTES # BLD AUTO: 1.93 10*3/MM3 (ref 0.7–3.1)
LYMPHOCYTES NFR BLD AUTO: 27.5 % (ref 19.6–45.3)
MAGNESIUM SERPL-MCNC: 1.8 MG/DL (ref 1.6–2.4)
MCH RBC QN AUTO: 30 PG (ref 26.6–33)
MCHC RBC AUTO-ENTMCNC: 30.3 G/DL (ref 31.5–35.7)
MCV RBC AUTO: 99.2 FL (ref 79–97)
MONOCYTES # BLD AUTO: 0.61 10*3/MM3 (ref 0.1–0.9)
MONOCYTES NFR BLD AUTO: 8.7 % (ref 5–12)
NEUTROPHILS # BLD AUTO: 4.35 10*3/MM3 (ref 1.4–7)
NEUTROPHILS NFR BLD AUTO: 62.1 % (ref 42.7–76)
PHOSPHATE SERPL-MCNC: 2.5 MG/DL (ref 2.5–4.5)
PLATELET # BLD AUTO: 208 10*3/MM3 (ref 140–450)
PMV BLD AUTO: 10.9 FL (ref 6–12)
POTASSIUM BLD-SCNC: 3.4 MMOL/L (ref 3.5–5.2)
RBC # BLD AUTO: 3.6 10*6/MM3 (ref 3.77–5.28)
SODIUM BLD-SCNC: 140 MMOL/L (ref 136–145)
WBC NRBC COR # BLD: 7.01 10*3/MM3 (ref 3.4–10.8)

## 2019-04-10 PROCEDURE — 94003 VENT MGMT INPAT SUBQ DAY: CPT

## 2019-04-10 PROCEDURE — 63710000001 INSULIN DETEMIR PER 5 UNITS: Performed by: INTERNAL MEDICINE

## 2019-04-10 PROCEDURE — 82962 GLUCOSE BLOOD TEST: CPT

## 2019-04-10 PROCEDURE — 99233 SBSQ HOSP IP/OBS HIGH 50: CPT | Performed by: INTERNAL MEDICINE

## 2019-04-10 PROCEDURE — 94799 UNLISTED PULMONARY SVC/PX: CPT

## 2019-04-10 PROCEDURE — 71045 X-RAY EXAM CHEST 1 VIEW: CPT

## 2019-04-10 PROCEDURE — 83735 ASSAY OF MAGNESIUM: CPT | Performed by: INTERNAL MEDICINE

## 2019-04-10 PROCEDURE — 85025 COMPLETE CBC W/AUTO DIFF WBC: CPT | Performed by: INTERNAL MEDICINE

## 2019-04-10 PROCEDURE — 80069 RENAL FUNCTION PANEL: CPT | Performed by: INTERNAL MEDICINE

## 2019-04-10 PROCEDURE — 25010000002 FUROSEMIDE PER 20 MG: Performed by: INTERNAL MEDICINE

## 2019-04-10 PROCEDURE — 25010000002 ENOXAPARIN PER 10 MG: Performed by: INTERNAL MEDICINE

## 2019-04-10 PROCEDURE — 25010000002 ERTAPENEM PER 500 MG: Performed by: INTERNAL MEDICINE

## 2019-04-10 RX ORDER — POTASSIUM CHLORIDE 750 MG/1
40 CAPSULE, EXTENDED RELEASE ORAL AS NEEDED
Status: DISCONTINUED | OUTPATIENT
Start: 2019-04-10 | End: 2019-04-15 | Stop reason: HOSPADM

## 2019-04-10 RX ORDER — POTASSIUM CHLORIDE 1.5 G/1.77G
40 POWDER, FOR SOLUTION ORAL AS NEEDED
Status: DISCONTINUED | OUTPATIENT
Start: 2019-04-10 | End: 2019-04-15 | Stop reason: HOSPADM

## 2019-04-10 RX ORDER — FUROSEMIDE 10 MG/ML
40 INJECTION INTRAMUSCULAR; INTRAVENOUS EVERY 12 HOURS
Status: COMPLETED | OUTPATIENT
Start: 2019-04-10 | End: 2019-04-11

## 2019-04-10 RX ADMIN — POTASSIUM CHLORIDE 40 MEQ: 1.5 POWDER, FOR SOLUTION ORAL at 06:28

## 2019-04-10 RX ADMIN — MIDODRINE HYDROCHLORIDE 10 MG: 5 TABLET ORAL at 12:10

## 2019-04-10 RX ADMIN — MAGNESIUM SULFATE HEPTAHYDRATE 4 G: 40 INJECTION, SOLUTION INTRAVENOUS at 06:28

## 2019-04-10 RX ADMIN — ROPINIROLE 4 MG: 2 TABLET, FILM COATED ORAL at 08:47

## 2019-04-10 RX ADMIN — IPRATROPIUM BROMIDE AND ALBUTEROL SULFATE 3 ML: 2.5; .5 SOLUTION RESPIRATORY (INHALATION) at 12:10

## 2019-04-10 RX ADMIN — IPRATROPIUM BROMIDE AND ALBUTEROL SULFATE 3 ML: 2.5; .5 SOLUTION RESPIRATORY (INHALATION) at 00:26

## 2019-04-10 RX ADMIN — CLONAZEPAM 0.5 MG: 0.5 TABLET ORAL at 06:28

## 2019-04-10 RX ADMIN — PANTOPRAZOLE SODIUM 40 MG: 40 INJECTION, POWDER, FOR SOLUTION INTRAVENOUS at 08:46

## 2019-04-10 RX ADMIN — ERTAPENEM SODIUM 1 G: 1 INJECTION, POWDER, LYOPHILIZED, FOR SOLUTION INTRAMUSCULAR; INTRAVENOUS at 12:10

## 2019-04-10 RX ADMIN — CHLORHEXIDINE GLUCONATE 0.12% ORAL RINSE 15 ML: 1.2 LIQUID ORAL at 20:26

## 2019-04-10 RX ADMIN — CLONAZEPAM 0.5 MG: 0.5 TABLET ORAL at 14:52

## 2019-04-10 RX ADMIN — OXYCODONE HYDROCHLORIDE 5 MG: 5 TABLET ORAL at 03:48

## 2019-04-10 RX ADMIN — IPRATROPIUM BROMIDE AND ALBUTEROL SULFATE 3 ML: 2.5; .5 SOLUTION RESPIRATORY (INHALATION) at 06:34

## 2019-04-10 RX ADMIN — INSULIN DETEMIR 10 UNITS: 100 INJECTION, SOLUTION SUBCUTANEOUS at 08:52

## 2019-04-10 RX ADMIN — CLONAZEPAM 0.5 MG: 0.5 TABLET ORAL at 21:06

## 2019-04-10 RX ADMIN — ROPINIROLE 4 MG: 2 TABLET, FILM COATED ORAL at 20:25

## 2019-04-10 RX ADMIN — INSULIN LISPRO 4 UNITS: 100 INJECTION, SOLUTION INTRAVENOUS; SUBCUTANEOUS at 17:38

## 2019-04-10 RX ADMIN — ENOXAPARIN SODIUM 40 MG: 100 INJECTION SUBCUTANEOUS at 08:48

## 2019-04-10 RX ADMIN — INSULIN LISPRO 2 UNITS: 100 INJECTION, SOLUTION INTRAVENOUS; SUBCUTANEOUS at 12:10

## 2019-04-10 RX ADMIN — ASPIRIN 81 MG 81 MG: 81 TABLET ORAL at 08:46

## 2019-04-10 RX ADMIN — FUROSEMIDE 40 MG: 10 INJECTION, SOLUTION INTRAMUSCULAR; INTRAVENOUS at 13:03

## 2019-04-10 RX ADMIN — CHLORHEXIDINE GLUCONATE 0.12% ORAL RINSE 15 ML: 1.2 LIQUID ORAL at 08:46

## 2019-04-10 RX ADMIN — POTASSIUM CHLORIDE 40 MEQ: 1.5 POWDER, FOR SOLUTION ORAL at 12:10

## 2019-04-10 RX ADMIN — MIDODRINE HYDROCHLORIDE 10 MG: 5 TABLET ORAL at 17:38

## 2019-04-10 RX ADMIN — BUSPIRONE HYDROCHLORIDE 15 MG: 15 TABLET ORAL at 20:26

## 2019-04-10 RX ADMIN — IPRATROPIUM BROMIDE AND ALBUTEROL SULFATE 3 ML: 2.5; .5 SOLUTION RESPIRATORY (INHALATION) at 19:10

## 2019-04-10 RX ADMIN — BUSPIRONE HYDROCHLORIDE 15 MG: 15 TABLET ORAL at 15:01

## 2019-04-10 RX ADMIN — BUSPIRONE HYDROCHLORIDE 15 MG: 15 TABLET ORAL at 08:48

## 2019-04-10 RX ADMIN — MIDODRINE HYDROCHLORIDE 10 MG: 5 TABLET ORAL at 08:47

## 2019-04-10 RX ADMIN — INSULIN DETEMIR 10 UNITS: 100 INJECTION, SOLUTION SUBCUTANEOUS at 20:26

## 2019-04-10 RX ADMIN — INSULIN LISPRO 2 UNITS: 100 INJECTION, SOLUTION INTRAVENOUS; SUBCUTANEOUS at 01:09

## 2019-04-10 NOTE — PLAN OF CARE
Problem: Patient Care Overview  Goal: Plan of Care Review  Outcome: Ongoing (interventions implemented as appropriate)   04/10/19 1659   Coping/Psychosocial   Plan of Care Reviewed With patient   Plan of Care Review   Progress improving   OTHER   Outcome Summary 1. Neuro- Pt alert and appears oriented. Pt calm. Mouths words to converse. Sister visited and pt interacted with her.     2. Respiratory- Pt on trach collar trials all day. Sats > 95%.     3. Cardiac- Sinus rhythm and normotensive.     4. GI- Replete with Fiber at goal of 70 ml/hr.     5. - 4100-6250 ml urine with 40 lasix.        Problem: Skin Injury Risk (Adult)  Goal: Skin Health and Integrity  Outcome: Ongoing (interventions implemented as appropriate)   04/10/19 1659   Skin Injury Risk (Adult)   Skin Health and Integrity making progress toward outcome       Problem: Ventilation, Mechanical Invasive (Adult)  Goal: Signs and Symptoms of Listed Potential Problems Will be Absent, Minimized or Managed (Ventilation, Mechanical Invasive)  Outcome: Ongoing (interventions implemented as appropriate)   04/10/19 1659   Goal/Outcome Evaluation   Problems Assessed (Mechanical Ventilation, Invasive) all   Problems Present (Mech Vent, Invasive) inability to wean       Problem: Fall Risk (Adult)  Goal: Absence of Fall  Outcome: Ongoing (interventions implemented as appropriate)   04/10/19 1659   Fall Risk (Adult)   Absence of Fall making progress toward outcome       Problem: Airway, Artificial (Adult)  Goal: Signs and Symptoms of Listed Potential Problems Will be Absent, Minimized or Managed (Airway, Artificial)  Outcome: Ongoing (interventions implemented as appropriate)   04/10/19 1659   Goal/Outcome Evaluation   Problems Assessed (Artificial Airway) all   Problems Present (Artificial Airway) none

## 2019-04-10 NOTE — PROGRESS NOTES
Clinical Nutrition     Multidisciplinary Rounds    Time: 20min  Patient Name: Nallely Junior  Date of Encounter: 04/10/19 1:19 PM  MRN: [id number]  Admission date: 4/2/2019      Reason for visit: MDR. RD to continue to follow per protocol.     Additional information obtained during MDR:  Remains on the vent.  Plan  For possible trach collar trials as tolerated.  EN running @ goal rate and being tolerated.     Current diet: NPO Diet  Diet, Tube Feeding Tube Feeding Formula: Replete with Fiber (Very High Protein) @ goal rate 70ml/hr. TGV 1400ml.  Free water @ 10ml/hr.   Route: PEG  24hr delivery: 1354ml to provide 1354kcals, 85grm PRO, 20.6grm fiber and a total of 1335ml free water.     EMR reviewed     Intervention:  Follow treatment plan  Care plan reviewed    Follow up:   Per protocol      Leola Gallardo RD  1:19 PM

## 2019-04-10 NOTE — PROGRESS NOTES
"INTENSIVIST NOTE     Hospital Day: 8      Ms. Nallely Junior, 78 y.o. female is followed for:   Acute on chronic respiratory failure       SUBJECTIVE     77yo F with a history of chronic tracheostomy, dysphagia s/p PEG tube placement and noncompliance with a modified diet who presented as a transfer from [facility] for a possible STEMI. She was evaluated by Cardiology who did not feel she was having a STEMI. No LHC was performed as she would not be a candidate for intervention based on her chronic debilitated state. She was admitted to the ICU for medical management. She was found to be profoundly hypercapneic and was placed on mechanical ventilation.  She had atelectasis of the left lung which improved with mechanical ventilation.  She was found to have an ESBL E. coli UTI.  She was weaned completely from mechanical ventilation on 4/7 but required placement back on ventilation approximately 36 hours later.    Interval history:    Continues to do well on ventilatory support.  Seems to tolerate low levels of pressure support.  Afebrile.  Fluid balance -800 mL.    The patient's relevant past medical, surgical and social history were reviewed and updated in Epic as appropriate.       OBJECTIVE     Vital Sign Min/Max for last 24 hours  Temp  Min: 98.6 °F (37 °C)  Max: 99.6 °F (37.6 °C)   BP  Min: 92/46  Max: 139/85   Pulse  Min: 56  Max: 83   Resp  Min: 10  Max: 34   SpO2  Min: 94 %  Max: 100 %   No Data Recorded   No Data Recorded      Intake/Output Summary (Last 24 hours) at 4/10/2019 1131  Last data filed at 4/10/2019 1000  Gross per 24 hour   Intake 3275 ml   Output 4100 ml   Net -825 ml      Flowsheet Rows      First Filed Value   Admission Height  162.6 cm (64.02\") Documented at 04/03/2019 0320   Admission Weight  90.7 kg (200 lb) Documented at 04/02/2019 1800             04/02/19  1800 04/03/19  0320 04/03/19  0714   Weight: 90.7 kg (200 lb) 90.7 kg (199 lb 15.3 oz) 90.7 kg (200 lb)        " "    Objective:  General Appearance:  In no acute distress.    Vital signs: (most recent): Blood pressure 121/55, pulse 73, temperature 98.6 °F (37 °C), temperature source Oral, resp. rate 10, height 162.6 cm (64\"), weight 90.7 kg (200 lb), SpO2 95 %.    HEENT: Normal HEENT exam.  (Right internal jugular triple lumen catheter  Cuffed Shiley tracheostomy tube)    Lungs:  Normal effort and normal respiratory rate.  Breath sounds clear to auscultation.  She is not in respiratory distress.  No rales, wheezes or rhonchi.    Heart: Normal rate.  Regular rhythm.  S1 normal and S2 normal.  Positive for murmur.  No gallop or friction rub. (Systolic ejection murmur)  Chest: Symmetric chest wall expansion.   Abdomen: Abdomen is soft and non-distended.  (PEG tube in place.).  Bowel sounds are normal.   There is no abdominal tenderness.   There is no mass. There is no splenomegaly. There is no hepatomegaly.   Extremities: There is dependent edema.  There is no deformity.    Neurological: Patient is alert.    Pupils:  Pupils are equal, round, and reactive to light.    Skin:  Warm and dry.              I reviewed the patient's new clinical results.  I reviewed the patient's new imaging results/reports including actual images and agree with reports.      Chest X-Ray: Left effusion/atelectasis    INFUSIONS       Results from last 7 days   Lab Units 04/10/19  0407 04/09/19  0418 04/08/19  0357   WBC 10*3/mm3 7.01 7.44 6.23   HEMOGLOBIN g/dL 10.8* 10.9* 11.0*   HEMATOCRIT % 35.7 36.3 35.6   PLATELETS 10*3/mm3 208 181 156     Results from last 7 days   Lab Units 04/10/19  0407 04/09/19 0418 04/08/19  0357   SODIUM mmol/L 140 141 143   POTASSIUM mmol/L 3.4* 4.2 3.9   CHLORIDE mmol/L 96* 103 111*   CO2 mmol/L 37.0* 28.0 28.0   BUN mg/dL 11 10 10   GLUCOSE mg/dL 167* 148* 144*   CREATININE mg/dL 0.35* 0.36* 0.34*   MAGNESIUM mg/dL 1.8 2.0 2.4  2.4   CALCIUM mg/dL 8.3* 8.3* 8.2*         Results from last 7 days   Lab Units 04/08/19  0355 " 04/05/19  0351 04/04/19  0346   PH, ARTERIAL pH units 7.333* 7.527* 7.501*   PCO2, ARTERIAL mm Hg 58.3* 27.8* 38.3   PO2 ART mm Hg 88.5 94.1 99.3   FIO2 % 35 40 40         [facility] Ventilation: FiO2 (%):  [40 %] 40 %  S RR:  [8-10] 10  PEEP/CPAP (cm H2O):  [5 cm H20] 5 cm H20  MD SUP:  [0 cm H20-15 cm H20] 15 cm H20  MAP (cm H2O):  [7.5-11] 8.3    I reviewed the patient's medications.    Assessment/Plan   ASSESSMENT      Active Hospital Problems    Diagnosis   • **Inferior STEMI    • Acute on chronic respiratory failure with hypoxia      Chronic trach      • Hypotension     on midodrine     • COPD   • Atelectasis of left lung   • Aortic valve stenosis     ECHO 10/2018:  ·  Left ventricular systolic function is hyperdynamic (EF > 70).  · Left ventricular wall thickness is consistent with moderate concentric hypertrophy.  · Left ventricular diastolic dysfunction (grade I) consistent with impaired relaxation.  · Moderate to severe aortic stenosis (mean gradient 39 mmHg) is present.  · Mild mitral valve regurgitation is present     • Stage 3 CKD   • Former smoker   • Debility   • Dementia   • Class 1 obesity in adult   • Bronchomalacia   • Hyperlipidemia   • Coronary artery disease   • GERD    • Dysphagia with chronic PEG    • Type 2 diabetes mellitus          78-year-old admitted with what is felt to be a type II MI due to hypotension and LVH/aortic stenosis.  She has chronic hypotension.  Her aortic stenosis is severe.  She has developed acute on chronic respiratory failure requiring mechanical ventilation.  She is been treated for ESBL E. coli UTI.    She was weaned from mechanical ventilation and tracheostomy actually changed to an uncuffed Shiley on 4/8 but that evening she developed recurrent respiratory failure prompting replacement of a cuffed Shiley and placed back on respiratory support.     On midodrine for hypotension which appears chronic.  She is a not a candidate for further measures for her aortic stenosis  per cardiology.    Had a discussion regarding her future wishes regarding long-term mechanical ventilation yesterday with the patient and her family but did not receive a clear answer.        PLAN     1. Place on trach collar as tolerated again.  2. Can resume mechanical ventilation as needed.  If she needs ventilation persistently will need to consider different placement options.  3. Continue midodrine  4. As long as no evidence of shock, will tolerate hypotension due to severe aortic stenosis  5. Additional diuretics today  6. Continue ertapenem  7. Continue tube feeds           I discussed the patient's findings and my recommendations with patient and nursing staff     Plan of care and goals reviewed with multidisciplinary team at daily rounds.    High level of risk due to:  illness with threat to life or bodily function.    Michael Pretty MD  Pulmonary and Critical Care Medicine  04/10/19 11:31 AM

## 2019-04-10 NOTE — PROGRESS NOTES
Continued Stay Note  [facility]     Patient Name: Nallely Junior  MRN: [id number]  Today's Date: 4/10/2019    Admit Date: 4/2/2019    Discharge Plan     Row Name 04/10/19 1230       Plan    Plan Nursing Facility Placement    Patient/Family in Agreement with Plan  -- Patient/sister    Plan Comments Remains in ICU, to begin trach collar trials today with plan to return to [facility] once she no longer requires vent.  Discussed in unit multidisciplinary rounds this morning and updated Admissions @ [facility].  Chasity Quan, Ext. [phone]    Final Discharge Disposition Code 30 - still a patient        Discharge Codes    No documentation.             JUNE Ball

## 2019-04-10 NOTE — PLAN OF CARE
Problem: Patient Care Overview  Goal: Plan of Care Review  Outcome: Ongoing (interventions implemented as appropriate)   04/10/19 0553   Coping/Psychosocial   Plan of Care Reviewed With patient   Plan of Care Review   Progress no change   OTHER   Outcome Summary Pt remains on ventillator. pt had no acute respiratory issues overnight, currently on SIMV-VC+. pt continuously asking for food and drinks even after repeated education. pt rested throughout the night unil earlier this A.M. and pt became restless and agitated again. no acute events overnight, will continue to closely monitor.       Problem: Skin Injury Risk (Adult)  Goal: Skin Health and Integrity  Outcome: Ongoing (interventions implemented as appropriate)   04/10/19 0553   Skin Injury Risk (Adult)   Skin Health and Integrity making progress toward outcome       Problem: Ventilation, Mechanical Invasive (Adult)  Goal: Signs and Symptoms of Listed Potential Problems Will be Absent, Minimized or Managed (Ventilation, Mechanical Invasive)  Outcome: Ongoing (interventions implemented as appropriate)   04/10/19 0553   Goal/Outcome Evaluation   Problems Assessed (Mechanical Ventilation, Invasive) all   Problems Present (Mech Vent, Invasive) none       Problem: Fall Risk (Adult)  Goal: Absence of Fall  Outcome: Ongoing (interventions implemented as appropriate)   04/10/19 0553   Fall Risk (Adult)   Absence of Fall making progress toward outcome       Problem: Airway, Artificial (Adult)  Goal: Signs and Symptoms of Listed Potential Problems Will be Absent, Minimized or Managed (Airway, Artificial)  Outcome: Ongoing (interventions implemented as appropriate)   04/10/19 0553   Goal/Outcome Evaluation   Problems Assessed (Artificial Airway) all   Problems Present (Artificial Airway) none

## 2019-04-10 NOTE — SIGNIFICANT NOTE
04/10/19 1546   SLP Deferred Reason   SLP Deferred Reason Routine  (Pt not appropriate for SLP re-evaluation today. F/u as pt becomes appropriate for PMV/dysphagia. Defer at this time.)

## 2019-04-10 NOTE — PLAN OF CARE
Problem: Ventilation, Mechanical Invasive (Adult)  Goal: Signs and Symptoms of Listed Potential Problems Will be Absent, Minimized or Managed (Ventilation, Mechanical Invasive)  Outcome: Ongoing (interventions implemented as appropriate)  Pt will likely perform tc or ps trial today.

## 2019-04-10 NOTE — SIGNIFICANT NOTE
Patient developed respiratory distress and was placed on ACVC from SIMV.  Upon my arrival, patient was resting comfortably.  I changed her back to SIMV but w/ a PS of 15 and the patient continues to rest comfortably.

## 2019-04-11 LAB
ANION GAP SERPL CALCULATED.3IONS-SCNC: 7 MMOL/L
BASOPHILS # BLD AUTO: 0.01 10*3/MM3 (ref 0–0.2)
BASOPHILS NFR BLD AUTO: 0.1 % (ref 0–1.5)
BUN BLD-MCNC: 12 MG/DL (ref 8–23)
BUN/CREAT SERPL: 30.8 (ref 7–25)
CALCIUM SPEC-SCNC: 9.7 MG/DL (ref 8.6–10.5)
CHLORIDE SERPL-SCNC: 91 MMOL/L (ref 98–107)
CO2 SERPL-SCNC: 39 MMOL/L (ref 22–29)
CREAT BLD-MCNC: 0.39 MG/DL (ref 0.57–1)
DEPRECATED RDW RBC AUTO: 55.2 FL (ref 37–54)
EOSINOPHIL # BLD AUTO: 0.12 10*3/MM3 (ref 0–0.4)
EOSINOPHIL NFR BLD AUTO: 1.7 % (ref 0.3–6.2)
ERYTHROCYTE [DISTWIDTH] IN BLOOD BY AUTOMATED COUNT: 15.3 % (ref 12.3–15.4)
GFR SERPL CREATININE-BSD FRML MDRD: >150 ML/MIN/1.73
GLUCOSE BLD-MCNC: 159 MG/DL (ref 65–99)
GLUCOSE BLDC GLUCOMTR-MCNC: 154 MG/DL (ref 70–130)
GLUCOSE BLDC GLUCOMTR-MCNC: 159 MG/DL (ref 70–130)
GLUCOSE BLDC GLUCOMTR-MCNC: 177 MG/DL (ref 70–130)
GLUCOSE BLDC GLUCOMTR-MCNC: 194 MG/DL (ref 70–130)
HCT VFR BLD AUTO: 41.9 % (ref 34–46.6)
HGB BLD-MCNC: 12.7 G/DL (ref 12–15.9)
IMM GRANULOCYTES # BLD AUTO: 0.01 10*3/MM3 (ref 0–0.05)
IMM GRANULOCYTES NFR BLD AUTO: 0.1 % (ref 0–0.5)
LYMPHOCYTES # BLD AUTO: 1.66 10*3/MM3 (ref 0.7–3.1)
LYMPHOCYTES NFR BLD AUTO: 23.7 % (ref 19.6–45.3)
MAGNESIUM SERPL-MCNC: 2.1 MG/DL (ref 1.6–2.4)
MCH RBC QN AUTO: 30 PG (ref 26.6–33)
MCHC RBC AUTO-ENTMCNC: 30.3 G/DL (ref 31.5–35.7)
MCV RBC AUTO: 98.8 FL (ref 79–97)
MONOCYTES # BLD AUTO: 0.74 10*3/MM3 (ref 0.1–0.9)
MONOCYTES NFR BLD AUTO: 10.6 % (ref 5–12)
NEUTROPHILS # BLD AUTO: 4.47 10*3/MM3 (ref 1.4–7)
NEUTROPHILS NFR BLD AUTO: 63.9 % (ref 42.7–76)
PLATELET # BLD AUTO: 233 10*3/MM3 (ref 140–450)
PMV BLD AUTO: 10.7 FL (ref 6–12)
POTASSIUM BLD-SCNC: 4.3 MMOL/L (ref 3.5–5.2)
RBC # BLD AUTO: 4.24 10*6/MM3 (ref 3.77–5.28)
SODIUM BLD-SCNC: 137 MMOL/L (ref 136–145)
WBC NRBC COR # BLD: 7 10*3/MM3 (ref 3.4–10.8)

## 2019-04-11 PROCEDURE — 25010000002 ERTAPENEM PER 500 MG: Performed by: INTERNAL MEDICINE

## 2019-04-11 PROCEDURE — 92507 TX SP LANG VOICE COMM INDIV: CPT

## 2019-04-11 PROCEDURE — 85025 COMPLETE CBC W/AUTO DIFF WBC: CPT | Performed by: INTERNAL MEDICINE

## 2019-04-11 PROCEDURE — 82962 GLUCOSE BLOOD TEST: CPT

## 2019-04-11 PROCEDURE — 94799 UNLISTED PULMONARY SVC/PX: CPT

## 2019-04-11 PROCEDURE — 63710000001 INSULIN DETEMIR PER 5 UNITS: Performed by: INTERNAL MEDICINE

## 2019-04-11 PROCEDURE — 80048 BASIC METABOLIC PNL TOTAL CA: CPT | Performed by: INTERNAL MEDICINE

## 2019-04-11 PROCEDURE — 83735 ASSAY OF MAGNESIUM: CPT | Performed by: INTERNAL MEDICINE

## 2019-04-11 PROCEDURE — 25010000002 FUROSEMIDE PER 20 MG: Performed by: INTERNAL MEDICINE

## 2019-04-11 PROCEDURE — 99233 SBSQ HOSP IP/OBS HIGH 50: CPT | Performed by: INTERNAL MEDICINE

## 2019-04-11 RX ORDER — AMINO ACIDS/PROTEIN HYDROLYS 15G-100/30
1 LIQUID (ML) ORAL 2 TIMES DAILY
Status: DISCONTINUED | OUTPATIENT
Start: 2019-04-11 | End: 2019-04-15 | Stop reason: HOSPADM

## 2019-04-11 RX ADMIN — Medication 1 PACKET: at 20:00

## 2019-04-11 RX ADMIN — INSULIN DETEMIR 10 UNITS: 100 INJECTION, SOLUTION SUBCUTANEOUS at 08:18

## 2019-04-11 RX ADMIN — CHLORHEXIDINE GLUCONATE 0.12% ORAL RINSE 15 ML: 1.2 LIQUID ORAL at 08:17

## 2019-04-11 RX ADMIN — MIDODRINE HYDROCHLORIDE 10 MG: 5 TABLET ORAL at 13:05

## 2019-04-11 RX ADMIN — CLONAZEPAM 0.5 MG: 0.5 TABLET ORAL at 22:19

## 2019-04-11 RX ADMIN — BUSPIRONE HYDROCHLORIDE 15 MG: 15 TABLET ORAL at 20:34

## 2019-04-11 RX ADMIN — INSULIN LISPRO 2 UNITS: 100 INJECTION, SOLUTION INTRAVENOUS; SUBCUTANEOUS at 06:24

## 2019-04-11 RX ADMIN — ERTAPENEM SODIUM 1 G: 1 INJECTION, POWDER, LYOPHILIZED, FOR SOLUTION INTRAMUSCULAR; INTRAVENOUS at 12:40

## 2019-04-11 RX ADMIN — ROPINIROLE 4 MG: 2 TABLET, FILM COATED ORAL at 20:34

## 2019-04-11 RX ADMIN — IPRATROPIUM BROMIDE AND ALBUTEROL SULFATE 3 ML: 2.5; .5 SOLUTION RESPIRATORY (INHALATION) at 00:20

## 2019-04-11 RX ADMIN — Medication 1 PACKET: at 17:46

## 2019-04-11 RX ADMIN — CHLORHEXIDINE GLUCONATE 0.12% ORAL RINSE 15 ML: 1.2 LIQUID ORAL at 20:34

## 2019-04-11 RX ADMIN — INSULIN LISPRO 2 UNITS: 100 INJECTION, SOLUTION INTRAVENOUS; SUBCUTANEOUS at 17:09

## 2019-04-11 RX ADMIN — ASPIRIN 81 MG 81 MG: 81 TABLET ORAL at 08:17

## 2019-04-11 RX ADMIN — MIDODRINE HYDROCHLORIDE 10 MG: 5 TABLET ORAL at 08:17

## 2019-04-11 RX ADMIN — MIDODRINE HYDROCHLORIDE 10 MG: 5 TABLET ORAL at 17:09

## 2019-04-11 RX ADMIN — ROPINIROLE 4 MG: 2 TABLET, FILM COATED ORAL at 15:05

## 2019-04-11 RX ADMIN — IPRATROPIUM BROMIDE AND ALBUTEROL SULFATE 3 ML: 2.5; .5 SOLUTION RESPIRATORY (INHALATION) at 18:58

## 2019-04-11 RX ADMIN — FUROSEMIDE 40 MG: 10 INJECTION, SOLUTION INTRAMUSCULAR; INTRAVENOUS at 00:34

## 2019-04-11 RX ADMIN — IPRATROPIUM BROMIDE AND ALBUTEROL SULFATE 3 ML: 2.5; .5 SOLUTION RESPIRATORY (INHALATION) at 13:42

## 2019-04-11 RX ADMIN — BUSPIRONE HYDROCHLORIDE 15 MG: 15 TABLET ORAL at 15:05

## 2019-04-11 RX ADMIN — CLONAZEPAM 0.5 MG: 0.5 TABLET ORAL at 13:05

## 2019-04-11 RX ADMIN — INSULIN LISPRO 2 UNITS: 100 INJECTION, SOLUTION INTRAVENOUS; SUBCUTANEOUS at 00:34

## 2019-04-11 RX ADMIN — INSULIN LISPRO 2 UNITS: 100 INJECTION, SOLUTION INTRAVENOUS; SUBCUTANEOUS at 23:26

## 2019-04-11 RX ADMIN — BUSPIRONE HYDROCHLORIDE 15 MG: 15 TABLET ORAL at 08:17

## 2019-04-11 RX ADMIN — INSULIN LISPRO 2 UNITS: 100 INJECTION, SOLUTION INTRAVENOUS; SUBCUTANEOUS at 12:40

## 2019-04-11 RX ADMIN — INSULIN DETEMIR 10 UNITS: 100 INJECTION, SOLUTION SUBCUTANEOUS at 20:34

## 2019-04-11 RX ADMIN — IPRATROPIUM BROMIDE AND ALBUTEROL SULFATE 3 ML: 2.5; .5 SOLUTION RESPIRATORY (INHALATION) at 08:05

## 2019-04-11 RX ADMIN — PANTOPRAZOLE SODIUM 40 MG: 40 INJECTION, POWDER, FOR SOLUTION INTRAVENOUS at 08:19

## 2019-04-11 RX ADMIN — ROPINIROLE 4 MG: 2 TABLET, FILM COATED ORAL at 08:25

## 2019-04-11 RX ADMIN — CLONAZEPAM 0.5 MG: 0.5 TABLET ORAL at 05:50

## 2019-04-11 NOTE — PLAN OF CARE
Problem: Patient Care Overview  Goal: Plan of Care Review  Outcome: Ongoing (interventions implemented as appropriate)   04/11/19 1753   Coping/Psychosocial   Plan of Care Reviewed With patient   Plan of Care Review   Progress improving   OTHER   Outcome Summary 1. Neuro- pt nods appropriately to questions and mouths words.     2. Respiratory- Pt still olerating trach collar trials (for at least 36 hrs). Sats > 93%.     3. Cardiac- Sinus rhythm (55-75 bpm). SBP between  mmhg.     4. GI- Replete Fiber @ 70 ml/hr. Prostat BID.     BM x 3 liquid.    5. - External Catheter= 1000 ml.     6. Afebrile.     7. Awaiting bed placement.       Problem: Skin Injury Risk (Adult)  Goal: Skin Health and Integrity  Outcome: Ongoing (interventions implemented as appropriate)   04/11/19 1753   Skin Injury Risk (Adult)   Skin Health and Integrity making progress toward outcome       Problem: Ventilation, Mechanical Invasive (Adult)  Goal: Signs and Symptoms of Listed Potential Problems Will be Absent, Minimized or Managed (Ventilation, Mechanical Invasive)  Outcome: Ongoing (interventions implemented as appropriate)   04/11/19 1753   Goal/Outcome Evaluation   Problems Assessed (Mechanical Ventilation, Invasive) all   Problems Present (Mech Vent, Invasive) none       Problem: Fall Risk (Adult)  Goal: Absence of Fall  Outcome: Ongoing (interventions implemented as appropriate)   04/11/19 1753   Fall Risk (Adult)   Absence of Fall making progress toward outcome       Problem: Airway, Artificial (Adult)  Goal: Signs and Symptoms of Listed Potential Problems Will be Absent, Minimized or Managed (Airway, Artificial)  Outcome: Ongoing (interventions implemented as appropriate)   04/11/19 1753   Goal/Outcome Evaluation   Problems Assessed (Artificial Airway) all   Problems Present (Artificial Airway) none

## 2019-04-11 NOTE — PROGRESS NOTES
"                    Clinical Nutrition     Nutrition Assessment  Reason for Visit:   MDR, Follow-up protocol, EN    Patient Name: Nallely Junior  YOB: 1940  MRN: [id number]  Date of Encounter: 04/11/19 9:05 AM  Admission date: 4/2/2019    Nutrition Assessment     Hospital Problem List    Acute on chronic respiratory failure- vent (4/3)- trach collar (4/5); returned   to vent (4/8)- trach collar trials (4/10)      Dysphagia with chronic PEG      Atelectasis of left lung    Bronchomalacia    PMH    Type 2 diabetes mellitus     Hypotension    Hyperlipidemia    COPD    Coronary artery disease    GERD     Aortic valve stenosis    Stage 3 CKD    Former smoker    Debility    Dementia    Class 1 obesity in adult     Other: Note hx of refusing TF and of non-compliance w Dys IV diet nectar thick liq              Hx MRSA, PNA              (4/4) 20 Fr GONSALO tube in place  Reported/Observed/Food/Nutrition Related History:     Remains on trach collar, has been off vent for 24hrs. SLP evaluation for PMV treatment.  Not appropriate for swallow eval at this time.      Patient continue to ask for food and drink per RN.  H/o non-compliance with modified diet.       Anthropometrics   Admit Weight: 200# (estimated)  Admit Height: 64\" (162.6cm)  BMI: 34.3    Weight Change   UBW: note from NH in Oct, 177 lb and here in Oct bed wt 166lb    Documented weight discrepancies; assessment is based on reported admission weight.   Labs reviewed     Results from last 7 days   Lab Units 04/11/19  0430 04/10/19  0407 04/09/19  0418 04/08/19  0357   SODIUM mmol/L 137 140 141 143   POTASSIUM mmol/L 4.3 3.4* 4.2 3.9   CHLORIDE mmol/L 91* 96* 103 111*   CO2 mmol/L 39.0* 37.0* 28.0 28.0   BUN mg/dL 12 11 10 10   CREATININE mg/dL 0.39* 0.35* 0.36* 0.34*   GLUCOSE mg/dL 159* 167* 148* 144*   CALCIUM mg/dL 9.7 8.3* 8.3* 8.2*   PHOSPHORUS mg/dL  --  2.5 2.0* 2.2*  2.2*     Medications reviewed     Levemir, SSI, Protonix     Intake/Ouptut 24 hrs " (7:00AM - 6:59 AM)     Intake & Output (last day)       04/10 0701 - 04/11 0700 04/11 0701 - 04/12 0700    I.V. (mL/kg)      Other 241     NG/GT 1636     IV Piggyback 100     Total Intake(mL/kg) 1977 (21.8)     Urine (mL/kg/hr) 3975 (1.8)     Stool 0     Total Output 3975     Net -1998           Urine Unmeasured Occurrence 4 x     Stool Unmeasured Occurrence 4 x         Needs Assessment   Height used: 162.6 cm  Weight used: 90.7 lbs current wt, 54.5 Kg IBW    Calories: 1359  Method: PSU  Calories: 1174-1459 Method: 11-14 caloires per kg CBW    Est calories needs: 1400 calories     Protein: 109  Method: 1.2grms/kg CBW  Protein: 110grms Method: 2.0grms/kg IBW    Est protein needs: 110grms     Current Nutrition Prescription   PO: NPO Diet     EN: Replete w Fiber @ goal rate 70 ml/hr: TGV 1400ml.      At Goal will deliver: 1400 Kcal = 100% est need, 90 g PRO = 82% est need, 21 g Fiber , 1162 ml H20 in TF, 1362 total ml Free Water.     EN Delivery: 1 day:  1636ml to provide 1636Kcal = 117% of est need, 102g PRO = 93% of est need, 24.9 g Fiber, 1366ml H20 in TF 1607 total ml Free Water.    Nutrition Diagnosis     4/4, 4/5,4/8,4/9  Updated 4/11  Problem Inadequate protein intake    Etiology Current EN Rx    Signs/Symptoms Current EN meeting 79% est protein needs        Goal:   General: Nutrition support treatment  EN: adjsut EN    Nutrition Intervention    Follow treatment progress, Care plan reviewed, Nutrition support order placed    1. Decrease EN to goal rate 65ml/hr. TGV 1300ml.  2. Add prostat BID.      This will provide: 1500kcals, 111grm PRO, 19.8grm fiber, 1086ml free water.     Monitoring/Evaluation:   I&O, Pertinent labs, EN delivery/tolerance, Weight, Symptoms      Will Continue to follow per protocol      Leola Gallardo RD  Time Spent: 30 min        Electronically signed by:  Leola Gallardo RD  04/11/19 9:05 AM

## 2019-04-11 NOTE — PROGRESS NOTES
"INTENSIVIST NOTE     Hospital Day: 9      Ms. Nallely Junior, 78 y.o. female is followed for:   Acute on chronic respiratory failure       SUBJECTIVE     79yo F with a history of chronic tracheostomy, dysphagia s/p PEG tube placement and noncompliance with a modified diet who presented as a transfer from [facility] for a possible STEMI. She was evaluated by Cardiology who did not feel she was having a STEMI. No LHC was performed as she would not be a candidate for intervention based on her chronic debilitated state. She was admitted to the ICU for medical management. She was found to be profoundly hypercapneic and was placed on mechanical ventilation.  She had atelectasis of the left lung which improved with mechanical ventilation.  She was found to have an ESBL E. coli UTI.  She was weaned completely from mechanical ventilation on 4/7 but required placement back on ventilation approximately 36 hours later.    Interval history:    Remains on trach collar.  Fluid balance -1.6 L.  Afebrile.  Poor tolerance of Passy-Jackie valve this morning    The patient's relevant past medical, surgical and social history were reviewed and updated in Epic as appropriate.       OBJECTIVE     Vital Sign Min/Max for last 24 hours  Temp  Min: 98.6 °F (37 °C)  Max: 99 °F (37.2 °C)   BP  Min: 93/54  Max: 143/82   Pulse  Min: 61  Max: 77   Resp  Min: 20  Max: 26   SpO2  Min: 91 %  Max: 100 %   No Data Recorded   No Data Recorded      Intake/Output Summary (Last 24 hours) at 4/11/2019 1107  Last data filed at 4/11/2019 1000  Gross per 24 hour   Intake 1990 ml   Output 3600 ml   Net -1610 ml      Flowsheet Rows      First Filed Value   Admission Height  162.6 cm (64.02\") Documented at 04/03/2019 0320   Admission Weight  90.7 kg (200 lb) Documented at 04/02/2019 1800             04/02/19  1800 04/03/19  0320 04/03/19  0714   Weight: 90.7 kg (200 lb) 90.7 kg (199 lb 15.3 oz) 90.7 kg (200 lb)            Objective:  General Appearance:  In " "no acute distress.    Vital signs: (most recent): Blood pressure 114/49, pulse 70, temperature 98.9 °F (37.2 °C), temperature source Oral, resp. rate 20, height 162.6 cm (64\"), weight 90.7 kg (200 lb), SpO2 95 %.    HEENT: Normal HEENT exam.  (Right internal jugular triple lumen catheter  Cuffed Shiley tracheostomy tube)    Lungs:  Normal effort and normal respiratory rate.  Breath sounds clear to auscultation.  She is not in respiratory distress.  No rales, wheezes or rhonchi.    Heart: Normal rate.  Regular rhythm.  S1 normal and S2 normal.  Positive for murmur.  No gallop or friction rub. (Systolic ejection murmur)  Chest: Symmetric chest wall expansion.   Abdomen: Abdomen is soft and non-distended.  (PEG tube in place.).  Bowel sounds are normal.   There is no abdominal tenderness.   There is no mass. There is no splenomegaly. There is no hepatomegaly.   Extremities: There is dependent edema.  There is no deformity.    Neurological: Patient is alert.    Pupils:  Pupils are equal, round, and reactive to light.    Skin:  Warm and dry.              I reviewed the patient's new clinical results.  I reviewed the patient's new imaging results/reports including actual images and agree with reports.      Chest X-Ray: Left effusion/atelectasis    INFUSIONS       Results from last 7 days   Lab Units 04/11/19  0430 04/10/19  0407 04/09/19  0418   WBC 10*3/mm3 7.00 7.01 7.44   HEMOGLOBIN g/dL 12.7 10.8* 10.9*   HEMATOCRIT % 41.9 35.7 36.3   PLATELETS 10*3/mm3 233 208 181     Results from last 7 days   Lab Units 04/11/19  0430 04/10/19  0407 04/09/19  0418   SODIUM mmol/L 137 140 141   POTASSIUM mmol/L 4.3 3.4* 4.2   CHLORIDE mmol/L 91* 96* 103   CO2 mmol/L 39.0* 37.0* 28.0   BUN mg/dL 12 11 10   GLUCOSE mg/dL 159* 167* 148*   CREATININE mg/dL 0.39* 0.35* 0.36*   MAGNESIUM mg/dL 2.1 1.8 2.0   CALCIUM mg/dL 9.7 8.3* 8.3*         Results from last 7 days   Lab Units 04/08/19  0355 04/05/19  0351   PH, ARTERIAL pH units 7.333* " 7.527*   PCO2, ARTERIAL mm Hg 58.3* 27.8*   PO2 ART mm Hg 88.5 94.1   FIO2 % 35 40         [facility] Ventilation:      I reviewed the patient's medications.    Assessment/Plan   ASSESSMENT      Active Hospital Problems    Diagnosis   • **Inferior STEMI    • Acute on chronic respiratory failure with hypoxia      Chronic trach      • Hypotension     on midodrine     • COPD   • Atelectasis of left lung   • Aortic valve stenosis     ECHO 10/2018:  ·  Left ventricular systolic function is hyperdynamic (EF > 70).  · Left ventricular wall thickness is consistent with moderate concentric hypertrophy.  · Left ventricular diastolic dysfunction (grade I) consistent with impaired relaxation.  · Moderate to severe aortic stenosis (mean gradient 39 mmHg) is present.  · Mild mitral valve regurgitation is present     • Stage 3 CKD   • Former smoker   • Debility   • Dementia   • Class 1 obesity in adult   • Bronchomalacia   • Hyperlipidemia   • Coronary artery disease   • GERD    • Dysphagia with chronic PEG    • Type 2 diabetes mellitus          78-year-old admitted with what is felt to be a type II MI due to hypotension and LVH/aortic stenosis.  She has chronic hypotension.  Her aortic stenosis is severe.  She has developed acute on chronic respiratory failure requiring mechanical ventilation.  She is been treated for ESBL E. coli UTI.    She was weaned from mechanical ventilation and tracheostomy actually changed to an uncuffed Shiley on 4/8 but that evening she developed recurrent respiratory failure prompting replacement of a cuffed Shiley and placed back on respiratory support.  Currently back on trach collar.     On midodrine for hypotension which appears chronic.  She is a not a candidate for further measures for her aortic stenosis per cardiology.    Had a discussion earlier in the week regarding her future wishes regarding long-term mechanical ventilation with the patient and her family but did not receive a clear answer.         PLAN     1. Continue trach collar.  Has been off ventilator times 24 hours.  Will try to replace and uncuffed tracheostomy tube again tomorrow if continues to do well.  2. Can resume mechanical ventilation as needed.  If she needs ventilation persistently will need to consider different placement options.  3. Continue midodrine  4. As long as no evidence of shock, will tolerate hypotension due to severe aortic stenosis  5. Complete course of ertapenem  6. Continue tube feeds           I discussed the patient's findings and my recommendations with patient and nursing staff     Plan of care and goals reviewed with multidisciplinary team at daily rounds.    High level of risk due to:  illness with threat to life or bodily function.    Michael Pretty MD  Pulmonary and Critical Care Medicine  04/11/19 11:07 AM

## 2019-04-11 NOTE — PLAN OF CARE
Problem: Patient Care Overview  Goal: Plan of Care Review  Outcome: Ongoing (interventions implemented as appropriate)   04/11/19 0340   Coping/Psychosocial   Plan of Care Reviewed With patient   Plan of Care Review   Progress improving   OTHER   Outcome Summary VSS. Pt has remained on trach collar trials all night, tolerating fairly well. sats >92%. pt had to be suctioned frequently d/t increased secretions. tube feeding remains pt seems to have rested fairly well overnight. pt diuresing well. no other c/o at this time will continue to closely monitor.       Problem: Skin Injury Risk (Adult)  Goal: Skin Health and Integrity  Outcome: Ongoing (interventions implemented as appropriate)   04/11/19 0340   Skin Injury Risk (Adult)   Skin Health and Integrity making progress toward outcome       Problem: Fall Risk (Adult)  Goal: Absence of Fall  Outcome: Ongoing (interventions implemented as appropriate)   04/11/19 0340   Fall Risk (Adult)   Absence of Fall making progress toward outcome       Problem: Airway, Artificial (Adult)  Goal: Signs and Symptoms of Listed Potential Problems Will be Absent, Minimized or Managed (Airway, Artificial)  Outcome: Ongoing (interventions implemented as appropriate)   04/11/19 0340   Goal/Outcome Evaluation   Problems Assessed (Artificial Airway) all   Problems Present (Artificial Airway) none

## 2019-04-11 NOTE — THERAPY TREATMENT NOTE
Acute Care - Speech Language Pathology Treatment Note  [facility]     Patient Name: Nallely Junior  : 1940  MRN: [id number]  Today's Date: 2019         Admit Date: 2019    Visit Dx:      ICD-10-CM ICD-9-CM   1. Hypotension, unspecified hypotension type I95.9 458.9   2. Acute on chronic respiratory failure with hypoxia  J96.21 518.84     799.02   3. Inferior STEMI  I21.19 410.40   4. Atelectasis of left lung J98.11 518.0   5. Impaired mobility and ADLs Z74.09 799.89   6. Impaired functional mobility, balance, gait, and endurance Z74.09 V49.89   7. Oropharyngeal dysphagia R13.12 787.22   8. Voice and resonance disorder R49.9 784.40     Patient Active Problem List   Diagnosis   • Pneumonia   • Type 2 diabetes mellitus    • Hypotension   • Hyperlipidemia   • Depression   • Anxiety   • COPD   • Dysphagia with chronic PEG    • Coronary artery disease   • CHF    • GERD    • Acute on chronic respiratory failure with hypoxia    • Aortic valve stenosis   • Stage 3 CKD   • Former smoker   • Inferior STEMI    • H/O MRSA pneumonia 10/2018    • Debility   • Dementia   • Class 1 obesity in adult   • Atelectasis of left lung   • Bronchomalacia        Therapy Treatment  Rehabilitation Treatment Summary     Row Name 19 0946             Treatment Time/Intention    Discipline  speech language pathologist  -MP      Document Type  therapy note (daily note)  -MP      Subjective Information  no complaints  -MP      Mode of Treatment  individual therapy;speech-language pathology  -MP      Patient/Family Observations  No family present  -MP      Care Plan Review  care plan/treatment goals reviewed;patient/other agree to care plan  -MP      Therapy Frequency (Swallow)  PRN  -MP      Therapy Frequency (SLP SLC)  5 days per week  -MP      Patient Effort  adequate  -MP      Recorded by [MP] Pedrito Woodward, MS, CFY-SLP 19 1004      Row Name 19 2958             Pain Assessment    Additional Documentation  Pain  Scale: FACES Pre/Post-Treatment (Group)  -MP      Recorded by [MP] Crissy Pedrito, MS, CFY-SLP 04/11/19 1004      Row Name 04/11/19 0910             Pain Scale: FACES Pre/Post-Treatment    Pain: FACES Scale, Pretreatment  2-->hurts little bit  -MP      Pain: FACES Scale, Post-Treatment  2-->hurts little bit  -MP      Recorded by [MP] Pedrito Woodward, MS, CFY-[facility] 04/11/19 1004      Row Name                Wound 04/02/19 1900 Bilateral midline coccyx pressure injury    Wound - Properties Group Date first assessed: 04/02/19 [ROBINSON] Time first assessed: 1900 [ROBINSON] Present On Admission : yes [ROBINSON] Side: Bilateral [ROBINSON] Orientation: midline [ROBINSON] Location: coccyx [ROBINSON] Type: pressure injury [ROBINSON] Stage, Pressure Injury: deep tissue injury [ROBINSON] Additional Comments: probable stage 1 pressure injury [CP] Recorded by:  [CP] Jennifer Middleton APRN 04/03/19 1446 [ROBINSON] Vikas Bynum RN 04/03/19 0048    Row Name                Wound 04/03/19 0700 Left anterior toe abrasion;other (see comments)    Wound - Properties Group Date first assessed: 04/03/19 [KJ] Time first assessed: 0700 [KJ] Present On Admission : yes [KJ] Side: Left [KJ] Orientation: anterior [KJ] Location: toe [KJ] Type: abrasion;other (see comments) [KJ], scabbing  Recorded by:  [KJ] Jessica Gonzales RN 04/03/19 1527    Row Name 04/11/19 0910             Outcome Summary/Treatment Plan (SLP)    Daily Summary of Progress (SLP)  progress toward functional goals as expected  -MP      Plan for Continued Treatment (SLP)  Pt seen for PMV tx. Pt recently switched to cuffless trach, was unable to tolerate, and returned to [facility] #6 cuffed trach (on which SLP initially eval'ed for PMV use). Upon entry to room, cuff almost entirely deflated. SLP removed remaining air w/ syringe. Placed PMV. Vitals remained stable w/ PMV in place (HR between 70 and 73, O2 sats between 95 and 98). Pt able to phonate upon cue and audible from approximately 2 feet. However, pt appeared to  become anxious and requested SLP remove valve. Removed, encouraged pt to replace, and trialed again w/ similar outcome. Valve removed at end of session and placed at bedside. Replaced air in cuff. Attempted to complete dysphagia evaluation. However, pt refused PO trials of ice chip/thin H2O. SLP will continue to follow for PMV tx and dysphagia evaluation. Rec PMV use w/ SLP supervision only.  -MP      Anticipated Dischage Disposition  unknown;anticipate therapy at next level of care  -MP      Recorded by [MP] Pedrito Woodward, MS, CFY-SLP 04/11/19 1004        User Key  (r) = Recorded By, (t) = Taken By, (c) = Cosigned By    Initials Name Effective Dates Discipline    CP Jennifer Middleton APRN 02/05/19 -  Nurse    Vikas Conti RN 06/16/16 -  Nurse    Jessica Morales RN 06/16/16 -  Nurse    Pedrito Becerra, MS, CFY-SLP 08/15/18 -  SLP          EDUCATION  The patient has been educated in the following areas:   Communication Impairment Speaking Valve.    SLP Recommendation and Plan  Daily Summary of Progress (SLP): progress toward functional goals as expected     Plan for Continued Treatment (SLP): Pt seen for PMV tx. Pt recently switched to cuffless trach, was unable to tolerate, and [facility] (on which SLP initially eval'ed for PMV use). Upon entry to room, cuff almost entirely deflated. SLP removed remaining air w/ syringe. Placed PMV. Vitals remained stable w/ PMV in place (HR between 70 and 73, O2 sats between 95 and 98). Pt able to phonate upon cue and audible from approximately 2 feet. However, pt appeared to become anxious and requested SLP remove valve. Removed, encouraged pt to replace, and trialed again w/ similar outcome. Valve removed at end of session and placed at bedside. Replaced air in cuff. Attempted to complete dysphagia evaluation. However, pt refused PO trials of ice chip/thin H2O. SLP will continue to follow for PMV tx and dysphagia evaluation. Rec  PMV use w/ SLP supervision only.  Anticipated Dischage Disposition: unknown, anticipate therapy at next level of care             SLP GOALS     Row Name 04/11/19 0910 04/08/19 1345          Tolerate Speaking Valve Placement Goal 1 (SLP)    Tolerate Speaking Valve Placement Goal 1 (SLP)  finger occlusion;speaking valve 10 min;80%;with minimal cues (75-90%)  -MP  finger occlusion;speaking valve 10 min;80%;with minimal cues (75-90%)  -RD     Time Frame (Tolerate Speaking Valve Placement Goal 1, SLP)  short term goal (STG);by discharge  -MP  short term goal (STG);by discharge  -RD     Progress (Tolerate Speaking Valve Placement Goal 1, SLP)  50%;with moderate cues (50-74%)  -MP  --     Progress/Outcomes (Tolerate Speaking Valve Placement Goal 1, SLP)  continuing progress toward goal  -MP  --        Audible Speech with Speaking Valve Goal 1 (SLP)    Audible Speech Goal 1 (SLP)  3 feet;80%;with minimal cues (75-90%)  -MP  3 feet;80%;with minimal cues (75-90%)  -RD     Time Frame (Audible Speech Goal 1, SLP)  short term goal (STG);by discharge  -MP  short term goal (STG);by discharge  -RD     Progress (Audible Speech Goal 1, SLP)  40%;with moderate cues (50-74%)  -MP  --     Progress/Outcomes (Audible Speech Goal 1, SLP)  continuing progress toward goal  -MP  --        Additional Goal 1 (SLP)    Additional Goal 1, SLP  LTG: Pt will verbally communicate wants/needs while wearing PMV w/ 100% accuracy w/o cues  -MP  LTG: Pt will verbally communicate wants/needs while wearing PMV w/ 100% accuracy w/o cues  -RD     Time Frame (Additional Goal 1, SLP)  by discharge  -MP  by discharge  -RD     Progress/Outcomes (Additional Goal 1, SLP)  continuing progress toward goal  -MP  --       User Key  (r) = Recorded By, (t) = Taken By, (c) = Cosigned By    Initials Name Provider Type    Stacey Patton MS CCC-SLP Speech and Language Pathologist    Pedrito Becerra MS, CFY-SLP Speech and Language Pathologist              Time  Calculation:     Time Calculation- SLP     Row Name 04/11/19 1006             Time Calculation- SLP    SLP Start Time  0910  -MP      SLP Received On  04/11/19  -MP        User Key  (r) = Recorded By, (t) = Taken By, (c) = Cosigned By    Initials Name Provider Type    Pedrito Becerra MS, CFY-SLP Speech and Language Pathologist          Therapy Charges for Today     Code Description Service Date Service Provider Modifiers Qty    [id number]  ST TREATMENT SPEECH 3 4/11/2019 Pedrito Woodward MS, CFY-SLP GN 1                     Pedrito Woodward MS, STEVE-SLP  4/11/2019

## 2019-04-11 NOTE — PLAN OF CARE
Problem: Patient Care Overview  Goal: Plan of Care Review  Outcome: Ongoing (interventions implemented as appropriate)   04/11/19 1006   Coping/Psychosocial   Plan of Care Reviewed With patient   SLP PMV treatment completed. Will continue to address communication via PMV and dysphagia. Please see note for further details and recommendations.

## 2019-04-12 ENCOUNTER — APPOINTMENT (OUTPATIENT)
Dept: GENERAL RADIOLOGY | Facility: HOSPITAL | Age: 79
End: 2019-04-12

## 2019-04-12 LAB
ANION GAP SERPL CALCULATED.3IONS-SCNC: 7 MMOL/L
BASOPHILS # BLD AUTO: 0.01 10*3/MM3 (ref 0–0.2)
BASOPHILS NFR BLD AUTO: 0.1 % (ref 0–1.5)
BUN BLD-MCNC: 17 MG/DL (ref 8–23)
BUN/CREAT SERPL: 43.6 (ref 7–25)
CALCIUM SPEC-SCNC: 9.6 MG/DL (ref 8.6–10.5)
CHLORIDE SERPL-SCNC: 90 MMOL/L (ref 98–107)
CO2 SERPL-SCNC: 36 MMOL/L (ref 22–29)
CREAT BLD-MCNC: 0.39 MG/DL (ref 0.57–1)
DEPRECATED RDW RBC AUTO: 52.3 FL (ref 37–54)
EOSINOPHIL # BLD AUTO: 0.1 10*3/MM3 (ref 0–0.4)
EOSINOPHIL NFR BLD AUTO: 1.4 % (ref 0.3–6.2)
ERYTHROCYTE [DISTWIDTH] IN BLOOD BY AUTOMATED COUNT: 14.9 % (ref 12.3–15.4)
GFR SERPL CREATININE-BSD FRML MDRD: >150 ML/MIN/1.73
GLUCOSE BLD-MCNC: 224 MG/DL (ref 65–99)
GLUCOSE BLDC GLUCOMTR-MCNC: 158 MG/DL (ref 70–130)
GLUCOSE BLDC GLUCOMTR-MCNC: 218 MG/DL (ref 70–130)
GLUCOSE BLDC GLUCOMTR-MCNC: 221 MG/DL (ref 70–130)
GLUCOSE BLDC GLUCOMTR-MCNC: 221 MG/DL (ref 70–130)
HCT VFR BLD AUTO: 40.2 % (ref 34–46.6)
HGB BLD-MCNC: 12.4 G/DL (ref 12–15.9)
IMM GRANULOCYTES # BLD AUTO: 0.02 10*3/MM3 (ref 0–0.05)
IMM GRANULOCYTES NFR BLD AUTO: 0.3 % (ref 0–0.5)
LYMPHOCYTES # BLD AUTO: 1.7 10*3/MM3 (ref 0.7–3.1)
LYMPHOCYTES NFR BLD AUTO: 23.7 % (ref 19.6–45.3)
MCH RBC QN AUTO: 29.7 PG (ref 26.6–33)
MCHC RBC AUTO-ENTMCNC: 30.8 G/DL (ref 31.5–35.7)
MCV RBC AUTO: 96.2 FL (ref 79–97)
MONOCYTES # BLD AUTO: 0.67 10*3/MM3 (ref 0.1–0.9)
MONOCYTES NFR BLD AUTO: 9.4 % (ref 5–12)
NEUTROPHILS # BLD AUTO: 4.66 10*3/MM3 (ref 1.4–7)
NEUTROPHILS NFR BLD AUTO: 65.1 % (ref 42.7–76)
PLATELET # BLD AUTO: 230 10*3/MM3 (ref 140–450)
PMV BLD AUTO: 10.4 FL (ref 6–12)
POTASSIUM BLD-SCNC: 4.4 MMOL/L (ref 3.5–5.2)
RBC # BLD AUTO: 4.18 10*6/MM3 (ref 3.77–5.28)
SODIUM BLD-SCNC: 133 MMOL/L (ref 136–145)
WBC NRBC COR # BLD: 7.16 10*3/MM3 (ref 3.4–10.8)

## 2019-04-12 PROCEDURE — 25010000002 ERTAPENEM PER 500 MG: Performed by: INTERNAL MEDICINE

## 2019-04-12 PROCEDURE — 80048 BASIC METABOLIC PNL TOTAL CA: CPT | Performed by: INTERNAL MEDICINE

## 2019-04-12 PROCEDURE — 94799 UNLISTED PULMONARY SVC/PX: CPT

## 2019-04-12 PROCEDURE — 99233 SBSQ HOSP IP/OBS HIGH 50: CPT | Performed by: INTERNAL MEDICINE

## 2019-04-12 PROCEDURE — 71045 X-RAY EXAM CHEST 1 VIEW: CPT

## 2019-04-12 PROCEDURE — 92507 TX SP LANG VOICE COMM INDIV: CPT

## 2019-04-12 PROCEDURE — 82962 GLUCOSE BLOOD TEST: CPT

## 2019-04-12 PROCEDURE — 63710000001 INSULIN DETEMIR PER 5 UNITS: Performed by: INTERNAL MEDICINE

## 2019-04-12 PROCEDURE — 92610 EVALUATE SWALLOWING FUNCTION: CPT

## 2019-04-12 PROCEDURE — 25010000002 ENOXAPARIN PER 10 MG: Performed by: INTERNAL MEDICINE

## 2019-04-12 PROCEDURE — 85025 COMPLETE CBC W/AUTO DIFF WBC: CPT | Performed by: INTERNAL MEDICINE

## 2019-04-12 RX ADMIN — MIDODRINE HYDROCHLORIDE 10 MG: 5 TABLET ORAL at 08:06

## 2019-04-12 RX ADMIN — INSULIN LISPRO 2 UNITS: 100 INJECTION, SOLUTION INTRAVENOUS; SUBCUTANEOUS at 18:10

## 2019-04-12 RX ADMIN — Medication 1 PACKET: at 20:33

## 2019-04-12 RX ADMIN — BUSPIRONE HYDROCHLORIDE 15 MG: 15 TABLET ORAL at 15:02

## 2019-04-12 RX ADMIN — INSULIN DETEMIR 10 UNITS: 100 INJECTION, SOLUTION SUBCUTANEOUS at 20:33

## 2019-04-12 RX ADMIN — INSULIN LISPRO 4 UNITS: 100 INJECTION, SOLUTION INTRAVENOUS; SUBCUTANEOUS at 06:39

## 2019-04-12 RX ADMIN — BUSPIRONE HYDROCHLORIDE 15 MG: 15 TABLET ORAL at 08:08

## 2019-04-12 RX ADMIN — ERTAPENEM SODIUM 1 G: 1 INJECTION, POWDER, LYOPHILIZED, FOR SOLUTION INTRAMUSCULAR; INTRAVENOUS at 12:08

## 2019-04-12 RX ADMIN — ASPIRIN 81 MG 81 MG: 81 TABLET ORAL at 08:05

## 2019-04-12 RX ADMIN — CLONAZEPAM 0.5 MG: 0.5 TABLET ORAL at 21:02

## 2019-04-12 RX ADMIN — ROPINIROLE 4 MG: 2 TABLET, FILM COATED ORAL at 08:05

## 2019-04-12 RX ADMIN — ROPINIROLE 4 MG: 2 TABLET, FILM COATED ORAL at 15:02

## 2019-04-12 RX ADMIN — MIDODRINE HYDROCHLORIDE 10 MG: 5 TABLET ORAL at 12:08

## 2019-04-12 RX ADMIN — IPRATROPIUM BROMIDE AND ALBUTEROL SULFATE 3 ML: 2.5; .5 SOLUTION RESPIRATORY (INHALATION) at 12:57

## 2019-04-12 RX ADMIN — CHLORHEXIDINE GLUCONATE 0.12% ORAL RINSE 15 ML: 1.2 LIQUID ORAL at 08:07

## 2019-04-12 RX ADMIN — INSULIN DETEMIR 10 UNITS: 100 INJECTION, SOLUTION SUBCUTANEOUS at 08:04

## 2019-04-12 RX ADMIN — CHLORHEXIDINE GLUCONATE 0.12% ORAL RINSE 15 ML: 1.2 LIQUID ORAL at 20:32

## 2019-04-12 RX ADMIN — CLONAZEPAM 0.5 MG: 0.5 TABLET ORAL at 06:39

## 2019-04-12 RX ADMIN — IPRATROPIUM BROMIDE AND ALBUTEROL SULFATE 3 ML: 2.5; .5 SOLUTION RESPIRATORY (INHALATION) at 07:23

## 2019-04-12 RX ADMIN — INSULIN LISPRO 4 UNITS: 100 INJECTION, SOLUTION INTRAVENOUS; SUBCUTANEOUS at 12:08

## 2019-04-12 RX ADMIN — IPRATROPIUM BROMIDE AND ALBUTEROL SULFATE 3 ML: 2.5; .5 SOLUTION RESPIRATORY (INHALATION) at 19:32

## 2019-04-12 RX ADMIN — PANTOPRAZOLE SODIUM 40 MG: 40 INJECTION, POWDER, FOR SOLUTION INTRAVENOUS at 08:05

## 2019-04-12 RX ADMIN — CLONAZEPAM 0.5 MG: 0.5 TABLET ORAL at 14:50

## 2019-04-12 RX ADMIN — ROPINIROLE 4 MG: 2 TABLET, FILM COATED ORAL at 20:32

## 2019-04-12 RX ADMIN — Medication 1 PACKET: at 08:19

## 2019-04-12 RX ADMIN — IPRATROPIUM BROMIDE AND ALBUTEROL SULFATE 3 ML: 2.5; .5 SOLUTION RESPIRATORY (INHALATION) at 00:14

## 2019-04-12 RX ADMIN — INSULIN LISPRO 4 UNITS: 100 INJECTION, SOLUTION INTRAVENOUS; SUBCUTANEOUS at 23:28

## 2019-04-12 RX ADMIN — MIDODRINE HYDROCHLORIDE 10 MG: 5 TABLET ORAL at 18:10

## 2019-04-12 RX ADMIN — BUSPIRONE HYDROCHLORIDE 15 MG: 15 TABLET ORAL at 20:33

## 2019-04-12 RX ADMIN — ENOXAPARIN SODIUM 40 MG: 100 INJECTION SUBCUTANEOUS at 08:07

## 2019-04-12 NOTE — PROGRESS NOTES
Continued Stay Note  [facility]     Patient Name: Nallely Junior  MRN: [id number]  Today's Date: 4/12/2019    Admit Date: 4/2/2019    Discharge Plan     Row Name 04/12/19 1328       Plan    Plan Nursing Facility Placement    Patient/Family in Agreement with Plan  -- Patient/sister    Plan Comments Remains in ICU, anticipate return to nursing facility on Monday if all in agreement.  [facility] in place to transport to [facility] on Monday, 4/16/19 @ 4:00pm.  Updated notes faxed to facility per request; discussed with sister this morning and she is agreeable.  Chasity Quan, Ext. [phone]    Final Discharge Disposition Code 04 - intermediate care facility        Discharge Codes    No documentation.             JUNE Ball

## 2019-04-12 NOTE — PROGRESS NOTES
"INTENSIVIST NOTE     Hospital Day: 10      Ms. Nallely Junior, 78 y.o. female is followed for:   Acute on chronic respiratory failure       SUBJECTIVE     77yo F with a history of chronic tracheostomy, dysphagia s/p PEG tube placement and noncompliance with a modified diet who presented as a transfer from [facility] for a possible STEMI. She was evaluated by Cardiology who did not feel she was having a STEMI. No LHC was performed as she would not be a candidate for intervention based on her chronic debilitated state. She was admitted to the ICU for medical management. She was found to be profoundly hypercapneic and was placed on mechanical ventilation.  She had atelectasis of the left lung which improved with mechanical ventilation.  She was found to have an ESBL E. coli UTI.  She was weaned completely from mechanical ventilation on 4/7 but required placement back on ventilation approximately 36 hours later.  Was diuresed and again placed on trach collar on 4/10.    Interval history:    Has remained off ventilator for 48 hours and appears comfortable.  Asks incessantly for food to whomever walks in the room.  Afebrile.  Fluid balance +600 mL.    The patient's relevant past medical, surgical and social history were reviewed and updated in Epic as appropriate.       OBJECTIVE     Vital Sign Min/Max for last 24 hours  Temp  Min: 98 °F (36.7 °C)  Max: 98.7 °F (37.1 °C)   BP  Min: 86/42  Max: 137/55   Pulse  Min: 60  Max: 72   Resp  Min: 20  Max: 24   SpO2  Min: 89 %  Max: 100 %   No Data Recorded   No Data Recorded      Intake/Output Summary (Last 24 hours) at 4/12/2019 1039  Last data filed at 4/12/2019 1000  Gross per 24 hour   Intake 2002 ml   Output 1410 ml   Net 592 ml      Flowsheet Rows      First Filed Value   Admission Height  162.6 cm (64.02\") Documented at 04/03/2019 0320   Admission Weight  90.7 kg (200 lb) Documented at 04/02/2019 1800             04/02/19  1800 04/03/19  0320 04/03/19  0714 " "  Weight: 90.7 kg (200 lb) 90.7 kg (199 lb 15.3 oz) 90.7 kg (200 lb)            Objective:  General Appearance:  In no acute distress.    Vital signs: (most recent): Blood pressure 137/55, pulse 72, temperature 98 °F (36.7 °C), temperature source Oral, resp. rate 22, height 162.6 cm (64\"), weight 90.7 kg (200 lb), SpO2 96 %.    HEENT: Normal HEENT exam.  (Right internal jugular triple lumen catheter  Cuffed Shiley tracheostomy tube)    Lungs:  Normal effort and normal respiratory rate.  Breath sounds clear to auscultation.  She is not in respiratory distress.  No rales, wheezes or rhonchi.    Heart: Normal rate.  Regular rhythm.  S1 normal and S2 normal.  Positive for murmur.  No gallop or friction rub. (Systolic ejection murmur)  Chest: Symmetric chest wall expansion.   Abdomen: Abdomen is soft and non-distended.  (PEG tube in place.).  Bowel sounds are normal.   There is no abdominal tenderness.   There is no mass. There is no splenomegaly. There is no hepatomegaly.   Extremities: There is dependent edema.  There is no deformity.    Neurological: Patient is alert.    Pupils:  Pupils are equal, round, and reactive to light.    Skin:  Warm and dry.              I reviewed the patient's new clinical results.  I reviewed the patient's new imaging results/reports including actual images and agree with reports.      Chest X-Ray: Left basilar atelectasis/effusion    INFUSIONS       Results from last 7 days   Lab Units 04/12/19  0348 04/11/19 0430 04/10/19  0407   WBC 10*3/mm3 7.16 7.00 7.01   HEMOGLOBIN g/dL 12.4 12.7 10.8*   HEMATOCRIT % 40.2 41.9 35.7   PLATELETS 10*3/mm3 230 233 208     Results from last 7 days   Lab Units 04/12/19  0348 04/11/19  0430 04/10/19  0407 04/09/19  0418   SODIUM mmol/L 133* 137 140 141   POTASSIUM mmol/L 4.4 4.3 3.4* 4.2   CHLORIDE mmol/L 90* 91* 96* 103   CO2 mmol/L 36.0* 39.0* 37.0* 28.0   BUN mg/dL 17 12 11 10   GLUCOSE mg/dL 224* 159* 167* 148*   CREATININE mg/dL 0.39* 0.39* 0.35* " 0.36*   MAGNESIUM mg/dL  --  2.1 1.8 2.0   CALCIUM mg/dL 9.6 9.7 8.3* 8.3*         Results from last 7 days   Lab Units 04/08/19  0355   PH, ARTERIAL pH units 7.333*   PCO2, ARTERIAL mm Hg 58.3*   PO2 ART mm Hg 88.5   FIO2 % 35         [facility]      I reviewed the patient's medications.    Assessment/Plan   ASSESSMENT      Active Hospital Problems    Diagnosis   • **Inferior STEMI    • Acute on chronic respiratory failure with hypoxia      Chronic trach      • Hypotension     on midodrine     • COPD   • Atelectasis of left lung   • Aortic valve stenosis     ECHO 10/2018:  ·  Left ventricular systolic function is hyperdynamic (EF > 70).  · Left ventricular wall thickness is consistent with moderate concentric hypertrophy.  · Left ventricular diastolic dysfunction (grade I) consistent with impaired relaxation.  · Moderate to severe aortic stenosis (mean gradient 39 mmHg) is present.  · Mild mitral valve regurgitation is present     • Stage 3 CKD   • Former smoker   • Debility   • Dementia   • Class 1 obesity in adult   • Bronchomalacia   • Hyperlipidemia   • Coronary artery disease   • GERD    • Dysphagia with chronic PEG    • Type 2 diabetes mellitus          78-year-old admitted with what is felt to be a type II MI due to hypotension and LVH/aortic stenosis.  She has chronic hypotension.  Her aortic stenosis is severe.  She  developed acute on chronic respiratory failure requiring mechanical ventilation.  She was treated for ESBL E. coli UTI.    She was weaned from mechanical ventilation and tracheostomy actually changed to an uncuffed Shiley on 4/8 but that evening she developed recurrent respiratory failure prompting replacement of a cuffed Shiley and placed back on respiratory support.  Currently back on trach collar again for 48 hours.  It was suspected she may have needed diuresis as she was given significant amounts of IV fluids for hypotension.     On midodrine for hypotension which appears chronic.   She is a not a candidate for further measures for her aortic stenosis per cardiology.    Had a discussion earlier in the week regarding her future wishes regarding long-term mechanical ventilation with the patient and her family but did not receive a clear answer.    She has been proven to aspirate in the past.  It is unclear what level of consistency is safe for her, if any.  She incessantly ask for food despite being counseled on the risks and, apparently, at her nursing home she is allowed to eat because she is deemed competent.        PLAN     1. Continue trach collar.  Has been off ventilator times 48 hours again.  I will replace the tracheostomy with an uncuffed tube similar to the one she had on admission.  2. Continue midodrine  3. As long as no evidence of shock, will tolerate hypotension due to severe aortic stenosis  4. Complete course of ertapenem  5. Continue tube feeds  6. If she remains off the ventilator she can return to her previous nursing home next week.  The only other choice long-term if she requires mechanical ventilation is [place].           I discussed the patient's findings and my recommendations with patient and nursing staff     Plan of care and goals reviewed with multidisciplinary team at daily rounds.    High level of risk due to:  illness with threat to life or bodily function.    Michael Pretty MD  Pulmonary and Critical Care Medicine  04/12/19 10:39 AM

## 2019-04-12 NOTE — THERAPY RE-EVALUATION
Acute Care - Speech Language Pathology   Swallow Re-Evaluation [facility]   Clinical Swallow Evaluation  & PMV Treatment     Patient Name: Nallely Junior  : 1940  MRN: [id number]  Today's Date: 2019  Onset of Illness/Injury or Date of Surgery: 19     Referring Physician: ARELI Navas      Admit Date: 2019    Visit Dx:     ICD-10-CM ICD-9-CM   1. Hypotension, unspecified hypotension type I95.9 458.9   2. Acute on chronic respiratory failure with hypoxia  J96.21 518.84     799.02   3. Inferior STEMI  I21.19 410.40   4. Atelectasis of left lung J98.11 518.0   5. Impaired mobility and ADLs Z74.09 799.89   6. Impaired functional mobility, balance, gait, and endurance Z74.09 V49.89   7. Oropharyngeal dysphagia R13.12 787.22   8. Voice and resonance disorder R49.9 784.40     Patient Active Problem List   Diagnosis   • Pneumonia   • Type 2 diabetes mellitus    • Hypotension   • Hyperlipidemia   • Depression   • Anxiety   • COPD   • Dysphagia with chronic PEG    • Coronary artery disease   • CHF    • GERD    • Acute on chronic respiratory failure with hypoxia    • Aortic valve stenosis   • Stage 3 CKD   • Former smoker   • Inferior STEMI    • H/O MRSA pneumonia 10/2018    • Debility   • Dementia   • Class 1 obesity in adult   • Atelectasis of left lung   • Bronchomalacia     Past Medical History:   Diagnosis Date   • Anemia    • Anxiety    • Aortic stenosis    • CHF (congestive heart failure) (CMS/[facility])    • Chronic respiratory failure with hypoxia and hypercapnia (CMS/[facility])    • CKD (chronic kidney disease), stage III (CMS/HCC)    • Constipation    • COPD (chronic obstructive pulmonary disease) (CMS/HCC)    • Coronary artery disease    • Dementia    • Depression    • Diabetes mellitus (CMS/HCC)    • Dysphagia    • Femur fracture, right (CMS/HCC)    • GERD (gastroesophageal reflux disease)    • Hyperlipidemia    • Hypertension    • Hypotension     on midodrine   • Restless legs syndrome (RLS)      Past  Surgical History:   Procedure Laterality Date   • FOREARM SURGERY     • LEG SURGERY     • PEG TUBE INSERTION     • TRACHEOSTOMY          SWALLOW EVALUATION (last 72 hours)      SLP Adult Swallow Evaluation     Row Name 04/12/19 6016                   Rehab Evaluation    Document Type  re-evaluation  -AC        Subjective Information  complains of;fatigue  -AC        Patient Observations  cooperative increasingly drowsy as eval progressed  -AC        Patient/Family Observations  No family present. Pt alert initially w/ stimuli. Increasingly drowsy t/o session.  -AC        Patient Effort  adequate  -AC           General Information    Patient Profile Reviewed  yes  -AC        Pertinent History Of Current Problem  Adm w/ suspected STEMI, hypotension. Hx COPD, dementia, PNA, CHF, dysphagia, PEG, chronic trach. Pt previously underwent instrumental swallow study during admission 10/2018, which revealed silent aspiration of large drinks of thin & recs for soft diet, thin liquid via small cup sips (no straws). There is question of nonadherence to diet recommendations.   -AC        Current Method of Nutrition  NPO;gastrostomy feedings  -AC        Plans/Goals Discussed with  patient;agreed upon  -AC        Barriers to Rehab  previous functional deficit;medically complex  -AC        Patient's Goals for Discharge  return to PO diet  -AC           Pain Scale: Numbers Pre/Post-Treatment    Pain Scale: Numbers, Pretreatment  0/10 - no pain  -AC        Pain Scale: Numbers, Post-Treatment  0/10 - no pain  -AC           Oral Motor and Function    Dentition Assessment  poor oral hygiene;teeth are in poor condition;missing teeth  -AC        Secretion Management  requires suctioning to control secretions  -AC        Mucosal Quality  moist, healthy  -AC        Volitional Cough  weak  -AC           General Eating/Swallowing Observations    Respiratory Support Currently in Use  tracheostomy;trach collar;oxygen mask;other (see comments)  Patricio 6, cuff deflated  -        Eating/Swallowing Skills  fed by SLP  -        Positioning During Eating  upright 90 degree;upright in chair  -        Utensils Used  spoon  -AC        Consistencies Trialed  thin liquids  -AC        Pre SpO2 (%)  100  -AC        Post SpO2 (%)  100  -AC           Respiratory    Respiratory Status  increase in respiratory rate;during swallowing/eating  -        Respiratory Pattern  decrease control/incoordination of breathing swallow  -AC           Clinical Swallow Eval    Oral Prep Phase  WFL  -AC        Oral Transit  WFL  -AC        Oral Residue  WFL  -AC        Pharyngeal Phase  suspected pharyngeal impairment  -AC           Pharyngeal Phase Concerns    Pharyngeal Phase Concerns  cough  -AC        Cough  thin  -AC        Pharyngeal Phase Concerns, Comment  PMV placed during evaluation. Oral care performed. Oral phase WFL for ice. Pt exhibited delayed coughing to the point where she turned red and PMV had to be removed following ice chip. ? aspiration vs difficulty tolerating PMV. Pt began falling asleep near end of eval; DNT further PO & PMV removed.     Pt will need FEES before can identify safest PO diet. Noted that MD plans to change trach back to cuffless. SLP will f/u when pt more alert & after trach changed.   -AC           Clinical Impression    SLP Swallowing Diagnosis  suspected pharyngeal dysfunction  -AC        Functional Impact  risk of aspiration/pneumonia;risk of malnutrition;risk of dehydration  -        Rehab Potential/Prognosis, Swallowing  adequate, monitor progress closely  -        Swallow Criteria for Skilled Therapeutic Interventions Met  demonstrates skilled criteria  -           Recommendations    Predicted Duration Therapy Intervention (Days)  until discharge  -        SLP Diet Recommendation  NPO;long term alternate methods of nutrition/hydration  -AC        Recommended Diagnostics  FEES;other (see comments) ideally after trach change &  when pt more alert  -        SLP Rec. for Method of Medication Administration  meds via alternate route  -AC        Anticipated Dischage Disposition  anticipate therapy at next level of care  -          User Key  (r) = Recorded By, (t) = Taken By, (c) = Cosigned By    Initials Name Effective Dates    INEZ Khan Yola ALLEN, MS CCC-SLP 07/27/17 -           EDUCATION  The patient has been educated in the following areas:   Dysphagia (Swallowing Impairment) Oral Care/Hydration NPO rationale.    SLP Recommendation and Plan  SLP Swallowing Diagnosis: suspected pharyngeal dysfunction  SLP Diet Recommendation: NPO, long term alternate methods of nutrition/hydration           Recommended Diagnostics: FEES, other (see comments)(ideally after trach change & when pt more alert)  Swallow Criteria for Skilled Therapeutic Interventions Met: demonstrates skilled criteria  Anticipated Dischage Disposition: anticipate therapy at next level of care  Rehab Potential/Prognosis, Swallowing: adequate, monitor progress closely     Predicted Duration Therapy Intervention (Days): until discharge       Plan of Care Reviewed With: patient  Plan of Care Review  Plan of Care Reviewed With: patient  Daily Summary of Progress (SLP): progress toward functional goals is good  Plan for Continued Treatment (SLP): Cont PMV use w/ SLP and close RN supervision only. Pt would benefit from cont'd SLP services for PMV tx. Will f/u after trach changed to cuffless.    SLP GOALS     Row Name 04/12/19 1400 04/11/19 0910          Tolerate Speaking Valve Placement Goal 1 (SLP)    Tolerate Speaking Valve Placement Goal 1 (SLP)  finger occlusion;speaking valve 10 min;80%;with minimal cues (75-90%)  -AC  finger occlusion;speaking valve 10 min;80%;with minimal cues (75-90%)  -MP     Time Frame (Tolerate Speaking Valve Placement Goal 1, SLP)  short term goal (STG);by discharge  -AC  short term goal (STG);by discharge  -MP     Progress (Tolerate Speaking Valve  Placement Goal 1, SLP)  70%;with minimal cues (75-90%)  -AC  50%;with moderate cues (50-74%)  -MP     Progress/Outcomes (Tolerate Speaking Valve Placement Goal 1, SLP)  continuing progress toward goal  -AC  continuing progress toward goal  -MP     Comment (Tolerate Speaking Valve Placement Goal 1, SLP)  Cuff already deflated upon entry. Pt tolerated PMV placement after RN tracheal suctioned w/ minimal coughing, though incr'd RR was noted. PMV removed after ~8 min after developed hard coughing (? r/t aspiration of ice chip vs PMV intolerance). SpO2 remained @ 100% while wearing PMV. Oral suctioning provided.   -AC  --        Audible Speech with Speaking Valve Goal 1 (SLP)    Audible Speech Goal 1 (SLP)  3 feet;80%;with minimal cues (75-90%)  -AC  3 feet;80%;with minimal cues (75-90%)  -MP     Time Frame (Audible Speech Goal 1, SLP)  short term goal (STG);by discharge  -AC  short term goal (STG);by discharge  -MP     Progress (Audible Speech Goal 1, SLP)  40%;with minimal cues (75-90%)  -AC  40%;with moderate cues (50-74%)  -MP     Progress/Outcomes (Audible Speech Goal 1, SLP)  continuing progress toward goal  -AC  continuing progress toward goal  -MP     Comment (Audible Speech Goal 1, SLP)  Vocal quality was harsh and dysphonic. Pt audible 80% of the time @ 1 foot.   -AC  --        Additional Goal 1 (SLP)    Additional Goal 1, SLP  LTG: Pt will verbally communicate wants/needs while wearing PMV w/ 100% accuracy w/o cues  -AC  LTG: Pt will verbally communicate wants/needs while wearing PMV w/ 100% accuracy w/o cues  -MP     Time Frame (Additional Goal 1, SLP)  by discharge  -AC  by discharge  -MP     Progress/Outcomes (Additional Goal 1, SLP)  continuing progress toward goal  -AC  continuing progress toward goal  -MP       User Key  (r) = Recorded By, (t) = Taken By, (c) = Cosigned By    Initials Name Provider Type    AC Yola Khan, MS CCC-SLP Speech and Language Pathologist    Pedrito Becerra MS, CFY-SLP  Speech and Language Pathologist             Time Calculation:   Time Calculation- SLP     Row Name 19 1458             Time Calculation- SLP    SLP Start Time  1345  -AC      SLP Received On  19  -        User Key  (r) = Recorded By, (t) = Taken By, (c) = Cosigned By    Initials Name Provider Type    INEZ Khan Yola ALLEN, MS CCC-SLP Speech and Language Pathologist          Therapy Charges for Today     Code Description Service Date Service Provider Modifiers Qty    17587876821 HC ST EVAL ORAL PHARYNG SWALLOW 3 2019 Yola Khan, MS CCC-SLP GN 1    [id number] HC ST TREATMENT SPEECH 2 2019 KhanYola, MS CCC-SLP GN 1               Yola ALLEN MS Bill CCC-SLP  2019 and [facility] - Speech Language Pathology   Speech Treatment Note  [facility]     Patient Name: Nallely Junior  : 1940  MRN: [id number]  Today's Date: 2019  Onset of Illness/Injury or Date of Surgery: 19     Referring Physician: ARELI Navas      Admit Date: 2019    Visit Dx:      ICD-10-CM ICD-9-CM   1. Hypotension, unspecified hypotension type I95.9 458.9   2. Acute on chronic respiratory failure with hypoxia  J96.21 518.84     799.02   3. Inferior STEMI  I21.19 410.40   4. Atelectasis of left lung J98.11 518.0   5. Impaired mobility and ADLs Z74.09 799.89   6. Impaired functional mobility, balance, gait, and endurance Z74.09 V49.89   7. Oropharyngeal dysphagia R13.12 787.22   8. Voice and resonance disorder R49.9 784.40     Patient Active Problem List   Diagnosis   • Pneumonia   • Type 2 diabetes mellitus    • Hypotension   • Hyperlipidemia   • Depression   • Anxiety   • COPD   • Dysphagia with chronic PEG    • Coronary artery disease   • CHF    • GERD    • Acute on chronic respiratory failure with hypoxia    • Aortic valve stenosis   • Stage 3 CKD   • Former smoker   • Inferior STEMI    • H/O MRSA pneumonia 10/2018    • Debility   • Dementia   • Class 1 obesity in adult   • Atelectasis of left lung    • Bronchomalacia       Therapy Treatment  Rehabilitation Treatment Summary     Row Name 04/12/19 1400             Treatment Time/Intention    Discipline  speech language pathologist  -AC      Document Type  therapy note (daily note)  -AC      Subjective Information  complains of;fatigue  -AC      Care Plan Review  evaluation/treatment results reviewed;care plan/treatment goals reviewed;risks/benefits reviewed;current/potential barriers reviewed;patient/other agree to care plan  -AC      Therapy Frequency (SLP SLC)  5 days per week  -AC      Patient Effort  adequate  -AC      Recorded by [AC] Yola Khan MS CCC-SLP 04/12/19 1502      Row Name 04/12/19 1400             Pain Scale: Numbers Pre/Post-Treatment    Pain Scale: Numbers, Pretreatment  0/10 - no pain  -AC      Pain Scale: Numbers, Post-Treatment  0/10 - no pain  -AC      Recorded by [AC] Yola Khan, MS CCC-SLP 04/12/19 1502      Row Name                Wound 04/02/19 1900 Bilateral midline coccyx pressure injury    Wound - Properties Group Date first assessed: 04/02/19 [ROBINSON] Time first assessed: 1900 [ROBINSON] Present On Admission : yes [ROBINSON] Side: Bilateral [ROBINSON] Orientation: midline [ROBINSON] Location: coccyx [ROBINSON] Type: pressure injury [ROBINSON] Stage, Pressure Injury: deep tissue injury [ROBINSON] Additional Comments: probable stage 1 pressure injury [CP] Recorded by:  [CP] Jennifer Middleton APRN 04/03/19 1446 [ROBINSON] Vikas Bynum RN 04/03/19 0048    Row Name                Wound 04/03/19 0700 Left anterior toe abrasion;other (see comments)    Wound - Properties Group Date first assessed: 04/03/19 [KJ] Time first assessed: 0700 [KJ] Present On Admission : yes [KJ] Side: Left [KJ] Orientation: anterior [KJ] Location: toe [KJ] Type: abrasion;other (see comments) [KJ], scabbing  Recorded by:  [KJ] Jessica Gonzales RN 04/03/19 1527    Row Name 04/12/19 1400             Outcome Summary/Treatment Plan (SLP)    Daily Summary of Progress (SLP)  progress toward  functional goals is good  -AC      Plan for Continued Treatment (SLP)  Cont PMV use w/ SLP and close RN supervision only. Pt would benefit from cont'd SLP services for PMV tx. Will f/u after trach changed to cuffless.  -AC      Anticipated Dischage Disposition  anticipate therapy at next level of care  -AC      Recorded by [AC] Yola Khan, MS CCC-SLP 04/12/19 1502        User Key  (r) = Recorded By, (t) = Taken By, (c) = Cosigned By    Initials Name Effective Dates Discipline    CP Jennifer Middleton, APRN 02/05/19 -  Nurse    AC Yola Khan, MS CCC-SLP 07/27/17 -  SLP    Vikas Conti RN 06/16/16 -  Nurse    Jessica Morales RN 06/16/16 -  Nurse          Outcome Summary  Outcome Summary/Treatment Plan (SLP)  Daily Summary of Progress (SLP): progress toward functional goals is good (04/12/19 1400 : Yola Khan, MS CCC-SLP)  Plan for Continued Treatment (SLP): Cont PMV use w/ SLP and close RN supervision only. Pt would benefit from cont'd SLP services for PMV tx. Will f/u after trach changed to cuffless. (04/12/19 1400 : Yola Khan, MS CCC-SLP)  Anticipated Dischage Disposition: anticipate therapy at next level of care (04/12/19 1400 : Yola Khan, [facility]-SLP)      SLP GOALS     Row Name 04/12/19 1400 04/11/19 0910          Tolerate Speaking Valve Placement Goal 1 (SLP)    Tolerate Speaking Valve Placement Goal 1 (SLP)  finger occlusion;speaking valve 10 min;80%;with minimal cues (75-90%)  -AC  finger occlusion;speaking valve 10 min;80%;with minimal cues (75-90%)  -MP     Time Frame (Tolerate Speaking Valve Placement Goal 1, SLP)  short term goal (STG);by discharge  -AC  short term goal (STG);by discharge  -MP     Progress (Tolerate Speaking Valve Placement Goal 1, SLP)  70%;with minimal cues (75-90%)  -AC  50%;with moderate cues (50-74%)  -MP     Progress/Outcomes (Tolerate Speaking Valve Placement Goal 1, SLP)  continuing progress toward goal  -AC  continuing progress toward goal   -MP     Comment (Tolerate Speaking Valve Placement Goal 1, SLP)  Cuff already deflated upon entry. Pt tolerated PMV placement after RN tracheal suctioned w/ minimal coughing, though incr'd RR was noted. PMV removed after ~8 min after developed hard coughing (? r/t aspiration of ice chip vs PMV intolerance). SpO2 remained @ 100% while wearing PMV. Oral suctioning provided.   -AC  --        Audible Speech with Speaking Valve Goal 1 (SLP)    Audible Speech Goal 1 (SLP)  3 feet;80%;with minimal cues (75-90%)  -AC  3 feet;80%;with minimal cues (75-90%)  -MP     Time Frame (Audible Speech Goal 1, SLP)  short term goal (STG);by discharge  -AC  short term goal (STG);by discharge  -MP     Progress (Audible Speech Goal 1, SLP)  40%;with minimal cues (75-90%)  -AC  40%;with moderate cues (50-74%)  -MP     Progress/Outcomes (Audible Speech Goal 1, SLP)  continuing progress toward goal  -AC  continuing progress toward goal  -MP     Comment (Audible Speech Goal 1, SLP)  Vocal quality was harsh and dysphonic. Pt audible 80% of the time @ 1 foot.   -AC  --        Additional Goal 1 (SLP)    Additional Goal 1, SLP  LTG: Pt will verbally communicate wants/needs while wearing PMV w/ 100% accuracy w/o cues  -AC  LTG: Pt will verbally communicate wants/needs while wearing PMV w/ 100% accuracy w/o cues  -MP     Time Frame (Additional Goal 1, SLP)  by discharge  -AC  by discharge  -MP     Progress/Outcomes (Additional Goal 1, SLP)  continuing progress toward goal  -AC  continuing progress toward goal  -MP       User Key  (r) = Recorded By, (t) = Taken By, (c) = Cosigned By    Initials Name Provider Type    AC Yola Khan MS CCC-SLP Speech and Language Pathologist    Pedrito Becerra MS, CFY-SLP Speech and Language Pathologist          EDUCATION  The patient has been educated in the following areas:   Speaking Valve.    SLP Recommendation and Plan  Daily Summary of Progress (SLP): progress toward functional goals is goodPlan for  Continued Treatment (SLP): Cont PMV use w/ SLP and close RN supervision only. Pt would benefit from cont'd SLP services for PMV tx. Will f/u after trach changed to cuffless.  Anticipated Dischage Disposition: anticipate therapy at next level of care           Time Calculation:   Time Calculation- SLP     Row Name 04/12/19 1458             Time Calculation- SLP    SLP Start Time  1345  -      SLP Received On  04/12/19  -        User Key  (r) = Recorded By, (t) = Taken By, (c) = Cosigned By    Initials Name Provider Type     Yola Khan, MS CCC-SLP Speech and Language Pathologist          Therapy Charges for Today     Code Description Service Date Service Provider Modifiers Qty    09661693063 HC ST EVAL ORAL PHARYNG SWALLOW 3 4/12/2019 Yola Khan MS CCC-SLP GN 1    53825531110 HC ST TREATMENT SPEECH 2 4/12/2019 Yola Khan MS CCC-SLP GN 1                 Yola Khan MS CCC-SLP  4/12/2019

## 2019-04-12 NOTE — PROGRESS NOTES
"                    Clinical Nutrition     Nutrition Assessment  Reason for Visit:   MDR, Follow-up protocol, EN    Patient Name: Nallely Junior  YOB: 1940  MRN: [id number]  Date of Encounter: 04/12/19 10:58 AM  Admission date: 4/2/2019    Nutrition Assessment     Hospital Problem List    Acute on chronic respiratory failure- vent (4/3)- trach collar (4/5); returned   to vent (4/8)- trach collar trials (4/10)      Dysphagia with chronic PEG      Atelectasis of left lung    Bronchomalacia    PMH    Type 2 diabetes mellitus     Hypotension    Hyperlipidemia    COPD    Coronary artery disease    GERD     Aortic valve stenosis    Stage 3 CKD    Former smoker    Debility    Dementia    Class 1 obesity in adult     Other: Was on [facility] soft diet with thin liquids at nursing home prior to admission to this facility     Reported/Observed/Food/Nutrition Related History:     Remains on trach collar, has been off vent for 48hrs. SLP evaluation for PMV treatment.  Not appropriate for swallow eval at this time.      Patient continue to ask for food and drink per RN.       [facility] contacted to determine patient PO status prior to this admission.  Provided report is patient was on a mech soft with thin liquids since October of the 18th of last year. Patient was not receiving any tube feeding other than 200ml of free water/day for  PEG tube maintenance .    Patient started having loose BM yesterday per RN.       Anthropometrics   Admit Weight: 200# (estimated)  Admit Height: 64\" (162.6cm)  BMI: 34.3    Bed Weight: 84.4kg (obtained 4/12)  Weight Change   UBW: note from NH in Oct, 177 lb and here in Oct bed wt 166lb    Documented weight discrepancies; patient denies being 200#.  Bed weight obtained reflecting a weight of 186#   (patient with multiple pillows and blankets on the bed, weight most likely around 180#).      [facility] contacted and provides that patient weight last documented weight was " 179#.   Labs reviewed     Results from last 7 days   Lab Units 04/12/19  0348 04/11/19  0430 04/10/19  0407 04/09/19  0418 04/08/19  0357   SODIUM mmol/L 133* 137 140 141 143   POTASSIUM mmol/L 4.4 4.3 3.4* 4.2 3.9   CHLORIDE mmol/L 90* 91* 96* 103 111*   CO2 mmol/L 36.0* 39.0* 37.0* 28.0 28.0   BUN mg/dL 17 12 11 10 10   CREATININE mg/dL 0.39* 0.39* 0.35* 0.36* 0.34*   GLUCOSE mg/dL 224* 159* 167* 148* 144*   CALCIUM mg/dL 9.6 9.7 8.3* 8.3* 8.2*   PHOSPHORUS mg/dL  --   --  2.5 2.0* 2.2*  2.2*     Medications reviewed     Levemir, SSI, Protonix     Intake/Ouptut 24 hrs (7:00AM - 6:59 AM)     Intake & Output (last day)       04/11 0701 - 04/12 0700 04/12 0701 - 04/13 0700    Other 344 90    NG/GT 1541 265    IV Piggyback 100     Total Intake(mL/kg) 1985 (21.9) 355 (3.9)    Urine (mL/kg/hr) 1490 (0.7)     Stool 0     Total Output 1490     Net +495 +355          Urine Unmeasured Occurrence 4 x     Stool Unmeasured Occurrence 4 x         Needs Assessment   Height used: 162.6 cm  Weight used: 82kg, 54.5 Kg IBW    Calories: 1359  Method: PSU  Calories: 0786-6302 Method: 18-20 caloires per kg CBW    Est calories needs: 1600 calories     Protein: 109  Method: 1.2grms/kg CBW  Protein: 110grms Method: 2.0grms/kg IBW    Est protein needs: 110grms     Current Nutrition Prescription   PO: NPO Diet     EN: Replete w Fiber @ goal rate 70 ml/hr: TGV 1400ml.   Prostat protein BID      EN Delivery: 1 day:  1541 Kcal + 2 prostat to proivde 1741=  92% est need, 126 g PRO = 115% est need, 23.4 g Fiber , 1287ml H20 in TF, 1631 total ml Free Water.     Nutrition Diagnosis     4/11 Updated: 4/12  Problem Inadequate protein intake    Etiology Current EN Rx    Signs/Symptoms Current EN meeting 79% est protein needs    Status - EN adjuated 4/11    Goal:   General: Nutrition support treatment  EN: maintain EN    Nutrition Intervention    Follow treatment progress, Care plan reviewed, Nutrition support order placed    1. Needs re-assessed,  keep EN @ 70ml/hr.  TGV 1400ml.  Prostat protein BID.    = 1600k, 117grm PRO, 21.3fiber and a total of 1369ml free water    2. Monitor swallow eval     Monitoring/Evaluation:   I&O, Pertinent labs, EN delivery/tolerance, Weight, Symptoms      Will Continue to follow per protocol      Leola Gallardo RD  Time Spent: 30 min        Electronically signed by:  Leola Gallardo RD  04/12/19 10:58 AM

## 2019-04-12 NOTE — PLAN OF CARE
Problem: Patient Care Overview  Goal: Plan of Care Review  Outcome: Ongoing (interventions implemented as appropriate)   04/12/19 0325   Coping/Psychosocial   Plan of Care Reviewed With patient   Plan of Care Review   Progress improving   OTHER   Outcome Summary VSS. Pt remains in ICU. pt has been tolerating trach collar throughout night sats >92%. pt has required suctioning multiple times for secretion control. pt still asking for food and drink, pt continuously educated. goals to keep pt off vent. pt has rested well throughout night. UOP adequate. BMs q. 2 hours. no other concerns at this time, will continue to monitor.       Problem: Skin Injury Risk (Adult)  Goal: Skin Health and Integrity  Outcome: Ongoing (interventions implemented as appropriate)   04/12/19 0325   Skin Injury Risk (Adult)   Skin Health and Integrity making progress toward outcome       Problem: Fall Risk (Adult)  Goal: Absence of Fall  Outcome: Ongoing (interventions implemented as appropriate)   04/12/19 0325   Fall Risk (Adult)   Absence of Fall making progress toward outcome       Problem: Airway, Artificial (Adult)  Goal: Signs and Symptoms of Listed Potential Problems Will be Absent, Minimized or Managed (Airway, Artificial)  Outcome: Ongoing (interventions implemented as appropriate)   04/12/19 0325   Goal/Outcome Evaluation   Problems Assessed (Artificial Airway) all   Problems Present (Artificial Airway) none

## 2019-04-12 NOTE — PLAN OF CARE
Problem: Patient Care Overview  Goal: Plan of Care Review  Outcome: Ongoing (interventions implemented as appropriate)   04/12/19 6171   Coping/Psychosocial   Plan of Care Reviewed With patient   SLP re-evaluation and treatment completed. Will f/u to assess FEES-readiness ideally following trach change to cuffless and when pt more alert. Will also cont PMV tx. Please see note for further details and recommendations.

## 2019-04-13 LAB
ANION GAP SERPL CALCULATED.3IONS-SCNC: 9 MMOL/L
BASOPHILS # BLD AUTO: 0.02 10*3/MM3 (ref 0–0.2)
BASOPHILS NFR BLD AUTO: 0.3 % (ref 0–1.5)
BUN BLD-MCNC: 22 MG/DL (ref 8–23)
BUN/CREAT SERPL: 51.2 (ref 7–25)
CALCIUM SPEC-SCNC: 9.9 MG/DL (ref 8.6–10.5)
CHLORIDE SERPL-SCNC: 93 MMOL/L (ref 98–107)
CO2 SERPL-SCNC: 34 MMOL/L (ref 22–29)
CREAT BLD-MCNC: 0.43 MG/DL (ref 0.57–1)
DEPRECATED RDW RBC AUTO: 53.3 FL (ref 37–54)
EOSINOPHIL # BLD AUTO: 0.11 10*3/MM3 (ref 0–0.4)
EOSINOPHIL NFR BLD AUTO: 1.7 % (ref 0.3–6.2)
ERYTHROCYTE [DISTWIDTH] IN BLOOD BY AUTOMATED COUNT: 15.1 % (ref 12.3–15.4)
GFR SERPL CREATININE-BSD FRML MDRD: 142 ML/MIN/1.73
GLUCOSE BLD-MCNC: 235 MG/DL (ref 65–99)
GLUCOSE BLDC GLUCOMTR-MCNC: 176 MG/DL (ref 70–130)
GLUCOSE BLDC GLUCOMTR-MCNC: 199 MG/DL (ref 70–130)
GLUCOSE BLDC GLUCOMTR-MCNC: 201 MG/DL (ref 70–130)
GLUCOSE BLDC GLUCOMTR-MCNC: 225 MG/DL (ref 70–130)
GLUCOSE BLDC GLUCOMTR-MCNC: 252 MG/DL (ref 70–130)
HCT VFR BLD AUTO: 41.1 % (ref 34–46.6)
HGB BLD-MCNC: 12.6 G/DL (ref 12–15.9)
IMM GRANULOCYTES # BLD AUTO: 0.01 10*3/MM3 (ref 0–0.05)
IMM GRANULOCYTES NFR BLD AUTO: 0.2 % (ref 0–0.5)
LYMPHOCYTES # BLD AUTO: 2.01 10*3/MM3 (ref 0.7–3.1)
LYMPHOCYTES NFR BLD AUTO: 30.9 % (ref 19.6–45.3)
MCH RBC QN AUTO: 29.9 PG (ref 26.6–33)
MCHC RBC AUTO-ENTMCNC: 30.7 G/DL (ref 31.5–35.7)
MCV RBC AUTO: 97.4 FL (ref 79–97)
MONOCYTES # BLD AUTO: 0.69 10*3/MM3 (ref 0.1–0.9)
MONOCYTES NFR BLD AUTO: 10.6 % (ref 5–12)
NEUTROPHILS # BLD AUTO: 3.68 10*3/MM3 (ref 1.4–7)
NEUTROPHILS NFR BLD AUTO: 56.5 % (ref 42.7–76)
PLATELET # BLD AUTO: 255 10*3/MM3 (ref 140–450)
PMV BLD AUTO: 11.1 FL (ref 6–12)
POTASSIUM BLD-SCNC: 4.6 MMOL/L (ref 3.5–5.2)
RBC # BLD AUTO: 4.22 10*6/MM3 (ref 3.77–5.28)
SODIUM BLD-SCNC: 136 MMOL/L (ref 136–145)
WBC NRBC COR # BLD: 6.51 10*3/MM3 (ref 3.4–10.8)

## 2019-04-13 PROCEDURE — 63710000001 INSULIN DETEMIR PER 5 UNITS: Performed by: INTERNAL MEDICINE

## 2019-04-13 PROCEDURE — 92507 TX SP LANG VOICE COMM INDIV: CPT

## 2019-04-13 PROCEDURE — 63710000001 INSULIN REGULAR HUMAN PER 5 UNITS: Performed by: INTERNAL MEDICINE

## 2019-04-13 PROCEDURE — 94799 UNLISTED PULMONARY SVC/PX: CPT

## 2019-04-13 PROCEDURE — 82962 GLUCOSE BLOOD TEST: CPT

## 2019-04-13 PROCEDURE — 85025 COMPLETE CBC W/AUTO DIFF WBC: CPT | Performed by: INTERNAL MEDICINE

## 2019-04-13 PROCEDURE — 92526 ORAL FUNCTION THERAPY: CPT

## 2019-04-13 PROCEDURE — 80048 BASIC METABOLIC PNL TOTAL CA: CPT | Performed by: INTERNAL MEDICINE

## 2019-04-13 PROCEDURE — 99233 SBSQ HOSP IP/OBS HIGH 50: CPT | Performed by: INTERNAL MEDICINE

## 2019-04-13 PROCEDURE — 25010000002 ENOXAPARIN PER 10 MG: Performed by: INTERNAL MEDICINE

## 2019-04-13 RX ADMIN — IPRATROPIUM BROMIDE AND ALBUTEROL SULFATE 3 ML: 2.5; .5 SOLUTION RESPIRATORY (INHALATION) at 01:25

## 2019-04-13 RX ADMIN — INSULIN DETEMIR 10 UNITS: 100 INJECTION, SOLUTION SUBCUTANEOUS at 08:15

## 2019-04-13 RX ADMIN — BUSPIRONE HYDROCHLORIDE 15 MG: 15 TABLET ORAL at 08:15

## 2019-04-13 RX ADMIN — INSULIN HUMAN 2 UNITS: 100 INJECTION, SOLUTION PARENTERAL at 18:03

## 2019-04-13 RX ADMIN — IPRATROPIUM BROMIDE AND ALBUTEROL SULFATE 3 ML: 2.5; .5 SOLUTION RESPIRATORY (INHALATION) at 07:45

## 2019-04-13 RX ADMIN — CLONAZEPAM 0.5 MG: 0.5 TABLET ORAL at 06:16

## 2019-04-13 RX ADMIN — ASPIRIN 81 MG 81 MG: 81 TABLET ORAL at 08:14

## 2019-04-13 RX ADMIN — CHLORHEXIDINE GLUCONATE 0.12% ORAL RINSE 15 ML: 1.2 LIQUID ORAL at 22:19

## 2019-04-13 RX ADMIN — INSULIN HUMAN 4 UNITS: 100 INJECTION, SOLUTION PARENTERAL at 13:50

## 2019-04-13 RX ADMIN — MIDODRINE HYDROCHLORIDE 10 MG: 5 TABLET ORAL at 12:09

## 2019-04-13 RX ADMIN — MIDODRINE HYDROCHLORIDE 10 MG: 5 TABLET ORAL at 08:15

## 2019-04-13 RX ADMIN — ROPINIROLE 4 MG: 2 TABLET, FILM COATED ORAL at 15:43

## 2019-04-13 RX ADMIN — PANTOPRAZOLE SODIUM 40 MG: 40 INJECTION, POWDER, FOR SOLUTION INTRAVENOUS at 08:16

## 2019-04-13 RX ADMIN — INSULIN DETEMIR 20 UNITS: 100 INJECTION, SOLUTION SUBCUTANEOUS at 21:39

## 2019-04-13 RX ADMIN — ROPINIROLE 4 MG: 2 TABLET, FILM COATED ORAL at 21:43

## 2019-04-13 RX ADMIN — Medication 1 PACKET: at 21:42

## 2019-04-13 RX ADMIN — INSULIN HUMAN 4 UNITS: 100 INJECTION, SOLUTION PARENTERAL at 23:36

## 2019-04-13 RX ADMIN — CLONAZEPAM 0.5 MG: 0.5 TABLET ORAL at 13:57

## 2019-04-13 RX ADMIN — BUSPIRONE HYDROCHLORIDE 15 MG: 15 TABLET ORAL at 15:43

## 2019-04-13 RX ADMIN — IPRATROPIUM BROMIDE AND ALBUTEROL SULFATE 3 ML: 2.5; .5 SOLUTION RESPIRATORY (INHALATION) at 13:19

## 2019-04-13 RX ADMIN — IPRATROPIUM BROMIDE AND ALBUTEROL SULFATE 3 ML: 2.5; .5 SOLUTION RESPIRATORY (INHALATION) at 19:22

## 2019-04-13 RX ADMIN — CLONAZEPAM 0.5 MG: 0.5 TABLET ORAL at 21:43

## 2019-04-13 RX ADMIN — ROPINIROLE 4 MG: 2 TABLET, FILM COATED ORAL at 08:15

## 2019-04-13 RX ADMIN — BUSPIRONE HYDROCHLORIDE 15 MG: 15 TABLET ORAL at 21:43

## 2019-04-13 RX ADMIN — Medication 1 PACKET: at 08:15

## 2019-04-13 RX ADMIN — CHLORHEXIDINE GLUCONATE 0.12% ORAL RINSE 15 ML: 1.2 LIQUID ORAL at 08:14

## 2019-04-13 RX ADMIN — ENOXAPARIN SODIUM 40 MG: 100 INJECTION SUBCUTANEOUS at 08:15

## 2019-04-13 RX ADMIN — INSULIN LISPRO 6 UNITS: 100 INJECTION, SOLUTION INTRAVENOUS; SUBCUTANEOUS at 06:17

## 2019-04-13 RX ADMIN — MIDODRINE HYDROCHLORIDE 10 MG: 5 TABLET ORAL at 18:03

## 2019-04-13 NOTE — PROGRESS NOTES
"Intensive Care Follow-up     Hospital:  LOS: 11 days   Ms. Nallely Junior, 78 y.o. female is followed for:   Acute ST elevation myocardial infarction (STEMI) of inferior wall (CMS/HCC)   Acute on chronic resp failure     Subjective   Interval History:  The chart has been reviewed.  The patient has remained afebrile overnight.  She is on trach collar trials and is doing well with this.    The patient's past medical, surgical and social history were reviewed and updated in Epic as appropriate.        Objective     Infusions:     Medications:    aspirin 81 mg Oral Daily   busPIRone 15 mg Oral TID   chlorhexidine 15 mL Mouth/Throat Q12H   clonazePAM 0.5 mg Oral Q8H   enoxaparin 40 mg Subcutaneous Q24H   insulin detemir 20 Units Subcutaneous Q12H   insulin regular 0-9 Units Subcutaneous Q6H   ipratropium-albuterol 3 mL Nebulization Q6H - RT   midodrine 10 mg Oral TID   pantoprazole 40 mg Intravenous Q24H   PRO-STAT 1 packet Nasogastric BID   rOPINIRole 4 mg Oral TID       Vital Sign Min/Max for last 24 hours  Temp  Min: 97.7 °F (36.5 °C)  Max: 98.4 °F (36.9 °C)   BP  Min: 84/48  Max: 143/59   Pulse  Min: 60  Max: 70   Resp  Min: 20  Max: 28   SpO2  Min: 89 %  Max: 98 %   Flow (L/min)  Min: 14  Max: 14       Input/Output for last 24 hour shift  04/12 0701 - 04/13 0700  In: 2222   Out: 1900 [Urine:1900]      Objective:  General Appearance:  In no acute distress, comfortable and ill-appearing.    Vital signs: (most recent): Blood pressure 113/54, pulse 62, temperature 97.8 °F (36.6 °C), temperature source Oral, resp. rate 20, height 162.6 cm (64\"), weight 90.7 kg (200 lb), SpO2 96 %.    HEENT: Normal HEENT exam.  (Right internal jugular triple lumen catheter  Cuffed Shiley tracheostomy tube )    Lungs:  Normal effort and normal respiratory rate.  Breath sounds clear to auscultation.  She is not in respiratory distress.  No rales, wheezes or rhonchi.    Heart: Normal rate.  Regular rhythm.  S1 normal and S2 normal.  Positive " for murmur.  No gallop or friction rub. (Systolic ejection murmur)  Chest: Symmetric chest wall expansion.   Abdomen: Abdomen is soft and non-distended.  (PEG tube in place.).  Bowel sounds are normal.   There is no abdominal tenderness.   There is no mass. There is no splenomegaly. There is no hepatomegaly.   Extremities: There is dependent edema.  There is no deformity.    Neurological: Patient is alert and oriented to person, place and time.    Pupils:  Pupils are equal, round, and reactive to light.    Skin:  Warm and dry.              Results from last 7 days   Lab Units 04/13/19  0406 04/12/19  0348 04/11/19  0430   WBC 10*3/mm3 6.51 7.16 7.00   HEMOGLOBIN g/dL 12.6 12.4 12.7   PLATELETS 10*3/mm3 255 230 233     Results from last 7 days   Lab Units 04/13/19  0406 04/12/19  0348 04/11/19  0430 04/10/19  0407 04/09/19  0418 04/08/19  0357   SODIUM mmol/L 136 133* 137 140 141 143   POTASSIUM mmol/L 4.6 4.4 4.3 3.4* 4.2 3.9   CO2 mmol/L 34.0* 36.0* 39.0* 37.0* 28.0 28.0   BUN mg/dL 22 17 12 11 10 10   CREATININE mg/dL 0.43* 0.39* 0.39* 0.35* 0.36* 0.34*   MAGNESIUM mg/dL  --   --  2.1 1.8 2.0 2.4  2.4   PHOSPHORUS mg/dL  --   --   --  2.5 2.0* 2.2*  2.2*   GLUCOSE mg/dL 235* 224* 159* 167* 148* 144*     Estimated Creatinine Clearance: 63.2 mL/min (A) (by C-G formula based on SCr of 0.43 mg/dL (L)).    Results from last 7 days   Lab Units 04/08/19  0355   PH, ARTERIAL pH units 7.333*   PCO2, ARTERIAL mm Hg 58.3*   PO2 ART mm Hg 88.5         I reviewed the patient's results and images.     Assessment/Plan   Impression        Inferior STEMI     Type 2 diabetes mellitus     Hypotension    Hyperlipidemia    COPD    Dysphagia with chronic PEG     Coronary artery disease    GERD     Acute on chronic respiratory failure with hypoxia     Aortic valve stenosis    Stage 3 CKD    Former smoker    Debility    Dementia    Class 1 obesity in adult    Atelectasis of left lung    Bronchomalacia       Plan        Continue on with  trach collars as tolerated.  Pulmonary hygiene will be ensured.  Increase Levemir to 20 units subcutaneous every 12 hours and continue with as needed coverage.  The patient will likely be transferred back to her care facility at the beginning of the week.  Close observation will continue in the intensive care unit secondary to high risk of respiratory compromise secondary to underlying lung disease and atelectasis.    Plan of care and goals reviewed with mulitdisciplinary/antibiotic stewardship team during rounds.   I discussed the patient's findings and my recommendations with patient and nursing staff     High level of risk due to:  severe exacerbation of chronic illness and illness with threat to life or bodily function.      Patricio Guzmán MD, [facility]  Pulmonology and Critical Care Medicine

## 2019-04-13 NOTE — PLAN OF CARE
Problem: Patient Care Overview  Goal: Plan of Care Review  Outcome: Ongoing (interventions implemented as appropriate)   04/12/19 2026   Coping/Psychosocial   Plan of Care Reviewed With patient   Plan of Care Review   Progress improving   OTHER   Outcome Summary 1. Neuro- pt mouths words.     2. Respiratory- Trach changed by Dr Pretty to cufless 6 XLT shiley. Trach collar continued for over 48 hrs.     3. Cardiac- Sinus rhythm and normotensive with midodrine.     4. GI- Replete with Fiber @ 70 ml/hr and FW 10 ml/hr. BM x 2.     5. - external catheter= 900 ml urine.   6. No pain meds given.       Problem: Skin Injury Risk (Adult)  Goal: Skin Health and Integrity  Outcome: Ongoing (interventions implemented as appropriate)   04/12/19 2026   Skin Injury Risk (Adult)   Skin Health and Integrity making progress toward outcome       Problem: Ventilation, Mechanical Invasive (Adult)  Goal: Signs and Symptoms of Listed Potential Problems Will be Absent, Minimized or Managed (Ventilation, Mechanical Invasive)  Outcome: Ongoing (interventions implemented as appropriate)   04/12/19 2026   Goal/Outcome Evaluation   Problems Assessed (Mechanical Ventilation, Invasive) all   Problems Present (Mech Vent, Invasive) inability to wean       Problem: Fall Risk (Adult)  Goal: Absence of Fall  Outcome: Ongoing (interventions implemented as appropriate)   04/12/19 2026   Fall Risk (Adult)   Absence of Fall making progress toward outcome       Problem: Airway, Artificial (Adult)  Goal: Signs and Symptoms of Listed Potential Problems Will be Absent, Minimized or Managed (Airway, Artificial)  Outcome: Ongoing (interventions implemented as appropriate)   04/12/19 2026   Goal/Outcome Evaluation   Problems Assessed (Artificial Airway) all   Problems Present (Artificial Airway) communication impairment

## 2019-04-13 NOTE — PLAN OF CARE
Problem: Patient Care Overview  Goal: Plan of Care Review  Outcome: Ongoing (interventions implemented as appropriate)   04/13/19 1026   Coping/Psychosocial   Plan of Care Reviewed With patient   SLP treatment completed. Will continue to address dysphagia and voice deficits via tx. Please see note for further details and recommendations.

## 2019-04-13 NOTE — PLAN OF CARE
Problem: Patient Care Overview  Goal: Plan of Care Review  Outcome: Ongoing (interventions implemented as appropriate)   04/13/19 0455   Coping/Psychosocial   Plan of Care Reviewed With patient   Plan of Care Review   Progress improving   OTHER   Outcome Summary Pt A&Ox4, mouths words. Pt tolerates trach collar at 35%. Sinus Rhythm. Replete w/ Fiber @ 70 FW @ 10. UOP adequate. VSS. Will continue to monitor.        Problem: Skin Injury Risk (Adult)  Goal: Skin Health and Integrity  Outcome: Outcome(s) achieved Date Met: 04/13/19 04/13/19 0455   Skin Injury Risk (Adult)   Skin Health and Integrity making progress toward outcome       Problem: Fall Risk (Adult)  Goal: Absence of Fall  Outcome: Ongoing (interventions implemented as appropriate)   04/13/19 0455   Fall Risk (Adult)   Absence of Fall achieves outcome       Problem: Airway, Artificial (Adult)  Goal: Signs and Symptoms of Listed Potential Problems Will be Absent, Minimized or Managed (Airway, Artificial)  Outcome: Ongoing (interventions implemented as appropriate)   04/13/19 0455   Goal/Outcome Evaluation   Problems Assessed (Artificial Airway) all   Problems Present (Artificial Airway) communication impairment

## 2019-04-13 NOTE — THERAPY TREATMENT NOTE
Acute Care - Speech Language Pathology Progress Note  [facility]     Patient Name: Nallely Junior  : 1940  MRN: [id number]  Today's Date: 2019         Admit Date: 2019    Visit Dx:      ICD-10-CM ICD-9-CM   1. Hypotension, unspecified hypotension type I95.9 458.9   2. Acute on chronic respiratory failure with hypoxia  J96.21 518.84     799.02   3. Inferior STEMI  I21.19 410.40   4. Atelectasis of left lung J98.11 518.0   5. Impaired mobility and ADLs Z74.09 799.89   6. Impaired functional mobility, balance, gait, and endurance Z74.09 V49.89   7. Oropharyngeal dysphagia R13.12 787.22   8. Voice and resonance disorder R49.9 784.40     Patient Active Problem List   Diagnosis   • Pneumonia   • Type 2 diabetes mellitus    • Hypotension   • Hyperlipidemia   • Depression   • Anxiety   • COPD   • Dysphagia with chronic PEG    • Coronary artery disease   • CHF    • GERD    • Acute on chronic respiratory failure with hypoxia    • Aortic valve stenosis   • Stage 3 CKD   • Former smoker   • Inferior STEMI    • H/O MRSA pneumonia 10/2018    • Debility   • Dementia   • Class 1 obesity in adult   • Atelectasis of left lung   • Bronchomalacia        Therapy Treatment  Rehabilitation Treatment Summary     Row Name 19 1000             Treatment Time/Intention    Discipline  speech language pathologist  -SANDRA      Document Type  therapy note (daily note)  -SANDRA      Subjective Information  no complaints  -SANDRA      Mode of Treatment  individual therapy;speech-language pathology  -SANDRA      Patient/Family Observations  No family present   -SANDRA      Care Plan Review  evaluation/treatment results reviewed;care plan/treatment goals reviewed  -SANDRA      Therapy Frequency (Swallow)  PRN  -SANDRA      Therapy Frequency (SLP SLC)  5 days per week  -SANDRA      Patient Effort  adequate  -SANDRA      Recorded by [SANDRA] Karen Oliveira CCC-SLP 19 1021      Row Name 19 1000             Vital Signs    O2 Delivery Post Treatment   trach collar  -JA      Recorded by [JA] Karen Oliveira CCC-SLP 04/13/19 1021      Row Name 04/13/19 1000             Speaking Valve    Respiratory Status  trach collar  -JA      Pretreatment Cuff Status  Comment shiley cuffless  -JA      Trach Type  Shiley;non cuffed;size 6;extra long  -JA      Types of Intervention  tracheostomy speaking valve;coordination of respiration/phonation  -JA      Speaking Valve Type  PMV-2000 (clear)  -JA      Phonation with Occlusion  audible at 3 feet;speaking valve  -JA      Vocal Quality on Phonation  noticeably hoarse  -JA      Response to Intervention  tolerated for short period;speaking valve;anxious;cough;stridor  -JA      Minutes Tolerated  speaking valve;< 1 minutes  -JA      At Conclusion  speaking valve removed;speaking valve at bedside;notified RN/LPN;other (see comments)  -JA      Speaking Valve Placement Recommendation  SLP only  -JA      Recorded by [JA] Karen Oliveira CCC-SLP 04/13/19 1021      Row Name 04/13/19 1000             Oral Motor and Function    Dentition Assessment  poor oral hygiene;teeth are in poor condition;missing teeth  -JA      Secretion Management  WNL/WFL  -JA      Mucosal Quality  moist, healthy  -JA      Volitional Swallow  delayed;weak  -JA      Volitional Cough  weak  -JA      Recorded by [JA] Karen Oliveira CCC-SLP 04/13/19 1021      Row Name 04/13/19 1000             Respiratory    Respiratory Status  increase in respiratory rate;during swallowing/eating;wheezing, stridor  -JA      Respiratory Pattern  decrease control/incoordination of breathing swallow  -JA      Recorded by [JA] Karen Oliveira CCC-SLP 04/13/19 1021      Row Name 04/13/19 1000             Clinical Swallow Eval    Oral Prep Phase  WFL  -JA      Oral Transit  WFL  -JA      Oral Residue  WFL  -JA      Pharyngeal Phase  suspected pharyngeal impairment  -JA      Clinical Swallow Evaluation Summary  Pt was tested w/ thin via tsp and ice chips x3. Overt  signs/symptoms of aspiration w/ thin c/b cough and multiple swallows. Noted increased RR w/ trials. Pt is at high risk for aspiration. Recommend continue NPO, meds and nutrition via PEG.   -SANDRA      Recorded by [SANDRA] Karen Oliveira CCC-SLP 04/13/19 1021      Row Name 04/13/19 1000             Pharyngeal Phase Concerns    Pharyngeal Phase Concerns  cough;multiple swallows  -SANDRA      Wet Vocal Quality  thin  -SANDRA      Multiple Swallows  thin  -SANDRA      Cough  thin  -SANDRA      Pharyngeal Phase Concerns, Comment  Cough  noted without PMV as pt did not tolerate valve for more than 1 minute.   -SANDRA      Recorded by [SANDRA] Karen Oliveira CCC-SLP 04/13/19 1021      Row Name 04/13/19 1000             Clinical Impression    SLP Swallowing Diagnosis  suspected pharyngeal dysfunction  -SANDRA      Functional Impact  risk of aspiration/pneumonia;risk of malnutrition;risk of dehydration  -SANDRA      Rehab Potential/Prognosis, Swallowing  adequate, monitor progress closely  -SANDRA      Swallow Criteria for Skilled Therapeutic Interventions Met  demonstrates skilled criteria  -SANDRA      Recorded by [SANDRA] Karen Oliveira CCC-SLP 04/13/19 1021      Row Name 04/13/19 1000             Recommendations    Predicted Duration Therapy Intervention (Days)  until discharge  -SANDRA      SLP Diet Recommendation  NPO;long term alternate methods of nutrition/hydration  -SANDRA      Recommended Diagnostics  reassess via clinical swallow evaluation  -SANDRA      SLP Rec. for Method of Medication Administration  meds via alternate route  -SANDRA      Recorded by [SANDRA] Karen Oliveira CCC-SLP 04/13/19 1021      Row Name                Wound 04/02/19 1900 Bilateral midline coccyx pressure injury    Wound - Properties Group Date first assessed: 04/02/19 [ROBINSON] Time first assessed: 1900 [ROBINSON] Present On Admission : yes [ROBINSON] Side: Bilateral [ROBINSON] Orientation: midline [ROBINSON] Location: coccyx [ROBINSON] Type: pressure injury [ROBINSON] Stage, Pressure Injury: deep tissue injury [ROBINSON]  Additional Comments: probable stage 1 pressure injury [CP] Recorded by:  [CP] Jennifer Middleton APRN 04/03/19 1446 [ROBINSON] Vikas Bynum RN 04/03/19 0048    Row Name                Wound 04/03/19 0700 Left anterior toe abrasion;other (see comments)    Wound - Properties Group Date first assessed: 04/03/19 [KJ] Time first assessed: 0700 [KJ] Present On Admission : yes [KJ] Side: Left [KJ] Orientation: anterior [KJ] Location: toe [KJ] Type: abrasion;other (see comments) [KJ], scabbing  Recorded by:  [KJ] Jessica Gonzales RN 04/03/19 1527    Row Name 04/13/19 1000             Outcome Summary/Treatment Plan (SLP)    Daily Summary of Progress (SLP)  progress toward functional goals is gradual  -JA      Plan for Continued Treatment (SLP)  Continued use of PMV w/ SLP only. Will continue to follow for dysphagai and speech tx and to determine readiness for instrumental exam.   -SANDRA      Anticipated Dischage Disposition  anticipate therapy at next level of care  -JA      Recorded by [SANDRA] Karen Oliveira CCC-SLP 04/13/19 1021        User Key  (r) = Recorded By, (t) = Taken By, (c) = Cosigned By    Initials Name Effective Dates Discipline    JA Karen Oliveira CCC-SLP 06/22/15 -  SLP    Jennifer Coronado APRN 02/05/19 -  Nurse    Vikas Conti RN 06/16/16 -  Nurse    Jessica Morales RN 06/16/16 -  Nurse          EDUCATION  The patient has been educated in the following areas:   Communication Impairment Speaking Valve Dysphagia (Swallowing Impairment) Oral Care/Hydration NPO rationale.    SLP Recommendation and Plan  Daily Summary of Progress (SLP): progress toward functional goals is gradual     Plan for Continued Treatment (SLP): Continued use of PMV w/ SLP only. Will continue to follow for dysphagai and speech tx and to determine readiness for instrumental exam.   Anticipated Dischage Disposition: anticipate therapy at next level of care             SLP GOALS     Row Name 04/13/19 1000  04/12/19 1400 04/11/19 0910       Oral Nutrition/Hydration Goal 1 (SLP)    Oral Nutrition/Hydration Goal 1, SLP  Pt will tolerate trials of H20 without signs/symptoms of aspiration   -JA  --  --    Time Frame (Oral Nutrition/Hydration Goal 1, SLP)  short term goal (STG)  -JA  --  --    Barriers (Oral Nutrition/Hydration Goal 1, SLP)  Pt was given thins via tsp w/ overt s/s of aspiration w/ consistencies tested.   -JA  --  --    Progress/Outcomes (Oral Nutrition/Hydration Goal 1, SLP)  continuing progress toward goal  -JA  --  --       Tolerate Speaking Valve Placement Goal 1 ([facility])    Tolerate Speaking Valve Placement Goal 1 (SLP)  finger occlusion;speaking valve 10 min;80%;with minimal cues (75-90%)  -JA  finger occlusion;speaking valve 10 min;80%;with minimal cues (75-90%)  -AC  finger occlusion;speaking valve 10 min;80%;with minimal cues (75-90%)  -MP    Time Frame (Tolerate Speaking Valve Placement Goal 1, SLP)  short term goal (STG);by discharge  -JA  short term goal (STG);by discharge  -AC  short term goal (STG);by discharge  -MP    Progress (Tolerate Speaking Valve Placement Goal 1, SLP)  40%;with maximum cues (25-49%)  -JA  70%;with minimal cues (75-90%)  -AC  50%;with moderate cues (50-74%)  -MP    Progress/Outcomes (Tolerate Speaking Valve Placement Goal 1, SLP)  continuing progress toward goal  -JA  continuing progress toward goal  -AC  continuing progress toward goal  -MP    Comment (Tolerate Speaking Valve Placement Goal 1, SLP)  Pt presents w/ shiley cuffless. Noted audible vocal quality w/ placement of valve however increased wheezing, rr and pt complaint of difficulty breathing w/ valve. PMV was removed and SLP proceeded w/ dysphagia tx without the valve.   -JA  Cuff already deflated upon entry. Pt tolerated PMV placement after RN tracheal suctioned w/ minimal coughing, though incr'd RR was noted. PMV removed after ~8 min after developed hard coughing (? r/t aspiration of ice chip vs PMV intolerance).  SpO2 remained @ 100% while wearing PMV. Oral suctioning provided.   -AC  --       Audible Speech with Speaking Valve Goal 1 (SLP)    Audible Speech Goal 1 (SLP)  3 feet;80%;with minimal cues (75-90%)  -JA  3 feet;80%;with minimal cues (75-90%)  -AC  3 feet;80%;with minimal cues (75-90%)  -MP    Time Frame (Audible Speech Goal 1, SLP)  short term goal (STG);by discharge  -JA  short term goal (STG);by discharge  -AC  short term goal (STG);by discharge  -MP    Progress (Audible Speech Goal 1, SLP)  80%;with minimal cues (75-90%)  -JA  40%;with minimal cues (75-90%)  -AC  40%;with moderate cues (50-74%)  -MP    Progress/Outcomes (Audible Speech Goal 1, SLP)  good progress toward goal  -JA  continuing progress toward goal  -AC  continuing progress toward goal  -MP    Comment (Audible Speech Goal 1, SLP)  Though pt's vocal qualitty was dysphonic, it was audible at 3 ft   -JA  Vocal quality was harsh and dysphonic. Pt audible 80% of the time @ 1 foot.   -AC  --       Additional Goal 1 (SLP)    Additional Goal 1, SLP  LTG: Pt will verbally communicate wants/needs while wearing PMV w/ 100% accuracy w/o cues  -JA  LTG: Pt will verbally communicate wants/needs while wearing PMV w/ 100% accuracy w/o cues  -AC  LTG: Pt will verbally communicate wants/needs while wearing PMV w/ 100% accuracy w/o cues  -MP    Time Frame (Additional Goal 1, SLP)  by discharge  -JA  by discharge  -AC  by discharge  -MP    Progress/Outcomes (Additional Goal 1, SLP)  continuing progress toward goal  -JA  continuing progress toward goal  -AC  continuing progress toward goal  -MP      User Key  (r) = Recorded By, (t) = Taken By, (c) = Cosigned By    Initials Name Provider Type    Karen Escobar, CCC-SLP Speech and Language Pathologist    Yola Lin, MS CCC-SLP Speech and Language Pathologist    MP Pedrito Woodward, MS, CFY-SLP Speech and Language Pathologist              Time Calculation:     Time Calculation- SLP     Row Name 04/13/19  1030             Time Calculation- SLP    SLP Start Time  1000  -SANDRA      SLP Received On  19  -SANDRA        User Key  (r) = Recorded By, (t) = Taken By, (c) = Cosigned By    Initials Name Provider Type    Karen Escobar CCC-SLP Speech and Language Pathologist          Therapy Charges for Today     Code Description Service Date Service Provider Modifiers Qty    13627690873 HC ST TREATMENT SWALLOW 2 2019 Kraen Oliveira CCC-SLP GN 1    66982469618 HC ST TREATMENT SPEECH 1 2019 Karen Oliveira CCC-SLP GN 1                     Karen OHARA. BASSEM Oliveira  2019   and Acute Care - Speech Language Pathology   Swallow Progress Note  [facility]     Patient Name: Nallely Junior  : 1940  MRN: [id number]  Today's Date: 2019  Onset of Illness/Injury or Date of Surgery: 19     Referring Physician: ARELI Navas      Admit Date: 2019    Visit Dx:      ICD-10-CM ICD-9-CM   1. Hypotension, unspecified hypotension type I95.9 458.9   2. Acute on chronic respiratory failure with hypoxia  J96.21 518.84     799.02   3. Inferior STEMI  I21.19 410.40   4. Atelectasis of left lung J98.11 518.0   5. Impaired mobility and ADLs Z74.09 799.89   6. Impaired functional mobility, balance, gait, and endurance Z74.09 V49.89   7. Oropharyngeal dysphagia R13.12 787.22   8. Voice and resonance disorder R49.9 784.40     Patient Active Problem List   Diagnosis   • Pneumonia   • Type 2 diabetes mellitus    • Hypotension   • Hyperlipidemia   • Depression   • Anxiety   • COPD   • Dysphagia with chronic PEG    • Coronary artery disease   • CHF    • GERD    • Acute on chronic respiratory failure with hypoxia    • Aortic valve stenosis   • Stage 3 CKD   • Former smoker   • Inferior STEMI    • H/O MRSA pneumonia 10/2018    • Debility   • Dementia   • Class 1 obesity in adult   • Atelectasis of left lung   • Bronchomalacia       Therapy Treatment  Rehabilitation Treatment Summary     Row Name  04/13/19 1000             Treatment Time/Intention    Discipline  speech language pathologist  -SANDRA      Document Type  therapy note (daily note)  -SANDRA      Subjective Information  no complaints  -SANDRA      Mode of Treatment  individual therapy;speech-language pathology  -SANDRA      Patient/Family Observations  No family present   -SANDRA      Care Plan Review  evaluation/treatment results reviewed;care plan/treatment goals reviewed  -SANDRA      Therapy Frequency (Swallow)  PRN  -SANDRA      Therapy Frequency (SLP SLC)  5 days per week  -SANDRA      Patient Effort  adequate  -JA      Recorded by [JA] Karen Oliveira CCC-SLP 04/13/19 1021      Row Name 04/13/19 1000             Vital Signs    O2 Delivery Post Treatment  trach collar  -SANDRA      Recorded by [JA] Karen Oliveira CCC-SLP 04/13/19 1021      Row Name 04/13/19 1000             Speaking Valve    Respiratory Status  trach collar  -SANDRA      Pretreatment Cuff Status  Comment shiley cuffless  -JA      Trach Type  Shiley;non cuffed;size 6;extra long  -JA      Types of Intervention  tracheostomy speaking valve;coordination of respiration/phonation  -SANDRA      Speaking Valve Type  PMV-2000 (clear)  -JA      Phonation with Occlusion  audible at 3 feet;speaking valve  -JA      Vocal Quality on Phonation  noticeably hoarse  -JA      Response to Intervention  tolerated for short period;speaking valve;anxious;cough;stridor  -SANDRA      Minutes Tolerated  speaking valve;< 1 minutes  -SANDRA      At Conclusion  speaking valve removed;speaking valve at bedside;notified RN/LPN;other (see comments)  -SANDRA      Speaking Valve Placement Recommendation  SLP only  -JA      Recorded by [JA] Karen Oliveira CCC-SLP 04/13/19 1021      Row Name 04/13/19 1000             Oral Motor and Function    Dentition Assessment  poor oral hygiene;teeth are in poor condition;missing teeth  -SANDRA      Secretion Management  WNL/WFL  -JA      Mucosal Quality  moist, healthy  -SANDRA      Volitional Swallow  delayed;weak   -JA      Volitional Cough  weak  -JA      Recorded by [SANDRA] Karen Oliveira CCC-SLP 04/13/19 1021      Row Name 04/13/19 1000             Respiratory    Respiratory Status  increase in respiratory rate;during swallowing/eating;wheezing, stridor  -JA      Respiratory Pattern  decrease control/incoordination of breathing swallow  -SANDRA      Recorded by [SANDRA] Karen Oliveira CCC-SLP 04/13/19 1021      Row Name 04/13/19 1000             Clinical Swallow Eval    Oral Prep Phase  WFL  -JA      Oral Transit  WFL  -JA      Oral Residue  WFL  -JA      Pharyngeal Phase  suspected pharyngeal impairment  -JA      Clinical Swallow Evaluation Summary  Pt was tested w/ thin via tsp and ice chips x3. Overt signs/symptoms of aspiration w/ thin c/b cough and multiple swallows. Noted increased RR w/ trials. Pt is at high risk for aspiration. Recommend continue NPO, meds and nutrition via PEG.   -SANDRA      Recorded by [SANDRA] Karen Oliveira CCC-SLP 04/13/19 1021      Row Name 04/13/19 1000             Pharyngeal Phase Concerns    Pharyngeal Phase Concerns  cough;multiple swallows  -JA      Wet Vocal Quality  thin  -JA      Multiple Swallows  thin  -JA      Cough  thin  -JA      Pharyngeal Phase Concerns, Comment  Cough  noted without PMV as pt did not tolerate valve for more than 1 minute.   -SANDRA      Recorded by [SANDRA] Karen Oliveira CCC-SLP 04/13/19 1021      Row Name 04/13/19 1000             Clinical Impression    SLP Swallowing Diagnosis  suspected pharyngeal dysfunction  -JA      Functional Impact  risk of aspiration/pneumonia;risk of malnutrition;risk of dehydration  -SANDRA      Rehab Potential/Prognosis, Swallowing  adequate, monitor progress closely  -SANDRA      Swallow Criteria for Skilled Therapeutic Interventions Met  demonstrates skilled criteria  -JA      Recorded by [SANDRA] Karen Oliveira CCC-SLP 04/13/19 1021      Row Name 04/13/19 1000             Recommendations    Predicted Duration Therapy Intervention  (Days)  until discharge  -JA      SLP Diet Recommendation  NPO;long term alternate methods of nutrition/hydration  -SANDRA      Recommended Diagnostics  reassess via clinical swallow evaluation  -SANDRA      SLP Rec. for Method of Medication Administration  meds via alternate route  -SANDRA      Recorded by [JA] Karen Oliveira CCC-SLP 04/13/19 1021      Row Name                Wound 04/02/19 1900 Bilateral midline coccyx pressure injury    Wound - Properties Group Date first assessed: 04/02/19 [ROBINSON] Time first assessed: 1900 [ROBINSON] Present On Admission : yes [ROBINSON] Side: Bilateral [ROBINSON] Orientation: midline [ROBINSON] Location: coccyx [ROBINSON] Type: pressure injury [ROBINSON] Stage, Pressure Injury: deep tissue injury [ROBINSON] Additional Comments: probable stage 1 pressure injury [CP] Recorded by:  [CP] Jennifer Middleton APRN 04/03/19 1446 [ROBINSON] Vikas Bynum RN 04/03/19 0048    Row Name                Wound 04/03/19 0700 Left anterior toe abrasion;other (see comments)    Wound - Properties Group Date first assessed: 04/03/19 [KJ] Time first assessed: 0700 [KJ] Present On Admission : yes [KJ] Side: Left [KJ] Orientation: anterior [KJ] Location: toe [KJ] Type: abrasion;other (see comments) [KJ], scabbing  Recorded by:  [KJ] Jessica Gonzales RN 04/03/19 1527    Row Name 04/13/19 1000             Outcome Summary/Treatment Plan (SLP)    Daily Summary of Progress (SLP)  progress toward functional goals is gradual  -SANDRA      Plan for Continued Treatment (SLP)  Continued use of PMV w/ SLP only. Will continue to follow for dysphagai and speech tx and to determine readiness for instrumental exam.   -SANDRA      Anticipated Dischage Disposition  anticipate therapy at next level of care  -SANDRA      Recorded by [SANDRA] Karen Oliveira CCC-SLP 04/13/19 1021        User Key  (r) = Recorded By, (t) = Taken By, (c) = Cosigned By    Initials Name Effective Dates Discipline    Karen Escobar CCC-SLP 06/22/15 -  SLP    Jennifer Coronado APRN  02/05/19 -  Nurse    Vikas Conti RN 06/16/16 -  Nurse    Jessica Morales RN 06/16/16 -  Nurse          Outcome Summary  Outcome Summary/Treatment Plan (SLP)  Daily Summary of Progress (SLP): progress toward functional goals is gradual (04/13/19 1000 : Karen Oliveira, CCC-SLP)  Plan for Continued Treatment (SLP): Continued use of PMV w/ SLP only. Will continue to follow for dysphagai and speech tx and to determine readiness for instrumental exam.  (04/13/19 1000 : Karen Oliveira, CCC-SLP)  Anticipated Dischage Disposition: anticipate therapy at next level of care (04/13/19 1000 : Karen Oliveira, CCC-SLP)      SLP GOALS     Row Name 04/13/19 1000 04/12/19 1400 04/11/19 0910       Oral Nutrition/Hydration Goal 1 (SLP)    Oral Nutrition/Hydration Goal 1, SLP  Pt will tolerate trials of H20 without signs/symptoms of aspiration   -JA  --  --    Time Frame (Oral Nutrition/Hydration Goal 1, SLP)  short term goal (STG)  -JA  --  --    Barriers (Oral Nutrition/Hydration Goal 1, SLP)  Pt was given thins via tsp w/ overt s/s of aspiration w/ consistencies tested.   -JA  --  --    Progress/Outcomes (Oral Nutrition/Hydration Goal 1, SLP)  continuing progress toward goal  -JA  --  --       Tolerate Speaking Valve Placement Goal 1 (SLP)    Tolerate Speaking Valve Placement Goal 1 (SLP)  finger occlusion;speaking valve 10 min;80%;with minimal cues (75-90%)  -JA  finger occlusion;speaking valve 10 min;80%;with minimal cues (75-90%)  -AC  finger occlusion;speaking valve 10 min;80%;with minimal cues (75-90%)  -MP    Time Frame (Tolerate Speaking Valve Placement Goal 1, SLP)  short term goal (STG);by discharge  -JA  short term goal (STG);by discharge  -AC  short term goal (STG);by discharge  -MP    Progress (Tolerate Speaking Valve Placement Goal 1, SLP)  40%;with maximum cues (25-49%)  -JA  70%;with minimal cues (75-90%)  -AC  50%;with moderate cues (50-74%)  -MP    Progress/Outcomes (Tolerate Speaking  Valve Placement Goal 1, SLP)  continuing progress toward goal  -JA  continuing progress toward goal  -AC  continuing progress toward goal  -MP    Comment (Tolerate Speaking Valve Placement Goal 1, SLP)  Pt presents w/ shiley cuffless. Noted audible vocal quality w/ placement of valve however increased wheezing, rr and pt complaint of difficulty breathing w/ valve. PMV was removed and SLP proceeded w/ dysphagia tx without the valve.   -JA  Cuff already deflated upon entry. Pt tolerated PMV placement after RN tracheal suctioned w/ minimal coughing, though incr'd RR was noted. PMV removed after ~8 min after developed hard coughing (? r/t aspiration of ice chip vs PMV intolerance). SpO2 remained @ 100% while wearing PMV. Oral suctioning provided.   -AC  --       Audible Speech with Speaking Valve Goal 1 (SLP)    Audible Speech Goal 1 (SLP)  3 feet;80%;with minimal cues (75-90%)  -JA  3 feet;80%;with minimal cues (75-90%)  -AC  3 feet;80%;with minimal cues (75-90%)  -MP    Time Frame (Audible Speech Goal 1, SLP)  short term goal (STG);by discharge  -JA  short term goal (STG);by discharge  -AC  short term goal (STG);by discharge  -MP    Progress (Audible Speech Goal 1, SLP)  80%;with minimal cues (75-90%)  -JA  40%;with minimal cues (75-90%)  -AC  40%;with moderate cues (50-74%)  -MP    Progress/Outcomes (Audible Speech Goal 1, SLP)  good progress toward goal  -JA  continuing progress toward goal  -AC  continuing progress toward goal  -MP    Comment (Audible Speech Goal 1, SLP)  Though pt's vocal qualitty was dysphonic, it was audible at 3 ft   -JA  Vocal quality was harsh and dysphonic. Pt audible 80% of the time @ 1 foot.   -AC  --       Additional Goal 1 (SLP)    Additional Goal 1, SLP  LTG: Pt will verbally communicate wants/needs while wearing PMV w/ 100% accuracy w/o cues  -JA  LTG: Pt will verbally communicate wants/needs while wearing PMV w/ 100% accuracy w/o cues  -AC  LTG: Pt will verbally communicate  wants/needs while wearing PMV w/ 100% accuracy w/o cues  -MP    Time Frame (Additional Goal 1, SLP)  by discharge  -JA  by discharge  -AC  by discharge  -MP    Progress/Outcomes (Additional Goal 1, SLP)  continuing progress toward goal  -JA  continuing progress toward goal  -AC  continuing progress toward goal  -MP      User Key  (r) = Recorded By, (t) = Taken By, (c) = Cosigned By    Initials Name Provider Type    Karen Escobar CCC-SLP Speech and Language Pathologist    Yola Lin, MS CCC-SLP Speech and Language Pathologist    MP Pedrito Woodward, MS, CFY-SLP Speech and Language Pathologist          EDUCATION  The patient has been educated in the following areas:   Communication Impairment Speaking Valve Dysphagia (Swallowing Impairment) Oral Care/Hydration NPO rationale.    SLP Recommendation and Plan  Daily Summary of Progress (SLP): progress toward functional goals is gradual     Plan for Continued Treatment (SLP): Continued use of PMV w/ SLP only. Will continue to follow for dysphagai and speech tx and to determine readiness for instrumental exam.   Anticipated Dischage Disposition: anticipate therapy at next level of care                    Time Calculation:   Time Calculation- SLP     Row Name 04/13/19 1030             Time Calculation- SLP    SLP Start Time  1000  -      SLP Received On  04/13/19  -SANDRA        User Key  (r) = Recorded By, (t) = Taken By, (c) = Cosigned By    Initials Name Provider Type    Karen Escobar CCC-SLP Speech and Language Pathologist          Therapy Charges for Today     Code Description Service Date Service Provider Modifiers Qty    36646793507  ST TREATMENT SWALLOW 2 4/13/2019 Karen Oliveira CCC-SLP GN 1    [id number] HC ST TREATMENT SPEECH 1 4/13/2019 Karen Oliveira CCC-SLP GN 1                 Karen OHARA. BASSEM Oliveira  4/13/2019

## 2019-04-13 NOTE — PLAN OF CARE
Problem: Patient Care Overview  Goal: Plan of Care Review  Outcome: Ongoing (interventions implemented as appropriate)   04/13/19 1240   Coping/Psychosocial   Plan of Care Reviewed With patient   Plan of Care Review   Progress improving   OTHER   Outcome Summary Pt VSS. Mouths words appropriately. Tolerating TF. Adequate UOP. IRISH muñoz Rn.

## 2019-04-14 LAB
ANION GAP SERPL CALCULATED.3IONS-SCNC: 10 MMOL/L
BASOPHILS # BLD AUTO: 0.01 10*3/MM3 (ref 0–0.2)
BASOPHILS NFR BLD AUTO: 0.2 % (ref 0–1.5)
BUN BLD-MCNC: 26 MG/DL (ref 8–23)
BUN/CREAT SERPL: 60.5 (ref 7–25)
CALCIUM SPEC-SCNC: 9.6 MG/DL (ref 8.6–10.5)
CHLORIDE SERPL-SCNC: 95 MMOL/L (ref 98–107)
CO2 SERPL-SCNC: 31 MMOL/L (ref 22–29)
CREAT BLD-MCNC: 0.43 MG/DL (ref 0.57–1)
DEPRECATED RDW RBC AUTO: 54.1 FL (ref 37–54)
EOSINOPHIL # BLD AUTO: 0.15 10*3/MM3 (ref 0–0.4)
EOSINOPHIL NFR BLD AUTO: 2.3 % (ref 0.3–6.2)
ERYTHROCYTE [DISTWIDTH] IN BLOOD BY AUTOMATED COUNT: 15.2 % (ref 12.3–15.4)
GFR SERPL CREATININE-BSD FRML MDRD: 142 ML/MIN/1.73
GLUCOSE BLD-MCNC: 218 MG/DL (ref 65–99)
GLUCOSE BLDC GLUCOMTR-MCNC: 167 MG/DL (ref 70–130)
GLUCOSE BLDC GLUCOMTR-MCNC: 179 MG/DL (ref 70–130)
GLUCOSE BLDC GLUCOMTR-MCNC: 215 MG/DL (ref 70–130)
GLUCOSE BLDC GLUCOMTR-MCNC: 233 MG/DL (ref 70–130)
HCT VFR BLD AUTO: 42.1 % (ref 34–46.6)
HGB BLD-MCNC: 13.1 G/DL (ref 12–15.9)
IMM GRANULOCYTES # BLD AUTO: 0.02 10*3/MM3 (ref 0–0.05)
IMM GRANULOCYTES NFR BLD AUTO: 0.3 % (ref 0–0.5)
LYMPHOCYTES # BLD AUTO: 2.24 10*3/MM3 (ref 0.7–3.1)
LYMPHOCYTES NFR BLD AUTO: 34.2 % (ref 19.6–45.3)
MCH RBC QN AUTO: 30.7 PG (ref 26.6–33)
MCHC RBC AUTO-ENTMCNC: 31.1 G/DL (ref 31.5–35.7)
MCV RBC AUTO: 98.6 FL (ref 79–97)
MONOCYTES # BLD AUTO: 0.64 10*3/MM3 (ref 0.1–0.9)
MONOCYTES NFR BLD AUTO: 9.8 % (ref 5–12)
NEUTROPHILS # BLD AUTO: 3.51 10*3/MM3 (ref 1.4–7)
NEUTROPHILS NFR BLD AUTO: 53.5 % (ref 42.7–76)
PLATELET # BLD AUTO: 230 10*3/MM3 (ref 140–450)
PMV BLD AUTO: 10.7 FL (ref 6–12)
POTASSIUM BLD-SCNC: 4.5 MMOL/L (ref 3.5–5.2)
RBC # BLD AUTO: 4.27 10*6/MM3 (ref 3.77–5.28)
SODIUM BLD-SCNC: 136 MMOL/L (ref 136–145)
WBC NRBC COR # BLD: 6.55 10*3/MM3 (ref 3.4–10.8)

## 2019-04-14 PROCEDURE — 94799 UNLISTED PULMONARY SVC/PX: CPT

## 2019-04-14 PROCEDURE — 82962 GLUCOSE BLOOD TEST: CPT

## 2019-04-14 PROCEDURE — 80048 BASIC METABOLIC PNL TOTAL CA: CPT | Performed by: INTERNAL MEDICINE

## 2019-04-14 PROCEDURE — 85025 COMPLETE CBC W/AUTO DIFF WBC: CPT | Performed by: INTERNAL MEDICINE

## 2019-04-14 PROCEDURE — 99233 SBSQ HOSP IP/OBS HIGH 50: CPT | Performed by: INTERNAL MEDICINE

## 2019-04-14 PROCEDURE — 63710000001 INSULIN DETEMIR PER 5 UNITS: Performed by: INTERNAL MEDICINE

## 2019-04-14 PROCEDURE — 25010000002 ENOXAPARIN PER 10 MG: Performed by: INTERNAL MEDICINE

## 2019-04-14 RX ADMIN — IPRATROPIUM BROMIDE AND ALBUTEROL SULFATE 3 ML: 2.5; .5 SOLUTION RESPIRATORY (INHALATION) at 00:04

## 2019-04-14 RX ADMIN — MIDODRINE HYDROCHLORIDE 10 MG: 5 TABLET ORAL at 13:57

## 2019-04-14 RX ADMIN — IPRATROPIUM BROMIDE AND ALBUTEROL SULFATE 3 ML: 2.5; .5 SOLUTION RESPIRATORY (INHALATION) at 12:34

## 2019-04-14 RX ADMIN — INSULIN DETEMIR 25 UNITS: 100 INJECTION, SOLUTION SUBCUTANEOUS at 21:21

## 2019-04-14 RX ADMIN — ROPINIROLE 4 MG: 2 TABLET, FILM COATED ORAL at 16:13

## 2019-04-14 RX ADMIN — BUSPIRONE HYDROCHLORIDE 15 MG: 15 TABLET ORAL at 08:36

## 2019-04-14 RX ADMIN — PANTOPRAZOLE SODIUM 40 MG: 40 INJECTION, POWDER, FOR SOLUTION INTRAVENOUS at 08:35

## 2019-04-14 RX ADMIN — CHLORHEXIDINE GLUCONATE 0.12% ORAL RINSE 15 ML: 1.2 LIQUID ORAL at 08:35

## 2019-04-14 RX ADMIN — CHLORHEXIDINE GLUCONATE 0.12% ORAL RINSE 15 ML: 1.2 LIQUID ORAL at 21:29

## 2019-04-14 RX ADMIN — IPRATROPIUM BROMIDE AND ALBUTEROL SULFATE 3 ML: 2.5; .5 SOLUTION RESPIRATORY (INHALATION) at 19:26

## 2019-04-14 RX ADMIN — Medication 1 PACKET: at 21:20

## 2019-04-14 RX ADMIN — ROPINIROLE 4 MG: 2 TABLET, FILM COATED ORAL at 08:36

## 2019-04-14 RX ADMIN — INSULIN HUMAN 4 UNITS: 100 INJECTION, SOLUTION PARENTERAL at 06:25

## 2019-04-14 RX ADMIN — INSULIN HUMAN 4 UNITS: 100 INJECTION, SOLUTION PARENTERAL at 12:16

## 2019-04-14 RX ADMIN — ASPIRIN 81 MG 81 MG: 81 TABLET ORAL at 08:36

## 2019-04-14 RX ADMIN — MIDODRINE HYDROCHLORIDE 10 MG: 5 TABLET ORAL at 08:36

## 2019-04-14 RX ADMIN — ENOXAPARIN SODIUM 40 MG: 100 INJECTION SUBCUTANEOUS at 08:35

## 2019-04-14 RX ADMIN — MIDODRINE HYDROCHLORIDE 10 MG: 5 TABLET ORAL at 18:26

## 2019-04-14 RX ADMIN — INSULIN HUMAN 2 UNITS: 100 INJECTION, SOLUTION PARENTERAL at 18:08

## 2019-04-14 RX ADMIN — INSULIN DETEMIR 20 UNITS: 100 INJECTION, SOLUTION SUBCUTANEOUS at 08:36

## 2019-04-14 RX ADMIN — BUSPIRONE HYDROCHLORIDE 15 MG: 15 TABLET ORAL at 16:13

## 2019-04-14 RX ADMIN — BUSPIRONE HYDROCHLORIDE 15 MG: 15 TABLET ORAL at 21:24

## 2019-04-14 RX ADMIN — CLONAZEPAM 0.5 MG: 0.5 TABLET ORAL at 16:23

## 2019-04-14 RX ADMIN — Medication 1 PACKET: at 09:00

## 2019-04-14 RX ADMIN — IPRATROPIUM BROMIDE AND ALBUTEROL SULFATE 3 ML: 2.5; .5 SOLUTION RESPIRATORY (INHALATION) at 06:19

## 2019-04-14 NOTE — SIGNIFICANT NOTE
04/14/19 0926   SLP Deferred Reason   SLP Deferred Reason Routine  (Spoke with RN, pt still about the same. Scheduled for transfer to SNF tomorrow. Given intolerance of PMV and s/s asp with small tsp thin yesterday, rec pt be re-evaluated in next setting. )

## 2019-04-14 NOTE — PLAN OF CARE
Problem: Patient Care Overview  Goal: Plan of Care Review  Outcome: Ongoing (interventions implemented as appropriate)   04/14/19 0257   Coping/Psychosocial   Plan of Care Reviewed With patient   OTHER   Outcome Summary Vital signs stable. Waiting to return to nursing home at the beginning of the week. Patient remains on 35% trach collar and has been educated frequently on NPO precautions.        Problem: Fall Risk (Adult)  Goal: Absence of Fall   04/14/19 0257   Fall Risk (Adult)   Absence of Fall making progress toward outcome       Problem: Airway, Artificial (Adult)  Goal: Signs and Symptoms of Listed Potential Problems Will be Absent, Minimized or Managed (Airway, Artificial)  Outcome: Ongoing (interventions implemented as appropriate)   04/14/19 0257   Goal/Outcome Evaluation   Problems Assessed (Artificial Airway) all   Problems Present (Artificial Airway) communication impairment

## 2019-04-14 NOTE — PROGRESS NOTES
"Intensive Care Follow-up     Hospital:  LOS: 12 days   Ms. Nallely Junior, 78 y.o. female is followed for:   Acute ST elevation myocardial infarction (STEMI) of inferior wall (CMS/HCC)   Acute on chronic respiratory failure  Dysphasia     Subjective   Interval History:  The chart has been reviewed.  The patient has not tolerate her Passy-[place] valve.  She has not had any fevers overnight.  She remains with baseline dysphagia.    The patient's past medical, surgical and social history were reviewed and updated in Epic as appropriate.        Objective     Infusions:     Medications:    aspirin 81 mg Oral Daily   busPIRone 15 mg Oral TID   chlorhexidine 15 mL Mouth/Throat Q12H   clonazePAM 0.5 mg Oral Q8H   enoxaparin 40 mg Subcutaneous Q24H   insulin detemir 20 Units Subcutaneous Q12H   insulin regular 0-9 Units Subcutaneous Q6H   ipratropium-albuterol 3 mL Nebulization Q6H - RT   midodrine 10 mg Oral TID   pantoprazole 40 mg Intravenous Q24H   PRO-STAT 1 packet Nasogastric BID   rOPINIRole 4 mg Oral TID       Vital Sign Min/Max for last 24 hours  Temp  Min: 96.9 °F (36.1 °C)  Max: 98.4 °F (36.9 °C)   BP  Min: 75/59  Max: 138/57   Pulse  Min: 58  Max: 75   Resp  Min: 16  Max: 26   SpO2  Min: 91 %  Max: 98 %   Flow (L/min)  Min: 14  Max: 14       Input/Output for last 24 hour shift  04/13 0701 - 04/14 0700  In: 811   Out: 600 [Urine:600]      Objective:  General Appearance:  In no acute distress, comfortable and ill-appearing.    Vital signs: (most recent): Blood pressure 135/59, pulse 69, temperature 98.4 °F (36.9 °C), temperature source Oral, resp. rate 26, height 162.6 cm (64\"), weight 90.7 kg (200 lb), SpO2 95 %.    HEENT: Normal HEENT exam.  (Right internal jugular triple lumen catheter  Cuffed Shiley tracheostomy tube )    Lungs:  Normal effort and normal respiratory rate.  Breath sounds clear to auscultation.  She is not in respiratory distress.  No rales, wheezes or rhonchi.    Heart: Normal rate.  Regular " rhythm.  S1 normal and S2 normal.  Positive for murmur.  No gallop or friction rub. (Systolic ejection murmur)  Chest: Symmetric chest wall expansion.   Abdomen: Abdomen is soft and non-distended.  (PEG tube in place.).  Bowel sounds are normal.   There is no abdominal tenderness.   There is no mass. There is no splenomegaly. There is no hepatomegaly.   Extremities: There is dependent edema.  There is no deformity.    Neurological: Patient is alert and oriented to person, place and time.    Pupils:  Pupils are equal, round, and reactive to light.    Skin:  Warm and dry.              Results from last 7 days   Lab Units 04/14/19  0323 04/13/19  0406 04/12/19  0348   WBC 10*3/mm3 6.55 6.51 7.16   HEMOGLOBIN g/dL 13.1 12.6 12.4   PLATELETS 10*3/mm3 230 255 230     Results from last 7 days   Lab Units 04/14/19  0323 04/13/19  0406 04/12/19  0348 04/11/19  0430 04/10/19  0407 04/09/19  0418 04/08/19  0357   SODIUM mmol/L 136 136 133* 137 140 141 143   POTASSIUM mmol/L 4.5 4.6 4.4 4.3 3.4* 4.2 3.9   CO2 mmol/L 31.0* 34.0* 36.0* 39.0* 37.0* 28.0 28.0   BUN mg/dL 26* 22 17 12 11 10 10   CREATININE mg/dL 0.43* 0.43* 0.39* 0.39* 0.35* 0.36* 0.34*   MAGNESIUM mg/dL  --   --   --  2.1 1.8 2.0 2.4  2.4   PHOSPHORUS mg/dL  --   --   --   --  2.5 2.0* 2.2*  2.2*   GLUCOSE mg/dL 218* 235* 224* 159* 167* 148* 144*     Estimated Creatinine Clearance: 63.2 mL/min (A) (by C-G formula based on SCr of 0.43 mg/dL (L)).    Results from last 7 days   Lab Units 04/08/19  0355   PH, ARTERIAL pH units 7.333*   PCO2, ARTERIAL mm Hg 58.3*   PO2 ART mm Hg 88.5         I reviewed the patient's results and images.     Assessment/Plan   Impression        Inferior STEMI     Type 2 diabetes mellitus     Hypotension    Hyperlipidemia    COPD    Dysphagia with chronic PEG     Coronary artery disease    GERD     Acute on chronic respiratory failure with hypoxia     Aortic valve stenosis    Stage 3 CKD    Former smoker    Debility    Dementia    Class 1  obesity in adult    Atelectasis of left lung    Bronchomalacia       Plan        Increase Levemir to 30 units nightly.  We will continue to try with the Passy-Jackie valve today.  She continues to have problems with dysphagia.  Continue with trach collar trials.  The plan will be to transition to her care facility when a bed is available.  Follow-up orders have been placed.       I discussed the patient's findings and my recommendations with patient and nursing staff     High level of risk due to:  severe exacerbation of chronic illness and illness with threat to life or bodily function.      Patricio Guzmán MD, [facility]  Pulmonology and Critical Care Medicine

## 2019-04-14 NOTE — PLAN OF CARE
Problem: Patient Care Overview  Goal: Plan of Care Review  Outcome: Ongoing (interventions implemented as appropriate)   04/14/19 1747   Coping/Psychosocial   Plan of Care Reviewed With patient   Plan of Care Review   Progress no change   OTHER   Outcome Summary Pt mouths words/follows commands, VSS. 35% trach collar. Repeating NPO percautions frequently. Transfer to SNF on Monday.        Problem: Ventilation, Mechanical Invasive (Adult)  Goal: Signs and Symptoms of Listed Potential Problems Will be Absent, Minimized or Managed (Ventilation, Mechanical Invasive)  Outcome: Ongoing (interventions implemented as appropriate)   04/14/19 1747   Goal/Outcome Evaluation   Problems Assessed (Mechanical Ventilation, Invasive) all   Problems Present (Mech Vent, Invasive) situational response;immobility;mechanical dysfunction       Problem: Fall Risk (Adult)  Goal: Absence of Fall  Outcome: Ongoing (interventions implemented as appropriate)   04/14/19 1747   Fall Risk (Adult)   Absence of Fall making progress toward outcome       Problem: Airway, Artificial (Adult)  Goal: Signs and Symptoms of Listed Potential Problems Will be Absent, Minimized or Managed (Airway, Artificial)  Outcome: Ongoing (interventions implemented as appropriate)   04/14/19 1747   Goal/Outcome Evaluation   Problems Assessed (Artificial Airway) all   Problems Present (Artificial Airway) communication impairment

## 2019-04-15 VITALS
BODY MASS INDEX: 34.15 KG/M2 | RESPIRATION RATE: 24 BRPM | WEIGHT: 200 LBS | HEART RATE: 65 BPM | SYSTOLIC BLOOD PRESSURE: 119 MMHG | OXYGEN SATURATION: 95 % | HEIGHT: 64 IN | DIASTOLIC BLOOD PRESSURE: 50 MMHG | TEMPERATURE: 98.2 F

## 2019-04-15 PROBLEM — I21.4 NSTEMI (NON-ST ELEVATED MYOCARDIAL INFARCTION) (HCC): Status: ACTIVE | Noted: 2019-04-02

## 2019-04-15 PROBLEM — B96.29 UTI DUE TO EXTENDED-SPECTRUM BETA LACTAMASE (ESBL) PRODUCING ESCHERICHIA COLI: Status: ACTIVE | Noted: 2019-04-15

## 2019-04-15 PROBLEM — N39.0 UTI DUE TO EXTENDED-SPECTRUM BETA LACTAMASE (ESBL) PRODUCING ESCHERICHIA COLI: Status: ACTIVE | Noted: 2019-04-15

## 2019-04-15 PROBLEM — Z16.12 UTI DUE TO EXTENDED-SPECTRUM BETA LACTAMASE (ESBL) PRODUCING ESCHERICHIA COLI: Status: ACTIVE | Noted: 2019-04-15

## 2019-04-15 LAB
ALBUMIN SERPL-MCNC: 3.4 G/DL (ref 3.5–5.2)
ALP SERPL-CCNC: 79 U/L (ref 39–117)
ALT SERPL W P-5'-P-CCNC: 18 U/L (ref 1–33)
ANION GAP SERPL CALCULATED.3IONS-SCNC: 9 MMOL/L
AST SERPL-CCNC: 24 U/L (ref 1–32)
BILIRUB SERPL-MCNC: 0.5 MG/DL (ref 0.2–1.2)
BUN BLD-MCNC: 27 MG/DL (ref 8–23)
CALCIUM SPEC-SCNC: 10 MG/DL (ref 8.6–10.5)
CHLORIDE SERPL-SCNC: 93 MMOL/L (ref 98–107)
CHOLEST SERPL-MCNC: 143 MG/DL (ref 0–200)
CO2 SERPL-SCNC: 33 MMOL/L (ref 22–29)
CREAT BLD-MCNC: 0.48 MG/DL (ref 0.57–1)
CRP SERPL-MCNC: 0.17 MG/DL (ref 0–0.5)
GLUCOSE BLD-MCNC: 227 MG/DL (ref 65–99)
GLUCOSE BLDC GLUCOMTR-MCNC: 200 MG/DL (ref 70–130)
GLUCOSE BLDC GLUCOMTR-MCNC: 202 MG/DL (ref 70–130)
GLUCOSE BLDC GLUCOMTR-MCNC: 216 MG/DL (ref 70–130)
MAGNESIUM SERPL-MCNC: 1.9 MG/DL (ref 1.6–2.4)
PHOSPHATE SERPL-MCNC: 3.4 MG/DL (ref 2.5–4.5)
POTASSIUM BLD-SCNC: 4.4 MMOL/L (ref 3.5–5.2)
PREALB SERPL-MCNC: 21.2 MG/DL (ref 20–40)
PROT SERPL-MCNC: 7.6 G/DL (ref 6–8.5)
SODIUM BLD-SCNC: 135 MMOL/L (ref 136–145)
TRIGL SERPL-MCNC: 327 MG/DL (ref 0–150)

## 2019-04-15 PROCEDURE — 82465 ASSAY BLD/SERUM CHOLESTEROL: CPT

## 2019-04-15 PROCEDURE — 25010000002 ENOXAPARIN PER 10 MG: Performed by: INTERNAL MEDICINE

## 2019-04-15 PROCEDURE — 83735 ASSAY OF MAGNESIUM: CPT

## 2019-04-15 PROCEDURE — 86140 C-REACTIVE PROTEIN: CPT

## 2019-04-15 PROCEDURE — 82962 GLUCOSE BLOOD TEST: CPT

## 2019-04-15 PROCEDURE — 99239 HOSP IP/OBS DSCHRG MGMT >30: CPT | Performed by: INTERNAL MEDICINE

## 2019-04-15 PROCEDURE — 63710000001 INSULIN DETEMIR PER 5 UNITS: Performed by: INTERNAL MEDICINE

## 2019-04-15 PROCEDURE — 84478 ASSAY OF TRIGLYCERIDES: CPT

## 2019-04-15 PROCEDURE — 84134 ASSAY OF PREALBUMIN: CPT

## 2019-04-15 PROCEDURE — 94799 UNLISTED PULMONARY SVC/PX: CPT

## 2019-04-15 PROCEDURE — 80053 COMPREHEN METABOLIC PANEL: CPT

## 2019-04-15 PROCEDURE — 84100 ASSAY OF PHOSPHORUS: CPT

## 2019-04-15 RX ADMIN — Medication 1 PACKET: at 08:50

## 2019-04-15 RX ADMIN — IPRATROPIUM BROMIDE AND ALBUTEROL SULFATE 3 ML: 2.5; .5 SOLUTION RESPIRATORY (INHALATION) at 13:42

## 2019-04-15 RX ADMIN — CHLORHEXIDINE GLUCONATE 0.12% ORAL RINSE 15 ML: 1.2 LIQUID ORAL at 08:48

## 2019-04-15 RX ADMIN — IPRATROPIUM BROMIDE AND ALBUTEROL SULFATE 3 ML: 2.5; .5 SOLUTION RESPIRATORY (INHALATION) at 07:16

## 2019-04-15 RX ADMIN — BUSPIRONE HYDROCHLORIDE 15 MG: 15 TABLET ORAL at 08:49

## 2019-04-15 RX ADMIN — MIDODRINE HYDROCHLORIDE 10 MG: 5 TABLET ORAL at 12:58

## 2019-04-15 RX ADMIN — ENOXAPARIN SODIUM 40 MG: 100 INJECTION SUBCUTANEOUS at 08:49

## 2019-04-15 RX ADMIN — PANTOPRAZOLE SODIUM 40 MG: 40 INJECTION, POWDER, FOR SOLUTION INTRAVENOUS at 08:48

## 2019-04-15 RX ADMIN — MIDODRINE HYDROCHLORIDE 10 MG: 5 TABLET ORAL at 08:49

## 2019-04-15 RX ADMIN — INSULIN HUMAN 4 UNITS: 100 INJECTION, SOLUTION PARENTERAL at 00:24

## 2019-04-15 RX ADMIN — CLONAZEPAM 0.5 MG: 0.5 TABLET ORAL at 06:17

## 2019-04-15 RX ADMIN — INSULIN HUMAN 4 UNITS: 100 INJECTION, SOLUTION PARENTERAL at 12:57

## 2019-04-15 RX ADMIN — MAGNESIUM SULFATE HEPTAHYDRATE 4 G: 40 INJECTION, SOLUTION INTRAVENOUS at 06:17

## 2019-04-15 RX ADMIN — ASPIRIN 81 MG 81 MG: 81 TABLET ORAL at 08:49

## 2019-04-15 RX ADMIN — INSULIN HUMAN 4 UNITS: 100 INJECTION, SOLUTION PARENTERAL at 06:26

## 2019-04-15 RX ADMIN — IPRATROPIUM BROMIDE AND ALBUTEROL SULFATE 3 ML: 2.5; .5 SOLUTION RESPIRATORY (INHALATION) at 00:02

## 2019-04-15 RX ADMIN — ROPINIROLE 4 MG: 2 TABLET, FILM COATED ORAL at 08:49

## 2019-04-15 RX ADMIN — ROPINIROLE 4 MG: 2 TABLET, FILM COATED ORAL at 15:43

## 2019-04-15 RX ADMIN — INSULIN DETEMIR 25 UNITS: 100 INJECTION, SOLUTION SUBCUTANEOUS at 08:49

## 2019-04-15 RX ADMIN — BUSPIRONE HYDROCHLORIDE 15 MG: 15 TABLET ORAL at 16:14

## 2019-04-15 NOTE — PLAN OF CARE
Problem: Patient Care Overview  Goal: Plan of Care Review  Outcome: Ongoing (interventions implemented as appropriate)   04/15/19 1431   Coping/Psychosocial   Plan of Care Reviewed With patient   Plan of Care Review   Progress improving   OTHER   Outcome Summary Pt neuro intact. VSS, TLDL DC'd transfering to [facility] today. 35%Trach collar tollerated. Dysphagia to be elvaluated at nursing home. Transportation scheduled for 4pm today.

## 2019-04-15 NOTE — DISCHARGE SUMMARY
Date of Admission: 4/2/2019  Date of Discharge:  4/15/2019    Discharge Diagnosis:   Problem List Items Addressed This Visit        Cardiovascular and Mediastinum    Hypotension - Primary    * (Principal) NSTEMI    Respiratory    Acute on chronic respiratory failure with hypoxia     Atelectasis of left lung, s/p bronchoscopy    Dysphagia with chronic PEG       Other Visit Diagnoses     Impaired mobility and ADLs        Impaired functional mobility, balance, gait, and endurance        Voice and resonance disorder                Hospital Course:     79yo F with a history of chronic tracheostomy, dysphagia s/p PEG tube placement and noncompliance with a modified diet who presented as a transfer from [facility] for a possible STEMI. She was evaluated by Cardiology who did not feel she was having a STEMI. No LHC was performed as she would not be a candidate for intervention based on her chronic debilitated state. She was admitted to the ICU for medical management. She was found to be profoundly hypercapneic and was placed on mechanical ventilation.  She had atelectasis of the left lung which improved with mechanical ventilation.  She was found to have an ESBL E. coli UTI.    During her stay she has been treated with courses of is act him and vancomycin and this was simplified to Invanz.  She was also found to have Proteus mirabilis in her sputum which was covered by these as well.    She was weaned completely from mechanical ventilation on 4/7 but required placement back on ventilation approximately 36 hours later.  Was diuresed and again placed on trach collar on 4/10.  She has been tolerating trach collar trials without difficulty however has not been tolerating Passy-Jackie valve.  She was changed over to a cuff less Shiley tracheostomy without difficulty.    She has been participating with physical therapy.  Speech therapy has been following her for her dysphagia.  She has been receiving tube feedings Replete  with fiber at 70 mL/h via her PEG.  By report, at the nursing home she was on mechanical soft diet with thin liquids.  She had not been receiving tube feedings there.    She has completed therapy for her infections and is at or near her baseline in terms of her respiratory status.  We will plan to transition her back to her nursing home today with plans to continue with physical therapy as well as speech therapy.       Procedures Performed:  Bronchoscopy on April 2, 2019 for atelectasis  Replacement of percutaneous gastrostomy tube  Echocardiogram performed on April 3, 2019 showing preserved left ventricular ejection fraction of 70% with moderate to severe aortic valve stenosis.    Consults:   Dr. Candelario Padilla, cardiology  Dr. Ministerio Irizarry, gastroenterology      Pertinent Test Results:   Results from last 7 days   Lab Units 04/14/19  0323   WBC 10*3/mm3 6.55   HEMOGLOBIN g/dL 13.1   HEMATOCRIT % 42.1   PLATELETS 10*3/mm3 230     Results from last 7 days   Lab Units 04/15/19  0335 04/14/19  0323 04/13/19  0406  04/10/19  0407 04/09/19  0418   SODIUM mmol/L 135* 136 136   < > 140 141   POTASSIUM mmol/L 4.4 4.5 4.6   < > 3.4* 4.2   CHLORIDE mmol/L 93* 95* 93*   < > 96* 103   CO2 mmol/L 33.0* 31.0* 34.0*   < > 37.0* 28.0   BUN mg/dL 27* 26* 22   < > 11 10   CREATININE mg/dL 0.48* 0.43* 0.43*   < > 0.35* 0.36*   GLUCOSE mg/dL 227* 218* 235*   < > 167* 148*   CALCIUM mg/dL 10.0 9.6 9.9   < > 8.3* 8.3*   PHOSPHORUS mg/dL 3.4  --   --   --  2.5 2.0*    < > = values in this interval not displayed.             Condition on Discharge:  Good  Vital Signs  Temp:  [97.2 °F (36.2 °C)-98.7 °F (37.1 °C)] 98.1 °F (36.7 °C)  Heart Rate:  [62-95] 62  Resp:  [18-26] 18  BP: ()/(41-88) 118/53    General Appearance:  In no acute distress, comfortable and ill-appearing.    HEENT: Normal HEENT exam.  (Uncuffed Shiley tracheostomy tube )    Lungs:  Normal effort and normal respiratory rate.  Breath sounds clear to auscultation.  She  is not in respiratory distress.  No rales, wheezes or rhonchi.    Heart: Normal rate.  Regular rhythm.  S1 normal and S2 normal.  Positive for murmur.  No gallop or friction rub. (Systolic ejection murmur)  Chest: Symmetric chest wall expansion.   Abdomen: Abdomen is soft and non-distended.  (PEG tube in place.).  Bowel sounds are normal.   There is no abdominal tenderness.   There is no mass. There is no splenomegaly. There is no hepatomegaly.   Extremities: There is dependent edema.  There is no deformity.    Neurological: Patient is alert and oriented to person, place and time.    Pupils:  Pupils are equal, round, and reactive to light.    Skin:  Warm and dry.          Discharge Disposition:  Skilled Nursing Facility ([facility] - External)    Discharge Medications:     Discharge Medications      Continue These Medications      Instructions Start Date   albuterol 1.25 MG/3ML nebulizer solution  Commonly known as:  ACCUNEB   1 ampule, Nebulization, Every 4 Hours PRN      aspirin 81 MG chewable tablet   81 mg, Oral, Daily      atorvastatin 40 MG tablet  Commonly known as:  LIPITOR   40 mg, Oral, Daily      busPIRone 15 MG tablet  Commonly known as:  BUSPAR   15 mg, Oral, 3 Times Daily      clonazePAM 1 MG tablet  Commonly known as:  KlonoPIN   1 mg, Oral, Every 8 Hours      docusate sodium 100 MG capsule  Commonly known as:  COLACE   100 mg, Oral, 2 Times Daily      enoxaparin 40 MG/0.4ML solution syringe  Commonly known as:  LOVENOX   40 mg, Subcutaneous, Every 24 Hours Scheduled      ferrous sulfate 325 (65 FE) MG tablet   325 mg, Oral, Daily With Breakfast      fish oil 1000 MG capsule capsule   Oral, 2 Times Daily With Meals      furosemide 20 MG tablet  Commonly known as:  LASIX   20 mg, Oral, 2 Times Daily      insulin glargine 100 UNIT/ML injection  Commonly known as:  LANTUS   25 Units, Subcutaneous, Nightly      insulin lispro 100 UNIT/ML injection  Commonly known as:  humaLOG   0-7 Units, Subcutaneous, Every 6  Hours      midodrine 10 MG tablet  Commonly known as:  PROAMATINE   10 mg, Oral, 3 Times Daily      MULTIVITAMIN ADULT PO   1 tablet, Oral, Daily      ondansetron 4 MG tablet  Commonly known as:  ZOFRAN   4 mg, Oral, Every 6 Hours PRN      pantoprazole 40 MG EC tablet  Commonly known as:  PROTONIX   40 mg, Oral, Daily      rOPINIRole 4 MG tablet  Commonly known as:  REQUIP   4 mg, Oral, 3 Times Daily      sodium bicarbonate 325 MG tablet   325 mg, Oral, 4 Times Daily             Discharge Diet: Patient will continue with tube feeding with Replete with fiber at 70 mL/h.  She will need to be reevaluated by speech therapy at her facility for graduation back to a dysphasia diet.    Activity at Discharge: As tolerated.    Follow-up Appointments  PCP within 1 month    Test Results Pending at Discharge   Order Current Status    Prealbumin Collected (04/15/19 1053)    C-reactive Protein In process           Patricio Guzmán MD  04/15/19  11:31 AM    Time: Discharge 35 min

## 2019-04-15 NOTE — PLAN OF CARE
Problem: Patient Care Overview  Goal: Plan of Care Review  Outcome: Ongoing (interventions implemented as appropriate)   04/15/19 0428   Coping/Psychosocial   Plan of Care Reviewed With patient   Plan of Care Review   Progress no change   OTHER   Outcome Summary No changes overnight. Tracheostomy collar at 40%. Frequently asks for water and food. Explained to patient at length why this is not appropriate. Plans to go back to skilled nursing facility at ~1600 today.       Problem: Skin Injury Risk (Adult)  Goal: Identify Related Risk Factors and Signs and Symptoms  Outcome: Ongoing (interventions implemented as appropriate)   04/15/19 0428   Skin Injury Risk (Adult)   Related Risk Factors (Skin Injury Risk) advanced age;critical care admission;hospitalization prolonged;mobility impaired     Goal: Skin Health and Integrity  Outcome: Ongoing (interventions implemented as appropriate)   04/15/19 0428   Skin Injury Risk (Adult)   Skin Health and Integrity making progress toward outcome       Problem: Airway, Artificial (Adult)  Goal: Signs and Symptoms of Listed Potential Problems Will be Absent, Minimized or Managed (Airway, Artificial)  Outcome: Ongoing (interventions implemented as appropriate)   04/15/19 0428   Goal/Outcome Evaluation   Problems Assessed (Artificial Airway) all   Problems Present (Artificial Airway) communication impairment

## 2019-04-15 NOTE — PROGRESS NOTES
"                    Clinical Nutrition     Nutrition Assessment  Reason for Visit:   MDR, Follow-up protocol, EN    Patient Name: Nallely Junior  YOB: 1940  MRN: [id number]  Date of Encounter: 04/15/19 9:12 AM  Admission date: 4/2/2019    Nutrition Assessment     Hospital Problem List    Acute on chronic respiratory failure- vent (4/3)- trach collar (4/5); returned   to vent (4/8)- trach collar trials (4/10)      Dysphagia with chronic PEG      Atelectasis of left lung    Bronchomalacia    Other: Was on [facility] soft diet with thin liquids at nursing home prior to admission to this facility     Reported/Observed/Food/Nutrition Related History:   SLP working with patient, not ready for instrumental eval per note on 4/13.     Possible transfer to a nursing home facility today. Patient will be discharged on tube feeding via PEG and SLP to work  with patient at outside facility.      EN running @ goal rate at time of visit and being tolerated.  No problems with diarrhea.    Anthropometrics   Admit Weight: 200# (estimated)  Admit Height: 64\" (162.6cm)  BMI: 34.3    Bed Weight: 84.4kg (obtained 4/12)  Weight Change   UBW: note from NH in Oct, 177 lb and here in Oct bed wt 166lb    Documented weight discrepancies; patient denies being 200#.  Bed weight obtained reflecting a weight of 186#   (patient with multiple pillows and blankets on the bed, weight most likely around 180#).      [facility] contacted and provides that patient weight last documented weight was 179#.   Labs reviewed     Results from last 7 days   Lab Units 04/15/19  0335 04/14/19  0323 04/13/19  0406 04/10/19  0407 04/09/19  0418   SODIUM mmol/L 135* 136 136 140 141   POTASSIUM mmol/L 4.4 4.5 4.6 3.4* 4.2   CHLORIDE mmol/L 93* 95* 93* 96* 103   CO2 mmol/L 33.0* 31.0* 34.0* 37.0* 28.0   BUN mg/dL 27* 26* 22 11 10   CREATININE mg/dL 0.48* 0.43* 0.43* 0.35* 0.36*   GLUCOSE mg/dL 227* 218* 235* 167* 148*   CALCIUM mg/dL 10.0 9.6 9.9 8.3* " 8.3*   PHOSPHORUS mg/dL 3.4  --   --  2.5 2.0*    < > = values in this interval not displayed.     Medications reviewed     Levemir, SSI, Protonix     Intake/Ouptut 24 hrs (7:00AM - 6:59 AM)     Intake & Output (last day)       04/14 0701 - 04/15 0700 04/15 0701 - 04/16 0700    Other 698     NG/GT 2235     Total Intake(mL/kg) 2933 (32.3)     Urine (mL/kg/hr)      Total Output      Net +2933           Urine Unmeasured Occurrence 7 x         Needs Assessment   Height used: 162.6 cm  Weight used: 82kg, 54.5 Kg IBW    Calories: 1359  Method: PSU  Calories: 6274-3712 Method: 18-20 caloires per kg CBW    Est calories needs: 1600 calories     Protein: 109  Method: 1.2grms/kg CBW  Protein: 110grms Method: 2.0grms/kg IBW    Est protein needs: 110grms     Current Nutrition Prescription   PO: NPO Diet     EN: Replete w Fiber @ goal rate 70 ml/hr: TGV 1400ml.   Prostat protein BID      EN Delivery: 3 day:   1612ml+ 2 prostat to proivde 1812kcals=  113% est need, 130g PRO = 117% est need, 24.5 g Fiber , 1346ml H20 in TF, 1723ml mtotal ml Free Water.     Nutrition Diagnosis     4/11 Updated: 4/12, 4/15  Problem Inadequate protein intake    Etiology Current EN Rx    Signs/Symptoms Current EN meeting 79% est protein needs    Status - resolved     Goal:   General: Nutrition support treatment  EN: maintain EN    Nutrition Intervention    Follow treatment progress, Care plan reviewed    Monitoring/Evaluation:   I&O, Pertinent labs, EN delivery/tolerance, Weight, Symptoms      Will Continue to follow per protocol    Leola Gallardo RD  Time Spent: 30 min    Electronically signed by:  Leola Gallardo RD  04/15/19 9:12 AM

## 2019-04-15 NOTE — NURSING NOTE
[facility]/transportation arrived to transport Pt to [facility] rehab facility. Report called to [facility], staff notified that nursing home was unaware that patient was arriving today. Nursing home reports that they were trying to contact case mgt many times today.     AVS/discharged summary needed to be faxed and scripts for medication needed to be sent with patient. Nursing unaware of required documentation, AVS faxed to facility. SNF will have oncall physician fill prescriptions.     Report given to Carm[place], the nurse who stated she was receiving the patient.

## 2019-04-15 NOTE — PROGRESS NOTES
Intensive Care Follow-up     Hospital:  LOS: 13 days   Ms. Nallely Junior, 78 y.o. female is followed for:   Acute ST elevation myocardial infarction (STEMI) of inferior wall (CMS/HCC)   In the setting of acute on chronic respiratory failure and likely respiratory infection     Subjective     79yo F with a history of chronic tracheostomy, dysphagia s/p PEG tube placement and noncompliance with a modified diet who presented as a transfer from [facility] for a possible STEMI. She was evaluated by Cardiology who did not feel she was having a STEMI. No LHC was performed as she would not be a candidate for intervention based on her chronic debilitated state. She was admitted to the ICU for medical management. She was found to be profoundly hypercapneic and was placed on mechanical ventilation.  She had atelectasis of the left lung which improved with mechanical ventilation.  She was found to have an ESBL E. coli UTI.  She was weaned completely from mechanical ventilation on 4/7 but required placement back on ventilation approximately 36 hours later.  Was diuresed and again placed on trach collar on 4/10.  Interval History:  The chart has been reviewed.  The patient has remained afebrile overnight.  She has been tolerating trach collar without any difficulty for the past several days.  Secretions have been minimal.    The patient's past medical, surgical and social history were reviewed and updated in Epic as appropriate.        Objective     Infusions:     Medications:    aspirin 81 mg Oral Daily   busPIRone 15 mg Oral TID   chlorhexidine 15 mL Mouth/Throat Q12H   clonazePAM 0.5 mg Oral Q8H   enoxaparin 40 mg Subcutaneous Q24H   insulin detemir 25 Units Subcutaneous Q12H   insulin regular 0-9 Units Subcutaneous Q6H   ipratropium-albuterol 3 mL Nebulization Q6H - RT   midodrine 10 mg Oral TID   pantoprazole 40 mg Intravenous Q24H   PRO-STAT 1 packet Nasogastric BID   rOPINIRole 4 mg Oral TID       Vital Sign  "Min/Max for last 24 hours  Temp  Min: 97.2 °F (36.2 °C)  Max: 98.7 °F (37.1 °C)   BP  Min: 90/46  Max: 147/61   Pulse  Min: 62  Max: 95   Resp  Min: 18  Max: 26   SpO2  Min: 91 %  Max: 96 %   Flow (L/min)  Min: 14  Max: 14       Input/Output for last 24 hour shift  04/14 0701 - 04/15 0700  In: 2933   Out: -       Objective:  General Appearance:  In no acute distress, comfortable and ill-appearing.    Vital signs: (most recent): Blood pressure 110/53, pulse 62, temperature 98.7 °F (37.1 °C), temperature source Oral, resp. rate 20, height 162.6 cm (64\"), weight 90.7 kg (200 lb), SpO2 92 %.    HEENT: Normal HEENT exam.  (Uncuffed Shiley tracheostomy tube )    Lungs:  Normal effort and normal respiratory rate.  Breath sounds clear to auscultation.  She is not in respiratory distress.  No rales, wheezes or rhonchi.    Heart: Normal rate.  Regular rhythm.  S1 normal and S2 normal.  Positive for murmur.  No gallop or friction rub. (Systolic ejection murmur)  Chest: Symmetric chest wall expansion.   Abdomen: Abdomen is soft and non-distended.  (PEG tube in place.).  Bowel sounds are normal.   There is no abdominal tenderness.   There is no mass. There is no splenomegaly. There is no hepatomegaly.   Extremities: There is dependent edema.  There is no deformity.    Neurological: Patient is alert and oriented to person, place and time.    Pupils:  Pupils are equal, round, and reactive to light.    Skin:  Warm and dry.              Results from last 7 days   Lab Units 04/14/19  0323 04/13/19  0406 04/12/19  0348   WBC 10*3/mm3 6.55 6.51 7.16   HEMOGLOBIN g/dL 13.1 12.6 12.4   PLATELETS 10*3/mm3 230 255 230     Results from last 7 days   Lab Units 04/15/19  0335 04/14/19  0323 04/13/19  0406  04/11/19  0430 04/10/19  0407 04/09/19  0418   SODIUM mmol/L 135* 136 136   < > 137 140 141   POTASSIUM mmol/L 4.4 4.5 4.6   < > 4.3 3.4* 4.2   CO2 mmol/L 33.0* 31.0* 34.0*   < > 39.0* 37.0* 28.0   BUN mg/dL 27* 26* 22   < > 12 11 10 "   CREATININE mg/dL 0.48* 0.43* 0.43*   < > 0.39* 0.35* 0.36*   MAGNESIUM mg/dL 1.9  --   --   --  2.1 1.8 2.0   PHOSPHORUS mg/dL 3.4  --   --   --   --  2.5 2.0*   GLUCOSE mg/dL 227* 218* 235*   < > 159* 167* 148*    < > = values in this interval not displayed.     Estimated Creatinine Clearance: 63.2 mL/min (A) (by C-G formula based on SCr of 0.48 mg/dL (L)).          Images:   A chest x-ray was performed today    I reviewed the patient's results and images.     Assessment/Plan   Impression        Inferior STEMI     Type 2 diabetes mellitus     Hypotension    Hyperlipidemia    COPD    Dysphagia with chronic PEG     Coronary artery disease    GERD     Acute on chronic respiratory failure with hypoxia     Aortic valve stenosis    Stage 3 CKD    Former smoker    Debility    Dementia    Class 1 obesity in adult    Atelectasis of left lung    Bronchomalacia       Plan        Continue on with trach collar trials and respiratory support.  Continue to try Pass[place] valve as tolerated.  We will plan for transition back to her nursing home facility at approximately 1600 hrs. today.    Plan of care and goals reviewed with mulitdisciplinary/antibiotic stewardship team during rounds.   I discussed the patient's findings and my recommendations with patient and nursing staff         Patricio Guzmán MD, [facility]  Pulmonology and Critical Care Medicine

## 2019-04-15 NOTE — PROGRESS NOTES
Case Management Discharge Note    Final Note: [facility]; report can be called to [phone]    Destination - Selection Complete      Service Provider Request Status Selected Services Address Phone Number Fax Number    [facility] Selected Intermediate Care [address], [facility] [address] [phone]      Durable Medical Equipment      No service has been selected for the patient.      Dialysis/Infusion      No service has been selected for the patient.      Home Medical Care      No service has been selected for the patient.      Therapy      No service has been selected for the patient.      Community Resources      No service has been selected for the patient.             Final Discharge Disposition Code: 04 - intermediate care facility

## 2019-04-15 NOTE — DISCHARGE PLACEMENT REQUEST
"Amy Junior (78 y.o. Female)     Date of Birth Social Security Number Address Home Phone MRN    1940  [address]  [facility] [address] [phone] [id number]    Oriental orthodox Marital Status          Latter day        Admission Date Admission Type Admitting Provider Attending Provider Department, Room/Bed    4/2/19 Urgent Case, Alva V., DO Case, Alva V., [facility]    Discharge Date Discharge Disposition Discharge Destination         Skilled Nursing Facility ([facility] - External)              Attending Provider:  Alva Pinedo DO    Allergies:  Penicillins, Sulfa Antibiotics    Isolation:  Contact   Infection:  ESBL E coli (04/05/19), MRSA (10/10/18)   Code Status:  CPR    Ht:  162.6 cm (64\")   Wt:  90.7 kg (200 lb)    Admission Cmt:  None   Principal Problem:  NSTEMI (non-ST elevated myocardial infarction) (CMS/[facility]) [I21.4]                 Active Insurance as of 4/2/2019     Primary Coverage     Payor Plan Insurance Group Employer/Plan Group    MEDICARE MEDICARE A & B      Payor Plan Address Payor Plan Phone Number Payor Plan Fax Number Effective Dates    [address] [phone]  4/1/2005 - None Entered    [facility] [id number]       Subscriber Name Subscriber Birth Date Member ID       AMY JUNIOR 1940 [id number]           Secondary Coverage     Payor Plan Insurance Group Employer/Plan Group    AARP MED SUPP AARP HEALTH CARE OPTIONS      Payor Plan Address Payor Plan Phone Number Payor Plan Fax Number Effective Dates    [facility] [phone]  1/1/2017 - None Entered    [address]       [facility] [address]       Subscriber Name Subscriber Birth Date Member ID       AMY JUNIOR 1940 [id number]                 Emergency Contacts      (Rel.) Home Phone Work Phone Mobile Phone    SharifCarlita neff (Sister) -- -- [phone]                 Discharge Summary      Patricio Guzmán MD at 4/15/2019 11:31 AM            Date " of Admission: 4/2/2019  Date of Discharge:  4/15/2019    Discharge Diagnosis:   Problem List Items Addressed This Visit        Cardiovascular and Mediastinum    Hypotension - Primary    * (Principal) NSTEMI    Respiratory    Acute on chronic respiratory failure with hypoxia     Atelectasis of left lung, s/p bronchoscopy    Dysphagia with chronic PEG       Other Visit Diagnoses     Impaired mobility and ADLs        Impaired functional mobility, balance, gait, and endurance        Voice and resonance disorder                Hospital Course:     79yo F with a history of chronic tracheostomy, dysphagia s/p PEG tube placement and noncompliance with a modified diet who presented as a transfer from [facility] for a possible STEMI. She was evaluated by Cardiology who did not feel she was having a STEMI. No LHC was performed as she would not be a candidate for intervention based on her chronic debilitated state. She was admitted to the ICU for medical management. She was found to be profoundly hypercapneic and was placed on mechanical ventilation.  She had atelectasis of the left lung which improved with mechanical ventilation.  She was found to have an ESBL E. coli UTI.    During her stay she has been treated with courses of is act him and vancomycin and this was simplified to Invanz.  She was also found to have Proteus mirabilis in her sputum which was covered by these as well.    She was weaned completely from mechanical ventilation on 4/7 but required placement back on ventilation approximately 36 hours later.  Was diuresed and again placed on trach collar on 4/10.  She has been tolerating trach collar trials without difficulty however has not been tolerating Passy-[place] valve.  She was changed over to a cuff less Shiley tracheostomy without difficulty.    She has been participating with physical therapy.  Speech therapy has been following her for her dysphagia.  She has been receiving tube feedings Replete with  fiber at 70 mL/h via her PEG.  By report, at the nursing home she was on mechanical soft diet with thin liquids.  She had not been receiving tube feedings there.    She has completed therapy for her infections and is at or near her baseline in terms of her respiratory status.  We will plan to transition her back to her nursing home today with plans to continue with physical therapy as well as speech therapy.       Procedures Performed:  Bronchoscopy on April 2, 2019 for atelectasis  Replacement of percutaneous gastrostomy tube  Echocardiogram performed on April 3, 2019 showing preserved left ventricular ejection fraction of 70% with moderate to severe aortic valve stenosis.    Consults:   Dr. Candelario Padilla, cardiology  Dr. Ministerio Irizarry, gastroenterology      Pertinent Test Results:   Results from last 7 days   Lab Units 04/14/19  0323   WBC 10*3/mm3 6.55   HEMOGLOBIN g/dL 13.1   HEMATOCRIT % 42.1   PLATELETS 10*3/mm3 230     Results from last 7 days   Lab Units 04/15/19  0335 04/14/19  0323 04/13/19  0406  04/10/19  0407 04/09/19  0418   SODIUM mmol/L 135* 136 136   < > 140 141   POTASSIUM mmol/L 4.4 4.5 4.6   < > 3.4* 4.2   CHLORIDE mmol/L 93* 95* 93*   < > 96* 103   CO2 mmol/L 33.0* 31.0* 34.0*   < > 37.0* 28.0   BUN mg/dL 27* 26* 22   < > 11 10   CREATININE mg/dL 0.48* 0.43* 0.43*   < > 0.35* 0.36*   GLUCOSE mg/dL 227* 218* 235*   < > 167* 148*   CALCIUM mg/dL 10.0 9.6 9.9   < > 8.3* 8.3*   PHOSPHORUS mg/dL 3.4  --   --   --  2.5 2.0*    < > = values in this interval not displayed.             Condition on Discharge:  Good  Vital Signs  Temp:  [97.2 °F (36.2 °C)-98.7 °F (37.1 °C)] 98.1 °F (36.7 °C)  Heart Rate:  [62-95] 62  Resp:  [18-26] 18  BP: ()/(41-88) 118/53    General Appearance:  In no acute distress, comfortable and ill-appearing.    HEENT: Normal HEENT exam.  (Uncuffed Shiley tracheostomy tube )    Lungs:  Normal effort and normal respiratory rate.  Breath sounds clear to auscultation.  She is  not in respiratory distress.  No rales, wheezes or rhonchi.    Heart: Normal rate.  Regular rhythm.  S1 normal and S2 normal.  Positive for murmur.  No gallop or friction rub. (Systolic ejection murmur)  Chest: Symmetric chest wall expansion.   Abdomen: Abdomen is soft and non-distended.  (PEG tube in place.).  Bowel sounds are normal.   There is no abdominal tenderness.   There is no mass. There is no splenomegaly. There is no hepatomegaly.   Extremities: There is dependent edema.  There is no deformity.    Neurological: Patient is alert and oriented to person, place and time.    Pupils:  Pupils are equal, round, and reactive to light.    Skin:  Warm and dry.          Discharge Disposition:  Skilled Nursing Facility ([facility] - External)    Discharge Medications:     Discharge Medications      Continue These Medications      Instructions Start Date   albuterol 1.25 MG/3ML nebulizer solution  Commonly known as:  ACCUNEB   1 ampule, Nebulization, Every 4 Hours PRN      aspirin 81 MG chewable tablet   81 mg, Oral, Daily      atorvastatin 40 MG tablet  Commonly known as:  LIPITOR   40 mg, Oral, Daily      busPIRone 15 MG tablet  Commonly known as:  BUSPAR   15 mg, Oral, 3 Times Daily      clonazePAM 1 MG tablet  Commonly known as:  KlonoPIN   1 mg, Oral, Every 8 Hours      docusate sodium 100 MG capsule  Commonly known as:  COLACE   100 mg, Oral, 2 Times Daily      enoxaparin 40 MG/0.4ML solution syringe  Commonly known as:  LOVENOX   40 mg, Subcutaneous, Every 24 Hours Scheduled      ferrous sulfate 325 (65 FE) MG tablet   325 mg, Oral, Daily With Breakfast      fish oil 1000 MG capsule capsule   Oral, 2 Times Daily With Meals      furosemide 20 MG tablet  Commonly known as:  LASIX   20 mg, Oral, 2 Times Daily      insulin glargine 100 UNIT/ML injection  Commonly known as:  LANTUS   25 Units, Subcutaneous, Nightly      insulin lispro 100 UNIT/ML injection  Commonly known as:  humaLOG   0-7 Units, Subcutaneous, Every 6  Hours      midodrine 10 MG tablet  Commonly known as:  PROAMATINE   10 mg, Oral, 3 Times Daily      MULTIVITAMIN ADULT PO   1 tablet, Oral, Daily      ondansetron 4 MG tablet  Commonly known as:  ZOFRAN   4 mg, Oral, Every 6 Hours PRN      pantoprazole 40 MG EC tablet  Commonly known as:  PROTONIX   40 mg, Oral, Daily      rOPINIRole 4 MG tablet  Commonly known as:  REQUIP   4 mg, Oral, 3 Times Daily      sodium bicarbonate 325 MG tablet   325 mg, Oral, 4 Times Daily             Discharge Diet: Patient will continue with tube feeding with Replete with fiber at 70 mL/h.  She will need to be reevaluated by speech therapy at her facility for graduation back to a dysphasia diet.    Activity at Discharge: As tolerated.    Follow-up Appointments  PCP within 1 month    Test Results Pending at Discharge   Order Current Status    Prealbumin Collected (04/15/19 1053)    C-reactive Protein In process           Patricio Guzmán MD  04/15/19  11:31 AM    Time: Discharge 35 min    Electronically signed by Patricio Guzmán MD at 4/15/2019 11:45 AM

## 2019-04-15 NOTE — PROGRESS NOTES
Continued Stay Note   Louisa     Patient Name: Nallely Junior  MRN: [id number]  Today's Date: 4/15/2019    Admit Date: 4/2/2019    Discharge Plan     Row Name 04/15/19 0956       Plan    Plan Comments  Pt to be transferred back to [facility] today @1600 via [facility]. Report can be called to [phone] and transfer summary will be faxed to [phone]    Final Discharge Disposition Code  04 - intermediate care facility    Final Note  [facility]; report can be called to [phone]        Discharge Codes    No documentation.             Alexia Salgado RN

## 2019-04-19 DIAGNOSIS — G89.29 OTHER CHRONIC PAIN: Primary | ICD-10-CM

## 2019-04-19 RX ORDER — OXYCODONE HYDROCHLORIDE 5 MG/1
5 CAPSULE ORAL EVERY 8 HOURS
Qty: 42 CAPSULE | Refills: 0 | Status: SHIPPED | OUTPATIENT
Start: 2019-04-19 | End: 2019-05-03

## 2019-04-26 ENCOUNTER — HOSPITAL ENCOUNTER (EMERGENCY)
Facility: HOSPITAL | Age: 79
Discharge: HOME | End: 2019-04-26
Attending: FAMILY MEDICINE
Payer: MEDICARE

## 2019-04-26 ENCOUNTER — APPOINTMENT (OUTPATIENT)
Dept: GENERAL RADIOLOGY | Facility: HOSPITAL | Age: 79
End: 2019-04-26
Payer: MEDICARE

## 2019-04-26 VITALS
BODY MASS INDEX: 28.35 KG/M2 | HEIGHT: 63 IN | RESPIRATION RATE: 20 BRPM | SYSTOLIC BLOOD PRESSURE: 98 MMHG | DIASTOLIC BLOOD PRESSURE: 34 MMHG | HEART RATE: 77 BPM | TEMPERATURE: 98 F | OXYGEN SATURATION: 95 % | WEIGHT: 160 LBS

## 2019-04-26 DIAGNOSIS — R06.03 ACUTE RESPIRATORY DISTRESS: Primary | ICD-10-CM

## 2019-04-26 LAB
ANION GAP SERPL CALCULATED.3IONS-SCNC: 8 MMOL/L (ref 3–16)
BASOPHILS ABSOLUTE: 0 K/UL (ref 0–0.1)
BASOPHILS RELATIVE PERCENT: 0.4 %
BUN BLDV-MCNC: 24 MG/DL (ref 6–20)
CALCIUM SERPL-MCNC: 9.5 MG/DL (ref 8.5–10.5)
CHLORIDE BLD-SCNC: 92 MMOL/L (ref 98–107)
CO2: 39 MMOL/L (ref 20–30)
CREAT SERPL-MCNC: 0.7 MG/DL (ref 0.4–1.2)
EOSINOPHILS ABSOLUTE: 0.2 K/UL (ref 0–0.4)
EOSINOPHILS RELATIVE PERCENT: 2.3 %
GFR AFRICAN AMERICAN: >59
GFR NON-AFRICAN AMERICAN: >60
GLUCOSE BLD-MCNC: 308 MG/DL (ref 74–106)
HCT VFR BLD CALC: 40.3 % (ref 37–47)
HEMOGLOBIN: 12.5 G/DL (ref 11.5–16.5)
IMMATURE GRANULOCYTES #: 0 K/UL
IMMATURE GRANULOCYTES %: 0.4 % (ref 0–5)
LYMPHOCYTES ABSOLUTE: 2.2 K/UL (ref 1.5–4)
LYMPHOCYTES RELATIVE PERCENT: 30.9 %
MCH RBC QN AUTO: 30.9 PG (ref 27–32)
MCHC RBC AUTO-ENTMCNC: 31 G/DL (ref 31–35)
MCV RBC AUTO: 99.5 FL (ref 80–100)
MONOCYTES ABSOLUTE: 0.6 K/UL (ref 0.2–0.8)
MONOCYTES RELATIVE PERCENT: 8 %
NEUTROPHILS ABSOLUTE: 4.2 K/UL (ref 2–7.5)
NEUTROPHILS RELATIVE PERCENT: 58 %
PDW BLD-RTO: 14.6 % (ref 11–16)
PLATELET # BLD: 182 K/UL (ref 150–400)
PMV BLD AUTO: 11.7 FL (ref 6–10)
POTASSIUM REFLEX MAGNESIUM: 4.2 MMOL/L (ref 3.4–5.1)
PRO-BNP: 3349 PG/ML (ref 0–1800)
RBC # BLD: 4.05 M/UL (ref 3.8–5.8)
SODIUM BLD-SCNC: 139 MMOL/L (ref 136–145)
TROPONIN: <0.3 NG/ML
WBC # BLD: 7.3 K/UL (ref 4–11)

## 2019-04-26 PROCEDURE — 80048 BASIC METABOLIC PNL TOTAL CA: CPT

## 2019-04-26 PROCEDURE — 99285 EMERGENCY DEPT VISIT HI MDM: CPT

## 2019-04-26 PROCEDURE — 85025 COMPLETE CBC W/AUTO DIFF WBC: CPT

## 2019-04-26 PROCEDURE — 71045 X-RAY EXAM CHEST 1 VIEW: CPT

## 2019-04-26 PROCEDURE — 93005 ELECTROCARDIOGRAM TRACING: CPT

## 2019-04-26 PROCEDURE — 83880 ASSAY OF NATRIURETIC PEPTIDE: CPT

## 2019-04-26 PROCEDURE — 36415 COLL VENOUS BLD VENIPUNCTURE: CPT

## 2019-04-26 PROCEDURE — 84484 ASSAY OF TROPONIN QUANT: CPT

## 2019-04-26 PROCEDURE — 2580000003 HC RX 258: Performed by: FAMILY MEDICINE

## 2019-04-26 PROCEDURE — 96360 HYDRATION IV INFUSION INIT: CPT

## 2019-04-26 RX ORDER — 0.9 % SODIUM CHLORIDE 0.9 %
500 INTRAVENOUS SOLUTION INTRAVENOUS ONCE
Status: COMPLETED | OUTPATIENT
Start: 2019-04-26 | End: 2019-04-26

## 2019-04-26 RX ADMIN — SODIUM CHLORIDE 500 ML: 9 INJECTION, SOLUTION INTRAVENOUS at 06:36

## 2019-04-26 NOTE — ED NOTES
Patient report to Veronica Keene LPN at [facility].      Astrid Young RN  04/26/19 5554

## 2019-04-26 NOTE — ED NOTES
Patient resting with eyes closed. No resp distress noted. RR regular and unlabored. Skin pk/w/d. Spo2 97%.       Taniya Loving RN  04/26/19 1625

## 2019-04-26 NOTE — ED PROVIDER NOTES
[address] [place] [address] Court  eMERGENCY dEPARTMENT eNCOUnter      Pt Name: Jah Noble  MRN: [id number]  [place] 1940  Date of evaluation: 4/26/2019  Provider: Leydi Grey, [address] [place] [address]       Chief Complaint   Patient presents with    Shortness of Breath         HISTORY OF PRESENT ILLNESS   (Location/Symptom, Timing/Onset, Context/Setting, Quality, Duration, Modifying Factors, Severity)  Note limiting factors. Jah Noble is a 66 y.o. female who presents to the emergency department secondary to having acute shortness of breath, sat dropped to 66-68%. They tried to suction pt and she still complained of SOA. Pt chronic trach. No fever. No trouble with BP at the nursing home. HR was up and was around 86%. Pt getting oxygen through her trach collar 33% humidified. BP 98/33 on arrival. No respiratory distress. Pt without any fever. Pt without any chest pain. Respiratory here suctioned pt and now sats up to 98% and still no distress. Nursing Notes were reviewed.     REVIEW OF SYSTEMS    (2-9 systems for level 4, 10 or more forlevel 5)     Review of Systems   Unable to perform ROS: Acuity of condition (Pt limited speech due to trach)           PAST MEDICAL HISTORY     Past Medical History:   Diagnosis Date    Anemia     Anxiety     CAD (coronary artery disease)     CHF (congestive heart failure) (HCC)     Chronic kidney disease     stage 3    Constipation     COPD (chronic obstructive pulmonary disease) (HCC)     Dementia     Diabetes mellitus (HCC)     Dysphagia     Femur fracture (HCC)     GERD (gastroesophageal reflux disease)     Hypercapnia     Hyperlipidemia     Hypertension     Hypotension     Larynx disorder     Nausea & vomiting     Obesity     Respiratory failure (HCC)     Restless leg syndrome     Stenosis of aortic valve          SURGICAL HISTORY       Past Surgical History:   Procedure Laterality Date    ARM SURGERY Right     GASTROSTOMY      LEG SURGERY Right     TRACHEOSTOMY           CURRENT MEDICATIONS       Current Discharge Medication List      CONTINUE these medications which have NOT CHANGED    Details   oxyCODONE 5 MG capsule Take 1 capsule by mouth every 8 hours for 14 days. Qty: 42 capsule, Refills: 0    Comments: Reduce doses taken as pain becomes manageable  Associated Diagnoses: Other chronic pain      clonazePAM (KLONOPIN) 1 MG tablet Take 1 tablet by mouth three times daily for 120 days.   Qty: 90 tablet, Refills: 3    Associated Diagnoses: Anxiety      insulin glargine (LANTUS) 100 UNIT/ML injection vial Inject 30 Units into the skin nightly  Qty: 1 vial, Refills: 0      busPIRone (BUSPAR) 15 MG tablet Take 15 mg by mouth 3 times daily  Qty: 90 tablet, Refills: 3      furosemide (LASIX) 40 MG tablet Take 0.5 tablets by mouth 2 times daily  Qty: 6 tablet, Refills: 0      rOPINIRole (REQUIP) 4 MG tablet Take 1 tablet by mouth 3 times daily  Qty: 30 tablet, Refills: 0      ferrous sulfate 325 (65 Fe) MG tablet Take 1 tablet by mouth 2 times daily  Qty: 30 tablet      midodrine (PROAMATINE) 10 MG tablet Take 10 mg by mouth 3 times daily      Omega-3 1000 MG CAPS Take 1,000 mg by mouth 2 times daily      ondansetron (ZOFRAN) 4 MG tablet Take 4 mg by mouth every 6 hours as needed for Nausea or Vomiting      albuterol (PROVENTIL) (2.5 MG/3ML) 0.083% nebulizer solution Take 2.5 mg by nebulization every 4 hours as needed for Wheezing      aspirin 81 MG chewable tablet Take 81 mg by mouth daily      docusate sodium (COLACE) 100 MG capsule Take 100 mg by mouth daily      pantoprazole (PROTONIX) 40 MG tablet Take 40 mg by mouth daily      sodium bicarbonate 325 MG tablet Take 325 mg by mouth 4 times daily      Multiple Vitamins-Minerals (THERAPEUTIC MULTIVITAMIN-MINERALS) tablet Take 1 tablet by mouth daily      atorvastatin (LIPITOR) 40 MG tablet Take 40 mg by mouth nightly       enoxaparin (LOVENOX) 40 MG/0.4ML injection Inject into the skin daily guaiFENesin (MUCINEX) 600 MG extended release tablet Take 1 tablet by mouth 2 times daily as needed for Congestion  Qty: 24 tablet, Refills: 0      Vitamins A & D (VITAMIN A & D) ointment Apply topically as needed for Dry Skin Apply topically to buttocks as needed for redness. Nutritional Supplements (PROMOTE PO) Take 60 mL/hr by mouth      terbutaline (BRETHINE) 1 MG/ML injection 1 vial inhaled via nebulization every 2 hrs as needed. polyethylene glycol (GLYCOLAX) powder Take 17 g by mouth daily      Arginine POWD 1 packet by Gastrostomy Tube route 2 times daily      acetaminophen (TYLENOL) 500 MG tablet Take 500 mg by mouth every 4 hours as needed for Pain or Fever      acetylcysteine (MUCOMYST) 10 % nebulizer solution Inhale 4 mLs into the lungs every 4 hours as needed      ipratropium-albuterol (DUONEB) 0.5-2.5 (3) MG/3ML SOLN nebulizer solution Inhale 1 vial into the lungs every 4 hours as needed              ALLERGIES     Penicillins and Sulfa antibiotics    FAMILY HISTORY     History reviewed. No pertinent family history.        SOCIAL HISTORY       Social History     Socioeconomic History    Marital status:      Spouse name: None    Number of children: None    Years of education: None    Highest education level: None   Occupational History    None   Social Needs    Financial resource strain: None    Food insecurity:     Worry: None     Inability: None    Transportation needs:     Medical: None     Non-medical: None   Tobacco Use    Smoking status: Former Smoker    Smokeless tobacco: Never Used   Substance and Sexual Activity    Alcohol use: No    Drug use: No    Sexual activity: Not Currently   Lifestyle    Physical activity:     Days per week: None     Minutes per session: None    Stress: None   Relationships    Social connections:     Talks on phone: None     Gets together: None     Attends Church service: None     Active member of club or organization: None     Attends meetings of clubs or organizations: None     Relationship status: None    Intimate partner violence:     Fear of current or ex partner: None     Emotionally abused: None     Physically abused: None     Forced sexual activity: None   Other Topics Concern    None   Social History Narrative    None       SCREENINGS             PHYSICAL EXAM    (up to 7 for level 4, 8 or more for level 5)     ED Triage Vitals [04/26/19 0523]   BP Temp Temp Source Pulse Resp SpO2 Height Weight   (!) 102/36 98 °F (36.7 °C) Axillary 76 20 96 % 5' 2.5\" (1.588 m) 160 lb (72.6 kg)       Physical Exam   Constitutional: She appears well-developed and well-nourished. No distress. Obese female; No respiratory distress   HENT:   Head: Normocephalic and atraumatic. Mouth/Throat: Oropharynx is clear and moist.   Eyes: Pupils are equal, round, and reactive to light. Conjunctivae and EOM are normal.   Neck: Neck supple. Trach/collar in place without obstruction or base of skin erythema/drainage   Cardiovascular: Normal rate and regular rhythm.   +1/5 holosystolic murmur heard best left 2nd intercostal space   Pulmonary/Chest: Effort normal and breath sounds normal.   Abdominal: Soft. Musculoskeletal: She exhibits edema. Pt with +2 lower extremity edema   Neurological: She is alert. Skin: Skin is warm and dry. There is pallor. Psychiatric: She has a normal mood and affect. Her behavior is normal.   Nursing note and vitals reviewed. DIAGNOSTIC RESULTS     EKG: All EKG's are interpreted by the Emergency Department Physician who either signs or Co-signs this chart in the absence of a cardiologist.    HR 71; Sinus Rhythm;  ms; 1st degree block; Normal QRS 90 ms;  No acute ST-T elev/dep; Normal axis    RADIOLOGY:   Non-plain film images such as CT, Ultrasound and MRI are read by the radiologist. Plain radiographic images are visualized andpreliminarily interpreted by the emergency physician with the below findings:    CXR - Marked cardiomegaly; Mild vascular congestion; No infiltrate; Small right pleural effusion    Interpretationper the Radiologist below, if available at the time of this note:    XR CHEST PORTABLE    (Results Pending)         ED BEDSIDE ULTRASOUND:   Performed by ED Physician - none    LABS:  Labs Reviewed   CBC WITH AUTO DIFFERENTIAL - Abnormal; Notable for the following components:       Result Value    MPV 11.7 (*)     All other components within normal limits    Narrative:     Performed at:  [address] [place] [address] Laboratory  [address] [place] [address],  [place], [address] [address]   Phone [phone]   BASIC METABOLIC PANEL W/ REFLEX TO MG FOR LOW K - Abnormal; Notable for the following components:    Chloride 92 (*)     CO2 39 (*)     Glucose 308 (*)     BUN 24 (*)     All other components within normal limits    Narrative:     Performed at:  [address] [place] [address] Laboratory  [address] [place] [address],  [place], [address]   Phone [phone] - Abnormal; Notable for the following components:    Pro-BNP 3,349 (*)     All other components within normal limits    Narrative:     Performed at:  [address] [place] [address] Laboratory  [address] [place] [address],  [place], [address]   Phone [phone]   TROPONIN    Narrative:     Performed at:  [address] [place] [address] Laboratory  [address] [place] [address],  [place], [address]   Phone [phone]       All other labs were within normal range or not returned as of this dictation. EMERGENCY DEPARTMENT COURSE and DIFFERENTIAL DIAGNOSIS/MDM:   Vitals:    Vitals:    04/26/19 0523 04/26/19 0700   BP: (!) 102/36 (!) 99/32   Pulse: 76 71   Resp: 20    Temp: 98 °F (36.7 °C)    TempSrc: Axillary    SpO2: 96% 97%   Weight: 160 lb (72.6 kg)    Height: 5' 2.5\" (1.588 m)            CRITICAL CARE TIME   Total Critical Care time was 0 minutes, excluding separatelyreportable procedures.   There was a high probability ofclinically significant/life threatening deterioration in the patient's condition which required my urgent intervention. CONSULTS:  None    PROCEDURES:  None    PROGRESS NOTES:    Pt with /40 now. Pt without any distress. Sat 97% the whole time. FINAL IMPRESSION      1. Acute respiratory distress New Problem         DISPOSITION/PLAN   DISPOSITION        PATIENT REFERRED TO:  [place]arin Belcher [place]  [address]  [phone]      Pt to see PCP in next couple days.  Nursing team needs to deep suction pt agressively while at the nursing home when she starts having distress      DISCHARGE MEDICATIONS:  Current Discharge Medication List          (Please note that portions of this note were completed with a voice recognition program.  Efforts were made to edit the dictations but occasionallywords are mis-transcribed.)    Natividad Deal DO (electronically signed)  Attending Emergency Physician          Natividad Deal DO  04/26/19 1526

## 2019-04-26 NOTE — ED TRIAGE NOTES
Pt sent from nursing home with c/o soa per nh staff. Reportedly suctioned from trach, with sats improving greatly after suctioning. Pt p/w/d.  No resp distress noted on ed arrival, resp, toi, at bedside

## 2019-05-02 ENCOUNTER — APPOINTMENT (OUTPATIENT)
Dept: GENERAL RADIOLOGY | Facility: HOSPITAL | Age: 79
End: 2019-05-02
Payer: MEDICARE

## 2019-05-02 ENCOUNTER — HOSPITAL ENCOUNTER (EMERGENCY)
Facility: HOSPITAL | Age: 79
Discharge: HOME | End: 2019-05-02
Attending: EMERGENCY MEDICINE
Payer: MEDICARE

## 2019-05-02 VITALS
RESPIRATION RATE: 12 BRPM | DIASTOLIC BLOOD PRESSURE: 50 MMHG | HEART RATE: 67 BPM | OXYGEN SATURATION: 95 % | HEIGHT: 63 IN | BODY MASS INDEX: 28.35 KG/M2 | SYSTOLIC BLOOD PRESSURE: 119 MMHG | TEMPERATURE: 97.2 F | WEIGHT: 160 LBS

## 2019-05-02 DIAGNOSIS — J95.00 COMPLICATION OF TRACHEOSTOMY TUBE (HCC): Primary | ICD-10-CM

## 2019-05-02 PROCEDURE — 99284 EMERGENCY DEPT VISIT MOD MDM: CPT

## 2019-05-02 PROCEDURE — 31720 CLEARANCE OF AIRWAYS: CPT

## 2019-05-02 PROCEDURE — 71045 X-RAY EXAM CHEST 1 VIEW: CPT

## 2019-05-02 NOTE — ED NOTES
[facility] ems brought patient in from [facility], states that the ems call when out for a \"muscle spasm\", stated that when they arrived nursing staff stated that patients trach came out and and they had to reinsert it, patient with moderate amount of bloody drainage noted, [facility] to their stretcher her sats were 85% per 15 liter trach mask at that time, patient responsive to voice upon arrival to er.      Arnold Marroquin RN  05/02/19 9612

## 2019-05-02 NOTE — ED PROVIDER NOTES
[facility]      Pt Name: Sherry Sibley  MRN: [id number]  YOB: 1940  Date of evaluation: 5/2/2019  Provider: Alethea Faith, [address] [place] [address]       Chief Complaint   Patient presents with    Shortness of Breath         HISTORY OF PRESENT ILLNESS  (Location/Symptom, Timing/Onset, Context/Setting, Quality, Duration, Modifying Factors, Severity.)   Sherry Sibley is a 78 y.o. female who presents to the emergency department for evaluation. Nursing home. The history is limited as the EMS providers did not get a full signout from the nursing home staff. They were told her some sort of muscle spasm, upon her arrival they noted the patient had a trach dislodged which was replaced by the nursing staff at the facility, they placed the patient on a trach collar and brought to the hospital for further evaluation. The patient herself does not provide any additional history which limits my HPI. Nursing notes were reviewed.     REVIEW OFSYSTEMS    (2-9 systems for level 4, 10 or more for level 5)   ROS:  Unable to fully perform review of systems due to patient's state      PAST MEDICAL HISTORY     Past Medical History:   Diagnosis Date    Anemia     Anxiety     CAD (coronary artery disease)     CHF (congestive heart failure) (HCC)     Chronic kidney disease     stage 3    Constipation     COPD (chronic obstructive pulmonary disease) (HCC)     Dementia     Diabetes mellitus (HCC)     Dysphagia     Femur fracture (HCC)     GERD (gastroesophageal reflux disease)     Hypercapnia     Hyperlipidemia     Hypertension     Hypotension     Larynx disorder     Nausea & vomiting     Obesity     Respiratory failure (HCC)     Restless leg syndrome     Stenosis of aortic valve          SURGICAL HISTORY       Past Surgical History:   Procedure Laterality Date    ARM SURGERY Right     GASTROSTOMY      LEG SURGERY Right     TRACHEOSTOMY CURRENT MEDICATIONS       Previous Medications    ACETAMINOPHEN (TYLENOL) 500 MG TABLET    Take 500 mg by mouth every 4 hours as needed for Pain or Fever    ACETYLCYSTEINE (MUCOMYST) 10 % NEBULIZER SOLUTION    Inhale 4 mLs into the lungs every 4 hours as needed    ALBUTEROL (PROVENTIL) (2.5 MG/3ML) 0.083% NEBULIZER SOLUTION    Take 2.5 mg by nebulization every 4 hours as needed for Wheezing    ARGININE POWD    1 packet by Gastrostomy Tube route 2 times daily    ASPIRIN 81 MG CHEWABLE TABLET    Take 81 mg by mouth daily    ATORVASTATIN (LIPITOR) 40 MG TABLET    Take 40 mg by mouth nightly     BUSPIRONE (BUSPAR) 15 MG TABLET    Take 15 mg by mouth 3 times daily    CLONAZEPAM (KLONOPIN) 1 MG TABLET    Take 1 tablet by mouth three times daily for 120 days. DOCUSATE SODIUM (COLACE) 100 MG CAPSULE    Take 100 mg by mouth daily    ENOXAPARIN (LOVENOX) 40 MG/0.4ML INJECTION    Inject into the skin daily    FERROUS SULFATE 325 (65 FE) MG TABLET    Take 1 tablet by mouth 2 times daily    FUROSEMIDE (LASIX) 40 MG TABLET    Take 0.5 tablets by mouth 2 times daily    GUAIFENESIN (MUCINEX) 600 MG EXTENDED RELEASE TABLET    Take 1 tablet by mouth 2 times daily as needed for Congestion    INSULIN GLARGINE (LANTUS) 100 UNIT/ML INJECTION VIAL    Inject 30 Units into the skin nightly    IPRATROPIUM-ALBUTEROL (DUONEB) 0.5-2.5 (3) MG/3ML SOLN NEBULIZER SOLUTION    Inhale 1 vial into the lungs every 4 hours as needed     MIDODRINE (PROAMATINE) 10 MG TABLET    Take 10 mg by mouth 3 times daily    MULTIPLE VITAMINS-MINERALS (THERAPEUTIC MULTIVITAMIN-MINERALS) TABLET    Take 1 tablet by mouth daily    NUTRITIONAL SUPPLEMENTS (PROMOTE PO)    Take 60 mL/hr by mouth    OMEGA-3 1000 MG CAPS    Take 1,000 mg by mouth 2 times daily    ONDANSETRON (ZOFRAN) 4 MG TABLET    Take 4 mg by mouth every 6 hours as needed for Nausea or Vomiting    OXYCODONE 5 MG CAPSULE    Take 1 capsule by mouth every 8 hours for 14 days.     PANTOPRAZOLE (PROTONIX) 40 MG TABLET    Take 40 mg by mouth daily    POLYETHYLENE GLYCOL (GLYCOLAX) POWDER    Take 17 g by mouth daily    ROPINIROLE (REQUIP) 4 MG TABLET    Take 1 tablet by mouth 3 times daily    SODIUM BICARBONATE 325 MG TABLET    Take 325 mg by mouth 4 times daily    TERBUTALINE (BRETHINE) 1 MG/ML INJECTION    1 vial inhaled via nebulization every 2 hrs as needed. VITAMINS A & D (VITAMIN A & D) OINTMENT    Apply topically as needed for Dry Skin Apply topically to buttocks as needed for redness. ALLERGIES     Penicillins and Sulfa antibiotics    FAMILY HISTORY     History reviewed. No pertinent family history.        SOCIAL HISTORY       Social History     Socioeconomic History    Marital status:      Spouse name: None    Number of children: None    Years of education: None    Highest education level: None   Occupational History    None   Social Needs    Financial resource strain: None    Food insecurity:     Worry: None     Inability: None    Transportation needs:     Medical: None     Non-medical: None   Tobacco Use    Smoking status: Former Smoker    Smokeless tobacco: Never Used   Substance and Sexual Activity    Alcohol use: No    Drug use: No    Sexual activity: Not Currently   Lifestyle    Physical activity:     Days per week: None     Minutes per session: None    Stress: None   Relationships    Social connections:     Talks on phone: None     Gets together: None     Attends Shinto service: None     Active member of club or organization: None     Attends meetings of clubs or organizations: None     Relationship status: None    Intimate partner violence:     Fear of current or ex partner: None     Emotionally abused: None     Physically abused: None     Forced sexual activity: None   Other Topics Concern    None   Social History Narrative    None         PHYSICAL EXAM    (up to 7 for level 4, 8 or more for level 5)     ED Triage Vitals   BP Temp Temp src Pulse Resp range or not returned as of this dictation. EMERGENCY DEPARTMENT COURSE and DIFFERENTIAL DIAGNOSIS/MDM:   Vitals:    Vitals:    05/02/19 1045 05/02/19 1100 05/02/19 1115 05/02/19 1128   BP: (!) 138/58 (!) 123/48 (!) 114/52    Pulse: 70 72 75    Resp:       Temp:       TempSrc:       SpO2: 91% (!) 88% (!) 87% 96%   Weight:       Height:           MEDICATIONS ADMINISTERED IN ED:  Medications - No data to display    Patient status post a trach dislodgment, replaced by nursing staff prior to evaluation at the hospital.  On arrival trach is in place, there is some blood coming from the tracheostomy site, this is very small and minimal nature, we did suction the patient, removing some bloody sputum. Saturations 98% upon arrival to the emergency department, with respiratory therapy at the bedside, we are able to tenuous deep suctioning, we'll obtain chest x-ray for evaluation. We did perform a couple rounds of suction, clearing the airway, oxygen saturation 97%, vital signs stable. We'll send the patient back to nursing home, they can perform tracheostomy care and suctioning every 30-45 minutes until secretions clear. The patient will follow-up with their PCP in 1-2 days for reevaluation. If the patient or family members have anyfurther concerns or any worsening symptoms they will return to the ED for reevaluation. CONSULTS:  None    PROCEDURES:  Procedures    CRITICAL CARE TIME    Total Critical Care time was 0 minutes, excluding separately reportable procedures. There was a high probability of clinically significant/life threatening deterioration in the patient's condition which required my urgent intervention. FINAL IMPRESSION      1.  Complication of tracheostomy tube [facility])          DISPOSITION/PLAN   DISPOSITION Decision To Discharge 05/02/2019 11:27:32 AM      PATIENT REFERRED TO:  Harriett Silva, [address]  [phone]    In 1 day      [facility]  [facility] [address] [facility]  [phone]    If symptoms worsen      DISCHARGE MEDICATIONS:  New Prescriptions    No medications on file       Comment: Please note this report has been produced using speech recognition software and may contain errorsrelated to that system including errors in grammar, punctuation, and spelling, as well as words and phrases that may be inappropriate. If there are any questions or concerns please feel free to contact the dictating providerfor clarification.     Julio Cesar Vigil DO  Attending Emergency Physician              Julio Cesar Vigil DO  05/02/19 8523

## 2019-05-02 NOTE — ED NOTES
Patient suctioned per respiratory therapy at this time for frothy bloody secretions at this time.      Joon Copeland RN  05/02/19 0919

## 2019-05-02 NOTE — ED NOTES
Patient switched to a 50% venturi trach mask at this time per respiratory.      Dayana Plascencia RN  05/02/19 1009

## 2019-05-06 ENCOUNTER — TELEPHONE (OUTPATIENT)
Dept: PRIMARY CARE CLINIC | Age: 79
End: 2019-05-06

## 2019-05-06 NOTE — TELEPHONE ENCOUNTER
Per NH can they do blood sugars BID instead of around the clock due to her not being tube fed anymore? ? Also, can they increase Lantus from 25mg nightly to 35mg nightly? ??

## 2019-05-06 NOTE — TELEPHONE ENCOUNTER
Pt decided to do DNR and signed papers. Does not want to go to hosp this past weekend. She was de-sating and v/u the risks of not going to hosp. It was just an [place].

## 2020-07-21 NOTE — PLAN OF CARE
Problem: Patient Care Overview  Goal: Plan of Care Review  Outcome: Ongoing (interventions implemented as appropriate)   04/08/19 0625   Coping/Psychosocial   Plan of Care Reviewed With patient   Plan of Care Review   Progress improving   OTHER   Outcome Summary Pt. vitals stable and pain managed. Tolerated trach collar throughout night, speech/WOC to follow up with pt. Pt. Incont. Of bowel and bladder. Will continue to monitor. See orders/notes.           The patient is a 39y Male complaining of psychiatric evaluation.

## 2023-04-27 NOTE — THERAPY EVALUATION
Acute Care - Speech Language Pathology Initial Evaluation  [facility]   Trach/Speaking Valve Evaluation  Clinical Swallow Evaluation       Patient Name: Nallely Junior  : 1940  MRN: [id number]  Today's Date: 2019  Onset of Illness/Injury or Date of Surgery: 19     Referring Physician: ARELI Navas      Admit Date: 2019     Visit Dx:    ICD-10-CM ICD-9-CM   1. Hypotension, unspecified hypotension type I95.9 458.9   2. Acute on chronic respiratory failure with hypoxia  J96.21 518.84     799.02   3. Inferior STEMI  I21.19 410.40   4. Atelectasis of left lung J98.11 518.0   5. Impaired mobility and ADLs Z74.09 799.89   6. Impaired functional mobility, balance, gait, and endurance Z74.09 V49.89   7. Oropharyngeal dysphagia R13.12 787.22   8. Voice and resonance disorder R49.9 784.40     Patient Active Problem List   Diagnosis   • Pneumonia   • Type 2 diabetes mellitus    • Hypotension   • Hyperlipidemia   • Depression   • Anxiety   • COPD   • Dysphagia with chronic PEG    • Coronary artery disease   • CHF    • GERD    • Acute on chronic respiratory failure with hypoxia    • Aortic valve stenosis   • Stage 3 CKD   • Former smoker   • Inferior STEMI    • H/O MRSA pneumonia 10/2018    • Debility   • Dementia   • Class 1 obesity in adult   • Atelectasis of left lung   • Bronchomalacia     Past Medical History:   Diagnosis Date   • Anemia    • Anxiety    • Aortic stenosis    • CHF (congestive heart failure) (CMS/[facility])    • Chronic respiratory failure with hypoxia and hypercapnia (CMS/[facility])    • CKD (chronic kidney disease), stage III (CMS/[facility])    • Constipation    • COPD (chronic obstructive pulmonary disease) (CMS/[facility])    • Coronary artery disease    • Dementia    • Depression    • Diabetes mellitus (CMS/H[facility])    • Dysphagia    • Femur fracture, right (CMS/[facility])    • GERD (gastroesophageal reflux disease)    • Hyperlipidemia    • Hypertension    • Hypotension     on midodrine   • Restless legs syndrome  History  Chief Complaint   Patient presents with   • Vaginal Bleeding - Pregnant     Pt stated that she pregnant, unknown how far along  Stated that she began having vaginal bleeding beginning two days ago  Stated that it is similar to spotting  14-year-old female presents to the emergency department with reports of vaginal bleeding  States that she is currently pregnant  States that she is unsure how far along she is  Last menstrual period was 3/6/2023  States that she has regular periods every 28 days  Did call her OB/GYN yesterday and set up an appointment for the near future  States that she has had some dark red and brown spotting yesterday and today  Denies pelvic pain or cramping  No history of previous pregnancies  History provided by:  Patient   used: No        Prior to Admission Medications   Prescriptions Last Dose Informant Patient Reported? Taking?   acetaminophen (TYLENOL) 650 mg CR tablet   No No   Sig: Take 1 tablet (650 mg total) by mouth every 8 (eight) hours as needed for mild pain   ibuprofen (MOTRIN) 600 mg tablet   No No   Sig: Take 1 tablet (600 mg total) by mouth every 6 (six) hours as needed for mild pain   ondansetron (ZOFRAN) 4 mg tablet   No No   Sig: Take 1 tablet (4 mg total) by mouth every 6 (six) hours      Facility-Administered Medications: None       History reviewed  No pertinent past medical history  History reviewed  No pertinent surgical history  History reviewed  No pertinent family history  I have reviewed and agree with the history as documented      E-Cigarette/Vaping   • E-Cigarette Use Never User      E-Cigarette/Vaping Substances     Social History     Tobacco Use   • Smoking status: Never   • Smokeless tobacco: Never   Vaping Use   • Vaping Use: Never used   Substance Use Topics   • Alcohol use: Not Currently   • Drug use: Not Currently       Review of Systems   Constitutional: Negative for activity change, appetite change, (RLS)      Past Surgical History:   Procedure Laterality Date   • FOREARM SURGERY     • LEG SURGERY     • PEG TUBE INSERTION     • TRACHEOSTOMY          SLP EVALUATION (last 72 hours)      SLP SLC Evaluation     Row Name 04/08/19 8920                   General Information    Patient Profile Reviewed  yes  -RD        Pertinent History Of Current Problem  Adm w/ Inferior STEMI. Dysphagia w/ chronic PEG, but previously on soft and thins at SNF. Pt had mold in PEG upon admission, so this had to be replaced during this IP adm. RECS for soft and thins, small sips, no straws 2' silent aspiration after instrumental 10/18/18 at [facility]. PMH: COPD, CKD3, dementia, DM 2, chronic trach. Had shiley cuffed-6 as of 4/2, but currently w/ shiley 6 cuffless after changing out trach today. Pt has had trach at least since 10/11/17 per chart.  -RD        Precautions/Limitations, Vision  WFL with corrective lenses  -RD        Precautions/Limitations, Hearing  hearing impairment, bilaterally  -RD        Prior Level of Function-Communication  cognitive-linguistic impairment;other (see comments) voice impairment 2' trach; dementia per chart  -RD        Plans/Goals Discussed with  patient;agreed upon  -RD        Barriers to Rehab  previous functional deficit;medically complex  -RD        Patient's Goals for Discharge  other (see comments) produce voice  -RD           Pain Assessment    Additional Documentation  Pain Scale: Numbers Pre/Post-Treatment (Group)  -RD           Pain Scale: Numbers Pre/Post-Treatment    Pain Scale: Numbers, Pretreatment  0/10 - no pain  -RD        Pain Scale: Numbers, Post-Treatment  0/10 - no pain  -RD           Speaking Valve    Respiratory Status  trach collar  -RD        Pretreatment Heart Rate (beats/min)  68  -RD        Pre SpO2 (%)  96  -RD        Pretreatment Cuff Status  Comment cuffless trach  -RD        Trach Type  Shiley;non cuffed;size 6  -RD        Types of Intervention  tracheostomy speaking  chills and fever  HENT: Negative for congestion, dental problem, drooling, ear discharge, ear pain, mouth sores, nosebleeds, rhinorrhea, sore throat and trouble swallowing  Eyes: Negative for pain, discharge and itching  Respiratory: Negative for cough, chest tightness, shortness of breath and wheezing  Cardiovascular: Negative for chest pain and palpitations  Gastrointestinal: Negative for abdominal pain, blood in stool, constipation, diarrhea, nausea and vomiting  Endocrine: Negative for cold intolerance and heat intolerance  Genitourinary: Positive for vaginal bleeding  Negative for difficulty urinating, dysuria, flank pain, frequency and urgency  Skin: Negative for rash and wound  Allergic/Immunologic: Negative for food allergies and immunocompromised state  Neurological: Negative for dizziness, seizures, syncope, weakness, numbness and headaches  Psychiatric/Behavioral: Negative for agitation, behavioral problems and confusion  Physical Exam  Physical Exam  Vitals and nursing note reviewed  Constitutional:       General: She is not in acute distress  Appearance: She is not diaphoretic  HENT:      Head: Normocephalic and atraumatic  Right Ear: External ear normal       Left Ear: External ear normal       Mouth/Throat:      Pharynx: No oropharyngeal exudate  Eyes:      Conjunctiva/sclera: Conjunctivae normal    Neck:      Vascular: No JVD  Trachea: No tracheal deviation  Cardiovascular:      Rate and Rhythm: Normal rate and regular rhythm  Heart sounds: Normal heart sounds  No murmur heard  No friction rub  No gallop  Pulmonary:      Effort: No respiratory distress  Breath sounds: No wheezing or rales  Chest:      Chest wall: No tenderness  Abdominal:      General: Bowel sounds are normal  There is no distension  Palpations: Abdomen is soft  Tenderness: There is no abdominal tenderness  There is no guarding     Musculoskeletal: General: No tenderness or deformity  Normal range of motion  Lymphadenopathy:      Cervical: No cervical adenopathy  Skin:     General: Skin is warm and dry  Findings: No erythema or rash  Neurological:      Mental Status: She is alert and oriented to person, place, and time           Vital Signs  ED Triage Vitals [04/27/23 0740]   Temperature Pulse Respirations Blood Pressure SpO2   99 2 °F (37 3 °C) 90 18 118/63 100 %      Temp Source Heart Rate Source Patient Position - Orthostatic VS BP Location FiO2 (%)   Oral -- -- -- --      Pain Score       No Pain           Vitals:    04/27/23 0740   BP: 118/63   Pulse: 90         Visual Acuity      ED Medications  Medications - No data to display    Diagnostic Studies  Results Reviewed     Procedure Component Value Units Date/Time    Quantitative hCG [[id number]]  (Abnormal) Collected: 04/27/23 0759    Lab Status: Final result Specimen: Blood from Arm, Left Updated: 04/27/23 0904     HCG, Quant 27,149 mIU/mL     Narrative:       Expected Ranges:     Approximate               Approximate HCG  Gestation age          Concentration ( mIU/mL)  _____________          ______________________   Sean Jose                      HCG values  0 2-1                       5-50  1-2                           2-3                         100-5000  3-4                         500-06447  4-5                         1000-90940  5-6                         [id number]  6-8                         44968-292198  8-12                        58230-839918      Basic metabolic panel [[id number]]  (Abnormal) Collected: 04/27/23 0759    Lab Status: Final result Specimen: Blood from Arm, Left Updated: 04/27/23 0904     Sodium 134 mmol/L      Potassium 3 4 mmol/L      Chloride 105 mmol/L      CO2 27 mmol/L      ANION GAP 2 mmol/L      BUN 12 mg/dL      Creatinine 0 67 mg/dL      Glucose 100 mg/dL      Calcium 9 1 mg/dL      eGFR 127 ml/min/1 73sq m     Narrative:      National [employer] valve;coordination of respiration/phonation  -RD        Speaking Valve Type  PMV-2000 (clear)  -RD        Phonation with Occlusion  speaking valve;audible at 3 feet  -RD        Vocal Quality on Phonation  noticeably hoarse;wet;other (see comments) difficulty coordinating speech/respiration  -RD        Response to Intervention  speaking valve;tolerated for short period;anxious;cough;breath stacking  -RD        Minutes Tolerated  speaking valve;< 1 minutes  -RD        Posttreatment Heart Rate (beats/min)  73  -RD        Post SpO2 (%)  99  -RD        Posttreatment Cuff Status  Comment cuffless  -RD        At Conclusion  speaking valve removed;speaking valve at bedside;notified RN/LPN;other (see comments) non-cuffed trach  -RD        Speaking Valve Placement Recommendation  SLP only  -RD        Speaking Valve, Comment  PMV evaluation complete. HR at 68 and 96% O2 prior to PMV placement. Cuffless trach- shiley 6. PMV placed. Pt audible at 3' w/ functional articulation. Pt having difficulty coordinating respiration and speech. Provided education. Pt becoming anxious and coughing, requested removal of PMV. O2 remained stable at 99% and HR at 73 during PMV placement and after removal. Attempted PMV placement x3, but pt unable to tolerate. Speaking valve removed and placed at bedside. Continue PMV tx w/ SLP only.   -RD           SLP Clinical Impressions    SLP Diagnosis  Communication/voice impairment 2' trach.   -RD        Rehab Potential/Prognosis  good  -RD        SLC Criteria for Skilled Therapy Interventions Met  yes  -RD        Functional Impact  difficulty in expressing complex messages;difficulty communicating in an emergency;difficulty communicating wants, needs  -RD           Recommendations    Therapy Frequency (SLP SLC)  5 days per week  -RD        Predicted Duration Therapy Intervention (Days)  until discharge  -RD        Anticipated Dischage Disposition  skilled nursing facility;anticipate therapy at next level  Foundation guidelines for Chronic Kidney Disease (CKD):   •  Stage 1 with normal or high GFR (GFR > 90 mL/min/1 73 square meters)  •  Stage 2 Mild CKD (GFR = 60-89 mL/min/1 73 square meters)  •  Stage 3A Moderate CKD (GFR = 45-59 mL/min/1 73 square meters)  •  Stage 3B Moderate CKD (GFR = 30-44 mL/min/1 73 square meters)  •  Stage 4 Severe CKD (GFR = 15-29 mL/min/1 73 square meters)  •  Stage 5 End Stage CKD (GFR <15 mL/min/1 73 square meters)  Note: GFR calculation is accurate only with a steady state creatinine    Urine Microscopic [[id number]]  (Abnormal) Collected: 04/27/23 0821    Lab Status: Final result Specimen: Urine, Clean Catch Updated: 04/27/23 0849     RBC, UA 1-2 /hpf      WBC, UA 1-2 /hpf      Epithelial Cells Occasional /hpf      Bacteria, UA None Seen /hpf      MUCUS THREADS Moderate     URINE COMMENT --    UA w Reflex to Microscopic w Reflex to Culture [[id number]]  (Abnormal) Collected: 04/27/23 0821    Lab Status: Final result Specimen: Urine, Clean Catch Updated: 04/27/23 0848     Color, UA Yellow     Clarity, UA Clear     Specific Gravity, UA 1 021     pH, UA 6 0     Leukocytes, UA Negative     Nitrite, UA Negative     Protein, UA Trace mg/dl      Glucose, UA Negative mg/dl      Ketones, UA Negative mg/dl      Urobilinogen, UA <2 0 mg/dl      Bilirubin, UA Negative     Occult Blood, UA Trace     URINE COMMENT --    Urine culture [[id number]] Collected: 04/27/23 0821    Lab Status:  In process Specimen: Urine, Clean Catch Updated: 04/27/23 0848    POCT pregnancy, urine [[id number]]  (Abnormal) Resulted: 04/27/23 0826    Lab Status: Final result Updated: 04/27/23 0826     EXT Preg Test, Ur Positive     Control Valid    CBC and differential [[id number]]  (Abnormal) Collected: 04/27/23 0759    Lab Status: Final result Specimen: Blood from Arm, Left Updated: 04/27/23 0813     WBC 3 53 Thousand/uL      RBC 4 62 Million/uL      Hemoglobin 13 8 g/dL      Hematocrit 39 8 %      MCV 86 fL      MCH 29 9 pg of care  -RD          User Key  (r) = Recorded By, (t) = Taken By, (c) = Cosigned By    Initials Name Effective Dates    Stacey Patton MS CCC-SLP 04/03/18 -              EDUCATION  The patient has been educated in the following areas:     Communication Impairment Speaking Valve Voice Disorder.    SLP Recommendation and Plan  SLP Diagnosis: Communication/voice impairment 2' trach.      Swallow Criteria for Skilled Therapeutic Interventions Met: demonstrates skilled criteria  SLC Criteria for Skilled Therapy Interventions Met: yes  SLP Diagnosis: Communication/voice impairment 2' trach.   Anticipated Dischage Disposition: skilled nursing facility, anticipate therapy at next level of care  Therapy Frequency (Swallow): evaluation only, PRN  Predicted Duration Therapy Intervention (Days): until discharge    Plan of Care Reviewed With: patient  Plan of Care Review  Plan of Care Reviewed With: patient      SLP GOALS     Row Name 04/08/19 1345             Tolerate Speaking Valve Placement Goal 1 (SLP)    Tolerate Speaking Valve Placement Goal 1 (SLP)  finger occlusion;speaking valve 10 min;80%;with minimal cues (75-90%)  -RD      Time Frame (Tolerate Speaking Valve Placement Goal 1, SLP)  short term goal (STG);by discharge  -RD         Audible Speech with Speaking Valve Goal 1 (SLP)    Audible Speech Goal 1 (SLP)  3 feet;80%;with minimal cues (75-90%)  -RD      Time Frame (Audible Speech Goal 1, SLP)  short term goal (STG);by discharge  -RD         Additional Goal 1 (SLP)    Additional Goal 1, SLP  LTG: Pt will verbally communicate wants/needs while wearing PMV w/ 100% accuracy w/o cues  -RD      Time Frame (Additional Goal 1, SLP)  by discharge  -RD        User Key  (r) = Recorded By, (t) = Taken By, (c) = Cosigned By    Initials Name Provider Type    Stacey Patton MS CCC-SLP Speech and Language Pathologist                  Time Calculation:     Time Calculation- SLP     Row Name 04/08/19 9400              MCHC 34 7 g/dL      RDW 11 2 %      MPV 10 3 fL      Platelets 551 Thousands/uL      nRBC 0 /100 WBCs      Neutrophils Relative 45 %      Immat GRANS % 0 %      Lymphocytes Relative 40 %      Monocytes Relative 9 %      Eosinophils Relative 5 %      Basophils Relative 1 %      Neutrophils Absolute 1 62 Thousands/µL      Immature Grans Absolute 0 00 Thousand/uL      Lymphocytes Absolute 1 40 Thousands/µL      Monocytes Absolute 0 30 Thousand/µL      Eosinophils Absolute 0 17 Thousand/µL      Basophils Absolute 0 04 Thousands/µL                  US OB < 14 weeks with transvaginal   Final Result by Gerard Mckoy MD ( [id number])      Intrauterine gestational sac corresponding to a 5-week 2-day pregnancy  Fetal pole not confidently identified currently  Follow-up is recommended  Workstation performed: [id number]                    Procedures  Procedures         ED Course                                             Medical Decision Making  Differential diagnosis includes but not limited to: first trimester bleeding, threatened , inevitable     Threatened miscarriage: acute illness or injury  Amount and/or Complexity of Data Reviewed  Labs: ordered  Decision-making details documented in ED Course  Radiology: ordered  Decision-making details documented in ED Course  Discussion of management or test interpretation with external provider(s): Discussed test results with patient  High possibility that pregnancy was nonviable based on dates given, but will have follow-up lab work done in 2 days  Patient advised to follow-up with her OB/GYN as scheduled on 23  Return to ER precautions given          Disposition  Final diagnoses:   Threatened miscarriage     Time reflects when diagnosis was documented in both MDM as applicable and the Disposition within this note     Time User Action Codes Description Comment    2023  9:32 AM Moris Yanez [O20 0] Threatened miscarriage       ED Time Calculation- SLP    SLP Start Time  1345  -RD      SLP Received On  19  -RD        User Key  (r) = Recorded By, (t) = Taken By, (c) = Cosigned By    Initials Name Provider Type    Stacey Patton MS CCC-SLP Speech and Language Pathologist          Therapy Charges for Today     Code Description Service Date Service Provider Modifiers Qty    25646468377 HC ST EVAL ORAL PHARYNG SWALLOW 3 2019 Stacey Kim MS CCC-SLP GN 1    04123798573 HC ST EVALUATION FIT VOICE PROSTH 3 2019 Stacey Kim MS CCC-SLP  1    39094461005 HC VALVE TRACH PMV SERIES 2019 Stacey Kim MS CCC-SLP  1                     MS BASSEM Ramos  2019 and Acute Care - Speech Language Pathology   Swallow Initial Evaluation [facility]     Patient Name: Nallely Junior  : 1940  MRN: [id number]  Today's Date: 2019  Onset of Illness/Injury or Date of Surgery: 19     Referring Physician: ARELI Navas      Admit Date: 2019    Visit Dx:     ICD-10-CM ICD-9-CM   1. Hypotension, unspecified hypotension type I95.9 458.9   2. Acute on chronic respiratory failure with hypoxia  J96.21 518.84     799.02   3. Inferior STEMI  I21.19 410.40   4. Atelectasis of left lung J98.11 518.0   5. Impaired mobility and ADLs Z74.09 799.89   6. Impaired functional mobility, balance, gait, and endurance Z74.09 V49.89   7. Oropharyngeal dysphagia R13.12 787.22   8. Voice and resonance disorder R49.9 784.40     Patient Active Problem List   Diagnosis   • Pneumonia   • Type 2 diabetes mellitus    • Hypotension   • Hyperlipidemia   • Depression   • Anxiety   • COPD   • Dysphagia with chronic PEG    • Coronary artery disease   • CHF    • GERD    • Acute on chronic respiratory failure with hypoxia    • Aortic valve stenosis   • Stage 3 CKD   • Former smoker   • Inferior STEMI    • H/O MRSA pneumonia 10/2018    • Debility   • Dementia   • Class 1 obesity in adult   • Atelectasis of left lung   •  Bronchomalacia     Past Medical History:   Diagnosis Date   • Anemia    • Anxiety    • Aortic stenosis    • CHF (congestive heart failure) (CMS/[facility])    • Chronic respiratory failure with hypoxia and hypercapnia (CMS/[facility])    • CKD (chronic kidney disease), stage III (CMS/[facility])    • Constipation    • COPD (chronic obstructive pulmonary disease) (CMS/[facility])    • Coronary artery disease    • Dementia    • Depression    • Diabetes mellitus (CMS/[facility])    • Dysphagia    • Femur fracture, right (CMS/[facility])    • GERD (gastroesophageal reflux disease)    • Hyperlipidemia    • Hypertension    • Hypotension     on midodrine   • Restless legs syndrome (RLS)      Past Surgical History:   Procedure Laterality Date   • FOREARM SURGERY     • LEG SURGERY     • PEG TUBE INSERTION     • TRACHEOSTOMY          SWALLOW EVALUATION (last 72 hours)      SLP Adult Swallow Evaluation     Row Name 04/08/19 5152                   Rehab Evaluation    Document Type  evaluation  -RD        Subjective Information  complains of;weakness  -RD        Patient Observations  alert;cooperative;agree to therapy  -RD        Patient/Family Observations  No family present  -RD        Patient Effort  adequate  -RD           General Information    Current Method of Nutrition  NPO;gastrostomy feedings  -RD        Prior Level of Function-Communication  cognitive-linguistic impairment;other (see comments) voice impairment 2' trach; dementia  -RD        Prior Level of Function-Swallowing  mechanical soft textures;thin liquids;compensations (maneuvers, postures) needed;other (see comments) hx of dysphagia w/ chronic PEG 2' concern for aspiration   -RD        Patient's Goals for Discharge  return to PO diet  -RD           Oral Motor and Function    Dentition Assessment  poor oral hygiene;teeth are in poor condition;missing teeth  -RD        Secretion Management  requires suctioning to control secretions  -RD        Mucosal Quality  moist, healthy  -RD        Volitional  Disposition     ED Disposition   Discharge    Condition   Stable    Date/Time   u Apr 27, 2023  9:32 AM    Comment   Juanito Medrano discharge to home/self care  Follow-up Information    None         Discharge Medication List as of 4/27/2023  9:34 AM      CONTINUE these medications which have NOT CHANGED    Details   acetaminophen (TYLENOL) 650 mg CR tablet Take 1 tablet (650 mg total) by mouth every 8 (eight) hours as needed for mild pain, Starting Mon 11/28/2022, Normal      ibuprofen (MOTRIN) 600 mg tablet Take 1 tablet (600 mg total) by mouth every 6 (six) hours as needed for mild pain, Starting Mon 11/28/2022, Normal      ondansetron (ZOFRAN) 4 mg tablet Take 1 tablet (4 mg total) by mouth every 6 (six) hours, Starting Mon 11/28/2022, Normal             Outpatient Discharge Orders   hCG, quantitative   Standing Status: Future Standing Exp   Date: 04/27/24       PDMP Review     None          ED Provider  Electronically Signed by           Vega Henry PA-C  04/27/23 8950 Swallow  delayed;weak  -RD        Volitional Cough  weak  -RD           Oral Musculature and Cranial Nerve Assessment    Oral Motor General Assessment  WFL  -RD           General Eating/Swallowing Observations    Respiratory Support Currently in Use  tracheostomy;trach collar;other (see comments) santhosh 6 cuffless  -RD        Eating/Swallowing Skills  fed by SLP  -RD        Positioning During Eating  upright 90 degree;upright in bed  -RD        Utensils Used  spoon  -RD        Consistencies Trialed  thin liquids;pureed  -RD           Clinical Swallow Eval    Oral Residue  WFL DNT solid  -RD        Pharyngeal Phase  suspected pharyngeal impairment  -RD        Clinical Swallow Evaluation Summary  Clinical dysphagia eval complete. Overt s/s of aspiration c/b inconsistent wet breathing w/ thins and pureed. Pt has a hx of silent aspiration and at risk 2' trach. Needs FEES prior to PO diet. NPO until FEES  -RD           Pharyngeal Phase Concerns    Pharyngeal Phase Concerns  wet vocal quality  -RD        Wet Vocal Quality  thin;other (see comments) wet breathing  -RD        Pharyngeal Phase Concerns, Comment  Suspect pharyngeal dysphagia  -RD           Clinical Impression    SLP Swallowing Diagnosis  suspected pharyngeal dysfunction  -RD        Functional Impact  risk of aspiration/pneumonia  -RD        Rehab Potential/Prognosis, Swallowing  good, to achieve stated therapy goals  -RD        Swallow Criteria for Skilled Therapeutic Interventions Met  demonstrates skilled criteria  -RD           Recommendations    Therapy Frequency (Swallow)  evaluation only;PRN  -RD        SLP Diet Recommendation  NPO;long term alternate methods of nutrition/hydration;other (see comments) ice chips post oral care PRN, if MD approves  -RD        Recommended Diagnostics  FEES;other (see comments) 4/9/19  -RD        Recommended Precautions and Strategies  other (see comments) Oral care BID/PRN  -RD        SLP Rec. for Method of Medication  Administration  meds via alternate route  -RD          User Key  (r) = Recorded By, (t) = Taken By, (c) = Cosigned By    Initials Name Effective Dates    Stacey Patton MS CCC-SLP 04/03/18 -           EDUCATION  The patient has been educated in the following areas:   Dysphagia (Swallowing Impairment) Oral Care/Hydration NPO rationale.    SLP Recommendation and Plan  SLP Swallowing Diagnosis: suspected pharyngeal dysfunction  SLP Diet Recommendation: NPO, long term alternate methods of nutrition/hydration, other (see comments)(ice chips post oral care PRN, if MD approves)  Recommended Precautions and Strategies: other (see comments)(Oral care BID/PRN)        Recommended Diagnostics: FEES, other (see comments)(4/9/19)  Swallow Criteria for Skilled Therapeutic Interventions Met: demonstrates skilled criteria  Anticipated Dischage Disposition: skilled nursing facility, anticipate therapy at next level of care  Rehab Potential/Prognosis, Swallowing: good, to achieve stated therapy goals  Therapy Frequency (Swallow): evaluation only, PRN  Predicted Duration Therapy Intervention (Days): until discharge       Plan of Care Reviewed With: patient  Plan of Care Review  Plan of Care Reviewed With: patient    SLP GOALS     Row Name 04/08/19 1345             Tolerate Speaking Valve Placement Goal 1 (SLP)    Tolerate Speaking Valve Placement Goal 1 (SLP)  finger occlusion;speaking valve 10 min;80%;with minimal cues (75-90%)  -RD      Time Frame (Tolerate Speaking Valve Placement Goal 1, SLP)  short term goal (STG);by discharge  -RD         Audible Speech with Speaking Valve Goal 1 (SLP)    Audible Speech Goal 1 (SLP)  3 feet;80%;with minimal cues (75-90%)  -RD      Time Frame (Audible Speech Goal 1, SLP)  short term goal (STG);by discharge  -RD         Additional Goal 1 (SLP)    Additional Goal 1, SLP  LTG: Pt will verbally communicate wants/needs while wearing PMV w/ 100% accuracy w/o cues  -RD      Time Frame (Additional  Goal 1, SLP)  by discharge  -RD        User Key  (r) = Recorded By, (t) = Taken By, (c) = Cosigned By    Initials Name Provider Type    Stacey Patton MS CCC-SLP Speech and Language Pathologist             Time Calculation:   Time Calculation- SLP     Row Name 04/08/19 1558             Time Calculation- SLP    SLP Start Time  1345  -RD      SLP Received On  04/08/19  -RD        User Key  (r) = Recorded By, (t) = Taken By, (c) = Cosigned By    Initials Name Provider Type    Stacey Patton MS CCC-SLP Speech and Language Pathologist          Therapy Charges for Today     Code Description Service Date Service Provider Modifiers Qty    40214860453 HC ST EVAL ORAL PHARYNG SWALLOW 3 4/8/2019 Stacey Kim MS CCC-SLP GN 1    [id number] HC ST EVALUATION FIT VOICE PROSTH 3 4/8/2019 Stacey Kim MS CCC-SLP  1    [id number] HC VALVE TRACH PMV SERIES 4/8/2019 Stacey Kim MS CCC-SLP  1               MS BASSEM Ramos  4/8/2019

## (undated) DEVICE — CATH DIAG EXPO M/ PK 6FR FL4/FR4 PIG 3PK

## (undated) DEVICE — MODEL BT2000 P/N 700287-012KIT CONTENTS: MANIFOLD WITH SALINE AND CONTRAST PORTS, SALINE TUBING WITH SPIKE AND HAND SYRINGE, TRANSDUCER: Brand: BT2000 AUTOMATED MANIFOLD KIT

## (undated) DEVICE — MODEL AT P65, P/N 701554-001KIT CONTENTS: HAND CONTROLLER, 3-WAY HIGH-PRESSURE STOPCOCK WITH ROTATING END AND PREMIUM HIGH-PRESSURE TUBING: Brand: ANGIOTOUCH® KIT

## (undated) DEVICE — PK CATH CARD 10

## (undated) DEVICE — GW J TP FIX CORE .035 150

## (undated) DEVICE — PINNACLE INTRODUCER SHEATH: Brand: PINNACLE